# Patient Record
Sex: FEMALE | Race: BLACK OR AFRICAN AMERICAN | Employment: OTHER | URBAN - METROPOLITAN AREA
[De-identification: names, ages, dates, MRNs, and addresses within clinical notes are randomized per-mention and may not be internally consistent; named-entity substitution may affect disease eponyms.]

---

## 2017-02-06 PROBLEM — F32.5 MAJOR DEPRESSIVE DISORDER WITH SINGLE EPISODE, IN REMISSION (HCC): Status: ACTIVE | Noted: 2017-02-06

## 2017-02-06 PROBLEM — I12.0 CKD STAGE 5 SECONDARY TO HYPERTENSION (HCC): Status: ACTIVE | Noted: 2017-02-06

## 2017-02-06 PROBLEM — N18.5 CKD STAGE 5 SECONDARY TO HYPERTENSION (HCC): Status: ACTIVE | Noted: 2017-02-06

## 2017-04-09 ENCOUNTER — APPOINTMENT (OUTPATIENT)
Dept: GENERAL RADIOLOGY | Age: 67
End: 2017-04-09
Payer: MEDICARE

## 2017-04-09 ENCOUNTER — HOSPITAL ENCOUNTER (EMERGENCY)
Age: 67
Discharge: HOME | End: 2017-04-09
Attending: EMERGENCY MEDICINE
Payer: MEDICARE

## 2017-04-09 VITALS
TEMPERATURE: 98.2 F | SYSTOLIC BLOOD PRESSURE: 181 MMHG | OXYGEN SATURATION: 99 % | BODY MASS INDEX: 21.9 KG/M2 | RESPIRATION RATE: 16 BRPM | HEIGHT: 62 IN | DIASTOLIC BLOOD PRESSURE: 88 MMHG | HEART RATE: 65 BPM | WEIGHT: 119 LBS

## 2017-04-09 DIAGNOSIS — S30.0XXA SACRAL CONTUSION, INITIAL ENCOUNTER: Primary | ICD-10-CM
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
## 2017-04-09 PROCEDURE — 72170 X-RAY EXAM OF PELVIS: CPT

## 2017-04-09 PROCEDURE — 72100 X-RAY EXAM L-S SPINE 2/3 VWS: CPT

## 2017-04-09 PROCEDURE — 99283 EMERGENCY DEPT VISIT LOW MDM: CPT | Performed by: PHYSICIAN ASSISTANT

## 2017-04-09 RX ORDER — HYDROCODONE BITARTRATE AND ACETAMINOPHEN 5; 325 MG/1; MG/1
1 TABLET ORAL
Qty: 12 TAB | Refills: 0 | Status: SHIPPED | OUTPATIENT
Start: 2017-04-09 | End: 2017-05-27
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
## 2017-04-10 NOTE — DISCHARGE INSTRUCTIONS
Apply cool compresses to affected area as tolerated. You have a heart murmur that should be evaluated by a cardiologist. Ann Padilla have been seen by [facility] when you were in the hospital in the past. I am providing you with their name and contact information so that you can call tomorrow to arrange a follow up appointment. If you should have any change in your symptoms, worsening symptoms, or concerns return to the ER           Preventing Falls: Care Instructions  Your Care Instructions  Getting around your home safely can be a challenge if you have injuries or health problems that make it easy for you to fall. Loose rugs and furniture in walkways are among the dangers for many older people who have problems walking or who have poor eyesight. People who have conditions such as arthritis, osteoporosis, or dementia also have to be careful not to fall. You can make your home safer with a few simple measures. Follow-up care is a key part of your treatment and safety. Be sure to make and go to all appointments, and call your doctor if you are having problems. It's also a good idea to know your test results and keep a list of the medicines you take. How can you care for yourself at home? Taking care of yourself  · You may get dizzy if you do not drink enough water. To prevent dehydration, drink plenty of fluids, enough so that your urine is light yellow or clear like water. Choose water and other caffeine-free clear liquids. If you have kidney, heart, or liver disease and have to limit fluids, talk with your doctor before you increase the amount of fluids you drink. · Exercise regularly to improve your strength, muscle tone, and balance. Walk if you can. Swimming may be a good choice if you cannot walk easily. · Have your vision and hearing checked each year or any time you notice a change. If you have trouble seeing and hearing, you might not be able to avoid objects and could lose your balance.   · Know the side effects of the medicines you take. Ask your doctor or pharmacist whether the medicines you take can affect your balance. Sleeping pills or sedatives can affect your balance. · Limit the amount of alcohol you drink. Alcohol can impair your balance and other senses. · Ask your doctor whether calluses or corns on your feet need to be removed. If you wear loose-fitting shoes because of calluses or corns, you can lose your balance and fall. · Talk to your doctor if you have numbness in your feet. Preventing falls at home  · Remove raised doorway thresholds, throw rugs, and clutter. Repair loose carpet or raised areas in the floor. · Move furniture and electrical cords to keep them out of walking paths. · Use nonskid floor wax, and wipe up spills right away, especially on ceramic tile floors. · If you use a walker or cane, put rubber tips on it. If you use crutches, clean the bottoms of them regularly with an abrasive pad, such as steel wool. · Keep your house well lit, especially Carlos Landen, and outside walkways. Use night-lights in areas such as hallways and bathrooms. Add extra light switches or use remote switches (such as switches that go on or off when you clap your hands) to make it easier to turn lights on if you have to get up during the night. · Install sturdy handrails on stairways. · Move items in your cabinets so that the things you use a lot are on the lower shelves (about waist level). · Keep a cordless phone and a flashlight with new batteries by your bed. If possible, put a phone in each of the main rooms of your house, or carry a cell phone in case you fall and cannot reach a phone. Or, you can wear a device around your neck or wrist. You push a button that sends a signal for help. · Wear low-heeled shoes that fit well and give your feet good support. Use footwear with nonskid soles. Check the heels and soles of your shoes for wear.  Repair or replace worn heels or soles.  · Do not wear socks without shoes on wood floors. · Walk on the grass when the sidewalks are slippery. If you live in an area that gets snow and ice in the winter, sprinkle salt on slippery steps and sidewalks. Preventing falls in the bath  · Install grab bars and nonskid mats inside and outside your shower or tub and near the toilet and sinks. · Use shower chairs and bath benches. · Use a hand-held shower head that will allow you to sit while showering. · Get into a tub or shower by putting the weaker leg in first. Get out of a tub or shower with your strong side first.  · Repair loose toilet seats and consider installing a raised toilet seat to make getting on and off the toilet easier. · Keep your bathroom door unlocked while you are in the shower. Where can you learn more? Go to http://elfegoSeeqcourt.info/. Enter 0476 79 69 [id number] in the search box to learn more about \"Preventing Falls: Care Instructions. \"  Current as of: August 4, 2016  Content Version: 11.2  © 2722-1732 [employer]. Care instructions adapted under license by [employer] (which disclaims liability or warranty for this information). If you have questions about a medical condition or this instruction, always ask your healthcare professional. [place] [address] any warranty or liability for your use of this information. Low Back Contusion: Care Instructions  Your Care Instructions  Contusion is the medical term for a bruise. When you have a low back bruise, it's often caused by a direct blow or an impact, such as falling against a counter or table. Bruises are common sports injuries. Most people think of a bruise as a black-and-blue spot. This happens when small blood vessels get torn and leak blood under the skin. But bones, muscles, and organs can also get bruised. If these deep tissues are damaged, you may not always see a bruise. The doctor will examine your bruise.  You may also have tests to make sure you do not have a more serious injury, such as a broken bone or nerve damage. Tests may include X-rays or other imaging tests like a CT scan or MRI. Low back bruises may cause pain and swelling. But if there is no serious damage, they will often get better with home treatment in several days to a few weeks. The doctor has checked you carefully, but problems can develop later. If you notice any problems or new symptoms, get medical treatment right away. Follow-up care is a key part of your treatment and safety. Be sure to make and go to all appointments, and call your doctor if you are having problems. It's also a good idea to know your test results and keep a list of the medicines you take. How can you care for yourself at home? · Put ice or a cold pack on the sore area for 10 to 20 minutes at a time to stop swelling. Put a thin cloth between the ice pack and your skin. · Be safe with medicines. Read and follow all instructions on the label. ¨ If the doctor gave you a prescription medicine for pain, take it as prescribed. ¨ If you are not taking a prescription pain medicine, ask your doctor if you can take an over-the-counter medicine. · For the first day or two of pain, take it easy. But as soon as possible, get back to your normal daily life and activities. · Get gentle exercise, such as walking. Movement keeps your spine flexible and helps your muscles stay strong. When should you call for help? Call 911 anytime you think you may need emergency care. For example, call if:  · You are unable to move a leg at all. Call your doctor now or seek immediate medical care if:  · You have new or worse symptoms in your legs or buttocks. Symptoms may include:  ¨ Numbness or tingling. ¨ Weakness. ¨ Pain. · You lose bladder or bowel control. · You have blood in your urine.   Watch closely for changes in your health, and be sure to contact your doctor if:  · You do not get better as expected. Where can you learn more? Go to http://elfego-court.info/. Enter V337 in the search box to learn more about \"Low Back Contusion: Care Instructions. \"  Current as of: May 27, 2016  Content Version: 11.2  © 5417-6583 [employer]. Care instructions adapted under license by [employer] (which disclaims liability or warranty for this information). If you have questions about a medical condition or this instruction, always ask your healthcare professional. [address] any warranty or liability for your use of this information.

## 2017-05-27 ENCOUNTER — APPOINTMENT (OUTPATIENT)
Dept: GENERAL RADIOLOGY | Age: 67
End: 2017-05-27
Attending: EMERGENCY MEDICINE
Payer: MEDICARE

## 2017-05-27 ENCOUNTER — HOSPITAL ENCOUNTER (EMERGENCY)
Age: 67
Discharge: HOME | End: 2017-05-27
Attending: EMERGENCY MEDICINE
Payer: MEDICARE

## 2017-05-27 VITALS
SYSTOLIC BLOOD PRESSURE: 161 MMHG | DIASTOLIC BLOOD PRESSURE: 71 MMHG | TEMPERATURE: 98.5 F | OXYGEN SATURATION: 100 % | WEIGHT: 114 LBS | RESPIRATION RATE: 20 BRPM | HEART RATE: 66 BPM | HEIGHT: 61 IN | BODY MASS INDEX: 21.52 KG/M2

## 2017-05-27 DIAGNOSIS — N39.0 URINARY TRACT INFECTION WITHOUT HEMATURIA, SITE UNSPECIFIED: ICD-10-CM

## 2017-05-27 DIAGNOSIS — R53.83 MALAISE AND FATIGUE: ICD-10-CM

## 2017-05-27 DIAGNOSIS — R11.2 NON-INTRACTABLE VOMITING WITH NAUSEA, UNSPECIFIED VOMITING TYPE: Primary | ICD-10-CM

## 2017-05-27 DIAGNOSIS — R53.81 MALAISE AND FATIGUE: ICD-10-CM

## 2017-05-27 LAB
ALBUMIN SERPL BCP-MCNC: 3.1 G/DL (ref 3.2–4.6)
ALBUMIN/GLOB SERPL: 0.7 {RATIO} (ref 1.2–3.5)
ALP SERPL-CCNC: 53 U/L (ref 50–136)
ALT SERPL-CCNC: 7 U/L (ref 12–65)
ANION GAP BLD CALC-SCNC: 8 MMOL/L (ref 7–16)
AST SERPL W P-5'-P-CCNC: 10 U/L (ref 15–37)
ATRIAL RATE: 65 BPM
BACTERIA URNS QL MICRO: NORMAL /HPF
BASOPHILS # BLD AUTO: 0 K/UL (ref 0–0.2)
BASOPHILS # BLD: 0 % (ref 0–2)
BILIRUB SERPL-MCNC: 0.2 MG/DL (ref 0.2–1.1)
BUN SERPL-MCNC: 42 MG/DL (ref 8–23)
CALCIUM SERPL-MCNC: 8.4 MG/DL (ref 8.3–10.4)
CALCULATED P AXIS, ECG09: 66 DEGREES
CALCULATED R AXIS, ECG10: 58 DEGREES
CALCULATED T AXIS, ECG11: 126 DEGREES
CASTS URNS QL MICRO: NORMAL /LPF
CHLORIDE SERPL-SCNC: 116 MMOL/L (ref 98–107)
CO2 SERPL-SCNC: 19 MMOL/L (ref 21–32)
CREAT SERPL-MCNC: 4.44 MG/DL (ref 0.6–1)
DIAGNOSIS, 93000: NORMAL
DIFFERENTIAL METHOD BLD: ABNORMAL
EOSINOPHIL # BLD: 0.2 K/UL (ref 0–0.8)
EOSINOPHIL NFR BLD: 3 % (ref 0.5–7.8)
EPI CELLS #/AREA URNS HPF: NORMAL /HPF
ERYTHROCYTE [DISTWIDTH] IN BLOOD BY AUTOMATED COUNT: 13.8 % (ref 11.9–14.6)
GLOBULIN SER CALC-MCNC: 4.4 G/DL (ref 2.3–3.5)
GLUCOSE SERPL-MCNC: 89 MG/DL (ref 65–100)
HCT VFR BLD AUTO: 24 % (ref 35.8–46.3)
HGB BLD-MCNC: 8 G/DL (ref 11.7–15.4)
IMM GRANULOCYTES # BLD: 0 K/UL (ref 0–0.5)
IMM GRANULOCYTES NFR BLD AUTO: 0.2 % (ref 0–5)
LYMPHOCYTES # BLD AUTO: 35 % (ref 13–44)
LYMPHOCYTES # BLD: 2 K/UL (ref 0.5–4.6)
MCH RBC QN AUTO: 29.9 PG (ref 26.1–32.9)
MCHC RBC AUTO-ENTMCNC: 33.3 G/DL (ref 31.4–35)
MCV RBC AUTO: 89.6 FL (ref 79.6–97.8)
MONOCYTES # BLD: 0.4 K/UL (ref 0.1–1.3)
MONOCYTES NFR BLD AUTO: 8 % (ref 4–12)
NEUTS SEG # BLD: 3 K/UL (ref 1.7–8.2)
NEUTS SEG NFR BLD AUTO: 54 % (ref 43–78)
P-R INTERVAL, ECG05: 180 MS
PLATELET # BLD AUTO: 161 K/UL (ref 150–450)
PMV BLD AUTO: 12.5 FL (ref 10.8–14.1)
POTASSIUM SERPL-SCNC: 5.1 MMOL/L (ref 3.5–5.1)
PROT SERPL-MCNC: 7.5 G/DL (ref 6.3–8.2)
Q-T INTERVAL, ECG07: 392 MS
QRS DURATION, ECG06: 84 MS
QTC CALCULATION (BEZET), ECG08: 407 MS
RBC # BLD AUTO: 2.68 M/UL (ref 4.05–5.25)
RBC #/AREA URNS HPF: NORMAL /HPF
SODIUM SERPL-SCNC: 143 MMOL/L (ref 136–145)
TROPONIN I SERPL-MCNC: 0.07 NG/ML (ref 0.02–0.05)
VENTRICULAR RATE, ECG03: 65 BPM
WBC # BLD AUTO: 5.6 K/UL (ref 4.3–11.1)
WBC URNS QL MICRO: NORMAL /HPF

## 2017-05-27 PROCEDURE — 96374 THER/PROPH/DIAG INJ IV PUSH: CPT | Performed by: EMERGENCY MEDICINE

## 2017-05-27 PROCEDURE — 85025 COMPLETE CBC W/AUTO DIFF WBC: CPT | Performed by: EMERGENCY MEDICINE

## 2017-05-27 PROCEDURE — 99285 EMERGENCY DEPT VISIT HI MDM: CPT | Performed by: EMERGENCY MEDICINE

## 2017-05-27 PROCEDURE — 81015 MICROSCOPIC EXAM OF URINE: CPT | Performed by: EMERGENCY MEDICINE

## 2017-05-27 PROCEDURE — 71010 XR CHEST PORT: CPT

## 2017-05-27 PROCEDURE — 96375 TX/PRO/DX INJ NEW DRUG ADDON: CPT | Performed by: EMERGENCY MEDICINE

## 2017-05-27 PROCEDURE — 80053 COMPREHEN METABOLIC PANEL: CPT | Performed by: EMERGENCY MEDICINE

## 2017-05-27 PROCEDURE — 81003 URINALYSIS AUTO W/O SCOPE: CPT | Performed by: EMERGENCY MEDICINE

## 2017-05-27 PROCEDURE — 74011000250 HC RX REV CODE- 250: Performed by: EMERGENCY MEDICINE

## 2017-05-27 PROCEDURE — 93005 ELECTROCARDIOGRAM TRACING: CPT | Performed by: EMERGENCY MEDICINE

## 2017-05-27 PROCEDURE — 84484 ASSAY OF TROPONIN QUANT: CPT | Performed by: EMERGENCY MEDICINE

## 2017-05-27 PROCEDURE — 74011250636 HC RX REV CODE- 250/636: Performed by: EMERGENCY MEDICINE

## 2017-05-27 PROCEDURE — 74011250637 HC RX REV CODE- 250/637: Performed by: EMERGENCY MEDICINE

## 2017-05-27 RX ORDER — AMLODIPINE BESYLATE 10 MG/1
TABLET ORAL DAILY
COMMUNITY
End: 2017-11-28 | Stop reason: SDUPTHER

## 2017-05-27 RX ORDER — SODIUM CHLORIDE 0.9 % (FLUSH) 0.9 %
5-10 SYRINGE (ML) INJECTION EVERY 8 HOURS
Status: DISCONTINUED | OUTPATIENT
Start: 2017-05-27 | End: 2017-05-27 | Stop reason: HOSPADM

## 2017-05-27 RX ORDER — ONDANSETRON HYDROCHLORIDE 8 MG/1
8 TABLET, FILM COATED ORAL
Qty: 10 TAB | Refills: 0 | Status: SHIPPED | OUTPATIENT
Start: 2017-05-27 | End: 2017-11-28

## 2017-05-27 RX ORDER — LABETALOL HYDROCHLORIDE 5 MG/ML
20 INJECTION, SOLUTION INTRAVENOUS
Status: COMPLETED | OUTPATIENT
Start: 2017-05-27 | End: 2017-05-27

## 2017-05-27 RX ORDER — SULFAMETHOXAZOLE AND TRIMETHOPRIM 800; 160 MG/1; MG/1
1 TABLET ORAL 2 TIMES DAILY
Qty: 14 TAB | Refills: 0 | Status: SHIPPED | OUTPATIENT
Start: 2017-05-27 | End: 2017-11-11

## 2017-05-27 RX ORDER — LOSARTAN POTASSIUM 100 MG/1
100 TABLET ORAL DAILY
COMMUNITY
End: 2017-11-28 | Stop reason: SDUPTHER

## 2017-05-27 RX ORDER — SULFAMETHOXAZOLE AND TRIMETHOPRIM 800; 160 MG/1; MG/1
1 TABLET ORAL
Status: COMPLETED | OUTPATIENT
Start: 2017-05-27 | End: 2017-05-27

## 2017-05-27 RX ORDER — LISINOPRIL 5 MG/1
20 TABLET ORAL ONCE
Status: COMPLETED | OUTPATIENT
Start: 2017-05-27 | End: 2017-05-27

## 2017-05-27 RX ORDER — HYDRALAZINE HYDROCHLORIDE 100 MG/1
TABLET, FILM COATED ORAL 3 TIMES DAILY
COMMUNITY
End: 2017-11-28 | Stop reason: SDUPTHER

## 2017-05-27 RX ORDER — AMLODIPINE BESYLATE 10 MG/1
10 TABLET ORAL
Status: COMPLETED | OUTPATIENT
Start: 2017-05-27 | End: 2017-05-27

## 2017-05-27 RX ORDER — ONDANSETRON 2 MG/ML
4 INJECTION INTRAMUSCULAR; INTRAVENOUS
Status: COMPLETED | OUTPATIENT
Start: 2017-05-27 | End: 2017-05-27

## 2017-05-27 RX ORDER — SODIUM CHLORIDE 0.9 % (FLUSH) 0.9 %
5-10 SYRINGE (ML) INJECTION AS NEEDED
Status: DISCONTINUED | OUTPATIENT
Start: 2017-05-27 | End: 2017-05-27 | Stop reason: HOSPADM

## 2017-05-27 RX ADMIN — SULFAMETHOXAZOLE AND TRIMETHOPRIM 1 TABLET: 800; 160 TABLET ORAL at 04:47

## 2017-05-27 RX ADMIN — LISINOPRIL 20 MG: 5 TABLET ORAL at 03:10

## 2017-05-27 RX ADMIN — LABETALOL HYDROCHLORIDE 20 MG: 5 INJECTION, SOLUTION INTRAVENOUS at 03:10

## 2017-05-27 RX ADMIN — AMLODIPINE BESYLATE 10 MG: 10 TABLET ORAL at 03:10

## 2017-05-27 RX ADMIN — ONDANSETRON 4 MG: 2 INJECTION INTRAMUSCULAR; INTRAVENOUS at 03:10

## 2017-05-27 NOTE — DISCHARGE INSTRUCTIONS
Nausea and Vomiting: Care Instructions  Your Care Instructions    When you are nauseated, you may feel weak and sweaty and notice a lot of saliva in your mouth. Nausea often leads to vomiting. Most of the time you do not need to worry about nausea and vomiting, but they can be signs of other illnesses. Two common causes of nausea and vomiting are stomach flu and food poisoning. Nausea and vomiting from viral stomach flu will usually start to improve within 24 hours. Nausea and vomiting from food poisoning may last from 12 to 48 hours. The doctor has checked you carefully, but problems can develop later. If you notice any problems or new symptoms, get medical treatment right away. Follow-up care is a key part of your treatment and safety. Be sure to make and go to all appointments, and call your doctor if you are having problems. It's also a good idea to know your test results and keep a list of the medicines you take. How can you care for yourself at home? · To prevent dehydration, drink plenty of fluids, enough so that your urine is light yellow or clear like water. Choose water and other caffeine-free clear liquids until you feel better. If you have kidney, heart, or liver disease and have to limit fluids, talk with your doctor before you increase the amount of fluids you drink. · Rest in bed until you feel better. · When you are able to eat, try clear soups, mild foods, and liquids until all symptoms are gone for 12 to 48 hours. Other good choices include dry toast, crackers, cooked cereal, and gelatin dessert, such as Jell-O. When should you call for help? Call 911 anytime you think you may need emergency care. For example, call if:  · You passed out (lost consciousness). Call your doctor now or seek immediate medical care if:  · You have symptoms of dehydration, such as:  ¨ Dry eyes and a dry mouth. ¨ Passing only a little dark urine.   ¨ Feeling thirstier than usual.  · You have new or worsening belly pain. · You have a new or higher fever. · You vomit blood or what looks like coffee grounds. Watch closely for changes in your health, and be sure to contact your doctor if:  · You have ongoing nausea and vomiting. · Your vomiting is getting worse. · Your vomiting lasts longer than 2 days. · You are not getting better as expected. Where can you learn more? Go to http://elfego-court.info/. Enter 25 [id number] in the search box to learn more about \"Nausea and Vomiting: Care Instructions. \"  Current as of: May 27, 2016  Content Version: 11.2  © 7380-1467 [employer]. Care instructions adapted under license by [employer] (which disclaims liability or warranty for this information). If you have questions about a medical condition or this instruction, always ask your healthcare professional. [address] any warranty or liability for your use of this information. Urinary Tract Infection in Women: Care Instructions  Your Care Instructions    A urinary tract infection, or UTI, is a general term for an infection anywhere between the kidneys and the urethra (where urine comes out). Most UTIs are bladder infections. They often cause pain or burning when you urinate. UTIs are caused by bacteria and can be cured with antibiotics. Be sure to complete your treatment so that the infection goes away. Follow-up care is a key part of your treatment and safety. Be sure to make and go to all appointments, and call your doctor if you are having problems. It's also a good idea to know your test results and keep a list of the medicines you take. How can you care for yourself at home? · Take your antibiotics as directed. Do not stop taking them just because you feel better. You need to take the full course of antibiotics. · Drink extra water and other fluids for the next day or two. This may help wash out the bacteria that are causing the infection.  (If you have kidney, heart, or liver disease and have to limit fluids, talk with your doctor before you increase your fluid intake.)  · Avoid drinks that are carbonated or have caffeine. They can irritate the bladder. · Urinate often. Try to empty your bladder each time. · To relieve pain, take a hot bath or lay a heating pad set on low over your lower belly or genital area. Never go to sleep with a heating pad in place. To prevent UTIs  · Drink plenty of water each day. This helps you urinate often, which clears bacteria from your system. (If you have kidney, heart, or liver disease and have to limit fluids, talk with your doctor before you increase your fluid intake.)  · Urinate when you need to. · Urinate right after you have sex. · Change sanitary pads often. · Avoid douches, bubble baths, feminine hygiene sprays, and other feminine hygiene products that have deodorants. · After going to the bathroom, wipe from front to back. When should you call for help? Call your doctor now or seek immediate medical care if:  · Symptoms such as fever, chills, nausea, or vomiting get worse or appear for the first time. · You have new pain in your back just below your rib cage. This is called flank pain. · There is new blood or pus in your urine. · You have any problems with your antibiotic medicine. Watch closely for changes in your health, and be sure to contact your doctor if:  · You are not getting better after taking an antibiotic for 2 days. · Your symptoms go away but then come back. Where can you learn more? Go to http://elfego-court.info/. Enter A143 in the search box to learn more about \"Urinary Tract Infection in Women: Care Instructions. \"  Current as of: November 28, 2016  Content Version: 11.2  © 9096-2003 [employer]. Care instructions adapted under license by [employer] (which disclaims liability or warranty for this information).  If you have questions about a medical condition or this instruction, always ask your healthcare professional. [place] [address] any warranty or liability for your use of this information.

## 2017-05-27 NOTE — ED TRIAGE NOTES
Pt arrives per [employer]. Pt states she woke up with generalized weakness and had some nausea vomiting yesterday. Past history of CVA with left sided facial droop and MI. Pt has history of hypertension and states she is noncompliant with meds.

## 2017-11-09 ENCOUNTER — APPOINTMENT (OUTPATIENT)
Dept: CT IMAGING | Age: 67
DRG: 065 | End: 2017-11-09
Attending: STUDENT IN AN ORGANIZED HEALTH CARE EDUCATION/TRAINING PROGRAM
Payer: MEDICARE

## 2017-11-09 ENCOUNTER — HOSPITAL ENCOUNTER (INPATIENT)
Age: 67
LOS: 2 days | Discharge: HOME | DRG: 065 | End: 2017-11-11
Attending: STUDENT IN AN ORGANIZED HEALTH CARE EDUCATION/TRAINING PROGRAM | Admitting: INTERNAL MEDICINE
Payer: MEDICARE

## 2017-11-09 DIAGNOSIS — I63.9 CEREBROVASCULAR ACCIDENT (CVA), UNSPECIFIED MECHANISM (HCC): Primary | ICD-10-CM

## 2017-11-09 LAB
ALBUMIN SERPL-MCNC: 3.4 G/DL (ref 3.2–4.6)
ALBUMIN/GLOB SERPL: 0.8 {RATIO} (ref 1.2–3.5)
ALP SERPL-CCNC: 57 U/L (ref 50–136)
ALT SERPL-CCNC: 10 U/L (ref 12–65)
ANION GAP SERPL CALC-SCNC: 10 MMOL/L (ref 7–16)
AST SERPL-CCNC: 15 U/L (ref 15–37)
ATRIAL RATE: 87 BPM
BASOPHILS # BLD: 0 K/UL (ref 0–0.2)
BASOPHILS NFR BLD: 0 % (ref 0–2)
BILIRUB SERPL-MCNC: 0.2 MG/DL (ref 0.2–1.1)
BUN SERPL-MCNC: 51 MG/DL (ref 8–23)
CALCIUM SERPL-MCNC: 8.6 MG/DL (ref 8.3–10.4)
CALCULATED P AXIS, ECG09: 90 DEGREES
CALCULATED R AXIS, ECG10: 66 DEGREES
CALCULATED T AXIS, ECG11: 151 DEGREES
CHLORIDE SERPL-SCNC: 114 MMOL/L (ref 98–107)
CO2 SERPL-SCNC: 16 MMOL/L (ref 21–32)
CREAT SERPL-MCNC: 4.64 MG/DL (ref 0.6–1)
DIAGNOSIS, 93000: NORMAL
DIFFERENTIAL METHOD BLD: ABNORMAL
EOSINOPHIL # BLD: 0.2 K/UL (ref 0–0.8)
EOSINOPHIL NFR BLD: 1 % (ref 0.5–7.8)
ERYTHROCYTE [DISTWIDTH] IN BLOOD BY AUTOMATED COUNT: 14 % (ref 11.9–14.6)
GLOBULIN SER CALC-MCNC: 4.3 G/DL (ref 2.3–3.5)
GLUCOSE BLD-MCNC: 156 MG/DL (ref 65–100)
GLUCOSE SERPL-MCNC: 158 MG/DL (ref 65–100)
HCT VFR BLD AUTO: 26.8 % (ref 35.8–46.3)
HGB BLD-MCNC: 9.2 G/DL (ref 11.7–15.4)
IMM GRANULOCYTES # BLD: 0 K/UL (ref 0–0.5)
IMM GRANULOCYTES NFR BLD AUTO: 0 % (ref 0–5)
INR BLD: 1.1 (ref 0.9–1.2)
LYMPHOCYTES # BLD: 2.1 K/UL (ref 0.5–4.6)
LYMPHOCYTES NFR BLD: 17 % (ref 13–44)
MCH RBC QN AUTO: 30 PG (ref 26.1–32.9)
MCHC RBC AUTO-ENTMCNC: 34.3 G/DL (ref 31.4–35)
MCV RBC AUTO: 87.3 FL (ref 79.6–97.8)
MONOCYTES # BLD: 0.5 K/UL (ref 0.1–1.3)
MONOCYTES NFR BLD: 4 % (ref 4–12)
NEUTS SEG # BLD: 9.4 K/UL (ref 1.7–8.2)
NEUTS SEG NFR BLD: 78 % (ref 43–78)
P-R INTERVAL, ECG05: 172 MS
PLATELET # BLD AUTO: 192 K/UL (ref 150–450)
PMV BLD AUTO: 11.9 FL (ref 10.8–14.1)
POTASSIUM SERPL-SCNC: 5.2 MMOL/L (ref 3.5–5.1)
PROT SERPL-MCNC: 7.7 G/DL (ref 6.3–8.2)
PT BLD: 12.7 SECS (ref 9.6–11.6)
Q-T INTERVAL, ECG07: 334 MS
QRS DURATION, ECG06: 86 MS
QTC CALCULATION (BEZET), ECG08: 401 MS
RBC # BLD AUTO: 3.07 M/UL (ref 4.05–5.25)
SODIUM SERPL-SCNC: 140 MMOL/L (ref 136–145)
TROPONIN I BLD-MCNC: 0 NG/ML (ref 0.02–0.05)
VENTRICULAR RATE, ECG03: 87 BPM
WBC # BLD AUTO: 12.1 K/UL (ref 4.3–11.1)

## 2017-11-09 PROCEDURE — 93005 ELECTROCARDIOGRAM TRACING: CPT | Performed by: STUDENT IN AN ORGANIZED HEALTH CARE EDUCATION/TRAINING PROGRAM

## 2017-11-09 PROCEDURE — 85610 PROTHROMBIN TIME: CPT

## 2017-11-09 PROCEDURE — 65660000000 HC RM CCU STEPDOWN

## 2017-11-09 PROCEDURE — 85025 COMPLETE CBC W/AUTO DIFF WBC: CPT | Performed by: STUDENT IN AN ORGANIZED HEALTH CARE EDUCATION/TRAINING PROGRAM

## 2017-11-09 PROCEDURE — 74011250637 HC RX REV CODE- 250/637: Performed by: INTERNAL MEDICINE

## 2017-11-09 PROCEDURE — 70450 CT HEAD/BRAIN W/O DYE: CPT

## 2017-11-09 PROCEDURE — 84484 ASSAY OF TROPONIN QUANT: CPT

## 2017-11-09 PROCEDURE — 82962 GLUCOSE BLOOD TEST: CPT

## 2017-11-09 PROCEDURE — 99285 EMERGENCY DEPT VISIT HI MDM: CPT | Performed by: STUDENT IN AN ORGANIZED HEALTH CARE EDUCATION/TRAINING PROGRAM

## 2017-11-09 PROCEDURE — 80053 COMPREHEN METABOLIC PANEL: CPT | Performed by: STUDENT IN AN ORGANIZED HEALTH CARE EDUCATION/TRAINING PROGRAM

## 2017-11-09 RX ORDER — HEPARIN SODIUM 5000 [USP'U]/ML
5000 INJECTION, SOLUTION INTRAVENOUS; SUBCUTANEOUS EVERY 8 HOURS
Status: DISCONTINUED | OUTPATIENT
Start: 2017-11-10 | End: 2017-11-11 | Stop reason: HOSPADM

## 2017-11-09 RX ORDER — SODIUM CHLORIDE 0.9 % (FLUSH) 0.9 %
5-10 SYRINGE (ML) INJECTION AS NEEDED
Status: DISCONTINUED | OUTPATIENT
Start: 2017-11-09 | End: 2017-11-11 | Stop reason: HOSPADM

## 2017-11-09 RX ORDER — AMLODIPINE BESYLATE 10 MG/1
10 TABLET ORAL DAILY
Status: DISCONTINUED | OUTPATIENT
Start: 2017-11-10 | End: 2017-11-11 | Stop reason: HOSPADM

## 2017-11-09 RX ORDER — METOPROLOL SUCCINATE 50 MG/1
50 TABLET, EXTENDED RELEASE ORAL 2 TIMES DAILY
Status: DISCONTINUED | OUTPATIENT
Start: 2017-11-10 | End: 2017-11-11 | Stop reason: HOSPADM

## 2017-11-09 RX ORDER — LOSARTAN POTASSIUM 50 MG/1
100 TABLET ORAL DAILY
Status: DISCONTINUED | OUTPATIENT
Start: 2017-11-10 | End: 2017-11-11 | Stop reason: HOSPADM

## 2017-11-09 RX ORDER — HYDRALAZINE HYDROCHLORIDE 50 MG/1
100 TABLET, FILM COATED ORAL EVERY 8 HOURS
Status: DISCONTINUED | OUTPATIENT
Start: 2017-11-10 | End: 2017-11-11 | Stop reason: HOSPADM

## 2017-11-09 RX ORDER — ASPIRIN 325 MG
325 TABLET ORAL DAILY
Status: DISCONTINUED | OUTPATIENT
Start: 2017-11-10 | End: 2017-11-11 | Stop reason: HOSPADM

## 2017-11-09 RX ORDER — FLUOXETINE HYDROCHLORIDE 20 MG/1
20 CAPSULE ORAL DAILY
Status: DISCONTINUED | OUTPATIENT
Start: 2017-11-10 | End: 2017-11-11 | Stop reason: HOSPADM

## 2017-11-09 RX ORDER — SODIUM BICARBONATE 650 MG/1
650 TABLET ORAL 2 TIMES DAILY
Status: DISCONTINUED | OUTPATIENT
Start: 2017-11-10 | End: 2017-11-11 | Stop reason: HOSPADM

## 2017-11-09 RX ORDER — SODIUM CHLORIDE 0.9 % (FLUSH) 0.9 %
5-10 SYRINGE (ML) INJECTION EVERY 8 HOURS
Status: DISCONTINUED | OUTPATIENT
Start: 2017-11-10 | End: 2017-11-11 | Stop reason: HOSPADM

## 2017-11-09 RX ORDER — ACETAMINOPHEN 325 MG/1
650 TABLET ORAL
Status: DISCONTINUED | OUTPATIENT
Start: 2017-11-09 | End: 2017-11-11 | Stop reason: HOSPADM

## 2017-11-09 RX ORDER — ATORVASTATIN CALCIUM 40 MG/1
40 TABLET, FILM COATED ORAL DAILY
Status: DISCONTINUED | OUTPATIENT
Start: 2017-11-10 | End: 2017-11-11 | Stop reason: HOSPADM

## 2017-11-09 RX ADMIN — HYDRALAZINE HYDROCHLORIDE 100 MG: 50 TABLET, FILM COATED ORAL at 23:58

## 2017-11-09 NOTE — IP AVS SNAPSHOT
Jaci Tamayo 
 
 
 [address] 
[phone] Patient: Hank Pichardo MRN: [id number] LCR:[id number] My Medications STOP taking these medications   
 aspirin, buffered 81 mg Tab  
   
  
 trimethoprim-sulfamethoxazole 160-800 mg per tablet Commonly known as:  BACTRIM DS, SEPTRA DS  
   
  
  
TAKE these medications as instructed Instructions Each Dose to Equal  
 Morning Noon Evening Bedtime  
 amLODIPine 10 mg tablet Commonly known as:  Horris Bills Take  by mouth daily. Had today  
   
   
   
  
 aspirin 325 mg tablet Commonly known as:  ASPIRIN Start taking on:  11/12/2017 Take 1 Tab by mouth daily. 325 mg Had today  
   
   
   
  
 atorvastatin 80 mg tablet Commonly known as:  LIPITOR Start taking on:  11/12/2017 Take 1 Tab by mouth daily. 80 mg Had today FLUoxetine 20 mg capsule Commonly known as:  PROzac Take 1 Cap by mouth daily. 20 mg Had today  
   
   
   
  
 hydrALAZINE 100 mg tablet Commonly known as:  APRESOLINE Your next dose is:  Next dose at 1 pm  
   
 Take  by mouth three (3) times daily. Had today  
 1pm take 7pm take 
  
   
  
 losartan 100 mg tablet Commonly known as:  COZAAR Take 100 mg by mouth daily. 100 mg Had today  
   
   
   
  
 metoprolol succinate 50 mg XL tablet Commonly known as:  TOPROL-XL Take 1 Tab by mouth two (2) times a day. 50 mg Had today  
   
   
   
  
 ondansetron hcl 8 mg tablet Commonly known as:  ZOFRAN (AS HYDROCHLORIDE) Take 1 Tab by mouth every eight (8) hours as needed for Nausea. 8 mg  
    
   
   
   
  
 sodium bicarbonate 650 mg tablet Take 1 Tab by mouth two (2) times a day. 650 mg Had today Take 7pm  
   
  
  
  
Where to Get Your Medications Information on where to get these meds will be given to you by the nurse or doctor. ! Ask your nurse or doctor about these medications  
  aspirin 325 mg tablet  
 atorvastatin 80 mg tablet

## 2017-11-09 NOTE — IP AVS SNAPSHOT
[address] 
[phone] Patient: Xenia Lara MRN: [id number] CANDIE:[birth year] About your hospitalization You were admitted on:  November 9, 2017 You last received care in the:  [facility] 7 MED SURG You were discharged on:  November 11, 2017 Why you were hospitalized Your primary diagnosis was:  Not on File Your diagnoses also included: Tobacco Use Disorder, History Of Cva (Cerebrovascular Accident), Ckd Stage 5 Secondary To Hypertension (Hcc), Anemia Of Renal Disease, Cva (Cerebral Vascular Accident) (Hcc), Uti (Urinary Tract Infection), Stroke (Hcc) Things You Need To Do (next 8 weeks) Schedule an appointment with Janette Rizo MD as soon as possible for a visit in 2 week(s) Eval right carotid, limited duplex US, see Dr Charles Can Phone:  [phone] Where:  [facility], [address] [facility], [facility] [address] [facility] [address] Schedule an appointment with Jacky Carter MD as soon as possible for a visit in 1 week(s) Phone:  [phone] Where:  [facility] [address] Wednesday Nov 15, 2017 Office Visit with Geovani Felder DO at  9:30 AM  
Where:  [facility] ([facility] [address]) Discharge Orders None A check hardik indicates which time of day the medication should be taken. My Medications STOP taking these medications   
 aspirin, buffered 81 mg Tab  
   
  
 trimethoprim-sulfamethoxazole 160-800 mg per tablet Commonly known as:  BACTRIM DS, SEPTRA DS  
   
  
  
TAKE these medications as instructed Instructions Each Dose to Equal  
 Morning Noon Evening Bedtime  
 amLODIPine 10 mg tablet Commonly known as:  Love Breach Take  by mouth daily. Had today  
   
   
   
  
 aspirin 325 mg tablet Commonly known as:  ASPIRIN  
 Start taking on:  11/12/2017 Take 1 Tab by mouth daily. 325 mg Had today  
   
   
   
  
 atorvastatin 80 mg tablet Commonly known as:  LIPITOR Start taking on:  11/12/2017 Take 1 Tab by mouth daily. 80 mg Had today FLUoxetine 20 mg capsule Commonly known as:  PROzac Take 1 Cap by mouth daily. 20 mg Had today  
   
   
   
  
 hydrALAZINE 100 mg tablet Commonly known as:  APRESOLINE Your next dose is:  Next dose at 1 pm  
   
 Take  by mouth three (3) times daily. Had today  
 1pm take 7pm take 
  
   
  
 losartan 100 mg tablet Commonly known as:  COZAAR Take 100 mg by mouth daily. 100 mg Had today  
   
   
   
  
 metoprolol succinate 50 mg XL tablet Commonly known as:  TOPROL-XL Take 1 Tab by mouth two (2) times a day. 50 mg Had today  
   
   
   
  
 ondansetron hcl 8 mg tablet Commonly known as:  ZOFRAN (AS HYDROCHLORIDE) Take 1 Tab by mouth every eight (8) hours as needed for Nausea. 8 mg  
    
   
   
   
  
 sodium bicarbonate 650 mg tablet Take 1 Tab by mouth two (2) times a day. 650 mg Had today Take 7pm  
   
  
  
  
Where to Get Your Medications Information on where to get these meds will be given to you by the nurse or doctor. ! Ask your nurse or doctor about these medications  
  aspirin 325 mg tablet  
 atorvastatin 80 mg tablet Discharge Instructions Stroke: After Your Visit Your Care Instructions You have had a stroke. Risk factors for stroke include being overweight, smoking, and sedentary lifestyle. This means that the blood flow to a part of your brain was blocked for some time, which damages the nerve cells in that part of the brain. The part of your body controlled by that part of your brain may not function properly now. The brain is an amazing organ that can heal itself to some degree.  The stroke you had damaged part of your brain, but other parts of your brain may take over in some way for the damaged areas. You have already started this process. Going home may be hard for you and your family. The more you can try to do for yourself, the better. Remember to take each day one at a time. Follow-up care is a key part of your treatment and safety. Be sure to make and go to all appointments, and call your doctor if you are having problems. Its also a good idea to know your test results and keep a list of the medicines you take. How can you care for yourself at home? Enter a stroke rehabilitation (rehab) program, if your doctor recommends it. Physical, speech, and occupational therapies can help you manage bathing, dressing, eating, and other basics of daily living. Eat a heart-healthy diet that is low in cholesterol, saturated fat, and salt. Eat lots of fresh fruits and vegetables and foods high in fiber. Increase your activities slowly. Take short rest breaks when you get tired. Gradually increase the amount you walk. Start out by walking a little more than you did the day before. Do not drive until your doctor says it is okay. It is normal to feel sad or depressed after a stroke. If the blues last, talk to your doctor. If you are having problems with urine leakage, go to the bathroom at regular times, including when you first wake up and at bedtime. Also, limit fluids after dinner. If you are constipated, drink plenty of fluids, enough so that your urine is light yellow or clear like water. If you have kidney, heart, or liver disease and have to limit fluids, talk with your doctor before you increase the amount of fluids you drink. Set up a regular time for using the toilet. If you continue to have constipation, your doctor may suggest using a bulking agent, such as Metamucil, or a stool softener, laxative, or enema. Medicines Take your medicines exactly as prescribed. Call your doctor if you think you are having a problem with your medicine. You may be taking several medicines. ACE (angiotensin-converting enzyme) inhibitors, angiotensin II receptor blockers (ARBs), beta-blockers, diuretics (water pills), and calcium channel blockers control your blood pressure. Statins help lower cholesterol. Your doctor may also prescribe medicines for depression, pain, sleep problems, anxiety, or agitation. If your doctor has given you medicine that prevents blood clots, such as warfarin (Coumadin), aspirin combined with extended-release dipyridamole (Aggrenox), clopidogrel (Plavix), or aspirin to prevent another stroke, you should: 
Tell your dentist, pharmacist, and other health professionals that you take these medicines. Watch for unusual bruising or bleeding, such as blood in your urine, red or black stools, or bleeding from your nose or gums. Get regular blood tests to check your clotting time if you are taking Coumadin. Wear medical alert jewelry that says you take blood thinners. You can buy this at most drugsZignals. Do not take any over-the-counter medicines or herbal products without talking to your doctor first. 
If you take birth control pills or hormone replacement therapy, talk to your doctor about whether they are right for you. For family members and caregivers Make the home safe. Set up a room so that your loved one does not have to climb stairs. Be sure the bathroom is on the same floor. Move throw rugs and furniture that could cause falls, and make sure that the lighting is good. Put grab bars and seats in tubs and showers. Find out what your loved one can do and what he or she needs help with. Try not to do things for your loved one that your loved one can do on his or her own. Help him or her learn and practice new skills. Visit and talk with your loved one often.  Try doing activities together that you both enjoy, such as playing cards or board games. Keep in touch with your loved one's friends as much as you can, and encourage them to visit. Take care of yourself. Do not try to do everything yourself. Ask other family members to help. Eat well, get enough rest, and take time to do things that you enjoy. Keep up with your own doctor visits, and make sure to take your medicines regularly. Get out of the house as much as you can. Join a local support group. Find out if you qualify for home health care visits to help with rehab or for adult day care. When should you call for help? Call 911 anytime you think you may need emergency care. For example, call if: 
You have signs of another stroke. These may include: 
Sudden numbness, paralysis, or weakness in your face, arm, or leg, especially on only one side of your body. New problems with walking or balance. Sudden vision changes. Drooling or slurred speech. New problems speaking or understanding simple statements, or you feel confused. A sudden, severe headache that is different from past headaches. Call 911 even if these symptoms go away in a few minutes. You cough up blood. You vomit blood or what looks like coffee grounds. You pass maroon or very bloody stools. Call your doctor now or seek immediate medical care if: 
You have new bruises or blood spots under your skin. You have a nosebleed. Your gums bleed when you brush your teeth. You have blood in your urine. Your stools are black and tarlike or have streaks of blood. You have vaginal bleeding when you are not having your period, or heavy period bleeding. You have new symptoms that may be related to your stroke, such as falls or trouble swallowing. Watch closely for changes in your health, and be sure to contact your doctor if you have any problems. Where can you learn more? Go to DealExplorer.be Enter O658  in the search box to learn more about \"Stroke: After Your Visit\". © 1496-7476 Healthwise, Incorporated. Care instructions adapted under license by [facility] [employer] (which disclaims liability or warranty for this information). This care instruction is for use with your licensed healthcare professional. If you have questions about a medical condition or this instruction, always ask your healthcare professional. Ally Casey any warranty or liability for your use of this information. Ignite[employer] Announcement We are excited to announce that we are making your provider's discharge notes available to you in [email or web address]. You will see these notes when they are completed and signed by the physician that discharged you from your recent hospital stay. If you have any questions or concerns about any information you see in Ignite100, please call the Health Information Department where you were seen or reach out to your Primary Care Provider for more information about your plan of care. Introducing [facility]! [facility] introduces Ignite100 patient portal. Now you can access parts of your medical record, email your doctor's office, and request medication refills online. 1. In your internet browser, go to [email or web address] 2. Click on the First Time User? Click Here link in the Sign In box. You will see the New Member Sign Up page. 3. Enter your [email or web address] Access Code exactly as it appears below. You will not need to use this code after youve completed the sign-up process. If you do not sign up before the expiration date, you must request a new code. · [email or web address] Access Code: [email or web address] Expires: 1/31/2018 12:16 PM 
 
4. Enter the last four digits of your Social Security Number (xxxx) and Date of Birth (mm/dd/yyyy) as indicated and click Submit. You will be taken to the next sign-up page. 5. Create a Talkray ID. This will be your Talkray login ID and cannot be changed, so think of one that is secure and easy to remember. 6. Create a Talkray password. You can change your password at any time. 7. Enter your Password Reset Question and Answer. This can be used at a later time if you forget your password. 8. Enter your e-mail address. You will receive e-mail notification when new information is available in [address] 9. Click Sign Up. You can now view and download portions of your medical record. 10. Click the Download Summary menu link to download a portable copy of your medical information. If you have questions, please visit the Frequently Asked Questions section of the Talkray website. Remember, Talkray is NOT to be used for urgent needs. For medical emergencies, dial 911. Now available from your iPhone and Android! Unresulted Labs-Please follow up with your PCP about these lab tests Order Current Status CULTURE, URINE Preliminary result Providers Seen During Your Hospitalization Provider Specialty Primary office phone Ann Ortiz DO Emergency Medicine [phone] Carola Monzon, [address] [place] [address] Internal Medicine [phone] Immunizations Administered for This Admission Name Date Influenza Vaccine (Quad) PF  Deferred () TB Skin Test (PPD) Intradermal  Deferred (), 11/10/2017 Your Primary Care Physician (PCP) Primary Care Physician Office Phone Office Fax [address]  [address] [phone] You are allergic to the following Allergen Reactions Codeine Nausea and Vomiting Recent Documentation Height Weight BMI OB Status Smoking Status 1.549 m 51.7 kg 21.54 kg/m2 Postmenopausal Current Every Day Smoker Emergency Contacts Name Discharge Info Relation Home Work Mobile FitjaDDN 10 CAREGIVER [3] Daughter [21] [phone] Missael Uriarte  Mother [14] [phone] Patient Belongings The following personal items are in your possession at time of discharge: 
  Dental Appliances: None         Home Medications: Sent home   Jewelry: None  Clothing: At bedside    Other Valuables: At bedside Please provide this summary of care documentation to your next provider. Signatures-by signing, you are acknowledging that this After Visit Summary has been reviewed with you and you have received a copy. Patient Signature:  ____________________________________________________________ Date:  ____________________________________________________________  
  
[facility] Provider Signature:  ____________________________________________________________ Date:  ____________________________________________________________

## 2017-11-10 ENCOUNTER — APPOINTMENT (OUTPATIENT)
Dept: MRI IMAGING | Age: 67
DRG: 065 | End: 2017-11-10
Attending: INTERNAL MEDICINE
Payer: MEDICARE

## 2017-11-10 ENCOUNTER — APPOINTMENT (OUTPATIENT)
Dept: ULTRASOUND IMAGING | Age: 67
DRG: 065 | End: 2017-11-10
Attending: INTERNAL MEDICINE
Payer: MEDICARE

## 2017-11-10 PROBLEM — N39.0 UTI (URINARY TRACT INFECTION): Status: ACTIVE | Noted: 2017-11-10

## 2017-11-10 PROBLEM — I63.9 STROKE (HCC): Status: ACTIVE | Noted: 2017-11-10

## 2017-11-10 LAB
APPEARANCE UR: ABNORMAL
BACTERIA URNS QL MICRO: ABNORMAL /HPF
BILIRUB UR QL: NEGATIVE
CASTS URNS QL MICRO: ABNORMAL /LPF
CHOLEST SERPL-MCNC: 152 MG/DL
COLOR UR: YELLOW
EPI CELLS #/AREA URNS HPF: ABNORMAL /HPF
EST. AVERAGE GLUCOSE BLD GHB EST-MCNC: ABNORMAL MG/DL
GLUCOSE UR STRIP.AUTO-MCNC: NEGATIVE MG/DL
HBA1C MFR BLD: <3.5 % (ref 4.8–6)
HDLC SERPL-MCNC: 52 MG/DL (ref 40–60)
HDLC SERPL: 2.9 {RATIO}
HGB UR QL STRIP: NEGATIVE
KETONES UR QL STRIP.AUTO: NEGATIVE MG/DL
LDLC SERPL CALC-MCNC: 85.6 MG/DL
LEUKOCYTE ESTERASE UR QL STRIP.AUTO: ABNORMAL
LIPID PROFILE,FLP: NORMAL
NITRITE UR QL STRIP.AUTO: NEGATIVE
PH UR STRIP: 7 [PH] (ref 5–9)
PROT UR STRIP-MCNC: 100 MG/DL
RBC #/AREA URNS HPF: ABNORMAL /HPF
SP GR UR REFRACTOMETRY: 1.01 (ref 1–1.02)
TRIGL SERPL-MCNC: 72 MG/DL (ref 35–150)
UROBILINOGEN UR QL STRIP.AUTO: 0.2 EU/DL (ref 0.2–1)
VLDLC SERPL CALC-MCNC: 14.4 MG/DL (ref 6–23)
WBC URNS QL MICRO: >100 /HPF

## 2017-11-10 PROCEDURE — 36415 COLL VENOUS BLD VENIPUNCTURE: CPT | Performed by: INTERNAL MEDICINE

## 2017-11-10 PROCEDURE — 97161 PT EVAL LOW COMPLEX 20 MIN: CPT

## 2017-11-10 PROCEDURE — 74011000302 HC RX REV CODE- 302: Performed by: INTERNAL MEDICINE

## 2017-11-10 PROCEDURE — 70551 MRI BRAIN STEM W/O DYE: CPT

## 2017-11-10 PROCEDURE — 81001 URINALYSIS AUTO W/SCOPE: CPT | Performed by: INTERNAL MEDICINE

## 2017-11-10 PROCEDURE — 83036 HEMOGLOBIN GLYCOSYLATED A1C: CPT | Performed by: INTERNAL MEDICINE

## 2017-11-10 PROCEDURE — 92610 EVALUATE SWALLOWING FUNCTION: CPT

## 2017-11-10 PROCEDURE — 86580 TB INTRADERMAL TEST: CPT | Performed by: INTERNAL MEDICINE

## 2017-11-10 PROCEDURE — 97165 OT EVAL LOW COMPLEX 30 MIN: CPT

## 2017-11-10 PROCEDURE — 87086 URINE CULTURE/COLONY COUNT: CPT | Performed by: INTERNAL MEDICINE

## 2017-11-10 PROCEDURE — 93306 TTE W/DOPPLER COMPLETE: CPT

## 2017-11-10 PROCEDURE — 74011250637 HC RX REV CODE- 250/637: Performed by: INTERNAL MEDICINE

## 2017-11-10 PROCEDURE — 74011000258 HC RX REV CODE- 258: Performed by: INTERNAL MEDICINE

## 2017-11-10 PROCEDURE — 74011250636 HC RX REV CODE- 250/636: Performed by: INTERNAL MEDICINE

## 2017-11-10 PROCEDURE — 80061 LIPID PANEL: CPT | Performed by: INTERNAL MEDICINE

## 2017-11-10 PROCEDURE — 93880 EXTRACRANIAL BILAT STUDY: CPT

## 2017-11-10 PROCEDURE — 65660000000 HC RM CCU STEPDOWN

## 2017-11-10 RX ORDER — ONDANSETRON 2 MG/ML
6 INJECTION INTRAMUSCULAR; INTRAVENOUS
Status: DISCONTINUED | OUTPATIENT
Start: 2017-11-10 | End: 2017-11-11 | Stop reason: HOSPADM

## 2017-11-10 RX ADMIN — SODIUM BICARBONATE 650 MG TABLET 650 MG: at 08:58

## 2017-11-10 RX ADMIN — HYDRALAZINE HYDROCHLORIDE 100 MG: 50 TABLET, FILM COATED ORAL at 15:16

## 2017-11-10 RX ADMIN — Medication 5 ML: at 00:02

## 2017-11-10 RX ADMIN — METOPROLOL SUCCINATE 50 MG: 50 TABLET, EXTENDED RELEASE ORAL at 08:58

## 2017-11-10 RX ADMIN — LOSARTAN POTASSIUM 100 MG: 50 TABLET ORAL at 08:58

## 2017-11-10 RX ADMIN — AMLODIPINE BESYLATE 10 MG: 10 TABLET ORAL at 08:58

## 2017-11-10 RX ADMIN — HEPARIN SODIUM 5000 UNITS: 5000 INJECTION, SOLUTION INTRAVENOUS; SUBCUTANEOUS at 08:57

## 2017-11-10 RX ADMIN — HEPARIN SODIUM 5000 UNITS: 5000 INJECTION, SOLUTION INTRAVENOUS; SUBCUTANEOUS at 00:47

## 2017-11-10 RX ADMIN — HEPARIN SODIUM 5000 UNITS: 5000 INJECTION, SOLUTION INTRAVENOUS; SUBCUTANEOUS at 15:15

## 2017-11-10 RX ADMIN — Medication 5 ML: at 05:40

## 2017-11-10 RX ADMIN — ACETAMINOPHEN 650 MG: 325 TABLET ORAL at 17:10

## 2017-11-10 RX ADMIN — METOPROLOL SUCCINATE 50 MG: 50 TABLET, EXTENDED RELEASE ORAL at 17:10

## 2017-11-10 RX ADMIN — SODIUM BICARBONATE 650 MG TABLET 650 MG: at 17:10

## 2017-11-10 RX ADMIN — ASPIRIN 325 MG ORAL TABLET 325 MG: 325 PILL ORAL at 08:58

## 2017-11-10 RX ADMIN — TUBERCULIN PURIFIED PROTEIN DERIVATIVE 5 UNITS: 5 INJECTION INTRADERMAL at 03:52

## 2017-11-10 RX ADMIN — FLUOXETINE 20 MG: 20 CAPSULE ORAL at 08:58

## 2017-11-10 RX ADMIN — ATORVASTATIN CALCIUM 40 MG: 40 TABLET, FILM COATED ORAL at 10:44

## 2017-11-10 RX ADMIN — CEFTRIAXONE SODIUM 1 G: 1 INJECTION, POWDER, FOR SOLUTION INTRAMUSCULAR; INTRAVENOUS at 08:57

## 2017-11-10 RX ADMIN — Medication 10 ML: at 15:16

## 2017-11-10 RX ADMIN — HYDRALAZINE HYDROCHLORIDE 100 MG: 50 TABLET, FILM COATED ORAL at 19:58

## 2017-11-10 RX ADMIN — HYDRALAZINE HYDROCHLORIDE 100 MG: 50 TABLET, FILM COATED ORAL at 05:40

## 2017-11-10 NOTE — PROGRESS NOTES
Problem: Self Care Deficits Care Plan (Adult)  Goal: *Acute Goals and Plan of Care (Insert Text)  1. Patient will complete lower body bathing and dressing with modified independence and adaptive equipment as needed. 2. Patient will complete toileting with modified independence. 3. Patient will tolerate 20 minutes of OT treatment with no rest breaks to increase activity tolerance for ADLs. 4. Patient will complete functional transfers with modified independence and adaptive equipment as needed. Timeframe: 7 visits       OCCUPATIONAL THERAPY: Initial Assessment 11/10/2017  INPATIENT: Hospital Day: 2  Payor: Rolando Barlow / Plan: [address] [place] [address] HMO / Product Type: Managed Care Medicare /      NAME/AGE/GENDER: Darnell Garcia is a 69 y.o. female   PRIMARY DIAGNOSIS:  CVA (cerebral vascular accident) ([facility] [address] <principal problem not specified> <principal problem not specified>        ICD-10: Treatment Diagnosis:    · Other lack of cordination (R27.8)   Precautions/Allergies:     Codeine      ASSESSMENT:     Ms. Rahul Stapleton is a 69year old female who was admitted with L sided weakness. MRI shows acute to early subacute lacunar infarct in R frontoparietal cortex. At baseline patient lives alone and is typically independent with ADLs, IADLs, and driving. She has a cane that she uses \"some of the time\" for ambulation. Admits to one recent fall. She is R hand dominant. Patient agreeable to OT evaluation. Reports no pain at rest. Appears alert and oriented. BUE are [facility] for ROM, strength. Coordination decreased bilaterally and equally. Demonstrates ability to feed herself with R hand and bring cup to mouth using L hand. Patient feels that her LUE weakness has resolved and BUE appear equal at time of evaluation. Currently requiring CGA/ minimal assistance for transfers with fair balance.  Patient is likely functioning close to her baseline, but would benefit from 1-2 more sessions to insure independence and safety since she lives alone. Will follow. This section established at most recent assessment   PROBLEM LIST (Impairments causing functional limitations):  1. Decreased ADL/Functional Activities  2. Decreased Transfer Abilities  3. Decreased Ambulation Ability/Technique  4. Decreased Balance  5. Decreased coordination   INTERVENTIONS PLANNED: (Benefits and precautions of occupational therapy have been discussed with the patient.)  1. Activities of daily living training  2. Adaptive equipment training  3. Group therapy  4. Therapeutic activity  5. Therapeutic exercise     TREATMENT PLAN: Frequency/Duration: Follow patient 3x/ week to address above goals. Rehabilitation Potential For Stated Goals: Good     RECOMMENDED REHABILITATION/EQUIPMENT: (at time of discharge pending progress): Due to the probability of continued deficits (see above) this patient will not likely need continued skilled occupational therapy after discharge. Equipment:    None at this time              OCCUPATIONAL PROFILE AND HISTORY:   History of Present Injury/Illness (Reason for Referral):  Per H&P, \"Patient 67F with pmhx of HTN, GERD, Tob abuse, CAD, CVA several years ago with some residual left sided weakness. Lives at home alone and typically ambulates independently. Presents today with acute weakness and facial droop. Pt reports she was feeling fine until she had episode this afternoon of suddenly losing vision in both eyes which lasted only seconds followed by episode nausea. She walked to the bathroom and vomited then fell into bathtub due to weakness. Family has noted new left sided facial droop as well as increased LUE weakness. Evaluated by tele-neuro and not TPA candidate due to mildness of symptoms.   Hospitalist asked to admit for CVA workup\"  Past Medical History/Comorbidities:   Ms. Jessica Zamudio  has a past medical history of Aspirin long-term use (SINAN); CAD (coronary artery disease); CKD (chronic kidney disease) stage 3, GFR 30-59 ml/min ( ); GERD (gastroesophageal reflux disease); Heart murmur (not followed per cardiology); History of MI (myocardial infarction) (2012); History of stroke (2012); colonic polyps (2016); Hydronephrosis; Hypertension; Left-sided weakness (2012); Loss of appetite; Renal atrophy, right; Renal insufficiency; Smoker; Stented coronary artery (Xience SINAN); Stroke [facility]); and UTI (urinary tract infection) (11/10/2017). She also has no past medical history of Adverse effect of anesthesia; DEMENTIA; Difficult intubation; Endocrine disease; Malignant hyperthermia due to anesthesia; Nausea & vomiting; Pseudocholinesterase deficiency; or Sleep disorder. Ms. Marisol Schmidt  has a past surgical history that includes left heart cath,percutaneous (4/2012); urological; and colonoscopy (N/A, 9/26/2016). Social History/Living Environment:   Home Environment: Private residence  # Steps to Enter: 3  One/Two Story Residence: One story  Living Alone: Yes  Support Systems: Family member(s), Friends \ neighbors  Patient Expects to be Discharged to[de-identified] Private residence  Current DME Used/Available at Home: Cyrilla Crape, straight, Shower chair  Tub or Shower Type: Tub/Shower combination  Prior Level of Function/Work/Activity:  Patient lives alone. She is typically independent with ADLs, IADLs, and driving. Admits to one recent fall. Has a cane to use some of the time. R hand dominant.       Number of Personal Factors/Comorbidities that affect the Plan of Care: Brief history (0):  LOW COMPLEXITY   ASSESSMENT OF OCCUPATIONAL PERFORMANCE[de-identified]   Activities of Daily Living:           Basic ADLs (From Assessment) Complex ADLs (From Assessment)   Basic ADL  Feeding: Setup  Oral Facial Hygiene/Grooming: Setup  Bathing: Minimum assistance  Upper Body Dressing: Setup  Lower Body Dressing: Minimum assistance  Toileting: Minimum assistance Instrumental ADL  Meal Preparation: Minimum assistance  Homemaking: Minimum assistance   Grooming/Bathing/Dressing Activities of Daily Living     Cognitive Retraining  Safety/Judgement: Awareness of environment; Fall prevention                       Bed/Mat Mobility  Supine to Sit: Supervision  Sit to Stand: Stand-by asssistance  Bed to Chair: Stand-by asssistance  Scooting: Supervision       Most Recent Physical Functioning:   Gross Assessment:  AROM: Within functional limits (BUE)  Strength: Generally decreased, functional (BUE 4/5 and equal )  Coordination: Generally decreased, functional (BUE equal)               Posture:  Posture (WDL): Within defined limits  Balance:  Sitting: Intact  Standing: Impaired  Standing - Static: Good  Standing - Dynamic : Fair Bed Mobility:  Supine to Sit: Supervision  Scooting: Supervision  Wheelchair Mobility:     Transfers:  Sit to Stand: Stand-by asssistance  Stand to Sit: Contact guard assistance  Bed to Chair: Stand-by asssistance                Patient Vitals for the past 6 hrs:   BP BP Patient Position SpO2 Pulse   11/10/17 0951 162/70 At rest 100 % 78   11/10/17 1148 130/62 At rest 99 % 72       Mental Status  Neurologic State: Alert  Orientation Level: Oriented to person, Oriented to place, Oriented to situation  Cognition: Follows commands  Perception: Appears intact  Perseveration: No perseveration noted  Safety/Judgement: Awareness of environment, Fall prevention                          Physical Skills Involved:  1. Balance  2. Activity Tolerance  3. Fine Motor Control Cognitive Skills Affected (resulting in the inability to perform in a timely and safe manner):  1. None Psychosocial Skills Affected:  1. Habits/Routines   Number of elements that affect the Plan of Care: 3-5:  MODERATE COMPLEXITY   CLINICAL DECISION MAKIN [facility] [address] [address] AM-PAC 6 Clicks   Daily Activity Inpatient Short Form  How much help from another person does the patient currently need. .. Total A Lot A Little None   1. Putting on and taking off regular lower body clothing? [] 1   [] 2   [x] 3   [] 4   2.   Bathing (including washing, rinsing, drying)? [] 1   [] 2   [x] 3   [] 4   3. Toileting, which includes using toilet, bedpan or urinal?   [] 1   [] 2   [x] 3   [] 4   4. Putting on and taking off regular upper body clothing? [] 1   [] 2   [] 3   [x] 4   5. Taking care of personal grooming such as brushing teeth? [] 1   [] 2   [] 3   [x] 4   6. Eating meals? [] 1   [] 2   [] 3   [x] 4   © 2007, Trustees of [address] [place] [address] [address] [address], under license to [employer]. All rights reserved      Score:  Initial: 21 Most Recent: X (Date: -- )    Interpretation of Tool:  Represents activities that are increasingly more difficult (i.e. Bed mobility, Transfers, Gait). Score 24 23 22-20 19-15 14-10 9-7 6     Modifier CH CI CJ CK CL CM CN      ? Self Care:     - CURRENT STATUS: CJ - 20%-39% impaired, limited or restricted    - GOAL STATUS: CI - 1%-19% impaired, limited or restricted    - D/C STATUS:  ---------------To be determined---------------  Payor: Ki Cordoba / Plan: [address] [place] [address] / Product Type: Managed Care Medicare /      Medical Necessity:     · Patient demonstrates good rehab potential due to higher previous functional level. Reason for Services/Other Comments:  · Patient continues to require present interventions due to patient's inability to care for self at independent baseline.    Use of outcome tool(s) and clinical judgement create a POC that gives a: LOW COMPLEXITY         TREATMENT:   (In addition to Assessment/Re-Assessment sessions the following treatments were rendered)     Pre-treatment Symptoms/Complaints:  None   Pain: Initial:   Pain Intensity 1: 0  Post Session:  None      Assessment/Reassessment only, no treatment provided today    Braces/Orthotics/Lines/Etc:   · IV  · O2 Device: Room air  Treatment/Session Assessment:    · Response to Treatment:  Tolerated well  · Interdisciplinary Collaboration:   o Occupational Therapist  o Registered Nurse  o Physician  o Social Worker  · After treatment position/precautions:   o Up in chair  o Bed/Chair-wheels locked  o Call light within reach  o RN notified   · Compliance with Program/Exercises: compliant most of the time. · Recommendations/Intent for next treatment session: \"Next visit will focus on advancements to more challenging activities and reduction in assistance provided\".   Total Treatment Duration:  OT Patient Time In/Time Out  Time In: 1150  Time Out: [address] OTR/L

## 2017-11-10 NOTE — PROGRESS NOTES
STG: Pt will tolerate regular textures/thin liquids without signs/sx of aspiration with 100% accuracy  STG: Pt will participate with full cognitive assessment x1  LTG: Pt will tolerate the least restrictive diet at discharge without respiratory compromise    Speech language pathology: bedside swallow note: Initial Assessment    NAME/AGE/GENDER: Victoriano Baker is a 69 y.o. female  DATE: 11/10/2017  PRIMARY DIAGNOSIS: CVA (cerebral vascular accident) [facility])       ICD-10: Treatment Diagnosis: dysphagia; oropharyngeal R13.12    INTERDISCIPLINARY COLLABORATION: Registered Nurse  PRECAUTIONS/ALLERGIES: Codeine ASSESSMENT:Based on the objective data described below, Ms. Jessica Zamudio presents with no overt signs/sx of aspiration. Patient is fully oriented. Speech is clear; per son increased dysarthria noted yesterday. Mild left facial asymmetry. She is edentulous and has dentures but does not wear them when eating. Her son reports she can eat steak without them. No overt signs/sx of aspiration with thin liquids by straw. Clear vocal quality. Mild increased time for mastication required with the mixed and regular trials but son reports increased time at baseline due to being edentulous with oral cavity completely cleared with additional time. Her son reports that she does occasionally have difficulty swallowing pills and per RN she had difficulty initiating a swallow with them this am.  Recommend continue cardiac diet/thin liquids. May do best with pills coated in puree. Patient lives alone but next door to her son and daughter-in-law. They prepare many of her meals and her son reports they also assist with medication/finance management. He reports the patient does sometimes still drive after her CVA in 2012. Right frontopartietal infarct confirmed on MRI. Full cognitive assessment recommended. Patient will benefit from skilled intervention to address the below impairments. ?????? ? ? This section established at most recent assessment??????????  PROBLEM LIST (Impairments causing functional limitations):  1. Dysphagia  2. cognition  REHABILITATION POTENTIAL FOR STATED GOALS: Good  PLAN OF CARE:   Patient will benefit from skilled intervention to address the following impairments. RECOMMENDATIONS AND PLANNED INTERVENTIONS (Benefits and precautions of therapy have been discussed with the patient.):  · PO:  Regular  · Liquids:  regular thin  MEDICATIONS:  · coated in puree  COMPENSATORY STRATEGIES/MODIFICATIONS INCLUDING:  · Small sips and bites  OTHER RECOMMENDATIONS (including follow up treatment recommendations): · Family training/education  · Patient education  · diet tolerance   · Cognitive assessment  RECOMMENDED DIET MODIFICATIONS DISCUSSED WITH:  · Nursing  · Family  · Patient  FREQUENCY/DURATION: Continue to follow patient 3 times a week for duration of hospital stay to address above goals. RECOMMENDED REHABILITATION/EQUIPMENT: (at time of discharge pending progress): Due to the probability of continued deficits (see above) this patient will likely need continued skilled speech therapy after discharge. SUBJECTIVE:   Reports she is tired but is cooperative. History of Present Injury/Illness: Ms. Vessie Bernheim  has a past medical history of Aspirin long-term use (SINAN); CAD (coronary artery disease); CKD (chronic kidney disease) stage 3, GFR 30-59 ml/min ( ); GERD (gastroesophageal reflux disease); Heart murmur (not followed per cardiology); History of MI (myocardial infarction) (2012); History of stroke (2012); colonic polyps (2016); Hydronephrosis; Hypertension; Left-sided weakness (2012); Loss of appetite; Renal atrophy, right; Renal insufficiency; Smoker; Stented coronary artery (Xience SINAN); Stroke [facility]); and UTI (urinary tract infection) (11/10/2017). She also has no past medical history of Adverse effect of anesthesia; DEMENTIA; Difficult intubation; Endocrine disease; Malignant hyperthermia due to anesthesia;  Nausea & vomiting; Pseudocholinesterase deficiency; or Sleep disorder. She also  has a past surgical history that includes left heart cath,percutaneous (4/2012); urological; and colonoscopy (N/A, 9/26/2016). Present Symptoms: CVA with left sided weakness  Pain Intensity 1: 0  Current Medications:   No current facility-administered medications on file prior to encounter. Current Outpatient Prescriptions on File Prior to Encounter   Medication Sig Dispense Refill    losartan (COZAAR) 100 mg tablet Take 100 mg by mouth daily.  hydrALAZINE (APRESOLINE) 100 mg tablet Take  by mouth three (3) times daily.  amLODIPine (NORVASC) 10 mg tablet Take  by mouth daily.  sodium bicarbonate 650 mg tablet Take 1 Tab by mouth two (2) times a day. 180 Tab 3    metoprolol succinate (TOPROL-XL) 50 mg XL tablet Take 1 Tab by mouth two (2) times a day. 180 Tab 3    Aspirin, Buffered 81 mg tab Take 1 Tab by mouth nightly. Indications: THROMBOSIS PREVENTION AFTER PCI, continue      trimethoprim-sulfamethoxazole (BACTRIM DS, SEPTRA DS) 160-800 mg per tablet Take 1 Tab by mouth two (2) times a day. 14 Tab 0    ondansetron hcl (ZOFRAN, AS HYDROCHLORIDE,) 8 mg tablet Take 1 Tab by mouth every eight (8) hours as needed for Nausea. 10 Tab 0    FLUoxetine (PROZAC) 20 mg capsule Take 1 Cap by mouth daily. 90 Cap 2     Current Dietary Status:  cardiac      History of reflux: yes   Reflux medication: n/a  Social History/Home Situation: home alone; lives nextdoor to her son and daughter-in-law  Home Environment: Private residence  One/Two Story Residence: One story  Living Alone: Yes  Support Systems: Family member(s), Friends \ neighbors  Patient Expects to be Discharged to[de-identified] Private residence  Current DME Used/Available at Home: None  OBJECTIVE:   Respiratory Status:        CXR Results:n/a  MRI/CT Results:Acute to early subacute lacunar infarct in the right frontoparietal junction  cortex.   2. Multiple old lacunar infarcts present as described along with white matter  findings compatible with chronic small vessel ischemic disease  Oral Motor Structure/Speech:  Oral-Motor Structure/Motor Speech  Labial: Impaired coordination, Left droop (mild)  Dentition: Edentulous  Lingual: No impairment    Cognitive and Communication Status:  Neurologic State: Alert  Orientation Level: Oriented X4  Cognition: Appropriate decision making  Perception: Appears intact  Perseveration: No perseveration noted  Safety/Judgement: Awareness of environment    BEDSIDE SWALLOW EVALUATION  Oral Assessment:  Oral Assessment  Labial: Impaired coordination; Left droop (mild)  Dentition: Edentulous  Lingual: No impairment  P.O. Trials:  Patient Position: upright in bed    The patient was given tsp to straw amounts of the following:   Consistency Presented: Thin liquid; Solid;Mixed consistency;Puree  How Presented: Self-fed/presented;Spoon;Straw;Successive swallows    ORAL PHASE:  Bolus Acceptance: No impairment  Bolus Formation/Control: Impaired  Propulsion: No impairment  Type of Impairment: Delayed;Mastication  Oral Residue: None    PHARYNGEAL PHASE:  Initiation of Swallow: No impairment  Laryngeal Elevation: Functional  Aspiration Signs/Symptoms: None  Vocal Quality: No impairment   Pharyngeal Phase Characteristics: No impairment, issues, or problems     OTHER OBSERVATIONS:  Rate/bite size: WNL   Endurance:  Questionable       Tool Used: Dysphagia Outcome and Severity Scale (RENATO)    Score Comments   Normal Diet  [] 7 With no strategies or extra time needed   Functional Swallow  [x] 6 May have mild oral or pharyngeal delay       Mild Dysphagia    [] 5 Which may require one diet consistency restricted (those who demonstrate penetration which is entirely cleared on MBS would be included)   Mild-Moderate Dysphagia  [] 4 With 1-2 diet consistencies restricted       Moderate Dysphagia  [] 3 With 2 or more diet consistencies restricted       Moderately Severe Dysphagia [] 2 With partial PO strategies (trials with ST only)       Severe Dysphagia  [] 1 With inability to tolerate any PO safely          Score:  Initial: 6 Most Recent: X (Date: -- )   Interpretation of Tool: The Dysphagia Outcome and Severity Scale (RENATO) is a simple, easy-to-use, 7-point scale developed to systematically rate the functional severity of dysphagia based on objective assessment and make recommendations for diet level, independence level, and type of nutrition. Score 7 6 5 4 3 2 1   Modifier CH CI CJ CK CL CM CN   ? Swallowing:     - CURRENT STATUS: CI - 1%-19% impaired, limited or restricted    - GOAL STATUS:  CH - 0% impaired, limited or restricted    - D/C STATUS:  ---------------To be determined---------------  Payor: Samantha Mckeon / Plan: [address] [place] [address] HMO / Product Type: Managed Care Medicare /     TREATMENT:    (In addition to Assessment/Re-Assessment sessions the following treatments were rendered)  Assessment/Reassessment only, no treatment provided today    ORAL MOTOR  EXERCISES:                                                                                                                                                                      LARYNGEAL / PHARYNGEAL EXERCISES:                                                                                                                                     __________________________________________________________________________________________________  Safety:   After treatment position/precautions:  · RN notified  · Upright in Bed  Progression/Medical Necessity:   · Skilled intervention continues to be required due to unable to attend/participate in therapy as expected. Compliance with Program/Exercises: Will assess as treatment progresses.    Reason for Continuation of Services/Other Comments:  · Patient continues to require skilled intervention due to patient unable to attend/participate in therapy as expected. Recommendations/Intent for next treatment session: \"Treatment next visit will focus on diet tolerance; cognitive assessment\".     Total Treatment Duration:  Time In: 0943  Time Out: [address] MS, CCC-SLP

## 2017-11-10 NOTE — H&P
HOSPITALIST H&P/CONSULT  NAME:  Natalia Parra   Age:  69 y.o.  :   1950   MRN:   [id number]  PCP: Elif Vences MD  Consulting MD:  Treatment Team: Attending Provider: Carola Monzon DO  HPI:   Patient 71F with pmhx of HTN, GERD, Tob abuse, CAD, CVA several years ago with some residual left sided weakness. Lives at home alone and typically ambulates independently. Presents today with acute weakness and facial droop. Pt reports she was feeling fine until she had episode this afternoon of suddenly losing vision in both eyes which lasted only seconds followed by episode nausea. She walked to the bathroom and vomited then fell into bathtub due to weakness. Family has noted new left sided facial droop as well as increased LUE weakness. Evaluated by tele-neuro and not TPA candidate due to mildness of symptoms.   Hospitalist asked to admit for CVA workup      Complete ROS done and is as stated in HPI or otherwise negative  Past Medical History:   Diagnosis Date    Aspirin long-term use SINAN    continue    CAD (coronary artery disease)     MI. stented x 1     CKD (chronic kidney disease) stage 3, GFR 30-59 ml/min           GERD (gastroesophageal reflux disease)     no meds     Heart murmur not followed per cardiology    EF 55% PER ECHO 2012    History of MI (myocardial infarction) 2012    History of stroke 2012    after MI . has residual left sided weakness in left leg    Hx of colonic polyps 2016    adenoma    Hydronephrosis     Hypertension     daily meds    Left-sided weakness 2012    left leg r/t cva    Loss of appetite     states within the last several months    Renal atrophy, right     Renal insufficiency     elevated creatinine    Smoker     48 yr hx/ 1 pk per day    Stented coronary artery Xience SINAN    proximal LAD 2012    Stroke [facility])     left side noted with slight residual      Past Surgical History:   Procedure Laterality Date    COLONOSCOPY N/A 2016 Pedro--one desc TA, one rectal hyperplastic--5 year recall    HX UROLOGICAL      stent to left    [place] LEFT HEART CATH,PERCUTANEOUS  4/2012    Xience LAD      Prior to Admission Medications   Prescriptions Last Dose Informant Patient Reported? Taking? Aspirin, Buffered 81 mg tab 11/3/2017 at Unknown time  Yes Yes   Sig: Take 1 Tab by mouth nightly. Indications: THROMBOSIS PREVENTION AFTER PCI, continue   FLUoxetine (PROZAC) 20 mg capsule Not Taking at Unknown time  No No   Sig: Take 1 Cap by mouth daily. amLODIPine (NORVASC) 10 mg tablet 11/10/2017 at Unknown time  Yes Yes   Sig: Take  by mouth daily. hydrALAZINE (APRESOLINE) 100 mg tablet 11/10/2017 at Unknown time  Yes Yes   Sig: Take  by mouth three (3) times daily. losartan (COZAAR) 100 mg tablet 11/9/2017 at Unknown time  Yes Yes   Sig: Take 100 mg by mouth daily. metoprolol succinate (TOPROL-XL) 50 mg XL tablet 11/3/2017 at Unknown time  No Yes   Sig: Take 1 Tab by mouth two (2) times a day. ondansetron hcl (ZOFRAN, AS HYDROCHLORIDE,) 8 mg tablet Not Taking at Unknown time  No No   Sig: Take 1 Tab by mouth every eight (8) hours as needed for Nausea. sodium bicarbonate 650 mg tablet 11/9/2017 at Unknown time  No Yes   Sig: Take 1 Tab by mouth two (2) times a day. trimethoprim-sulfamethoxazole (BACTRIM DS, SEPTRA DS) 160-800 mg per tablet Not Taking at Unknown time  No No   Sig: Take 1 Tab by mouth two (2) times a day.       Facility-Administered Medications: None     Allergies   Allergen Reactions    Codeine Nausea and Vomiting      Social History   Substance Use Topics    Smoking status: Current Every Day Smoker     Packs/day: 1.00     Years: 50.00    Smokeless tobacco: Never Used    Alcohol use No      Family History   Problem Relation Age of Onset    Hypertension Mother     Heart Disease Mother     Stroke Father       Objective:     Visit Vitals    /70 (BP 1 Location: Right arm, BP Patient Position: At rest)    Pulse 74    Temp 98.7 °F (37.1 °C)    Resp 18    Ht 5' 1\" (1.549 m)    Wt 51.7 kg (114 lb)    SpO2 99%    BMI 21.54 kg/m2      Temp (24hrs), Av.2 °F (36.8 °C), Min:97.6 °F (36.4 °C), Max:98.7 °F (37.1 °C)    Oxygen Therapy  O2 Sat (%): 99 % (17)  Pulse via Oximetry: 74 beats per minute (17)  O2 Device: Room air (17)  Physical Exam:  General:    Alert, cooperative, no distress, appears stated age. Head:   Normocephalic, left sided facial.   Nose:  Nares normal. No drainage or sinus tenderness. Lungs:   Clear to auscultation bilaterally. No Wheezing or Rhonchi. No rales. Heart:   Regular rate and rhythm,  no murmur, rub or gallop. Abdomen:   Soft, non-tender. Not distended. Bowel sounds normal.   Extremities: No cyanosis. No edema. No clubbing  Skin:     Texture, turgor normal. No rashes or lesions. Not Jaundiced  Neurologic: Alert and oriented x 3, no focal deficits   Data Review:   Recent Results (from the past 24 hour(s))   EKG, 12 LEAD, INITIAL    Collection Time: 17  8:04 PM   Result Value Ref Range    Ventricular Rate 87 BPM    Atrial Rate 87 BPM    P-R Interval 172 ms    QRS Duration 86 ms    Q-T Interval 334 ms    QTC Calculation (Bezet) 401 ms    Calculated P Axis 90 degrees    Calculated R Axis 66 degrees    Calculated T Axis 151 degrees    Diagnosis       !! AGE AND GENDER SPECIFIC ECG ANALYSIS !!   Normal sinus rhythm  Possible Left atrial enlargement  Left ventricular hypertrophy with repolarization abnormality  Abnormal ECG  When compared with ECG of 27-MAY-2017 02:41,  T wave inversion now evident in Inferior leads  Confirmed by ST OCTAVIO GARDUNO MD (), KEISHA AMAYA ([id number]) on 2017 9:18:37 PM     POC PT/INR    Collection Time: 17  8:06 PM   Result Value Ref Range    Prothrombin time (POC) 12.7 (H) 9.6 - 11.6 SECS    INR (POC) 1.1 0.9 - 1.2     GLUCOSE, POC, BEDSIDE    Collection Time: 17  8:08 PM   Result Value Ref Range    Glucose (POC) 156 (H) 65 - 100 MG/DL   POC TROPONIN-I    Collection Time: 11/09/17  8:09 PM   Result Value Ref Range    Troponin-I (POC) 0 (L) 0.02 - 0.05 ng/ml   CBC WITH AUTOMATED DIFF    Collection Time: 11/09/17  8:10 PM   Result Value Ref Range    WBC 12.1 (H) 4.3 - 11.1 K/uL    RBC 3.07 (L) 4.05 - 5.25 M/uL    HGB 9.2 (L) 11.7 - 15.4 g/dL    HCT 26.8 (L) 35.8 - 46.3 %    MCV 87.3 79.6 - 97.8 FL    MCH 30.0 26.1 - 32.9 PG    MCHC 34.3 31.4 - 35.0 g/dL    RDW 14.0 11.9 - 14.6 %    PLATELET 646 168 - 256 K/uL    MPV 11.9 10.8 - 14.1 FL    DF AUTOMATED      NEUTROPHILS 78 43 - 78 %    LYMPHOCYTES 17 13 - 44 %    MONOCYTES 4 4.0 - 12.0 %    EOSINOPHILS 1 0.5 - 7.8 %    BASOPHILS 0 0.0 - 2.0 %    IMMATURE GRANULOCYTES 0 0.0 - 5.0 %    ABS. NEUTROPHILS 9.4 (H) 1.7 - 8.2 K/UL    ABS. LYMPHOCYTES 2.1 0.5 - 4.6 K/UL    ABS. MONOCYTES 0.5 0.1 - 1.3 K/UL    ABS. EOSINOPHILS 0.2 0.0 - 0.8 K/UL    ABS. BASOPHILS 0.0 0.0 - 0.2 K/UL    ABS. IMM. GRANS. 0.0 0.0 - 0.5 K/UL   METABOLIC PANEL, COMPREHENSIVE    Collection Time: 11/09/17  8:10 PM   Result Value Ref Range    Sodium 140 136 - 145 mmol/L    Potassium 5.2 (H) 3.5 - 5.1 mmol/L    Chloride 114 (H) 98 - 107 mmol/L    CO2 16 (L) 21 - 32 mmol/L    Anion gap 10 7 - 16 mmol/L    Glucose 158 (H) 65 - 100 mg/dL    BUN 51 (H) 8 - 23 MG/DL    Creatinine 4.64 (H) 0.6 - 1.0 MG/DL    GFR est AA 12 (L) >60 ml/min/1.73m2    GFR est non-AA 10 (L) >60 ml/min/1.73m2    Calcium 8.6 8.3 - 10.4 MG/DL    Bilirubin, total 0.2 0.2 - 1.1 MG/DL    ALT (SGPT) 10 (L) 12 - 65 U/L    AST (SGOT) 15 15 - 37 U/L    Alk.  phosphatase 57 50 - 136 U/L    Protein, total 7.7 6.3 - 8.2 g/dL    Albumin 3.4 3.2 - 4.6 g/dL    Globulin 4.3 (H) 2.3 - 3.5 g/dL    A-G Ratio 0.8 (L) 1.2 - 3.5       Imaging /Procedures /Studies   Final result (Exam End: 11/9/2017  7:59 PM) Open        Study Result      Noncontrast head CT      Clinical Indication: Code stroke with acute right upper and lower extremity  weakness, history of stroke.     Technique: Noncontrast axial images were obtained through the brain.     Comparison: MRI 4/30/2012, CT 4/28/2012     Findings: There is no acute intracranial hemorrhage, hydrocephalus, intra-axial  mass, or mass-effect. There are extensive scattered and confluent areas of  low-attenuation involving bilateral periventricular white matter, centrum  semiovale, corona radiata, and basal ganglia. Region of increased since prior. Superimposed small or developing infarct cannot be excluded by CT. There is no  CT evidence of acute large artery territorial infarction or abnormal extra-axial  fluid collection otherwise.     The mastoid air cells are aerated. 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      No displaced skull fractures are present.        IMPRESSION  Impression:   1. No evidence of hemorrhage. 2. Extensive hypodensities are nonspecific, most compatible with chronic small  vessel ischemic change and previous infarcts. Superimposed acute or developing  infarct cannot be excluded by CT however.            Assessment and Plan: Active Hospital Problems    Diagnosis Date Noted    CKD stage 5 secondary to hypertension ([facility] [address] 02/06/2017    History of CVA (cerebrovascular accident) 02/11/2016    Anemia of renal disease 01/07/2016    CVA (cerebral vascular accident) ([facility] [address] 04/28/2012    Tobacco use disorder 04/26/2012     A/P  - CVA - eval per stroke protocol. MRI, echo, carotid duplex. Pt/ot/ppd. Asa 325, statin    - CKD stage 5 - not on HD. Cr at baseline. Resume bicarb tabs from PTA meds. - Tob abuse - cessation counseling  - HTN - resume home meds (cozaar, norvasc, toprol)    Code Status: Full code    Anticipated discharge: 2-3 days    Signed By: Frank Levy DO     November 10, 2017      Addendum - UA came back pos for UTI - will add rocephin.  Culture already ordered

## 2017-11-10 NOTE — PROGRESS NOTES
Spoke with patient and son regarding d/c planning. Alert and oriented x 4  Lives alone in own home. No step entry  Son and daughter provide support and assistance if needed. Prior to admission very independent and able to perform all of her own ADL's and drive. PCP- Dr. Liz Floyd and last seen 2 months ago. DME- [facility] [place]  Denies having any problems filling Rx. No HH preference  PT/OT/ST karolals pending  CM will continue to follow for any further needs. Care Management Interventions  PCP Verified by CM: Yes  Mode of Transport at Discharge:  Other (see comment) (family)  Transition of Care Consult (CM Consult): Discharge Planning  Physical Therapy Consult: Yes  Occupational Therapy Consult: Yes  Speech Therapy Consult: Yes  Current Support Network: Own Home, Lives Alone  Confirm Follow Up Transport: Family  Plan discussed with Pt/Family/Caregiver: Yes  Freedom of Choice Offered: Yes  Discharge Location  Discharge Placement: Other: (TBD)

## 2017-11-10 NOTE — PROGRESS NOTES
Asked Lakisha MARIE in ED regarding stat MRI and DCB before pt was sent up to floor. She stated tests could be preformed in the morning per routine.

## 2017-11-10 NOTE — PROGRESS NOTES
Patient given a Patient Stroke Education book. Patient educated on signs and symptoms of stroke and importance of getting to nearest ED as soon as symptoms occur. Patient educated on risk factors of stroke. Medication side effects sheets provided and reviewed with patient and family.

## 2017-11-10 NOTE — PROGRESS NOTES
TRANSFER - IN REPORT:    Verbal report received from Lakisha RN(name) on Yuridia Izaguirre  being received from ED(unit) for routine progression of care      Report consisted of patients Situation, Background, Assessment and   Recommendations(SBAR). Information from the following report(s) SBAR, Kardex, ED Summary and Recent Results was reviewed with the receiving nurse. Opportunity for questions and clarification was provided. Assessment completed upon patients arrival to unit and care assumed. Skin assessment completed by this RN and Julian Jeff RN , skin is dry but no skin issues noted at this time.

## 2017-11-10 NOTE — ED PROVIDER NOTES
HPI Comments: 57-year-old female patient presents via EMS from home with reports of new onset left-sided facial droop, extremity weakness and difficulty ambulating. Per family's report patient had a history of stroke but they state patient appeared to have increased facial droop on the left side approximately 6:30 PM tonight. In addition patient has been unable to ambulate though the onset of this symptom is unclear. EMS reports burning her drift in the left upper extremity as well. Patient is a 69 y.o. female presenting with weakness. The history is provided by the patient and the EMS personnel. No  was used. Extremity Weakness    This is a new problem. The current episode started 1 to 2 hours ago. The problem occurs constantly. The problem has not changed since onset. The patient is experiencing no pain. Associated symptoms include numbness and limited range of motion. Pertinent negatives include no stiffness, no tingling, no itching, no back pain and no neck pain. She has tried nothing for the symptoms. There has been no history of extremity trauma.         Past Medical History:   Diagnosis Date    Aspirin long-term use SINAN    continue    CAD (coronary artery disease)     MI. stented x 1 2012    CKD (chronic kidney disease) stage 3, GFR 30-59 ml/min           GERD (gastroesophageal reflux disease)     no meds     Heart murmur not followed per cardiology    EF 55% PER ECHO 2012    History of MI (myocardial infarction) 2012    History of stroke 2012    after MI . has residual left sided weakness in left leg    Hx of colonic polyps 2016    adenoma    Hydronephrosis     Hypertension     daily meds    Left-sided weakness 2012    left leg r/t cva    Loss of appetite     states within the last several months    Renal atrophy, right     Renal insufficiency     elevated creatinine    Smoker     48 yr hx/ 1 pk per day    Stented coronary artery Xience SINAN    proximal LAD 2012    Stroke [facility])     left side noted with slight residual       Past Surgical History:   Procedure Laterality Date    COLONOSCOPY N/A 9/26/2016    [facility]--one desc TA, one rectal hyperplastic--5 year recall    HX UROLOGICAL      stent to left    [place] LEFT HEART CATH,PERCUTANEOUS  4/2012    Xience LAD         Family History:   Problem Relation Age of Onset    Hypertension Mother     Heart Disease Mother     Stroke Father        Social History     Social History    Marital status:      Spouse name: N/A    Number of children: N/A    Years of education: N/A     Occupational History    Not on file. Social History Main Topics    Smoking status: Current Every Day Smoker     Packs/day: 1.00     Years: 50.00    Smokeless tobacco: Never Used    Alcohol use No    Drug use: No    Sexual activity: Not on file     Other Topics Concern    Not on file     Social History Narrative         ALLERGIES: Codeine    Review of Systems   Musculoskeletal: Negative for back pain, neck pain and stiffness. Skin: Negative for itching. Neurological: Positive for weakness and numbness. Negative for tingling. There were no vitals filed for this visit. Physical Exam   Constitutional: She is oriented to person, place, and time. She appears well-developed and well-nourished. No distress. Alert and oriented to person, place and time. No acute distress. Speaks in clear, fluent sentences. HENT:   Head: Normocephalic and atraumatic. Right Ear: External ear normal.   Nose: Nose normal.   Eyes: Conjunctivae and EOM are normal. Pupils are equal, round, and reactive to light. Neck: Normal range of motion. Neck supple. No JVD present. No tracheal deviation present. Cardiovascular: Normal rate, regular rhythm, S1 normal, S2 normal, normal heart sounds and intact distal pulses. Exam reveals no gallop, no distant heart sounds and no friction rub. No murmur heard.   Pulmonary/Chest: Effort normal and breath sounds normal. No accessory muscle usage or stridor. No tachypnea and no bradypnea. No respiratory distress. She has no decreased breath sounds. She has no wheezes. She has no rhonchi. She has no rales. She exhibits no tenderness. Course no focal findings. Abdominal: Soft. Normal appearance. She exhibits no distension and no mass. There is no hepatosplenomegaly, splenomegaly or hepatomegaly. There is no tenderness. There is no rigidity, no rebound, no guarding, no CVA tenderness, no tenderness at McBurney's point and negative Nath's sign. Musculoskeletal: She exhibits no edema, tenderness or deformity. Neurological: She is alert and oriented to person, place, and time. She displays no atrophy and no tremor. No cranial nerve deficit or sensory deficit. She exhibits abnormal muscle tone. She displays no seizure activity. GCS eye subscore is 4. GCS verbal subscore is 5. GCS motor subscore is 6. Left-sided facial droop involving the left eye and corner of the left mouth. Patient denies any reproducible numbness on exam.  There is no significant pronator drift. No significant ataxia on cerebellar testing. Trace weakness in the left upper extremity with shoulder shrug though  strengths are intact bilaterally. Did not test gait as EMS reports patient inability to ambulate at all currently. Skin: Skin is warm and dry. No rash noted. She is not diaphoretic. Psychiatric: She has a normal mood and affect. Her behavior is normal.        MDM  Number of Diagnoses or Management Options  Cerebrovascular accident (CVA), unspecified mechanism [facility]): new and requires workup  Diagnosis management comments: New-onset left-sided facial droop and pronator drift and left upper extremity that started at approximate 6:30 PM this evening. Patient will be initiated as a code stroke. Taken directly to CT scan at this time.  8:02 PM    On reexamination after return for CT scan, patient has slight left-sided facial droop involving the eye and mouth on the left side. She also is unable to completely adductor right eye with testing of extraocular muscles. Unsure of the chronicity of this finding. Later drift appears improved on my exam.  Slight weakness appreciated in the upper extremity with shoulder shrug though  strength appear intact. There is no obvious ataxia on cerebellar testing. 8:10 PM    CT imaging shows chronic changes and suspected chronic microvascular changes. There is no obvious infarct present. 8:13 PM    Neurologist recommends against TPA at this time, recommends admission for further workup and MRI imaging. Spoke with on-call hospitalist agrees to admit patient at this time.          Amount and/or Complexity of Data Reviewed  Clinical lab tests: ordered and reviewed  Tests in the radiology section of CPT®: ordered and reviewed  Tests in the medicine section of CPT®: ordered and reviewed  Independent visualization of images, tracings, or specimens: yes    Risk of Complications, Morbidity, and/or Mortality  Presenting problems: moderate  Diagnostic procedures: low  Management options: moderate    Patient Progress  Patient progress: stable    ED Course       Procedures

## 2017-11-10 NOTE — PROGRESS NOTES
Problem: Mobility Impaired (Adult and Pediatric)  Goal: *Acute Goals and Plan of Care (Insert Text)  Discharge Goals:  (1.)Ms. Keshia Childress will transfer from bed to chair and chair to bed with independence using the least restrictive device within 7 day(s). (2.)Ms. Keshia Childress will ambulate with INDEPENDENCE for 300+ feet with the least restrictive device within 7 day(s). (3.)Ms. Keshia Childress will safely ascend and descend 3 steps with INDEPENDENCE within 7 day(s). (4.)Ms. Keshia Childress will demonstrate good dynamic standing balance within 7 day(s). ________________________________________________________________________________________________      PHYSICAL THERAPY: Initial Assessment, AM 11/10/2017  INPATIENT: Hospital Day: 2  Payor: Tuan Coleman / Plan: [address] [place] [address] HMO / Product Type: Managed Care Medicare /      NAME/AGE/GENDER: Stephanie Romano is a 69 y.o. female   PRIMARY DIAGNOSIS: CVA (cerebral vascular accident) ([facility] [address] <principal problem not specified> <principal problem not specified>        ICD-10: Treatment Diagnosis:   · Difficulty in walking, Not elsewhere classified (R26.2)   Precaution/Allergies:  Codeine      ASSESSMENT:     Ms. Keshia Childress is a 69year old right-handed female admitted with acute onset left sided weakness with MRI showing acute/subacute right frontoparietal CVA. Patient seen this AM for initial physical therapy evaluation: presents sitting on edge of bed s/p OT evaluation and endorses 0/10 pain. Patient lives alone in a single story residence with 3 steps to enter. At baseline, patient is independent with ADLs, ambulates primarily without utilizing an assistive device, and has a SPC to use PRN. Endorses 1 fall PTA. Today, B LE strength 4+/5, B dorsiflexion/plantarflexion power 4-/5, B LE ROM WFL, and sensation is intact to light touch B LE. Patient performed transfers with SBA and ambulation x5ft from bed to chair with no assistive device and SBA.  Demonstrated a slow, step-through gait pattern with grossly normalized mechanics, and fair+ dynamic balance throughout. Session concluded secondary to patient requesting to eat her lunch. Ms. Meri Martinez is approaching her independent/modified independent baseline; will follow 1-2 additional acute PT session to address dynamic standing balance and community level ambulation tolerance. Will follow and progress toward stated discharge goals during acute stay. This section established at most recent assessment   PROBLEM LIST (Impairments causing functional limitations):  1. Decreased Strength  2. Decreased Transfer Abilities  3. Decreased Ambulation Ability/Technique  4. Decreased Balance  5. Decreased Activity Tolerance  6. Decreased Knowledge of Precautions  7. Decreased [place] with Home Exercise Program   INTERVENTIONS PLANNED: (Benefits and precautions of physical therapy have been discussed with the patient.)  1. Balance Exercise  2. Bed Mobility  3. Family Education  4. Gait Training  5. Neuromuscular Re-education/Strengthening  6. Therapeutic Activites  7. Therapeutic Exercise/Strengthening  8. Transfer Training  9. Group Therapy     TREATMENT PLAN: Frequency/Duration: 3 times a week for duration of hospital stay  Rehabilitation Potential For Stated Goals: Good     RECOMMENDED REHABILITATION/EQUIPMENT: (at time of discharge pending progress): Due to the probability of continued deficits (see above) this patient will likely need continued skilled physical therapy after discharge. Equipment:    None at this time              HISTORY:   History of Present Injury/Illness (Reason for Referral):  CVA work up  Past Medical History/Comorbidities:   Ms. Meri Martinez  has a past medical history of Aspirin long-term use (SINAN); CAD (coronary artery disease); CKD (chronic kidney disease) stage 3, GFR 30-59 ml/min ( ); GERD (gastroesophageal reflux disease); Heart murmur (not followed per cardiology); History of MI (myocardial infarction) (2012);  History of stroke (2012); colonic polyps (2016); Hydronephrosis; Hypertension; Left-sided weakness (2012); Loss of appetite; Renal atrophy, right; Renal insufficiency; Smoker; Stented coronary artery (Xience SINAN); Stroke [facility]); and UTI (urinary tract infection) (11/10/2017). She also has no past medical history of Adverse effect of anesthesia; DEMENTIA; Difficult intubation; Endocrine disease; Malignant hyperthermia due to anesthesia; Nausea & vomiting; Pseudocholinesterase deficiency; or Sleep disorder. Ms. Ruba Santana  has a past surgical history that includes left heart cath,percutaneous (4/2012); urological; and colonoscopy (N/A, 9/26/2016). Social History/Living Environment:   Home Environment: Private residence  One/Two Story Residence: One story  Living Alone: Yes  Support Systems: Family member(s), Friends \ neighbors  Patient Expects to be Discharged to[de-identified] Private residence  Current DME Used/Available at Home: None  Prior Level of Function/Work/Activity:  Patient lives alone in a single story residence with 3 steps to enter. At baseline, patient is independent with ADLs, ambulates primarily without utilizing an assistive device, and has a SPC to use PRN. Endorses 1 fall PTA.    Dominant Side:         RIGHT   Number of Personal Factors/Comorbidities that affect the Plan of Care: 1-2: MODERATE COMPLEXITY   EXAMINATION:   Most Recent Physical Functioning:   Gross Assessment:  AROM: Within functional limits (B LE)  Strength: Generally decreased, functional (4/5 B dorsiflexion; proximal B LE 4+/5)  Coordination: Within functional limits (B LE)  Sensation: Intact (B LE)               Posture:  Posture (WDL): Within defined limits  Balance:  Sitting: Intact  Standing: Impaired  Standing - Static: Good  Standing - Dynamic : Fair Bed Mobility:  Supine to Sit: Supervision  Scooting: Supervision  Wheelchair Mobility:     Transfers:  Sit to Stand: Stand-by asssistance  Bed to Chair: Stand-by asssistance  Gait:     Base of Support: Center of gravity altered  Speed/Akila: Slow  Gait Abnormalities: Trunk sway increased  Distance (ft): 5 Feet (ft)  Assistive Device:  (None)  Interventions: Safety awareness training; Tactile cues; Verbal cues   Vision:          Near vision impairment. Distance vision WFL. Tracking [facility] all four quadrants. Convergence WFL. Patient utilizes corrective lenses    Body Structures Involved:  1. Nerves  2. Muscles Body Functions Affected:  1. Neuromusculoskeletal  2. Movement Related Activities and Participation Affected:  1. Mobility  2. Self Care   Number of elements that affect the Plan of Care: 4+: HIGH COMPLEXITY   CLINICAL PRESENTATION:   Presentation: Stable and uncomplicated: LOW COMPLEXITY   CLINICAL DECISION MAKIN[address] [place] [address] [address] [id number] AM-PAC 6 Clicks   Basic Mobility Inpatient Short Form  How much difficulty does the patient currently have. .. Unable A Lot A Little None   1. Turning over in bed (including adjusting bedclothes, sheets and blankets)? [] 1   [] 2   [] 3   [x] 4   2. Sitting down on and standing up from a chair with arms ( e.g., wheelchair, bedside commode, etc.)   [] 1   [] 2   [] 3   [x] 4   3. Moving from lying on back to sitting on the side of the bed? [] 1   [] 2   [] 3   [x] 4   How much help from another person does the patient currently need. .. Total A Lot A Little None   4. Moving to and from a bed to a chair (including a wheelchair)? [] 1   [] 2   [x] 3   [] 4   5. Need to walk in hospital room? [] 1   [] 2   [x] 3   [] 4   6. Climbing 3-5 steps with a railing? [] 1   [] 2   [x] 3   [] 4   © , Trustees of [facility] [address] [address], under license to [employer]. All rights reserved      Score:  Initial: 21 Most Recent: 21 (Date: 11/10/17 )    Interpretation of Tool:  Represents activities that are increasingly more difficult (i.e. Bed mobility, Transfers, Gait). Score 24 23 22-20 19-15 14-10 9-7 6     Modifier CH CI CJ CK CL CM CN      ?  Mobility - Walking and Moving Around:     - CURRENT STATUS: CJ - 20%-39% impaired, limited or restricted    - GOAL STATUS: CI - 1%-19% impaired, limited or restricted    - D/C STATUS:  ---------------To be determined---------------  Payor: Nisreen Ribeiro / Plan: [address] [place] [address] / Product Type: Managed Care Medicare /      Medical Necessity:     · Patient demonstrates good rehab potential due to higher previous functional level. Reason for Services/Other Comments:  · Patient continues to require skilled intervention due to decreased balance/gait status from baseline. .   Use of outcome tool(s) and clinical judgement create a POC that gives a: Clear prediction of patient's progress: LOW COMPLEXITY            TREATMENT:   (In addition to Assessment/Re-Assessment sessions the following treatments were rendered)   Pre-treatment Symptoms/Complaints:    Pain: Initial:   Pain Intensity 1: 0  Post Session:  0/10     Assessment/Reassessment only, no treatment provided today    Braces/Orthotics/Lines/Etc:   · IV  · O2 Device: Room air  Treatment/Session Assessment:    · Response to Treatment:  See above. · Interdisciplinary Collaboration:   o Physical Therapist  o Occupational Therapist  o Registered Nurse  · After treatment position/precautions:   o Up in chair  o Bed in low position  o Call light within reach  o RN notified  o Instructed patient to call for assistance for staff with mobility; verbalized understanding. · Compliance with Program/Exercises: Will assess as treatment progresses. · Recommendations/Intent for next treatment session: \"Next visit will focus on advancements to more challenging activities and reduction in assistance provided\".     Total Treatment Duration:  PT Patient Time In/Time Out  Time In: 1158  Time Out: 1316 [address], [place]

## 2017-11-10 NOTE — PROGRESS NOTES
Hospitalist Progress Note    Subjective:   Daily Progress Note: 11/10/2017 1:45 PM    Ms. Zenobia Interiano is a 68 yo AAF, with a pmh of HTN, tobacco abuse, and prior CVA, who presented 11/10 for a left facial droop and left upper extremity weakness and is found to have an acute ischemic right frontoparietal CVA. She is now on aspirin 325 mg and lipitor. Was on baby aspirin at home. Right carotid with 70-99% stenosis and left carotid with 50-69% stenosis. Also with possible uti and is on rocephin day 2. Echo pending. Denies current chest pain, sob, nausea. Has improved with therapies and is doing well. Current Facility-Administered Medications   Medication Dose Route Frequency    influenza vaccine 2017-18 (3 yrs+)(PF) (FLUZONE QUAD/FLUARIX QUAD) injection 0.5 mL  0.5 mL IntraMUSCular PRIOR TO DISCHARGE    tuberculin injection 5 Units  5 Units IntraDERMal ONCE    cefTRIAXone (ROCEPHIN) 1 g in 0.9% sodium chloride (MBP/ADV) 50 mL  1 g IntraVENous Q24H    ondansetron (ZOFRAN) injection 6 mg  6 mg IntraVENous Q6H PRN    amLODIPine (NORVASC) tablet 10 mg  10 mg Oral DAILY    FLUoxetine (PROzac) capsule 20 mg  20 mg Oral DAILY    hydrALAZINE (APRESOLINE) tablet 100 mg  100 mg Oral Q8H    losartan (COZAAR) tablet 100 mg  100 mg Oral DAILY    metoprolol succinate (TOPROL-XL) XL tablet 50 mg  50 mg Oral BID    sodium bicarbonate tablet 650 mg  650 mg Oral BID    sodium chloride (NS) flush 5-10 mL  5-10 mL IntraVENous Q8H    sodium chloride (NS) flush 5-10 mL  5-10 mL IntraVENous PRN    aspirin (ASPIRIN) tablet 325 mg  325 mg Oral DAILY    acetaminophen (TYLENOL) tablet 650 mg  650 mg Oral Q4H PRN    heparin (porcine) injection 5,000 Units  5,000 Units SubCUTAneous Q8H    atorvastatin (LIPITOR) tablet 40 mg  40 mg Oral DAILY        Review of Systems  A comprehensive review of systems was negative except for that written in the HPI.     Objective:     Visit Vitals    /62 (BP 1 Location: Right arm, BP Patient Position: At rest)    Pulse 72    Temp 99.3 °F (37.4 °C)    Resp 18    Ht 5' 1\" (1.549 m)    Wt 51.7 kg (114 lb)    SpO2 99%    BMI 21.54 kg/m2      O2 Device: Room air    Temp (24hrs), Av.9 °F (37.2 °C), Min:97.6 °F (36.4 °C), Max:100.2 °F (37.9 °C)              General: awake, alert, oriented, no acute distress  Eyes; non icteric, EOMI  Neck; supple  CV: RRR  Pulm; CTAb  Abd; soft, non tender  Neuro: slight facial droop, 4/5 left arm strength    Additional comments:I reviewed the patient's new clinical lab test results. jj    Data Review    Recent Results (from the past 24 hour(s))   EKG, 12 LEAD, INITIAL    Collection Time: 17  8:04 PM   Result Value Ref Range    Ventricular Rate 87 BPM    Atrial Rate 87 BPM    P-R Interval 172 ms    QRS Duration 86 ms    Q-T Interval 334 ms    QTC Calculation (Bezet) 401 ms    Calculated P Axis 90 degrees    Calculated R Axis 66 degrees    Calculated T Axis 151 degrees    Diagnosis       !! AGE AND GENDER SPECIFIC ECG ANALYSIS !!   Normal sinus rhythm  Possible Left atrial enlargement  Left ventricular hypertrophy with repolarization abnormality  Abnormal ECG  When compared with ECG of 27-MAY-2017 02:41,  T wave inversion now evident in Inferior leads  Confirmed by ST OCTAVIO GARDUNO MD (), KEISHA AMAYA ([id number]) on 2017 9:18:37 PM     POC PT/INR    Collection Time: 17  8:06 PM   Result Value Ref Range    Prothrombin time (POC) 12.7 (H) 9.6 - 11.6 SECS    INR (POC) 1.1 0.9 - 1.2     GLUCOSE, POC, BEDSIDE    Collection Time: 17  8:08 PM   Result Value Ref Range    Glucose (POC) 156 (H) 65 - 100 MG/DL   POC TROPONIN-I    Collection Time: 17  8:09 PM   Result Value Ref Range    Troponin-I (POC) 0 (L) 0.02 - 0.05 ng/ml   CBC WITH AUTOMATED DIFF    Collection Time: 17  8:10 PM   Result Value Ref Range    WBC 12.1 (H) 4.3 - 11.1 K/uL    RBC 3.07 (L) 4.05 - 5.25 M/uL    HGB 9.2 (L) 11.7 - 15.4 g/dL    HCT 26.8 (L) 35.8 - 46.3 %    MCV 87.3 79.6 - 97.8 FL    MCH 30.0 26.1 - 32.9 PG    MCHC 34.3 31.4 - 35.0 g/dL    RDW 14.0 11.9 - 14.6 %    PLATELET 922 659 - 673 K/uL    MPV 11.9 10.8 - 14.1 FL    DF AUTOMATED      NEUTROPHILS 78 43 - 78 %    LYMPHOCYTES 17 13 - 44 %    MONOCYTES 4 4.0 - 12.0 %    EOSINOPHILS 1 0.5 - 7.8 %    BASOPHILS 0 0.0 - 2.0 %    IMMATURE GRANULOCYTES 0 0.0 - 5.0 %    ABS. NEUTROPHILS 9.4 (H) 1.7 - 8.2 K/UL    ABS. LYMPHOCYTES 2.1 0.5 - 4.6 K/UL    ABS. MONOCYTES 0.5 0.1 - 1.3 K/UL    ABS. EOSINOPHILS 0.2 0.0 - 0.8 K/UL    ABS. BASOPHILS 0.0 0.0 - 0.2 K/UL    ABS. IMM. GRANS. 0.0 0.0 - 0.5 K/UL   METABOLIC PANEL, COMPREHENSIVE    Collection Time: 11/09/17  8:10 PM   Result Value Ref Range    Sodium 140 136 - 145 mmol/L    Potassium 5.2 (H) 3.5 - 5.1 mmol/L    Chloride 114 (H) 98 - 107 mmol/L    CO2 16 (L) 21 - 32 mmol/L    Anion gap 10 7 - 16 mmol/L    Glucose 158 (H) 65 - 100 mg/dL    BUN 51 (H) 8 - 23 MG/DL    Creatinine 4.64 (H) 0.6 - 1.0 MG/DL    GFR est AA 12 (L) >60 ml/min/1.73m2    GFR est non-AA 10 (L) >60 ml/min/1.73m2    Calcium 8.6 8.3 - 10.4 MG/DL    Bilirubin, total 0.2 0.2 - 1.1 MG/DL    ALT (SGPT) 10 (L) 12 - 65 U/L    AST (SGOT) 15 15 - 37 U/L    Alk.  phosphatase 57 50 - 136 U/L    Protein, total 7.7 6.3 - 8.2 g/dL    Albumin 3.4 3.2 - 4.6 g/dL    Globulin 4.3 (H) 2.3 - 3.5 g/dL    A-G Ratio 0.8 (L) 1.2 - 3.5     URINALYSIS W/ RFLX MICROSCOPIC    Collection Time: 11/10/17  3:48 AM   Result Value Ref Range    Color YELLOW      Appearance CLOUDY      Specific gravity 1.012 1.001 - 1.023      pH (UA) 7.0 5.0 - 9.0      Protein 100 (A) NEG mg/dL    Glucose NEGATIVE  mg/dL    Ketone NEGATIVE  NEG mg/dL    Bilirubin NEGATIVE  NEG      Blood NEGATIVE  NEG      Urobilinogen 0.2 0.2 - 1.0 EU/dL    Nitrites NEGATIVE  NEG      Leukocyte Esterase LARGE (A) NEG      WBC >100 (H) 0 /hpf    RBC 20-50 0 /hpf    Epithelial cells 3-5 0 /hpf    Bacteria 2+ (H) 0 /hpf    Casts 0-3 0 /lpf   CULTURE, URINE    Collection Time: 11/10/17  3:48 AM   Result Value Ref Range    Special Requests: NO SPECIAL REQUESTS      Culture result:        NO GROWTH AFTER SHORT PERIOD OF INCUBATION. FURTHER RESULTS TO FOLLOW AFTER OVERNIGHT INCUBATION. LIPID PANEL    Collection Time: 11/10/17  5:17 AM   Result Value Ref Range    LIPID PROFILE          Cholesterol, total 152 <200 MG/DL    Triglyceride 72 35 - 150 MG/DL    HDL Cholesterol 52 40 - 60 MG/DL    LDL, calculated 85.6 <100 MG/DL    VLDL, calculated 14.4 6.0 - 23.0 MG/DL    CHOL/HDL Ratio 2.9     HEMOGLOBIN A1C WITH EAG    Collection Time: 11/10/17  5:17 AM   Result Value Ref Range    Hemoglobin A1c <3.5 (L) 4.8 - 6.0 %    Est. average glucose Cannot be calculated mg/dL         Assessment/Plan:     Active Problems:    Tobacco use disorder (4/26/2012)      CVA (cerebral vascular accident) ([facility] [address] (4/28/2012)      Anemia of renal disease (1/7/2016)      History of CVA (cerebrovascular accident) (2/11/2016)      CKD stage 5 secondary to hypertension ([facility] [address] (2/6/2017)      UTI (urinary tract infection) (11/10/2017)  Rocephin day 2. Urine culture pending. Stroke [facility]) (11/10/2017)      Overview: Frontoparietal CVA, ischemic  Continue aspirin and lipitor, PT/OT. Will ask vascular surgery to evaluate patient's carotid stenosis as this is not her first ischemic CVA event. Echo pending. Home tomorrow if no intervention/testing needed by vascular?     Care Plan discussed with: Patient/Family, Nurse and       Signed By: Vane Peñaloza MD     November 10, 2017

## 2017-11-10 NOTE — ED NOTES
TRANSFER - OUT REPORT:    Verbal report given to [facility]) on [employer]  being transferred to 70(unit) for routine progression of care       Report consisted of patients Situation, Background, Assessment and   Recommendations(SBAR). Information from the following report(s) SBAR was reviewed with the receiving nurse. Lines:   Peripheral IV 11/09/17 Right Antecubital (Active)   Site Assessment Clean, dry, & intact 11/9/2017  9:30 PM   Phlebitis Assessment 0 11/9/2017  9:30 PM   Infiltration Assessment 0 11/9/2017  9:30 PM   Dressing Status Clean, dry, & intact 11/9/2017  9:30 PM        Opportunity for questions and clarification was provided.       Patient transported with:

## 2017-11-10 NOTE — ED TRIAGE NOTES
Patient arrives via EMS with left sided extremity weakness, EMS states last seen normal at 1830. Dr Orlin Patricio evaluated patient upon arrival.     EMS states that patient was about to eat when she had an episode of diarrhea, family stated that she could not walk that started at 31 75 62. Patient reports right sided headache, left sided facial droop, left sided weakness.

## 2017-11-10 NOTE — PROGRESS NOTES
Problem: Falls - Risk of  Goal: *Absence of Falls  Document Tiffany Fall Risk and appropriate interventions in the flowsheet.    Outcome: Progressing Towards Goal  Fall Risk Interventions:

## 2017-11-11 VITALS
TEMPERATURE: 98.9 F | HEART RATE: 72 BPM | BODY MASS INDEX: 21.52 KG/M2 | SYSTOLIC BLOOD PRESSURE: 130 MMHG | RESPIRATION RATE: 18 BRPM | HEIGHT: 61 IN | OXYGEN SATURATION: 99 % | DIASTOLIC BLOOD PRESSURE: 55 MMHG | WEIGHT: 114 LBS

## 2017-11-11 LAB
ANION GAP SERPL CALC-SCNC: 10 MMOL/L (ref 7–16)
BASOPHILS # BLD: 0 K/UL (ref 0–0.2)
BASOPHILS NFR BLD: 0 % (ref 0–2)
BUN SERPL-MCNC: 49 MG/DL (ref 8–23)
CALCIUM SERPL-MCNC: 8.5 MG/DL (ref 8.3–10.4)
CHLORIDE SERPL-SCNC: 116 MMOL/L (ref 98–107)
CO2 SERPL-SCNC: 15 MMOL/L (ref 21–32)
CREAT SERPL-MCNC: 4.71 MG/DL (ref 0.6–1)
DIFFERENTIAL METHOD BLD: ABNORMAL
EOSINOPHIL # BLD: 0.1 K/UL (ref 0–0.8)
EOSINOPHIL NFR BLD: 2 % (ref 0.5–7.8)
ERYTHROCYTE [DISTWIDTH] IN BLOOD BY AUTOMATED COUNT: 14 % (ref 11.9–14.6)
GLUCOSE SERPL-MCNC: 84 MG/DL (ref 65–100)
HCT VFR BLD AUTO: 23.9 % (ref 35.8–46.3)
HGB BLD-MCNC: 8.1 G/DL (ref 11.7–15.4)
IMM GRANULOCYTES # BLD: 0 K/UL (ref 0–0.5)
IMM GRANULOCYTES NFR BLD AUTO: 0 % (ref 0–5)
LYMPHOCYTES # BLD: 2.3 K/UL (ref 0.5–4.6)
LYMPHOCYTES NFR BLD: 48 % (ref 13–44)
MAGNESIUM SERPL-MCNC: 1.7 MG/DL (ref 1.8–2.4)
MCH RBC QN AUTO: 29.3 PG (ref 26.1–32.9)
MCHC RBC AUTO-ENTMCNC: 33.9 G/DL (ref 31.4–35)
MCV RBC AUTO: 86.6 FL (ref 79.6–97.8)
MM INDURATION POC: 0 MM (ref 0–5)
MONOCYTES # BLD: 0.3 K/UL (ref 0.1–1.3)
MONOCYTES NFR BLD: 7 % (ref 4–12)
NEUTS SEG # BLD: 2 K/UL (ref 1.7–8.2)
NEUTS SEG NFR BLD: 43 % (ref 43–78)
PHOSPHATE SERPL-MCNC: 3.8 MG/DL (ref 2.3–3.7)
PLATELET # BLD AUTO: 169 K/UL (ref 150–450)
PMV BLD AUTO: 12.1 FL (ref 10.8–14.1)
POTASSIUM SERPL-SCNC: 5.1 MMOL/L (ref 3.5–5.1)
PPD POC: NEGATIVE NEGATIVE
RBC # BLD AUTO: 2.76 M/UL (ref 4.05–5.25)
SODIUM SERPL-SCNC: 141 MMOL/L (ref 136–145)
WBC # BLD AUTO: 4.7 K/UL (ref 4.3–11.1)

## 2017-11-11 PROCEDURE — 36415 COLL VENOUS BLD VENIPUNCTURE: CPT | Performed by: INTERNAL MEDICINE

## 2017-11-11 PROCEDURE — 80048 BASIC METABOLIC PNL TOTAL CA: CPT | Performed by: INTERNAL MEDICINE

## 2017-11-11 PROCEDURE — 74011000258 HC RX REV CODE- 258: Performed by: INTERNAL MEDICINE

## 2017-11-11 PROCEDURE — 83735 ASSAY OF MAGNESIUM: CPT | Performed by: INTERNAL MEDICINE

## 2017-11-11 PROCEDURE — 99221 1ST HOSP IP/OBS SF/LOW 40: CPT | Performed by: SURGERY

## 2017-11-11 PROCEDURE — 84100 ASSAY OF PHOSPHORUS: CPT | Performed by: INTERNAL MEDICINE

## 2017-11-11 PROCEDURE — 85025 COMPLETE CBC W/AUTO DIFF WBC: CPT | Performed by: INTERNAL MEDICINE

## 2017-11-11 PROCEDURE — 74011250637 HC RX REV CODE- 250/637: Performed by: INTERNAL MEDICINE

## 2017-11-11 PROCEDURE — 74011250636 HC RX REV CODE- 250/636: Performed by: INTERNAL MEDICINE

## 2017-11-11 RX ORDER — ATORVASTATIN CALCIUM 80 MG/1
80 TABLET, FILM COATED ORAL DAILY
Qty: 30 TAB | Refills: 0 | Status: SHIPPED | OUTPATIENT
Start: 2017-11-12 | End: 2017-11-28 | Stop reason: SDUPTHER

## 2017-11-11 RX ORDER — ASPIRIN 325 MG
325 TABLET ORAL DAILY
Qty: 30 TAB | Refills: 0 | Status: SHIPPED | OUTPATIENT
Start: 2017-11-12 | End: 2018-11-01 | Stop reason: DRUGHIGH

## 2017-11-11 RX ADMIN — CEFTRIAXONE SODIUM 1 G: 1 INJECTION, POWDER, FOR SOLUTION INTRAMUSCULAR; INTRAVENOUS at 06:10

## 2017-11-11 RX ADMIN — HEPARIN SODIUM 5000 UNITS: 5000 INJECTION, SOLUTION INTRAVENOUS; SUBCUTANEOUS at 00:45

## 2017-11-11 RX ADMIN — HEPARIN SODIUM 5000 UNITS: 5000 INJECTION, SOLUTION INTRAVENOUS; SUBCUTANEOUS at 08:47

## 2017-11-11 RX ADMIN — ATORVASTATIN CALCIUM 40 MG: 40 TABLET, FILM COATED ORAL at 08:47

## 2017-11-11 RX ADMIN — Medication 10 ML: at 06:07

## 2017-11-11 RX ADMIN — METOPROLOL SUCCINATE 50 MG: 50 TABLET, EXTENDED RELEASE ORAL at 08:46

## 2017-11-11 RX ADMIN — ASPIRIN 325 MG ORAL TABLET 325 MG: 325 PILL ORAL at 08:46

## 2017-11-11 RX ADMIN — HYDRALAZINE HYDROCHLORIDE 100 MG: 50 TABLET, FILM COATED ORAL at 06:07

## 2017-11-11 RX ADMIN — LOSARTAN POTASSIUM 100 MG: 50 TABLET ORAL at 08:46

## 2017-11-11 RX ADMIN — SODIUM BICARBONATE 650 MG TABLET 650 MG: at 08:46

## 2017-11-11 RX ADMIN — AMLODIPINE BESYLATE 10 MG: 10 TABLET ORAL at 08:46

## 2017-11-11 RX ADMIN — FLUOXETINE 20 MG: 20 CAPSULE ORAL at 08:46

## 2017-11-11 NOTE — PROGRESS NOTES
Discharged home once her ride got here  Verbalized understanding of instructions  appt w dr Malachi TIJERINA and appt w Dr Lai August next week for follow up

## 2017-11-11 NOTE — PROGRESS NOTES
Problem: Falls - Risk of  Goal: *Absence of Falls  Document Tiffany Fall Risk and appropriate interventions in the flowsheet.    Outcome: Progressing Towards Goal  Fall Risk Interventions:  Mobility Interventions: Communicate number of staff needed for ambulation/transfer, OT consult for ADLs, Patient to call before getting OOB, PT Consult for mobility concerns, PT Consult for assist device competence, Strengthening exercises (ROM-active/passive), Utilize walker, cane, or other assitive device, Utilize gait belt for transfers/ambulation         Medication Interventions: Assess postural VS orthostatic hypotension, Evaluate medications/consider consulting pharmacy, Patient to call before getting OOB, Teach patient to arise slowly, Utilize gait belt for transfers/ambulation    Elimination Interventions: Call light in reach, Patient to call for help with toileting needs, Toilet paper/wipes in reach, Toileting schedule/hourly rounds    History of Falls Interventions: Consult care management for discharge planning, Door open when patient unattended, Evaluate medications/consider consulting pharmacy, Investigate reason for fall, Room close to nurse's station, Utilize gait belt for transfer/ambulation

## 2017-11-11 NOTE — DISCHARGE SUMMARY
Physician Discharge Summary       Patient: Keyla Ruiz MRN: [id number]  SSN: [id number]    YOB: 1950  Age: 69 y.o. Sex: female    PCP: Srinivasan Contreras MD    Admit date: 11/9/2017  Admitting Provider: Jen Orozco DO    Discharge date: 11/11/2017  Discharging Provider: Ruma Blackburn MD    * Admission Diagnoses: CVA (cerebral vascular accident) [facility])    * Discharge Diagnoses:    Hospital Problems as of 11/11/2017  Date Reviewed: 4/24/2017          Codes Class Noted - Resolved POA    UTI (urinary tract infection) ICD-10-CM: N39.0  ICD-9-CM: 599.0  11/10/2017 - Present Unknown        Stroke ([facility] ICD-10-CM: I63.9  ICD-9-CM: 434.91  11/10/2017 - Present Unknown    Overview Signed 11/10/2017 12:03 PM by Ruma Blackburn MD     Frontoparietal CVA, ischemic             CKD stage 5 secondary to hypertension ([facility] ICD-10-CM: I12.0, N18.5  ICD-9-CM: 403.91, 585.5  2/6/2017 - Present Yes        History of CVA (cerebrovascular accident) ICD-10-CM: Z86.73  ICD-9-CM: V12.54  2/11/2016 - Present Yes        Anemia of renal disease ICD-10-CM: D63.1  ICD-9-CM: 285.21  1/7/2016 - Present Yes        CVA (cerebral vascular accident) [facility]) ICD-10-CM: I63.9  ICD-9-CM: 434.91  4/28/2012 - Present Unknown        Tobacco use disorder ICD-10-CM: F17.200  ICD-9-CM: 305.1  4/26/2012 - Present Yes              * Hospital Course:     Ms. Jessie Sheikh is a 68 yo AAF, with a pmh of HTN, tobacco abuse, and prior CVA, who presented 11/10 for a left facial droop and left upper extremity weakness and is found to have an acute ischemic right frontoparietal CVA. She is now on aspirin 325 mg and lipitor 80 mg. Was on baby aspirin at home. Right carotid with 70-99% stenosis and left carotid with 50-69% stenosis. Evaluated by vascular surgery who will see in office in a few weeks and repeat US at that time. Unfortunately patient cannot have imaging with contrast as she is CKD stage 4-5.   Needs outpatient nephrology evaluation. Also with possible uti and has completed three days of rocephin. Echo with ef of 65-70% and no shunt. Has improved with therapies and is doing well. She is now medically stable for discharge home with home health services. Consults: Vascular Surgery    Significant Diagnostic Studies:     Transthoracic Echocardiogram  2D, M-mode, Doppler, and Color Doppler    Patient: Michael Melendez  MR #: [id number]  : 15-Osmar-1950  Age: 69 years  Gender: Female  Study date: 10-Nov-2017  Account #: [de-identified]  Height: 60.6 in  Weight: 113.7 lb  BSA: 1.48 mï¾²  Status:Routine  Location: [facility]  BP: 153/ 66    Allergies: CODEINE    Sonographer: Ganga Hall [facility]  Group:  [facility]  Referring Physician: Christine Stanford MD  Reading Physician: Letty Jones. MD [place] [facility]    INDICATIONS: CVA. PROCEDURE: This was a routine study. A transthoracic echocardiogram was  performed. The study included complete 2D imaging, M-mode, complete spectral  Doppler, and color Doppler. Intravenous contrast (agitated saline) was  administered. Image quality was adequate. LEFT VENTRICLE: Size was normal. Systolic function was normal. Ejection  fraction was estimated in the range of 65 % to 70 %. There were no regional  wall motion abnormalities. There was moderate concentric hypertrophy. Abnormal  Left ventricular diastolic function    RIGHT VENTRICLE: The size was normal. Systolic function was normal. The  tricuspid jet envelope definition was inadequate for estimation of RV   systolic  pressure. LEFT ATRIUM: The atrium was mildly dilated. ATRIAL SEPTUM: No significant shunt with agitated saline. RIGHT ATRIUM: Size was normal.    SYSTEMIC VEINS: IVC: The inferior vena cava was normal in size and course. AORTIC VALVE: The valve was probably trileaflet. There was no evidence for  stenosis. There was no insufficiency.     MITRAL VALVE: Valve structure was normal. There was no evidence for stenosis. There was trivial regurgitation. TRICUSPID VALVE: The valve structure was normal. There was no evidence for  stenosis. There was trivial regurgitation. PULMONIC VALVE: Not well visualized. There was no evidence for stenosis. There  was no insufficiency. PERICARDIUM: There was no pericardial effusion. AORTA: The root exhibited normal size. SUMMARY:    -  Left ventricle: Systolic function was normal. Ejection fraction was  estimated in the range of 65 % to 70 %. There were no regional wall motion  abnormalities. There was moderate concentric hypertrophy. -  Left atrium: The atrium was mildly dilated. -  Atrial septum: No significant shunt with agitated saline. SYSTEM MEASUREMENT TABLES    2D mode  AoR Diam (2D): 2.7 cm  LA Dimension (2D): 3.4 cm  IVS/LVPW (2D): 1.2  IVSd (2D): 1.9 cm  LVIDd (2D): 3.2 cm  LVIDs (2D): 1.8 cm  LVOT Area (2D): 3.1 cm2  LVPWd (2D): 1.6 cm    Unspecified Scan Mode  Peak Grad; Mean; Antegrade Flow: 6 mm[Hg]  Vmax; Antegrade Flow: 122 cm/s  LVOT Diam: 2 cm    Prepared and signed by    Xenia Ibrahim. MD [place] [facility]  Signed [id number] 13:14:23      Noncontrast head CT      Clinical Indication: Code stroke with acute right upper and lower extremity  weakness, history of stroke.     Technique: Noncontrast axial images were obtained through the brain.     Comparison: MRI 4/30/2012, CT 4/28/2012     Findings: There is no acute intracranial hemorrhage, hydrocephalus, intra-axial  mass, or mass-effect. There are extensive scattered and confluent areas of  low-attenuation involving bilateral periventricular white matter, centrum  semiovale, corona radiata, and basal ganglia. Region of increased since prior. Superimposed small or developing infarct cannot be excluded by CT. There is no  CT evidence of acute large artery territorial infarction or abnormal extra-axial  fluid collection otherwise.     The mastoid air cells are aerated.  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      No displaced skull fractures are present.        IMPRESSION  Impression:   1. No evidence of hemorrhage. 2. Extensive hypodensities are nonspecific, most compatible with chronic small  vessel ischemic change and previous infarcts. Superimposed acute or developing  infarct cannot be excluded by CT however. Duplex doppler carotid ultrasound exams performed of both the right and left  side of the neck. NASCET criteria.     Peak systolic velocity right common carotid artery 99 cm/s, right internal  carotid of 326 cm/s with a ratio of 3.3 on the right. Right internal carotid  artery end-diastolic velocity of 75 cm/s.     Peak systolic velocity left common carotid artery 108 cm/s, internal carotid of  209 cm/s with a ratio of 1.9 on the left. Left internal carotid artery  end-diastolic velocity of 50 cm/s.     Antegrade flow right vertebral artery and antegrade flow left vertebral artery.     Grayscale and color-flow imaging reveal bilateral carotid artery  atherosclerosis.     IMPRESSION  IMPRESSION:     Stenosis of the right internal carotid artery in the range of 70-99%.     Stenosis of the left internal carotid artery in the range of 50-69%. MRI BRAIN WITHOUT CONTRAST 11/10/2017     HISTORY: Stroke. Left facial droop and left-sided weakness.  Sudden vision loss  yesterday     TECHNIQUE: Sagittal and axial T1-weighted, axial T2-weighted, axial and coronal  FLAIR, axial T2-weighted gradient-echo, axial diffusion weighted images with ADC  maps of the brain.        COMPARISON: MRI Brain April 30, 2012 and Head CT November 19, 2017     FINDINGS: There is a small focus of restricted diffusion in the right paramedian  frontoparietal junction cortex without associated hemorrhage or mass effect.     On the T2-weighted and FLAIR sequences, there are extensive hyperintense white  matter lesions compatible with chronic small vessel ischemic disease. Old  lacunar infarcts are present in the bilateral basal ganglia, left thalamus,  corona radiata and centrum semiovale.     There is no intra-axial mass, hydrocephalus, or abnormal extra-axial fluid  collection.     The left ethmoid air cells are opacified. Mucosal thickening is present in the  left sphenoid sinus.     IMPRESSION  IMPRESSION:     1. Acute to early subacute lacunar infarct in the right frontoparietal junction  cortex.     2. Multiple old lacunar infarcts present as described along with white matter  findings compatible with chronic small vessel ischemic disease. Discharge Exam:    General: awake, alert, oriented, no acute distress  Eyes; non icteric, EOMI  Neck; supple  Cv; RRR  Pulm; CTAB  Abd; soft, non tender  Neuro: moves all extremities without focal deficit, has facial droop still evident, mildly slurred speech    * Discharge Condition: stable  * Disposition: Home    Discharge Medications:  Current Discharge Medication List      START taking these medications    Details   aspirin (ASPIRIN) 325 mg tablet Take 1 Tab by mouth daily. Qty: 30 Tab, Refills: 0      atorvastatin (LIPITOR) 80 mg tablet Take 1 Tab by mouth daily. Qty: 30 Tab, Refills: 0         CONTINUE these medications which have NOT CHANGED    Details   losartan (COZAAR) 100 mg tablet Take 100 mg by mouth daily. hydrALAZINE (APRESOLINE) 100 mg tablet Take  by mouth three (3) times daily. amLODIPine (NORVASC) 10 mg tablet Take  by mouth daily. sodium bicarbonate 650 mg tablet Take 1 Tab by mouth two (2) times a day. Qty: 180 Tab, Refills: 3    Associated Diagnoses: CKD stage 5 secondary to hypertension (HCC)      metoprolol succinate (TOPROL-XL) 50 mg XL tablet Take 1 Tab by mouth two (2) times a day.   Qty: 180 Tab, Refills: 3    Associated Diagnoses: Benign essential HTN      ondansetron hcl (ZOFRAN, AS HYDROCHLORIDE,) 8 mg tablet Take 1 Tab by mouth every eight (8) hours as needed for Nausea. Qty: 10 Tab, Refills: 0      FLUoxetine (PROZAC) 20 mg capsule Take 1 Cap by mouth daily. Qty: 90 Cap, Refills: 2    Associated Diagnoses: Other depression         STOP taking these medications       Aspirin, Buffered 81 mg tab Comments:   Reason for Stopping:         trimethoprim-sulfamethoxazole (BACTRIM DS, SEPTRA DS) 160-800 mg per tablet Comments:   Reason for Stopping:               * Follow-up Care/Patient Instructions: Activity: as tolerated  Diet: cardiac    Follow-up Information     Follow up With Details Comments Contact Valdemar Benitez MD Schedule an appointment as soon as possible for a visit in 2 weeks Eval right carotid, limited duplex US, see Dr Lo Velasquez [facility]  [facility]  Vascular [address] [facility],[address] [place] [address]  [phone]      Arie Hancock MD Schedule an appointment as soon as possible for a visit in 1 week  [address] [place] [address]  [phone]          35 minutes spent in discharge planning and coordination of care.      Signed:  Sukumar Johns MD  11/11/2017  10:36 AM

## 2017-11-11 NOTE — CONSULTS
[facility]   [address] [place] [address] , [place], [address] [facility] [address]  Phone: [phone]  Fax: [phone]    Subjective:      Edwardo Walker is a 69 y.o. female who presents for evaluation of right carotid stenosis after admission for right hemispheric stroke. Initial symptoms were left hemiparesis and bilateral blurred vision. All of the symptoms are improving daily but have not resolved. She is not a particularly detailed historian in terms of her description of events - mainly \"yes/no\" type of responses. Duplex US shows > 70% stenosis. Images and report reviewed. Patient also has CKD-% but has not seen a nephrologist lately. Says she has in the past but she does not remember who or when. +h/o CAD, prior \"heaert attack\" but no details. She does not appear to be seeing a cardiologist either. Denies CP but does have exertional SOB. +smoker.           Past Medical History:   Diagnosis Date    Aspirin long-term use SINAN    continue    CAD (coronary artery disease)     MI. stented x 1 2012    CKD (chronic kidney disease) stage 3, GFR 30-59 ml/min           GERD (gastroesophageal reflux disease)     no meds     Heart murmur not followed per cardiology    EF 55% PER ECHO 2012    History of MI (myocardial infarction) 2012    History of stroke 2012    after MI . has residual left sided weakness in left leg    Hx of colonic polyps 2016    adenoma    Hydronephrosis     Hypertension     daily meds    Left-sided weakness 2012    left leg r/t cva    Loss of appetite     states within the last several months    Renal atrophy, right     Renal insufficiency     elevated creatinine    Smoker     48 yr hx/ 1 pk per day    Stented coronary artery Xience SINAN    proximal LAD 2012    Stroke [facility])     left side noted with slight residual    UTI (urinary tract infection) 11/10/2017     Past Surgical History:   Procedure Laterality Date    COLONOSCOPY N/A 9/26/2016    [facility] desc TA, one rectal hyperplastic--5 year recall    HX UROLOGICAL      stent to left    GA LEFT HEART CATH,PERCUTANEOUS  4/2012    Xience LAD      Family History   Problem Relation Age of Onset    Hypertension Mother     Heart Disease Mother     Stroke Father      Social History     Social History    Marital status:      Spouse name: N/A    Number of children: N/A    Years of education: N/A     Social History Main Topics    Smoking status: Current Every Day Smoker     Packs/day: 1.00     Years: 50.00    Smokeless tobacco: Never Used    Alcohol use No    Drug use: No    Sexual activity: Not on file     Other Topics Concern    Not on file     Social History Narrative      Current Facility-Administered Medications   Medication Dose Route Frequency    influenza vaccine 2017-18 (3 yrs+)(PF) (FLUZONE QUAD/FLUARIX QUAD) injection 0.5 mL  0.5 mL IntraMUSCular PRIOR TO DISCHARGE    cefTRIAXone (ROCEPHIN) 1 g in 0.9% sodium chloride (MBP/ADV) 50 mL  1 g IntraVENous Q24H    ondansetron (ZOFRAN) injection 6 mg  6 mg IntraVENous Q6H PRN    amLODIPine (NORVASC) tablet 10 mg  10 mg Oral DAILY    FLUoxetine (PROzac) capsule 20 mg  20 mg Oral DAILY    hydrALAZINE (APRESOLINE) tablet 100 mg  100 mg Oral Q8H    losartan (COZAAR) tablet 100 mg  100 mg Oral DAILY    metoprolol succinate (TOPROL-XL) XL tablet 50 mg  50 mg Oral BID    sodium bicarbonate tablet 650 mg  650 mg Oral BID    sodium chloride (NS) flush 5-10 mL  5-10 mL IntraVENous Q8H    sodium chloride (NS) flush 5-10 mL  5-10 mL IntraVENous PRN    aspirin (ASPIRIN) tablet 325 mg  325 mg Oral DAILY    acetaminophen (TYLENOL) tablet 650 mg  650 mg Oral Q4H PRN    heparin (porcine) injection 5,000 Units  5,000 Units SubCUTAneous Q8H    atorvastatin (LIPITOR) tablet 40 mg  40 mg Oral DAILY      Allergies   Allergen Reactions    Codeine Nausea and Vomiting       Review of Systems:  A comprehensive review of systems was negative except for that written in the History of Present Illness. Objective:     Patient Vitals for the past 8 hrs:   BP Temp Pulse Resp SpO2   17 0716 130/55 98.9 °F (37.2 °C) 72 18 99 %   17 0316 163/63 99.2 °F (37.3 °C) 76 18 98 %     Temp (24hrs), Av.1 °F (37.3 °C), Min:98.6 °F (37 °C), Max:99.4 °F (37.4 °C)      Physical Exam:  Visit Vitals    /55 (BP 1 Location: Right arm, BP Patient Position: At rest)    Pulse 72    Temp 98.9 °F (37.2 °C)    Resp 18    Ht 5' 1\" (1.549 m)    Wt 114 lb (51.7 kg)    SpO2 99%    BMI 21.54 kg/m2     General appearance: alert, cooperative, no distress, appears stated age, flat affect. Head: Normocephalic, without obvious abnormality, atraumatic  Extremities: extremities normal, atraumatic, no cyanosis or edema  Neurologic: Grossly normal other than slight left-sided weakness. Assessment:     Acute R hemispheric CVA, >70% LEE stenosis. Neuro deficit has not resolved and is still improving per patient's history. Multiple medical problems, patient seems to have neglected care for them. Plan:     No indication for surgery now. Antiplatelet therapy. F/U as outpatient in 2 weeks. Unable to do contrast imaging due to CKD-5. I will repeat a limited carotid US in office for further anatomic clarification. Will need to be evaluated by nephrology and cardiology prior to surgery. Will assess for carotid endarterectomy in conjunction with above. Signed By: Carmencita Oh MD     2017      Elements of this note have been dictated using speech recognition software. As a result, errors of speech recognition may have occurred.

## 2017-11-11 NOTE — DISCHARGE INSTRUCTIONS

## 2017-11-12 LAB
BACTERIA SPEC CULT: NORMAL
SERVICE CMNT-IMP: NORMAL

## 2017-11-20 ENCOUNTER — PATIENT OUTREACH (OUTPATIENT)
Dept: CASE MANAGEMENT | Age: 67
End: 2017-11-20

## 2017-11-20 NOTE — PROGRESS NOTES
Transition of Care Discharge Follow-up Questionnaire   Date/Time of Call: 17  Patient name: Chloe Kay  :1950  MRN:       What was the patient hospitalized for?       17 invalid phone #. Reached out to PCP EVA. Does the patient understand his/her diagnosis and/or treatment and what happened during the hospitalization? Did the patient receive discharge instructions? Review any discharge instructions (see notes in [facility]). Ask patient if they understand these. Do they have any questions? Were home services ordered (nursing, PT, OT, ST, etc.)? If so, has the first visit occurred? If not, why? (Assist with coordination of services if necessary.)          Was any DME ordered? If so, has it been received? If not, why?  (Assist with coordination of arranging DME orders if necessary.)          Complete a review of all medications (new, continued and discontinued meds per the D/C instructions and medication tab in [facility]). Were all new prescriptions filled? If not, why?  (Assist with obtainment of medications if necessary.)          Does the patient understand the purpose and dosing instructions for all medications? (If patient has questions, provide explanation and education.)    Does the patient have any problems in performing ADLs? (If patient is unable to perform ADLs  what is the limiting factor(s)? Do they have a support system that can assist? If no support system is present, discuss possible assistance that they may be able to obtain.)              Does the patient have all follow-up appointments scheduled? 7 day f/up with PCP?    7-14 day f/up with specialist?    If f/up has not been made  what actions has the care coordinator made to accomplish this? Has transportation been arranged?     [facility] follow-up should be within 7 days of discharge; all others should have PCP follow-up within 7 days of discharge; follow-ups with other specialists should be within 7-14 days of discharge.)    Any other questions or concerns expressed by the patient? Schedule next appointment with MARY WHITESIDE Coordinator or refer to RN Case Manager/  per the workflow guidelines. When is care coordinators next follow-up call scheduled? If referred for CCM  what RN care manager was the referral assigned? VIKTORIA Paniagua, RN  Care  Nurse, [address]  C [phone]  [email or web address]  [email or web address]     HUGO Call Completed By:          Downtime progress note entry for [facility] : Raul Esparza This note will not be viewable in [address]

## 2017-11-28 PROBLEM — I65.23 BILATERAL CAROTID ARTERY STENOSIS: Status: ACTIVE | Noted: 2017-11-28

## 2017-11-28 PROBLEM — E78.00 PURE HYPERCHOLESTEROLEMIA: Status: ACTIVE | Noted: 2017-11-28

## 2017-12-08 ENCOUNTER — HOSPITAL ENCOUNTER (OUTPATIENT)
Dept: SURGERY | Age: 67
Discharge: HOME | End: 2017-12-08
Payer: MEDICARE

## 2017-12-08 VITALS
TEMPERATURE: 98.1 F | BODY MASS INDEX: 22.47 KG/M2 | OXYGEN SATURATION: 100 % | HEART RATE: 77 BPM | DIASTOLIC BLOOD PRESSURE: 69 MMHG | RESPIRATION RATE: 18 BRPM | HEIGHT: 61 IN | WEIGHT: 119 LBS | SYSTOLIC BLOOD PRESSURE: 145 MMHG

## 2017-12-08 LAB
ANION GAP SERPL CALC-SCNC: 10 MMOL/L (ref 7–16)
APPEARANCE UR: ABNORMAL
BACTERIA URNS QL MICRO: ABNORMAL /HPF
BILIRUB UR QL: NEGATIVE
BUN SERPL-MCNC: 42 MG/DL (ref 8–23)
CALCIUM SERPL-MCNC: 8.5 MG/DL (ref 8.3–10.4)
CHLORIDE SERPL-SCNC: 116 MMOL/L (ref 98–107)
CO2 SERPL-SCNC: 19 MMOL/L (ref 21–32)
COLOR UR: YELLOW
CREAT SERPL-MCNC: 4.42 MG/DL (ref 0.6–1)
EPI CELLS #/AREA URNS HPF: ABNORMAL /HPF
ERYTHROCYTE [DISTWIDTH] IN BLOOD BY AUTOMATED COUNT: 14.4 % (ref 11.9–14.6)
GLUCOSE SERPL-MCNC: 88 MG/DL (ref 65–100)
GLUCOSE UR STRIP.AUTO-MCNC: NEGATIVE MG/DL
HCT VFR BLD AUTO: 27.2 % (ref 35.8–46.3)
HGB BLD-MCNC: 8.7 G/DL (ref 11.7–15.4)
HGB UR QL STRIP: NEGATIVE
KETONES UR QL STRIP.AUTO: NEGATIVE MG/DL
LEUKOCYTE ESTERASE UR QL STRIP.AUTO: ABNORMAL
MCH RBC QN AUTO: 29.3 PG (ref 26.1–32.9)
MCHC RBC AUTO-ENTMCNC: 32 G/DL (ref 31.4–35)
MCV RBC AUTO: 91.6 FL (ref 79.6–97.8)
NITRITE UR QL STRIP.AUTO: NEGATIVE
PH UR STRIP: 6 [PH] (ref 5–9)
PLATELET # BLD AUTO: 144 K/UL (ref 150–450)
PMV BLD AUTO: 12.2 FL (ref 10.8–14.1)
POTASSIUM SERPL-SCNC: 5.3 MMOL/L (ref 3.5–5.1)
PROT UR STRIP-MCNC: 30 MG/DL
RBC # BLD AUTO: 2.97 M/UL (ref 4.05–5.25)
RBC #/AREA URNS HPF: ABNORMAL /HPF
SODIUM SERPL-SCNC: 145 MMOL/L (ref 136–145)
SP GR UR REFRACTOMETRY: 1.01 (ref 1–1.02)
UROBILINOGEN UR QL STRIP.AUTO: 0.2 EU/DL (ref 0.2–1)
WBC # BLD AUTO: 5.4 K/UL (ref 4.3–11.1)
WBC URNS QL MICRO: ABNORMAL /HPF

## 2017-12-08 PROCEDURE — 87086 URINE CULTURE/COLONY COUNT: CPT | Performed by: UROLOGY

## 2017-12-08 PROCEDURE — 85027 COMPLETE CBC AUTOMATED: CPT | Performed by: UROLOGY

## 2017-12-08 PROCEDURE — 81001 URINALYSIS AUTO W/SCOPE: CPT | Performed by: UROLOGY

## 2017-12-08 PROCEDURE — 80048 BASIC METABOLIC PNL TOTAL CA: CPT | Performed by: UROLOGY

## 2017-12-08 NOTE — PERIOP NOTES
Patient verified name, , and surgery as liste pendingd in [facility]. Patient provided medical/health information and PTA medications to the best of their ability. TYPE  CASE:1b  Orders per surgeon: Received and dated 17   Labs per surgeon:cbc,bmp,urine,u/a. Results: sent to surgeon  Labs per anesthesia protocol: none  EKG  :  ekg 17 on pts chart    Patient provided with and instructed on education handouts including Guide to Surgery, blood transfusions, pain management, and hand hygiene for the family and community, and [facility] brochure.     hibiclens and instructions given per hospital policy. Instructed patient to continue previous medications as prescribed prior to surgery unless otherwise directed and to take the following medications the day of surgery according to anesthesia guidelines : amlodipine,aspirin,atorvastatin,prozac,hydrazaline,and metoprolol . Instructed patient to hold  the following medications: all nsaids,vitamins and supplements. Original medication prescription bottles not visualized during patient appointment. Patient teach back successful and patient demonstrates knowledge of instruction. made dr. Evelin Fall aware of pts recent CVA and past history of  stents. Chioma Lockhart stated for pt to stay on aspirin and that pt did not need to be seen at this time.

## 2017-12-10 LAB
BACTERIA SPEC CULT: NORMAL
SERVICE CMNT-IMP: NORMAL

## 2017-12-12 ENCOUNTER — ANESTHESIA EVENT (OUTPATIENT)
Dept: SURGERY | Age: 67
End: 2017-12-12
Payer: MEDICARE

## 2017-12-13 ENCOUNTER — ANESTHESIA (OUTPATIENT)
Dept: SURGERY | Age: 67
End: 2017-12-13
Payer: MEDICARE

## 2017-12-13 ENCOUNTER — HOSPITAL ENCOUNTER (OUTPATIENT)
Age: 67
Setting detail: OUTPATIENT SURGERY
Discharge: HOME | End: 2017-12-13
Attending: UROLOGY | Admitting: UROLOGY
Payer: MEDICARE

## 2017-12-13 VITALS
RESPIRATION RATE: 16 BRPM | SYSTOLIC BLOOD PRESSURE: 134 MMHG | OXYGEN SATURATION: 100 % | HEIGHT: 61 IN | BODY MASS INDEX: 21.73 KG/M2 | WEIGHT: 115.1 LBS | TEMPERATURE: 98.2 F | HEART RATE: 64 BPM | DIASTOLIC BLOOD PRESSURE: 63 MMHG

## 2017-12-13 LAB — POTASSIUM BLD-SCNC: 5.1 MMOL/L (ref 3.5–5.1)

## 2017-12-13 PROCEDURE — 76010000154 HC OR TIME FIRST 0.5 HR: Performed by: UROLOGY

## 2017-12-13 PROCEDURE — C2617 STENT, NON-COR, TEM W/O DEL: HCPCS | Performed by: UROLOGY

## 2017-12-13 PROCEDURE — 76210000021 HC REC RM PH II 0.5 TO 1 HR: Performed by: UROLOGY

## 2017-12-13 PROCEDURE — 74011636320 HC RX REV CODE- 636/320: Performed by: UROLOGY

## 2017-12-13 PROCEDURE — C1769 GUIDE WIRE: HCPCS | Performed by: UROLOGY

## 2017-12-13 PROCEDURE — 74011250636 HC RX REV CODE- 250/636

## 2017-12-13 PROCEDURE — 84132 ASSAY OF SERUM POTASSIUM: CPT

## 2017-12-13 PROCEDURE — 77030019927 HC TBNG IRR CYSTO BAXT -A: Performed by: UROLOGY

## 2017-12-13 PROCEDURE — 77030018836 HC SOL IRR NACL ICUM -A: Performed by: UROLOGY

## 2017-12-13 PROCEDURE — 77030020143 HC AIRWY LARYN INTUB CGAS -A: Performed by: ANESTHESIOLOGY

## 2017-12-13 PROCEDURE — C1758 CATHETER, URETERAL: HCPCS | Performed by: UROLOGY

## 2017-12-13 PROCEDURE — 74011000250 HC RX REV CODE- 250

## 2017-12-13 PROCEDURE — 74011250636 HC RX REV CODE- 250/636: Performed by: ANESTHESIOLOGY

## 2017-12-13 PROCEDURE — 77030032490 HC SLV COMPR SCD KNE COVD -B: Performed by: UROLOGY

## 2017-12-13 PROCEDURE — 76060000032 HC ANESTHESIA 0.5 TO 1 HR: Performed by: UROLOGY

## 2017-12-13 PROCEDURE — 77030020782 HC GWN BAIR PAWS FLX 3M -B: Performed by: ANESTHESIOLOGY

## 2017-12-13 PROCEDURE — 74011250636 HC RX REV CODE- 250/636: Performed by: UROLOGY

## 2017-12-13 PROCEDURE — 76210000006 HC OR PH I REC 0.5 TO 1 HR: Performed by: UROLOGY

## 2017-12-13 PROCEDURE — 77030018832 HC SOL IRR H20 ICUM -A: Performed by: UROLOGY

## 2017-12-13 DEVICE — URETERAL STENT
Type: IMPLANTABLE DEVICE | Site: URETER | Status: FUNCTIONAL
Brand: PERCUFLEX™ PLUS

## 2017-12-13 RX ORDER — DIPHENHYDRAMINE HYDROCHLORIDE 50 MG/ML
12.5 INJECTION, SOLUTION INTRAMUSCULAR; INTRAVENOUS
Status: DISCONTINUED | OUTPATIENT
Start: 2017-12-13 | End: 2017-12-13 | Stop reason: HOSPADM

## 2017-12-13 RX ORDER — SODIUM CHLORIDE, SODIUM LACTATE, POTASSIUM CHLORIDE, CALCIUM CHLORIDE 600; 310; 30; 20 MG/100ML; MG/100ML; MG/100ML; MG/100ML
100 INJECTION, SOLUTION INTRAVENOUS CONTINUOUS
Status: DISCONTINUED | OUTPATIENT
Start: 2017-12-13 | End: 2017-12-13 | Stop reason: HOSPADM

## 2017-12-13 RX ORDER — OXYCODONE HYDROCHLORIDE 5 MG/1
5 TABLET ORAL
Status: DISCONTINUED | OUTPATIENT
Start: 2017-12-13 | End: 2017-12-13 | Stop reason: HOSPADM

## 2017-12-13 RX ORDER — NALOXONE HYDROCHLORIDE 0.4 MG/ML
0.2 INJECTION, SOLUTION INTRAMUSCULAR; INTRAVENOUS; SUBCUTANEOUS AS NEEDED
Status: DISCONTINUED | OUTPATIENT
Start: 2017-12-13 | End: 2017-12-13 | Stop reason: HOSPADM

## 2017-12-13 RX ORDER — OXYCODONE HYDROCHLORIDE 5 MG/1
10 TABLET ORAL
Status: DISCONTINUED | OUTPATIENT
Start: 2017-12-13 | End: 2017-12-13 | Stop reason: HOSPADM

## 2017-12-13 RX ORDER — LIDOCAINE HYDROCHLORIDE 10 MG/ML
0.1 INJECTION INFILTRATION; PERINEURAL AS NEEDED
Status: DISCONTINUED | OUTPATIENT
Start: 2017-12-13 | End: 2017-12-13 | Stop reason: HOSPADM

## 2017-12-13 RX ORDER — FLUMAZENIL 0.1 MG/ML
0.2 INJECTION INTRAVENOUS
Status: DISCONTINUED | OUTPATIENT
Start: 2017-12-13 | End: 2017-12-13 | Stop reason: HOSPADM

## 2017-12-13 RX ORDER — PROPOFOL 10 MG/ML
INJECTION, EMULSION INTRAVENOUS AS NEEDED
Status: DISCONTINUED | OUTPATIENT
Start: 2017-12-13 | End: 2017-12-13 | Stop reason: HOSPADM

## 2017-12-13 RX ORDER — CIPROFLOXACIN 2 MG/ML
400 INJECTION, SOLUTION INTRAVENOUS ONCE
Status: COMPLETED | OUTPATIENT
Start: 2017-12-13 | End: 2017-12-13

## 2017-12-13 RX ORDER — HYDROMORPHONE HYDROCHLORIDE 2 MG/ML
0.5 INJECTION, SOLUTION INTRAMUSCULAR; INTRAVENOUS; SUBCUTANEOUS
Status: DISCONTINUED | OUTPATIENT
Start: 2017-12-13 | End: 2017-12-13 | Stop reason: HOSPADM

## 2017-12-13 RX ORDER — EPHEDRINE SULFATE 50 MG/ML
INJECTION, SOLUTION INTRAVENOUS AS NEEDED
Status: DISCONTINUED | OUTPATIENT
Start: 2017-12-13 | End: 2017-12-13 | Stop reason: HOSPADM

## 2017-12-13 RX ORDER — SODIUM CHLORIDE 0.9 % (FLUSH) 0.9 %
5-10 SYRINGE (ML) INJECTION EVERY 8 HOURS
Status: DISCONTINUED | OUTPATIENT
Start: 2017-12-13 | End: 2017-12-13 | Stop reason: HOSPADM

## 2017-12-13 RX ORDER — ACETAMINOPHEN 500 MG
1000 TABLET ORAL ONCE
Status: DISCONTINUED | OUTPATIENT
Start: 2017-12-13 | End: 2017-12-13 | Stop reason: HOSPADM

## 2017-12-13 RX ORDER — FENTANYL CITRATE 50 UG/ML
100 INJECTION, SOLUTION INTRAMUSCULAR; INTRAVENOUS ONCE
Status: DISCONTINUED | OUTPATIENT
Start: 2017-12-13 | End: 2017-12-13 | Stop reason: HOSPADM

## 2017-12-13 RX ORDER — LIDOCAINE HYDROCHLORIDE 20 MG/ML
INJECTION, SOLUTION EPIDURAL; INFILTRATION; INTRACAUDAL; PERINEURAL AS NEEDED
Status: DISCONTINUED | OUTPATIENT
Start: 2017-12-13 | End: 2017-12-13 | Stop reason: HOSPADM

## 2017-12-13 RX ORDER — SODIUM CHLORIDE 0.9 % (FLUSH) 0.9 %
5-10 SYRINGE (ML) INJECTION AS NEEDED
Status: DISCONTINUED | OUTPATIENT
Start: 2017-12-13 | End: 2017-12-13 | Stop reason: HOSPADM

## 2017-12-13 RX ORDER — ONDANSETRON 2 MG/ML
INJECTION INTRAMUSCULAR; INTRAVENOUS AS NEEDED
Status: DISCONTINUED | OUTPATIENT
Start: 2017-12-13 | End: 2017-12-13 | Stop reason: HOSPADM

## 2017-12-13 RX ORDER — HYDROCODONE BITARTRATE AND ACETAMINOPHEN 5; 325 MG/1; MG/1
1 TABLET ORAL
Qty: 15 TAB | Refills: 0 | Status: SHIPPED | OUTPATIENT
Start: 2017-12-13 | End: 2018-05-21

## 2017-12-13 RX ADMIN — EPHEDRINE SULFATE 10 MG: 50 INJECTION, SOLUTION INTRAVENOUS at 15:55

## 2017-12-13 RX ADMIN — ONDANSETRON 4 MG: 2 INJECTION INTRAMUSCULAR; INTRAVENOUS at 16:00

## 2017-12-13 RX ADMIN — LIDOCAINE HYDROCHLORIDE 40 MG: 20 INJECTION, SOLUTION EPIDURAL; INFILTRATION; INTRACAUDAL; PERINEURAL at 15:42

## 2017-12-13 RX ADMIN — EPHEDRINE SULFATE 10 MG: 50 INJECTION, SOLUTION INTRAVENOUS at 16:02

## 2017-12-13 RX ADMIN — CIPROFLOXACIN 400 MG: 2 INJECTION, SOLUTION INTRAVENOUS at 15:37

## 2017-12-13 RX ADMIN — PROPOFOL 140 MG: 10 INJECTION, EMULSION INTRAVENOUS at 15:42

## 2017-12-13 RX ADMIN — SODIUM CHLORIDE, SODIUM LACTATE, POTASSIUM CHLORIDE, AND CALCIUM CHLORIDE 100 ML/HR: 600; 310; 30; 20 INJECTION, SOLUTION INTRAVENOUS at 13:26

## 2017-12-13 NOTE — BRIEF OP NOTE
BRIEF OPERATIVE NOTE    Date of Procedure: 12/13/2017   Preoperative Diagnosis: UPJ (ureteropelvic junction) obstruction [N13.5]  Postoperative Diagnosis: UPJ (ureteropelvic junction) obstruction [N13.5]    Procedure(s):  CYSTOSCOPY LEFT  URETERAL STENT EXCHANGE  LEFT RETROGRADE PYELOGRAM  Surgeon(s) and Role:     * Birtha Lunch, DO - Primary         Assistant Staff:       Surgical Staff:  Circ-1: Kim Manuel RN  Event Time In   Incision Start 1557   Incision Close 1601     Anesthesia: General   Estimated Blood Loss: Nil  Specimens: * No specimens in log *   Findings: see op note  Complications: none immediate  Implants:   Implant Name Type Inv.  Item Serial No.  Lot No. LRB No. Used Action   [facility] Chris DBL-PGTL [id number] -- [facility] - [id number]   [facility] Chris DBL-PGTL [id number] -- [facility] [id number] Left 1 Implanted

## 2017-12-13 NOTE — ANESTHESIA PREPROCEDURE EVALUATION
Anesthetic History   No history of anesthetic complications            Review of Systems / Medical History  Patient summary reviewed and pertinent labs reviewed    Pulmonary          Smoker         Neuro/Psych       CVA      Comments: CVA with residual left sided weakness  Recent CVA - found to have carotid stenosis - to cont medical therapy with future CEA Cardiovascular    Hypertension          Past MI, CAD and cardiac stents ('12)    Exercise tolerance: <4 METS     GI/Hepatic/Renal     GERD: well controlled    Renal disease (CRI with stage V renal disease): CRI and ESRD       Endo/Other  Within defined limits           Other Findings            Physical Exam    Airway  Mallampati: III  TM Distance: 4 - 6 cm  Neck ROM: normal range of motion   Mouth opening: Normal     Cardiovascular    Rhythm: regular      Murmur    Comments: Bilat carotid bruits Dental    Dentition: Edentulous     Pulmonary  Breath sounds clear to auscultation               Abdominal         Other Findings            Anesthetic Plan    ASA: 4  Anesthesia type: general          Induction: Intravenous  Anesthetic plan and risks discussed with: Patient      Poor historian with multiple current medical problems with poor medical follow up. CVA 5 weeks ago with no residual.  Scheduled for vascular f/u in 2d.   Explained risks and benefits to patient and son who appeared

## 2017-12-13 NOTE — IP AVS SNAPSHOT
Nadia Iglesias 
 
 
 [address] [facility] [address] [facility] 
[phone] Patient: Sumaya Steven MRN: [id number] About your hospitalization You were admitted on:  December 13, 2017 You last received care in the:  [facility] SURGERY You were discharged on:  December 13, 2017 Why you were hospitalized Your primary diagnosis was:  Not on File Things You Need To Do (next 8 weeks) Thursday Dec 14, 2017 Follow Up with Aguila Herrera MD at  9:15 AM  
Where:  [facility] ([address]) Friday Dec 15, 2017 Carotid Artery Ultrasound with [facility] at  9:30 AM  
Where:  [facility]) Carotid Artery Ultrasound with Briseida Bedoya MD at 10:00 AM  
Where:  [facility] ([facility]) Discharge Orders None A check hardik indicates which time of day the medication should be taken. My Medications TAKE these medications as instructed Instructions Each Dose to Equal  
 Morning Noon Evening Bedtime  
 amLODIPine 10 mg tablet Commonly known as:  Francisco Javier Anderson Your last dose was: Your next dose is: Take 1 Tab by mouth daily. 10 mg  
    
   
   
   
  
 aspirin 325 mg tablet Commonly known as:  ASPIRIN Your last dose was: Your next dose is: Take 1 Tab by mouth daily. 325 mg  
    
   
   
   
  
 atorvastatin 80 mg tablet Commonly known as:  LIPITOR Your last dose was: Your next dose is: Take 1 Tab by mouth daily. 80 mg FLUoxetine 20 mg capsule Commonly known as:  PROzac Your last dose was: Your next dose is: Take 1 Cap by mouth daily. 20 mg  
    
   
   
   
  
 hydrALAZINE 100 mg tablet Commonly known as:  APRESOLINE  
   
 Your last dose was: Your next dose is: Take 1 Tab by mouth three (3) times daily. 100 mg HYDROcodone-acetaminophen 5-325 mg per tablet Commonly known as:  Khari Barbour Your last dose was: Your next dose is: Take 1 Tab by mouth every six (6) hours as needed for Pain. Max Daily Amount: 4 Tabs. 1 Tab  
    
   
   
   
  
 losartan 100 mg tablet Commonly known as:  COZAAR Your last dose was: Your next dose is: Take 1 Tab by mouth daily. 100 mg  
    
   
   
   
  
 metoprolol succinate 50 mg XL tablet Commonly known as:  TOPROL-XL Your last dose was: Your next dose is: Take 1 Tab by mouth daily. 50 mg  
    
   
   
   
  
 sodium bicarbonate 650 mg tablet Your last dose was: Your next dose is: Take 1 Tab by mouth two (2) times a day. 650 mg Where to Get Your Medications Information on where to get these meds will be given to you by the nurse or doctor. ! Ask your nurse or doctor about these medications HYDROcodone-acetaminophen 5-325 mg per tablet Discharge Instructions Ureteral Stent Placement: What to Expect at Home Your Recovery A ureteral (say \"you-REE-ter-ul\") stent is a thin, hollow tube that is placed in the ureter to help urine pass from the kidney into the bladder. Ureters are the tubes that connect the kidneys to the bladder. You may have a small amount of blood in your urine for 1 to 3 days after the procedure. While the stent is in place, you may have to urinate more often, feel a sudden need to urinate, or feel like you cannot completely empty your bladder. You may feel some pain when you urinate or do strenuous activity. You also may notice a small amount of blood in your urine after strenuous activities.  These side effects usually do not prevent people from doing their normal daily activities. Your urethra is the tube that carries urine from your bladder to outside your body. This string is attached to the stent. Try not to pull on the string. The doctor will use the string to pull out the stent when you no longer need it. After the procedure, urine may flow better from your kidneys to your bladder. A ureteral stent may be left in place for several days or for as long as several months. Your doctor will take it out when you no longer need it. This care sheet gives you a general idea about how long it will take for you to recover. But each person recovers at a different pace. Follow the steps below to get better as quickly as possible. Cystoscopy: What to Expect at [facility] Your Recovery A cystoscopy is a procedure that lets a doctor look inside of the bladder and the urethra. The urethra is the tube that carries urine from the bladder to outside the body. The doctor uses a thin, lighted tube called a cystoscope to look for kidney or bladder stones, tumors, bleeding, or infection. After the cystoscopy, your urethra may be sore at first, and it may burn when you urinate for the first few days after the procedure. You may feel the need to urinate more often, and your urine may be pink. These symptoms should get better in 1 or 2 days. You will probably be able to go back to work or most of your usual activities in 1 or 2 days. This care sheet gives you a general idea about how long it will take for you to recover. But each person recovers at a different pace. Follow the steps below to get better as quickly as possible. How can you care for yourself at home? Activity 1. Rest when you feel tired. Getting enough sleep will help you recover. 2. Try to walk each day. Start by walking a little more than you did the day before. Bit by bit, increase the amount you walk. Walking boosts blood flow and helps prevent pneumonia and constipation. 3. Avoid strenuous activities, such as bicycle riding, jogging, weight lifting, or aerobic exercise, until your doctor says it is okay. 4. Ask your doctor when you can drive again. 5. Most people are able to return to work within 1 or 2 days after the procedure. 6. You may shower and take baths as usual. 
7. Ask your doctor when it is okay for you to have sex. Diet · You can eat your normal diet. If your stomach is upset, try bland, low-fat foods like plain rice, broiled chicken, toast, and yogurt. · Drink plenty of fluids (unless your doctor tells you not to). Medicines · Take pain medicines exactly as directed. ¨ If the doctor gave you a prescription medicine for pain, take it as prescribed. ¨ If you are not taking a prescription pain medicine, ask your doctor if you can take an over-the-counter medicine. · If you think your pain medicine is making you sick to your stomach: 
¨ Take your medicine after meals (unless your doctor has told you not to). ¨ Ask your doctor for a different pain medicine. · If your doctor prescribed antibiotics, take them as directed. Do not stop taking them just because you feel better. You need to take the full course of antibiotics. Follow-up care is a key part of your treatment and safety. Be sure to make and go to all appointments, and call your doctor if you are having problems. It's also a good idea to know your test results and keep a list of the medicines you take. When should you call for help? Call 911 anytime you think you may need emergency care. For example, call if: 
· You passed out (lost consciousness). · You have severe trouble breathing. · You have sudden chest pain and shortness of breath, or you cough up blood. · You have severe belly pain. Call your doctor now or seek immediate medical care if: 
· You are sick to your stomach or cannot keep fluids down. · Your urine is still red or you see blood clots after you have urinated several times. · You have trouble passing urine or stool, especially if you have pain or swelling in your lower belly. · You have signs of a blood clot, such as: 
¨ Pain in your calf, back of the knee, thigh, or groin. ¨ Redness and swelling in your leg or groin. · You develop a fever or severe chills. · You have pain in your back just below your rib cage. This is called flank pain. Watch closely for changes in your health, and be sure to contact your doctor if: 
· You have pain or burning when you urinate. A burning feeling is normal for a day or two after the test, but call if it does not get better. · You have a frequent urge to urinate but can pass only small amounts of urine. Your urine is pink, red, or cloudy, or smells bad. It is normal for the urine to have a pinkish color for a few days after the test, but call if it does not get better. After general anesthesia or intravenous sedation, for 24 hours or while taking prescription Narcotics: · Limit your activities · Do not drive and operate hazardous machinery · Do not make important personal or business decisions · Do  not drink alcoholic beverages · If you have not urinated within 8 hours after discharge, please contact your surgeon on call. *  Please give a list of your current medications to your Primary Care Provider. *  Please update this list whenever your medications are discontinued, doses are 
    changed, or new medications (including over-the-counter products) are added. *  Please carry medication information at all times in case of emergency situations. These are general instructions for a healthy lifestyle: No smoking/ No tobacco products/ Avoid exposure to second hand smoke Surgeon General's Warning:  Quitting smoking now greatly reduces serious risk to your health. Obesity, smoking, and sedentary lifestyle greatly increases your risk for illness A healthy diet, regular physical exercise & weight monitoring are important for maintaining a healthy lifestyle You may be retaining fluid if you have a history of heart failure or if you experience any of the following symptoms:  Weight gain of 3 pounds or more overnight or 5 pounds in a week, increased swelling in our hands or feet or shortness of breath while lying flat in bed. Please call your doctor as soon as you notice any of these symptoms; do not wait until your next office visit. Recognize signs and symptoms of STROKE: 
 
F-face looks uneven A-arms unable to move or move unevenly S-speech slurred or non-existent T-time-call 911 as soon as signs and symptoms begin-DO NOT go Back to bed or wait to see if you get better-TIME IS BRAIN. Introducing [facility]! Mabel Mckee introduces [employer] patient portal. Now you can access parts of your medical record, email your doctor's office, and request medication refills online. 1. In your internet browser, go to https://[employer]. [employer] 2. Click on the First Time User? Click Here link in the Sign In box. You will see the New Member Sign Up page. 3. Enter your [employer] Access Code exactly as it appears below. You will not need to use this code after youve completed the sign-up process. If you do not sign up before the expiration date, you must request a new code. · [employer] Access Code: [email or web address] Expires: 1/31/2018 12:16 PM 
 
4. Enter the last four digits of your Social Security Number (xxxx) and Date of Birth (mm/dd/yyyy) as indicated and click Submit. You will be taken to the next sign-up page. 5. Create a [employer] ID. This will be your [employer] login ID and cannot be changed, so think of one that is secure and easy to remember. 6. Create a [employer] password. You can change your password at any time. 7. Enter your Password Reset Question and Answer. This can be used at a later time if you forget your password. 8. Enter your e-mail address. You will receive e-mail notification when new information is available in [address] 9. Click Sign Up. You can now view and download portions of your medical record. 10. Click the Download Summary menu link to download a portable copy of your medical information. If you have questions, please visit the Frequently Asked Questions section of the [employer] website. Remember, [employer] is NOT to be used for urgent needs. For medical emergencies, dial 911. Now available from your iPhone and Android! Providers Seen During Your Hospitalization Provider Specialty Primary office phone Mariposa Mooney DO Urology [phone] Your Primary Care Physician (PCP) Primary Care Physician Office Phone Office Fax [address] 224 [phone] You are allergic to the following Allergen Reactions Codeine Nausea and Vomiting Recent Documentation Height Weight BMI OB Status Smoking Status 1.549 m 52.2 kg 21.75 kg/m2 Postmenopausal Current Every Day Smoker Emergency Contacts Name Discharge Info Relation Home Work Mobile Bindu Engel DISCHARGE CAREGIVER [3] Daughter [21] [phone] Missael Uriarte  Son [22] [phone] Patient Belongings The following personal items are in your possession at time of discharge: 
  Dental Appliances: None  Visual Aid: Glasses, At home      Home Medications: None   Jewelry: None  Clothing: Footwear, Pants, Shirt, Undergarments    Other Valuables: Purse Please provide this summary of care documentation to your next provider. Signatures-by signing, you are acknowledging that this After Visit Summary has been reviewed with you and you have received a copy. Patient Signature:  ____________________________________________________________  Date:  ____________________________________________________________  
  
[facility]    
 Provider Signature:  ____________________________________________________________ Date:  ____________________________________________________________

## 2017-12-13 NOTE — DISCHARGE INSTRUCTIONS
Ureteral Stent Placement: What to Expect at [address] [place] [address]  A ureteral (say \"you-REE-ter-ul\") stent is a thin, hollow tube that is placed in the ureter to help urine pass from the kidney into the bladder. Ureters are the tubes that connect the kidneys to the bladder. You may have a small amount of blood in your urine for 1 to 3 days after the procedure. While the stent is in place, you may have to urinate more often, feel a sudden need to urinate, or feel like you cannot completely empty your bladder. You may feel some pain when you urinate or do strenuous activity. You also may notice a small amount of blood in your urine after strenuous activities. These side effects usually do not prevent people from doing their normal daily activities. Your urethra is the tube that carries urine from your bladder to outside your body. This string is attached to the stent. Try not to pull on the string. The doctor will use the string to pull out the stent when you no longer need it. After the procedure, urine may flow better from your kidneys to your bladder. A ureteral stent may be left in place for several days or for as long as several months. Your doctor will take it out when you no longer need it. This care sheet gives you a general idea about how long it will take for you to recover. But each person recovers at a different pace. Follow the steps below to get better as quickly as possible. Cystoscopy: What to Expect at [address] [place] [address]  A cystoscopy is a procedure that lets a doctor look inside of the bladder and the urethra. The urethra is the tube that carries urine from the bladder to outside the body. The doctor uses a thin, lighted tube called a cystoscope to look for kidney or bladder stones, tumors, bleeding, or infection. After the cystoscopy, your urethra may be sore at first, and it may burn when you urinate for the first few days after the procedure.  You may feel the need to urinate more often, and your urine may be pink. These symptoms should get better in 1 or 2 days. You will probably be able to go back to work or most of your usual activities in 1 or 2 days. This care sheet gives you a general idea about how long it will take for you to recover. But each person recovers at a different pace. Follow the steps below to get better as quickly as possible. How can you care for yourself at home? Activity  1. Rest when you feel tired. Getting enough sleep will help you recover. 2. Try to walk each day. Start by walking a little more than you did the day before. Bit by bit, increase the amount you walk. Walking boosts blood flow and helps prevent pneumonia and constipation. 3. Avoid strenuous activities, such as bicycle riding, jogging, weight lifting, or aerobic exercise, until your doctor says it is okay. 4. Ask your doctor when you can drive again. 5. Most people are able to return to work within 1 or 2 days after the procedure. 6. You may shower and take baths as usual.  7. Ask your doctor when it is okay for you to have sex. Diet  · You can eat your normal diet. If your stomach is upset, try bland, low-fat foods like plain rice, broiled chicken, toast, and yogurt. · Drink plenty of fluids (unless your doctor tells you not to). Medicines  · Take pain medicines exactly as directed. ¨ If the doctor gave you a prescription medicine for pain, take it as prescribed. ¨ If you are not taking a prescription pain medicine, ask your doctor if you can take an over-the-counter medicine. · If you think your pain medicine is making you sick to your stomach:  ¨ Take your medicine after meals (unless your doctor has told you not to). ¨ Ask your doctor for a different pain medicine. · If your doctor prescribed antibiotics, take them as directed. Do not stop taking them just because you feel better. You need to take the full course of antibiotics. Follow-up care is a key part of your treatment and safety.  Be sure to make and go to all appointments, and call your doctor if you are having problems. It's also a good idea to know your test results and keep a list of the medicines you take. When should you call for help? Call 911 anytime you think you may need emergency care. For example, call if:  · You passed out (lost consciousness). · You have severe trouble breathing. · You have sudden chest pain and shortness of breath, or you cough up blood. · You have severe belly pain. Call your doctor now or seek immediate medical care if:  · You are sick to your stomach or cannot keep fluids down. · Your urine is still red or you see blood clots after you have urinated several times. · You have trouble passing urine or stool, especially if you have pain or swelling in your lower belly. · You have signs of a blood clot, such as:  ¨ Pain in your calf, back of the knee, thigh, or groin. ¨ Redness and swelling in your leg or groin. · You develop a fever or severe chills. · You have pain in your back just below your rib cage. This is called flank pain. Watch closely for changes in your health, and be sure to contact your doctor if:  · You have pain or burning when you urinate. A burning feeling is normal for a day or two after the test, but call if it does not get better. · You have a frequent urge to urinate but can pass only small amounts of urine. Your urine is pink, red, or cloudy, or smells bad. It is normal for the urine to have a pinkish color for a few days after the test, but call if it does not get better. After general anesthesia or intravenous sedation, for 24 hours or while taking prescription Narcotics:  · Limit your activities  · Do not drive and operate hazardous machinery  · Do not make important personal or business decisions  · Do  not drink alcoholic beverages  · If you have not urinated within 8 hours after discharge, please contact your surgeon on call.     *  Please give a list of your current medications to your Primary Care Provider. *  Please update this list whenever your medications are discontinued, doses are      changed, or new medications (including over-the-counter products) are added. *  Please carry medication information at all times in case of emergency situations. These are general instructions for a healthy lifestyle:  No smoking/ No tobacco products/ Avoid exposure to second hand smoke  Surgeon General's Warning:  Quitting smoking now greatly reduces serious risk to your health. Obesity, smoking, and sedentary lifestyle greatly increases your risk for illness  A healthy diet, regular physical exercise & weight monitoring are important for maintaining a healthy lifestyle    You may be retaining fluid if you have a history of heart failure or if you experience any of the following symptoms:  Weight gain of 3 pounds or more overnight or 5 pounds in a week, increased swelling in our hands or feet or shortness of breath while lying flat in bed. Please call your doctor as soon as you notice any of these symptoms; do not wait until your next office visit. Recognize signs and symptoms of STROKE:    F-face looks uneven  A-arms unable to move or move unevenly  S-speech slurred or non-existent  T-time-call 911 as soon as signs and symptoms begin-DO NOT go       Back to bed or wait to see if you get better-TIME IS BRAIN.

## 2017-12-14 NOTE — ANESTHESIA POSTPROCEDURE EVALUATION
Post-Anesthesia Evaluation and Assessment    Patient: Yamilet Nguyen MRN: [id number]  SSN: [id number]    YOB: 1950  Age: 69 y.o. Sex: female       Cardiovascular Function/Vital Signs  Visit Vitals    /63    Pulse 64    Temp 36.8 °C (98.2 °F)    Resp 16    Ht 5' 1\" (1.549 m)    Wt 52.2 kg (115 lb 1.6 oz)    SpO2 100%    BMI 21.75 kg/m2       Patient is status post general anesthesia for Procedure(s):  CYSTOSCOPY LEFT  URETERAL STENT EXCHANGE  LEFT RETROGRADE PYELOGRAM.    Nausea/Vomiting: None    Postoperative hydration reviewed and adequate. Pain:  Pain Scale 1: Numeric (0 - 10) (12/13/17 1646)  Pain Intensity 1: 0 (12/13/17 1646)   Managed    Neurological Status:   Neuro (WDL): Within Defined Limits (12/13/17 1646)  Neuro  Neurologic State: Alert (12/13/17 1646)  Orientation Level: Oriented X4 (12/13/17 1646)  Cognition: Follows commands (12/13/17 1646)  Speech: Clear (12/13/17 1646)  Assessment L Pupil: Brisk;Round (12/13/17 1646)  Assessment R Pupil: Brisk;Round (12/13/17 1646)  LUE Motor Response: Purposeful;Spontaneous  (12/13/17 1646)  LLE Motor Response: Purposeful;Spontaneous  (12/13/17 1646)  RUE Motor Response: Purposeful;Spontaneous  (12/13/17 1646)  RLE Motor Response: Purposeful;Spontaneous  (12/13/17 1646)   At baseline    Mental Status and Level of Consciousness: Arousable    Pulmonary Status:   O2 Device: Room air (12/13/17 1646)   Adequate oxygenation and airway patent    Complications related to anesthesia: None    Post-anesthesia assessment completed.  No concerns    Signed By: Yashira Yates MD     December 13, 2017

## 2017-12-16 NOTE — OP NOTES
[address] REPORT    Kiley Terrell  MR#: [id number]  : 1950  ACCOUNT #: [de-identified]   DATE OF SERVICE: 2017    DATE OF PROCEDURE:  2017    PREOPERATIVE DIAGNOSIS: Left ureteropelvic junction obstruction. POSTOPERATIVE DIAGNOSIS: Left ureteropelvic junction obstruction. PROCEDURES PERFORMED: Cystoscopy, left retrograde pyelogram, and left ureteral stent exchange. ANESTHESIA: General.  SURGEON: Katey Elizalde DO.  ESTIMATED BLOOD LOSS: Nil  SPECIMENS REMOVED: none  COMPLICATIONS: none immediate  CLINICAL HISTORY: This is a 78-year-old female with a history of chronic renal insufficiency who was previously diagnosed with a left UPJ obstruction and a solitary functioning left kidney. Her last stent exchange was 2016. She overall has exhibited no improvement in her renal function with the stent. However, given her significant renal insufficiency, I have elected to exchange her stent on a serial basis. Her latest creatinine is 4.42 on 2017. All risks, benefits, and alternatives to the above mentioned procedure have been reviewed and she is willing to proceed at this time. DESCRIPTION OF PROCEDURE: Patient consent was obtained. The patient was brought back to the operating room, at which time she was placed in the supine position. After the uneventful induction of general anesthesia, she was then placed in a dorsal lithotomy position. Her genital area was prepped and draped and a sterile field applied. A 22-Lao cystoscope was inserted into the urethra and advanced into the bladder under direct visualization. The bladder was systematically surveyed and no abnormalities were seen. The stent was externalized using the Alligator grasping forceps. A guidewire was passed up the left collecting system without difficulty. An open ended ureteral catheter was then passed up the left collecting system and the wire removed.  A retrograde pyelogram was performed in a pull-out fashion. No significant dilation or extravasation of contrast was noted. No filling defects were seen. The guidewire was then replaced and a new 6 x 26 double J ureteral stent was then passed under cystoscopic and fluoroscopic guidance. Excellent curl was noted proximally, as well as distally. The patient's bladder was drained and the procedure was terminated. The patient tolerated the procedure well. I will plan to exchange her stent out in approximately 6 months. INTERPRETATION OF LEFT RETROGRADE PYELOGRAM: A left retrograde pyelogram was performed through the open ended ureteral catheter outlining the left renal collection system. No significant hydronephrosis or filling defects were seen. No extravasation of contrast was noted.       DO MEJIA Arriaga / Starlet Breed  D: 12/13/2017 16:14     T: 12/15/2017 09:45  JOB #: [id number]

## 2017-12-19 ENCOUNTER — HOSPITAL ENCOUNTER (OUTPATIENT)
Dept: CT IMAGING | Age: 67
Discharge: HOME | End: 2017-12-19
Attending: SURGERY
Payer: MEDICARE

## 2017-12-19 DIAGNOSIS — Z86.73 HISTORY OF CVA (CEREBROVASCULAR ACCIDENT): ICD-10-CM

## 2017-12-19 PROCEDURE — 70490 CT SOFT TISSUE NECK W/O DYE: CPT

## 2018-04-18 ENCOUNTER — HOSPITAL ENCOUNTER (EMERGENCY)
Age: 68
Discharge: HOME | End: 2018-04-18
Attending: EMERGENCY MEDICINE
Payer: MEDICARE

## 2018-04-18 ENCOUNTER — APPOINTMENT (OUTPATIENT)
Dept: ULTRASOUND IMAGING | Age: 68
End: 2018-04-18
Attending: EMERGENCY MEDICINE
Payer: MEDICARE

## 2018-04-18 VITALS
OXYGEN SATURATION: 100 % | DIASTOLIC BLOOD PRESSURE: 78 MMHG | HEART RATE: 62 BPM | WEIGHT: 120 LBS | HEIGHT: 62 IN | SYSTOLIC BLOOD PRESSURE: 177 MMHG | BODY MASS INDEX: 22.08 KG/M2 | RESPIRATION RATE: 16 BRPM | TEMPERATURE: 97.8 F

## 2018-04-18 DIAGNOSIS — M79.605 LEFT LEG PAIN: Primary | ICD-10-CM

## 2018-04-18 PROCEDURE — 93971 EXTREMITY STUDY: CPT

## 2018-04-18 PROCEDURE — 99283 EMERGENCY DEPT VISIT LOW MDM: CPT | Performed by: EMERGENCY MEDICINE

## 2018-04-18 NOTE — ED PROVIDER NOTES
HPI Comments: Patient is a 49-year-old female with history of coronary artery disease, CVA and chronic kidney disease who is presenting with left lower extremity pain for about 2-3 weeks. States pain is in her left calf region and worse with ambulation. Patient denies any injury or trauma. One pack per day smoker. No reports of chest pain or shortness of breath. Patient is a 77 y.o. female presenting with leg pain. The history is provided by the patient. No  was used. Leg Pain    Pertinent negatives include no back pain.         Past Medical History:   Diagnosis Date    Aspirin long-term use SINAN    continue    CAD (coronary artery disease)     MI. stented x 1 2012    Chronic kidney disease     CKD (chronic kidney disease) stage 3, GFR 30-59 ml/min           GERD (gastroesophageal reflux disease)     no meds     Heart murmur not followed per cardiology    EF 55% PER ECHO 2012    History of MI (myocardial infarction) 2012    History of stroke 2012    after MI . has residual left sided weakness in left leg    Hx of colonic polyps 2016    adenoma    Hydronephrosis     Hypertension     daily meds    Left-sided weakness 2012    left leg r/t cva    Loss of appetite     states within the last several months    Renal atrophy, right     Renal insufficiency     elevated creatinine    Smoker     48 yr hx/ 1 pk per day    Stented coronary artery Xience SINAN    proximal LAD 2012    Stroke [facility])     left side noted with slight residual    UTI (urinary tract infection) 11/10/2017       Past Surgical History:   Procedure Laterality Date    COLONOSCOPY N/A 9/26/2016    Pedro--one desc TA, one rectal hyperplastic--5 year recall    HX UROLOGICAL      stent to left    [place] LEFT HEART CATH,PERCUTANEOUS  4/2012    Xience LAD         Family History:   Problem Relation Age of Onset    Hypertension Mother     Heart Disease Mother     Stroke Father        Social History     Social History  Marital status:      Spouse name: N/A    Number of children: N/A    Years of education: N/A     Occupational History    Not on file. Social History Main Topics    Smoking status: Current Every Day Smoker     Packs/day: 0.50     Years: 50.00    Smokeless tobacco: Never Used    Alcohol use No    Drug use: No    Sexual activity: Not on file     Other Topics Concern    Not on file     Social History Narrative         ALLERGIES: Codeine    Review of Systems   Constitutional: Negative for fatigue and fever. Respiratory: Negative for cough and shortness of breath. Cardiovascular: Negative for chest pain. Gastrointestinal: Negative for abdominal pain, nausea and vomiting. Genitourinary: Negative for dysuria. Musculoskeletal: Negative for back pain. Left lower extremity pain   Neurological: Negative for syncope. Psychiatric/Behavioral: Negative for confusion. Vitals:    04/18/18 1451   BP: 171/71   Pulse: 73   Resp: 16   Temp: 97.6 °F (36.4 °C)   SpO2: 99%   Weight: 54.4 kg (120 lb)   Height: 5' 1.5\" (1.562 m)            Physical Exam   Constitutional: She is oriented to person, place, and time. She appears well-developed and well-nourished. No distress. HENT:   Head: Normocephalic and atraumatic. Eyes: Conjunctivae and EOM are normal. Pupils are equal, round, and reactive to light. Neck: Normal range of motion. Neck supple. Cardiovascular: Normal rate, regular rhythm and normal heart sounds. Pulmonary/Chest: Effort normal and breath sounds normal. No respiratory distress. She has no wheezes. She has no rales. Abdominal: Soft. She exhibits no distension. There is no tenderness. There is no rebound. Musculoskeletal: Normal range of motion. She exhibits tenderness. She exhibits no edema. Pain to palpation in the left calf region. No erythema,or warmth. Normal pulses distally. Normal sensation. No significant swelling or venous distention present. Neurological: She is alert and oriented to person, place, and time. Skin: Skin is warm and dry. No rash noted. She is not diaphoretic. Psychiatric: She has a normal mood and affect. Her behavior is normal.   Vitals reviewed. MDM  Number of Diagnoses or Management Options  Left leg pain: new and does not require workup  Diagnosis management comments: Differential includes peripheral vascular disease,  DVT, cellulitis. We will obtain an ultrasound to evaluate for possible DVT. Ultrasound negative for DVT. I believe the patient may have some claudication secondary to her years of smoking. She has intact pulses bilaterally at this time. Extremity is not cool. Discussed the need for follow-up with PCP for further studies. Patient and family expressed understanding. Discharged in stable condition. Ana Sánchez MD; 4/18/2018 @4:32 PM Voice dictation software was used during the making of this note. This software is not perfect and grammatical and other typographical errors may be present. This note has not been proofread for errors.  ===================================================================           Amount and/or Complexity of Data Reviewed  Tests in the radiology section of CPT®: ordered and reviewed (Duplex Lower Ext Venous Left    Result Date: 4/18/2018  LEFT LOWER EXTREMITY VENOUS DUPLEX ULTRASOUND. HISTORY: Left lower extremity pain and swelling. TECHNIQUE: Direct skin-contact high resolution grayscale images, color-flow Doppler and duplex waveform analysis. FINDINGS: There is compressibility, color-flow assignment and augmentation of the venous waveform with calf compression in the common femoral, superficial femoral and popliteal veins.  Upper calf veins are unremarkable     IMPRESSION: Negative for sonographic evidence of deep venous thrombosis left lower extremity.     )  Review and summarize past medical records: yes  Independent visualization of images, tracings, or specimens: yes    Risk of Complications, Morbidity, and/or Mortality  Presenting problems: moderate  Diagnostic procedures: moderate  Management options: moderate    Patient Progress  Patient progress: stable        ED Course       Procedures

## 2018-04-18 NOTE — ED NOTES
I have reviewed discharge instructions with the patient and caregiver. The patient and caregiver verbalized understanding. Patient left ED via Discharge Method: ambulatory to Home with Ti Me, her daughter  Opportunity for questions and clarification provided. Patient given 0 scripts. To continue your aftercare when you leave the hospital, you may receive an automated call from our care team to check in on how you are doing. This is a free service and part of our promise to provide the best care and service to meet your aftercare needs.  If you have questions, or wish to unsubscribe from this service please call [phone]. Thank you for Choosing our [facility].

## 2018-04-18 NOTE — ED TRIAGE NOTES
Pt with left lower leg pain x 2-3 weeks. Pt  Denies injury or trauma. Pt reports pain is to front of leg.     Jeanenne Gottron, RN

## 2018-05-21 ENCOUNTER — HOSPITAL ENCOUNTER (OUTPATIENT)
Dept: SURGERY | Age: 68
Discharge: HOME | End: 2018-05-21
Payer: MEDICARE

## 2018-05-21 VITALS
TEMPERATURE: 97.8 F | SYSTOLIC BLOOD PRESSURE: 208 MMHG | OXYGEN SATURATION: 100 % | BODY MASS INDEX: 20.66 KG/M2 | HEART RATE: 67 BPM | DIASTOLIC BLOOD PRESSURE: 92 MMHG | WEIGHT: 112.25 LBS | RESPIRATION RATE: 20 BRPM | HEIGHT: 62 IN

## 2018-05-21 LAB
ANION GAP SERPL CALC-SCNC: 9 MMOL/L (ref 7–16)
BUN SERPL-MCNC: 42 MG/DL (ref 8–23)
CALCIUM SERPL-MCNC: 8.6 MG/DL (ref 8.3–10.4)
CHLORIDE SERPL-SCNC: 115 MMOL/L (ref 98–107)
CO2 SERPL-SCNC: 18 MMOL/L (ref 21–32)
CREAT SERPL-MCNC: 3.75 MG/DL (ref 0.6–1)
ERYTHROCYTE [DISTWIDTH] IN BLOOD BY AUTOMATED COUNT: 14.9 % (ref 11.9–14.6)
GLUCOSE SERPL-MCNC: 71 MG/DL (ref 65–100)
HCT VFR BLD AUTO: 26.4 % (ref 35.8–46.3)
HGB BLD-MCNC: 8.6 G/DL (ref 11.7–15.4)
MCH RBC QN AUTO: 29.7 PG (ref 26.1–32.9)
MCHC RBC AUTO-ENTMCNC: 32.6 G/DL (ref 31.4–35)
MCV RBC AUTO: 91 FL (ref 79.6–97.8)
PLATELET # BLD AUTO: 168 K/UL (ref 150–450)
PMV BLD AUTO: 11.8 FL (ref 10.8–14.1)
POTASSIUM SERPL-SCNC: 5.2 MMOL/L (ref 3.5–5.1)
RBC # BLD AUTO: 2.9 M/UL (ref 4.05–5.25)
SODIUM SERPL-SCNC: 142 MMOL/L (ref 136–145)
WBC # BLD AUTO: 4.2 K/UL (ref 4.3–11.1)

## 2018-05-21 PROCEDURE — 87086 URINE CULTURE/COLONY COUNT: CPT | Performed by: UROLOGY

## 2018-05-21 PROCEDURE — 80048 BASIC METABOLIC PNL TOTAL CA: CPT | Performed by: UROLOGY

## 2018-05-21 PROCEDURE — 81001 URINALYSIS AUTO W/SCOPE: CPT | Performed by: UROLOGY

## 2018-05-21 PROCEDURE — 85027 COMPLETE CBC AUTOMATED: CPT | Performed by: UROLOGY

## 2018-05-21 NOTE — PERIOP NOTES
Reviewed todays labs-- urine and urine cult still pending---noted hgb 8.6--and potassium 5.2-- bun and  Creat elevated also-- looks like they( bun, creat, potassium) were around the same 12/2018--- called [facility]-- after hours-- left message about labs and request to have dr moulton review--- will inform mda after heard from dr moulton---chart sent to chartroom for follow up

## 2018-05-21 NOTE — PERIOP NOTES
Patient verified name, , and surgery as listed in [facility]. Patient provided medical/health information and PTA medications to the best of their ability. TYPE  CASE:1b  Orders per surgeon: Received on chart and dated 18   Labs per surgeon:cbc, bmp, urine, urine cult---- blood work collected today--pt unable to give urine sample at this time-- given water to drink and placed in melissa-- aware to come and get me when ready to go  Labs per anesthesia protocol: no add't  EKG  :  2017 and echo 2017      Patient provided with and instructed on education handouts including Guide to Surgery, blood transfusions, pain management, and hand hygiene for the family and community, and [employer] brochure. dawnmist and instructions given per hospital policy. Instructed patient to continue previous medications as prescribed prior to surgery unless otherwise directed and to take the following medications the day of surgery according to anesthesia guidelines : norvasc, asa 325mg-- ( note from dr moulton on chart ok to stay on asa 325 mg) apresoline, lopressor, sodium bicarb. Instructed patient to hold  the following medications: none. Original medication prescription bottles was not visualized during patient appointment. Patient teach back successful and patient demonstrates knowledge of instruction.

## 2018-05-22 ENCOUNTER — ANESTHESIA EVENT (OUTPATIENT)
Dept: SURGERY | Age: 68
End: 2018-05-22
Payer: MEDICARE

## 2018-05-22 LAB
APPEARANCE UR: ABNORMAL
BACTERIA URNS QL MICRO: ABNORMAL /HPF
BILIRUB UR QL: NEGATIVE
CASTS URNS QL MICRO: ABNORMAL /LPF
COLOR UR: YELLOW
EPI CELLS #/AREA URNS HPF: ABNORMAL /HPF
GLUCOSE UR STRIP.AUTO-MCNC: NEGATIVE MG/DL
HGB UR QL STRIP: ABNORMAL
KETONES UR QL STRIP.AUTO: NEGATIVE MG/DL
LEUKOCYTE ESTERASE UR QL STRIP.AUTO: ABNORMAL
NITRITE UR QL STRIP.AUTO: NEGATIVE
PH UR STRIP: 6 [PH] (ref 5–9)
PROT UR STRIP-MCNC: 100 MG/DL
RBC #/AREA URNS HPF: ABNORMAL /HPF
SP GR UR REFRACTOMETRY: 1.01 (ref 1–1.02)
UROBILINOGEN UR QL STRIP.AUTO: 0.2 EU/DL (ref 0.2–1)
WBC URNS QL MICRO: ABNORMAL /HPF

## 2018-05-23 ENCOUNTER — HOSPITAL ENCOUNTER (OUTPATIENT)
Age: 68
Setting detail: OUTPATIENT SURGERY
Discharge: HOME | End: 2018-05-23
Attending: UROLOGY | Admitting: UROLOGY
Payer: MEDICARE

## 2018-05-23 ENCOUNTER — ANESTHESIA (OUTPATIENT)
Dept: SURGERY | Age: 68
End: 2018-05-23
Payer: MEDICARE

## 2018-05-23 VITALS
TEMPERATURE: 98.1 F | HEART RATE: 73 BPM | WEIGHT: 111.5 LBS | RESPIRATION RATE: 17 BRPM | BODY MASS INDEX: 20.52 KG/M2 | DIASTOLIC BLOOD PRESSURE: 66 MMHG | SYSTOLIC BLOOD PRESSURE: 138 MMHG | HEIGHT: 62 IN | OXYGEN SATURATION: 96 %

## 2018-05-23 LAB — POTASSIUM BLD-SCNC: 5.3 MMOL/L (ref 3.5–5.1)

## 2018-05-23 PROCEDURE — C1758 CATHETER, URETERAL: HCPCS | Performed by: UROLOGY

## 2018-05-23 PROCEDURE — 74011250637 HC RX REV CODE- 250/637: Performed by: ANESTHESIOLOGY

## 2018-05-23 PROCEDURE — C2617 STENT, NON-COR, TEM W/O DEL: HCPCS | Performed by: UROLOGY

## 2018-05-23 PROCEDURE — 74011000250 HC RX REV CODE- 250

## 2018-05-23 PROCEDURE — 76210000006 HC OR PH I REC 0.5 TO 1 HR: Performed by: UROLOGY

## 2018-05-23 PROCEDURE — 77030020782 HC GWN BAIR PAWS FLX 3M -B: Performed by: ANESTHESIOLOGY

## 2018-05-23 PROCEDURE — 74011636320 HC RX REV CODE- 636/320: Performed by: UROLOGY

## 2018-05-23 PROCEDURE — 74011250636 HC RX REV CODE- 250/636

## 2018-05-23 PROCEDURE — 74011250636 HC RX REV CODE- 250/636: Performed by: UROLOGY

## 2018-05-23 PROCEDURE — 76010000154 HC OR TIME FIRST 0.5 HR: Performed by: UROLOGY

## 2018-05-23 PROCEDURE — 77030019927 HC TBNG IRR CYSTO BAXT -A: Performed by: UROLOGY

## 2018-05-23 PROCEDURE — 76210000021 HC REC RM PH II 0.5 TO 1 HR: Performed by: UROLOGY

## 2018-05-23 PROCEDURE — C1769 GUIDE WIRE: HCPCS | Performed by: UROLOGY

## 2018-05-23 PROCEDURE — 77030018832 HC SOL IRR H20 ICUM -A: Performed by: UROLOGY

## 2018-05-23 PROCEDURE — 77030032490 HC SLV COMPR SCD KNE COVD -B: Performed by: UROLOGY

## 2018-05-23 PROCEDURE — 76060000032 HC ANESTHESIA 0.5 TO 1 HR: Performed by: UROLOGY

## 2018-05-23 PROCEDURE — 84132 ASSAY OF SERUM POTASSIUM: CPT

## 2018-05-23 PROCEDURE — 74011250636 HC RX REV CODE- 250/636: Performed by: ANESTHESIOLOGY

## 2018-05-23 PROCEDURE — 77030020143 HC AIRWY LARYN INTUB CGAS -A: Performed by: ANESTHESIOLOGY

## 2018-05-23 DEVICE — URETERAL STENT
Type: IMPLANTABLE DEVICE | Site: URETER | Status: FUNCTIONAL
Brand: PERCUFLEX™ PLUS

## 2018-05-23 RX ORDER — HYDROMORPHONE HYDROCHLORIDE 2 MG/ML
0.5 INJECTION, SOLUTION INTRAMUSCULAR; INTRAVENOUS; SUBCUTANEOUS
Status: DISCONTINUED | OUTPATIENT
Start: 2018-05-23 | End: 2018-05-23 | Stop reason: HOSPADM

## 2018-05-23 RX ORDER — ONDANSETRON 2 MG/ML
4 INJECTION INTRAMUSCULAR; INTRAVENOUS ONCE
Status: DISCONTINUED | OUTPATIENT
Start: 2018-05-23 | End: 2018-05-23 | Stop reason: HOSPADM

## 2018-05-23 RX ORDER — FENTANYL CITRATE 50 UG/ML
INJECTION, SOLUTION INTRAMUSCULAR; INTRAVENOUS AS NEEDED
Status: DISCONTINUED | OUTPATIENT
Start: 2018-05-23 | End: 2018-05-23 | Stop reason: HOSPADM

## 2018-05-23 RX ORDER — MIDAZOLAM HYDROCHLORIDE 1 MG/ML
2 INJECTION, SOLUTION INTRAMUSCULAR; INTRAVENOUS ONCE
Status: DISCONTINUED | OUTPATIENT
Start: 2018-05-23 | End: 2018-05-23 | Stop reason: HOSPADM

## 2018-05-23 RX ORDER — CIPROFLOXACIN 2 MG/ML
200 INJECTION, SOLUTION INTRAVENOUS ONCE
Status: COMPLETED | OUTPATIENT
Start: 2018-05-23 | End: 2018-05-23

## 2018-05-23 RX ORDER — OXYCODONE HYDROCHLORIDE 5 MG/1
10 TABLET ORAL
Status: DISCONTINUED | OUTPATIENT
Start: 2018-05-23 | End: 2018-05-23 | Stop reason: HOSPADM

## 2018-05-23 RX ORDER — LIDOCAINE HYDROCHLORIDE 10 MG/ML
0.1 INJECTION INFILTRATION; PERINEURAL AS NEEDED
Status: DISCONTINUED | OUTPATIENT
Start: 2018-05-23 | End: 2018-05-23 | Stop reason: HOSPADM

## 2018-05-23 RX ORDER — PROPOFOL 10 MG/ML
INJECTION, EMULSION INTRAVENOUS AS NEEDED
Status: DISCONTINUED | OUTPATIENT
Start: 2018-05-23 | End: 2018-05-23 | Stop reason: HOSPADM

## 2018-05-23 RX ORDER — METOPROLOL TARTRATE 25 MG/1
50 TABLET, FILM COATED ORAL ONCE
Status: COMPLETED | OUTPATIENT
Start: 2018-05-23 | End: 2018-05-23

## 2018-05-23 RX ORDER — FENTANYL CITRATE 50 UG/ML
100 INJECTION, SOLUTION INTRAMUSCULAR; INTRAVENOUS ONCE
Status: DISCONTINUED | OUTPATIENT
Start: 2018-05-23 | End: 2018-05-23 | Stop reason: HOSPADM

## 2018-05-23 RX ORDER — MIDAZOLAM HYDROCHLORIDE 1 MG/ML
2 INJECTION, SOLUTION INTRAMUSCULAR; INTRAVENOUS
Status: DISCONTINUED | OUTPATIENT
Start: 2018-05-23 | End: 2018-05-23 | Stop reason: HOSPADM

## 2018-05-23 RX ORDER — SODIUM CHLORIDE, SODIUM LACTATE, POTASSIUM CHLORIDE, CALCIUM CHLORIDE 600; 310; 30; 20 MG/100ML; MG/100ML; MG/100ML; MG/100ML
100 INJECTION, SOLUTION INTRAVENOUS CONTINUOUS
Status: DISCONTINUED | OUTPATIENT
Start: 2018-05-23 | End: 2018-05-23 | Stop reason: HOSPADM

## 2018-05-23 RX ORDER — GLYCOPYRROLATE 0.2 MG/ML
INJECTION INTRAMUSCULAR; INTRAVENOUS AS NEEDED
Status: DISCONTINUED | OUTPATIENT
Start: 2018-05-23 | End: 2018-05-23 | Stop reason: HOSPADM

## 2018-05-23 RX ORDER — DIPHENHYDRAMINE HYDROCHLORIDE 50 MG/ML
12.5 INJECTION, SOLUTION INTRAMUSCULAR; INTRAVENOUS
Status: DISCONTINUED | OUTPATIENT
Start: 2018-05-23 | End: 2018-05-23 | Stop reason: HOSPADM

## 2018-05-23 RX ORDER — ONDANSETRON 2 MG/ML
INJECTION INTRAMUSCULAR; INTRAVENOUS AS NEEDED
Status: DISCONTINUED | OUTPATIENT
Start: 2018-05-23 | End: 2018-05-23 | Stop reason: HOSPADM

## 2018-05-23 RX ORDER — NALOXONE HYDROCHLORIDE 0.4 MG/ML
0.1 INJECTION, SOLUTION INTRAMUSCULAR; INTRAVENOUS; SUBCUTANEOUS AS NEEDED
Status: DISCONTINUED | OUTPATIENT
Start: 2018-05-23 | End: 2018-05-23 | Stop reason: HOSPADM

## 2018-05-23 RX ORDER — LIDOCAINE HYDROCHLORIDE 20 MG/ML
INJECTION, SOLUTION EPIDURAL; INFILTRATION; INTRACAUDAL; PERINEURAL AS NEEDED
Status: DISCONTINUED | OUTPATIENT
Start: 2018-05-23 | End: 2018-05-23 | Stop reason: HOSPADM

## 2018-05-23 RX ORDER — ALBUTEROL SULFATE 0.83 MG/ML
2.5 SOLUTION RESPIRATORY (INHALATION) AS NEEDED
Status: DISCONTINUED | OUTPATIENT
Start: 2018-05-23 | End: 2018-05-23 | Stop reason: HOSPADM

## 2018-05-23 RX ORDER — EPHEDRINE SULFATE 50 MG/ML
INJECTION, SOLUTION INTRAVENOUS AS NEEDED
Status: DISCONTINUED | OUTPATIENT
Start: 2018-05-23 | End: 2018-05-23 | Stop reason: HOSPADM

## 2018-05-23 RX ORDER — OXYCODONE HYDROCHLORIDE 5 MG/1
5 TABLET ORAL
Status: DISCONTINUED | OUTPATIENT
Start: 2018-05-23 | End: 2018-05-23 | Stop reason: HOSPADM

## 2018-05-23 RX ADMIN — EPHEDRINE SULFATE 10 MG: 50 INJECTION, SOLUTION INTRAVENOUS at 17:44

## 2018-05-23 RX ADMIN — GLYCOPYRROLATE 0.1 MG: 0.2 INJECTION INTRAMUSCULAR; INTRAVENOUS at 17:33

## 2018-05-23 RX ADMIN — LIDOCAINE HYDROCHLORIDE 100 MG: 20 INJECTION, SOLUTION EPIDURAL; INFILTRATION; INTRACAUDAL; PERINEURAL at 17:29

## 2018-05-23 RX ADMIN — EPHEDRINE SULFATE 5 MG: 50 INJECTION, SOLUTION INTRAVENOUS at 17:39

## 2018-05-23 RX ADMIN — ONDANSETRON 4 MG: 2 INJECTION INTRAMUSCULAR; INTRAVENOUS at 17:42

## 2018-05-23 RX ADMIN — EPHEDRINE SULFATE 10 MG: 50 INJECTION, SOLUTION INTRAVENOUS at 17:33

## 2018-05-23 RX ADMIN — CIPROFLOXACIN 200 MG: 2 INJECTION, SOLUTION INTRAVENOUS at 17:34

## 2018-05-23 RX ADMIN — PROPOFOL 150 MG: 10 INJECTION, EMULSION INTRAVENOUS at 17:29

## 2018-05-23 RX ADMIN — SODIUM CHLORIDE, SODIUM LACTATE, POTASSIUM CHLORIDE, AND CALCIUM CHLORIDE 100 ML/HR: 600; 310; 30; 20 INJECTION, SOLUTION INTRAVENOUS at 16:17

## 2018-05-23 RX ADMIN — METOPROLOL TARTRATE 50 MG: 25 TABLET ORAL at 17:13

## 2018-05-23 RX ADMIN — FENTANYL CITRATE 50 MCG: 50 INJECTION, SOLUTION INTRAMUSCULAR; INTRAVENOUS at 17:20

## 2018-05-23 NOTE — BRIEF OP NOTE
BRIEF OPERATIVE NOTE    Date of Procedure: 5/23/2018   Preoperative Diagnosis: Left UPJ (ureteropelvic junction) obstruction [N13.5]  Postoperative Diagnosis: Left UPJ (ureteropelvic junction) obstruction [N13.5]    Procedure(s):  CYSTOSCOPY / LEFT URETERAL STENT EXCHANGE / left retrograde pyelogram  Surgeon(s) and Role:     * Kaley Simpson, DO - Primary         Surgical Assistant: none    Surgical Staff:  Circ-1: Alison Edwards RN  Event Time In   Incision Start 1740   Incision Close 1746     Anesthesia: General   Estimated Blood Loss: Nil  Specimens: * No specimens in log *   Findings: see op note  Complications: none immediate  Implants:   Implant Name Type Inv.  Item Serial No.  Lot No. LRB No. Used Action   Andrew Munguia DBL-PGTL [id number] [facility] [id number]   Andrew Munguia DBL-PGTL [id number] -- Yulia Leslie [facility] [id number] Left 1 Implanted

## 2018-05-23 NOTE — DISCHARGE INSTRUCTIONS
Ureteral Stent Placement: What to Expect at [address] [place] [address]  A ureteral (say \"you-REE-ter-ul\") stent is a thin, hollow tube that is placed in the ureter to help urine pass from the kidney into the bladder. Ureters are the tubes that connect the kidneys to the bladder. You may have a small amount of blood in your urine for 1 to 3 days after the procedure. While the stent is in place, you may have to urinate more often, feel a sudden need to urinate, or feel like you cannot completely empty your bladder. You may feel some pain when you urinate or do strenuous activity. You also may notice a small amount of blood in your urine after strenuous activities. These side effects usually do not prevent people from doing their normal daily activities. Your urethra is the tube that carries urine from your bladder to outside your body. This string is attached to the stent. Try not to pull on the string. The doctor will use the string to pull out the stent when you no longer need it. After the procedure, urine may flow better from your kidneys to your bladder. A ureteral stent may be left in place for several days or for as long as several months. Your doctor will take it out when you no longer need it. This care sheet gives you a general idea about how long it will take for you to recover. But each person recovers at a different pace. Follow the steps below to get better as quickly as possible. Cystoscopy: What to Expect at [address] [place] [address]  A cystoscopy is a procedure that lets a doctor look inside of the bladder and the urethra. The urethra is the tube that carries urine from the bladder to outside the body. The doctor uses a thin, lighted tube called a cystoscope to look for kidney or bladder stones, tumors, bleeding, or infection. After the cystoscopy, your urethra may be sore at first, and it may burn when you urinate for the first few days after the procedure.  You may feel the need to urinate more often, and your urine may be pink. These symptoms should get better in 1 or 2 days. You will probably be able to go back to work or most of your usual activities in 1 or 2 days. This care sheet gives you a general idea about how long it will take for you to recover. But each person recovers at a different pace. Follow the steps below to get better as quickly as possible. How can you care for yourself at home? Activity  1. Rest when you feel tired. Getting enough sleep will help you recover. 2. Try to walk each day. Start by walking a little more than you did the day before. Bit by bit, increase the amount you walk. Walking boosts blood flow and helps prevent pneumonia and constipation. 3. Avoid strenuous activities, such as bicycle riding, jogging, weight lifting, or aerobic exercise, until your doctor says it is okay. 4. Ask your doctor when you can drive again. 5. Most people are able to return to work within 1 or 2 days after the procedure. 6. You may shower and take baths as usual.  7. Ask your doctor when it is okay for you to have sex. Diet  · You can eat your normal diet. If your stomach is upset, try bland, low-fat foods like plain rice, broiled chicken, toast, and yogurt. · Drink plenty of fluids (unless your doctor tells you not to). Medicines  · Take pain medicines exactly as directed. ¨ If the doctor gave you a prescription medicine for pain, take it as prescribed. ¨ If you are not taking a prescription pain medicine, ask your doctor if you can take an over-the-counter medicine. · If you think your pain medicine is making you sick to your stomach:  ¨ Take your medicine after meals (unless your doctor has told you not to). ¨ Ask your doctor for a different pain medicine. · If your doctor prescribed antibiotics, take them as directed. Do not stop taking them just because you feel better. You need to take the full course of antibiotics. Follow-up care is a key part of your treatment and safety.  Be sure to make and go to all appointments, and call your doctor if you are having problems. It's also a good idea to know your test results and keep a list of the medicines you take. When should you call for help? Call 911 anytime you think you may need emergency care. For example, call if:  · You passed out (lost consciousness). · You have severe trouble breathing. · You have sudden chest pain and shortness of breath, or you cough up blood. · You have severe belly pain. Call your doctor now or seek immediate medical care if:  · You are sick to your stomach or cannot keep fluids down. · Your urine is still red or you see blood clots after you have urinated several times. · You have trouble passing urine or stool, especially if you have pain or swelling in your lower belly. · You have signs of a blood clot, such as:  ¨ Pain in your calf, back of the knee, thigh, or groin. ¨ Redness and swelling in your leg or groin. · You develop a fever or severe chills. · You have pain in your back just below your rib cage. This is called flank pain. Watch closely for changes in your health, and be sure to contact your doctor if:  · You have pain or burning when you urinate. A burning feeling is normal for a day or two after the test, but call if it does not get better. · You have a frequent urge to urinate but can pass only small amounts of urine. Your urine is pink, red, or cloudy, or smells bad. It is normal for the urine to have a pinkish color for a few days after the test, but call if it does not get better. After general anesthesia or intravenous sedation, for 24 hours or while taking prescription Narcotics:  · Limit your activities  · Do not drive and operate hazardous machinery  · Do not make important personal or business decisions  · Do  not drink alcoholic beverages  · If you have not urinated within 8 hours after discharge, please contact your surgeon on call.     *  Please give a list of your current medications to your Primary Care Provider. *  Please update this list whenever your medications are discontinued, doses are      changed, or new medications (including over-the-counter products) are added. *  Please carry medication information at all times in case of emergency situations. These are general instructions for a healthy lifestyle:  No smoking/ No tobacco products/ Avoid exposure to second hand smoke  Surgeon General's Warning:  Quitting smoking now greatly reduces serious risk to your health. Obesity, smoking, and sedentary lifestyle greatly increases your risk for illness  A healthy diet, regular physical exercise & weight monitoring are important for maintaining a healthy lifestyle    You may be retaining fluid if you have a history of heart failure or if you experience any of the following symptoms:  Weight gain of 3 pounds or more overnight or 5 pounds in a week, increased swelling in our hands or feet or shortness of breath while lying flat in bed. Please call your doctor as soon as you notice any of these symptoms; do not wait until your next office visit. Recognize signs and symptoms of STROKE:    F-face looks uneven  A-arms unable to move or move unevenly  S-speech slurred or non-existent  T-time-call 911 as soon as signs and symptoms begin-DO NOT go       Back to bed or wait to see if you get better-TIME IS BRAIN.

## 2018-05-23 NOTE — ANESTHESIA POSTPROCEDURE EVALUATION
Post-Anesthesia Evaluation and Assessment    Patient: Corrinne Pals MRN: [id number]  SSN: [id number]    YOB: 1950  Age: 77 y.o. Sex: female       Cardiovascular Function/Vital Signs  Visit Vitals    /66    Pulse 73    Temp 36.8 °C (98.3 °F)    Resp 17    Ht 5' 2\" (1.575 m)    Wt 50.6 kg (111 lb 8 oz)    SpO2 96%    BMI 20.39 kg/m2       Patient is status post general anesthesia for Procedure(s):  CYSTOSCOPY / LEFT URETERAL STENT EXCHANGE / left retrograde . Nausea/Vomiting: None    Postoperative hydration reviewed and adequate. Pain:  Pain Scale 1: Visual (05/23/18 1755)  Pain Intensity 1: 0 (05/23/18 1755)   Managed    Neurological Status:   Neuro (WDL): Exceptions to WDL (05/23/18 1806)  Neuro  Neurologic State: Drowsy (05/23/18 1806)  Orientation Level: Oriented X4 (05/23/18 1806)  Cognition: Follows commands (05/23/18 1806)  Speech: Clear (05/23/18 1806)  LUE Motor Response: Purposeful (05/23/18 1806)  LLE Motor Response: Purposeful (05/23/18 1806)  RUE Motor Response: Purposeful (05/23/18 1806)  RLE Motor Response: Purposeful (05/23/18 1806)   At baseline    Mental Status and Level of Consciousness: Arousable    Pulmonary Status:   O2 Device: Room air (05/23/18 1806)   Adequate oxygenation and airway patent    Complications related to anesthesia: None    Post-anesthesia assessment completed.  No concerns    Signed By: Zeferino Matthews MD     May 23, 2018

## 2018-05-23 NOTE — PERIOP NOTES
Dr. Juan Ramon Schaefer notified of POC K+ 5.3 today in pre op. Patient stating she is not quite sure if she took Metoprolol today or not. Patient feels she did not. /66, HR 60. Dr. Juan Ramon Schaefer stating OK to give Metoprolol 50 mg PO x1 in pre op.

## 2018-05-23 NOTE — ANESTHESIA PREPROCEDURE EVALUATION
Anesthetic History   No history of anesthetic complications            Review of Systems / Medical History  Patient summary reviewed and pertinent labs reviewed    Pulmonary          Smoker         Neuro/Psych       CVA (left sided weakness; 11/17 and 2013)  TIA     Cardiovascular    Hypertension: well controlled          Past MI, cardiac stents (2012) and hyperlipidemia    Exercise tolerance: <4 METS: Limited by weakness after CVA; uses cane  Comments: EF 65% 11/17 ECHO   on bASA; took this am  Will give beta blocker--did not take   GI/Hepatic/Renal         Renal disease (stage 3; no dialysis): CRI    Pertinent negatives: No GERD   Endo/Other             Other Findings   Comments: Hx hydronephrosis           Physical Exam    Airway  Mallampati: II  TM Distance: 4 - 6 cm  Neck ROM: normal range of motion   Mouth opening: Normal     Cardiovascular    Rhythm: regular  Rate: normal         Dental    Dentition: Full upper dentures and Edentulous     Pulmonary  Breath sounds clear to auscultation               Abdominal  GI exam deferred       Other Findings            Anesthetic Plan    ASA: 3  Anesthesia type: general          Induction: Intravenous  Anesthetic plan and risks discussed with: Patient      LMA ok

## 2018-05-23 NOTE — PERIOP NOTES
Dr. Betsy Larkin reviewed case, patient history, hemoglobin, creatinine, BUN results. No orders received. Dr. Veronique Schaffer has reviewed labs. Preliminary urine culture showing mixed skin cleopatra.

## 2018-05-23 NOTE — IP AVS SNAPSHOT
[address] [address] 
[phone] Patient: Fanny Petit MRN: [id number] AEU:[id number] About your hospitalization You were admitted on:  May 23, 2018 You last received care in the:  [facility] PACU You were discharged on:  May 23, 2018 Why you were hospitalized Your primary diagnosis was:  Not on File Follow-up Information Follow up With Details Comments Contact Info MD Christian Parisi [address] [facility] [phone] Laura Pandya, DO  Follow up with me in 5 mo to discuss stent exchange. Call sooner for questions/concerns [phone] [facility] [phone] Discharge Orders None A check hardik indicates which time of day the medication should be taken. My Medications CONTINUE taking these medications Instructions Each Dose to Equal  
 Morning Noon Evening Bedtime  
 amLODIPine 10 mg tablet Commonly known as:  Jarred Nohemy Your last dose was: Your next dose is: Take 1 Tab by mouth daily. 10 mg  
    
   
   
   
  
 aspirin 325 mg tablet Commonly known as:  ASPIRIN Your last dose was: Your next dose is: Take 1 Tab by mouth daily. 325 mg  
    
   
   
   
  
 hydrALAZINE 100 mg tablet Commonly known as:  APRESOLINE Your last dose was: Your next dose is: Take 1 Tab by mouth three (3) times daily. 100 mg  
    
   
   
   
  
 losartan 100 mg tablet Commonly known as:  COZAAR Your last dose was: Your next dose is: Take 1 Tab by mouth daily. 100 mg  
    
   
   
   
  
 metoprolol succinate 50 mg XL tablet Commonly known as:  TOPROL-XL Your last dose was: Your next dose is: Take 1 Tab by mouth daily. 50 mg  
    
   
   
   
  
 sodium bicarbonate 650 mg tablet Your last dose was: Your next dose is: Take 1 Tab by mouth two (2) times a day. 650 mg Discharge Instructions Ureteral Stent Placement: What to Expect at Home Your Recovery A ureteral (say \"you-REE-ter-ul\") stent is a thin, hollow tube that is placed in the ureter to help urine pass from the kidney into the bladder. Ureters are the tubes that connect the kidneys to the bladder. You may have a small amount of blood in your urine for 1 to 3 days after the procedure. While the stent is in place, you may have to urinate more often, feel a sudden need to urinate, or feel like you cannot completely empty your bladder. You may feel some pain when you urinate or do strenuous activity. You also may notice a small amount of blood in your urine after strenuous activities. These side effects usually do not prevent people from doing their normal daily activities. Your urethra is the tube that carries urine from your bladder to outside your body. This string is attached to the stent. Try not to pull on the string. The doctor will use the string to pull out the stent when you no longer need it. After the procedure, urine may flow better from your kidneys to your bladder. A ureteral stent may be left in place for several days or for as long as several months. Your doctor will take it out when you no longer need it. This care sheet gives you a general idea about how long it will take for you to recover. But each person recovers at a different pace. Follow the steps below to get better as quickly as possible. Cystoscopy: What to Expect at [facility] Your Recovery A cystoscopy is a procedure that lets a doctor look inside of the bladder and the urethra. The urethra is the tube that carries urine from the bladder to outside the body.  The doctor uses a thin, lighted tube called a cystoscope to look for kidney or bladder stones, tumors, bleeding, or infection. After the cystoscopy, your urethra may be sore at first, and it may burn when you urinate for the first few days after the procedure. You may feel the need to urinate more often, and your urine may be pink. These symptoms should get better in 1 or 2 days. You will probably be able to go back to work or most of your usual activities in 1 or 2 days. This care sheet gives you a general idea about how long it will take for you to recover. But each person recovers at a different pace. Follow the steps below to get better as quickly as possible. How can you care for yourself at home? Activity 1. Rest when you feel tired. Getting enough sleep will help you recover. 2. Try to walk each day. Start by walking a little more than you did the day before. Bit by bit, increase the amount you walk. Walking boosts blood flow and helps prevent pneumonia and constipation. 3. Avoid strenuous activities, such as bicycle riding, jogging, weight lifting, or aerobic exercise, until your doctor says it is okay. 4. Ask your doctor when you can drive again. 5. Most people are able to return to work within 1 or 2 days after the procedure. 6. You may shower and take baths as usual. 
7. Ask your doctor when it is okay for you to have sex. Diet · You can eat your normal diet. If your stomach is upset, try bland, low-fat foods like plain rice, broiled chicken, toast, and yogurt. · Drink plenty of fluids (unless your doctor tells you not to). Medicines · Take pain medicines exactly as directed. ¨ If the doctor gave you a prescription medicine for pain, take it as prescribed. ¨ If you are not taking a prescription pain medicine, ask your doctor if you can take an over-the-counter medicine. · If you think your pain medicine is making you sick to your stomach: 
¨ Take your medicine after meals (unless your doctor has told you not to). ¨ Ask your doctor for a different pain medicine. · If your doctor prescribed antibiotics, take them as directed. Do not stop taking them just because you feel better. You need to take the full course of antibiotics. Follow-up care is a key part of your treatment and safety. Be sure to make and go to all appointments, and call your doctor if you are having problems. It's also a good idea to know your test results and keep a list of the medicines you take. When should you call for help? Call 911 anytime you think you may need emergency care. For example, call if: 
· You passed out (lost consciousness). · You have severe trouble breathing. · You have sudden chest pain and shortness of breath, or you cough up blood. · You have severe belly pain. Call your doctor now or seek immediate medical care if: 
· You are sick to your stomach or cannot keep fluids down. · Your urine is still red or you see blood clots after you have urinated several times. · You have trouble passing urine or stool, especially if you have pain or swelling in your lower belly. · You have signs of a blood clot, such as: 
¨ Pain in your calf, back of the knee, thigh, or groin. ¨ Redness and swelling in your leg or groin. · You develop a fever or severe chills. · You have pain in your back just below your rib cage. This is called flank pain. Watch closely for changes in your health, and be sure to contact your doctor if: 
· You have pain or burning when you urinate. A burning feeling is normal for a day or two after the test, but call if it does not get better. · You have a frequent urge to urinate but can pass only small amounts of urine. Your urine is pink, red, or cloudy, or smells bad. It is normal for the urine to have a pinkish color for a few days after the test, but call if it does not get better. After general anesthesia or intravenous sedation, for 24 hours or while taking prescription Narcotics: · Limit your activities · Do not drive and operate hazardous machinery · Do not make important personal or business decisions · Do  not drink alcoholic beverages · If you have not urinated within 8 hours after discharge, please contact your surgeon on call. *  Please give a list of your current medications to your Primary Care Provider. *  Please update this list whenever your medications are discontinued, doses are 
    changed, or new medications (including over-the-counter products) are added. *  Please carry medication information at all times in case of emergency situations. These are general instructions for a healthy lifestyle: No smoking/ No tobacco products/ Avoid exposure to second hand smoke Surgeon General's Warning:  Quitting smoking now greatly reduces serious risk to your health. Obesity, smoking, and sedentary lifestyle greatly increases your risk for illness A healthy diet, regular physical exercise & weight monitoring are important for maintaining a healthy lifestyle You may be retaining fluid if you have a history of heart failure or if you experience any of the following symptoms:  Weight gain of 3 pounds or more overnight or 5 pounds in a week, increased swelling in our hands or feet or shortness of breath while lying flat in bed. Please call your doctor as soon as you notice any of these symptoms; do not wait until your next office visit. Recognize signs and symptoms of STROKE: 
 
F-face looks uneven A-arms unable to move or move unevenly S-speech slurred or non-existent T-time-call 911 as soon as signs and symptoms begin-DO NOT go Back to bed or wait to see if you get better-TIME IS BRAIN. Introducing [facility]! [employer] introduces [employer] patient portal. Now you can access parts of your medical record, email your doctor's office, and request medication refills online. 1. In your internet browser, go to [email or web address] 2. Click on the First Time User? Click Here link in the Sign In box. You will see the New Member Sign Up page. 3. Enter your [employer] Access Code exactly as it appears below. You will not need to use this code after youve completed the sign-up process. If you do not sign up before the expiration date, you must request a new code. · [employer] Access Code: [email or web address] Expires: 7/17/2018  2:47 PM 
 
4. Enter the last four digits of your Social Security Number (xxxx) and Date of Birth (mm/dd/yyyy) as indicated and click Submit. You will be taken to the next sign-up page. 5. Create a [employer] ID. This will be your [employer] login ID and cannot be changed, so think of one that is secure and easy to remember. 6. Create a [employer] password. You can change your password at any time. 7. Enter your Password Reset Question and Answer. This can be used at a later time if you forget your password. 8. Enter your e-mail address. You will receive e-mail notification when new information is available in [address] 9. Click Sign Up. You can now view and download portions of your medical record. 10. Click the Download Summary menu link to download a portable copy of your medical information. If you have questions, please visit the Frequently Asked Questions section of the [employer] website. Remember, [employer] is NOT to be used for urgent needs. For medical emergencies, dial 911. Now available from your iPhone and Android! Introducing Roberto Garcia As a [employer] patient, I wanted to make you aware of our electronic visit tool called Roberto Garcia. [employer] 24/7 allows you to connect within minutes with a medical provider 24 hours a day, seven days a week via a mobile device or tablet or logging into a secure website from your computer. You can access Roberto Garcia from anywhere in the [place].  
 
A virtual visit might be right for you when you have a simple condition and feel like you just dont want to get out of bed, or cant get away from work for an appointment, when your regular [employer] provider is not available (evenings, weekends or holidays), or when youre out of town and need minor care. Electronic visits cost only $49 and if the [email or web address] 24/7 provider determines a prescription is needed to treat your condition, one can be electronically transmitted to a nearby pharmacy*. Please take a moment to enroll today if you have not already done so. The enrollment process is free and takes just a few minutes. To enroll, please download the [email or web address] 24/7 lori to your tablet or phone, or visit [email or web address] to enroll on your computer. And, as an [address] [place] [address] patient with a [employer] account, the results of your visits will be scanned into your electronic medical record and your primary care provider will be able to view the scanned results. We urge you to continue to see your regular [facility] provider for your ongoing medical care. And while your primary care provider may not be the one available when you seek a [employer] virtual visit, the peace of mind you get from getting a real diagnosis real time can be priceless. For more information on [employer], view our Frequently Asked Questions (FAQs) at [email or web address]. Sincerely, 
 
Lina Owen MD 
Chief Medical Officer [employer] *:  certain medications cannot be prescribed via [employer] Providers Seen During Your Hospitalization Provider Specialty Primary office phone Kayla Armas DO Urology [phone] Your Primary Care Physician (PCP) Primary Care Physician Office Phone Office Fax [address] [phone] You are allergic to the following Allergen Reactions Codeine Nausea and Vomiting Recent Documentation Height Weight BMI OB Status Smoking Status 1.575 m 50.6 kg 20.39 kg/m2 Postmenopausal Current Every Day Smoker Emergency Contacts Name Discharge Info Relation Home Work Mobile Bindu Engel DISCHARGE CAREGIVER [3] Daughter [21] [phone] Missael Uriarte  Son [22] [phone] Patient Belongings The following personal items are in your possession at time of discharge: 
  Dental Appliances: None  Visual Aid: Glasses (glasses for distance- did not bring today)      Home Medications: None   Jewelry: Ring (wedding set given to daughter, Charles Ahn, in pre op lobby.)  Clothing: Footwear, Socks, Undergarments, Shirt, Pants    Other Valuables: Other (comment), Cane (hair cap) Please provide this summary of care documentation to your next provider. Signatures-by signing, you are acknowledging that this After Visit Summary has been reviewed with you and you have received a copy. Patient Signature:  ____________________________________________________________ Date:  ____________________________________________________________  
  
Thom Richardson Provider Signature:  ____________________________________________________________ Date:  ____________________________________________________________

## 2018-05-23 NOTE — PROGRESS NOTES
made pre-op prayer visit. Pt was alert and verbal.  Stated she was a little nervous. Pt's daughter was present.  provided spiritual care through presence, pastoral conversation, prayer and assurance of prayer.

## 2018-05-24 LAB
BACTERIA SPEC CULT: NORMAL
SERVICE CMNT-IMP: NORMAL

## 2018-05-24 NOTE — OP NOTES
[facility] REPORT    Scott Chandler  MR#: [id number]  : 1950  ACCOUNT #: [de-identified]   DATE OF SERVICE: 2018    PREOPERATIVE DIAGNOSIS:  Left ureteropelvic junction obstruction. POSTOPERATIVE DIAGNOSIS:  Left ureteropelvic junction obstruction. PROCEDURE:  Cystoscopy, left retrograde pyelogram, and left ureteral stent exchange. ANESTHESIA:  General.    SURGEON:  Chirag Weir DO.    ESTIMATED BLOOD LOSS:  Less than 5 mL. COMPLICATIONS:  None immediate    ASSISTANT:  None. IMPLANTS:  Left ureteral stent. SPECIMENS REMOVED:  None    CLINICAL HISTORY:  This is a 78-year-old female with a history of a solitary functioning left kidney. She was found to have a left UPJ obstruction and elected for a left ureteral stent placement. She has undergone serial stent exchanges with her last stent being exchanged in 2017. She presents today for the above-mentioned procedure. All risks, benefits, and alternatives to the procedure have been reviewed and she is willing to proceed at this time. DESCRIPTION OF OPERATIVE PROCEDURE:  The patient's consent was obtained. The patient was brought back to the operating room at which time she was placed in the supine position. After the uneventful induction of general anesthesia, she was then placed in a dorsal lithotomy position. Her genital area was prepped and draped and a sterile field applied. A 22-Samoan cystoscope was inserted into the urethra and advanced into the bladder under direct visualization. A partially calcified stent was seen emanating from the left orifice. There was mild intermittent cystitis cystica noted throughout the bladder. There were no other mucosal abnormalities seen. The left ureteral stent was externalized using an alligator grasping forceps. A guidewire was passed up the left stent without difficulty.   A 5-Samoan open-ended ureteral catheter was then passed over the wire to the level of the kidney. A retrograde pyelogram was performed which showed no significant hydronephrosis, filling defects, or extravasation of contrast.  The guidewire was replaced and a new 6 x 26 double-J ureteral stent was then passed under cystoscopic and fluoroscopic guidance. Excellent curl was noted proximally as well as distally. The patient tolerated the procedure well. I will plan to exchange her stent again in approximately 6 months' time. INTERPRETATION OF LEFT RETROGRADE PYELOGRAM:  A left retrograde pyelogram was performed through the open-ended ureteral catheter outlining the left proximal collecting system. No hydronephrosis, filling defects, or extravasation of contrast was noted.       Sinclair Apley STERRETT, DO SPS / DN  D: 05/23/2018 18:01     T: 05/23/2018 18:23  JOB #: [id number]

## 2018-08-27 ENCOUNTER — HOSPITAL ENCOUNTER (EMERGENCY)
Age: 68
Discharge: HOME | End: 2018-08-28
Attending: STUDENT IN AN ORGANIZED HEALTH CARE EDUCATION/TRAINING PROGRAM
Payer: MEDICARE

## 2018-08-27 ENCOUNTER — APPOINTMENT (OUTPATIENT)
Dept: GENERAL RADIOLOGY | Age: 68
End: 2018-08-27
Attending: STUDENT IN AN ORGANIZED HEALTH CARE EDUCATION/TRAINING PROGRAM
Payer: MEDICARE

## 2018-08-27 DIAGNOSIS — R07.9 CHEST PAIN, UNSPECIFIED TYPE: Primary | ICD-10-CM

## 2018-08-27 LAB
ALBUMIN SERPL-MCNC: 3.1 G/DL (ref 3.2–4.6)
ALBUMIN/GLOB SERPL: 0.6 {RATIO} (ref 1.2–3.5)
ALP SERPL-CCNC: 69 U/L (ref 50–136)
ALT SERPL-CCNC: 15 U/L (ref 12–65)
ANION GAP SERPL CALC-SCNC: 11 MMOL/L (ref 7–16)
AST SERPL-CCNC: 16 U/L (ref 15–37)
BASOPHILS # BLD: 0 K/UL (ref 0–0.2)
BASOPHILS NFR BLD: 1 % (ref 0–2)
BILIRUB SERPL-MCNC: 0.2 MG/DL (ref 0.2–1.1)
BUN SERPL-MCNC: 53 MG/DL (ref 8–23)
CALCIUM SERPL-MCNC: 8.4 MG/DL (ref 8.3–10.4)
CHLORIDE SERPL-SCNC: 121 MMOL/L (ref 98–107)
CO2 SERPL-SCNC: 15 MMOL/L (ref 21–32)
CREAT SERPL-MCNC: 4.63 MG/DL (ref 0.6–1)
DIFFERENTIAL METHOD BLD: ABNORMAL
EOSINOPHIL # BLD: 0.3 K/UL (ref 0–0.8)
EOSINOPHIL NFR BLD: 4 % (ref 0.5–7.8)
ERYTHROCYTE [DISTWIDTH] IN BLOOD BY AUTOMATED COUNT: 14.1 %
GLOBULIN SER CALC-MCNC: 4.8 G/DL (ref 2.3–3.5)
GLUCOSE SERPL-MCNC: 99 MG/DL (ref 65–100)
HCT VFR BLD AUTO: 25.8 % (ref 35.8–46.3)
HGB BLD-MCNC: 8.3 G/DL (ref 11.7–15.4)
IMM GRANULOCYTES # BLD: 0 K/UL (ref 0–0.5)
IMM GRANULOCYTES NFR BLD AUTO: 0 % (ref 0–5)
LYMPHOCYTES # BLD: 3 K/UL (ref 0.5–4.6)
LYMPHOCYTES NFR BLD: 47 % (ref 13–44)
MAGNESIUM SERPL-MCNC: 1.9 MG/DL (ref 1.8–2.4)
MCH RBC QN AUTO: 29.1 PG (ref 26.1–32.9)
MCHC RBC AUTO-ENTMCNC: 32.2 G/DL (ref 31.4–35)
MCV RBC AUTO: 90.5 FL (ref 79.6–97.8)
MONOCYTES # BLD: 0.5 K/UL (ref 0.1–1.3)
MONOCYTES NFR BLD: 7 % (ref 4–12)
NEUTS SEG # BLD: 2.7 K/UL (ref 1.7–8.2)
NEUTS SEG NFR BLD: 42 % (ref 43–78)
NRBC # BLD: 0 K/UL (ref 0–0.2)
PLATELET # BLD AUTO: 142 K/UL (ref 150–450)
PMV BLD AUTO: 12.1 FL (ref 9.4–12.3)
POTASSIUM SERPL-SCNC: 5.3 MMOL/L (ref 3.5–5.1)
PROT SERPL-MCNC: 7.9 G/DL (ref 6.3–8.2)
RBC # BLD AUTO: 2.85 M/UL (ref 4.05–5.2)
SODIUM SERPL-SCNC: 147 MMOL/L (ref 136–145)
TROPONIN I BLD-MCNC: 0.01 NG/ML (ref 0.02–0.05)
WBC # BLD AUTO: 6.4 K/UL (ref 4.3–11.1)

## 2018-08-27 PROCEDURE — 71046 X-RAY EXAM CHEST 2 VIEWS: CPT

## 2018-08-27 PROCEDURE — 93005 ELECTROCARDIOGRAM TRACING: CPT | Performed by: STUDENT IN AN ORGANIZED HEALTH CARE EDUCATION/TRAINING PROGRAM

## 2018-08-27 PROCEDURE — 99285 EMERGENCY DEPT VISIT HI MDM: CPT | Performed by: STUDENT IN AN ORGANIZED HEALTH CARE EDUCATION/TRAINING PROGRAM

## 2018-08-27 PROCEDURE — 83735 ASSAY OF MAGNESIUM: CPT

## 2018-08-27 PROCEDURE — 85025 COMPLETE CBC W/AUTO DIFF WBC: CPT

## 2018-08-27 PROCEDURE — 80053 COMPREHEN METABOLIC PANEL: CPT

## 2018-08-27 PROCEDURE — 84484 ASSAY OF TROPONIN QUANT: CPT

## 2018-08-27 PROCEDURE — 81003 URINALYSIS AUTO W/O SCOPE: CPT | Performed by: STUDENT IN AN ORGANIZED HEALTH CARE EDUCATION/TRAINING PROGRAM

## 2018-08-28 VITALS
RESPIRATION RATE: 26 BRPM | TEMPERATURE: 98 F | HEIGHT: 62 IN | BODY MASS INDEX: 20.8 KG/M2 | DIASTOLIC BLOOD PRESSURE: 67 MMHG | HEART RATE: 78 BPM | OXYGEN SATURATION: 100 % | WEIGHT: 113 LBS | SYSTOLIC BLOOD PRESSURE: 141 MMHG

## 2018-08-28 LAB
ATRIAL RATE: 97 BPM
CALCULATED P AXIS, ECG09: 62 DEGREES
CALCULATED R AXIS, ECG10: 52 DEGREES
CALCULATED T AXIS, ECG11: 173 DEGREES
DIAGNOSIS, 93000: NORMAL
P-R INTERVAL, ECG05: 182 MS
Q-T INTERVAL, ECG07: 320 MS
QRS DURATION, ECG06: 84 MS
QTC CALCULATION (BEZET), ECG08: 406 MS
TROPONIN I BLD-MCNC: 0.01 NG/ML (ref 0.02–0.05)
VENTRICULAR RATE, ECG03: 97 BPM

## 2018-08-28 PROCEDURE — 84484 ASSAY OF TROPONIN QUANT: CPT

## 2018-08-28 NOTE — ED PROVIDER NOTES
HPI Comments: 78-year-old female patient presents via EMS with reports of right-sided chest pressure, shortness of breath and nausea/vomiting. Patient states her symptoms started at approximately 9 PM this morning while seated still. He reports near constant pain and dental EMS providers arrived. This pain was described as dull aching sensation in the right chest, nonradiating. Pain is completely resolved on arrival.  Patient denies any symptoms at present. She was given for his aspirin by EMS providers and vomited shortly thereafter. She denies other episodes of vomiting. No reports of diaphoresis. Denies recent fever, chills, abdominal pain or changes in bowel habits. She does report some flank pain earlier today but this is resolved as well. He notes a history of cardiac disease, previous stent and chronic renal failure. Patient is a 77 y.o. female presenting with chest pain. The history is provided by the patient. No  was used. Chest Pain    This is a new problem. The current episode started 3 to 5 hours ago. The problem has not changed since onset. The problem occurs constantly. The pain is associated with normal activity. The pain is present in the right side and substernal region. The pain is moderate. The quality of the pain is described as pressure-like. The pain does not radiate. Associated symptoms include nausea, shortness of breath and vomiting. Pertinent negatives include no abdominal pain, no back pain, no claudication, no cough, no diaphoresis, no dizziness, no exertional chest pressure, no fever, no headaches, no hemoptysis, no irregular heartbeat, no leg pain, no lower extremity edema, no malaise/fatigue, no near-syncope, no numbness, no orthopnea, no palpitations, no PND, no sputum production and no weakness. She has tried aspirin for the symptoms. The treatment provided significant relief. Risk factors include cardiac disease.         Past Medical History: Diagnosis Date    Aspirin long-term use SINAN    continue    CKD (chronic kidney disease) stage 3, GFR 30-59 ml/min           GERD (gastroesophageal reflux disease)     no meds     Heart murmur not followed per cardiology    EF 65-70 %-- on echo 11/2017    History of MI (myocardial infarction) 2012    stents x 1---- per pt-- does not see a cardiologist    Hx of colonic polyps 2016    adenoma    Hydronephrosis     Hypertension     daily meds    Left-sided weakness 2012    left leg r/t cva    Loss of appetite     states within the last several months    Poor historian     Renal atrophy, right     Renal insufficiency     ? --- pt unsure if sees nephrologist    Smoker     48 yr hx/ 1 pk per day    Stented coronary artery Xience SINAN    proximal LAD 2012-- pt states does not see a cardio    Stroke ([facility] [address]     with slight left  sided weakness residual       Past Surgical History:   Procedure Laterality Date    COLONOSCOPY N/A 9/26/2016    Pedro--one desc TA, one rectal hyperplastic--5 year recall    HX UROLOGICAL      stent to left--multi times    MS LEFT HEART CATH,PERCUTANEOUS  4/2012    Xience LAD         Family History:   Problem Relation Age of Onset    Hypertension Mother     Heart Disease Mother     Stroke Father        Social History     Social History    Marital status:      Spouse name: N/A    Number of children: N/A    Years of education: N/A     Occupational History    Not on file. Social History Main Topics    Smoking status: Current Every Day Smoker     Packs/day: 0.50     Years: 50.00    Smokeless tobacco: Never Used    Alcohol use No    Drug use: No    Sexual activity: Not on file     Other Topics Concern    Not on file     Social History Narrative         ALLERGIES: Codeine    Review of Systems   Constitutional: Negative for chills, diaphoresis, fever and malaise/fatigue. HENT: Negative for congestion, sneezing and sore throat.     Eyes: Negative for visual disturbance. Respiratory: Positive for shortness of breath. Negative for cough, hemoptysis, sputum production, chest tightness and wheezing. Cardiovascular: Positive for chest pain. Negative for palpitations, orthopnea, claudication, leg swelling, PND and near-syncope. Gastrointestinal: Positive for nausea and vomiting. Negative for abdominal pain, blood in stool and diarrhea. Endocrine: Negative for polyuria. Genitourinary: Negative for difficulty urinating, dysuria, flank pain, hematuria and urgency. Musculoskeletal: Negative for back pain, myalgias, neck pain and neck stiffness. Skin: Negative for color change and rash. Neurological: Negative for dizziness, syncope, speech difficulty, weakness, light-headedness, numbness and headaches. Psychiatric/Behavioral: Negative for behavioral problems. All other systems reviewed and are negative. Vitals:    08/27/18 2247   BP: 147/65   Pulse: (!) 102   Resp: 16   Temp: 98.3 °F (36.8 °C)   SpO2: 100%   Weight: 51.3 kg (113 lb)   Height: 5' 2\" (1.575 m)            Physical Exam   Constitutional: She is oriented to person, place, and time. She appears well-developed and well-nourished. No distress. Alert and oriented to person, place and time. No acute distress. Speaks in clear, fluent sentences. HENT:   Head: Normocephalic and atraumatic. Nose: Nose normal.   Eyes: Conjunctivae and EOM are normal. Pupils are equal, round, and reactive to light. Neck: Normal range of motion. Neck supple. No JVD present. No tracheal deviation present. Cardiovascular: Normal rate, regular rhythm, S1 normal, S2 normal, normal heart sounds and intact distal pulses. Exam reveals no gallop, no distant heart sounds and no friction rub. No murmur heard. Pulmonary/Chest: Effort normal and breath sounds normal. No accessory muscle usage or stridor. No tachypnea and no bradypnea. No respiratory distress. She has no decreased breath sounds. She has no wheezes. She has no rhonchi. She has no rales. She exhibits no tenderness. Abdominal: Soft. Normal appearance. She exhibits no distension and no mass. There is no hepatosplenomegaly, splenomegaly or hepatomegaly. There is no tenderness. There is no rigidity, no rebound, no guarding, no CVA tenderness, no tenderness at McBurney's point and negative Nath's sign. Musculoskeletal: Normal range of motion. She exhibits no edema, tenderness or deformity. Neurological: She is alert and oriented to person, place, and time. No cranial nerve deficit. Skin: Skin is warm and dry. No rash noted. She is not diaphoretic. Psychiatric: She has a normal mood and affect. Her behavior is normal.   Nursing note and vitals reviewed. MDM  Number of Diagnoses or Management Options  Diagnosis management comments: EKG obtained on arrival shows a normal sinus rhythm with a rate of 97 beats a minute. Chronic inversions of the T-wave in 1-4,5 and 6. Today's EKG appears consistent with previous tracings. No evidence of acute ischemic change. Patient is pain-free on arrival.    Previous ECHO:=====================  Sonographer: Deshaun Browne RDCS  Group:  [facility]  Referring Physician: Cruz Dias MD  Reading Physician: Anthony Conde. MD [facility]    INDICATIONS: CVA. PROCEDURE: This was a routine study. A transthoracic echocardiogram was  performed. The study included complete 2D imaging, M-mode, complete spectral  Doppler, and color Doppler. Intravenous contrast (agitated saline) was  administered. Image quality was adequate. LEFT VENTRICLE: Size was normal. Systolic function was normal. Ejection  fraction was estimated in the range of 65 % to 70 %. There were no regional  wall motion abnormalities. There was moderate concentric hypertrophy.    Abnormal  Left ventricular diastolic function    RIGHT VENTRICLE: The size was normal. Systolic function was normal. The  tricuspid jet envelope definition was inadequate for estimation of RV   systolic  pressure. LEFT ATRIUM: The atrium was mildly dilated. ATRIAL SEPTUM: No significant shunt with agitated saline. RIGHT ATRIUM: Size was normal.    SYSTEMIC VEINS: IVC: The inferior vena cava was normal in size and course. AORTIC VALVE: The valve was probably trileaflet. There was no evidence for  stenosis. There was no insufficiency. MITRAL VALVE: Valve structure was normal. There was no evidence for stenosis. There was trivial regurgitation. TRICUSPID VALVE: The valve structure was normal. There was no evidence for  stenosis. There was trivial regurgitation. PULMONIC VALVE: Not well visualized. There was no evidence for stenosis. There  was no insufficiency. PERICARDIUM: There was no pericardial effusion. AORTA: The root exhibited normal size. SUMMARY:    -  Left ventricle: Systolic function was normal. Ejection fraction was  estimated in the range of 65 % to 70 %. There were no regional wall motion  abnormalities. There was moderate concentric hypertrophy. -  Left atrium: The atrium was mildly dilated.     -  Atrial septum: No significant shunt with agitated saline.  ================================================       Amount and/or Complexity of Data Reviewed  Clinical lab tests: ordered and reviewed  Tests in the radiology section of CPT®: ordered and reviewed  Tests in the medicine section of CPT®: reviewed and ordered  Discuss the patient with other providers: yes  Independent visualization of images, tracings, or specimens: yes    Risk of Complications, Morbidity, and/or Mortality  Presenting problems: moderate  Diagnostic procedures: low  Management options: moderate          ED Course       Procedures

## 2018-08-28 NOTE — DISCHARGE INSTRUCTIONS
Chest Pain: Care Instructions  Your Care Instructions    There are many things that can cause chest pain. Some are not serious and will get better on their own in a few days. But some kinds of chest pain need more testing and treatment. Your doctor may have recommended a follow-up visit in the next 8 to 12 hours. If you are not getting better, you may need more tests or treatment. Even though your doctor has released you, you still need to watch for any problems. The doctor carefully checked you, but sometimes problems can develop later. If you have new symptoms or if your symptoms do not get better, get medical care right away. If you have worse or different chest pain or pressure that lasts more than 5 minutes or you passed out (lost consciousness), call 911 or seek other emergency help right away. A medical visit is only one step in your treatment. Even if you feel better, you still need to do what your doctor recommends, such as going to all suggested follow-up appointments and taking medicines exactly as directed. This will help you recover and help prevent future problems. How can you care for yourself at home? · Rest until you feel better. · Take your medicine exactly as prescribed. Call your doctor if you think you are having a problem with your medicine. · Do not drive after taking a prescription pain medicine. When should you call for help? Call 911 if:    · You passed out (lost consciousness).     · You have severe difficulty breathing.     · You have symptoms of a heart attack. These may include:  ¨ Chest pain or pressure, or a strange feeling in your chest.  ¨ Sweating. ¨ Shortness of breath. ¨ Nausea or vomiting. ¨ Pain, pressure, or a strange feeling in your back, neck, jaw, or upper belly or in one or both shoulders or arms. ¨ Lightheadedness or sudden weakness. ¨ A fast or irregular heartbeat.   After you call 911, the  may tell you to chew 1 adult-strength or 2 to 4 low-dose aspirin. Wait for an ambulance. Do not try to drive yourself.    Call your doctor today if:    · You have any trouble breathing.     · Your chest pain gets worse.     · You are dizzy or lightheaded, or you feel like you may faint.     · You are not getting better as expected.     · You are having new or different chest pain. Where can you learn more? Go to http://elfego-court.info/. Enter A120 in the search box to learn more about \"Chest Pain: Care Instructions. \"  Current as of: November 20, 2017  Content Version: 11.7  © 9088-5782 [employer]. Care instructions adapted under license by [employer] (which disclaims liability or warranty for this information). If you have questions about a medical condition or this instruction, always ask your healthcare professional. [address] any warranty or liability for your use of this information.

## 2018-08-28 NOTE — ED TRIAGE NOTES
Per EMS: Pt reports going to bed at 9pm, felt her chest tight and took 4-81mg ASA at 2130. Since, pain has mostly gone away and reporting malaise. Reported 10% remaining renal fxn. 12 lead sinus tach at 104, minor LVH. Mostly hydrates with sodas, freq. Smoker. 150/90, , RR 16, 100% room air, bgl 108, 20 L forearm. Lung sounds clear, strong radial pulses. Is experiencing some flank pain bilateral, increases with movement. Pt reports nausea and vomiting after ASA. Episode of vomit x1.

## 2018-08-28 NOTE — ED NOTES
I have reviewed discharge instructions with the patient. The patient verbalized understanding. Patient left ED via Discharge Method: ambulatory to Home with daughter. Opportunity for questions and clarification provided. Patient given 0 scripts. To continue your aftercare when you leave the hospital, you may receive an automated call from our care team to check in on how you are doing. This is a free service and part of our promise to provide the best care and service to meet your aftercare needs.  If you have questions, or wish to unsubscribe from this service please call [phone]. Thank you for Choosing our [facility] Emergency Department.

## 2018-08-29 PROBLEM — I77.9 BILATERAL CAROTID ARTERY DISEASE (HCC): Status: ACTIVE | Noted: 2018-08-29

## 2018-09-08 ENCOUNTER — HOSPITAL ENCOUNTER (EMERGENCY)
Age: 68
Discharge: HOME | End: 2018-09-08
Attending: EMERGENCY MEDICINE
Payer: MEDICARE

## 2018-09-08 VITALS
TEMPERATURE: 98.4 F | RESPIRATION RATE: 18 BRPM | BODY MASS INDEX: 21.35 KG/M2 | DIASTOLIC BLOOD PRESSURE: 86 MMHG | OXYGEN SATURATION: 100 % | WEIGHT: 116 LBS | SYSTOLIC BLOOD PRESSURE: 156 MMHG | HEART RATE: 78 BPM | HEIGHT: 62 IN

## 2018-09-08 DIAGNOSIS — M79.605 LEG PAIN, ANTERIOR, LEFT: Primary | ICD-10-CM

## 2018-09-08 PROCEDURE — 99284 EMERGENCY DEPT VISIT MOD MDM: CPT | Performed by: EMERGENCY MEDICINE

## 2018-09-08 NOTE — ED NOTES
I have reviewed discharge instructions with the patient. The patient verbalized understanding. Patient left ED via Discharge Method: ambulatory to Home with family Opportunity for questions and clarification provided. Patient given 0 scripts. To continue your aftercare when you leave the hospital, you may receive an automated call from our care team to check in on how you are doing. This is a free service and part of our promise to provide the best care and service to meet your aftercare needs.  If you have questions, or wish to unsubscribe from this service please call [phone]. Thank you for Choosing our [facility] Emergency Department.

## 2018-09-08 NOTE — DISCHARGE INSTRUCTIONS
Leg Pain: Care Instructions  Your Care Instructions  Many things can cause leg pain. Too much exercise or overuse can cause a muscle cramp (or charley horse). You can get leg cramps from not eating a balanced diet that has enough potassium, calcium, and other minerals. If you do not drink enough fluids or are taking certain medicines, you may develop leg cramps. Other causes of leg pain include injuries, blood flow problems, nerve damage, and twisted and enlarged veins (varicose veins). You can usually ease pain with self-care. Your doctor may recommend that you rest your leg and keep it elevated. Follow-up care is a key part of your treatment and safety. Be sure to make and go to all appointments, and call your doctor if you are having problems. It's also a good idea to know your test results and keep a list of the medicines you take. How can you care for yourself at home? · Take pain medicines exactly as directed. ¨ If the doctor gave you a prescription medicine for pain, take it as prescribed. ¨ If you are not taking a prescription pain medicine, ask your doctor if you can take an over-the-counter medicine. · Take any other medicines exactly as prescribed. Call your doctor if you think you are having a problem with your medicine. · Rest your leg while you have pain, and avoid standing for long periods of time. · Prop up your leg at or above the level of your heart when possible. · Make sure you are eating a balanced diet that is rich in calcium, potassium, and magnesium, especially if you are pregnant. · If directed by your doctor, put ice or a cold pack on the area for 10 to 20 minutes at a time. Put a thin cloth between the ice and your skin. · Your leg may be in a splint, a brace, or an elastic bandage, and you may have crutches to help you walk. Follow your doctor's directions about how long to wear supports and how to use the crutches. When should you call for help?   Call 911 anytime you think you may need emergency care. For example, call if:    · You have sudden chest pain and shortness of breath, or you cough up blood.     · Your leg is cool or pale or changes color.    Call your doctor now or seek immediate medical care if:    · You have increasing or severe pain.     · Your leg suddenly feels weak and you cannot move it.     · You have signs of a blood clot, such as:  ¨ Pain in your calf, back of the knee, thigh, or groin. ¨ Redness and swelling in your leg or groin.     · You have signs of infection, such as:  ¨ Increased pain, swelling, warmth, or redness. ¨ Red streaks leading from the sore area. ¨ Pus draining from a place on your leg. ¨ A fever.     · You cannot bear weight on your leg.    Watch closely for changes in your health, and be sure to contact your doctor if:    · You do not get better as expected. Where can you learn more? Go to http://elfego-court.info/. Enter M211 in the search box to learn more about \"Leg Pain: Care Instructions. \"  Current as of: November 20, 2017  Content Version: 11.7  © 0256-9440 [employer]. Care instructions adapted under license by [employer] (which disclaims liability or warranty for this information). If you have questions about a medical condition or this instruction, always ask your healthcare professional. [address] any warranty or liability for your use of this information.

## 2018-09-08 NOTE — ED TRIAGE NOTES
Pt arrived via ems for left leg pain for the past three days. Ems states the pt has had a cva. Denies any recent injury.

## 2018-09-08 NOTE — ED PROVIDER NOTES
HPI Comments: Patient presents to ER via EMS for reportedly having pain in her left leg. Family states patient is complaining of some pain in her left leg for the past couple days. Reports she appeared D be having some difficulty ambulating earlier so EMS was called. However upon arrival by EMS patient is able to ambulate without problems. She reports pain is resolved. Patient has a history of CVA with residual left-sided leg weakness. Denies any leg swelling, erythema or fever. Patient is a 77 y.o. female presenting with leg pain. The history is provided by the patient. Leg Pain This is a new problem. The current episode started 1 to 2 hours ago. The problem has been resolved. The pain is present in the left lower leg. The quality of the pain is described as aching. The pain is at a severity of 3/10. The pain is moderate. Pertinent negatives include no numbness and no back pain. Past Medical History:  
Diagnosis Date  Aspirin long-term use SINAN  
 continue  CKD (chronic kidney disease) stage 3, GFR 30-59 ml/min  GERD (gastroesophageal reflux disease)   
 no meds  Heart murmur not followed per cardiology EF 65-70 %-- on echo 11/2017  History of MI (myocardial infarction) 2012  
 stents x 1---- per pt-- does not see a cardiologist  
 Hx of colonic polyps 2016  
 adenoma  Hydronephrosis  Hypertension   
 daily meds  Left-sided weakness 2012  
 left leg r/t cva  Loss of appetite   
 states within the last several months  Poor historian  Renal atrophy, right  Renal insufficiency ? --- pt unsure if sees nephrologist  
 Smoker 50 yr hx/ 1 pk per day  Stented coronary artery Xience SINAN  
 proximal LAD 2012-- pt states does not see a cardio  Stroke ([facility] [address] with slight left  sided weakness residual  
 
 
Past Surgical History:  
Procedure Laterality Date  COLONOSCOPY N/A 9/26/2016 Pedro--one desc TA, one rectal hyperplastic--5 year recall  HX UROLOGICAL    
 stent to left--multi times  NE LEFT HEART CATH,PERCUTANEOUS  4/2012 Xience LAD Family History:  
Problem Relation Age of Onset  Hypertension Mother  Heart Disease Mother  Stroke Father Social History Social History  Marital status:  Spouse name: N/A  
 Number of children: N/A  
 Years of education: N/A Occupational History  Not on file. Social History Main Topics  Smoking status: Current Every Day Smoker Packs/day: 0.50 Years: 50.00  Smokeless tobacco: Never Used  Alcohol use No  
 Drug use: No  
 Sexual activity: Not on file Other Topics Concern  Not on file Social History Narrative ALLERGIES: Codeine Review of Systems Constitutional: Negative for fatigue, fever and unexpected weight change. HENT: Negative for congestion and dental problem. Eyes: Negative for visual disturbance. Respiratory: Negative for chest tightness, shortness of breath and stridor. Cardiovascular: Negative for palpitations and leg swelling. Gastrointestinal: Negative for abdominal pain, nausea and vomiting. Endocrine: Negative for polydipsia, polyphagia and polyuria. Genitourinary: Negative for flank pain and urgency. Musculoskeletal: Negative for back pain and gait problem. Skin: Negative for pallor. Allergic/Immunologic: Negative for food allergies and immunocompromised state. Neurological: Negative for light-headedness and numbness. Hematological: Negative for adenopathy. Does not bruise/bleed easily. Psychiatric/Behavioral: Negative for behavioral problems and confusion. All other systems reviewed and are negative. Vitals:  
 09/08/18 1752 BP: 175/71 Pulse: 78 Resp: 18 Temp: 98.4 °F (36.9 °C) SpO2: 100% Weight: 52.6 kg (116 lb) Height: 5' 2\" (1.575 m) Physical Exam  
 Constitutional: She is oriented to person, place, and time. She appears well-developed and well-nourished. HENT:  
Head: Normocephalic and atraumatic. Mouth/Throat: Oropharynx is clear and moist.  
Eyes: Conjunctivae and EOM are normal. No scleral icterus. Cardiovascular: Normal rate, regular rhythm and intact distal pulses. Pulmonary/Chest: Effort normal and breath sounds normal. No respiratory distress. Abdominal: Soft. Bowel sounds are normal. She exhibits no distension. Musculoskeletal: Normal range of motion. She exhibits no edema or deformity. Neurological: She is alert and oriented to person, place, and time. She has normal reflexes. No cranial nerve deficit. Skin: Skin is warm and dry. No erythema. Nursing note and vitals reviewed. MDM Number of Diagnoses or Management Options Diagnosis management comments: Patient is well-appearing here. Her exam was unimpressive. Legs swelling erythema or tenderness. Vital signs stable. We'll discharge home. Encouraged follow-up with primary care physician Risk of Complications, Morbidity, and/or Mortality Presenting problems: low Diagnostic procedures: low Management options: low Patient Progress Patient progress: stable ED Course Procedures

## 2018-09-19 ENCOUNTER — HOSPITAL ENCOUNTER (OUTPATIENT)
Dept: SURGERY | Age: 68
Discharge: HOME | End: 2018-09-19

## 2018-09-19 ENCOUNTER — HOSPITAL ENCOUNTER (OUTPATIENT)
Dept: SURGERY | Age: 68
Discharge: HOME | End: 2018-09-19
Payer: MEDICARE

## 2018-09-19 LAB
ANION GAP SERPL CALC-SCNC: 5 MMOL/L (ref 7–16)
BUN SERPL-MCNC: 41 MG/DL (ref 8–23)
CALCIUM SERPL-MCNC: 8.3 MG/DL (ref 8.3–10.4)
CHLORIDE SERPL-SCNC: 117 MMOL/L (ref 98–107)
CO2 SERPL-SCNC: 18 MMOL/L (ref 21–32)
CREAT SERPL-MCNC: 4.84 MG/DL (ref 0.6–1)
ERYTHROCYTE [DISTWIDTH] IN BLOOD BY AUTOMATED COUNT: 14.7 %
GLUCOSE SERPL-MCNC: 76 MG/DL (ref 65–100)
HCT VFR BLD AUTO: 26.3 % (ref 35.8–46.3)
HGB BLD-MCNC: 8.2 G/DL (ref 11.7–15.4)
MCH RBC QN AUTO: 29.1 PG (ref 26.1–32.9)
MCHC RBC AUTO-ENTMCNC: 31.2 G/DL (ref 31.4–35)
MCV RBC AUTO: 93.3 FL (ref 79.6–97.8)
NRBC # BLD: 0 K/UL (ref 0–0.2)
PLATELET # BLD AUTO: 129 K/UL (ref 150–450)
PMV BLD AUTO: 12.8 FL (ref 9.4–12.3)
POTASSIUM SERPL-SCNC: 5.2 MMOL/L (ref 3.5–5.1)
RBC # BLD AUTO: 2.82 M/UL (ref 4.05–5.2)
SODIUM SERPL-SCNC: 140 MMOL/L (ref 136–145)
WBC # BLD AUTO: 4.7 K/UL (ref 4.3–11.1)

## 2018-09-19 PROCEDURE — 80048 BASIC METABOLIC PNL TOTAL CA: CPT

## 2018-09-19 PROCEDURE — 85027 COMPLETE CBC AUTOMATED: CPT

## 2018-09-19 NOTE — PERIOP NOTES
Recent Results (from the past 12 hour(s)) CBC W/O DIFF Collection Time: 09/19/18  2:24 PM  
Result Value Ref Range WBC 4.7 4.3 - 11.1 K/uL  
 RBC 2.82 (L) 4.05 - 5.2 M/uL HGB 8.2 (L) 11.7 - 15.4 g/dL HCT 26.3 (L) 35.8 - 46.3 % MCV 93.3 79.6 - 97.8 FL  
 MCH 29.1 26.1 - 32.9 PG  
 MCHC 31.2 (L) 31.4 - 35.0 g/dL  
 RDW 14.7 % PLATELET 965 (L) 818 - 450 K/uL MPV 12.8 (H) 9.4 - 12.3 FL ABSOLUTE NRBC 0.00 0.0 - 0.2 K/uL METABOLIC PANEL, BASIC Collection Time: 09/19/18  2:24 PM  
Result Value Ref Range Sodium 140 136 - 145 mmol/L Potassium 5.2 (H) 3.5 - 5.1 mmol/L Chloride 117 (H) 98 - 107 mmol/L  
 CO2 18 (L) 21 - 32 mmol/L Anion gap 5 (L) 7 - 16 mmol/L Glucose 76 65 - 100 mg/dL BUN 41 (H) 8 - 23 MG/DL Creatinine 4.84 (H) 0.6 - 1.0 MG/DL  
 GFR est AA 11 (L) >60 ml/min/1.73m2 GFR est non-AA 9 (L) >60 ml/min/1.73m2 Calcium 8.3 8.3 - 10.4 MG/DL Reviewed Dr Kamala Acevedo notified of abnormal labs.

## 2018-09-19 NOTE — PERIOP NOTES
Patient verified name, , and surgery as listed in [facility]. Patient provided medical/health information and PTA medications to the best of their ability. TYPE  CASE:3 
Orders per surgeon: Received in [address] [place] [address] Labs per surgeon:cbc,bmp. Results: pending Labs per anesthesia protocol: T/S,cbc,bmp. Results pending EKG  :  18 Dr Sal Courser in to see pt. Pt scheduled for stress test 18. Dr Sal Courser request that anesthesia review stress test when resulted before surgery in AM. Patient provided with and instructed on education handouts including Guide to Surgery, blood transfusions, pain management, and hand hygiene for the family and community, and [facility] brochure. Hibiclens and instructions given per hospital policy. Instructed patient to continue previous medications as prescribed prior to surgery unless otherwise directed and to take the following medications the day of surgery according to anesthesia guidelines : Amlodipine, metoprolol . Instructed patient to hold  the following medications: none. Original medication prescription bottles not visualized during patient appointment. Patient teach back successful and patient demonstrates knowledge of instruction.

## 2018-11-02 ENCOUNTER — HOSPITAL ENCOUNTER (OUTPATIENT)
Dept: SURGERY | Age: 68
Discharge: HOME | End: 2018-11-02
Payer: MEDICARE

## 2018-11-02 ENCOUNTER — ANESTHESIA EVENT (OUTPATIENT)
Dept: SURGERY | Age: 68
DRG: 038 | End: 2018-11-02
Payer: MEDICARE

## 2018-11-02 VITALS
SYSTOLIC BLOOD PRESSURE: 192 MMHG | OXYGEN SATURATION: 100 % | BODY MASS INDEX: 22.29 KG/M2 | DIASTOLIC BLOOD PRESSURE: 100 MMHG | HEIGHT: 62 IN | HEART RATE: 74 BPM | WEIGHT: 121.13 LBS | RESPIRATION RATE: 16 BRPM | TEMPERATURE: 98.2 F

## 2018-11-02 LAB
ANION GAP SERPL CALC-SCNC: 6 MMOL/L (ref 7–16)
BUN SERPL-MCNC: 51 MG/DL (ref 8–23)
CALCIUM SERPL-MCNC: 8.4 MG/DL (ref 8.3–10.4)
CHLORIDE SERPL-SCNC: 116 MMOL/L (ref 98–107)
CO2 SERPL-SCNC: 18 MMOL/L (ref 21–32)
CREAT SERPL-MCNC: 4.45 MG/DL (ref 0.6–1)
ERYTHROCYTE [DISTWIDTH] IN BLOOD BY AUTOMATED COUNT: 14.8 %
GLUCOSE SERPL-MCNC: 87 MG/DL (ref 65–100)
HCT VFR BLD AUTO: 28.9 % (ref 35.8–46.3)
HGB BLD-MCNC: 9.2 G/DL (ref 11.7–15.4)
MCH RBC QN AUTO: 29.7 PG (ref 26.1–32.9)
MCHC RBC AUTO-ENTMCNC: 31.8 G/DL (ref 31.4–35)
MCV RBC AUTO: 93.2 FL (ref 79.6–97.8)
NRBC # BLD: 0 K/UL (ref 0–0.2)
PLATELET # BLD AUTO: 139 K/UL (ref 150–450)
PMV BLD AUTO: 12.6 FL (ref 9.4–12.3)
POTASSIUM SERPL-SCNC: 5.8 MMOL/L (ref 3.5–5.1)
RBC # BLD AUTO: 3.1 M/UL (ref 4.05–5.2)
SODIUM SERPL-SCNC: 140 MMOL/L (ref 136–145)
WBC # BLD AUTO: 5.1 K/UL (ref 4.3–11.1)

## 2018-11-02 PROCEDURE — 93005 ELECTROCARDIOGRAM TRACING: CPT | Performed by: ANESTHESIOLOGY

## 2018-11-02 PROCEDURE — 85027 COMPLETE CBC AUTOMATED: CPT

## 2018-11-02 PROCEDURE — 80048 BASIC METABOLIC PNL TOTAL CA: CPT

## 2018-11-02 NOTE — PERIOP NOTES
Labs reviewed, Dr. Yrn Cali notified of elevated potassium 5.8. Labs routed to surgeon. Recent Results (from the past 12 hour(s)) CBC W/O DIFF Collection Time: 11/02/18  2:51 PM  
Result Value Ref Range WBC 5.1 4.3 - 11.1 K/uL  
 RBC 3.10 (L) 4.05 - 5.2 M/uL HGB 9.2 (L) 11.7 - 15.4 g/dL HCT 28.9 (L) 35.8 - 46.3 % MCV 93.2 79.6 - 97.8 FL  
 MCH 29.7 26.1 - 32.9 PG  
 MCHC 31.8 31.4 - 35.0 g/dL  
 RDW 14.8 % PLATELET 521 (L) 797 - 450 K/uL MPV 12.6 (H) 9.4 - 12.3 FL ABSOLUTE NRBC 0.00 0.0 - 0.2 K/uL METABOLIC PANEL, BASIC Collection Time: 11/02/18  2:51 PM  
Result Value Ref Range Sodium 140 136 - 145 mmol/L Potassium 5.8 (H) 3.5 - 5.1 mmol/L Chloride 116 (H) 98 - 107 mmol/L  
 CO2 18 (L) 21 - 32 mmol/L Anion gap 6 (L) 7 - 16 mmol/L Glucose 87 65 - 100 mg/dL BUN 51 (H) 8 - 23 MG/DL Creatinine 4.45 (H) 0.6 - 1.0 MG/DL  
 GFR est AA 13 (L) >60 ml/min/1.73m2 GFR est non-AA 10 (L) >60 ml/min/1.73m2  Calcium 8.4 8.3 - 10.4 MG/DL

## 2018-11-02 NOTE — PERIOP NOTES
Patient verified name and . Patient provided medical/health information and PTA medications to the best of their ability. TYPE  CASE:lll Order for consent  found in EHR and matches case posting. Labs per surgeon:none. Labs per anesthesia protocol: CBC, BMP, EKG, T&S on DOS. EKG  :  Today. Has 1 cardiac stent placed . Echo on 2017 EF 65-70%, Stress test on 10/16/2018 normal. Dr. Meena Interiano here to see patient and reviewed chart. NO new orders received. Patient had elevated Creatinine 4.84 on 2018 awaiting to start PD. Dr. Meena Interiano aware. Patient provided with and instructed on education handouts including Guide to Surgery, blood transfusions, pain management, and hand hygiene for the family and community, and [facility] brochure. Antibacterial soap and Hibiclens instructions given per hospital policy. Instructed patient to continue previous medications as prescribed prior to surgery unless otherwise directed and to take the following medications the day of surgery according to anesthesia guidelines : none . Instructed patient to hold  the following medications: Vitamins. Original medication prescription bottles were not visualized during patient appointment. Patient teach back successful and patient demonstrates knowledge of instruction.

## 2018-11-02 NOTE — ANESTHESIA PREPROCEDURE EVALUATION
Anesthetic History No history of anesthetic complications Review of Systems / Medical History Patient summary reviewed and pertinent labs reviewed Pulmonary Smoker Neuro/Psych  
 
 
CVA (left sided weakness; 11/17 and 2013) TIA Cardiovascular Hypertension: well controlled Past MI, cardiac stents (2012) and hyperlipidemia Exercise tolerance: <4 METS: Limited by weakness after CVA; uses cane Comments: EF 55% 11/17 ECHO 
  
GI/Hepatic/Renal 
  
 
 
Renal disease (awaiting appt for potential PD): CRI and ESRD Pertinent negatives: No GERD Endo/Other Anemia Other Findings Comments: Hx hydronephrosis Physical Exam 
 
Airway Mallampati: II 
TM Distance: 4 - 6 cm Neck ROM: normal range of motion Mouth opening: Normal 
 
 Cardiovascular Rhythm: regular Rate: normal 
 
Murmur (diastolic murmur): Grade 4, Aortic area Dental 
 
Dentition: Full upper dentures and Edentulous Pulmonary Breath sounds clear to auscultation Abdominal 
GI exam deferred Other Findings Anesthetic Plan ASA: 4 Anesthesia type: general 
 
Monitoring Plan: Arterial line and BIS Induction: Intravenous Anesthetic plan and risks discussed with: Patient Prior CEA cancelled 2/2 Pt needing stress test. NST grossly wnl with EF 69%. Pt with Htn

## 2018-11-03 LAB
ATRIAL RATE: 64 BPM
CALCULATED P AXIS, ECG09: 73 DEGREES
CALCULATED R AXIS, ECG10: 59 DEGREES
CALCULATED T AXIS, ECG11: 140 DEGREES
DIAGNOSIS, 93000: NORMAL
P-R INTERVAL, ECG05: 184 MS
Q-T INTERVAL, ECG07: 404 MS
QRS DURATION, ECG06: 82 MS
QTC CALCULATION (BEZET), ECG08: 416 MS
VENTRICULAR RATE, ECG03: 64 BPM

## 2018-11-06 ENCOUNTER — ANESTHESIA (OUTPATIENT)
Dept: SURGERY | Age: 68
DRG: 038 | End: 2018-11-06
Payer: MEDICARE

## 2018-11-06 ENCOUNTER — HOSPITAL ENCOUNTER (INPATIENT)
Age: 68
LOS: 2 days | Discharge: HOME | DRG: 038 | End: 2018-11-08
Attending: SURGERY | Admitting: SURGERY
Payer: MEDICARE

## 2018-11-06 DIAGNOSIS — I65.21 CAROTID STENOSIS, ASYMPTOMATIC, RIGHT: ICD-10-CM

## 2018-11-06 DIAGNOSIS — I65.21 CAROTID STENOSIS, RIGHT: ICD-10-CM

## 2018-11-06 DIAGNOSIS — Z98.890 HISTORY OF RIGHT-SIDED CAROTID ENDARTERECTOMY: Primary | ICD-10-CM

## 2018-11-06 LAB
ABO + RH BLD: NORMAL
BLOOD GROUP ANTIBODIES SERPL: NORMAL
SPECIMEN EXP DATE BLD: NORMAL

## 2018-11-06 PROCEDURE — 74011250636 HC RX REV CODE- 250/636: Performed by: SURGERY

## 2018-11-06 PROCEDURE — 77030014008 HC SPNG HEMSTAT J&J -C: Performed by: SURGERY

## 2018-11-06 PROCEDURE — 74011250637 HC RX REV CODE- 250/637: Performed by: SURGERY

## 2018-11-06 PROCEDURE — 77030037400 HC ADH TISS HI VISC EXOFIN CHMP -B: Performed by: SURGERY

## 2018-11-06 PROCEDURE — 77030034888 HC SUT PROL 2 J&J -B: Performed by: SURGERY

## 2018-11-06 PROCEDURE — 76060000037 HC ANESTHESIA 3 TO 3.5 HR: Performed by: SURGERY

## 2018-11-06 PROCEDURE — 74011250636 HC RX REV CODE- 250/636

## 2018-11-06 PROCEDURE — 77030005401 HC CATH RAD ARRO -A: Performed by: ANESTHESIOLOGY

## 2018-11-06 PROCEDURE — 03CM0ZZ EXTIRPATION OF MATTER FROM RIGHT EXTERNAL CAROTID ARTERY, OPEN APPROACH: ICD-10-PCS | Performed by: SURGERY

## 2018-11-06 PROCEDURE — 03UK0KZ SUPPLEMENT RIGHT INTERNAL CAROTID ARTERY WITH NONAUTOLOGOUS TISSUE SUBSTITUTE, OPEN APPROACH: ICD-10-PCS | Performed by: SURGERY

## 2018-11-06 PROCEDURE — 77030002519 HC INSRT CLMP FIBRA STLTH AMR -B: Performed by: SURGERY

## 2018-11-06 PROCEDURE — 77030018673: Performed by: SURGERY

## 2018-11-06 PROCEDURE — 03UM0KZ SUPPLEMENT RIGHT EXTERNAL CAROTID ARTERY WITH NONAUTOLOGOUS TISSUE SUBSTITUTE, OPEN APPROACH: ICD-10-PCS | Performed by: SURGERY

## 2018-11-06 PROCEDURE — 77030019908 HC STETH ESOPH SIMS -A: Performed by: ANESTHESIOLOGY

## 2018-11-06 PROCEDURE — 74011000258 HC RX REV CODE- 258: Performed by: SURGERY

## 2018-11-06 PROCEDURE — 03UH0KZ SUPPLEMENT RIGHT COMMON CAROTID ARTERY WITH NONAUTOLOGOUS TISSUE SUBSTITUTE, OPEN APPROACH: ICD-10-PCS | Performed by: SURGERY

## 2018-11-06 PROCEDURE — 77030010512 HC APPL CLP LIG J&J -C: Performed by: SURGERY

## 2018-11-06 PROCEDURE — 77030002996 HC SUT SLK J&J -A: Performed by: SURGERY

## 2018-11-06 PROCEDURE — 77030018836 HC SOL IRR NACL ICUM -A: Performed by: SURGERY

## 2018-11-06 PROCEDURE — 74011250636 HC RX REV CODE- 250/636: Performed by: ANESTHESIOLOGY

## 2018-11-06 PROCEDURE — 76010000207 HC CV SURG 2.5 TO 3 HR INTENSV-TIER 1: Performed by: SURGERY

## 2018-11-06 PROCEDURE — 77030020782 HC GWN BAIR PAWS FLX 3M -B: Performed by: ANESTHESIOLOGY

## 2018-11-06 PROCEDURE — 86901 BLOOD TYPING SEROLOGIC RH(D): CPT

## 2018-11-06 PROCEDURE — 77030013794 HC KT TRNSDUC BLD EDWD -B: Performed by: ANESTHESIOLOGY

## 2018-11-06 PROCEDURE — 74011000250 HC RX REV CODE- 250

## 2018-11-06 PROCEDURE — 03CK0ZZ EXTIRPATION OF MATTER FROM RIGHT INTERNAL CAROTID ARTERY, OPEN APPROACH: ICD-10-PCS | Performed by: SURGERY

## 2018-11-06 PROCEDURE — 65610000006 HC RM INTENSIVE CARE

## 2018-11-06 PROCEDURE — 77030037088 HC TUBE ENDOTRACH ORAL NSL COVD-A: Performed by: ANESTHESIOLOGY

## 2018-11-06 PROCEDURE — 74011000250 HC RX REV CODE- 250: Performed by: SURGERY

## 2018-11-06 PROCEDURE — 03CH0ZZ EXTIRPATION OF MATTER FROM RIGHT COMMON CAROTID ARTERY, OPEN APPROACH: ICD-10-PCS | Performed by: SURGERY

## 2018-11-06 PROCEDURE — C1768 GRAFT, VASCULAR: HCPCS | Performed by: SURGERY

## 2018-11-06 PROCEDURE — 77030018824 HC SHNT CAR ARGY COVD -B: Performed by: SURGERY

## 2018-11-06 PROCEDURE — 77030020407 HC IV BLD WRMR ST 3M -A: Performed by: ANESTHESIOLOGY

## 2018-11-06 PROCEDURE — 77030013567 HC DRN WND RESERV BARD -A: Performed by: SURGERY

## 2018-11-06 PROCEDURE — 77030012406 HC DRN WND PENRS BARD -A: Performed by: SURGERY

## 2018-11-06 PROCEDURE — 77030031139 HC SUT VCRL2 J&J -A: Performed by: SURGERY

## 2018-11-06 PROCEDURE — 77030039266 HC ADH SKN EXOFIN S2SG -A: Performed by: SURGERY

## 2018-11-06 PROCEDURE — 77030039425 HC BLD LARYNG TRULITE DISP TELE -A: Performed by: ANESTHESIOLOGY

## 2018-11-06 PROCEDURE — 77030020245 HC SOL INJ 5% D/0.9%NACL

## 2018-11-06 PROCEDURE — 77010033678 HC OXYGEN DAILY

## 2018-11-06 PROCEDURE — 77030002986 HC SUT PROL J&J -A: Performed by: SURGERY

## 2018-11-06 PROCEDURE — 77030013292 HC BOWL MX PRSM J&J -A: Performed by: ANESTHESIOLOGY

## 2018-11-06 DEVICE — XENOSURE BIOLOGIC PATCH, 0.8CM X 8CM, EIFU
Type: IMPLANTABLE DEVICE | Site: NECK | Status: FUNCTIONAL
Brand: XENOSURE BIOLOGIC PATCH

## 2018-11-06 RX ORDER — LOSARTAN POTASSIUM 50 MG/1
100 TABLET ORAL DAILY
Status: DISCONTINUED | OUTPATIENT
Start: 2018-11-06 | End: 2018-11-07

## 2018-11-06 RX ORDER — NALOXONE HYDROCHLORIDE 0.4 MG/ML
0.4 INJECTION, SOLUTION INTRAMUSCULAR; INTRAVENOUS; SUBCUTANEOUS AS NEEDED
Status: DISCONTINUED | OUTPATIENT
Start: 2018-11-06 | End: 2018-11-08 | Stop reason: HOSPADM

## 2018-11-06 RX ORDER — SODIUM CHLORIDE 0.9 % (FLUSH) 0.9 %
5-10 SYRINGE (ML) INJECTION AS NEEDED
Status: DISCONTINUED | OUTPATIENT
Start: 2018-11-06 | End: 2018-11-08 | Stop reason: HOSPADM

## 2018-11-06 RX ORDER — MORPHINE SULFATE 10 MG/ML
5 INJECTION, SOLUTION INTRAMUSCULAR; INTRAVENOUS
Status: DISCONTINUED | OUTPATIENT
Start: 2018-11-06 | End: 2018-11-08 | Stop reason: HOSPADM

## 2018-11-06 RX ORDER — ACETAMINOPHEN 325 MG/1
650 TABLET ORAL
Status: DISCONTINUED | OUTPATIENT
Start: 2018-11-06 | End: 2018-11-08 | Stop reason: HOSPADM

## 2018-11-06 RX ORDER — OXYCODONE AND ACETAMINOPHEN 5; 325 MG/1; MG/1
1 TABLET ORAL
Status: DISCONTINUED | OUTPATIENT
Start: 2018-11-06 | End: 2018-11-08 | Stop reason: HOSPADM

## 2018-11-06 RX ORDER — AMLODIPINE BESYLATE 10 MG/1
10 TABLET ORAL
Status: DISCONTINUED | OUTPATIENT
Start: 2018-11-06 | End: 2018-11-08 | Stop reason: HOSPADM

## 2018-11-06 RX ORDER — ASPIRIN 81 MG/1
81 TABLET ORAL DAILY
Status: DISCONTINUED | OUTPATIENT
Start: 2018-11-07 | End: 2018-11-08 | Stop reason: HOSPADM

## 2018-11-06 RX ORDER — CEFAZOLIN SODIUM/WATER 2 G/20 ML
2 SYRINGE (ML) INTRAVENOUS EVERY 8 HOURS
Status: COMPLETED | OUTPATIENT
Start: 2018-11-06 | End: 2018-11-07

## 2018-11-06 RX ORDER — SODIUM CHLORIDE 9 MG/ML
INJECTION, SOLUTION INTRAVENOUS
Status: DISCONTINUED | OUTPATIENT
Start: 2018-11-06 | End: 2018-11-06 | Stop reason: HOSPADM

## 2018-11-06 RX ORDER — NEOSTIGMINE METHYLSULFATE 1 MG/ML
INJECTION, SOLUTION INTRAVENOUS AS NEEDED
Status: DISCONTINUED | OUTPATIENT
Start: 2018-11-06 | End: 2018-11-06 | Stop reason: HOSPADM

## 2018-11-06 RX ORDER — DOPAMINE HYDROCHLORIDE 320 MG/100ML
5 INJECTION, SOLUTION INTRAVENOUS
Status: DISCONTINUED | OUTPATIENT
Start: 2018-11-06 | End: 2018-11-08 | Stop reason: HOSPADM

## 2018-11-06 RX ORDER — LIDOCAINE HYDROCHLORIDE 20 MG/ML
INJECTION, SOLUTION EPIDURAL; INFILTRATION; INTRACAUDAL; PERINEURAL AS NEEDED
Status: DISCONTINUED | OUTPATIENT
Start: 2018-11-06 | End: 2018-11-06 | Stop reason: HOSPADM

## 2018-11-06 RX ORDER — ROCURONIUM BROMIDE 10 MG/ML
INJECTION, SOLUTION INTRAVENOUS AS NEEDED
Status: DISCONTINUED | OUTPATIENT
Start: 2018-11-06 | End: 2018-11-06 | Stop reason: HOSPADM

## 2018-11-06 RX ORDER — SODIUM CHLORIDE 0.9 % (FLUSH) 0.9 %
5-10 SYRINGE (ML) INJECTION EVERY 8 HOURS
Status: DISCONTINUED | OUTPATIENT
Start: 2018-11-06 | End: 2018-11-08 | Stop reason: HOSPADM

## 2018-11-06 RX ORDER — NICARDIPINE HYDROCHLORIDE 0.1 MG/ML
5-15 INJECTION INTRAVENOUS
Status: DISCONTINUED | OUTPATIENT
Start: 2018-11-06 | End: 2018-11-06 | Stop reason: SDUPTHER

## 2018-11-06 RX ORDER — FENTANYL CITRATE 50 UG/ML
INJECTION, SOLUTION INTRAMUSCULAR; INTRAVENOUS AS NEEDED
Status: DISCONTINUED | OUTPATIENT
Start: 2018-11-06 | End: 2018-11-06 | Stop reason: HOSPADM

## 2018-11-06 RX ORDER — ONDANSETRON 2 MG/ML
INJECTION INTRAMUSCULAR; INTRAVENOUS AS NEEDED
Status: DISCONTINUED | OUTPATIENT
Start: 2018-11-06 | End: 2018-11-06 | Stop reason: HOSPADM

## 2018-11-06 RX ORDER — LIDOCAINE HYDROCHLORIDE 10 MG/ML
INJECTION INFILTRATION; PERINEURAL AS NEEDED
Status: DISCONTINUED | OUTPATIENT
Start: 2018-11-06 | End: 2018-11-06 | Stop reason: HOSPADM

## 2018-11-06 RX ORDER — PROTAMINE SULFATE 10 MG/ML
INJECTION, SOLUTION INTRAVENOUS AS NEEDED
Status: DISCONTINUED | OUTPATIENT
Start: 2018-11-06 | End: 2018-11-06 | Stop reason: HOSPADM

## 2018-11-06 RX ORDER — DEXAMETHASONE SODIUM PHOSPHATE 100 MG/10ML
INJECTION INTRAMUSCULAR; INTRAVENOUS AS NEEDED
Status: DISCONTINUED | OUTPATIENT
Start: 2018-11-06 | End: 2018-11-06 | Stop reason: HOSPADM

## 2018-11-06 RX ORDER — SODIUM CHLORIDE, SODIUM LACTATE, POTASSIUM CHLORIDE, CALCIUM CHLORIDE 600; 310; 30; 20 MG/100ML; MG/100ML; MG/100ML; MG/100ML
75 INJECTION, SOLUTION INTRAVENOUS CONTINUOUS
Status: DISCONTINUED | OUTPATIENT
Start: 2018-11-06 | End: 2018-11-07

## 2018-11-06 RX ORDER — HEPARIN SODIUM 5000 [USP'U]/ML
INJECTION, SOLUTION INTRAVENOUS; SUBCUTANEOUS AS NEEDED
Status: DISCONTINUED | OUTPATIENT
Start: 2018-11-06 | End: 2018-11-06 | Stop reason: HOSPADM

## 2018-11-06 RX ORDER — ONDANSETRON 2 MG/ML
4 INJECTION INTRAMUSCULAR; INTRAVENOUS
Status: DISCONTINUED | OUTPATIENT
Start: 2018-11-06 | End: 2018-11-08 | Stop reason: HOSPADM

## 2018-11-06 RX ORDER — PROPOFOL 10 MG/ML
INJECTION, EMULSION INTRAVENOUS AS NEEDED
Status: DISCONTINUED | OUTPATIENT
Start: 2018-11-06 | End: 2018-11-06 | Stop reason: HOSPADM

## 2018-11-06 RX ORDER — GLYCOPYRROLATE 0.2 MG/ML
INJECTION INTRAMUSCULAR; INTRAVENOUS AS NEEDED
Status: DISCONTINUED | OUTPATIENT
Start: 2018-11-06 | End: 2018-11-06 | Stop reason: HOSPADM

## 2018-11-06 RX ORDER — METOPROLOL SUCCINATE 25 MG/1
50 TABLET, EXTENDED RELEASE ORAL
Status: DISCONTINUED | OUTPATIENT
Start: 2018-11-06 | End: 2018-11-08 | Stop reason: HOSPADM

## 2018-11-06 RX ORDER — CEFAZOLIN SODIUM/WATER 2 G/20 ML
2 SYRINGE (ML) INTRAVENOUS ONCE
Status: COMPLETED | OUTPATIENT
Start: 2018-11-06 | End: 2018-11-06

## 2018-11-06 RX ORDER — HEPARIN SODIUM 1000 [USP'U]/ML
INJECTION, SOLUTION INTRAVENOUS; SUBCUTANEOUS AS NEEDED
Status: DISCONTINUED | OUTPATIENT
Start: 2018-11-06 | End: 2018-11-06 | Stop reason: HOSPADM

## 2018-11-06 RX ORDER — BUPIVACAINE HYDROCHLORIDE 5 MG/ML
INJECTION, SOLUTION EPIDURAL; INTRACAUDAL AS NEEDED
Status: DISCONTINUED | OUTPATIENT
Start: 2018-11-06 | End: 2018-11-06 | Stop reason: HOSPADM

## 2018-11-06 RX ORDER — DEXTROSE MONOHYDRATE AND SODIUM CHLORIDE 5; .9 G/100ML; G/100ML
50 INJECTION, SOLUTION INTRAVENOUS CONTINUOUS
Status: DISCONTINUED | OUTPATIENT
Start: 2018-11-06 | End: 2018-11-07

## 2018-11-06 RX ADMIN — OXYCODONE HYDROCHLORIDE AND ACETAMINOPHEN 1 TABLET: 5; 325 TABLET ORAL at 21:12

## 2018-11-06 RX ADMIN — Medication: at 16:08

## 2018-11-06 RX ADMIN — LIDOCAINE HYDROCHLORIDE 60 MG: 20 INJECTION, SOLUTION EPIDURAL; INFILTRATION; INTRACAUDAL; PERINEURAL at 12:17

## 2018-11-06 RX ADMIN — DEXAMETHASONE SODIUM PHOSPHATE 10 MG: 100 INJECTION INTRAMUSCULAR; INTRAVENOUS at 12:47

## 2018-11-06 RX ADMIN — DEXTROSE MONOHYDRATE AND SODIUM CHLORIDE 50 ML/HR: 5; .9 INJECTION, SOLUTION INTRAVENOUS at 15:25

## 2018-11-06 RX ADMIN — NICARDIPINE HYDROCHLORIDE 2.5 MG/HR: 25 INJECTION INTRAVENOUS at 15:30

## 2018-11-06 RX ADMIN — FENTANYL CITRATE 25 MCG: 50 INJECTION, SOLUTION INTRAMUSCULAR; INTRAVENOUS at 13:27

## 2018-11-06 RX ADMIN — SODIUM CHLORIDE: 9 INJECTION, SOLUTION INTRAVENOUS at 12:20

## 2018-11-06 RX ADMIN — NEOSTIGMINE METHYLSULFATE 3 MG: 1 INJECTION, SOLUTION INTRAVENOUS at 14:02

## 2018-11-06 RX ADMIN — FENTANYL CITRATE 25 MCG: 50 INJECTION, SOLUTION INTRAMUSCULAR; INTRAVENOUS at 14:10

## 2018-11-06 RX ADMIN — Medication 2 G: at 12:30

## 2018-11-06 RX ADMIN — FENTANYL CITRATE 25 MCG: 50 INJECTION, SOLUTION INTRAMUSCULAR; INTRAVENOUS at 12:28

## 2018-11-06 RX ADMIN — NEOSTIGMINE METHYLSULFATE 1 MG: 1 INJECTION, SOLUTION INTRAVENOUS at 14:12

## 2018-11-06 RX ADMIN — FENTANYL CITRATE 50 MCG: 50 INJECTION, SOLUTION INTRAMUSCULAR; INTRAVENOUS at 12:40

## 2018-11-06 RX ADMIN — AMLODIPINE BESYLATE 10 MG: 10 TABLET ORAL at 21:12

## 2018-11-06 RX ADMIN — Medication 10 ML: at 22:00

## 2018-11-06 RX ADMIN — ONDANSETRON 4 MG: 2 INJECTION INTRAMUSCULAR; INTRAVENOUS at 14:05

## 2018-11-06 RX ADMIN — DEXTRAN 40 500 ML: 10 INJECTION, SOLUTION INTRAVENOUS at 15:24

## 2018-11-06 RX ADMIN — FENTANYL CITRATE 25 MCG: 50 INJECTION, SOLUTION INTRAMUSCULAR; INTRAVENOUS at 12:11

## 2018-11-06 RX ADMIN — LOSARTAN POTASSIUM 100 MG: 50 TABLET ORAL at 21:12

## 2018-11-06 RX ADMIN — ROCURONIUM BROMIDE 40 MG: 10 INJECTION, SOLUTION INTRAVENOUS at 12:17

## 2018-11-06 RX ADMIN — HEPARIN SODIUM 5700 UNITS: 1000 INJECTION, SOLUTION INTRAVENOUS; SUBCUTANEOUS at 13:08

## 2018-11-06 RX ADMIN — Medication 10 ML: at 15:24

## 2018-11-06 RX ADMIN — Medication 2 G: at 20:12

## 2018-11-06 RX ADMIN — PROTAMINE SULFATE 10 MG: 10 INJECTION, SOLUTION INTRAVENOUS at 13:53

## 2018-11-06 RX ADMIN — PROTAMINE SULFATE 20 MG: 10 INJECTION, SOLUTION INTRAVENOUS at 13:55

## 2018-11-06 RX ADMIN — SODIUM CHLORIDE, SODIUM LACTATE, POTASSIUM CHLORIDE, AND CALCIUM CHLORIDE 75 ML/HR: 600; 310; 30; 20 INJECTION, SOLUTION INTRAVENOUS at 10:11

## 2018-11-06 RX ADMIN — PROTAMINE SULFATE 10 MG: 10 INJECTION, SOLUTION INTRAVENOUS at 13:50

## 2018-11-06 RX ADMIN — FENTANYL CITRATE 50 MCG: 50 INJECTION, SOLUTION INTRAMUSCULAR; INTRAVENOUS at 12:05

## 2018-11-06 RX ADMIN — ROCURONIUM BROMIDE 10 MG: 10 INJECTION, SOLUTION INTRAVENOUS at 13:03

## 2018-11-06 RX ADMIN — NICARDIPINE HYDROCHLORIDE 5 MG/HR: 25 INJECTION INTRAVENOUS at 18:23

## 2018-11-06 RX ADMIN — PROPOFOL 40 MG: 10 INJECTION, EMULSION INTRAVENOUS at 12:40

## 2018-11-06 RX ADMIN — PROPOFOL 10 MG: 10 INJECTION, EMULSION INTRAVENOUS at 13:27

## 2018-11-06 RX ADMIN — GLYCOPYRROLATE 0.4 MG: 0.2 INJECTION INTRAMUSCULAR; INTRAVENOUS at 14:02

## 2018-11-06 RX ADMIN — PROPOFOL 130 MG: 10 INJECTION, EMULSION INTRAVENOUS at 12:17

## 2018-11-06 NOTE — PERIOP NOTES
TRANSFER - OUT REPORT: 
 
Verbal report given to Raul Pierce RN on Verner Griffiths  being transferred to CVICU for routine progression of care Report consisted of patients Situation, Background, Assessment and  
Recommendations(SBAR). Information from the following report(s) OR Summary was reviewed with the receiving nurse. Lines:  
Peripheral IV 11/06/18 Left Forearm (Active) Site Assessment Clean, dry, & intact 11/6/2018 10:11 AM  
Phlebitis Assessment 0 11/6/2018 10:11 AM  
Infiltration Assessment 0 11/6/2018 10:11 AM  
Dressing Status Clean, dry, & intact 11/6/2018 10:11 AM  
Dressing Type Tape;Transparent 11/6/2018 10:11 AM  
Hub Color/Line Status Pink; Infusing 11/6/2018 10:11 AM  
Action Taken Blood drawn 11/6/2018 10:11 AM  
   
Peripheral IV 11/06/18 Left Wrist (Active) Arterial Line 11/06/18 Right Radial artery (Active) Opportunity for questions and clarification was provided. Patient transported with: 
 Monitor O2 @ 3 liters Registered Nurse CRNA

## 2018-11-06 NOTE — ANESTHESIA POSTPROCEDURE EVALUATION
Procedure(s): RIGHT CAROTID ARTERY ENDARTERECTOMY. Anesthesia Post Evaluation Patient location during evaluation: PACU Patient participation: complete - patient participated Level of consciousness: responsive to verbal stimuli Pain management: adequate Airway patency: patent Anesthetic complications: no 
Cardiovascular status: acceptable Respiratory status: acceptable Hydration status: euvolemic Visit Vitals BP (!) 117/39 Pulse 66 Temp 36.3 °C (97.4 °F) Resp 12 Ht 5' 2\" (1.575 m) Wt 56.7 kg (125 lb) SpO2 99% BMI 22.86 kg/m²

## 2018-11-06 NOTE — PROGRESS NOTES
Dual skin assessment performed. No redness or skin breakdown noted. Right carotid dressing clean dry and intact. Will continue to monitor.

## 2018-11-06 NOTE — PROGRESS NOTES
made initial visit. Pt was asleep and staff was working with her and family. Pt appeared comfortable.  welcomed them to [facility] and shared information about  services with her.  provided spiritual care through presence, pastoral conversation, and assurance of prayer.

## 2018-11-06 NOTE — ANESTHESIA PROCEDURE NOTES
Arterial Line Placement Start time: 11/6/2018 12:09 PM 
End time: 11/6/2018 12:15 PM 
Performed by: Scot Terry MD 
Authorized by: Scot Terry MD  
 
Pre-Procedure Indications:  Arterial pressure monitoring Preanesthetic Checklist: patient identified, risks and benefits discussed, anesthesia consent, site marked, patient being monitored, timeout performed and patient being monitored Timeout Time: 12:09 Procedure:  
Prep:  Chlorhexidine Seldinger Technique?: Yes Orientation:  Right Location:  Radial artery Catheter size:  20 G Number of attempts:  2 Cont Cardiac Output Sensor: No   
 
Assessment:  
Post-procedure:  Line secured and sterile dressing applied Patient Tolerance:  Patient tolerated the procedure well with no immediate complications

## 2018-11-06 NOTE — BRIEF OP NOTE
BRIEF OPERATIVE NOTE Date of Procedure: 11/6/2018 Preoperative Diagnosis: Carotid stenosis, right [I65.21] Postoperative Diagnosis: Carotid stenosis, right [I65.21] Procedure(s): RIGHT CAROTID ARTERY ENDARTERECTOMY w/PATCH ANGIOPLASTY Surgeon(s) and Role: Joie Santa MD - Primary Surgical Assistant:  
 
Surgical Staff: 
Circ-1: Bhupinder Cormier RN 
Circ-2: Amy Short RN 
Circ-Relief: Javier Jensen RN Scrub Tech-1: Alirio Connell Scrub Tech-2: Deonte Burleson Time In Time Out Incision Start 21  Incision Close 1409 Anesthesia: General  
Estimated Blood Loss: 50cc Specimens: * No specimens in log * Findings:   
Complications: None Implants:  
Implant Name Type Inv. Item Serial No.  Lot No. LRB No. Used Action PATCH VASC PERIPATCH 0.8X8CM [facility] - [id number]  PATCH VASC PERIPATCH 0.8X8CM -- [employer] [facility] [id number] Right 1 Implanted

## 2018-11-06 NOTE — PROGRESS NOTES
TRANSFER - IN REPORT: 
 
Verbal report received from OR (name) on Odin Kidd  being received from OR (unit) for routine post - op Report consisted of patients Situation, Background, Assessment and  
Recommendations(SBAR). Information from the following report(s) SBAR, Kardex, OR Summary, Intake/Output, MAR, Accordion, Recent Results, Med Rec Status and Cardiac Rhythm nsr was reviewed with the receiving nurse. Opportunity for questions and clarification was provided. Assessment completed upon patients arrival to unit and care assumed.

## 2018-11-07 ENCOUNTER — APPOINTMENT (OUTPATIENT)
Dept: ULTRASOUND IMAGING | Age: 68
DRG: 038 | End: 2018-11-07
Attending: INTERNAL MEDICINE
Payer: MEDICARE

## 2018-11-07 LAB
ANION GAP SERPL CALC-SCNC: 5 MMOL/L (ref 7–16)
BUN SERPL-MCNC: 53 MG/DL (ref 8–23)
CALCIUM SERPL-MCNC: 7.3 MG/DL (ref 8.3–10.4)
CHLORIDE SERPL-SCNC: 116 MMOL/L (ref 98–107)
CO2 SERPL-SCNC: 16 MMOL/L (ref 21–32)
CREAT SERPL-MCNC: 4.42 MG/DL (ref 0.6–1)
ERYTHROCYTE [DISTWIDTH] IN BLOOD BY AUTOMATED COUNT: 14.6 %
GLUCOSE SERPL-MCNC: 148 MG/DL (ref 65–100)
HCT VFR BLD AUTO: 24.2 % (ref 35.8–46.3)
HGB BLD-MCNC: 7.6 G/DL (ref 11.7–15.4)
MCH RBC QN AUTO: 29.5 PG (ref 26.1–32.9)
MCHC RBC AUTO-ENTMCNC: 31.4 G/DL (ref 31.4–35)
MCV RBC AUTO: 93.8 FL (ref 79.6–97.8)
NRBC # BLD: 0 K/UL (ref 0–0.2)
PLATELET # BLD AUTO: 108 K/UL (ref 150–450)
PMV BLD AUTO: 12.8 FL (ref 9.4–12.3)
POTASSIUM SERPL-SCNC: 5.6 MMOL/L (ref 3.5–5.1)
POTASSIUM SERPL-SCNC: 7.1 MMOL/L (ref 3.5–5.1)
POTASSIUM SERPL-SCNC: 7.5 MMOL/L (ref 3.5–5.1)
POTASSIUM SERPL-SCNC: 7.5 MMOL/L (ref 3.5–5.1)
RBC # BLD AUTO: 2.58 M/UL (ref 4.05–5.2)
SODIUM SERPL-SCNC: 137 MMOL/L (ref 136–145)
WBC # BLD AUTO: 4.8 K/UL (ref 4.3–11.1)

## 2018-11-07 PROCEDURE — 84132 ASSAY OF SERUM POTASSIUM: CPT

## 2018-11-07 PROCEDURE — 74011250637 HC RX REV CODE- 250/637: Performed by: SURGERY

## 2018-11-07 PROCEDURE — 74011636637 HC RX REV CODE- 636/637: Performed by: INTERNAL MEDICINE

## 2018-11-07 PROCEDURE — 80048 BASIC METABOLIC PNL TOTAL CA: CPT

## 2018-11-07 PROCEDURE — 74011250636 HC RX REV CODE- 250/636: Performed by: SURGERY

## 2018-11-07 PROCEDURE — 74011000250 HC RX REV CODE- 250: Performed by: INTERNAL MEDICINE

## 2018-11-07 PROCEDURE — 85027 COMPLETE CBC AUTOMATED: CPT

## 2018-11-07 PROCEDURE — 76770 US EXAM ABDO BACK WALL COMP: CPT

## 2018-11-07 PROCEDURE — 74011250637 HC RX REV CODE- 250/637: Performed by: INTERNAL MEDICINE

## 2018-11-07 PROCEDURE — 36600 WITHDRAWAL OF ARTERIAL BLOOD: CPT

## 2018-11-07 PROCEDURE — 74011000258 HC RX REV CODE- 258: Performed by: INTERNAL MEDICINE

## 2018-11-07 PROCEDURE — 65660000004 HC RM CVT STEPDOWN

## 2018-11-07 PROCEDURE — 94640 AIRWAY INHALATION TREATMENT: CPT

## 2018-11-07 PROCEDURE — 36415 COLL VENOUS BLD VENIPUNCTURE: CPT

## 2018-11-07 RX ORDER — DEXTROSE 50 % IN WATER (D50W) INTRAVENOUS SYRINGE
25
Status: COMPLETED | OUTPATIENT
Start: 2018-11-07 | End: 2018-11-07

## 2018-11-07 RX ORDER — ALBUTEROL SULFATE 0.83 MG/ML
10 SOLUTION RESPIRATORY (INHALATION)
Status: COMPLETED | OUTPATIENT
Start: 2018-11-07 | End: 2018-11-07

## 2018-11-07 RX ADMIN — AMLODIPINE BESYLATE 10 MG: 10 TABLET ORAL at 20:59

## 2018-11-07 RX ADMIN — INSULIN HUMAN 10 UNITS: 100 INJECTION, SOLUTION PARENTERAL at 09:13

## 2018-11-07 RX ADMIN — DEXTROSE MONOHYDRATE 25 G: 25 INJECTION, SOLUTION INTRAVENOUS at 09:14

## 2018-11-07 RX ADMIN — SODIUM BICARBONATE: 84 INJECTION, SOLUTION INTRAVENOUS at 22:25

## 2018-11-07 RX ADMIN — SODIUM BICARBONATE: 84 INJECTION, SOLUTION INTRAVENOUS at 10:04

## 2018-11-07 RX ADMIN — Medication 10 ML: at 06:34

## 2018-11-07 RX ADMIN — OXYCODONE HYDROCHLORIDE AND ACETAMINOPHEN 1 TABLET: 5; 325 TABLET ORAL at 07:27

## 2018-11-07 RX ADMIN — ONDANSETRON 4 MG: 2 INJECTION INTRAMUSCULAR; INTRAVENOUS at 23:11

## 2018-11-07 RX ADMIN — ASPIRIN 81 MG: 81 TABLET, COATED ORAL at 08:42

## 2018-11-07 RX ADMIN — Medication 10 ML: at 13:53

## 2018-11-07 RX ADMIN — Medication 10 ML: at 21:00

## 2018-11-07 RX ADMIN — Medication 2 G: at 04:27

## 2018-11-07 RX ADMIN — ALBUTEROL SULFATE 10 MG: 2.5 SOLUTION RESPIRATORY (INHALATION) at 09:24

## 2018-11-07 RX ADMIN — ONDANSETRON 4 MG: 2 INJECTION INTRAMUSCULAR; INTRAVENOUS at 08:42

## 2018-11-07 RX ADMIN — PATIROMER 8.4 G: 8.4 POWDER, FOR SUSPENSION ORAL at 10:05

## 2018-11-07 RX ADMIN — METOPROLOL SUCCINATE 50 MG: 25 TABLET, EXTENDED RELEASE ORAL at 22:12

## 2018-11-07 NOTE — PROGRESS NOTES
KAREN Rodriguez updated on patient status. Most recent potassium is 5.6 from 7.5. Renal ultrasound completed. Patient okay to be transferred to CV stepdown.

## 2018-11-07 NOTE — CONSULTS
Full consult note dictated. CKD V:  stable   Hyperkalemia will be treated medically.   F/u on K  Thanks

## 2018-11-07 NOTE — PROGRESS NOTES
Dr. Flores Gomes called. POC discussed with Dr. Angela Valdivia. Will wait until tomorrow to decide about placement of pacemaker. Will update patient and family. Will continue to monitor.

## 2018-11-07 NOTE — PROGRESS NOTES
Pt sitting up in bedside chair in CVICU followed by Dr. Holly Romano. No needs voiced at present per pt. CM available for any needs per MD and will continue to follow. Care Management Interventions PCP Verified by CM: Yes Mode of Transport at Discharge: Other (see comment) Transition of Care Consult (CM Consult): Discharge Planning Discharge Durable Medical Equipment: (cane) Current Support Network: Other, Own Home(son lives with pt) Confirm Follow Up Transport: Family(daughter assist with transportation) Plan discussed with Pt/Family/Caregiver: Yes Freedom of Choice Offered: Yes [employer] Information Provided?: (confirms [facility]) Discharge Location Discharge Placement: Unable to determine at this time

## 2018-11-07 NOTE — OP NOTES
[facility] REPORT    Alexi Ramos  MR#: [id number]  : 1950  ACCOUNT #: [de-identified]   DATE OF SERVICE: 2018    PREOPERATIVE DIAGNOSIS:  Right carotid stenosis. POSTOPERATIVE DIAGNOSIS:  Right carotid stenosis. PROCEDURE PERFORMED:  Right carotid endarterectomy with bovine pericardial patch angioplasty. SURGEON:  Rupert Aviles MD    ASSISTANT:  None. ANESTHESIA:  General endotracheal.    ESTIMATED BLOOD LOSS:  50 mL. DRAINS:  #15 TOMMY. SPECIMENS REMOVED:  None. COMPLICATIONS:  None. IMPLANTS:  See chart. DESCRIPTION OF PROCEDURE:  The patient was brought to operating room and placed on the operating table in supine position. Following adequate general endotracheal anesthesia and a timeout procedure,  the right neck was draped and prepped in sterile fashion. A curvilinear incision was made over the anterior border of sternocleidomastoid muscle. Wound was deepened, using Bovie to control bleeding. The fascia was divided throughout the length of the wound. Dissection was carried down to the level of the facial branch of the internal jugular vein. This was divided between silk ties. Next, the common carotid, internal carotid and external carotid arteries were identified, mobilized and encircled with vessel loops in [place] fashion. The patient was then systemically heparinized. Next, the internal, external and common carotid arteries were sequentially occluded. A longitudinal arteriotomy was performed on the common carotid artery extended across the bifurcation onto the internal carotid artery beyond the plaque. A 10-[place] shunt was then placed in the internal carotid artery and then proximally in the common carotid artery, restoring antegrade flow. Flow was confirmed. Next, the plaque was mobilized and the proximal end of the arteriotomy circumferentially and divided.   The plaque was then elevated from proximal to distal.  The plaque extending into the external carotid artery was removed via eversion technique. Next, the remainder of plaque was removed and a smooth feathered endpoint was achieved. All movable debris in the endarterectomy site was removed with manual debridement. Next bovine pericardial patch was sewn in position with running 6-0 Prolene suture brought out from either corner. Prior to completion of the closure, the shunt was removed. Then, the endarterectomy site was irrigated with dextran solution. The patch closure was then completed and flow was restored to the external carotid artery and then to the internal carotid artery. Flow was confirmed by Doppler. At this point, hemostasis was achieved. Protamine was administered to reverse heparin effect. A 15 TOMMY drain was placed. Again, the wound was inspected for bleeding and there was none seen. The wound was then closed in layers of Vicryl suture. Sterile dry dressings were applied. The patient was awakened from anesthesia and was able to move all extremities on command upon awakening from anesthesia. The patient was then transferred to the recovery room in stable condition. The patient tolerated the procedure well. All needle and sponge counts were correct.       MD Stacey Gaming / Babatunde Solo  D: 11/07/2018 14:15     T: 11/07/2018 18:18  JOB #: [id number]

## 2018-11-07 NOTE — PROGRESS NOTES
Right radial art line removed at this time with tip intact. Pressure held to achieve hemostasis. No bleeding or hematoma noted. Will monitor closely

## 2018-11-07 NOTE — PROGRESS NOTES
Primary Nurse Flor Guy and Zara Swain, RN performed a dual skin assessment on this patient No impairment noted Izaiah score is 20

## 2018-11-07 NOTE — PROGRESS NOTES
Progress Note Patient: Odin Kidd MRN: [id number]  SSN: [id number] YOB: 1950  Age: 77 y.o. Sex: female Admission Date: 11/6/2018 LOS: 1 day 1 Day Post-Op PROCEDURE(S): 
RIGHT CAROTID ARTERY ENDARTERECTOMY Subjective:  
 
Patient admits to mild neck discomfort, otherwise has no complaints. Has ambulated with staff to void, tolerating sips, requests breakfast. K+ 7.5, nephrology consulted. Objective:  
 
Vitals:  
 11/07/18 9410 11/07/18 0636 11/07/18 0705 11/07/18 7344 BP: 155/66 165/72 156/70 157/67 Pulse: (!) 57 60 (!) 59 (!) 59 Resp: 16 14 9 18 Temp:   97.5 °F (36.4 °C) SpO2: 100%  99% 100% Weight:      
Height:      
  
 
Intake and Output: 
Current Shift: No intake/output data recorded. Last three shifts: 11/05 1901 - 11/07 0700 In: 2592.5 [I.V.:2592.5] Out: 821 [Urine:700; Drains:35] Physical Exam:  
GENERAL: alert, cooperative, no distress HEENT: EOM intact, tongue midline, facial symmetry noted NECK: surgical dressing intact, minimal edema, scant serous in TOMMY bulb LUNG: clear to auscultation bilaterally, normal respiratory effort HEART: mild bradycardia, regular underlying rhythm ABDOMEN: soft, nontender, nondistended, bowel sounds normoactive EXTREMITIES: warm, normal ROM,  and strength normal 
 
Lab/Data Review: 
BMP:  
Lab Results Component Value Date/Time  11/07/2018 12:12 AM  
 K 7.5 (HH) 11/07/2018 01:42 AM  
  (H) 11/07/2018 12:12 AM  
 CO2 16 (L) 11/07/2018 12:12 AM  
 AGAP 5 (L) 11/07/2018 12:12 AM  
  (H) 11/07/2018 12:12 AM  
 BUN 53 (H) 11/07/2018 12:12 AM  
 CREA 4.42 (H) 11/07/2018 12:12 AM  
 GFRAA 13 (L) 11/07/2018 12:12 AM  
 GFRNA 10 (L) 11/07/2018 12:12 AM  
 
CBC:  
Lab Results Component Value Date/Time WBC 4.8 11/07/2018 12:12 AM  
 HGB 7.6 (L) 11/07/2018 12:12 AM  
 HCT 24.2 (L) 11/07/2018 12:12 AM  
  (L) 11/07/2018 12:12 AM  
 
 
Assessment / Plan: Principal Problem: 
  Carotid stenosis, right (11/6/2018) Active Problems: 
  Carotid stenosis, asymptomatic, right (11/6/2018) Await Nephrology consult for hyperkalemia Possible D/C later today Signed By: Nataly Voss PA-C November 7, 2018 Physician Assistant with [facility] Kristin Dasilva.  Lennox Doom, MD / Shannan Cooney MD

## 2018-11-07 NOTE — PROGRESS NOTES
Bedside verbal shift report received from Farrukh, [address] [facility]

## 2018-11-07 NOTE — PROGRESS NOTES
TRANSFER - OUT REPORT: 
 
Verbal report given to Juliet Watson RN on Demond Avelar  being transferred to [facility] for routine progression of care Report consisted of patients Situation, Background, Assessment and  
Recommendations(SBAR). Information from the following report(s) SBAR, Kardex, Procedure Summary, Intake/Output, MAR, Recent Results and Cardiac Rhythm Sinus rhythm  was reviewed with the receiving nurse. Lines:  
Peripheral IV 11/06/18 Left Forearm (Active) Site Assessment Clean, dry, & intact 11/7/2018 11:05 AM  
Phlebitis Assessment 0 11/7/2018 11:05 AM  
Infiltration Assessment 0 11/7/2018 11:05 AM  
Dressing Status Clean, dry, & intact 11/7/2018 11:05 AM  
Dressing Type Transparent 11/7/2018 11:05 AM  
Hub Color/Line Status Pink; Infusing;Patent; Flushed 11/7/2018 11:05 AM  
Action Taken Blood drawn 11/6/2018 10:11 AM  
   
Peripheral IV 11/06/18 Left Wrist (Active) Site Assessment Clean, dry, & intact 11/7/2018 11:05 AM  
Phlebitis Assessment 0 11/7/2018 11:05 AM  
Infiltration Assessment 0 11/7/2018 11:05 AM  
Dressing Status Clean, dry, & intact 11/7/2018 11:05 AM  
Dressing Type Transparent 11/7/2018 11:05 AM  
Hub Color/Line Status Green;Patent;Capped 11/7/2018 11:05 AM  
  
 
Opportunity for questions and clarification was provided. Patient transported with: 
 Monitor Registered Nurse

## 2018-11-08 VITALS
OXYGEN SATURATION: 96 % | HEIGHT: 62 IN | SYSTOLIC BLOOD PRESSURE: 162 MMHG | HEART RATE: 83 BPM | BODY MASS INDEX: 25.38 KG/M2 | RESPIRATION RATE: 22 BRPM | DIASTOLIC BLOOD PRESSURE: 70 MMHG | TEMPERATURE: 98.6 F | WEIGHT: 137.9 LBS

## 2018-11-08 PROBLEM — Z98.890 HISTORY OF RIGHT-SIDED CAROTID ENDARTERECTOMY: Status: ACTIVE | Noted: 2018-11-08

## 2018-11-08 LAB
HCT VFR BLD AUTO: 25.5 % (ref 35.8–46.3)
HGB BLD-MCNC: 8.1 G/DL (ref 11.7–15.4)
POTASSIUM SERPL-SCNC: 5.6 MMOL/L (ref 3.5–5.1)

## 2018-11-08 PROCEDURE — 85018 HEMOGLOBIN: CPT

## 2018-11-08 PROCEDURE — 84132 ASSAY OF SERUM POTASSIUM: CPT

## 2018-11-08 PROCEDURE — 74011250637 HC RX REV CODE- 250/637: Performed by: INTERNAL MEDICINE

## 2018-11-08 PROCEDURE — 74011250637 HC RX REV CODE- 250/637: Performed by: SURGERY

## 2018-11-08 PROCEDURE — 74011000250 HC RX REV CODE- 250: Performed by: NURSE PRACTITIONER

## 2018-11-08 PROCEDURE — 36415 COLL VENOUS BLD VENIPUNCTURE: CPT

## 2018-11-08 RX ORDER — SODIUM BICARBONATE 650 MG/1
650 TABLET ORAL 2 TIMES DAILY
Qty: 60 TAB | Refills: 0 | Status: SHIPPED | OUTPATIENT
Start: 2018-11-08 | End: 2018-12-08

## 2018-11-08 RX ORDER — SODIUM BICARBONATE 650 MG/1
650 TABLET ORAL 2 TIMES DAILY
Status: DISCONTINUED | OUTPATIENT
Start: 2018-11-08 | End: 2018-11-08 | Stop reason: HOSPADM

## 2018-11-08 RX ORDER — POLYETHYLENE GLYCOL 3350 17 G/17G
17 POWDER, FOR SOLUTION ORAL DAILY
Status: DISCONTINUED | OUTPATIENT
Start: 2018-11-08 | End: 2018-11-08 | Stop reason: HOSPADM

## 2018-11-08 RX ORDER — OXYCODONE AND ACETAMINOPHEN 5; 325 MG/1; MG/1
1 TABLET ORAL
Qty: 20 TAB | Refills: 0 | Status: SHIPPED | OUTPATIENT
Start: 2018-11-08 | End: 2019-02-04

## 2018-11-08 RX ADMIN — Medication 10 ML: at 05:14

## 2018-11-08 RX ADMIN — Medication 10 ML: at 09:30

## 2018-11-08 RX ADMIN — POLYETHYLENE GLYCOL 3350 17 G: 17 POWDER, FOR SOLUTION ORAL at 09:19

## 2018-11-08 RX ADMIN — PATIROMER 8.4 G: 8.4 POWDER, FOR SUSPENSION ORAL at 10:15

## 2018-11-08 RX ADMIN — ASPIRIN 81 MG: 81 TABLET, COATED ORAL at 09:19

## 2018-11-08 NOTE — PROGRESS NOTES
Patient in the bathroom. Last potassium was 5.6 with repeat pending. If her K is less than 6, she can go home off ARB and on a low potassium diet with f/u in  Our office within 7-10 days. Will add Patiromer daily. She can continue this out of the hospital. 
 
Will f/u later in the day if she is not discharged.

## 2018-11-08 NOTE — PROGRESS NOTES
Problem: Falls - Risk of 
Goal: *Absence of Falls Document Dasha Jurado Fall Risk and appropriate interventions in the flowsheet. Outcome: Progressing Towards Goal 
Fall Risk Interventions: 
Mobility Interventions: Bed/chair exit alarm, Patient to call before getting OOB, Strengthening exercises (ROM-active/passive) Medication Interventions: Bed/chair exit alarm, Patient to call before getting OOB, Teach patient to arise slowly Elimination Interventions: Bed/chair exit alarm, Call light in reach, Patient to call for help with toileting needs

## 2018-11-08 NOTE — DISCHARGE SUMMARY
[address]   [address] FAX: [phone]        Physician Discharge Summary     Patient: Jose Antonio Shah MRN: [id number]  SSN: [id number]    YOB: 1950  Age: 77 y.o.   Sex: female       Admit Date: 11/6/2018    Discharge Date: 11/8/2018      Admitting Physician: Charisma Walls MD     Discharge Physician: Gianfranco Galicia NP    Admission Diagnoses: Carotid stenosis, right [I65.21]    Discharge Diagnoses:   Problem List as of 11/8/2018 Date Reviewed: 11/1/2018          Codes Class Noted - Resolved    History of right-sided carotid endarterectomy ICD-10-CM: Z98.890  ICD-9-CM: V45.89  11/8/2018 - Present        Carotid stenosis, asymptomatic, right ICD-10-CM: I65.21  ICD-9-CM: 433.10  11/6/2018 - Present        * (Principal) Carotid stenosis, right ICD-10-CM: I65.21  ICD-9-CM: 433.10  11/6/2018 - Present        [facility] ICD-10-CM: I77.9  ICD-9-CM: 447.9  8/29/2018 - Present        Bilateral carotid artery stenosis ICD-10-CM: I65.23  ICD-9-CM: 433.10, 433.30  11/28/2017 - Present        Pure hypercholesterolemia ICD-10-CM: E78.00  ICD-9-CM: 272.0  11/28/2017 - Present        UTI (urinary tract infection) ICD-10-CM: N39.0  ICD-9-CM: 599.0  11/10/2017 - Present        Stroke ([facility] ICD-10-CM: I63.9  ICD-9-CM: 434.91  11/10/2017 - Present    Overview Signed 11/10/2017 12:03 PM by Guillaume Tapia MD     Frontoparietal CVA, ischemic             History of urethral stent ICD-10-CM: [id number]  ICD-9-CM: V45.89  2/6/2017 - Present        CKD stage 5 secondary to hypertension ([facility] ICD-10-CM: I12.0, N18.5  ICD-9-CM: 403.91, 585.5  2/6/2017 - Present        Major depressive disorder with single episode, in remission [facility]) ICD-10-CM: F32.5  ICD-9-CM: 296.25  2/6/2017 - Present        Hydronephrosis ICD-10-CM: N13.30  ICD-9-CM: 576  2/12/2016 - Present        History of CVA (cerebrovascular accident) ICD-10-CM: Z86.73  ICD-9-CM: V12.54  2/11/2016 - Present        Anemia of renal disease ICD-10-CM: D63.1  ICD-9-CM: 285.21  1/7/2016 - Present        Benign essential HTN ICD-10-CM: I10  ICD-9-CM: 401.1  1/7/2016 - Present        Coronary artery disease involving native coronary artery with angina pectoris (HCC) ICD-10-CM: I25.119  ICD-9-CM: 414.01, 413.9  1/7/2016 - Present        CVA (cerebral vascular accident) [facility]) ICD-10-CM: I63.9  ICD-9-CM: 434.91  4/28/2012 - Present        Hypertensive emergency ICD-10-CM: I16.1  ICD-9-CM: 401.9  4/26/2012 - Present        Carotid artery bruit ICD-10-CM: R09.89  ICD-9-CM: 785.9  4/26/2012 - Present    Overview Signed 4/26/2012  8:28 AM by Chepe Neves     bilateral             CKD (chronic kidney disease) stage 3, GFR 30-59 ml/min (formerly [facility]) ICD-10-CM: N18.3  ICD-9-CM: 585.3  4/26/2012 - Present        Tobacco use disorder ICD-10-CM: F17.200  ICD-9-CM: 305.1  4/26/2012 - Present        GERD (gastroesophageal reflux disease) ICD-10-CM: K21.9  ICD-9-CM: 530.81  4/26/2012 - Present        Chest pain, unspecified ICD-10-CM: R07.9  ICD-9-CM: 786.50  4/26/2012 - Present        Anemia ICD-10-CM: D64.9  ICD-9-CM: 285.9  4/26/2012 - Present               Procedures for this admission: Procedure(s):  RIGHT CAROTID ARTERY ENDARTERECTOMY    Discharged Condition: stable    Hospital Course: Patient followed up for follow-up of her known bilateral carotid stenosis. She denies any lateralizing neurologic symptoms that we saw her last in September. She was identified to have a severe right ICA stenosis and we sent her for cardiac clearance. Her study which found her to be at low risk for perioperative complication and she was cleared for surgery. She was scheduled for a right carotid endarterectomy. Dr. Downs Colander performed a Right carotid endarterectomy with bovine pericardial patch angioplasty on 11/7/18. Her K+ was elevated at 7.5 on admission. Nephrology was consulted and made recommendations.  She will follow-up with their office in 7-10 days. She was deemed stable for discharge on 11/8/18. Consults: Nephrology    Significant Diagnostic Studies: labs, microbiology and surgery. Treatments: IV hydration, antibiotics: Ancef, analgesia: Percocet, cardiac meds: Norvasc, Metoprolol , anticoagulation: ASA and surgery: see above    Discharge Exam: GENERAL: alert, cooperative, no distress  HEENT: EOM intact, tongue midline, facial symmetry noted  NECK: surgical dressing intact, minimal edema, dressing in place  LUNG: clear to auscultation bilaterally, normal respiratory effort  HEART: mild bradycardia, regular underlying rhythm  ABDOMEN: soft, nontender, nondistended, bowel sounds normoactive  EXTREMITIES: warm, normal ROM,  and strength normal        Disposition: home    LOW POTASSIUM DIET! Patient Instructions:   Current Discharge Medication List      START taking these medications    Details   sodium bicarbonate 650 mg tablet Take 1 Tab by mouth two (2) times a day for 30 days. Qty: 60 Tab, Refills: 0      patiromer calcium sorbitex (VELTASSA) 8.4 gram powder Take 8.4 g by mouth daily for 30 days. Qty: 30 Packet, Refills: 0      oxyCODONE-acetaminophen (PERCOCET) 5-325 mg per tablet Take 1 Tab by mouth every four (4) hours as needed. Max Daily Amount: 6 Tabs. Qty: 20 Tab, Refills: 0    Associated Diagnoses: History of right-sided carotid endarterectomy; Carotid stenosis, right; Carotid stenosis, asymptomatic, right         CONTINUE these medications which have NOT CHANGED    Details   aspirin delayed-release 81 mg tablet Take  by mouth nightly. metoprolol succinate (TOPROL-XL) 50 mg XL tablet Take 1 Tab by mouth daily. Qty: 180 Tab, Refills: 3    Associated Diagnoses: Benign essential HTN      amLODIPine (NORVASC) 10 mg tablet Take 1 Tab by mouth daily.   Qty: 90 Tab, Refills: 3    Associated Diagnoses: Benign essential HTN         STOP taking these medications       losartan (COZAAR) 100 mg tablet Comments:   Reason for Stopping:               Reference discharge instructions as provided by nursing for diet, labs, medications, activity, wound care and any outpatient referrals. Follow-up Appointments   Procedures    FOLLOW UP VISIT Appointment in: Other (Specify) Carotid duplex 12/3/18 0930 F/U 12/6/18 1115     Carotid duplex 12/3/18 0930  F/U 12/6/18 1115     Standing Status:   Standing     Number of Occurrences:   1     Order Specific Question:   Appointment in     Answer: Other (Specify)        Signed:  Simon Jonas NP  11/8/2018  12:47 PM    Elements of this note have been dictated using speech recognition software. As a result, errors of speech recognition may have occurred.

## 2018-11-08 NOTE — PROGRESS NOTES
Bedside shift report given to Rancho Wakefield RN (oncoming nurse) by David Lee RN (offgoing nurse). Bedside shift report included the following information: SBAR, Kardex, ED Summary, MAR, and Recent Results.  \

## 2018-11-08 NOTE — PROGRESS NOTES
Reviewed discharge instructions and medications and gave copies and prescriptions. Gave time for questions. Removed PIV and heart monitor. Transported downstairs by w/c to go home. Patient stable upon discharge.

## 2018-11-08 NOTE — PROGRESS NOTES
Progress Note Patient: Elena Stone MRN: [id number]  SSN: [id number] YOB: 1950  Age: 77 y.o. Sex: female Admission Date: 11/6/2018 LOS: 2 days 2 Day Post-Op PROCEDURE(S): 
RIGHT CAROTID ARTERY ENDARTERECTOMY Subjective:  
 
Patient sitting in the chair eating breakfast. No complaints. K+ yesterday 5.6. Will repeat today. Objective:  
 
Vitals:  
 11/07/18 2324 11/08/18 0222 11/08/18 0344 11/08/18 9160 BP:  137/63  146/67 Pulse: 75 81  75 Resp:  18  20 Temp:  98.4 °F (36.9 °C)  98.9 °F (37.2 °C) SpO2:  98%  97% Weight:   137 lb 14.4 oz (62.6 kg) Height:   5' 2\" (1.575 m) Intake and Output: 
Current Shift: 11/08 0701 - 11/08 1900 In: 785 [I.V.:785] Out: 275 [Urine:275] Last three shifts: 11/06 1901 - 11/08 0700 In: 2830 [P.O.:650; I.V.:2180] Out: 7246 [Urine:2200; Drains:10] Physical Exam:  
GENERAL: alert, cooperative, no distress HEENT: EOM intact, tongue midline, facial symmetry noted NECK: surgical dressing intact, minimal edema, dressing in place LUNG: clear to auscultation bilaterally, normal respiratory effort HEART: mild bradycardia, regular underlying rhythm ABDOMEN: soft, nontender, nondistended, bowel sounds normoactive EXTREMITIES: warm, normal ROM,  and strength normal 
 
Lab/Data Review: 
BMP:  
Lab Results Component Value Date/Time  
 K 5.6 (H) 11/07/2018 01:10 PM  
 
CBC:  
No results found for: WBC, HGB, HGBEXT, HCT, HCTEXT, PLT, PLTEXT, HGBEXT, HCTEXT, PLTEXT Assessment / Plan:  
 
Principal Problem: 
  Carotid stenosis, right (11/6/2018) Active Problems: 
  Carotid stenosis, asymptomatic, right (11/6/2018) Recheck K+ and H/H Possible D/C later today once labs have resulted and she has been evaluated by nephrology Signed By: MARCO Soto November 8, 2018 Physician Assistant with [facility] V[facility] Surgery Chris Pedersen.  Yao Mcneil MD / Marisol Giraldo MD

## 2018-11-09 ENCOUNTER — PATIENT OUTREACH (OUTPATIENT)
Dept: CASE MANAGEMENT | Age: 68
End: 2018-11-09

## 2018-11-09 NOTE — PATIENT INSTRUCTIONS
11/9/18 @ 9:22am- Called to establish relationship. Left a voicemail with my contact information. 11/9/18 @ 12:24pm- Called and left voicemail with my contact information.

## 2018-11-12 ENCOUNTER — PATIENT OUTREACH (OUTPATIENT)
Dept: CASE MANAGEMENT | Age: 68
End: 2018-11-12

## 2018-11-12 NOTE — PATIENT INSTRUCTIONS
11/12/18 Called to establish relationship with member. RN Care  role explained and my contact information given. Leo Nurse advice line number given and Urgent care centers in her area reviewed. She had carotid surgery on 11/6/18. She states the incision is tight but is healing. No drainage or bleeding noted. No redness or warmth to incision site noted. Advsied her if incision starts bleeding to get a clean towel, hold pressure and call 911. Advised her to call 911 if she has difficulty swallowing or breathing due to swelling of neck. She voiced understanding. She states she has a f/u with surgeon on 11/15 . She states she is no longer going to [facility]. She states she is going to start going to [facility] but is unsure of PCP name as her daughter writes it all down on her calendar. Advsied her of the importance of 3-7 day f/u with PCP and she voiced understanding. She has gotten all of her discharge medications except for one, the pharmacy had to order it. She is on a a low potassium diet. Reviewed foods that were high in potassium such as potatoes, tomatoes, oranges, bananas and to avoid those foods. She voiced understanding. Her daughter is to transport her to all doctor's appointments. Advised her that I would be calling weekly but if she had any questions to please call me or the [facility] [employer] [facility] Nurse advice line number. She voiced understanding. Well dine meal program initiated.

## 2018-11-12 NOTE — PROGRESS NOTES
11/12/18 Called to establish relationship with member. RN Care  role explained and my contact information given. [facility] Nurse advice line number given and Urgent care centers in her area reviewed. She had carotid surgery on 11/6/18. She states the incision is tight but is healing. No drainage or bleeding noted. No redness or warmth to incision site noted. Advsied her if incision starts bleeding to get a clean towel, hold pressure and call 911. Advised her to call 911 if she has difficulty swallowing or breathing due to swelling of neck. She voiced understanding. She states she has a f/u with surgeon on 11/15 . She states she is no longer going to [facility]. She states she is going to start going to [facility] but is unsure of PCP name as her daughter writes it all down on her calendar. Advsied her of the importance of 3-7 day f/u with PCP and she voiced understanding. She has gotten all of her discharge medications except for one, the pharmacy had to order it. She is on a a low potassium diet. Reviewed foods that were high in potassium such as potatoes, tomatoes, oranges, bananas and to avoid those foods. She voiced understanding. Her daughter is to transport her to all doctor's appointments. Advised her that I would be calling weekly but if she had any questions to please call me or the [facility] [employer] [facility] Nurse advice line number. She voiced understanding. Well dine meal program initiated.

## 2018-11-19 ENCOUNTER — PATIENT OUTREACH (OUTPATIENT)
Dept: CASE MANAGEMENT | Age: 68
End: 2018-11-19

## 2018-11-19 NOTE — PATIENT INSTRUCTIONS
11/19/18 @ 1pm- Called to check on member. The phone number [phone] is not available to take calls at this time.

## 2018-11-19 NOTE — PROGRESS NOTES
11/19/18 @ 1pm- Called to check on member. The phone number [phone] is not available to take calls at this time.

## 2018-11-30 ENCOUNTER — PATIENT OUTREACH (OUTPATIENT)
Dept: CASE MANAGEMENT | Age: 68
End: 2018-11-30

## 2018-11-30 NOTE — PROGRESS NOTES
11/30/18 @ 11am- Have tried multiple times to get in contact with member. Phone number is unavailable to take calls. Mailed my business card with my contact information in an attempt to reach member.

## 2018-11-30 NOTE — PATIENT INSTRUCTIONS
1/30/18 @ 11am- Have tried multiple times to get in contact with member. Phone number is unavailable to take calls. Mailed my business card with my contact information in an attempt to reach member.

## 2019-02-04 ENCOUNTER — HOSPITAL ENCOUNTER (EMERGENCY)
Age: 69
Discharge: HOME | End: 2019-02-05
Attending: EMERGENCY MEDICINE
Payer: MEDICARE

## 2019-02-04 ENCOUNTER — APPOINTMENT (OUTPATIENT)
Dept: GENERAL RADIOLOGY | Age: 69
End: 2019-02-04
Attending: EMERGENCY MEDICINE
Payer: MEDICARE

## 2019-02-04 DIAGNOSIS — R53.83 MALAISE AND FATIGUE: Primary | ICD-10-CM

## 2019-02-04 DIAGNOSIS — R53.81 MALAISE AND FATIGUE: Primary | ICD-10-CM

## 2019-02-04 DIAGNOSIS — R11.0 NAUSEA WITHOUT VOMITING: ICD-10-CM

## 2019-02-04 DIAGNOSIS — N18.9 CHRONIC KIDNEY DISEASE, UNSPECIFIED CKD STAGE: ICD-10-CM

## 2019-02-04 LAB
ALBUMIN SERPL-MCNC: 3.5 G/DL (ref 3.2–4.6)
ALBUMIN/GLOB SERPL: 0.8 {RATIO}
ALP SERPL-CCNC: 71 U/L (ref 50–136)
ALT SERPL-CCNC: 8 U/L (ref 12–65)
ANION GAP SERPL CALC-SCNC: 8 MMOL/L
AST SERPL-CCNC: 24 U/L (ref 15–37)
BASOPHILS # BLD: 0 K/UL (ref 0–0.2)
BASOPHILS NFR BLD: 1 % (ref 0–2)
BILIRUB SERPL-MCNC: 0.2 MG/DL (ref 0.2–1.1)
BNP SERPL-MCNC: 113 PG/ML
BUN SERPL-MCNC: 38 MG/DL (ref 8–23)
CALCIUM SERPL-MCNC: 8.5 MG/DL (ref 8.3–10.4)
CHLORIDE SERPL-SCNC: 112 MMOL/L (ref 98–107)
CO2 SERPL-SCNC: 19 MMOL/L (ref 21–32)
CREAT SERPL-MCNC: 4.79 MG/DL (ref 0.6–1)
DIFFERENTIAL METHOD BLD: ABNORMAL
EOSINOPHIL # BLD: 0.1 K/UL (ref 0–0.8)
EOSINOPHIL NFR BLD: 3 % (ref 0.5–7.8)
ERYTHROCYTE [DISTWIDTH] IN BLOOD BY AUTOMATED COUNT: 14.6 % (ref 11.9–14.6)
GLOBULIN SER CALC-MCNC: 4.4 G/DL (ref 2.3–3.5)
GLUCOSE SERPL-MCNC: 89 MG/DL (ref 65–100)
HCT VFR BLD AUTO: 30.7 % (ref 35.8–46.3)
HGB BLD-MCNC: 9.8 G/DL (ref 11.7–15.4)
IMM GRANULOCYTES # BLD AUTO: 0 K/UL (ref 0–0.5)
IMM GRANULOCYTES NFR BLD AUTO: 0 % (ref 0–5)
LYMPHOCYTES # BLD: 2.1 K/UL (ref 0.5–4.6)
LYMPHOCYTES NFR BLD: 48 % (ref 13–44)
MCH RBC QN AUTO: 29 PG (ref 26.1–32.9)
MCHC RBC AUTO-ENTMCNC: 31.9 G/DL (ref 31.4–35)
MCV RBC AUTO: 90.8 FL (ref 79.6–97.8)
MONOCYTES # BLD: 0.3 K/UL (ref 0.1–1.3)
MONOCYTES NFR BLD: 6 % (ref 4–12)
NEUTS SEG # BLD: 1.9 K/UL (ref 1.7–8.2)
NEUTS SEG NFR BLD: 42 % (ref 43–78)
NRBC # BLD: 0 K/UL (ref 0–0.2)
PLATELET # BLD AUTO: 156 K/UL (ref 150–450)
PMV BLD AUTO: 12.5 FL (ref 9.4–12.3)
POTASSIUM SERPL-SCNC: 5 MMOL/L (ref 3.5–5.1)
PROT SERPL-MCNC: 7.9 G/DL
RBC # BLD AUTO: 3.38 M/UL (ref 4.05–5.2)
SODIUM SERPL-SCNC: 139 MMOL/L (ref 136–145)
TROPONIN I SERPL-MCNC: 0.13 NG/ML (ref 0.02–0.05)
WBC # BLD AUTO: 4.4 K/UL (ref 4.3–11.1)

## 2019-02-04 PROCEDURE — 85025 COMPLETE CBC W/AUTO DIFF WBC: CPT

## 2019-02-04 PROCEDURE — 83880 ASSAY OF NATRIURETIC PEPTIDE: CPT

## 2019-02-04 PROCEDURE — 96374 THER/PROPH/DIAG INJ IV PUSH: CPT | Performed by: EMERGENCY MEDICINE

## 2019-02-04 PROCEDURE — 80053 COMPREHEN METABOLIC PANEL: CPT

## 2019-02-04 PROCEDURE — 96375 TX/PRO/DX INJ NEW DRUG ADDON: CPT | Performed by: EMERGENCY MEDICINE

## 2019-02-04 PROCEDURE — 99285 EMERGENCY DEPT VISIT HI MDM: CPT | Performed by: EMERGENCY MEDICINE

## 2019-02-04 PROCEDURE — 93005 ELECTROCARDIOGRAM TRACING: CPT | Performed by: EMERGENCY MEDICINE

## 2019-02-04 PROCEDURE — 71046 X-RAY EXAM CHEST 2 VIEWS: CPT

## 2019-02-04 PROCEDURE — 84484 ASSAY OF TROPONIN QUANT: CPT

## 2019-02-04 RX ORDER — SODIUM BICARBONATE 650 MG/1
650 TABLET ORAL DAILY
COMMUNITY
End: 2019-07-29

## 2019-02-05 VITALS
DIASTOLIC BLOOD PRESSURE: 79 MMHG | WEIGHT: 137 LBS | OXYGEN SATURATION: 80 % | RESPIRATION RATE: 30 BRPM | SYSTOLIC BLOOD PRESSURE: 169 MMHG | HEIGHT: 63 IN | BODY MASS INDEX: 24.27 KG/M2 | TEMPERATURE: 98.8 F | HEART RATE: 91 BPM

## 2019-02-05 LAB — TROPONIN I SERPL-MCNC: 0.14 NG/ML (ref 0.02–0.05)

## 2019-02-05 PROCEDURE — 96374 THER/PROPH/DIAG INJ IV PUSH: CPT | Performed by: EMERGENCY MEDICINE

## 2019-02-05 PROCEDURE — 84484 ASSAY OF TROPONIN QUANT: CPT

## 2019-02-05 PROCEDURE — 74011250636 HC RX REV CODE- 250/636: Performed by: EMERGENCY MEDICINE

## 2019-02-05 PROCEDURE — 96375 TX/PRO/DX INJ NEW DRUG ADDON: CPT | Performed by: EMERGENCY MEDICINE

## 2019-02-05 RX ORDER — HYDRALAZINE HYDROCHLORIDE 20 MG/ML
10 INJECTION INTRAMUSCULAR; INTRAVENOUS
Status: COMPLETED | OUTPATIENT
Start: 2019-02-05 | End: 2019-02-05

## 2019-02-05 RX ORDER — ONDANSETRON 8 MG/1
8 TABLET, ORALLY DISINTEGRATING ORAL
Qty: 12 TAB | Refills: 0 | Status: SHIPPED | OUTPATIENT
Start: 2019-02-05 | End: 2020-10-20 | Stop reason: SDUPTHER

## 2019-02-05 RX ORDER — ONDANSETRON 2 MG/ML
4 INJECTION INTRAMUSCULAR; INTRAVENOUS
Status: COMPLETED | OUTPATIENT
Start: 2019-02-05 | End: 2019-02-05

## 2019-02-05 RX ADMIN — ONDANSETRON 4 MG: 2 INJECTION INTRAMUSCULAR; INTRAVENOUS at 01:53

## 2019-02-05 RX ADMIN — SODIUM CHLORIDE 500 ML: 900 INJECTION, SOLUTION INTRAVENOUS at 00:12

## 2019-02-05 RX ADMIN — HYDRALAZINE HYDROCHLORIDE: 20 INJECTION INTRAMUSCULAR; INTRAVENOUS at 01:04

## 2019-02-05 NOTE — ED PROVIDER NOTES
Patient presents to the ER complaining of generalized weakness and fatigue for the past week. She feels \"as if she does not have enough strength\". Denies any syncope or chest pain. Denies any fevers or vomiting. Reports a normal appetite. Patient has a history of chronic kidney disease, does not make much urine. The history is provided by the patient. Fatigue This is a new problem. The current episode started more than 2 days ago. The problem has been gradually worsening. Pertinent negatives include no focal weakness, no slurred speech, no movement disorder, no agitation, no mental status change, no unresponsiveness and no disorientation. There has been no fever. Pertinent negatives include no vomiting, no altered mental status, no confusion, no nausea and no bladder incontinence. Past Medical History:  
Diagnosis Date  Arrhythmia   
 murmur  Aspirin long-term use SINAN  
 continue  CAD (coronary artery disease) 2012 MI, 1 stent  Chronic kidney disease [address]  
 CKD (chronic kidney disease) stage 3, GFR 30-59 ml/min ([facility])  GERD (gastroesophageal reflux disease)   
 no meds  Heart murmur not followed per cardiology EF 65-70 %-- on echo 11/2017  History of MI (myocardial infarction) 2012  
 stents x 1---- per pt-- does not see a cardiologist  
 Hx of colonic polyps 2016  
 adenoma  Hydronephrosis  Hypertension   
 daily meds  Left-sided weakness 2012  
 left leg r/t cva  Loss of appetite   
 states within the last several months  Poor historian  Renal atrophy, right  Renal insufficiency ? --- pt unsure if sees nephrologist  
 Smoker 50 yr hx/ 1 pk per day  Stented coronary artery Xience SINAN  
 proximal LAD 2012-- pt states does not see a cardio  Stroke [facility]) 2012  
 with slight left  sided weakness residual  
 
 
Past Surgical History:  
Procedure Laterality Date  COLONOSCOPY N/A 9/26/2016 Pedro--one desc TA, one rectal hyperplastic--5 year recall  HX CAROTID ENDARTERECTOMY Right 11/07/2018  HX UROLOGICAL    
 stent to left kidney--multi times  [place] LEFT HEART CATH,PERCUTANEOUS  4/2012 Xience LAD Family History:  
Problem Relation Age of Onset  Hypertension Mother  Heart Disease Mother  Stroke Father Social History Socioeconomic History  Marital status:  Spouse name: Not on file  Number of children: Not on file  Years of education: Not on file  Highest education level: Not on file Social Needs  Financial resource strain: Not on file  Food insecurity - worry: Not on file  Food insecurity - inability: Not on file  Transportation needs - medical: Not on file  Transportation needs - non-medical: Not on file Occupational History  Not on file Tobacco Use  Smoking status: Current Every Day Smoker Packs/day: 1.00 Years: 50.00 Pack years: 50.00  Smokeless tobacco: Never Used Substance and Sexual Activity  Alcohol use: No  
 Drug use: No  
 Sexual activity: Not on file Other Topics Concern  Not on file Social History Narrative  Not on file ALLERGIES: Codeine Review of Systems Constitutional: Positive for fatigue. Negative for chills and diaphoresis. HENT: Negative for congestion. Eyes: Negative for redness. Respiratory: Negative for chest tightness and stridor. Cardiovascular: Negative for palpitations and leg swelling. Gastrointestinal: Negative for abdominal pain, anal bleeding, nausea and vomiting. Endocrine: Negative for polydipsia, polyphagia and polyuria. Genitourinary: Negative for bladder incontinence, decreased urine volume, flank pain, frequency and urgency. Musculoskeletal: Negative for back pain. Skin: Negative for pallor. Neurological: Positive for weakness. Negative for focal weakness, light-headedness and numbness. Hematological: Negative for adenopathy. Does not bruise/bleed easily. Psychiatric/Behavioral: Negative for agitation and confusion. All other systems reviewed and are negative. Vitals:  
 02/04/19 2140 BP: (!) 195/97 Pulse: 98 Resp: 18 Temp: 98.8 °F (37.1 °C) Weight: 62.1 kg (137 lb) Height: 5' 3\" (1.6 m) Physical Exam  
Constitutional: She is oriented to person, place, and time. She appears well-developed and well-nourished. HENT:  
Head: Normocephalic and atraumatic. Mouth/Throat: Oropharynx is clear and moist.  
Eyes: Conjunctivae and EOM are normal. Pupils are equal, round, and reactive to light. Neck: Normal range of motion. Neck supple. No thyromegaly present. Cardiovascular: Normal rate and regular rhythm. Pulmonary/Chest: Effort normal and breath sounds normal.  
Abdominal: Soft. Bowel sounds are normal. She exhibits no distension. There is no tenderness. Musculoskeletal: Normal range of motion. She exhibits no edema or deformity. Neurological: She is alert and oriented to person, place, and time. No cranial nerve deficit. Coordination normal.  
Nursing note and vitals reviewed. MDM Number of Diagnoses or Management Options Diagnosis management comments: Differential diagnosis in this patient is broad. We'll check labs, chest x-ray and EKG 
 
1:26 AM 
EKG is stable. Labs are stable. Troponin mildly elevated, will repeat and monitor blood pressure I believe troponin elevation is related to chronic kidney disease. 
 
2:01 AM 
Repeat troponin is equivocal, BP has improved Will d/c home with nausea meds, she has a follow-up appointment with nephrology soon. Amount and/or Complexity of Data Reviewed Clinical lab tests: ordered and reviewed Tests in the radiology section of CPT®: ordered and reviewed Risk of Complications, Morbidity, and/or Mortality Presenting problems: moderate Diagnostic procedures: low Management options: low Patient Progress Patient progress: improved Procedures Results Include: 
 
Recent Results (from the past 24 hour(s)) CBC WITH AUTOMATED DIFF Collection Time: 02/04/19 10:05 PM  
Result Value Ref Range WBC 4.4 4.3 - 11.1 K/uL  
 RBC 3.38 (L) 4.05 - 5.2 M/uL HGB 9.8 (L) 11.7 - 15.4 g/dL HCT 30.7 (L) 35.8 - 46.3 % MCV 90.8 79.6 - 97.8 FL  
 MCH 29.0 26.1 - 32.9 PG  
 MCHC 31.9 31.4 - 35.0 g/dL  
 RDW 14.6 11.9 - 14.6 % PLATELET 814 759 - 010 K/uL MPV 12.5 (H) 9.4 - 12.3 FL ABSOLUTE NRBC 0.00 0.0 - 0.2 K/uL  
 DF AUTOMATED NEUTROPHILS 42 (L) 43 - 78 % LYMPHOCYTES 48 (H) 13 - 44 % MONOCYTES 6 4.0 - 12.0 % EOSINOPHILS 3 0.5 - 7.8 % BASOPHILS 1 0.0 - 2.0 % IMMATURE GRANULOCYTES 0 0.0 - 5.0 %  
 ABS. NEUTROPHILS 1.9 1.7 - 8.2 K/UL  
 ABS. LYMPHOCYTES 2.1 0.5 - 4.6 K/UL  
 ABS. MONOCYTES 0.3 0.1 - 1.3 K/UL  
 ABS. EOSINOPHILS 0.1 0.0 - 0.8 K/UL  
 ABS. BASOPHILS 0.0 0.0 - 0.2 K/UL  
 ABS. IMM. GRANS. 0.0 0.0 - 0.5 K/UL METABOLIC PANEL, COMPREHENSIVE Collection Time: 02/04/19 10:05 PM  
Result Value Ref Range Sodium 139 136 - 145 mmol/L Potassium 5.0 3.5 - 5.1 mmol/L Chloride 112 (H) 98 - 107 mmol/L  
 CO2 19 (L) 21 - 32 mmol/L Anion gap 8 mmol/L Glucose 89 65 - 100 mg/dL BUN 38 (H) 8 - 23 MG/DL Creatinine 4.79 (H) 0.6 - 1.0 MG/DL  
 GFR est AA 12 (L) >60 ml/min/1.73m2 GFR est non-AA 10 ml/min/1.73m2 Calcium 8.5 8.3 - 10.4 MG/DL Bilirubin, total 0.2 0.2 - 1.1 MG/DL  
 ALT (SGPT) 8 (L) 12 - 65 U/L  
 AST (SGOT) 24 15 - 37 U/L Alk. phosphatase 71 50 - 136 U/L Protein, total 7.9 g/dL Albumin 3.5 3.2 - 4.6 g/dL Globulin 4.4 (H) 2.3 - 3.5 g/dL A-G Ratio 0.8 TROPONIN I Collection Time: 02/04/19 10:05 PM  
Result Value Ref Range Troponin-I, Qt. 0.13 (HH) 0.02 - 0.05 NG/ML  
BNP Collection Time: 02/04/19 10:05 PM  
Result Value Ref Range  pg/mL TROPONIN I  
 Collection Time: 02/05/19  1:15 AM  
Result Value Ref Range Troponin-I, Qt. 0.14 (HH) 0.02 - 0.05 NG/ML Voice dictation software was used during the making of this note. This software is not perfect and grammatical and other typographical errors may be present. This note has been proofread, but may still contain errors.  
Jessica Garcia MD; 2/5/2019 @2:01 AM  
===================================================================

## 2019-02-05 NOTE — ED NOTES
I have reviewed discharge instructions with the caregiver. The caregiver verbalized understanding. Patient left ED via Discharge Method: wheelchair to Home with (family). Opportunity for questions and clarification provided. Patient given 1 scripts. To continue your aftercare when you leave the hospital, you may receive an automated call from our care team to check in on how you are doing. This is a free service and part of our promise to provide the best care and service to meet your aftercare needs.  If you have questions, or wish to unsubscribe from this service please call [phone]. Thank you for Choosing our [facility] Emergency Department.

## 2019-02-05 NOTE — DISCHARGE INSTRUCTIONS
Take medications as prescribed  Follow-up with nephrology  Follow-up with your primary care physician  Return to the ER for any new or worsening symptoms    Fatigue: Care Instructions  Your Care Instructions    Fatigue is a feeling of tiredness, exhaustion, or lack of energy. You may feel fatigue because of too much or not enough activity. It can also come from stress, lack of sleep, boredom, and poor diet. Many medical problems, such as viral infections, can cause fatigue. Emotional problems, especially depression, are often the cause of fatigue. Fatigue is most often a symptom of another problem. Treatment for fatigue depends on the cause. For example, if you have fatigue because you have a certain health problem, treating this problem also treats your fatigue. If depression or anxiety is the cause, treatment may help. Follow-up care is a key part of your treatment and safety. Be sure to make and go to all appointments, and call your doctor if you are having problems. It's also a good idea to know your test results and keep a list of the medicines you take. How can you care for yourself at home? · Get regular exercise. But don't overdo it. Go back and forth between rest and exercise. · Get plenty of rest.  · Eat a healthy diet. Do not skip meals, especially breakfast.  · Reduce your use of caffeine, tobacco, and alcohol. Caffeine is most often found in coffee, tea, cola drinks, and chocolate. · Limit medicines that can cause fatigue. This includes tranquilizers and cold and allergy medicines. When should you call for help? Watch closely for changes in your health, and be sure to contact your doctor if:    · You have new symptoms such as fever or a rash.     · Your fatigue gets worse.     · You have been feeling down, depressed, or hopeless. Or you may have lost interest in things that you usually enjoy.     · You are not getting better as expected. Where can you learn more?   Go to http://easton.info/. Enter A555 in the search box to learn more about \"Fatigue: Care Instructions. \"  Current as of: September 23, 2018  Content Version: 11.9  © 1077-4569 [employer]. Care instructions adapted under license by [employer] (which disclaims liability or warranty for this information). If you have questions about a medical condition or this instruction, always ask your healthcare professional. [place] [address] any warranty or liability for your use of this information. Patient Education        Kidney Disease and High Blood Pressure: Care Instructions  Your Care Instructions    Long-term (chronic) kidney disease happens when the kidneys cannot remove waste and keep your body's fluids and chemicals in balance. Usually, the kidneys remove waste from the blood through the urine. When the kidneys are not working well, waste can build up so much that it poisons the body. Kidney disease can make you very tired. It also can cause swelling, or edema, in your legs or other areas of your body. High blood pressure is one of the major causes of chronic kidney disease. And kidney disease can also cause high blood pressure. No matter which came first, having high blood pressure damages the tiny blood vessels in the kidneys. If you have high blood pressure, it is important to lower it. There are many things you can do to lower your blood pressure, which may help slow or stop the damage to your kidneys. Follow-up care is a key part of your treatment and safety. Be sure to make and go to all appointments, and call your doctor if you are having problems. It's also a good idea to know your test results and keep a list of the medicines you take. How can you care for yourself at home? · Be safe with medicines. Take your medicines exactly as prescribed. Call your doctor if you have any problems with your medicine.  You will probably need more than one medicine to lower your blood pressure. You will get more details on the specific medicines your doctor prescribes. · Work with your doctor and a dietitian to plan meals that have the right amount of nutrients for you. You will probably have to limit salt, fluids, and protein. · Stay at a healthy weight. This is very important if you put on weight around the waist. Losing even 10 pounds can help you lower your blood pressure. · Manage other health problems such as diabetes and high cholesterol. You can help lower your risk for heart disease and blood vessel problems with a healthy lifestyle along with medicines. · Do not take ibuprofen (Advil, Motrin) or naproxen (Aleve), or similar medicines, unless your doctor tells you to. They may make chronic kidney disease worse. It is okay to take acetaminophen (Tylenol). · If your doctor recommends it, get more exercise. Walking is a good choice. Bit by bit, increase the amount you walk every day. Try for at least 30 minutes on most days of the week. You also may want to swim, bike, or do other activities. · Limit or avoid alcohol. Talk to your doctor about whether you can drink any alcohol. · Do not smoke or allow others to smoke around you. If you need help quitting, talk to your doctor about stop-smoking programs and medicines. These can increase your chances of quitting for good. When should you call for help? Call 911 anytime you think you may need emergency care.  For example, call if:    · You passed out (lost consciousness).    Call your doctor now or seek immediate medical care if:    · You have new or worse nausea and vomiting.     · You have much less urine than normal, or you have no urine.     · You are feeling confused or cannot think clearly.     · You have new or more blood in your urine.     · You have new swelling.     · You are dizzy or lightheaded, or you feel like you may faint.    Watch closely for changes in your health, and be sure to contact your doctor if:    · You do not get better as expected. Where can you learn more? Go to http://elfego-court.info/. Enter H704 in the search box to learn more about \"Kidney Disease and High Blood Pressure: Care Instructions. \"  Current as of: March 14, 2018  Content Version: 11.9  © 3297-6626 [employer]. Care instructions adapted under license by "[employer] (which disclaims liability or warranty for this information). If you have questions about a medical condition or this instruction, always ask your healthcare professional. [address] any warranty or liability for your use of this information.

## 2019-02-06 LAB
ATRIAL RATE: 95 BPM
CALCULATED P AXIS, ECG09: 68 DEGREES
CALCULATED R AXIS, ECG10: 55 DEGREES
CALCULATED T AXIS, ECG11: 130 DEGREES
DIAGNOSIS, 93000: NORMAL
P-R INTERVAL, ECG05: 168 MS
Q-T INTERVAL, ECG07: 348 MS
QRS DURATION, ECG06: 76 MS
QTC CALCULATION (BEZET), ECG08: 437 MS
VENTRICULAR RATE, ECG03: 95 BPM

## 2019-02-22 RX ORDER — CEFAZOLIN SODIUM/WATER 2 G/20 ML
2 SYRINGE (ML) INTRAVENOUS ONCE
Status: CANCELLED | OUTPATIENT
Start: 2019-02-22 | End: 2019-02-22

## 2019-02-22 RX ORDER — SODIUM CHLORIDE 0.9 % (FLUSH) 0.9 %
5-40 SYRINGE (ML) INJECTION EVERY 8 HOURS
Status: CANCELLED | OUTPATIENT
Start: 2019-02-22

## 2019-02-22 RX ORDER — SODIUM CHLORIDE 0.9 % (FLUSH) 0.9 %
5-40 SYRINGE (ML) INJECTION AS NEEDED
Status: CANCELLED | OUTPATIENT
Start: 2019-02-22

## 2019-02-25 ENCOUNTER — HOSPITAL ENCOUNTER (OUTPATIENT)
Dept: SURGERY | Age: 69
Discharge: HOME | End: 2019-02-25
Payer: MEDICARE

## 2019-02-25 VITALS
OXYGEN SATURATION: 98 % | DIASTOLIC BLOOD PRESSURE: 90 MMHG | RESPIRATION RATE: 18 BRPM | BODY MASS INDEX: 22.33 KG/M2 | WEIGHT: 121.31 LBS | HEART RATE: 77 BPM | HEIGHT: 62 IN | SYSTOLIC BLOOD PRESSURE: 203 MMHG | TEMPERATURE: 98.3 F

## 2019-02-25 LAB
ANION GAP SERPL CALC-SCNC: 7 MMOL/L (ref 7–16)
BASOPHILS # BLD: 0 K/UL (ref 0–0.2)
BASOPHILS NFR BLD: 1 % (ref 0–2)
BUN SERPL-MCNC: 43 MG/DL (ref 8–23)
CALCIUM SERPL-MCNC: 9.2 MG/DL (ref 8.3–10.4)
CHLORIDE SERPL-SCNC: 115 MMOL/L (ref 98–107)
CO2 SERPL-SCNC: 19 MMOL/L (ref 21–32)
CREAT SERPL-MCNC: 4.73 MG/DL (ref 0.6–1)
DIFFERENTIAL METHOD BLD: ABNORMAL
EOSINOPHIL # BLD: 0.2 K/UL (ref 0–0.8)
EOSINOPHIL NFR BLD: 4 % (ref 0.5–7.8)
ERYTHROCYTE [DISTWIDTH] IN BLOOD BY AUTOMATED COUNT: 14.5 % (ref 11.9–14.6)
GLUCOSE SERPL-MCNC: 82 MG/DL (ref 65–100)
HCT VFR BLD AUTO: 28.8 % (ref 35.8–46.3)
HGB BLD-MCNC: 9 G/DL (ref 11.7–15.4)
IMM GRANULOCYTES # BLD AUTO: 0 K/UL (ref 0–0.5)
IMM GRANULOCYTES NFR BLD AUTO: 0 % (ref 0–5)
LYMPHOCYTES # BLD: 1.6 K/UL (ref 0.5–4.6)
LYMPHOCYTES NFR BLD: 35 % (ref 13–44)
MCH RBC QN AUTO: 28.8 PG (ref 26.1–32.9)
MCHC RBC AUTO-ENTMCNC: 31.3 G/DL (ref 31.4–35)
MCV RBC AUTO: 92 FL (ref 79.6–97.8)
MONOCYTES # BLD: 0.4 K/UL (ref 0.1–1.3)
MONOCYTES NFR BLD: 8 % (ref 4–12)
NEUTS SEG # BLD: 2.4 K/UL (ref 1.7–8.2)
NEUTS SEG NFR BLD: 53 % (ref 43–78)
NRBC # BLD: 0 K/UL (ref 0–0.2)
PLATELET # BLD AUTO: 169 K/UL (ref 150–450)
PMV BLD AUTO: 12.7 FL (ref 9.4–12.3)
POTASSIUM SERPL-SCNC: 5.3 MMOL/L (ref 3.5–5.1)
RBC # BLD AUTO: 3.13 M/UL (ref 4.05–5.2)
SODIUM SERPL-SCNC: 141 MMOL/L (ref 136–145)
WBC # BLD AUTO: 4.5 K/UL (ref 4.3–11.1)

## 2019-02-25 PROCEDURE — 85025 COMPLETE CBC W/AUTO DIFF WBC: CPT

## 2019-02-25 PROCEDURE — 80048 BASIC METABOLIC PNL TOTAL CA: CPT

## 2019-02-25 NOTE — PERIOP NOTES
Recent Results (from the past 12 hour(s)) CBC WITH AUTOMATED DIFF Collection Time: 02/25/19 10:49 AM  
Result Value Ref Range WBC 4.5 4.3 - 11.1 K/uL  
 RBC 3.13 (L) 4.05 - 5.2 M/uL HGB 9.0 (L) 11.7 - 15.4 g/dL HCT 28.8 (L) 35.8 - 46.3 % MCV 92.0 79.6 - 97.8 FL  
 MCH 28.8 26.1 - 32.9 PG  
 MCHC 31.3 (L) 31.4 - 35.0 g/dL  
 RDW 14.5 11.9 - 14.6 % PLATELET 056 172 - 806 K/uL MPV 12.7 (H) 9.4 - 12.3 FL ABSOLUTE NRBC 0.00 0.0 - 0.2 K/uL  
 DF AUTOMATED NEUTROPHILS 53 43 - 78 % LYMPHOCYTES 35 13 - 44 % MONOCYTES 8 4.0 - 12.0 % EOSINOPHILS 4 0.5 - 7.8 % BASOPHILS 1 0.0 - 2.0 % IMMATURE GRANULOCYTES 0 0.0 - 5.0 %  
 ABS. NEUTROPHILS 2.4 1.7 - 8.2 K/UL  
 ABS. LYMPHOCYTES 1.6 0.5 - 4.6 K/UL  
 ABS. MONOCYTES 0.4 0.1 - 1.3 K/UL  
 ABS. EOSINOPHILS 0.2 0.0 - 0.8 K/UL  
 ABS. BASOPHILS 0.0 0.0 - 0.2 K/UL  
 ABS. IMM. GRANS. 0.0 0.0 - 0.5 K/UL METABOLIC PANEL, BASIC Collection Time: 02/25/19 10:49 AM  
Result Value Ref Range Sodium 141 136 - 145 mmol/L Potassium 5.3 (H) 3.5 - 5.1 mmol/L Chloride 115 (H) 98 - 107 mmol/L  
 CO2 19 (L) 21 - 32 mmol/L Anion gap 7 7 - 16 mmol/L Glucose 82 65 - 100 mg/dL BUN 43 (H) 8 - 23 MG/DL Creatinine 4.73 (H) 0.6 - 1.0 MG/DL  
 GFR est AA 12 (L) >60 ml/min/1.73m2 GFR est non-AA 10 (L) >60 ml/min/1.73m2 Calcium 9.2 8.3 - 10.4 MG/DL Reviewed. Radha at Dr Damon Carrillo office notified of K+ 5.3.

## 2019-02-25 NOTE — PERIOP NOTES
Patient verified name and . Patient provided medical/health information and PTA medications to the best of their ability. TYPE  CASE:1B Order for consent found in EHR and matches case posting. Labs per surgeon:cbc,bmp. Results: pending Labs per anesthesia protocol: K+ DOS. Results pending EKG  :  19 Patient provided with and instructed on education handouts including Guide to Surgery, blood transfusions, pain management, and hand hygiene for the family and community, and [facility] brochure. Hibiclens and instructions given per hospital policy. Instructed patient to continue previous medications as prescribed prior to surgery unless otherwise directed and to take the following medications the day of surgery according to anesthesia guidelines : none . Instructed patient to hold  the following medications: none. Original medication prescription bottles not visualized during patient appointment. Patient teach back successful and patient demonstrates knowledge of instruction.

## 2019-02-27 ENCOUNTER — ANESTHESIA EVENT (OUTPATIENT)
Dept: SURGERY | Age: 69
End: 2019-02-27
Payer: MEDICARE

## 2019-02-28 ENCOUNTER — ANESTHESIA (OUTPATIENT)
Dept: SURGERY | Age: 69
End: 2019-02-28
Payer: MEDICARE

## 2019-02-28 ENCOUNTER — HOSPITAL ENCOUNTER (OUTPATIENT)
Age: 69
Setting detail: OUTPATIENT SURGERY
Discharge: HOME | End: 2019-02-28
Attending: SURGERY | Admitting: SURGERY
Payer: MEDICARE

## 2019-02-28 VITALS
WEIGHT: 122.9 LBS | HEIGHT: 62 IN | DIASTOLIC BLOOD PRESSURE: 93 MMHG | OXYGEN SATURATION: 98 % | BODY MASS INDEX: 22.62 KG/M2 | TEMPERATURE: 98.3 F | SYSTOLIC BLOOD PRESSURE: 218 MMHG | HEART RATE: 64 BPM | RESPIRATION RATE: 12 BRPM

## 2019-02-28 LAB — POTASSIUM BLD-SCNC: 5.1 MMOL/L (ref 3.5–5.1)

## 2019-02-28 PROCEDURE — 84132 ASSAY OF SERUM POTASSIUM: CPT

## 2019-02-28 PROCEDURE — 76210000020 HC REC RM PH II FIRST 0.5 HR: Performed by: SURGERY

## 2019-02-28 PROCEDURE — 74011000250 HC RX REV CODE- 250

## 2019-02-28 PROCEDURE — 76010000161 HC OR TIME 1 TO 1.5 HR INTENSV-TIER 1: Performed by: SURGERY

## 2019-02-28 PROCEDURE — 77030032490 HC SLV COMPR SCD KNE COVD -B: Performed by: SURGERY

## 2019-02-28 PROCEDURE — 76210000016 HC OR PH I REC 1 TO 1.5 HR: Performed by: SURGERY

## 2019-02-28 PROCEDURE — 77030018673: Performed by: SURGERY

## 2019-02-28 PROCEDURE — 74011000250 HC RX REV CODE- 250: Performed by: ANESTHESIOLOGY

## 2019-02-28 PROCEDURE — 77030031139 HC SUT VCRL2 J&J -A: Performed by: SURGERY

## 2019-02-28 PROCEDURE — 77030039266 HC ADH SKN EXOFIN S2SG -A: Performed by: SURGERY

## 2019-02-28 PROCEDURE — 77030039521 HC STYLT FALLER TUNNEL COVD -C: Performed by: SURGERY

## 2019-02-28 PROCEDURE — 74011250636 HC RX REV CODE- 250/636

## 2019-02-28 PROCEDURE — 74011250636 HC RX REV CODE- 250/636: Performed by: SURGERY

## 2019-02-28 PROCEDURE — 77030037088 HC TUBE ENDOTRACH ORAL NSL COVD-A: Performed by: ANESTHESIOLOGY

## 2019-02-28 PROCEDURE — 74011250637 HC RX REV CODE- 250/637: Performed by: ANESTHESIOLOGY

## 2019-02-28 PROCEDURE — C1750 CATH, HEMODIALYSIS,LONG-TERM: HCPCS | Performed by: SURGERY

## 2019-02-28 PROCEDURE — 77030008522 HC TBNG INSUF LAPRO STRY -B: Performed by: SURGERY

## 2019-02-28 PROCEDURE — 76060000034 HC ANESTHESIA 1.5 TO 2 HR: Performed by: SURGERY

## 2019-02-28 PROCEDURE — 77030039425 HC BLD LARYNG TRULITE DISP TELE -A: Performed by: ANESTHESIOLOGY

## 2019-02-28 PROCEDURE — 74011250636 HC RX REV CODE- 250/636: Performed by: ANESTHESIOLOGY

## 2019-02-28 PROCEDURE — 77030016151 HC PROTCTR LNS DFOG COVD -B: Performed by: SURGERY

## 2019-02-28 PROCEDURE — 77030018719 HC DRSG PTCH ANTIMIC J&J -A: Performed by: SURGERY

## 2019-02-28 PROCEDURE — 77030019927 HC TBNG IRR CYSTO BAXT -A: Performed by: SURGERY

## 2019-02-28 PROCEDURE — 77030002996 HC SUT SLK J&J -A: Performed by: SURGERY

## 2019-02-28 PROCEDURE — 77030035048 HC TRCR ENDOSC OPTCL COVD -B: Performed by: SURGERY

## 2019-02-28 PROCEDURE — 77030020782 HC GWN BAIR PAWS FLX 3M -B: Performed by: ANESTHESIOLOGY

## 2019-02-28 PROCEDURE — 77030019908 HC STETH ESOPH SIMS -A: Performed by: ANESTHESIOLOGY

## 2019-02-28 RX ORDER — FAMOTIDINE 20 MG/1
20 TABLET, FILM COATED ORAL ONCE
Status: COMPLETED | OUTPATIENT
Start: 2019-02-28 | End: 2019-02-28

## 2019-02-28 RX ORDER — SODIUM CHLORIDE 0.9 % (FLUSH) 0.9 %
5-40 SYRINGE (ML) INJECTION AS NEEDED
Status: DISCONTINUED | OUTPATIENT
Start: 2019-02-28 | End: 2019-02-28 | Stop reason: HOSPADM

## 2019-02-28 RX ORDER — HEPARIN SODIUM 5000 [USP'U]/ML
INJECTION, SOLUTION INTRAVENOUS; SUBCUTANEOUS AS NEEDED
Status: DISCONTINUED | OUTPATIENT
Start: 2019-02-28 | End: 2019-02-28 | Stop reason: HOSPADM

## 2019-02-28 RX ORDER — SODIUM CHLORIDE, SODIUM LACTATE, POTASSIUM CHLORIDE, CALCIUM CHLORIDE 600; 310; 30; 20 MG/100ML; MG/100ML; MG/100ML; MG/100ML
75 INJECTION, SOLUTION INTRAVENOUS CONTINUOUS
Status: DISCONTINUED | OUTPATIENT
Start: 2019-02-28 | End: 2019-02-28 | Stop reason: HOSPADM

## 2019-02-28 RX ORDER — LABETALOL HYDROCHLORIDE 5 MG/ML
INJECTION, SOLUTION INTRAVENOUS AS NEEDED
Status: DISCONTINUED | OUTPATIENT
Start: 2019-02-28 | End: 2019-02-28 | Stop reason: HOSPADM

## 2019-02-28 RX ORDER — PROPOFOL 10 MG/ML
INJECTION, EMULSION INTRAVENOUS AS NEEDED
Status: DISCONTINUED | OUTPATIENT
Start: 2019-02-28 | End: 2019-02-28 | Stop reason: HOSPADM

## 2019-02-28 RX ORDER — LIDOCAINE HYDROCHLORIDE 10 MG/ML
0.1 INJECTION INFILTRATION; PERINEURAL AS NEEDED
Status: DISCONTINUED | OUTPATIENT
Start: 2019-02-28 | End: 2019-02-28 | Stop reason: HOSPADM

## 2019-02-28 RX ORDER — MIDAZOLAM HYDROCHLORIDE 1 MG/ML
2 INJECTION, SOLUTION INTRAMUSCULAR; INTRAVENOUS ONCE
Status: DISCONTINUED | OUTPATIENT
Start: 2019-02-28 | End: 2019-02-28 | Stop reason: HOSPADM

## 2019-02-28 RX ORDER — SODIUM CHLORIDE 0.9 % (FLUSH) 0.9 %
5-40 SYRINGE (ML) INJECTION EVERY 8 HOURS
Status: DISCONTINUED | OUTPATIENT
Start: 2019-02-28 | End: 2019-02-28 | Stop reason: HOSPADM

## 2019-02-28 RX ORDER — NEOSTIGMINE METHYLSULFATE 1 MG/ML
INJECTION INTRAVENOUS AS NEEDED
Status: DISCONTINUED | OUTPATIENT
Start: 2019-02-28 | End: 2019-02-28 | Stop reason: HOSPADM

## 2019-02-28 RX ORDER — FENTANYL CITRATE 50 UG/ML
INJECTION, SOLUTION INTRAMUSCULAR; INTRAVENOUS AS NEEDED
Status: DISCONTINUED | OUTPATIENT
Start: 2019-02-28 | End: 2019-02-28 | Stop reason: HOSPADM

## 2019-02-28 RX ORDER — CEFAZOLIN SODIUM/WATER 2 G/20 ML
2 SYRINGE (ML) INTRAVENOUS ONCE
Status: COMPLETED | OUTPATIENT
Start: 2019-02-28 | End: 2019-02-28

## 2019-02-28 RX ORDER — LIDOCAINE HYDROCHLORIDE 20 MG/ML
INJECTION, SOLUTION EPIDURAL; INFILTRATION; INTRACAUDAL; PERINEURAL AS NEEDED
Status: DISCONTINUED | OUTPATIENT
Start: 2019-02-28 | End: 2019-02-28 | Stop reason: HOSPADM

## 2019-02-28 RX ORDER — OXYCODONE HYDROCHLORIDE 5 MG/1
10 TABLET ORAL
Status: DISCONTINUED | OUTPATIENT
Start: 2019-02-28 | End: 2019-02-28 | Stop reason: HOSPADM

## 2019-02-28 RX ORDER — ROCURONIUM BROMIDE 10 MG/ML
INJECTION, SOLUTION INTRAVENOUS AS NEEDED
Status: DISCONTINUED | OUTPATIENT
Start: 2019-02-28 | End: 2019-02-28 | Stop reason: HOSPADM

## 2019-02-28 RX ORDER — SODIUM CHLORIDE 9 MG/ML
25 INJECTION, SOLUTION INTRAVENOUS CONTINUOUS
Status: DISCONTINUED | OUTPATIENT
Start: 2019-02-28 | End: 2019-02-28 | Stop reason: HOSPADM

## 2019-02-28 RX ORDER — GLYCOPYRROLATE 0.2 MG/ML
INJECTION INTRAMUSCULAR; INTRAVENOUS AS NEEDED
Status: DISCONTINUED | OUTPATIENT
Start: 2019-02-28 | End: 2019-02-28 | Stop reason: HOSPADM

## 2019-02-28 RX ORDER — EPHEDRINE SULFATE 50 MG/ML
INJECTION, SOLUTION INTRAVENOUS AS NEEDED
Status: DISCONTINUED | OUTPATIENT
Start: 2019-02-28 | End: 2019-02-28 | Stop reason: HOSPADM

## 2019-02-28 RX ORDER — OXYCODONE HYDROCHLORIDE 5 MG/1
5 TABLET ORAL
Status: DISCONTINUED | OUTPATIENT
Start: 2019-02-28 | End: 2019-02-28 | Stop reason: HOSPADM

## 2019-02-28 RX ORDER — ONDANSETRON 2 MG/ML
INJECTION INTRAMUSCULAR; INTRAVENOUS AS NEEDED
Status: DISCONTINUED | OUTPATIENT
Start: 2019-02-28 | End: 2019-02-28 | Stop reason: HOSPADM

## 2019-02-28 RX ORDER — METOPROLOL TARTRATE 5 MG/5ML
2 INJECTION INTRAVENOUS
Status: COMPLETED | OUTPATIENT
Start: 2019-02-28 | End: 2019-02-28

## 2019-02-28 RX ORDER — HYDROMORPHONE HYDROCHLORIDE 2 MG/ML
0.5 INJECTION, SOLUTION INTRAMUSCULAR; INTRAVENOUS; SUBCUTANEOUS
Status: DISCONTINUED | OUTPATIENT
Start: 2019-02-28 | End: 2019-02-28 | Stop reason: HOSPADM

## 2019-02-28 RX ORDER — FENTANYL CITRATE 50 UG/ML
100 INJECTION, SOLUTION INTRAMUSCULAR; INTRAVENOUS ONCE
Status: DISCONTINUED | OUTPATIENT
Start: 2019-02-28 | End: 2019-02-28 | Stop reason: HOSPADM

## 2019-02-28 RX ADMIN — NEOSTIGMINE METHYLSULFATE 3 MG: 1 INJECTION INTRAVENOUS at 15:55

## 2019-02-28 RX ADMIN — SODIUM CHLORIDE, SODIUM LACTATE, POTASSIUM CHLORIDE, AND CALCIUM CHLORIDE 75 ML/HR: 600; 310; 30; 20 INJECTION, SOLUTION INTRAVENOUS at 14:03

## 2019-02-28 RX ADMIN — ROCURONIUM BROMIDE 30 MG: 10 INJECTION, SOLUTION INTRAVENOUS at 14:58

## 2019-02-28 RX ADMIN — EPHEDRINE SULFATE 5 MG: 50 INJECTION, SOLUTION INTRAVENOUS at 15:06

## 2019-02-28 RX ADMIN — EPHEDRINE SULFATE 5 MG: 50 INJECTION, SOLUTION INTRAVENOUS at 15:16

## 2019-02-28 RX ADMIN — LABETALOL HYDROCHLORIDE 5 MG: 5 INJECTION, SOLUTION INTRAVENOUS at 15:37

## 2019-02-28 RX ADMIN — FENTANYL CITRATE 100 MCG: 50 INJECTION, SOLUTION INTRAMUSCULAR; INTRAVENOUS at 14:57

## 2019-02-28 RX ADMIN — LABETALOL HYDROCHLORIDE 5 MG: 5 INJECTION, SOLUTION INTRAVENOUS at 16:03

## 2019-02-28 RX ADMIN — METOPROLOL TARTRATE 2 MG: 5 INJECTION, SOLUTION INTRAVENOUS at 16:38

## 2019-02-28 RX ADMIN — LABETALOL HYDROCHLORIDE 5 MG: 5 INJECTION, SOLUTION INTRAVENOUS at 15:57

## 2019-02-28 RX ADMIN — LIDOCAINE HYDROCHLORIDE 100 MG: 20 INJECTION, SOLUTION EPIDURAL; INFILTRATION; INTRACAUDAL; PERINEURAL at 14:58

## 2019-02-28 RX ADMIN — Medication 2 G: at 14:48

## 2019-02-28 RX ADMIN — ONDANSETRON 4 MG: 2 INJECTION INTRAMUSCULAR; INTRAVENOUS at 15:57

## 2019-02-28 RX ADMIN — FAMOTIDINE 20 MG: 20 TABLET ORAL at 13:46

## 2019-02-28 RX ADMIN — SODIUM CHLORIDE 25 ML/HR: 900 INJECTION, SOLUTION INTRAVENOUS at 14:10

## 2019-02-28 RX ADMIN — GLYCOPYRROLATE 0.4 MG: 0.2 INJECTION INTRAMUSCULAR; INTRAVENOUS at 15:55

## 2019-02-28 RX ADMIN — LABETALOL HYDROCHLORIDE 5 MG: 5 INJECTION, SOLUTION INTRAVENOUS at 16:05

## 2019-02-28 RX ADMIN — PROPOFOL 140 MG: 10 INJECTION, EMULSION INTRAVENOUS at 14:58

## 2019-02-28 RX ADMIN — PROPOFOL 30 MG: 10 INJECTION, EMULSION INTRAVENOUS at 15:32

## 2019-02-28 RX ADMIN — HYDROMORPHONE HYDROCHLORIDE 0.5 MG: 2 INJECTION, SOLUTION INTRAMUSCULAR; INTRAVENOUS; SUBCUTANEOUS at 17:07

## 2019-02-28 NOTE — ANESTHESIA POSTPROCEDURE EVALUATION
Procedure(s): PERITONEAL DIALYSIS CATHETER INSERTION LAPAROSCOPIC. Anesthesia Post Evaluation Multimodal analgesia: multimodal analgesia not used between 6 hours prior to anesthesia start to PACU discharge Patient location during evaluation: PACU Patient participation: complete - patient participated Level of consciousness: awake and alert Pain management: adequate Airway patency: patent Anesthetic complications: no 
Cardiovascular status: hemodynamically stable Respiratory status: acceptable Hydration status: acceptable Visit Vitals BP (!) 213/91 Pulse (!) 57 Temp 36.5 °C (97.7 °F) Resp 12 Ht 5' 2\" (1.575 m) Wt 55.7 kg (122 lb 14.4 oz) SpO2 100% BMI 22.48 kg/m²

## 2019-02-28 NOTE — ANESTHESIA PREPROCEDURE EVALUATION
Anesthetic History Review of Systems / Medical History Pulmonary Smoker Neuro/Psych CVA TIA Cardiovascular Hypertension Dysrhythmias CAD and cardiac stents Exercise tolerance: <4 METS 
  
GI/Hepatic/Renal 
  
GERD Renal disease Endo/Other Anemia Other Findings Comments: Uses cane Anesthetic History No history of anesthetic complications Review of Systems / Medical History Patient summary reviewed and pertinent labs reviewed Pulmonary Smoker Neuro/Psych  
 
 
CVA (left sided weakness; 11/17 and 2013) TIA Cardiovascular Hypertension: well controlled Past MI, cardiac stents (2012) and hyperlipidemia Exercise tolerance: <4 METS: Limited by weakness after CVA; uses cane Comments: EF 55% 11/17 ECHO 
  
GI/Hepatic/Renal 
  
 
 
Renal disease (awaiting appt for potential PD): CRI and ESRD Pertinent negatives: No GERD Endo/Other Anesthetic History No history of anesthetic complications Review of Systems / Medical History Patient summary reviewed and pertinent labs reviewed Pulmonary Smoker Neuro/Psych  
 
 
CVA (left sided weakness; 11/17 and 2013) TIA Cardiovascular Hypertension: well controlled Past MI, cardiac stents (2012) and hyperlipidemia Exercise tolerance: <4 METS: Limited by weakness after CVA; uses cane Comments: EF 55% 11/17 ECHO 
  
GI/Hepatic/Renal 
  
 
 
Renal disease (awaiting appt for potential PD): CRI and ESRD Pertinent negatives: No GERD Endo/Other Anemia Other Findings Comments: Hx hydronephrosis Physical Exam 
 
Airway Mallampati: II 
TM Distance: 4 - 6 cm Neck ROM: normal range of motion Mouth opening: Normal 
 
 Cardiovascular Rhythm: regular Rate: normal 
 
Murmur (diastolic murmur): Grade 4, Aortic area  Dental 
 
 Dentition: Full upper dentures and Edentulous Pulmonary Breath sounds clear to auscultation Abdominal 
GI exam deferred Other Findings Anesthetic Plan ASA: 4 Anesthesia type: general 
 
Monitoring Plan: Arterial line and BIS Induction: Intravenous Anesthetic plan and risks discussed with: Patient Prior CEA cancelled 2/2 Pt needing stress test. NST grossly wnl with EF 69%. Pt with Htn Anemia Other Findings Comments: Hx hydronephrosis Physical Exam 
 
Airway Mallampati: II 
TM Distance: 4 - 6 cm Neck ROM: normal range of motion Mouth opening: Normal 
 
 Cardiovascular Rhythm: regular Rate: normal 
 
Murmur (diastolic murmur): Grade 4, Aortic area Dental 
 
Dentition: Full upper dentures and Edentulous Pulmonary Breath sounds clear to auscultation Abdominal 
GI exam deferred Other Findings Anesthetic Plan ASA: 4 Anesthesia type: general 
 
Monitoring Plan: Arterial line and BIS Induction: Intravenous Anesthetic plan and risks discussed with: Patient Prior CEA cancelled 2/2 Pt needing stress test. NST grossly wnl with EF 69%. Pt with Htn Physical Exam 
 
Airway Mallampati: II 
TM Distance: > 6 cm Neck ROM: normal range of motion Mouth opening: Normal 
 
 Cardiovascular Rhythm: regular Rate: normal 
 
 
 
 Dental 
 
 
  
Pulmonary Breath sounds clear to auscultation Abdominal 
 
 
 
 Other Findings Anesthetic Plan ASA: 3 Anesthesia type: general 
 
 
 
 
 
Anesthetic plan and risks discussed with: Patient

## 2019-02-28 NOTE — BRIEF OP NOTE
BRIEF OPERATIVE NOTE Date of Procedure: 2/28/2019 Preoperative Diagnosis: Chronic kidney disease, stage V ([facility] [address] [N18.5] Postoperative Diagnosis: Chronic kidney disease, stage V ([facility] [address] [N18.5] Procedure(s): PERITONEAL DIALYSIS CATHETER INSERTION LAPAROSCOPIC Surgeon(s) and Role: Arthur Riley MD - Primary Surgical Assistant: Damian Segura Surgical Staff: 
Circ-1: Jaylon Mcintyre RN 
Circ-Relief: Evin Doe RN Physician Assistant: KAREN Macias Scrub Tech-1: Herbert Doe Event Time In Time Out Incision Start 1525 Incision Close Anesthesia: General  
Estimated Blood Loss: 10 cc Specimens: * No specimens in log * Findings: 
Complications: None Implants: * No implants in log *

## 2019-02-28 NOTE — PERIOP NOTES
Dr. Morales Board telephoned and notified of patient's elevated BP and elevated POC K+ 5.1. No new orders received at this time.

## 2019-02-28 NOTE — DISCHARGE INSTRUCTIONS
Patient Education        Peritoneal Dialysis Catheter Care: Care Instructions  Your Care Instructions    Dialysis does the work of your kidneys when you have kidney failure. It filters wastes and removes extra fluid. It also keeps the right balance of chemicals in your blood. Peritoneal dialysis uses the lining of your belly to filter your blood. Before you start dialysis, your doctor creates a dialysis access. This is the place where the dialysis solution can flow into and out of your body. To make the access, the doctor places a soft tube in your belly. This tube is called a catheter. When you do dialysis, the solution flows into your belly and stays there for several hours. Then you remove it through the catheter. It is important to take care of the catheter and the access area to prevent infection. Follow-up care is a key part of your treatment and safety. Be sure to make and go to all appointments, and call your doctor if you are having problems. It's also a good idea to know your test results and keep a list of the medicines you take. How can you care for yourself at home? Care of the catheter and access  · After the doctor creates your access, keep the bandage dry and clean. Change a dirty or bloody bandage. · Keep your access area clean and dry. Check it every day for signs of infection. · Always clean and dry your catheter and access area right away after you get wet. · Always wash your hands before you touch the catheter. · Fasten or tape the catheter to your body to keep it from catching on your clothes. · Never use scissors or other sharp objects around your catheter. · Do not use unapproved clamps on your catheter. · Store your dialysis supplies in a cool, dry place. Activity when you have an access  · Do not lift heavy objects. · Do not swim or take a bath unless your doctor tells you it is okay. When should you call for help?   Call your doctor now or seek immediate medical care if:    · You have signs of infection, such as:  ? Increased pain, swelling, warmth, or redness. ? Red streaks leading from the access area. ? Pus draining from the access area. ? A fever.     · You have belly pain.     · You have nausea or vomiting.     · The dialysis fluid looks cloudy or is a different color.     · Fluid does not flow through the catheter.    Watch closely for changes in your health, and be sure to contact your doctor if you have any problems. After general anesthesia or intravenous sedation, for 24 hours or while taking prescription Narcotics:  · Limit your activities  · Do not drive and operate hazardous machinery  · Do not make important personal or business decisions  · Do  not drink alcoholic beverages  · If you have not urinated within 8 hours after discharge, please contact your surgeon on call. *  Please give a list of your current medications to your Primary Care Provider. *  Please update this list whenever your medications are discontinued, doses are      changed, or new medications (including over-the-counter products) are added. *  Please carry medication information at all times in case of emergency situations. These are general instructions for a healthy lifestyle:  No smoking/ No tobacco products/ Avoid exposure to second hand smoke  Surgeon General's Warning:  Quitting smoking now greatly reduces serious risk to your health. Obesity, smoking, and sedentary lifestyle greatly increases your risk for illness  A healthy diet, regular physical exercise & weight monitoring are important for maintaining a healthy lifestyle    You may be retaining fluid if you have a history of heart failure or if you experience any of the following symptoms:  Weight gain of 3 pounds or more overnight or 5 pounds in a week, increased swelling in our hands or feet or shortness of breath while lying flat in bed.   Please call your doctor as soon as you notice any of these symptoms; do not wait until your next office visit. Recognize signs and symptoms of STROKE:  F-face looks uneven  A-arms unable to move or move unevenly  S-speech slurred or non-existent  T-time-call 911 as soon as signs and symptoms begin-DO NOT go       Back to bed or wait to see if you get better-TIME IS BRAIN.     1.  Follow-up with hemodialysis in 3-5 days  2 follow-up with me in 2 weeks, please call office for appointment

## 2019-03-01 NOTE — OP NOTES
[address] [place] [address]  OPERATIVE REPORT    Name:  Mary Baptiste  MR#:  [id number]  :  1950  ACCOUNT #:  [de-identified]  DATE OF SERVICE:  2019      PREOPERATIVE DIAGNOSIS:  End-stage renal disease. POSTOPERATIVE DIAGNOSIS:  End-stage renal disease. PROCEDURE PERFORMED:  Laparoscopically assisted peritoneal dialysis catheter placement. SURGEON:  Mireille Brooke MD    ASSISTANT:  Siva Moreira PA-C    ANESTHESIA:  General endotracheal.    COMPLICATIONS:  None. SPECIMENS REMOVED:  None. IMPLANTS:  _____. ESTIMATED BLOOD LOSS:  Minimal.    DRAINS:  None. DESCRIPTION OF THE PROCEDURE:  The patient was brought to the operating room and placed on the operating table in the supine position. Following adequate general endotracheal anesthesia and a time-out procedure, the anterior abdomen was draped and prepped in sterile fashion. A small 5-mm incision was made on the right side of the abdomen at the level of the umbilicus. The 5-mm Optiview port was then inserted and the peritoneal cavity was entered under direct vision. The abdomen was then insufflated with CO2 at 15 mmHg. Next, a small incision was made just to the left of the umbilicus and superior to it. The wound was deepened with the Bovie down to the level of the rectus fascia. A 2-0 Vicryl suture was placed as pursestring. A small incision was made in the rectus sheath in the center of the pursestring stitch. The introducer needle was then advanced inferiorly within the rectus sheath and then downward into the peritoneal cavity under direct vision using the laparoscope. Then a guidewire was advanced followed by the peel-away introducer sheath of the peritoneal dialysis catheter system. Next, the catheter was then inserted through the peel-way sheath under direct vision and placed down in the bottom of the pelvis. The distal cuff was then inserted into the rectus sheath and the pursestring stitch was secured.   The catheter was then tunneled in a retrograde fashion and brought out through the skin laterally. The port was then attached and 1 liter of sterile saline was then instilled into the abdominal cavity in about 2 minutes. The IV bag was then dropped to the floor and 750 mL of saline promptly was drained. The remaining 250 mL was left in the abdominal cavity. Next, the catheter was flushed with concentrated heparin solution and the cap was attached. The wounds were then closed in layers of Vicryl suture. Sterile dry dressing was applied. The patient was then awakened from anesthesia and transported to the recovery room in stable condition. The patient tolerated the procedure well. No complication. All needle and sponge counts were correct.       Zion Ingram MD      [id number]  D:  02/28/2019 16:10  T:  03/01/2019 2:24  JOB #:  [id number]  CC:

## 2019-05-07 ENCOUNTER — HOSPITAL ENCOUNTER (EMERGENCY)
Age: 69
Discharge: HOME | End: 2019-05-07
Attending: EMERGENCY MEDICINE
Payer: MEDICARE

## 2019-05-07 ENCOUNTER — APPOINTMENT (OUTPATIENT)
Dept: GENERAL RADIOLOGY | Age: 69
End: 2019-05-07
Attending: EMERGENCY MEDICINE
Payer: MEDICARE

## 2019-05-07 VITALS
DIASTOLIC BLOOD PRESSURE: 63 MMHG | OXYGEN SATURATION: 100 % | SYSTOLIC BLOOD PRESSURE: 122 MMHG | RESPIRATION RATE: 18 BRPM | WEIGHT: 120 LBS | TEMPERATURE: 98.3 F | BODY MASS INDEX: 22.08 KG/M2 | HEART RATE: 63 BPM | HEIGHT: 62 IN

## 2019-05-07 DIAGNOSIS — N18.9 CHRONIC RENAL FAILURE, UNSPECIFIED CKD STAGE: Primary | ICD-10-CM

## 2019-05-07 DIAGNOSIS — R53.83 FATIGUE, UNSPECIFIED TYPE: ICD-10-CM

## 2019-05-07 LAB
ALBUMIN SERPL-MCNC: 3.4 G/DL (ref 3.2–4.6)
ALBUMIN/GLOB SERPL: 0.6 {RATIO} (ref 1.2–3.5)
ALP SERPL-CCNC: 78 U/L (ref 50–136)
ALT SERPL-CCNC: 19 U/L (ref 12–65)
ANION GAP SERPL CALC-SCNC: 13 MMOL/L (ref 7–16)
AST SERPL-CCNC: 24 U/L (ref 15–37)
ATRIAL RATE: 86 BPM
BASOPHILS # BLD: 0.1 K/UL (ref 0–0.2)
BASOPHILS NFR BLD: 1 % (ref 0–2)
BILIRUB SERPL-MCNC: 0.4 MG/DL (ref 0.2–1.1)
BUN SERPL-MCNC: 67 MG/DL (ref 8–23)
CALCIUM SERPL-MCNC: 9.4 MG/DL (ref 8.3–10.4)
CALCULATED P AXIS, ECG09: 71 DEGREES
CALCULATED R AXIS, ECG10: 65 DEGREES
CALCULATED T AXIS, ECG11: -171 DEGREES
CHLORIDE SERPL-SCNC: 96 MMOL/L (ref 98–107)
CO2 SERPL-SCNC: 27 MMOL/L (ref 21–32)
CREAT SERPL-MCNC: 9.57 MG/DL (ref 0.6–1)
DIAGNOSIS, 93000: NORMAL
DIFFERENTIAL METHOD BLD: ABNORMAL
EOSINOPHIL # BLD: 0.2 K/UL (ref 0–0.8)
EOSINOPHIL NFR BLD: 4 % (ref 0.5–7.8)
ERYTHROCYTE [DISTWIDTH] IN BLOOD BY AUTOMATED COUNT: 14.8 % (ref 11.9–14.6)
GLOBULIN SER CALC-MCNC: 5.7 G/DL (ref 2.3–3.5)
GLUCOSE SERPL-MCNC: 69 MG/DL (ref 65–100)
HCT VFR BLD AUTO: 39.4 % (ref 35.8–46.3)
HGB BLD-MCNC: 12.8 G/DL (ref 11.7–15.4)
IMM GRANULOCYTES # BLD AUTO: 0 K/UL (ref 0–0.5)
IMM GRANULOCYTES NFR BLD AUTO: 0 % (ref 0–5)
LYMPHOCYTES # BLD: 2.1 K/UL (ref 0.5–4.6)
LYMPHOCYTES NFR BLD: 42 % (ref 13–44)
MCH RBC QN AUTO: 29.8 PG (ref 26.1–32.9)
MCHC RBC AUTO-ENTMCNC: 32.5 G/DL (ref 31.4–35)
MCV RBC AUTO: 91.8 FL (ref 79.6–97.8)
MONOCYTES # BLD: 0.3 K/UL (ref 0.1–1.3)
MONOCYTES NFR BLD: 7 % (ref 4–12)
NEUTS SEG # BLD: 2.4 K/UL (ref 1.7–8.2)
NEUTS SEG NFR BLD: 47 % (ref 43–78)
NRBC # BLD: 0 K/UL (ref 0–0.2)
P-R INTERVAL, ECG05: 158 MS
PLATELET # BLD AUTO: 134 K/UL (ref 150–450)
PMV BLD AUTO: 12.6 FL (ref 9.4–12.3)
POTASSIUM SERPL-SCNC: 3.2 MMOL/L (ref 3.5–5.1)
PROT SERPL-MCNC: 9.1 G/DL (ref 6.3–8.2)
Q-T INTERVAL, ECG07: 386 MS
QRS DURATION, ECG06: 88 MS
QTC CALCULATION (BEZET), ECG08: 461 MS
RBC # BLD AUTO: 4.29 M/UL (ref 4.05–5.2)
SODIUM SERPL-SCNC: 136 MMOL/L (ref 136–145)
TROPONIN I BLD-MCNC: 0.03 NG/ML (ref 0.02–0.05)
VENTRICULAR RATE, ECG03: 86 BPM
WBC # BLD AUTO: 5.1 K/UL (ref 4.3–11.1)

## 2019-05-07 PROCEDURE — 93005 ELECTROCARDIOGRAM TRACING: CPT | Performed by: EMERGENCY MEDICINE

## 2019-05-07 PROCEDURE — 85025 COMPLETE CBC W/AUTO DIFF WBC: CPT

## 2019-05-07 PROCEDURE — 99285 EMERGENCY DEPT VISIT HI MDM: CPT | Performed by: EMERGENCY MEDICINE

## 2019-05-07 PROCEDURE — 80053 COMPREHEN METABOLIC PANEL: CPT

## 2019-05-07 PROCEDURE — 71046 X-RAY EXAM CHEST 2 VIEWS: CPT

## 2019-05-07 PROCEDURE — 84484 ASSAY OF TROPONIN QUANT: CPT

## 2019-05-07 NOTE — ED PROVIDER NOTES
33-year-old -American female recently started on peritoneal dialysis approximately one month ago presents with 3 days of fatigue and weakness. Family reports that she did have one Day of nausea and vomiting last week but has not had any further vomiting, fever or diarrhea. She denies chest pain, shortness breath and abdominal pain. There've been no recent changes in her dialysis. The history is provided by the patient. Fatigue Pertinent negatives include no shortness of breath, no chest pain, no vomiting, no confusion and no nausea. Past Medical History:  
Diagnosis Date  Arrhythmia   
 murmur  Aspirin long-term use SINAN  
 continue  CAD (coronary artery disease) 2012 MI, 1 stent  Chronic kidney disease St 3  
 CKD (chronic kidney disease) stage 3, GFR 30-59 ml/min ([facility])  GERD (gastroesophageal reflux disease)   
 no meds  Heart murmur not followed per cardiology EF 65-70 %-- on echo 11/2017  History of MI (myocardial infarction) 2012  
 stents x 1---- per pt-- does not see a cardiologist  
 Hx of colonic polyps 2016  
 adenoma  Hydronephrosis  Hypertension   
 daily meds  Left-sided weakness 2012  
 left leg r/t cva  Loss of appetite   
 states within the last several months  Poor historian  Renal atrophy, right  Renal insufficiency ? --- pt unsure if sees nephrologist  
 Smoker 50 yr hx/ 1 pk per day  Stented coronary artery Xience SINAN  
 proximal LAD 2012-- pt states does not see a cardio  Stroke [facility]) 2012  
 with slight left  sided weakness residual  
 
 
Past Surgical History:  
Procedure Laterality Date  COLONOSCOPY N/A 9/26/2016 Pedro--one desc TA, one rectal hyperplastic--5 year recall  HX CAROTID ENDARTERECTOMY Right 11/07/2018  HX UROLOGICAL    
 stent to left kidney--multi times  RI LEFT HEART CATH,PERCUTANEOUS  4/2012 Xience LAD  VASCULAR SURGERY PROCEDURE UNLIST  02/28/2019 PD catheter placement Family History:  
Problem Relation Age of Onset  Hypertension Mother  Heart Disease Mother  Stroke Father Social History Socioeconomic History  Marital status:  Spouse name: Not on file  Number of children: Not on file  Years of education: Not on file  Highest education level: Not on file Occupational History  Not on file Social Needs  Financial resource strain: Not on file  Food insecurity:  
  Worry: Not on file Inability: Not on file  Transportation needs:  
  Medical: Not on file Non-medical: Not on file Tobacco Use  Smoking status: Current Every Day Smoker Packs/day: 1.00 Years: 50.00 Pack years: 50.00  Smokeless tobacco: Never Used Substance and Sexual Activity  Alcohol use: No  
 Drug use: No  
 Sexual activity: Not on file Lifestyle  Physical activity:  
  Days per week: Not on file Minutes per session: Not on file  Stress: Not on file Relationships  Social connections:  
  Talks on phone: Not on file Gets together: Not on file Attends Ep[employer] service: Not on file Active member of club or organization: Not on file Attends meetings of clubs or organizations: Not on file Relationship status: Not on file  Intimate partner violence:  
  Fear of current or ex partner: Not on file Emotionally abused: Not on file Physically abused: Not on file Forced sexual activity: Not on file Other Topics Concern  Not on file Social History Narrative  Not on file ALLERGIES: Codeine and Hydralazine Review of Systems Constitutional: Positive for fatigue. Negative for fever. HENT: Negative for congestion. Respiratory: Negative for cough and shortness of breath. Cardiovascular: Negative for chest pain. Gastrointestinal: Negative for abdominal pain, nausea and vomiting. Genitourinary: Negative for dysuria. Musculoskeletal: Negative for back pain. Skin: Negative for rash. Neurological: Positive for weakness. Psychiatric/Behavioral: Negative for confusion. Vitals:  
 05/07/19 1354 BP: 119/65 Pulse: (!) 59 Resp: 18 Temp: 97.3 °F (36.3 °C) SpO2: 97% Weight: 54.4 kg (120 lb) Height: 5' 2\" (1.575 m) Physical Exam  
Constitutional: She appears well-developed and well-nourished. No distress. HENT:  
Head: Normocephalic and atraumatic. Mouth/Throat: Oropharynx is clear and moist.  
Eyes: Pupils are equal, round, and reactive to light. Conjunctivae are normal.  
Neck: Normal range of motion. Neck supple. Cardiovascular: Normal rate and regular rhythm. Pulmonary/Chest: Effort normal and breath sounds normal.  
Abdominal: Soft. Tenderness near PD catheter but no peritoneal signs Musculoskeletal: Normal range of motion. Neurological: She is alert. Skin: Skin is warm and dry. Psychiatric: She has a normal mood and affect. Her behavior is normal.  
Nursing note and vitals reviewed. MDM Number of Diagnoses or Management Options Diagnosis management comments: Lab work reveals elevated creatinine 9.5 and BUN 67 which is a significant Rise from baseline of creatinine 4.4 BUN 40. Findings discussed with on-call nephrology who will adjust her peritoneal dialysis prescription. Patient is comfortable And has reassuring vital signs and unremarkable electrolytes. She appears safe for discharge home with further outpatient management. Amount and/or Complexity of Data Reviewed Clinical lab tests: ordered and reviewed Tests in the radiology section of CPT®: ordered and reviewed Risk of Complications, Morbidity, and/or Mortality Presenting problems: moderate Diagnostic procedures: moderate Management options: moderate Procedures

## 2019-05-07 NOTE — ED TRIAGE NOTES
Per ems report patient to ed from home due to generalized weakness and \"not feeling well\". EMS vitals bp 110/79, hr 93, temp 98.2 and bgl 156.

## 2019-05-07 NOTE — ED TRIAGE NOTES
Patient states that she has been feeling weak for the last 3 days. Had an episode of SOB at home today and children called 911. Patient reports decreased appetite. Patient on peritoneal dialysis.

## 2019-05-07 NOTE — ED NOTES
I have reviewed discharge instructions with the patient. The patient verbalized understanding. Patient left ED via Discharge Method: ambulatory to Home with family Opportunity for questions and clarification provided. Patient given 0 scripts. To continue your aftercare when you leave the hospital, you may receive an automated call from our care team to check in on how you are doing. This is a free service and part of our promise to provide the best care and service to meet your aftercare needs.  If you have questions, or wish to unsubscribe from this service please call [phone]. Thank you for Choosing our [facility] Department.

## 2019-05-07 NOTE — DISCHARGE INSTRUCTIONS
Patient Education        Learning About Peritoneal Dialysis  What is peritoneal dialysis? Dialysis does the work of your kidneys when you have kidney failure. It filters wastes and removes extra fluid. And it restores the right balance of chemicals in the blood. Peritoneal dialysis (say \"iwbq-ik-nck-NEE-hugh vn-QU-sn-soumya\") uses the lining of your belly to filter your blood. This lining is called the peritoneal membrane. You do not need to go to a dialysis center for peritoneal dialysis. Instead, you will do your own treatments at home or in any clean place. You may be able to do it when you sleep. You can do peritoneal dialysis yourself or have a machine help you. How do you prepare? Before you can start dialysis, a doctor has to make a dialysis access in your belly. This is the place where the fluid (dialysis solution) flows into and out of your body. Your doctor will place a soft tube, or catheter, in your belly. This is most often done 10 to 14 days before dialysis starts. You will be trained on how to do the treatment. What happens in peritoneal dialysis? The process of doing peritoneal dialysis is called an exchange. Each exchange has three steps: fill, dwell, and drain. · Fill: Dialysis fluid enters your belly through the catheter. The fluid is a mix of sugar, water, and electrolytes. Electrolytes are minerals such as sodium, potassium, and calcium. · Dwell: While the fluid is in your belly, extra fluid and waste travel into the dialysis fluid. · Drain: The fluid is drained and replaced with new fluid. How do you do peritoneal dialysis? How the exchange is done, how often you do it, and how long it takes depend on the type of peritoneal dialysis you use. Continuous ambulatory peritoneal dialysis (CAPD)  · You attach the fluid to the catheter and let it flow into your belly. The fluid stays in your belly for about 4 to 6 hours.  During this time you can move around and do some of your normal activities. · After this time, you drain the fluid out of your belly. You then put fresh fluid in your belly. · You need to do this about 4 times a day. · It takes about 30 to 40 minutes to drain and refill your belly. Continuous cycling peritoneal dialysis (CCPD)  · A machine fills and drains the fluid from your belly. · This process takes about 10 to 12 hours. This means you can do CCPD at night while you sleep. Follow-up care is a key part of your treatment and safety. Be sure to make and go to all appointments, and call your doctor if you are having problems. It's also a good idea to know your test results and keep a list of the medicines you take. Where can you learn more? Go to http://elfego-court.info/. Enter J372 in the search box to learn more about \"Learning About Peritoneal Dialysis. \"  Current as of: March 14, 2018  Content Version: 11.9  © 0743-7377 [employer]. Care instructions adapted under license by [employer] (which disclaims liability or warranty for this information). If you have questions about a medical condition or this instruction, always ask your healthcare professional. [address] any warranty or liability for your use of this information.

## 2019-06-04 ENCOUNTER — APPOINTMENT (OUTPATIENT)
Dept: GENERAL RADIOLOGY | Age: 69
End: 2019-06-04
Attending: EMERGENCY MEDICINE
Payer: MEDICARE

## 2019-06-04 ENCOUNTER — HOSPITAL ENCOUNTER (OUTPATIENT)
Age: 69
Setting detail: OBSERVATION
Discharge: HOME | End: 2019-06-07
Attending: EMERGENCY MEDICINE | Admitting: INTERNAL MEDICINE
Payer: MEDICARE

## 2019-06-04 DIAGNOSIS — R06.02 SOB (SHORTNESS OF BREATH): Primary | ICD-10-CM

## 2019-06-04 DIAGNOSIS — Z99.2 END STAGE RENAL DISEASE ON DIALYSIS (HCC): ICD-10-CM

## 2019-06-04 DIAGNOSIS — N18.6 END STAGE RENAL DISEASE ON DIALYSIS (HCC): ICD-10-CM

## 2019-06-04 LAB
ALBUMIN SERPL-MCNC: 2.7 G/DL (ref 3.2–4.6)
ALBUMIN/GLOB SERPL: 0.5 {RATIO} (ref 1.2–3.5)
ALP SERPL-CCNC: 61 U/L (ref 50–136)
ALT SERPL-CCNC: 45 U/L (ref 12–65)
AMMONIA PLAS-SCNC: 14 UMOL/L (ref 11–32)
ANION GAP SERPL CALC-SCNC: 20 MMOL/L (ref 7–16)
APTT PPP: 27.4 SEC (ref 24.7–39.8)
AST SERPL-CCNC: 35 U/L (ref 15–37)
ATRIAL RATE: 166 BPM
BASOPHILS # BLD: 0 K/UL (ref 0–0.2)
BASOPHILS NFR BLD: 0 % (ref 0–2)
BILIRUB SERPL-MCNC: 0.3 MG/DL (ref 0.2–1.1)
BUN SERPL-MCNC: 66 MG/DL (ref 8–23)
CALCIUM SERPL-MCNC: 8.6 MG/DL (ref 8.3–10.4)
CALCULATED P AXIS, ECG09: 49 DEGREES
CALCULATED R AXIS, ECG10: 62 DEGREES
CALCULATED T AXIS, ECG11: -130 DEGREES
CHLORIDE SERPL-SCNC: 93 MMOL/L (ref 98–107)
CK SERPL-CCNC: 174 U/L (ref 21–215)
CO2 SERPL-SCNC: 19 MMOL/L (ref 21–32)
CREAT SERPL-MCNC: 10.8 MG/DL (ref 0.6–1)
D DIMER PPP FEU-MCNC: 6.53 UG/ML(FEU)
DIAGNOSIS, 93000: NORMAL
DIFFERENTIAL METHOD BLD: ABNORMAL
EOSINOPHIL # BLD: 0.1 K/UL (ref 0–0.8)
EOSINOPHIL NFR BLD: 1 % (ref 0.5–7.8)
ERYTHROCYTE [DISTWIDTH] IN BLOOD BY AUTOMATED COUNT: 16 % (ref 11.9–14.6)
GLOBULIN SER CALC-MCNC: 5.8 G/DL (ref 2.3–3.5)
GLUCOSE SERPL-MCNC: 93 MG/DL (ref 65–100)
HCT VFR BLD AUTO: 33.9 % (ref 35.8–46.3)
HGB BLD-MCNC: 11.1 G/DL (ref 11.7–15.4)
IMM GRANULOCYTES # BLD AUTO: 0.1 K/UL (ref 0–0.5)
IMM GRANULOCYTES NFR BLD AUTO: 1 % (ref 0–5)
LACTATE BLD-SCNC: 4.02 MMOL/L (ref 0.5–1.9)
LACTATE BLD-SCNC: 4.51 MMOL/L (ref 0.5–1.9)
LYMPHOCYTES # BLD: 2.5 K/UL (ref 0.5–4.6)
LYMPHOCYTES NFR BLD: 28 % (ref 13–44)
MCH RBC QN AUTO: 30.2 PG (ref 26.1–32.9)
MCHC RBC AUTO-ENTMCNC: 32.7 G/DL (ref 31.4–35)
MCV RBC AUTO: 92.1 FL (ref 79.6–97.8)
MONOCYTES # BLD: 0.8 K/UL (ref 0.1–1.3)
MONOCYTES NFR BLD: 9 % (ref 4–12)
NEUTS SEG # BLD: 5.5 K/UL (ref 1.7–8.2)
NEUTS SEG NFR BLD: 61 % (ref 43–78)
NRBC # BLD: 0 K/UL (ref 0–0.2)
P-R INTERVAL, ECG05: 176 MS
PLATELET # BLD AUTO: 229 K/UL (ref 150–450)
PMV BLD AUTO: 12.4 FL (ref 9.4–12.3)
POTASSIUM SERPL-SCNC: 2.9 MMOL/L (ref 3.5–5.1)
PROCALCITONIN SERPL-MCNC: 4 NG/ML
PROT SERPL-MCNC: 8.5 G/DL (ref 6.3–8.2)
Q-T INTERVAL, ECG07: 318 MS
QRS DURATION, ECG06: 86 MS
QTC CALCULATION (BEZET), ECG08: 403 MS
RBC # BLD AUTO: 3.68 M/UL (ref 4.05–5.2)
SODIUM SERPL-SCNC: 132 MMOL/L (ref 136–145)
TROPONIN I BLD-MCNC: 0.09 NG/ML (ref 0.02–0.05)
VENTRICULAR RATE, ECG03: 97 BPM
WBC # BLD AUTO: 9 K/UL (ref 4.3–11.1)

## 2019-06-04 PROCEDURE — 93005 ELECTROCARDIOGRAM TRACING: CPT | Performed by: EMERGENCY MEDICINE

## 2019-06-04 PROCEDURE — 84484 ASSAY OF TROPONIN QUANT: CPT

## 2019-06-04 PROCEDURE — 74011000250 HC RX REV CODE- 250: Performed by: INTERNAL MEDICINE

## 2019-06-04 PROCEDURE — 74011250637 HC RX REV CODE- 250/637: Performed by: INTERNAL MEDICINE

## 2019-06-04 PROCEDURE — 74011000250 HC RX REV CODE- 250: Performed by: EMERGENCY MEDICINE

## 2019-06-04 PROCEDURE — 83605 ASSAY OF LACTIC ACID: CPT

## 2019-06-04 PROCEDURE — 85379 FIBRIN DEGRADATION QUANT: CPT

## 2019-06-04 PROCEDURE — 94760 N-INVAS EAR/PLS OXIMETRY 1: CPT

## 2019-06-04 PROCEDURE — 82140 ASSAY OF AMMONIA: CPT

## 2019-06-04 PROCEDURE — 96366 THER/PROPH/DIAG IV INF ADDON: CPT | Performed by: EMERGENCY MEDICINE

## 2019-06-04 PROCEDURE — 82550 ASSAY OF CK (CPK): CPT

## 2019-06-04 PROCEDURE — 94640 AIRWAY INHALATION TREATMENT: CPT

## 2019-06-04 PROCEDURE — 74011250637 HC RX REV CODE- 250/637: Performed by: EMERGENCY MEDICINE

## 2019-06-04 PROCEDURE — 80053 COMPREHEN METABOLIC PANEL: CPT

## 2019-06-04 PROCEDURE — 96365 THER/PROPH/DIAG IV INF INIT: CPT | Performed by: EMERGENCY MEDICINE

## 2019-06-04 PROCEDURE — 85025 COMPLETE CBC W/AUTO DIFF WBC: CPT

## 2019-06-04 PROCEDURE — 99218 HC RM OBSERVATION: CPT

## 2019-06-04 PROCEDURE — 74011250636 HC RX REV CODE- 250/636: Performed by: EMERGENCY MEDICINE

## 2019-06-04 PROCEDURE — 85730 THROMBOPLASTIN TIME PARTIAL: CPT

## 2019-06-04 PROCEDURE — 74011250636 HC RX REV CODE- 250/636: Performed by: INTERNAL MEDICINE

## 2019-06-04 PROCEDURE — 77010033678 HC OXYGEN DAILY

## 2019-06-04 PROCEDURE — 71045 X-RAY EXAM CHEST 1 VIEW: CPT

## 2019-06-04 PROCEDURE — 84145 PROCALCITONIN (PCT): CPT

## 2019-06-04 PROCEDURE — 99285 EMERGENCY DEPT VISIT HI MDM: CPT | Performed by: EMERGENCY MEDICINE

## 2019-06-04 RX ORDER — SODIUM CHLORIDE 0.9 % (FLUSH) 0.9 %
5-40 SYRINGE (ML) INJECTION AS NEEDED
Status: DISCONTINUED | OUTPATIENT
Start: 2019-06-04 | End: 2019-06-07 | Stop reason: HOSPADM

## 2019-06-04 RX ORDER — ENOXAPARIN SODIUM 100 MG/ML
1 INJECTION SUBCUTANEOUS
Status: DISCONTINUED | OUTPATIENT
Start: 2019-06-04 | End: 2019-06-04 | Stop reason: ALTCHOICE

## 2019-06-04 RX ORDER — IPRATROPIUM BROMIDE AND ALBUTEROL SULFATE 2.5; .5 MG/3ML; MG/3ML
3 SOLUTION RESPIRATORY (INHALATION)
Status: DISCONTINUED | OUTPATIENT
Start: 2019-06-04 | End: 2019-06-07 | Stop reason: HOSPADM

## 2019-06-04 RX ORDER — ALBUTEROL SULFATE 0.83 MG/ML
5 SOLUTION RESPIRATORY (INHALATION)
Status: COMPLETED | OUTPATIENT
Start: 2019-06-04 | End: 2019-06-04

## 2019-06-04 RX ORDER — METOPROLOL SUCCINATE 50 MG/1
50 TABLET, EXTENDED RELEASE ORAL DAILY
Status: DISCONTINUED | OUTPATIENT
Start: 2019-06-05 | End: 2019-06-07 | Stop reason: HOSPADM

## 2019-06-04 RX ORDER — ACETAMINOPHEN 325 MG/1
650 TABLET ORAL
Status: DISCONTINUED | OUTPATIENT
Start: 2019-06-04 | End: 2019-06-07 | Stop reason: HOSPADM

## 2019-06-04 RX ORDER — HEPARIN SODIUM 5000 [USP'U]/ML
80 INJECTION, SOLUTION INTRAVENOUS; SUBCUTANEOUS ONCE
Status: COMPLETED | OUTPATIENT
Start: 2019-06-04 | End: 2019-06-04

## 2019-06-04 RX ORDER — POTASSIUM CHLORIDE 20 MEQ/1
20 TABLET, EXTENDED RELEASE ORAL
Status: COMPLETED | OUTPATIENT
Start: 2019-06-04 | End: 2019-06-04

## 2019-06-04 RX ORDER — ASPIRIN 81 MG/1
81 TABLET ORAL
Status: DISCONTINUED | OUTPATIENT
Start: 2019-06-04 | End: 2019-06-07 | Stop reason: HOSPADM

## 2019-06-04 RX ORDER — HEPARIN SODIUM 5000 [USP'U]/100ML
18-36 INJECTION, SOLUTION INTRAVENOUS
Status: DISCONTINUED | OUTPATIENT
Start: 2019-06-04 | End: 2019-06-05

## 2019-06-04 RX ORDER — SODIUM BICARBONATE 650 MG/1
325 TABLET ORAL DAILY
Status: DISCONTINUED | OUTPATIENT
Start: 2019-06-05 | End: 2019-06-07 | Stop reason: HOSPADM

## 2019-06-04 RX ORDER — AMLODIPINE BESYLATE 10 MG/1
10 TABLET ORAL DAILY
Status: DISCONTINUED | OUTPATIENT
Start: 2019-06-05 | End: 2019-06-07 | Stop reason: HOSPADM

## 2019-06-04 RX ORDER — SODIUM CHLORIDE 0.9 % (FLUSH) 0.9 %
5-40 SYRINGE (ML) INJECTION EVERY 8 HOURS
Status: DISCONTINUED | OUTPATIENT
Start: 2019-06-04 | End: 2019-06-07 | Stop reason: HOSPADM

## 2019-06-04 RX ADMIN — SODIUM CHLORIDE 1000 ML: 900 INJECTION, SOLUTION INTRAVENOUS at 16:55

## 2019-06-04 RX ADMIN — ASPIRIN 81 MG: 81 TABLET, COATED ORAL at 22:48

## 2019-06-04 RX ADMIN — Medication 5 ML: at 23:01

## 2019-06-04 RX ADMIN — HEPARIN SODIUM 4350 UNITS: 5000 INJECTION INTRAVENOUS; SUBCUTANEOUS at 22:59

## 2019-06-04 RX ADMIN — POTASSIUM CHLORIDE 20 MEQ: 20 TABLET, EXTENDED RELEASE ORAL at 17:39

## 2019-06-04 RX ADMIN — IPRATROPIUM BROMIDE AND ALBUTEROL SULFATE 3 ML: .5; 3 SOLUTION RESPIRATORY (INHALATION) at 23:07

## 2019-06-04 RX ADMIN — HEPARIN SODIUM 18 UNITS/KG/HR: 5000 INJECTION, SOLUTION INTRAVENOUS at 21:58

## 2019-06-04 RX ADMIN — ALBUTEROL SULFATE 5 MG: 2.5 SOLUTION RESPIRATORY (INHALATION) at 16:04

## 2019-06-04 NOTE — ED NOTES
Pt resting comfortably in bed with family present. No needs stated at this time. Call bell given to pt.

## 2019-06-04 NOTE — ED PROVIDER NOTES
The patient is a 59-year-old female who is brought to the emergency department by EMS secondary to shortness of breath. They state that they gave her breathing treatment in route to the emergency department and she seemed to have increased lung sounds with that intervention but she continues to breathe about 40 times per minute. The patient denies any chest pains or shortness of breath. She is not on oxygen at home but does have a history of COPD and smokes a pack cigarettes per day. The patient denies any history of DVT or pulmonary embolism. She said that when they were moving her she started having left anterior knee pain. The patient does have a history of chronic kidney disease on peritoneal dialysis for the past 4 months. She denies any abdominal pain fevers or chills. Past Medical History:   Diagnosis Date    Arrhythmia     murmur    Aspirin long-term use SINAN    continue    CAD (coronary artery disease) 2012    MI, 1 stent    Chronic kidney disease     St 3    CKD (chronic kidney disease) stage 3, GFR 30-59 ml/min ([facility])           GERD (gastroesophageal reflux disease)     no meds     Heart murmur not followed per cardiology    EF 65-70 %-- on echo 11/2017    History of MI (myocardial infarction) 2012    stents x 1---- per pt-- does not see a cardiologist    Hx of colonic polyps 2016    adenoma    Hydronephrosis     Hypertension     daily meds    Left-sided weakness 2012    left leg r/t cva    Loss of appetite     states within the last several months    Poor historian     Renal atrophy, right     Renal insufficiency     ? --- pt unsure if sees nephrologist    Smoker     48 yr hx/ 1 pk per day    Stented coronary artery Xience SINAN    proximal LAD 2012-- pt states does not see a cardio    Stroke ([facility] [address] 2012    with slight left  sided weakness residual       Past Surgical History:   Procedure Laterality Date    COLONOSCOPY N/A 9/26/2016    Pedro--one desc TA, one rectal hyperplastic--5 year recall    HX CAROTID ENDARTERECTOMY Right 11/07/2018    HX UROLOGICAL      stent to left kidney--multi times    SD LEFT HEART CATH,PERCUTANEOUS  4/2012    Xience LAD    VASCULAR SURGERY PROCEDURE UNLIST  02/28/2019    PD catheter placement         Family History:   Problem Relation Age of Onset    Hypertension Mother     Heart Disease Mother     Stroke Father        Social History     Socioeconomic History    Marital status:      Spouse name: Not on file    Number of children: Not on file    Years of education: Not on file    Highest education level: Not on file   Occupational History    Not on file   Social Needs    Financial resource strain: Not on file    Food insecurity:     Worry: Not on file     Inability: Not on file    Transportation needs:     Medical: Not on file     Non-medical: Not on file   Tobacco Use    Smoking status: Current Every Day Smoker     Packs/day: 1.00     Years: 50.00     Pack years: 50.00    Smokeless tobacco: Never Used   Substance and Sexual Activity    Alcohol use: No    Drug use: No    Sexual activity: Not on file   Lifestyle    Physical activity:     Days per week: Not on file     Minutes per session: Not on file    Stress: Not on file   Relationships    Social connections:     Talks on phone: Not on file     Gets together: Not on file     Attends Roman Catholic service: Not on file     Active member of club or organization: Not on file     Attends meetings of clubs or organizations: Not on file     Relationship status: Not on file    Intimate partner violence:     Fear of current or ex partner: Not on file     Emotionally abused: Not on file     Physically abused: Not on file     Forced sexual activity: Not on file   Other Topics Concern    Not on file   Social History Narrative    Not on file         ALLERGIES: Codeine and Hydralazine    Review of Systems   All other systems reviewed and are negative.       Vitals:    06/04/19 1900 06/04/19 1901 06/04/19 1908 06/04/19 1909   BP:  121/60     Pulse: 94 93 92 93   Resp: 26 (!) 47 (!) 32 25   Temp:       SpO2: 100% 100% 100% 95%   Weight:       Height:                Physical Exam     GENERAL:The patient is thin, and well-hydrated. VITAL SIGNS: Heart rate, blood pressure, respiratory rate reviewed as recorded in  nurse's notes  EYES: Pupils reactive. Extraocular motion intact. No conjunctival redness or drainage. EARS: No external masses or lesions. NOSE: No nasal drainage or epistaxis. MOUTH/THROAT: Pharynx clear; airway patent. NECK: Supple, no meningeal signs. Trachea midline. No masses or thyromegaly. LUNGS: patient has wheezing throughout her lung fields but good air exchange on both sides. The patient is markedly to With accessory muscle use with respiration. CHEST: No deformity  CARDIOVASCULAR: tachycardia with no murmur gallops or rubs appreciated. ABDOMEN: Soft without tenderness. No palpable masses or organomegaly. No  peritoneal signs. No rigidity. Peritoneal dialysis shunt in left lower quadrant  EXTREMITIES: No clubbing or cyanosis. No joint swelling. Normal muscle tone. No  restricted range of motion appreciated. NEUROLOGIC: Sensation is grossly intact. Cranial nerve exam reveals face is  symmetrical, tongue is midline speech is clear. SKIN: No rash or petechiae. Good skin turgor palpated. PSYCHIATRIC: Alert and oriented. Appropriate behavior and judgment.       MDM  Number of Diagnoses or Management Options  Diagnosis management comments: acute exacerbation asthma, COPD, CHF,     Bronchitis, aspiration pneumonia, pneumonia,    PE, rib fracture, rib dysfunction, pleurisy, pneumothorax, aspiration of foreign body         Amount and/or Complexity of Data Reviewed  Clinical lab tests: ordered and reviewed  Tests in the radiology section of CPT®: ordered and reviewed  Tests in the medicine section of CPT®: reviewed and ordered      ED Course as of Jun 04 2023 Tue Jun 04, 2019 1756 I talked to the patient's family about the findings in the emergency department as well as the patient and she is agreeable with having the VQ scan done this evening. The patient is getting IV fluids and will have a repeat lactic acid and the two-hour hardik. [KH]   2021 I spoke with the hospitalist on call Dr. Susanna Serrano who will come and see the patient in the emergency department. He requested that I go ahead and order a dose of Lovenox for the patient to be given tonight as he does not believe that the VQ scan will be obtained until tomorrow.     [KH]      ED Course User Index  [KH] Bimal Vang, DO       Procedures

## 2019-06-04 NOTE — ED NOTES
Patient to ED via [facility]. Per EMS, patient found using accessory muscles and in obvious distress with RA O2 sat in 80s. EMS reports tight, wheezy LS. Per EMS, patient treated with albuterol x 1 with minimal improvement. Patient non-O2 dependent at home. Patient reports increasing SHOB x 1 week. Patient denies any significant cough, fevers or any other associated complaints. Patient does PD at home x 4 months- reports no difficulty over the past several days. Per EMS, during transport patient with increasing complaint of L knee pain. MD Angle Vazquez to bedside with verbal orders given.

## 2019-06-05 ENCOUNTER — HOSPITAL ENCOUNTER (OUTPATIENT)
Dept: NUCLEAR MEDICINE | Age: 69
Discharge: HOME | End: 2019-06-05
Attending: EMERGENCY MEDICINE
Payer: MEDICARE

## 2019-06-05 LAB
AMPHET UR QL SCN: NEGATIVE
ANION GAP SERPL CALC-SCNC: 17 MMOL/L (ref 7–16)
APPEARANCE UR: ABNORMAL
APTT PPP: 178.3 SEC (ref 24.7–39.8)
APTT PPP: 87.9 SEC (ref 24.7–39.8)
BACTERIA URNS QL MICRO: ABNORMAL /HPF
BARBITURATES UR QL SCN: NEGATIVE
BASOPHILS # BLD: 0 K/UL (ref 0–0.2)
BASOPHILS NFR BLD: 0 % (ref 0–2)
BENZODIAZ UR QL: NEGATIVE
BILIRUB UR QL: ABNORMAL
BUN SERPL-MCNC: 69 MG/DL (ref 8–23)
CALCIUM SERPL-MCNC: 8.2 MG/DL (ref 8.3–10.4)
CANNABINOIDS UR QL SCN: NEGATIVE
CASTS URNS QL MICRO: ABNORMAL /LPF
CHLORIDE SERPL-SCNC: 99 MMOL/L (ref 98–107)
CO2 SERPL-SCNC: 20 MMOL/L (ref 21–32)
COCAINE UR QL SCN: NEGATIVE
COLOR UR: ABNORMAL
CREAT SERPL-MCNC: 10.7 MG/DL (ref 0.6–1)
DIFFERENTIAL METHOD BLD: ABNORMAL
EOSINOPHIL # BLD: 0 K/UL (ref 0–0.8)
EOSINOPHIL NFR BLD: 0 % (ref 0.5–7.8)
EPI CELLS #/AREA URNS HPF: ABNORMAL /HPF
ERYTHROCYTE [DISTWIDTH] IN BLOOD BY AUTOMATED COUNT: 15.9 % (ref 11.9–14.6)
GLUCOSE SERPL-MCNC: 117 MG/DL (ref 65–100)
GLUCOSE UR STRIP.AUTO-MCNC: NEGATIVE MG/DL
HCT VFR BLD AUTO: 29.3 % (ref 35.8–46.3)
HGB BLD-MCNC: 9.7 G/DL (ref 11.7–15.4)
HGB UR QL STRIP: ABNORMAL
IMM GRANULOCYTES # BLD AUTO: 0 K/UL (ref 0–0.5)
IMM GRANULOCYTES NFR BLD AUTO: 1 % (ref 0–5)
KETONES UR QL STRIP.AUTO: NEGATIVE MG/DL
LACTATE BLD-SCNC: 2.77 MMOL/L (ref 0.5–1.9)
LACTATE SERPL-SCNC: 1.9 MMOL/L (ref 0.4–2)
LEUKOCYTE ESTERASE UR QL STRIP.AUTO: ABNORMAL
LYMPHOCYTES # BLD: 1 K/UL (ref 0.5–4.6)
LYMPHOCYTES NFR BLD: 11 % (ref 13–44)
MCH RBC QN AUTO: 30.4 PG (ref 26.1–32.9)
MCHC RBC AUTO-ENTMCNC: 33.1 G/DL (ref 31.4–35)
MCV RBC AUTO: 91.8 FL (ref 79.6–97.8)
METHADONE UR QL: NEGATIVE
MONOCYTES # BLD: 0.2 K/UL (ref 0.1–1.3)
MONOCYTES NFR BLD: 2 % (ref 4–12)
NEUTS SEG # BLD: 7.4 K/UL (ref 1.7–8.2)
NEUTS SEG NFR BLD: 86 % (ref 43–78)
NITRITE UR QL STRIP.AUTO: NEGATIVE
NRBC # BLD: 0 K/UL (ref 0–0.2)
OPIATES UR QL: NEGATIVE
PCP UR QL: NEGATIVE
PH UR STRIP: 5 [PH] (ref 5–9)
PLATELET # BLD AUTO: 192 K/UL (ref 150–450)
PMV BLD AUTO: 12.1 FL (ref 9.4–12.3)
POTASSIUM SERPL-SCNC: 3 MMOL/L (ref 3.5–5.1)
PROT UR STRIP-MCNC: 100 MG/DL
RBC # BLD AUTO: 3.19 M/UL (ref 4.05–5.2)
RBC #/AREA URNS HPF: ABNORMAL /HPF
SODIUM SERPL-SCNC: 136 MMOL/L (ref 136–145)
SP GR UR REFRACTOMETRY: 1.02 (ref 1–1.02)
TROPONIN I SERPL-MCNC: 0.36 NG/ML (ref 0.02–0.05)
TROPONIN I SERPL-MCNC: 0.37 NG/ML (ref 0.02–0.05)
TROPONIN I SERPL-MCNC: 0.38 NG/ML (ref 0.02–0.05)
TROPONIN I SERPL-MCNC: 0.43 NG/ML (ref 0.02–0.05)
UROBILINOGEN UR QL STRIP.AUTO: 0.2 EU/DL (ref 0.2–1)
WBC # BLD AUTO: 8.6 K/UL (ref 4.3–11.1)
WBC URNS QL MICRO: >100 /HPF

## 2019-06-05 PROCEDURE — 94760 N-INVAS EAR/PLS OXIMETRY 1: CPT

## 2019-06-05 PROCEDURE — 96367 TX/PROPH/DG ADDL SEQ IV INF: CPT

## 2019-06-05 PROCEDURE — 99218 HC RM OBSERVATION: CPT

## 2019-06-05 PROCEDURE — 85730 THROMBOPLASTIN TIME PARTIAL: CPT

## 2019-06-05 PROCEDURE — 80048 BASIC METABOLIC PNL TOTAL CA: CPT

## 2019-06-05 PROCEDURE — 80307 DRUG TEST PRSMV CHEM ANLYZR: CPT

## 2019-06-05 PROCEDURE — 74011000250 HC RX REV CODE- 250: Performed by: INTERNAL MEDICINE

## 2019-06-05 PROCEDURE — 74011250637 HC RX REV CODE- 250/637: Performed by: INTERNAL MEDICINE

## 2019-06-05 PROCEDURE — 97165 OT EVAL LOW COMPLEX 30 MIN: CPT

## 2019-06-05 PROCEDURE — 87086 URINE CULTURE/COLONY COUNT: CPT

## 2019-06-05 PROCEDURE — 90945 DIALYSIS ONE EVALUATION: CPT

## 2019-06-05 PROCEDURE — 96366 THER/PROPH/DIAG IV INF ADDON: CPT | Performed by: EMERGENCY MEDICINE

## 2019-06-05 PROCEDURE — 78582 LUNG VENTILAT&PERFUS IMAGING: CPT

## 2019-06-05 PROCEDURE — 84484 ASSAY OF TROPONIN QUANT: CPT

## 2019-06-05 PROCEDURE — 36415 COLL VENOUS BLD VENIPUNCTURE: CPT

## 2019-06-05 PROCEDURE — 74011250636 HC RX REV CODE- 250/636: Performed by: NURSE PRACTITIONER

## 2019-06-05 PROCEDURE — 97530 THERAPEUTIC ACTIVITIES: CPT

## 2019-06-05 PROCEDURE — 83605 ASSAY OF LACTIC ACID: CPT

## 2019-06-05 PROCEDURE — 94761 N-INVAS EAR/PLS OXIMETRY MLT: CPT

## 2019-06-05 PROCEDURE — 81001 URINALYSIS AUTO W/SCOPE: CPT

## 2019-06-05 PROCEDURE — 74011000258 HC RX REV CODE- 258: Performed by: NURSE PRACTITIONER

## 2019-06-05 PROCEDURE — 93306 TTE W/DOPPLER COMPLETE: CPT

## 2019-06-05 PROCEDURE — 94640 AIRWAY INHALATION TREATMENT: CPT

## 2019-06-05 PROCEDURE — 85025 COMPLETE CBC W/AUTO DIFF WBC: CPT

## 2019-06-05 PROCEDURE — 74011250636 HC RX REV CODE- 250/636: Performed by: INTERNAL MEDICINE

## 2019-06-05 PROCEDURE — 96372 THER/PROPH/DIAG INJ SC/IM: CPT

## 2019-06-05 RX ORDER — ATORVASTATIN CALCIUM 80 MG/1
80 TABLET, FILM COATED ORAL
Status: ON HOLD | COMMUNITY
End: 2020-11-06

## 2019-06-05 RX ORDER — HYDRALAZINE HYDROCHLORIDE 10 MG/1
10 TABLET, FILM COATED ORAL 4 TIMES DAILY
COMMUNITY
End: 2019-06-27 | Stop reason: CLARIF

## 2019-06-05 RX ORDER — ASPIRIN 325 MG
325 TABLET ORAL ONCE
Status: COMPLETED | OUTPATIENT
Start: 2019-06-05 | End: 2019-06-05

## 2019-06-05 RX ORDER — HYDRALAZINE HYDROCHLORIDE 10 MG/1
10 TABLET, FILM COATED ORAL 4 TIMES DAILY
Status: DISCONTINUED | OUTPATIENT
Start: 2019-06-05 | End: 2019-06-05

## 2019-06-05 RX ORDER — POTASSIUM CHLORIDE 20 MEQ/1
20 TABLET, EXTENDED RELEASE ORAL 2 TIMES DAILY
Status: DISCONTINUED | OUTPATIENT
Start: 2019-06-05 | End: 2019-06-06

## 2019-06-05 RX ORDER — HEPARIN SODIUM 5000 [USP'U]/ML
5000 INJECTION, SOLUTION INTRAVENOUS; SUBCUTANEOUS EVERY 8 HOURS
Status: DISCONTINUED | OUTPATIENT
Start: 2019-06-05 | End: 2019-06-07 | Stop reason: HOSPADM

## 2019-06-05 RX ADMIN — HEPARIN SODIUM 5000 UNITS: 5000 INJECTION INTRAVENOUS; SUBCUTANEOUS at 17:07

## 2019-06-05 RX ADMIN — HEPARIN SODIUM 5000 UNITS: 5000 INJECTION INTRAVENOUS; SUBCUTANEOUS at 23:16

## 2019-06-05 RX ADMIN — IPRATROPIUM BROMIDE AND ALBUTEROL SULFATE 3 ML: .5; 3 SOLUTION RESPIRATORY (INHALATION) at 19:15

## 2019-06-05 RX ADMIN — ASPIRIN 325 MG ORAL TABLET 325 MG: 325 PILL ORAL at 00:49

## 2019-06-05 RX ADMIN — Medication 10 ML: at 21:34

## 2019-06-05 RX ADMIN — SODIUM BICARBONATE 650 MG TABLET 325 MG: at 10:17

## 2019-06-05 RX ADMIN — CEFTRIAXONE SODIUM 1 G: 1 INJECTION, POWDER, FOR SOLUTION INTRAMUSCULAR; INTRAVENOUS at 13:12

## 2019-06-05 RX ADMIN — Medication 10 ML: at 13:17

## 2019-06-05 RX ADMIN — HEPARIN SODIUM 15 UNITS/KG/HR: 5000 INJECTION, SOLUTION INTRAVENOUS at 06:40

## 2019-06-05 RX ADMIN — AMLODIPINE BESYLATE 10 MG: 10 TABLET ORAL at 08:22

## 2019-06-05 RX ADMIN — METOPROLOL SUCCINATE 50 MG: 50 TABLET, EXTENDED RELEASE ORAL at 08:22

## 2019-06-05 RX ADMIN — IPRATROPIUM BROMIDE AND ALBUTEROL SULFATE 3 ML: .5; 3 SOLUTION RESPIRATORY (INHALATION) at 07:13

## 2019-06-05 RX ADMIN — ASPIRIN 81 MG: 81 TABLET, COATED ORAL at 21:34

## 2019-06-05 RX ADMIN — POTASSIUM CHLORIDE 20 MEQ: 20 TABLET, EXTENDED RELEASE ORAL at 17:07

## 2019-06-05 RX ADMIN — POTASSIUM CHLORIDE 20 MEQ: 20 TABLET, EXTENDED RELEASE ORAL at 08:22

## 2019-06-05 RX ADMIN — Medication 5 ML: at 06:00

## 2019-06-05 RX ADMIN — IPRATROPIUM BROMIDE AND ALBUTEROL SULFATE 3 ML: .5; 3 SOLUTION RESPIRATORY (INHALATION) at 13:55

## 2019-06-05 NOTE — ED NOTES
Jessica Bolivar NP, aware pt remains NPO. States she will figure out whether pt can eat or not and put the appropriate orders in.

## 2019-06-05 NOTE — PROGRESS NOTES
Hourly rounds completed this shift. All needs met. Bed low/locked. No c/o pain. Pt 100% on RA. Son at bedside. Call light in reach. Will continue to monitor pt and give report to oncoming RN.      Peripheral IV 06/04/19 Right Wrist (Active)   Site Assessment Clean, dry, & intact 6/5/2019 12:58 PM   Phlebitis Assessment 0 6/5/2019 12:58 PM   Infiltration Assessment 0 6/5/2019 12:58 PM   Dressing Status Clean, dry, & intact 6/5/2019 12:58 PM   Dressing Type Transparent;Tape 6/5/2019 12:58 PM   Hub Color/Line Status Green;Flushed;Patent 6/5/2019 12:58 PM       Peripheral IV 06/04/19 Left Hand (Active)   Site Assessment Clean, dry, & intact 6/5/2019 12:58 PM   Phlebitis Assessment 0 6/5/2019 12:58 PM   Infiltration Assessment 0 6/5/2019 12:58 PM   Dressing Status Clean, dry, & intact 6/5/2019 12:58 PM   Dressing Type Transparent;Tape 6/5/2019 12:58 PM   Hub Color/Line Status Pink;Flushed;Patent 6/5/2019 12:58 PM

## 2019-06-05 NOTE — CONSULTS
[facility] Cardiology Consult                Date of  Admission: 6/4/2019  3:54 PM     Primary Care Physician: Maryam Tamayo MD  Primary Cardiologist: Dr Raya Sandra  Referring Physician: Hospital Medicine  Consulting Physician: Dr Valery Saunders    CC/Reason for consult:  Elevated Trop      Olga Simeon is a 68 y.o. female with hx of ESRD s/p PD, HTN, CVN, GERD, HLD and CAD. The patient came to the ed with complaints of SIMMS and SOB at rest for 5 days. She has no hx of CHF or use of home o2. She also denies any history of PE, thrombetic events, CP, ha, loc, ams, abd pain, recent illness, or recent changes to medication. She stated her SOB did start to improve on the way to the hospital with nebulizer treatments by EMS. She is still a 1 PPD smoker at home. Initial lab work showed Elevated D Dimer of over 6 with mildly elevated Trop I and no significant EKG changes. Her VQ scan showed low probability for PE and she was also noted to have an elevated  Lactic Acid and low K. Recent Cardiac Synopsis w/ Labs  LHC/NST: 2012 1. Extremely tortuous anatomy in the proximal brachiocephalic artery. The procedure was performed without extensive manipulation, but wires were need to torque catheters without kinking them. This needs to be taken into consideration if the patient is to undergo further cardiac catheterizations. 2. Left ventriculogram not obtained due to elevated creatinine, but did  have markedly elevated left ventricular diastolic pressure. 3. Severe proximal left anterior descending stenosis status post Xience  drug-eluting stent implantation as described. 4. Moderate nonobstructive disease involving the left dominant circumflex  system and the rest of the LAD system. 5. Borderline significant lesion of the proximal nondominant right coronary artery which is best treated medically. 6. Renal arteries were not imaged due to extreme tortuosity and need to conserve dye.  Renal artery duplex should be obtained and a staged procedure could be considered if the patient were to need renal artery angioplasty in the future. Echo: 2017   -  Left ventricle: Systolic function was normal. Ejection fraction was estimated in the range of 65 % to 70 %. There were no regional wall motion abnormalities. There was moderate concentric hypertrophy. -  Left atrium: The atrium was mildly dilated. -  Atrial septum: No significant shunt with agitated saline.   EKG: NSR poor baseline no  Gross ST segment elevated noted      Lab Results   Component Value Date     06/05/2019    K 3.0 (L) 06/05/2019    MG 1.9 08/27/2018    BUN 69 (H) 06/05/2019    CREA 10.70 (H) 06/05/2019    WBC 8.6 06/05/2019    HGB 9.7 (L) 06/05/2019     06/05/2019    INR 1.1 11/09/2017    TROIQ 0.36 (HH) 06/05/2019    TROIQ 0.43 (HH) 06/05/2019    TROIQ 0.38 (HH) 06/04/2019          Patient Active Problem List   Diagnosis Code    Hypertensive emergency I16.1    Carotid artery bruit R09.89    CKD (chronic kidney disease) stage 3, GFR 30-59 ml/min ([facility]) N18.3    Tobacco use disorder F17.200    GERD (gastroesophageal reflux disease) K21.9    Chest pain, unspecified R07.9    Anemia D64.9    CVA (cerebral vascular accident) ([facility] [address] I63.9    Anemia of renal disease N18.9, D63.1    Benign essential HTN I10    Coronary artery disease involving native coronary artery with angina pectoris ([facility]) I25.119    History of CVA (cerebrovascular accident) Z86.73    Hydronephrosis N13.30    History of urethral stent [id number]    CKD stage 5 secondary to hypertension ([facility]) I12.0, N18.5    Major depressive disorder with single episode, in remission ([address] F32.5    UTI (urinary tract infection) N39.0    Stroke ([facility] [address] I63.9    Bilateral carotid artery stenosis I65.23    Pure hypercholesterolemia E78.00    Bilateral carotid artery disease (HCC) I77.9    Carotid stenosis, asymptomatic, right I65.21    Carotid stenosis, right I65.21    History of right-sided carotid endarterectomy Z98.890    Shortness of breath R06.02       Past Medical History:   Diagnosis Date    Arrhythmia     murmur    Aspirin long-term use SINAN    continue    CAD (coronary artery disease) 2012    MI, 1 stent    Chronic kidney disease     St 3    CKD (chronic kidney disease) stage 3, GFR 30-59 ml/min ([facility])           GERD (gastroesophageal reflux disease)     no meds     Heart murmur not followed per cardiology    EF 65-70 %-- on echo 11/2017    History of MI (myocardial infarction) 2012    stents x 1---- per pt-- does not see a cardiologist    Hx of colonic polyps 2016    adenoma    Hydronephrosis     Hypertension     daily meds    Left-sided weakness 2012    left leg r/t cva    Loss of appetite     states within the last several months    Poor historian     Renal atrophy, right     Renal insufficiency     ? --- pt unsure if sees nephrologist    Smoker     48 yr hx/ 1 pk per day    Stented coronary artery Xience SINAN    proximal LAD 2012-- pt states does not see a cardio    Stroke ([facility] [address] 2012    with slight left  sided weakness residual      Past Surgical History:   Procedure Laterality Date    COLONOSCOPY N/A 9/26/2016    Pedro--one desc TA, one rectal hyperplastic--5 year recall    HX CAROTID ENDARTERECTOMY Right 11/07/2018    HX UROLOGICAL      stent to left kidney--multi times    PA LEFT HEART CATH,PERCUTANEOUS  4/2012    Xience LAD    VASCULAR SURGERY PROCEDURE UNLIST  02/28/2019    PD catheter placement     Allergies   Allergen Reactions    Codeine Nausea and Vomiting    Hydralazine Nausea and Vomiting      Family History   Problem Relation Age of Onset    Hypertension Mother     Heart Disease Mother     Stroke Father       Social History     Socioeconomic History    Marital status:      Spouse name: Not on file    Number of children: Not on file    Years of education: Not on file    Highest education level: Not on file   Occupational History    Not on file   Social Needs    Financial resource strain: Not on file    Food insecurity:     Worry: Not on file     Inability: Not on file    Transportation needs:     Medical: Not on file     Non-medical: Not on file   Tobacco Use    Smoking status: Current Every Day Smoker     Packs/day: 1.00     Years: 50.00     Pack years: 50.00    Smokeless tobacco: Never Used   Substance and Sexual Activity    Alcohol use: No    Drug use: No    Sexual activity: Not on file   Lifestyle    Physical activity:     Days per week: Not on file     Minutes per session: Not on file    Stress: Not on file   Relationships    Social connections:     Talks on phone: Not on file     Gets together: Not on file     Attends Christianity service: Not on file     Active member of club or organization: Not on file     Attends meetings of clubs or organizations: Not on file     Relationship status: Not on file    Intimate partner violence:     Fear of current or ex partner: Not on file     Emotionally abused: Not on file     Physically abused: Not on file     Forced sexual activity: Not on file   Other Topics Concern    Not on file   Social History Narrative    Not on file       Current Facility-Administered Medications   Medication Dose Route Frequency    potassium chloride (K-DUR, KLOR-CON) SR tablet 20 mEq  20 mEq Oral BID    sodium chloride (NS) flush 5-40 mL  5-40 mL IntraVENous Q8H    sodium chloride (NS) flush 5-40 mL  5-40 mL IntraVENous PRN    acetaminophen (TYLENOL) tablet 650 mg  650 mg Oral Q4H PRN    heparin 25,000 units in dextrose 500 mL infusion  18-36 Units/kg/hr IntraVENous TITRATE    albuterol-ipratropium (DUO-NEB) 2.5 MG-0.5 MG/3 ML  3 mL Nebulization Q6H RT    amLODIPine (NORVASC) tablet 10 mg  10 mg Oral DAILY    aspirin delayed-release tablet 81 mg  81 mg Oral QHS    metoprolol succinate (TOPROL-XL) XL tablet 50 mg  50 mg Oral DAILY    sodium bicarbonate tablet 325 mg  325 mg Oral DAILY     Current Outpatient Medications   Medication Sig    ondansetron (ZOFRAN ODT) 8 mg disintegrating tablet Take 1 Tab by mouth every eight (8) hours as needed for Nausea.  sodium bicarbonate 650 mg tablet Take  by mouth daily.  aspirin delayed-release 81 mg tablet Take  by mouth nightly.  metoprolol succinate (TOPROL-XL) 50 mg XL tablet Take 1 Tab by mouth daily. (Patient taking differently: Take 50 mg by mouth nightly.)    amLODIPine (NORVASC) 10 mg tablet Take 1 Tab by mouth daily. (Patient taking differently: Take 10 mg by mouth nightly.)       Review of Systems   Constitution: Negative for weight gain and weight loss. HENT: Negative. Eyes: Negative. Cardiovascular: Positive for dyspnea on exertion. Negative for chest pain (currently pain free), claudication, cyanosis, irregular heartbeat, leg swelling, near-syncope, orthopnea, palpitations, paroxysmal nocturnal dyspnea and syncope. Respiratory: Positive for shortness of breath. Negative for cough and wheezing. Endocrine: Negative. Skin: Negative. Musculoskeletal: Negative. Gastrointestinal: Negative for nausea and vomiting. Genitourinary: Negative. Neurological: Negative for dizziness. Psychiatric/Behavioral: Negative. Allergic/Immunologic: Negative.          Physical Exam  Vitals:    06/04/19 2308 06/05/19 0444 06/05/19 0713 06/05/19 0959   BP:  168/70  143/67   Pulse:  76  68   Resp:  20     Temp:  98.2 °F (36.8 °C)     SpO2: 98% 93% 97% 98%   Weight:       Height:           Physical Exam:  General: Well Developed, Well Nourished, No Acute Distress  HEENT: pupils equal and round, no abnormalities noted  Neck: supple, no JVD, no carotid bruits  Heart: S1S2 with RRR without murmurs or gallops  Lungs: Clear throughout auscultation bilaterally without adventitious sounds  Abd: soft, nontender, nondistended, with good bowel sounds  Ext: warm, no edema, calves supple/nontender, pulses 2+ bilaterally  Skin: warm and dry  Psychiatric: Normal mood and affect  Neurologic: Alert and oriented X 3      Labs:   Recent Labs     06/05/19  0435 06/04/19  1607    132*   K 3.0* 2.9*   BUN 69* 66*   CREA 10.70* 10.80*   * 93   WBC 8.6 9.0   HGB 9.7* 11.1*   HCT 29.3* 33.9*    229       Echo Results  (Last 48 hours)    None        Nm Lung Perfusion W Vent    Result Date: 6/5/2019  Impression: Low probability of pulmonary embolism. Assessment/Plan:     Assessment:      Principal Problem:    Shortness of breath (6/4/2019) Per Primary VQ scan negative, does not appear to be volume overloaded at this time by exam, Echo pending per primary 11/2017 Normal EF no major valve problems    Active Problems:    CKD (chronic kidney disease) stage 3, GFR 30-59 ml/min ([facility] [address] (4/26/2012) Per Nephrology      Anemia of renal disease (1/7/2016) Per Primary Team      Benign essential HTN (1/7/2016) Pt initial BP controled on arrival with one HTN reading, continue to monitor    Hypokalemia: Per Primary team would have a goal above 3.5    Elevated Trop I: Likely Type II in nature with elevated Lactic, Elevated Cr, and Electrolyte abnormalities. Currently wouldd not pursue invasive workup    Aion Gap Acidosis: Per Primary If hydration is needed would do it genitally and find cause. She has mod LVH and some LV outflow gradient. Keep BP controlled and avoid dehydration      Thank you very much for this referral. We appreciate the opportunity to participate in this patient's care.  Call if needed  Dora Dowling NP  Consulting MD: Neymar Villatoro

## 2019-06-05 NOTE — PROGRESS NOTES
Problem: Falls - Risk of  Goal: *Absence of Falls  Description  Document Maricruz Trav Fall Risk and appropriate interventions in the flowsheet.   Outcome: Progressing Towards Goal     Problem: Patient Education: Go to Patient Education Activity  Goal: Patient/Family Education  Outcome: Progressing Towards Goal

## 2019-06-05 NOTE — ED NOTES
TRANSFER - OUT REPORT:    Verbal report given to Radha Castorena, RN on Derrick Barcenas  being transferred to 6th floor for routine progression of care       Report consisted of patients Situation, Background, Assessment and   Recommendations(SBAR). Information from the following report(s) SBAR, Kardex and MAR was reviewed with the receiving nurse. Lines:   Peripheral IV 06/04/19 Right Wrist (Active)   Site Assessment Clean, dry, & intact 6/4/2019  3:57 PM   Phlebitis Assessment 0 6/4/2019  3:57 PM   Infiltration Assessment 0 6/4/2019  3:57 PM   Dressing Status Clean, dry, & intact 6/4/2019  3:57 PM   Hub Color/Line Status Green 6/4/2019  3:57 PM       Peripheral IV 06/04/19 Left Hand (Active)        Opportunity for questions and clarification was provided.       Patient transported with:

## 2019-06-05 NOTE — ED NOTES
Pt back [place] [facility]  Family at bedside and updated with patient's plan of care

## 2019-06-05 NOTE — ED NOTES
Spoke with Myriam Hargrove NP. Discussed bacteria in urine and if there was a need for continued heparin drip. Myriam Hargrove stated she will add abx and discontinue heparin.

## 2019-06-05 NOTE — PROGRESS NOTES
Problem: Self Care Deficits Care Plan (Adult)  Goal: *Acute Goals and Plan of Care (Insert Text)  Description  1. Patient will perform bathing with supervision within 7 days with equipment as needed. 2.  Patient will perform lower body dressing with minimal assistance within 7 days with equipment as needed. 3.  Patient will perform toileting with supervision within 7 days with equipment as needed. 4.   Patient will perform toilet transfer with supervision within 7 days with equipment as needed. 5.   Pt will participate in B UE therapeutic exercises for 8 minutes with 3 rest breaks within 7 days. 6..Pt and or caregiver to demonstrate and verbalize good understanding of recommendations for increasing safety with functional tasks within 7 days. Outcome: Progressing Towards Goal    OCCUPATIONAL THERAPY: Initial Assessment and AM 6/5/2019  OBSERVATION: OT Visit Days: 1  Payor: Kandi Claude / Plan: [address] [place] [address] HMO / Product Type: [employer] Medicare /      NAME/AGE/GENDER: Casper Gottlieb is a 68 y.o. female   PRIMARY DIAGNOSIS:  Shortness of breath [R06.02] Shortness of breath   Shortness of breath          ICD-10: Treatment Diagnosis:    Generalized Muscle Weakness (M62.81)   Precautions/Allergies:     Codeine and Hydralazine      ASSESSMENT:     Ms. Pollo Evans presents from home due to Shortness of Breath. Patient supine in bed, A & O x 2. Patient reported that she ambulated with a cane and required assist with ADLs prior to admit. Patient required Minimal Assist for supine to sit. Patient required extensive assist with LB dressing this date. Patient's daughter in law and patient's son just arrived, and they are upset that patient has not been able to eat anything since last night. Consulted with RN, and RN said that patient is NPO, awaiting blood test results and patient may have a procedure that requires her to be NPO.   Explained to family the circumstances, and family seemed to understand. Family hypervigilant, though supportive of patient. Family reported that patient has a broken wheel on her rollator. Patient stood with CGA with RW, and she leaned backwards a little initially, and with assist, patient regained her balance. Patient ambulated in hallway outside of room with CGA/Min A using RW. Patient to benefit from Occupational Therapy to maximize ADL performance. Cont OT per tx plan. This section established at most recent assessment   PROBLEM LIST (Impairments causing functional limitations):  Decreased Strength  Decreased ADL/Functional Activities  Decreased Transfer Abilities  Decreased Ambulation Ability/Technique  Decreased Balance  Decreased Activity Tolerance  Decreased Work Simplification/Energy Conservation Techniques  Increased Fatigue  Increased Shortness of Breath  Decreased Cognition   INTERVENTIONS PLANNED: (Benefits and precautions of occupational therapy have been discussed with the patient.)  Activities of daily living training  Adaptive equipment training  Balance training  Clothing management  Cognitive training  Donning&doffing training  Hygiene training  Medication management training  Neuromuscular re-eduation  Re-evaluation  Sensory reintegration training  Therapeutic activity  Therapeutic exercise     TREATMENT PLAN: Frequency/Duration: Follow patient 3x's/wk to address above goals. Rehabilitation Potential For Stated Goals: Good     REHAB RECOMMENDATIONS (at time of discharge pending progress):    Placement: It is my opinion, based on this patient's performance to date, that Ms. Virgil Sahu may benefit from participating in 1-2 additional therapy sessions in order to continue to assess for rehab potential and then make recommendation for disposition at discharge.   Home Health???  Equipment:   Patient has a rollator with broken wheel per family; may need to repair wheel if possible or may need a new rollator               OCCUPATIONAL PROFILE AND HISTORY:   History o71 years old F with PMH of ESRD on PD, HTN presented to the hospital complaining of SOB for 5 days. Patient reported SOB was on exertion and at rest. Patient denies history of PE, CHF or using O2 at home. Patient also denies chest pain, leg swelling or abdominal pain. Patient reported SOB improved of her way to the hospital. ED physician reported EMS gave her some nebs treatment. Patient stated is doing PD at home with no issues. . Patient noted she was told by her PCP she has COPD. Patient currently smokes 1 PPD. In the ED patient was having tachypnea but O2 sat was 100%. Currently at bedside patient is breathing comfortable. f Present Injury/Illness (Reason for Referral):    Past Medical History/Comorbidities:   Ms. Marisol Schmidt  has a past medical history of Arrhythmia, Aspirin long-term use (SINAN), CAD (coronary artery disease) (2012), Chronic kidney disease, CKD (chronic kidney disease) stage 3, GFR 30-59 ml/min ([facility]) ( ), GERD (gastroesophageal reflux disease), Heart murmur (not followed per cardiology), History of MI (myocardial infarction) (2012), colonic polyps (2016), Hydronephrosis, Hypertension, Left-sided weakness (2012), Loss of appetite, Poor historian, Renal atrophy, right, Renal insufficiency, Smoker, Stented coronary artery ([facility] SINAN), and Stroke ([facility] (2012). She also has no past medical history of Adverse effect of anesthesia, Difficult intubation, Malignant hyperthermia due to anesthesia, Nausea & vomiting, or Pseudocholinesterase deficiency. Ms. Marisol Schmidt  has a past surgical history that includes colonoscopy (N/A, 9/26/2016); pr left heart cath,percutaneous (4/2012); hx urological; hx carotid endarterectomy (Right, 11/07/2018); and vascular surgery procedure unlist (02/28/2019).   Social History/Living Environment:   Home Environment: Trailer/mobile home  # Steps to Enter: 2  Rails to Enter: No  One/Two Story Residence: One story  Living Alone: No(her son lives w her, doesn't drive, her other children drive)  Support Systems: Family member(s)  Patient Expects to be Discharged to[de-identified] Private residence  Current DME Used/Available at Home: Cane, straight  Tub or Shower Type: Tub/Shower combination  Prior Level of Function/Work/Activity:  Patient reported that she ambulated with a cane and required assist with ADLs prior to admit. Number of Personal Factors/Comorbidities that affect the Plan of Care: Brief history (0):  LOW COMPLEXITY   ASSESSMENT OF OCCUPATIONAL PERFORMANCE[de-identified]   Activities of Daily Living:   Basic ADLs (From Assessment) Complex ADLs (From Assessment)   Feeding: Setup(Pt currently NPO, possible test this date)  Oral Facial Hygiene/Grooming: Setup  Bathing: Moderate assistance  Upper Body Dressing: Minimum assistance  Lower Body Dressing: Moderate assistance  Toileting: Moderate assistance Instrumental ADL  Meal Preparation: Moderate assistance  Homemaking: Maximum assistance  Medication Management: Moderate assistance  Financial Management: Total assistance   Grooming/Bathing/Dressing Activities of Daily Living     Cognitive Retraining  Safety/Judgement: Awareness of environment; Fall prevention                       Bed/Mat Mobility  Sit to Stand: Contact guard assistance  Stand to Sit: Contact guard assistance  Bed to Chair: Minimum assistance       Most Recent Physical Functioning:   Gross Assessment:                  Posture:     Balance:  Sitting: Intact  Standing: Impaired  Standing - Static: Fair  Standing - Dynamic : Fair Bed Mobility:     Wheelchair Mobility:     Transfers:  Sit to Stand: Contact guard assistance  Stand to Sit: Contact guard assistance  Bed to Chair: Minimum assistance            Patient Vitals for the past 6 hrs:   BP BP Patient Position SpO2 Pulse   06/05/19 0959 143/67 At rest 98 % 68   06/05/19 1350 135/75 -- 96 % 69   06/05/19 1355 -- -- 99 % --   06/05/19 1417 132/78 -- 97 % 66       Mental Status  Neurologic State: Alert  Orientation Level: Disoriented to time, Oriented to person, Oriented to place, Oriented to situation  Cognition: Follows commands, Memory loss  Perception: Appears intact  Perseveration: No perseveration noted  Safety/Judgement: Awareness of environment, Fall prevention                          Physical Skills Involved:  Balance  Strength  Activity Tolerance Cognitive Skills Affected (resulting in the inability to perform in a timely and safe manner):  Executive Function  Immediate Memory  Divided Attention Psychosocial Skills Affected:  Habits/Routines  Social Interaction   Number of elements that affect the Plan of Care: 3-5:  MODERATE COMPLEXITY   CLINICAL DECISION MAKIN [facility]? ?6 Clicks? Daily Activity Inpatient Short Form  How much help from another person does the patient currently need. .. Total A Lot A Little None   1. Putting on and taking off regular lower body clothing? ? 1   ? 2   ? 3   ? 4   2. Bathing (including washing, rinsing, drying)? ? 1   ? 2   ? 3   ? 4   3. Toileting, which includes using toilet, bedpan or urinal?   ? 1   ? 2   ? 3   ? 4   4. Putting on and taking off regular upper body clothing? ? 1   ? 2   ? 3   ? 4   5. Taking care of personal grooming such as brushing teeth? ? 1   ? 2   ? 3   ? 4   6. Eating meals? ? 1   ? 2   ? 3   ? 4   © 2007, Trustees of [address] [place] [address] [address] [address], under license to [employer]. All rights reserved      Score:  Initial: 15 Most Recent: X (Date: -- )    Interpretation of Tool:  Represents activities that are increasingly more difficult (i.e. Bed mobility, Transfers, Gait). Medical Necessity:     Patient is expected to demonstrate progress in strength, balance and functional technique   to decrease assistance required with ADLs   . Reason for Services/Other Comments:  Patient continues to require skilled intervention due to patient's inability to take care of self   .    Use of outcome tool(s) and clinical judgement create a POC that gives a: LOW COMPLEXITY         TREATMENT:   (In addition to Assessment/Re-Assessment sessions the following treatments were rendered)     Pre-treatment Symptoms/Complaints:    Pain: Initial:   Pain Intensity 1: 0 0 Post Session:  0     Therapeutic Activity: (    10 minutes): Therapeutic activities including Bed transfers, Chair transfers and Ambulation on level ground to improve mobility, strength and balance. Required minimal   to promote static and dynamic balance in standing. Braces/Orthotics/Lines/Etc:   IV  O2 Device: Room air  Treatment/Session Assessment:    Response to Treatment:  positive  Interdisciplinary Collaboration:   Registered Nurse  After treatment position/precautions:   Supine in bed  Bed/Chair-wheels locked  Bed in low position  Call light within reach  RN notified  Family at bedside   Compliance with Program/Exercises: Compliant all of the time, Will assess as treatment progresses. Recommendations/Intent for next treatment session: \"Next visit will focus on advancements to more challenging activities and reduction in assistance provided\".   Total Treatment Duration:  OT Patient Time In/Time Out  Time In: 1125  Time Out: 1010 [facility],

## 2019-06-05 NOTE — PROGRESS NOTES
TRANSFER - IN REPORT:    Verbal report received from [facility]) on Sarkis Kimball  being received from ED(unit) for routine progression of care      Report consisted of patients Situation, Background, Assessment and   Recommendations(SBAR). Information from the following report(s) SBAR was reviewed with the receiving nurse. Opportunity for questions and clarification was provided. Assessment completed upon patients arrival to unit and care assumed.

## 2019-06-05 NOTE — H&P
Hospitalist H&P/Consult Note     Admit Date:  2019  3:54 PM   Name:  Mary Ramirez   Age:  68 y.o.  :  1950   MRN:  [id number]   PCP:  Saw Stallworth MD  Treatment Team: Attending Provider: Filemon Russell; Primary Nurse: Bina Billingsley RN    HPI:   68years old F with PMH of ESRD on PD, HTN presented to the hospital complaining of SOB for 5 days. Patient reported SOB was on exertion and at rest. Patient denies history of PE, CHF or using O2 at home. Patient also denies chest pain, leg swelling or abdominal pain. Patient reported SOB improved of her way to the hospital. ED physician reported EMS gave her some nebs treatment. Patient stated is doing PD at home with no issues. . Patient noted she was told by her PCP she has COPD. Patient currently smokes 1 PPD. In the ED patient was having tachypnea but O2 sat was 100%. Currently at bedside patient is breathing comfortable. 10 systems reviewed and negative except as noted in HPI. Past Medical History:   Diagnosis Date    Arrhythmia     murmur    Aspirin long-term use SINAN    continue    CAD (coronary artery disease)     MI, 1 stent    Chronic kidney disease     St 3    CKD (chronic kidney disease) stage 3, GFR 30-59 ml/min ([facility])           GERD (gastroesophageal reflux disease)     no meds     Heart murmur not followed per cardiology    EF 65-70 %-- on echo 2017    History of MI (myocardial infarction) 2012    stents x 1---- per pt-- does not see a cardiologist    Hx of colonic polyps 2016    adenoma    Hydronephrosis     Hypertension     daily meds    Left-sided weakness     left leg r/t cva    Loss of appetite     states within the last several months    Poor historian     Renal atrophy, right     Renal insufficiency     ? --- pt unsure if sees nephrologist    Smoker     48 yr hx/ 1 pk per day    Stented coronary artery [facility] SINAN    proximal LAD -- pt states does not see a cardio    Stroke [facility]) 2012    with slight left  sided weakness residual      Past Surgical History:   Procedure Laterality Date    COLONOSCOPY N/A 9/26/2016    Pedro--one desc TA, one rectal hyperplastic--5 year recall    HX CAROTID ENDARTERECTOMY Right 11/07/2018    HX UROLOGICAL      stent to left kidney--multi times    WA LEFT HEART CATH,PERCUTANEOUS  4/2012    Xience LAD    VASCULAR SURGERY PROCEDURE UNLIST  02/28/2019    PD catheter placement      Prior to Admission Medications   Prescriptions Last Dose Informant Patient Reported? Taking? amLODIPine (NORVASC) 10 mg tablet   No No   Sig: Take 1 Tab by mouth daily. Patient taking differently: Take 10 mg by mouth nightly. aspirin delayed-release 81 mg tablet   Yes No   Sig: Take  by mouth nightly. metoprolol succinate (TOPROL-XL) 50 mg XL tablet   No No   Sig: Take 1 Tab by mouth daily. Patient taking differently: Take 50 mg by mouth nightly. ondansetron (ZOFRAN ODT) 8 mg disintegrating tablet   No No   Sig: Take 1 Tab by mouth every eight (8) hours as needed for Nausea. sodium bicarbonate 650 mg tablet   Yes No   Sig: Take  by mouth daily.       Facility-Administered Medications: None     Allergies   Allergen Reactions    Codeine Nausea and Vomiting    Hydralazine Nausea and Vomiting      Social History     Tobacco Use    Smoking status: Current Every Day Smoker     Packs/day: 1.00     Years: 50.00     Pack years: 50.00    Smokeless tobacco: Never Used   Substance Use Topics    Alcohol use: No      Family History   Problem Relation Age of Onset    Hypertension Mother     Heart Disease Mother     Stroke Father       Immunization History   Administered Date(s) Administered    TB Skin Test (PPD) Intradermal 11/10/2017       Objective:     Patient Vitals for the past 24 hrs:   Temp Pulse Resp BP SpO2   06/04/19 1909  93 25  95 %   06/04/19 1908  92 (!) 32  100 %   06/04/19 1901  93 (!) 47 121/60 100 %   06/04/19 1900  94 26  100 % 06/04/19 1859  93 24  100 %   06/04/19 1856  93 23  100 %   06/04/19 1846  98 28  100 %   06/04/19 1825  91 25  100 %   06/04/19 1753  97 (!) 37  100 %   06/04/19 1738     100 %   06/04/19 1733  94 18  98 %   06/04/19 1732  94 27  95 %   06/04/19 1703     95 %   06/04/19 1637  98 26  98 %   06/04/19 1610  97 (!) 39  97 %   06/04/19 1558 99.1 °F (37.3 °C) 98 28 139/63 96 %     Oxygen Therapy  O2 Sat (%): 95 % (06/04/19 1909)  Pulse via Oximetry: 96 beats per minute (06/04/19 1909)  O2 Device: Nasal cannula (06/04/19 1900)  O2 Flow Rate (L/min): 3 l/min (06/04/19 1900)  No intake or output data in the 24 hours ending 06/04/19 2054    Physical Exam:  General:    Well nourished. Alert. Eyes:   Normal sclera. Extraocular movements intact. ENT:  Normocephalic, atraumatic. Moist mucous membranes  CV:   RRR. No murmur, rub, or gallop. Lungs:  CTAB. No wheezing, rhonchi, or rales. Abdomen: Soft, nontender, nondistended. Bowel sounds normal.   Extremities: Warm and dry. No cyanosis or edema. Neurologic: CN II-XII grossly intact. Sensation intact. Skin:     No rashes or jaundice. No wounds. Psych:  Normal mood and affect. I reviewed the labs  and other studies done this admission.   Data Review:   Recent Results (from the past 24 hour(s))   POC TROPONIN-I    Collection Time: 06/04/19  4:05 PM   Result Value Ref Range    Troponin-I (POC) 0.09 (H) 0.02 - 0.05 ng/ml   D DIMER    Collection Time: 06/04/19  4:07 PM   Result Value Ref Range    D DIMER 6.53 (HH) <0.56 ug/ml(FEU)   CBC WITH AUTOMATED DIFF    Collection Time: 06/04/19  4:07 PM   Result Value Ref Range    WBC 9.0 4.3 - 11.1 K/uL    RBC 3.68 (L) 4.05 - 5.2 M/uL    HGB 11.1 (L) 11.7 - 15.4 g/dL    HCT 33.9 (L) 35.8 - 46.3 %    MCV 92.1 79.6 - 97.8 FL    MCH 30.2 26.1 - 32.9 PG    MCHC 32.7 31.4 - 35.0 g/dL    RDW 16.0 (H) 11.9 - 14.6 %    PLATELET 611 035 - 524 K/uL    MPV 12.4 (H) 9.4 - 12.3 FL    ABSOLUTE NRBC 0.00 0.0 - 0.2 K/uL    DF AUTOMATED      NEUTROPHILS 61 43 - 78 %    LYMPHOCYTES 28 13 - 44 %    MONOCYTES 9 4.0 - 12.0 %    EOSINOPHILS 1 0.5 - 7.8 %    BASOPHILS 0 0.0 - 2.0 %    IMMATURE GRANULOCYTES 1 0.0 - 5.0 %    ABS. NEUTROPHILS 5.5 1.7 - 8.2 K/UL    ABS. LYMPHOCYTES 2.5 0.5 - 4.6 K/UL    ABS. MONOCYTES 0.8 0.1 - 1.3 K/UL    ABS. EOSINOPHILS 0.1 0.0 - 0.8 K/UL    ABS. BASOPHILS 0.0 0.0 - 0.2 K/UL    ABS. IMM. GRANS. 0.1 0.0 - 0.5 K/UL   METABOLIC PANEL, COMPREHENSIVE    Collection Time: 06/04/19  4:07 PM   Result Value Ref Range    Sodium 132 (L) 136 - 145 mmol/L    Potassium 2.9 (LL) 3.5 - 5.1 mmol/L    Chloride 93 (L) 98 - 107 mmol/L    CO2 19 (L) 21 - 32 mmol/L    Anion gap 20 (H) 7 - 16 mmol/L    Glucose 93 65 - 100 mg/dL    BUN 66 (H) 8 - 23 MG/DL    Creatinine 10.80 (H) 0.6 - 1.0 MG/DL    GFR est AA 5 (L) >60 ml/min/1.73m2    GFR est non-AA 4 (L) >60 ml/min/1.73m2    Calcium 8.6 8.3 - 10.4 MG/DL    Bilirubin, total 0.3 0.2 - 1.1 MG/DL    ALT (SGPT) 45 12 - 65 U/L    AST (SGOT) 35 15 - 37 U/L    Alk. phosphatase 61 50 - 136 U/L    Protein, total 8.5 (H) 6.3 - 8.2 g/dL    Albumin 2.7 (L) 3.2 - 4.6 g/dL    Globulin 5.8 (H) 2.3 - 3.5 g/dL    A-G Ratio 0.5 (L) 1.2 - 3.5     CK    Collection Time: 06/04/19  4:07 PM   Result Value Ref Range     21 - 215 U/L   POC LACTIC ACID    Collection Time: 06/04/19  4:07 PM   Result Value Ref Range    Lactic Acid (POC) 4.51 (H) 0.5 - 1.9 mmol/L   PROCALCITONIN    Collection Time: 06/04/19  4:07 PM   Result Value Ref Range    Procalcitonin 4.0 ng/mL   EKG, 12 LEAD, INITIAL    Collection Time: 06/04/19  4:08 PM   Result Value Ref Range    Ventricular Rate 97 BPM    Atrial Rate 166 BPM    P-R Interval 176 ms    QRS Duration 86 ms    Q-T Interval 318 ms    QTC Calculation (Bezet) 403 ms    Calculated P Axis 49 degrees    Calculated R Axis 62 degrees    Calculated T Axis -130 degrees    Diagnosis       !! AGE AND GENDER SPECIFIC ECG ANALYSIS !!   Sinus tachycardia  Possible Left atrial enlargement  Left ventricular hypertrophy with repolarization abnormality  Abnormal ECG  When compared with ECG of 07-MAY-2019 13:54,  T wave inversion more evident in Inferior leads  QT has shortened  Confirmed by HALLE HERNANDEZ (), Laurent Blanchard ([id number]) on 6/4/2019 5:05:40 PM     AMMONIA    Collection Time: 06/04/19  4:27 PM   Result Value Ref Range    Ammonia 14 11 - 32 UMOL/L   POC LACTIC ACID    Collection Time: 06/04/19  6:18 PM   Result Value Ref Range    Lactic Acid (POC) 4.02 (H) 0.5 - 1.9 mmol/L       Imaging Studies:  CXR Results  (Last 48 hours)               06/04/19 1630  XR CHEST PORT Final result    Impression:  IMPRESSION: Unremarkable portable chest x-ray. Narrative:  Portable chest x-ray       CLINICAL INDICATION: Altered mental status       FINDINGS: Single AP view of the chest compared to a similar chest x-ray dated   5/7/2019 show the lungs to be expanded and clear. No pleural effusion or   pneumothorax. The cardiac silhouette and mediastinum are unremarkable. CT Results  (Last 48 hours)    None          Assessment and Plan:     Hospital Problems as of 6/4/2019 Date Reviewed: 2/21/2019          Codes Class Noted - Resolved POA    * (Principal) Shortness of breath ICD-10-CM: R06.02  ICD-9-CM: 786.05  6/4/2019 - Present Unknown        Anemia of renal disease ICD-10-CM: N18.9, D63.1  ICD-9-CM: 285.21  1/7/2016 - Present Yes        Benign essential HTN ICD-10-CM: I10  ICD-9-CM: 401.1  1/7/2016 - Present Yes        CKD (chronic kidney disease) stage 3, GFR 30-59 ml/min ([facility]) ICD-10-CM: N18.3  ICD-9-CM: 585.3  4/26/2012 - Present Yes              A/P    -SOB  Could be multifactorial: hx of COPD, ?PE, debility  CXR with no acute disease  Elevated D-dimer and troponin 0.09      -Plan  Start heparin drip for possible PE  VQ scan ordered by ED physician, likely will be done on AM  Wean O2 as tolerated  Start duo nebs Q6.  No need for steroids as patient is not wheezing  Get echocardiogram to evaluate     -Lactic acidosis  Could be secondary to increase work of breathing  No WBC elevation and patient is afebrile  Benign abdominal exam  No infectious etiology identified  Patient already received 1 L bolus at ED. Hx ESRD  Trend LA and monitor temp curve    -Elevated troponin  Could be secondary to ESRD  EKG sinus tachy  Slightly elevated 0.09. Denies chest pain. Remote tele and trend troponins. echo    -ESRD  Appears euvolemic  Nephrology evaluation for PD    -HTN  Restart home medications    DVT ppx: heparin  Code status:Full        Signed:   Shreyas Molina MD

## 2019-06-05 NOTE — ED NOTES
Helen Pedersen MD paged r/t troponin of 0.38 . Verbal read back provided. MD to consult cardiology. Primary RN Kaykay Franco notified as this RN is assisting as a float assignment.

## 2019-06-05 NOTE — PROGRESS NOTES
Attempted to tx. RN reporting she was on her way to nuc med and then followed by HD. Will attempt again next visit.

## 2019-06-05 NOTE — CONSULTS
[facility]        Subjective: ESRD on PD  Pt here under OBS status. Renal consult dictated # [id number]    Review of Systems -   General ROS: negative for - fever, chills  Respiratory ROS: no SOB, cough, SIMMS  Cardiovascular ROS: no CP, palpitations  Gastrointestinal ROS: no N&V, abdominal pain, diarrhea  Genito-Urinary ROS: no difficulty voiding, dysuria  Neurological ROS: no seizures, focal weekness        Objective:    Vitals:    06/04/19 1909 06/04/19 2308 06/05/19 0444 06/05/19 0713   BP:   168/70    Pulse: 93  76    Resp: 25  20    Temp:   98.2 °F (36.8 °C)    SpO2: 95% 98% 93% 97%   Weight:       Height:           PE  Gen: in no acute distress  CV:reg rate  Chest:clear  Abd: soft PD cath in LLQ is ok  Ext/Access: no edema       . LAB  Recent Labs     06/05/19  0435 06/04/19  1607   WBC 8.6 9.0   HGB 9.7* 11.1*   HCT 29.3* 33.9*    229     Recent Labs     06/05/19  0435 06/05/19  0159 06/04/19  2320 06/04/19  1607     --   --  132*   K 3.0*  --   --  2.9*   CL 99  --   --  93*   CO2 20*  --   --  19*   *  --   --  93   BUN 69*  --   --  66*   CREA 10.70*  --   --  10.80*   CA 8.2*  --   --  8.6   TROIQ  --  0.43* 0.38*  --    ALB  --   --   --  2.7*   TBILI  --   --   --  0.3   ALT  --   --   --  45   SGOT  --   --   --  35           Radiology    A/P:   Patient Active Problem List   Diagnosis Code    Hypertensive emergency I16.1    Carotid artery bruit R09.89    CKD (chronic kidney disease) stage 3, GFR 30-59 ml/min ([facility]) N18.3    Tobacco use disorder F17.200    GERD (gastroesophageal reflux disease) K21.9    Chest pain, unspecified R07.9    Anemia D64.9    CVA (cerebral vascular accident) ([facility] [address] I63.9    Anemia of renal disease N18.9, D63.1    Benign essential HTN I10    Coronary artery disease involving native coronary artery with angina pectoris (HCC) I25.119    History of CVA (cerebrovascular accident) Z80.78    Hydronephrosis N13.30    History of urethral stent [id number]    CKD stage 5 secondary to hypertension (HCC) I12.0, N18.5    Major depressive disorder with single episode, in remission ([facility] [address] F32.5    UTI (urinary tract infection) N39.0    Stroke (HCC) I63.9    Bilateral carotid artery stenosis I65.23    Pure hypercholesterolemia E78.00    Bilateral carotid artery disease (HCC) I77.9    Carotid stenosis, asymptomatic, right I65.21    Carotid stenosis, right I65.21    History of right-sided carotid endarterectomy Z98.890    Shortness of breath R06.02       ESRD on PD - will continue PD while she is here. She does not appear to be volume overloaded.   Will continue with 2.5 % dialysate K replacement ordered    SOB - workup in progress    ASHWENDY Mir MD

## 2019-06-05 NOTE — CONSULTS
[address] [place] [address]    Name:  Hector Billingsley  MR#:  [id number]  :  1950  ACCOUNT #:  [de-identified]  DATE OF SERVICE:  2019    REASON FOR CONSULTATION:  We are seeing the patient at the request of Dr. Laura Dunne with regards to renal failure. HISTORY OF PRESENT ILLNESS:  The patient is a 26-year-old -American female who presented to the ER at [address] on 2019 with increasing shortness of breath over the past five days prior to admission. Chest x-ray was clear on admission. She has been told in the past that she has COPD. She is being admitted to the hospital now for further evaluation and treatment under outpatient observation status. PAST MEDICAL HISTORY:  Significant for end-stage renal disease, on chronic cycling peritoneal dialysis. She has been on PD for about four months and has a cycler. [facility] is her home PD unit. Other medical problems include chronic hypertension, anemia of chronic kidney disease, peripheral vascular disease with carotid bruit, atherosclerotic coronary artery disease with remote history of stenting, remote history of CVA, chronic history of depression, hyperlipidemia. MEDICATIONS PRIOR TO ADMISSION:  Her current medications include DuoNeb nebulizer q.6 hours, Norvasc 10 mg p.o. daily, aspirin 81 mg p.o. daily, Toprol-XL 50 mg p.o. daily, sodium bicarb 325 mg p.o. daily. She is on a heparin drip. ALLERGIES:  SHE HAS A HISTORY OF DRUG ALLERGY TO CODEINE WHICH CAUSES NAUSEA AND VOMITING AND TO HYDRALAZINE CAUSES NAUSEA AND VOMITING. SOCIAL HISTORY:  She is single. Her son lives with her. She does not smoke, does not drink any alcohol. FAMILY HISTORY:  Negative for any kidney disease. REVIEW OF SYSTEMS:  CONSTITUTIONAL:  She denies any fevers, chills. No headaches, dizzy spells, fainting spells.   RESPIRATORY:  Her shortness of breath has improved since coming to the ER.  CARDIOVASCULAR:  She denies any chest pain or palpitations. GASTROINTESTINAL:  No nausea, vomiting, heartburn or abdominal pain. Her PD fluid has remained clear. She has no diarrhea. GENITOURINARY:  No dysuria or polyuria. PHYSICAL EXAMINATION:  GENERAL:  Reveals a pleasant -American female, in no acute distress. VITAL SIGNS:  She is afebrile. Blood pressure is 168/70; pulse is 76; and respirations are 29, they are not labored. HEENT:  Her head is normocephalic. Eyes:  Pupils are equal and reactive to light and accommodation. Extraocular muscles were intact. Fundi were not visualized. LUNGS:  Clear to auscultation. HEART:  Regular rate and rhythm. ABDOMEN:  Soft. Bowel sounds are present. PD catheter is in the left lower quadrant. Exit site is clean and intact. GENITAL/RECTAL:  Deferred. EXTREMITIES:  She has no peripheral edema. LABORATORY DATA:  White count is 8.6, hemoglobin 9.7, hematocrit 29.3, platelets are 195. Chemistries:  Sodium is 136, potassium 3.0, chloride 99, CO2 is 20, BUN is 69, creatinine is 10.7, calcium is 8.2. IMPRESSION:  1. End-stage renal disease, on chronic cycling peritoneal dialysis. 2.  Chronic hypertension. 3.  History of hyperlipidemia. 4.  History of atherosclerotic coronary artery disease. 5.  Remote history of cerebrovascular accident. PLAN:  We will provide her with peritoneal dialysis while she is here in the hospital.  We will also put her on daily potassium replacement as she is probably losing it in her PD fluid. Thank you very much for allowing us to see her and helping in her care. We will follow with you.       Roslyn Banegas MD      [id number]  D:  06/05/2019 7:53  T:  06/05/2019 8:00  JOB #:  [id number]  CC:  Shauna Najera MD

## 2019-06-05 NOTE — ED NOTES
Pt reports that she received peritoneal dialysis every night, no orders seen for this, Apurva Escobar MD paged, per MD patient should not get PD tonight and he would like this pt consulted by nephrology in the morning.

## 2019-06-05 NOTE — PROGRESS NOTES
Nurse reported troponin trending up. No chest pain. Patient already on heparin drip. Start ASA  Will ask Cardiology to evaluate.

## 2019-06-05 NOTE — PROGRESS NOTES
Oxygen Qualifier       Room air: SpO2 with O2 and liter flow   Resting SpO2  94%     Ambulating SpO2  97%          Completed by:    Conner Prasad, RT

## 2019-06-05 NOTE — ED NOTES
spoke with [employer]. They reported that the hospitalist note said the test could be done routine tomorrow am. Family updated.

## 2019-06-05 NOTE — ED NOTES
Report from FRANK marcelo. Pt remains on tele monitor on heparin drip in chest pain center. Repeat troponin due at 645 AM drawn and sent. Tele leads in place. Will continue to monitor. Per last nurse, pt does PD dialysis usually every night, but per Dr. Catia Wilson last night, hold PD and have nephro evaluate today.

## 2019-06-05 NOTE — PROGRESS NOTES
06/05/19 1258   Dual Skin Pressure Injury Assessment   Dual Skin Pressure Injury Assessment WDL   Second Care Provider (Based on [address] [place] [address]) Nisha Bartholomew RN   Skin Integumentary   Skin Integumentary (WDL) WDL   Skin Color Appropriate for ethnicity   Skin Condition/Temp Warm;Dry;Flaky   Skin Integrity   (Peritoneal dialysis access to abd)   Turgor Non-tenting   Hair Growth Absent   Varicosities Absent   Wound Prevention and Protection Methods   Orientation of Wound Prevention Posterior   Location of Wound Prevention Sacrum/Coccyx   Dressing Present  No   Wound Offloading (Prevention Methods) Turning;Repositioning

## 2019-06-05 NOTE — PROGRESS NOTES
Hospitalist Progress Note     Admit Date:  2019  3:54 PM   Name:  Leigh Mcmullen   Age:  68 y.o.  :  1950   MRN:  [id number]   PCP:  July Hilliard MD  Treatment Team: Attending Provider: Go Gonzalez MD; Consulting Provider: Collette Commons, MD; Consulting Provider: Emily Hardin MD; Utilization Review: Raymond Noe RN; Occupational Therapist: Bushra Denise OT    Subjective:     Pt is a 67 yo female with pmh ESRD on PD, HTN who presented to ER with SOB x 5 days. CXR without acute findings and VQ scan low probability for PE. Cards consulted for elevated trops but felt likely r/t elevated LA and Cr. This afternoon pt states breathing is better. She denies SOB but do not think she has exerted herself since admission.       Objective:     Patient Vitals for the past 24 hrs:   Temp Pulse Resp BP SpO2   19 1355     99 %   19 1350 98.2 °F (36.8 °C) 69 18 135/75 96 %   19 0959  68  143/67 98 %   19 0713     97 %   19 0444 98.2 °F (36.8 °C) 76 20 168/70 93 %   19 2308     98 %   19 1909  93 25  95 %   19 1908  92 (!) 32  100 %   19 1901  93 (!) 47 121/60 100 %   19 1900  94 26  100 %   19 1859  93 24  100 %   19 1856  93 23  100 %   19 1846  98 28  100 %   19 1825  91 25  100 %   19 1753  97 (!) 37  100 %   19 1738     100 %   19 1733  94 18  98 %   19 1732  94 27  95 %   19 1703     95 %   19 1637  98 26  98 %   19 1610  97 (!) 39  97 %   19 1558 99.1 °F (37.3 °C) 98 28 139/63 96 %     Oxygen Therapy  O2 Sat (%): 99 % (19 1355)  Pulse via Oximetry: 74 beats per minute (19 1355)  O2 Device: Room air (19 135)  O2 Flow Rate (L/min): 3 l/min (19 9308)    Intake/Output Summary (Last 24 hours) at 2019 1417  Last data filed at 2019 1233  Gross per 24 hour Intake 246.82 ml   Output    Net 246.82 ml         General:    Well nourished. Alert. CV:   RRR. No murmur, rub, or gallop. Lungs:   CTAB. No wheezing, rhonchi, or rales. Abdomen:   Soft, nontender, nondistended. Extremities: Warm and dry. No cyanosis or edema. Skin:     No rashes or jaundice. Neuro:  No gross focal deficits    Data Review:  I have reviewed all labs, meds, telemetry events, and studies from the last 24 hours:    Recent Results (from the past 24 hour(s))   POC TROPONIN-I    Collection Time: 06/04/19  4:05 PM   Result Value Ref Range    Troponin-I (POC) 0.09 (H) 0.02 - 0.05 ng/ml   D DIMER    Collection Time: 06/04/19  4:07 PM   Result Value Ref Range    D DIMER 6.53 (HH) <0.56 ug/ml(FEU)   CBC WITH AUTOMATED DIFF    Collection Time: 06/04/19  4:07 PM   Result Value Ref Range    WBC 9.0 4.3 - 11.1 K/uL    RBC 3.68 (L) 4.05 - 5.2 M/uL    HGB 11.1 (L) 11.7 - 15.4 g/dL    HCT 33.9 (L) 35.8 - 46.3 %    MCV 92.1 79.6 - 97.8 FL    MCH 30.2 26.1 - 32.9 PG    MCHC 32.7 31.4 - 35.0 g/dL    RDW 16.0 (H) 11.9 - 14.6 %    PLATELET 072 771 - 877 K/uL    MPV 12.4 (H) 9.4 - 12.3 FL    ABSOLUTE NRBC 0.00 0.0 - 0.2 K/uL    DF AUTOMATED      NEUTROPHILS 61 43 - 78 %    LYMPHOCYTES 28 13 - 44 %    MONOCYTES 9 4.0 - 12.0 %    EOSINOPHILS 1 0.5 - 7.8 %    BASOPHILS 0 0.0 - 2.0 %    IMMATURE GRANULOCYTES 1 0.0 - 5.0 %    ABS. NEUTROPHILS 5.5 1.7 - 8.2 K/UL    ABS. LYMPHOCYTES 2.5 0.5 - 4.6 K/UL    ABS. MONOCYTES 0.8 0.1 - 1.3 K/UL    ABS. EOSINOPHILS 0.1 0.0 - 0.8 K/UL    ABS. BASOPHILS 0.0 0.0 - 0.2 K/UL    ABS. IMM.  GRANS. 0.1 0.0 - 0.5 K/UL   METABOLIC PANEL, COMPREHENSIVE    Collection Time: 06/04/19  4:07 PM   Result Value Ref Range    Sodium 132 (L) 136 - 145 mmol/L    Potassium 2.9 (LL) 3.5 - 5.1 mmol/L    Chloride 93 (L) 98 - 107 mmol/L    CO2 19 (L) 21 - 32 mmol/L    Anion gap 20 (H) 7 - 16 mmol/L    Glucose 93 65 - 100 mg/dL    BUN 66 (H) 8 - 23 MG/DL    Creatinine 10.80 (H) 0.6 - 1.0 MG/DL GFR est AA 5 (L) >60 ml/min/1.73m2    GFR est non-AA 4 (L) >60 ml/min/1.73m2    Calcium 8.6 8.3 - 10.4 MG/DL    Bilirubin, total 0.3 0.2 - 1.1 MG/DL    ALT (SGPT) 45 12 - 65 U/L    AST (SGOT) 35 15 - 37 U/L    Alk. phosphatase 61 50 - 136 U/L    Protein, total 8.5 (H) 6.3 - 8.2 g/dL    Albumin 2.7 (L) 3.2 - 4.6 g/dL    Globulin 5.8 (H) 2.3 - 3.5 g/dL    A-G Ratio 0.5 (L) 1.2 - 3.5     CK    Collection Time: 06/04/19  4:07 PM   Result Value Ref Range     21 - 215 U/L   POC LACTIC ACID    Collection Time: 06/04/19  4:07 PM   Result Value Ref Range    Lactic Acid (POC) 4.51 (H) 0.5 - 1.9 mmol/L   PROCALCITONIN    Collection Time: 06/04/19  4:07 PM   Result Value Ref Range    Procalcitonin 4.0 ng/mL   EKG, 12 LEAD, INITIAL    Collection Time: 06/04/19  4:08 PM   Result Value Ref Range    Ventricular Rate 97 BPM    Atrial Rate 166 BPM    P-R Interval 176 ms    QRS Duration 86 ms    Q-T Interval 318 ms    QTC Calculation (Bezet) 403 ms    Calculated P Axis 49 degrees    Calculated R Axis 62 degrees    Calculated T Axis -130 degrees    Diagnosis       !! AGE AND GENDER SPECIFIC ECG ANALYSIS !!   Sinus tachycardia  Possible Left atrial enlargement  Left ventricular hypertrophy with repolarization abnormality  Abnormal ECG  When compared with ECG of 07-MAY-2019 13:54,  T wave inversion more evident in Inferior leads  QT has shortened  Confirmed by HALLE HERNANDEZ (), JOSHUA GOYAL ([id number]) on 6/4/2019 5:05:40 PM     AMMONIA    Collection Time: 06/04/19  4:27 PM   Result Value Ref Range    Ammonia 14 11 - 32 UMOL/L   POC LACTIC ACID    Collection Time: 06/04/19  6:18 PM   Result Value Ref Range    Lactic Acid (POC) 4.02 (H) 0.5 - 1.9 mmol/L   PTT    Collection Time: 06/04/19 10:56 PM   Result Value Ref Range    aPTT 27.4 24.7 - 39.8 SEC   TROPONIN I    Collection Time: 06/04/19 11:20 PM   Result Value Ref Range    Troponin-I, Qt. 0.38 (HH) 0.02 - 0.05 NG/ML   TROPONIN I    Collection Time: 06/05/19  1:59 AM   Result Value Ref Range Troponin-I, Qt. 0.43 () 0.02 - 0.05 NG/ML   PTT    Collection Time: 06/05/19  4:35 AM   Result Value Ref Range    aPTT 178.3 () 24.7 - 30.9 SEC   METABOLIC PANEL, BASIC    Collection Time: 06/05/19  4:35 AM   Result Value Ref Range    Sodium 136 136 - 145 mmol/L    Potassium 3.0 (L) 3.5 - 5.1 mmol/L    Chloride 99 98 - 107 mmol/L    CO2 20 (L) 21 - 32 mmol/L    Anion gap 17 (H) 7 - 16 mmol/L    Glucose 117 (H) 65 - 100 mg/dL    BUN 69 (H) 8 - 23 MG/DL    Creatinine 10.70 (H) 0.6 - 1.0 MG/DL    GFR est AA 5 (L) >60 ml/min/1.73m2    GFR est non-AA 4 (L) >60 ml/min/1.73m2    Calcium 8.2 (L) 8.3 - 10.4 MG/DL   CBC WITH AUTOMATED DIFF    Collection Time: 06/05/19  4:35 AM   Result Value Ref Range    WBC 8.6 4.3 - 11.1 K/uL    RBC 3.19 (L) 4.05 - 5.2 M/uL    HGB 9.7 (L) 11.7 - 15.4 g/dL    HCT 29.3 (L) 35.8 - 46.3 %    MCV 91.8 79.6 - 97.8 FL    MCH 30.4 26.1 - 32.9 PG    MCHC 33.1 31.4 - 35.0 g/dL    RDW 15.9 (H) 11.9 - 14.6 %    PLATELET 855 877 - 399 K/uL    MPV 12.1 9.4 - 12.3 FL    ABSOLUTE NRBC 0.00 0.0 - 0.2 K/uL    DF AUTOMATED      NEUTROPHILS 86 (H) 43 - 78 %    LYMPHOCYTES 11 (L) 13 - 44 %    MONOCYTES 2 (L) 4.0 - 12.0 %    EOSINOPHILS 0 (L) 0.5 - 7.8 %    BASOPHILS 0 0.0 - 2.0 %    IMMATURE GRANULOCYTES 1 0.0 - 5.0 %    ABS. NEUTROPHILS 7.4 1.7 - 8.2 K/UL    ABS. LYMPHOCYTES 1.0 0.5 - 4.6 K/UL    ABS. MONOCYTES 0.2 0.1 - 1.3 K/UL    ABS. EOSINOPHILS 0.0 0.0 - 0.8 K/UL    ABS. BASOPHILS 0.0 0.0 - 0.2 K/UL    ABS. IMM.  GRANS. 0.0 0.0 - 0.5 K/UL   POC LACTIC ACID    Collection Time: 06/05/19  6:46 AM   Result Value Ref Range    Lactic Acid (POC) 2.77 (H) 0.5 - 1.9 mmol/L   TROPONIN I    Collection Time: 06/05/19  7:16 AM   Result Value Ref Range    Troponin-I, Qt. 0.36 (HH) 0.02 - 0.05 NG/ML   URINALYSIS W/ RFLX MICROSCOPIC    Collection Time: 06/05/19 10:18 AM   Result Value Ref Range    Color RAJIV      Appearance TURBID      Specific gravity 1.018 1.001 - 1.023      pH (UA) 5.0 5.0 - 9.0      Protein 100 (A) NEG mg/dL    Glucose NEGATIVE  mg/dL    Ketone NEGATIVE  NEG mg/dL    Bilirubin SMALL (A) NEG      Blood LARGE (A) NEG      Urobilinogen 0.2 0.2 - 1.0 EU/dL    Nitrites NEGATIVE  NEG      Leukocyte Esterase LARGE (A) NEG      WBC >100 0 /hpf    RBC 0-3 0 /hpf    Epithelial cells 3-5 0 /hpf    Bacteria 4+ (H) 0 /hpf    Casts GRANULAR 0 /lpf   DRUG SCREEN, URINE    Collection Time: 06/05/19 10:18 AM   Result Value Ref Range    PCP(PHENCYCLIDINE) NEGATIVE       BENZODIAZEPINES NEGATIVE       COCAINE NEGATIVE       AMPHETAMINES NEGATIVE       METHADONE NEGATIVE       THC (TH-CANNABINOL) NEGATIVE       OPIATES NEGATIVE       BARBITURATES NEGATIVE      LACTIC ACID    Collection Time: 06/05/19 11:31 AM   Result Value Ref Range    Lactic acid 1.9 0.4 - 2.0 MMOL/L   PTT    Collection Time: 06/05/19 11:31 AM   Result Value Ref Range    aPTT 87.9 (H) 24.7 - 39.8 SEC        All Micro Results     None          No results found for this visit on 06/04/19.     Current Meds:  Current Facility-Administered Medications   Medication Dose Route Frequency    potassium chloride (K-DUR, KLOR-CON) SR tablet 20 mEq  20 mEq Oral BID    cefTRIAXone (ROCEPHIN) 1 g in 0.9% sodium chloride (MBP/ADV) 50 mL  1 g IntraVENous Q24H    sodium chloride (NS) flush 5-40 mL  5-40 mL IntraVENous Q8H    sodium chloride (NS) flush 5-40 mL  5-40 mL IntraVENous PRN    acetaminophen (TYLENOL) tablet 650 mg  650 mg Oral Q4H PRN    albuterol-ipratropium (DUO-NEB) 2.5 MG-0.5 MG/3 ML  3 mL Nebulization Q6H RT    amLODIPine (NORVASC) tablet 10 mg  10 mg Oral DAILY    aspirin delayed-release tablet 81 mg  81 mg Oral QHS    metoprolol succinate (TOPROL-XL) XL tablet 50 mg  50 mg Oral DAILY    sodium bicarbonate tablet 325 mg  325 mg Oral DAILY       Other Studies (last 24 hours):  Xr Chest Port    Result Date: 6/4/2019  Portable chest x-ray CLINICAL INDICATION: Altered mental status FINDINGS: Single AP view of the chest compared to a similar chest x-ray dated 5/7/2019 show the lungs to be expanded and clear. No pleural effusion or pneumothorax. The cardiac silhouette and mediastinum are unremarkable. IMPRESSION: Unremarkable portable chest x-ray. Nm Lung Perfusion W Vent    Result Date: 6/5/2019  Ventilation/perfusion scintigraphy. Indication: PE, 68 years Female shortness of breath, end-stage renal disease on peritoneal dialysis, elevated d-dimer Radiopharmaceutical: 41.5 mCi Tc 99m DTPA aerosol. 6.3 mCi Tc 99m MAA. Findings: Ventilation images demonstrate homogeneous deposition of aerosol bilaterally. Perfusion images demonstrate bilateral symmetric perfusion with no mismatched segmental or subsegmental perfusion defects. Chest radiograph performed yesterday demonstrates no evidence of consolidation or pleural effusions. There is mild hyperinflation. Impression: Low probability of pulmonary embolism. Assessment and Plan:     Hospital Problems as of 6/5/2019 Date Reviewed: 2/21/2019          Codes Class Noted - Resolved POA    * (Principal) Shortness of breath ICD-10-CM: R06.02  ICD-9-CM: 786.05  6/4/2019 - Present Unknown        Anemia of renal disease ICD-10-CM: N18.9, D63.1  ICD-9-CM: 285.21  1/7/2016 - Present Yes        Benign essential HTN ICD-10-CM: I10  ICD-9-CM: 401.1  1/7/2016 - Present Yes        CKD (chronic kidney disease) stage 3, GFR 30-59 ml/min ([facility]) ICD-10-CM: N18.3  ICD-9-CM: 585.3  4/26/2012 - Present Yes              Plan:    SOB  VQ scan not likely for PE, stop Hep gtt  ?  COPD exac   Duonebs  Echo pending  Smoking cessation    Need to assess exertional sats/ SIMMS, consult PT     Elevated trops   Felt demand ischemia   Trend trops and LA     UTI  Follow culture  Start Rocephin     ESRD  Consult nephro for PD     HTN   Cont Norvasc and Toprol    DC planning  Consult CM but no obvious dc needs  Likely home 06/06     Diet:  DIET CARDIAC  DVT ppx:  Heparin     Signed:  Dearcliff Harley, AMIRAH

## 2019-06-06 LAB
ALBUMIN SERPL-MCNC: 2.5 G/DL (ref 3.2–4.6)
ANION GAP SERPL CALC-SCNC: 15 MMOL/L (ref 7–16)
BASOPHILS # BLD: 0 K/UL (ref 0–0.2)
BASOPHILS NFR BLD: 0 % (ref 0–2)
BUN SERPL-MCNC: 68 MG/DL (ref 8–23)
CALCIUM SERPL-MCNC: 8.6 MG/DL (ref 8.3–10.4)
CHLORIDE SERPL-SCNC: 104 MMOL/L (ref 98–107)
CO2 SERPL-SCNC: 22 MMOL/L (ref 21–32)
CREAT SERPL-MCNC: 8.92 MG/DL (ref 0.6–1)
DIFFERENTIAL METHOD BLD: ABNORMAL
EOSINOPHIL # BLD: 0.1 K/UL (ref 0–0.8)
EOSINOPHIL NFR BLD: 1 % (ref 0.5–7.8)
ERYTHROCYTE [DISTWIDTH] IN BLOOD BY AUTOMATED COUNT: 16.3 % (ref 11.9–14.6)
GLUCOSE SERPL-MCNC: 98 MG/DL (ref 65–100)
HCT VFR BLD AUTO: 31.4 % (ref 35.8–46.3)
HGB BLD-MCNC: 10.3 G/DL (ref 11.7–15.4)
IMM GRANULOCYTES # BLD AUTO: 0 K/UL (ref 0–0.5)
IMM GRANULOCYTES NFR BLD AUTO: 1 % (ref 0–5)
LYMPHOCYTES # BLD: 2.7 K/UL (ref 0.5–4.6)
LYMPHOCYTES NFR BLD: 31 % (ref 13–44)
MCH RBC QN AUTO: 30.8 PG (ref 26.1–32.9)
MCHC RBC AUTO-ENTMCNC: 32.8 G/DL (ref 31.4–35)
MCV RBC AUTO: 94 FL (ref 79.6–97.8)
MONOCYTES # BLD: 0.4 K/UL (ref 0.1–1.3)
MONOCYTES NFR BLD: 5 % (ref 4–12)
NEUTS SEG # BLD: 5.4 K/UL (ref 1.7–8.2)
NEUTS SEG NFR BLD: 63 % (ref 43–78)
NRBC # BLD: 0 K/UL (ref 0–0.2)
PHOSPHATE SERPL-MCNC: 5.8 MG/DL (ref 2.3–3.7)
PLATELET # BLD AUTO: 200 K/UL (ref 150–450)
PMV BLD AUTO: 12.1 FL (ref 9.4–12.3)
POTASSIUM SERPL-SCNC: 3.1 MMOL/L (ref 3.5–5.1)
RBC # BLD AUTO: 3.34 M/UL (ref 4.05–5.2)
SODIUM SERPL-SCNC: 141 MMOL/L (ref 136–145)
WBC # BLD AUTO: 8.7 K/UL (ref 4.3–11.1)

## 2019-06-06 PROCEDURE — 74011000250 HC RX REV CODE- 250: Performed by: INTERNAL MEDICINE

## 2019-06-06 PROCEDURE — 36415 COLL VENOUS BLD VENIPUNCTURE: CPT

## 2019-06-06 PROCEDURE — 94760 N-INVAS EAR/PLS OXIMETRY 1: CPT

## 2019-06-06 PROCEDURE — 96372 THER/PROPH/DIAG INJ SC/IM: CPT

## 2019-06-06 PROCEDURE — 94640 AIRWAY INHALATION TREATMENT: CPT

## 2019-06-06 PROCEDURE — 90945 DIALYSIS ONE EVALUATION: CPT

## 2019-06-06 PROCEDURE — 74011250637 HC RX REV CODE- 250/637: Performed by: INTERNAL MEDICINE

## 2019-06-06 PROCEDURE — 74011000258 HC RX REV CODE- 258: Performed by: NURSE PRACTITIONER

## 2019-06-06 PROCEDURE — 99218 HC RM OBSERVATION: CPT

## 2019-06-06 PROCEDURE — 96366 THER/PROPH/DIAG IV INF ADDON: CPT

## 2019-06-06 PROCEDURE — 74011250636 HC RX REV CODE- 250/636: Performed by: NURSE PRACTITIONER

## 2019-06-06 PROCEDURE — 80069 RENAL FUNCTION PANEL: CPT

## 2019-06-06 PROCEDURE — 85025 COMPLETE CBC W/AUTO DIFF WBC: CPT

## 2019-06-06 RX ORDER — POTASSIUM CHLORIDE 20 MEQ/1
20 TABLET, EXTENDED RELEASE ORAL 3 TIMES DAILY
Status: DISCONTINUED | OUTPATIENT
Start: 2019-06-06 | End: 2019-06-07 | Stop reason: HOSPADM

## 2019-06-06 RX ORDER — HYDROCODONE BITARTRATE AND ACETAMINOPHEN 5; 325 MG/1; MG/1
1 TABLET ORAL
Status: DISCONTINUED | OUTPATIENT
Start: 2019-06-06 | End: 2019-06-07 | Stop reason: HOSPADM

## 2019-06-06 RX ADMIN — POTASSIUM CHLORIDE 20 MEQ: 20 TABLET, EXTENDED RELEASE ORAL at 22:38

## 2019-06-06 RX ADMIN — POTASSIUM CHLORIDE 20 MEQ: 20 TABLET, EXTENDED RELEASE ORAL at 16:10

## 2019-06-06 RX ADMIN — METOPROLOL SUCCINATE 50 MG: 50 TABLET, EXTENDED RELEASE ORAL at 08:25

## 2019-06-06 RX ADMIN — HEPARIN SODIUM 5000 UNITS: 5000 INJECTION INTRAVENOUS; SUBCUTANEOUS at 05:15

## 2019-06-06 RX ADMIN — AMLODIPINE BESYLATE 10 MG: 10 TABLET ORAL at 08:25

## 2019-06-06 RX ADMIN — HYDROCODONE BITARTRATE AND ACETAMINOPHEN 1 TABLET: 5; 325 TABLET ORAL at 09:47

## 2019-06-06 RX ADMIN — CEFTRIAXONE SODIUM 1 G: 1 INJECTION, POWDER, FOR SOLUTION INTRAMUSCULAR; INTRAVENOUS at 13:41

## 2019-06-06 RX ADMIN — IPRATROPIUM BROMIDE AND ALBUTEROL SULFATE 3 ML: .5; 3 SOLUTION RESPIRATORY (INHALATION) at 19:09

## 2019-06-06 RX ADMIN — HEPARIN SODIUM 5000 UNITS: 5000 INJECTION INTRAVENOUS; SUBCUTANEOUS at 22:39

## 2019-06-06 RX ADMIN — HYDROCODONE BITARTRATE AND ACETAMINOPHEN 1 TABLET: 5; 325 TABLET ORAL at 17:08

## 2019-06-06 RX ADMIN — POTASSIUM CHLORIDE 20 MEQ: 20 TABLET, EXTENDED RELEASE ORAL at 08:25

## 2019-06-06 RX ADMIN — IPRATROPIUM BROMIDE AND ALBUTEROL SULFATE 3 ML: .5; 3 SOLUTION RESPIRATORY (INHALATION) at 07:20

## 2019-06-06 RX ADMIN — Medication 10 ML: at 22:39

## 2019-06-06 RX ADMIN — IPRATROPIUM BROMIDE AND ALBUTEROL SULFATE 3 ML: .5; 3 SOLUTION RESPIRATORY (INHALATION) at 14:46

## 2019-06-06 RX ADMIN — SODIUM BICARBONATE 650 MG TABLET 325 MG: at 08:25

## 2019-06-06 RX ADMIN — HEPARIN SODIUM 5000 UNITS: 5000 INJECTION INTRAVENOUS; SUBCUTANEOUS at 16:10

## 2019-06-06 RX ADMIN — Medication 10 ML: at 13:10

## 2019-06-06 RX ADMIN — Medication 10 ML: at 05:15

## 2019-06-06 RX ADMIN — ASPIRIN 81 MG: 81 TABLET, COATED ORAL at 22:38

## 2019-06-06 NOTE — PROGRESS NOTES
Rounding done every hour. Patient resting in room. No signs or symptoms of distress. Pt tolerated parataenial dialysis well. Pt with remote tele and ran NSR except once Tele tech notified pt had one run of 11 beats of vtach. Doctor made aware. No changes in status. Will check mag in am.  Patient denies any further needs or pain at this time. Bed in low position and call light/ personal items within reach. Will continue to monitor and give bedside shift report to oncoming day shift nurse.

## 2019-06-06 NOTE — PROGRESS NOTES
Interdisciplinary Rounds completed 06/06/19. Nursing, Case Management, Physician and PT present. Plan of care reviewed and updated. No needs identified. Wanting to go home.

## 2019-06-06 NOTE — PROGRESS NOTES
Problem: Falls - Risk of  Goal: *Absence of Falls  Description  Document Kamar Joshi Fall Risk and appropriate interventions in the flowsheet. Outcome: Progressing Towards Goal     Problem: Chronic Renal Failure  Goal: *Fluid and electrolytes stabilized  Outcome: Progressing Towards Goal     Problem:  Activity Intolerance  Goal: *Oxygen saturation during activity within specified parameters  Outcome: Resolved/Met

## 2019-06-06 NOTE — PROGRESS NOTES
Hourly rounds completed this shift. All needs met. Bed low/locked. No c/o SOB. Pt c/o leg pain x2-Norco 5-325mg given. Pt is currently connected to peritoneal dialysis-draining well. Tele shows NSR all day. Pts son eager to have pt DC tomorrow. Call light in reach. Will continue to monitor pt and give report to oncoming RN.      Peripheral IV 06/04/19 Right Wrist (Active)   Site Assessment Clean, dry, & intact 6/6/2019  2:30 PM   Phlebitis Assessment 0 6/6/2019  2:30 PM   Infiltration Assessment 0 6/6/2019  2:30 PM   Dressing Status Clean, dry, & intact 6/6/2019  2:30 PM   Dressing Type Transparent;Tape 6/6/2019  2:30 PM   Hub Color/Line Status Green;Flushed;Patent 6/6/2019  2:30 PM       Peripheral IV 06/04/19 Left Hand (Active)   Site Assessment Clean, dry, & intact 6/6/2019  2:30 PM   Phlebitis Assessment 0 6/6/2019  2:30 PM   Infiltration Assessment 0 6/6/2019  2:30 PM   Dressing Status Clean, dry, & intact 6/6/2019  2:30 PM   Dressing Type Transparent;Tape 6/6/2019  2:30 PM   Hub Color/Line Status Pink;Flushed;Patent 6/6/2019  2:30 PM

## 2019-06-06 NOTE — PROGRESS NOTES
CM met with patient to discuss d/c plan. Patient alert/orietned x4. Patient verified information no changes needed. Patient states her son lives with her in a one level home with two steps to enter into the home. Patient states she's independent with her ADL's and do has several DME's in the home. Patient on PD dialysis and states she will be returning back home with no needs identified. CM will continue to monitor and remain available for any concerns or needs that may occur. Care Management Interventions  PCP Verified by CM: Yes([place] Cam[facility],)  Mode of Transport at Discharge:  Other (see comment)(Family)  Transition of Care Consult (CM Consult): Discharge Planning  Discharge Durable Medical Equipment: No(patient currently has several DME's in the home such as BSC, rolling walker, cane,shower chair, PD dailysis)  Physical Therapy Consult: Yes  Occupational Therapy Consult: Yes  Speech Therapy Consult: No  Current Support Network: Own Home  Confirm Follow Up Transport: Family  Plan discussed with Pt/Family/Caregiver: Yes  Freedom of Choice Offered: Yes  [place] Resource Information Provided?: No  Discharge Location  Discharge Placement: Home

## 2019-06-06 NOTE — PROGRESS NOTES
[facility]        Subjective: ESRD on PD  Pt here under OBS status. Breathing is back to baseline    Review of Systems -   General ROS: negative for - fever, chills  Respiratory ROS: no SOB, cough, SIMMS  Cardiovascular ROS: no CP, palpitations  Gastrointestinal ROS: no N&V, abdominal pain, diarrhea  Genito-Urinary ROS: no difficulty voiding, dysuria  Neurological ROS: no seizures, focal weekness        Objective:    Vitals:    06/06/19 0506 06/06/19 0721 06/06/19 0732 06/06/19 1021   BP: 107/60  110/62 112/66   Pulse: 70  66 69   Resp: 18 19 18   Temp: 98.4 °F (36.9 °C)  98.6 °F (37 °C) 98.2 °F (36.8 °C)   SpO2: 98% 99% 99% 96%   Weight:       Height:           PE  Gen: in no acute distress  CV:reg rate  Chest:clear  Abd: soft PD cath in LLQ is ok  Ext/Access: no edema       . LAB  Recent Labs     06/06/19  0505 06/05/19  0435 06/04/19  1607   WBC 8.7 8.6 9.0   HGB 10.3* 9.7* 11.1*   HCT 31.4* 29.3* 33.9*    192 229     Recent Labs     06/06/19  0505 06/05/19  1436 06/05/19  0716 06/05/19  0435 06/05/19  0159  06/04/19  1607     --   --  136  --   --  132*   K 3.1*  --   --  3.0*  --   --  2.9*     --   --  99  --   --  93*   CO2 22  --   --  20*  --   --  19*   GLU 98  --   --  117*  --   --  93   BUN 68*  --   --  69*  --   --  66*   CREA 8.92*  --   --  10.70*  --   --  10.80*   PHOS 5.8*  --   --   --   --   --   --    CA 8.6  --   --  8.2*  --   --  8.6   TROIQ  --  0.37* 0.36*  --  0.43*   < >  --    ALB 2.5*  --   --   --   --   --  2.7*   TBILI  --   --   --   --   --   --  0.3   ALT  --   --   --   --   --   --  45   SGOT  --   --   --   --   --   --  35    < > = values in this interval not displayed.            Radiology    A/P:   Patient Active Problem List   Diagnosis Code    Hypertensive emergency I16.1    Carotid artery bruit R09.89    CKD (chronic kidney disease) stage 3, GFR 30-59 ml/min ([facility]) N18.3    Tobacco use disorder F17.200    GERD (gastroesophageal reflux disease) K21.9    Chest pain, unspecified R07.9    Anemia D64.9    CVA (cerebral vascular accident) ([facility] [address] I63.9    Anemia of renal disease N18.9, D63.1    Benign essential HTN I10    Coronary artery disease involving native coronary artery with angina pectoris (HCC) I25.119    History of CVA (cerebrovascular accident) Z86.73    Hydronephrosis N13.30    History of urethral stent [id number]    CKD stage 5 secondary to hypertension (HCC) I12.0, N18.5    Major depressive disorder with single episode, in remission ([facility] [address] F32.5    UTI (urinary tract infection) N39.0    Stroke (HCC) I63.9    Bilateral carotid artery stenosis I65.23    Pure hypercholesterolemia E78.00    Bilateral carotid artery disease (HCC) I77.9    Carotid stenosis, asymptomatic, right I65.21    Carotid stenosis, right I65.21    History of right-sided carotid endarterectomy Z98.890    Shortness of breath R06.02       ESRD on PD - will continue PD while she is here.   Will continue with 2.5 % dialysate K replacement ordered    SOB - workup in progress    ASHD     Anemia      Shelia Clark MD

## 2019-06-06 NOTE — PROGRESS NOTES
06/06/19 1447   Oxygen Therapy   O2 Sat (%) 98 %   Pulse via Oximetry 62 beats per minute   O2 Device Room air

## 2019-06-06 NOTE — PROGRESS NOTES
Hospitalist Progress Note    2019  Admit Date: 2019  3:54 PM   NAME: Juana Castrejon   :  1950   MRN:  [id number]   Attending: Corrine Wing MD  PCP:  Herbert Hollingsworth MD    SUBJECTIVE:     Juana Castrejon is a   68years old F with PMH of ESRD on PD, HTN presented to the hospital complaining of SOB for 5 days. Patient reported SOB was on exertion and at rest. Patient denies history of PE, CHF or using O2 at home. Patient also denies chest pain, leg swelling or abdominal pain. Patient reported SOB improved of her way to the hospital. ED physician reported EMS gave her some nebs treatment. Patient stated is doing PD at home with no issues. . Patient denies any chest pain,sob or nausea or vomiting           Review of Systems negative with exception of pertinent positives noted above      PHYSICAL EXAM       Visit Vitals  /54   Pulse 65   Temp 97.7 °F (36.5 °C)   Resp 18   Ht 5' 2\" (1.575 m)   Wt 54.4 kg (120 lb)   SpO2 94%   BMI 21.95 kg/m²      Temp (24hrs), Av.3 °F (36.8 °C), Min:97.7 °F (36.5 °C), Max:98.6 °F (37 °C)    Oxygen Therapy  O2 Sat (%): 94 % (19 1528)  Pulse via Oximetry: 62 beats per minute (19 1447)  O2 Device: Room air (19 1447)  O2 Flow Rate (L/min): 3 l/min (19 2308)    Intake/Output Summary (Last 24 hours) at 2019 1555  Last data filed at 2019 1352  Gross per 24 hour   Intake 440 ml   Output 2348 ml   Net -1908 ml          General: No acute distress. Head:  Atraumatic Normocephalic. Eyes:  PERRLA, EOMI, Anicteric. ENT:  No discharges/lesions. Lungs:  CTA Bilaterally. CVS:  Regular rate and rhythm,  No murmur, rub, or gallop, No JVD, No lower extremity edema. Abdomen: Soft, Non distended, Non tender, Positive bowel sounds. MSK:  No deformities, lesions, Spontaneously moves extremities. Neurologic:  AOx3. No focal deficits  Psychiatry:      No anxiety/Depression  Skin:   No rash/lesions.  Good skin turgor  Heme/Lymph/Immune:  No petechiae, ecchymoses, overt signs of bleeding or lymphadenopathy noted. Recent Results (from the past 24 hour(s))   CBC WITH AUTOMATED DIFF    Collection Time: 06/06/19  5:05 AM   Result Value Ref Range    WBC 8.7 4.3 - 11.1 K/uL    RBC 3.34 (L) 4.05 - 5.2 M/uL    HGB 10.3 (L) 11.7 - 15.4 g/dL    HCT 31.4 (L) 35.8 - 46.3 %    MCV 94.0 79.6 - 97.8 FL    MCH 30.8 26.1 - 32.9 PG    MCHC 32.8 31.4 - 35.0 g/dL    RDW 16.3 (H) 11.9 - 14.6 %    PLATELET 833 800 - 865 K/uL    MPV 12.1 9.4 - 12.3 FL    ABSOLUTE NRBC 0.00 0.0 - 0.2 K/uL    DF AUTOMATED      NEUTROPHILS 63 43 - 78 %    LYMPHOCYTES 31 13 - 44 %    MONOCYTES 5 4.0 - 12.0 %    EOSINOPHILS 1 0.5 - 7.8 %    BASOPHILS 0 0.0 - 2.0 %    IMMATURE GRANULOCYTES 1 0.0 - 5.0 %    ABS. NEUTROPHILS 5.4 1.7 - 8.2 K/UL    ABS. LYMPHOCYTES 2.7 0.5 - 4.6 K/UL    ABS. MONOCYTES 0.4 0.1 - 1.3 K/UL    ABS. EOSINOPHILS 0.1 0.0 - 0.8 K/UL    ABS. BASOPHILS 0.0 0.0 - 0.2 K/UL    ABS. IMM.  GRANS. 0.0 0.0 - 0.5 K/UL   RENAL FUNCTION PANEL    Collection Time: 06/06/19  5:05 AM   Result Value Ref Range    Sodium 141 136 - 145 mmol/L    Potassium 3.1 (L) 3.5 - 5.1 mmol/L    Chloride 104 98 - 107 mmol/L    CO2 22 21 - 32 mmol/L    Anion gap 15 7 - 16 mmol/L    Glucose 98 65 - 100 mg/dL    BUN 68 (H) 8 - 23 MG/DL    Creatinine 8.92 (H) 0.6 - 1.0 MG/DL    GFR est AA 6 (L) >60 ml/min/1.73m2    GFR est non-AA 5 (L) >60 ml/min/1.73m2    Calcium 8.6 8.3 - 10.4 MG/DL    Phosphorus 5.8 (H) 2.3 - 3.7 MG/DL    Albumin 2.5 (L) 3.2 - 4.6 g/dL         Imaging /Procedures [address] Problems as of 6/6/2019 Date Reviewed: 2/21/2019          Codes Class Noted - Resolved POA    * (Principal) Shortness of breath ICD-10-CM: R06.02  ICD-9-CM: 786.05  6/4/2019 - Present Unknown        Anemia of renal disease ICD-10-CM: N18.9, D63.1  ICD-9-CM: 285.21  1/7/2016 - Present Yes        Benign essential HTN ICD-10-CM: I10  ICD-9-CM: 401.1  1/7/2016 - Present Yes        CKD (chronic kidney disease) stage 3, GFR 30-59 ml/min ([facility]) ICD-10-CM: N18.3  ICD-9-CM: 585.3  4/26/2012 - Present Yes                 -SOB  Could be multifactorial: hx of COPD, PE rouled out v/q scan neg, debility  CXR with no acute disease        -Plan  Wean O2 as tolerated  Start duo nebs Q6. No need for steroids as patient is not wheezing       -Lactic acidosis  Could be secondary to increase work of breathing  No WBC elevation and patient is afebrile  Benign abdominal exam       -Elevated troponin  Could be secondary to ESRD  EKG sinus tachy  Slightly elevated 0.09. Denies chest pain.   Evaluated by cardiology no need for ischemic evaluation     -ESRD  Appears euvolemic  Nephrology on board pt s/p PD     -HTN  cont home medications     DVT ppx: heparin  Code status:Full      Timo Tyler MD

## 2019-06-06 NOTE — PROGRESS NOTES
Notified by tele tech Kamilah Comment that pt had 11 beat run of V-tach. Pt asymptomatic;denies chest pain, SOB. Doctor informed and orders received for am magnesium lab. Will continue to monitor.

## 2019-06-06 NOTE — DIALYSIS
Disconnected PD cycler from patientby primary RN. Betadine cap intact. Patient tolerated well. UF amount -2048mls.  Effluent: clear, light, yellow

## 2019-06-07 VITALS
DIASTOLIC BLOOD PRESSURE: 75 MMHG | HEIGHT: 62 IN | OXYGEN SATURATION: 97 % | SYSTOLIC BLOOD PRESSURE: 131 MMHG | RESPIRATION RATE: 20 BRPM | HEART RATE: 79 BPM | WEIGHT: 114.1 LBS | TEMPERATURE: 98.1 F | BODY MASS INDEX: 20.99 KG/M2

## 2019-06-07 LAB
ANION GAP SERPL CALC-SCNC: 14 MMOL/L (ref 7–16)
BASOPHILS # BLD: 0 K/UL (ref 0–0.2)
BASOPHILS NFR BLD: 1 % (ref 0–2)
BUN SERPL-MCNC: 57 MG/DL (ref 8–23)
CALCIUM SERPL-MCNC: 9 MG/DL (ref 8.3–10.4)
CHLORIDE SERPL-SCNC: 108 MMOL/L (ref 98–107)
CO2 SERPL-SCNC: 19 MMOL/L (ref 21–32)
CREAT SERPL-MCNC: 7.95 MG/DL (ref 0.6–1)
DIFFERENTIAL METHOD BLD: ABNORMAL
EOSINOPHIL # BLD: 0.2 K/UL (ref 0–0.8)
EOSINOPHIL NFR BLD: 2 % (ref 0.5–7.8)
ERYTHROCYTE [DISTWIDTH] IN BLOOD BY AUTOMATED COUNT: 16.5 % (ref 11.9–14.6)
GLUCOSE SERPL-MCNC: 90 MG/DL (ref 65–100)
HCT VFR BLD AUTO: 34.9 % (ref 35.8–46.3)
HGB BLD-MCNC: 10.7 G/DL (ref 11.7–15.4)
IMM GRANULOCYTES # BLD AUTO: 0.1 K/UL (ref 0–0.5)
IMM GRANULOCYTES NFR BLD AUTO: 1 % (ref 0–5)
LYMPHOCYTES # BLD: 2.4 K/UL (ref 0.5–4.6)
LYMPHOCYTES NFR BLD: 32 % (ref 13–44)
MAGNESIUM SERPL-MCNC: 2.3 MG/DL (ref 1.8–2.4)
MCH RBC QN AUTO: 29.9 PG (ref 26.1–32.9)
MCHC RBC AUTO-ENTMCNC: 30.7 G/DL (ref 31.4–35)
MCV RBC AUTO: 97.5 FL (ref 79.6–97.8)
MONOCYTES # BLD: 0.6 K/UL (ref 0.1–1.3)
MONOCYTES NFR BLD: 8 % (ref 4–12)
NEUTS SEG # BLD: 4.2 K/UL (ref 1.7–8.2)
NEUTS SEG NFR BLD: 57 % (ref 43–78)
NRBC # BLD: 0 K/UL (ref 0–0.2)
PLATELET # BLD AUTO: 214 K/UL (ref 150–450)
PMV BLD AUTO: 11.9 FL (ref 9.4–12.3)
POTASSIUM SERPL-SCNC: 4.8 MMOL/L (ref 3.5–5.1)
RBC # BLD AUTO: 3.58 M/UL (ref 4.05–5.2)
SODIUM SERPL-SCNC: 141 MMOL/L (ref 136–145)
WBC # BLD AUTO: 7.4 K/UL (ref 4.3–11.1)

## 2019-06-07 PROCEDURE — 74011250637 HC RX REV CODE- 250/637: Performed by: INTERNAL MEDICINE

## 2019-06-07 PROCEDURE — 94760 N-INVAS EAR/PLS OXIMETRY 1: CPT

## 2019-06-07 PROCEDURE — 36415 COLL VENOUS BLD VENIPUNCTURE: CPT

## 2019-06-07 PROCEDURE — 94640 AIRWAY INHALATION TREATMENT: CPT

## 2019-06-07 PROCEDURE — 74011000250 HC RX REV CODE- 250: Performed by: INTERNAL MEDICINE

## 2019-06-07 PROCEDURE — 96372 THER/PROPH/DIAG INJ SC/IM: CPT

## 2019-06-07 PROCEDURE — 83735 ASSAY OF MAGNESIUM: CPT

## 2019-06-07 PROCEDURE — 85025 COMPLETE CBC W/AUTO DIFF WBC: CPT

## 2019-06-07 PROCEDURE — 74011250636 HC RX REV CODE- 250/636: Performed by: NURSE PRACTITIONER

## 2019-06-07 PROCEDURE — 80048 BASIC METABOLIC PNL TOTAL CA: CPT

## 2019-06-07 PROCEDURE — 99218 HC RM OBSERVATION: CPT

## 2019-06-07 RX ORDER — CEPHALEXIN 500 MG/1
500 CAPSULE ORAL
Status: DISCONTINUED | OUTPATIENT
Start: 2019-06-07 | End: 2019-06-07 | Stop reason: HOSPADM

## 2019-06-07 RX ORDER — CEPHALEXIN 500 MG/1
500 CAPSULE ORAL 2 TIMES DAILY
Qty: 10 CAP | Refills: 0 | Status: SHIPPED | OUTPATIENT
Start: 2019-06-07 | End: 2019-06-12

## 2019-06-07 RX ADMIN — POTASSIUM CHLORIDE 20 MEQ: 20 TABLET, EXTENDED RELEASE ORAL at 08:50

## 2019-06-07 RX ADMIN — METOPROLOL SUCCINATE 50 MG: 50 TABLET, EXTENDED RELEASE ORAL at 08:50

## 2019-06-07 RX ADMIN — HEPARIN SODIUM 5000 UNITS: 5000 INJECTION INTRAVENOUS; SUBCUTANEOUS at 05:43

## 2019-06-07 RX ADMIN — SODIUM BICARBONATE 650 MG TABLET 325 MG: at 08:50

## 2019-06-07 RX ADMIN — HYDROCODONE BITARTRATE AND ACETAMINOPHEN 1 TABLET: 5; 325 TABLET ORAL at 08:49

## 2019-06-07 RX ADMIN — Medication 10 ML: at 05:48

## 2019-06-07 RX ADMIN — IPRATROPIUM BROMIDE AND ALBUTEROL SULFATE 3 ML: .5; 3 SOLUTION RESPIRATORY (INHALATION) at 08:16

## 2019-06-07 NOTE — PROGRESS NOTES
Patient to discharge home. No needs identified at this time. Care Management Interventions  PCP Verified by CM: Yes(Yolanda Camara,)  Mode of Transport at Discharge:  Other (see comment)(Family)  Transition of Care Consult (CM Consult): Discharge Planning  Discharge Durable Medical Equipment: No(patient currently has several DME's in the home such as BSC, rolling walker, cane,shower chair, PD dailysis)  Physical Therapy Consult: Yes  Occupational Therapy Consult: Yes  Speech Therapy Consult: No  Current Support Network: Own Home  Confirm Follow Up Transport: Family  Plan discussed with Pt/Family/Caregiver: Yes  Freedom of Choice Offered: Yes  [facility] Resource Information Provided?: No  Discharge Location  Discharge Placement: Home

## 2019-06-07 NOTE — DISCHARGE INSTRUCTIONS
DISCHARGE SUMMARY from Nurse    PATIENT INSTRUCTIONS:    *  Please give a list of your current medications to your Primary Care Provider. *  Please update this list whenever your medications are discontinued, doses are      changed, or new medications (including over-the-counter products) are added. *  Please carry medication information at all times in case of emergency situations. These are general instructions for a healthy lifestyle:    No smoking/ No tobacco products/ Avoid exposure to second hand smoke  Surgeon General's Warning:  Quitting smoking now greatly reduces serious risk to your health. Obesity, smoking, and sedentary lifestyle greatly increases your risk for illness    A healthy diet, regular physical exercise & weight monitoring are important for maintaining a healthy lifestyle    You may be retaining fluid if you have a history of heart failure or if you experience any of the following symptoms:  Weight gain of 3 pounds or more overnight or 5 pounds in a week, increased swelling in our hands or feet or shortness of breath while lying flat in bed. Please call your doctor as soon as you notice any of these symptoms; do not wait until your next office visit. Recognize signs and symptoms of STROKE:    F-face looks uneven    A-arms unable to move or move unevenly    S-speech slurred or non-existent    T-time-call 911 as soon as signs and symptoms begin-DO NOT go       Back to bed or wait to see if you get better-TIME IS BRAIN. Warning Signs of HEART ATTACK     Call 911 if you have these symptoms:   Chest discomfort. Most heart attacks involve discomfort in the center of the chest that lasts more than a few minutes, or that goes away and comes back. It can feel like uncomfortable pressure, squeezing, fullness, or pain.  Discomfort in other areas of the upper body. Symptoms can include pain or discomfort in one or both arms, the back, neck, jaw, or stomach.    Shortness of breath with or without chest discomfort.  Other signs may include breaking out in a cold sweat, nausea, or lightheadedness. Don't wait more than five minutes to call 911 - MINUTES MATTER! Fast action can save your life. Calling 911 is almost always the fastest way to get lifesaving treatment. Emergency Medical Services staff can begin treatment when they arrive -- up to an hour sooner than if someone gets to the hospital by car. The discharge information has been reviewed with the patient. The patient verbalized understanding. Discharge medications reviewed with the patient and appropriate educational materials and side effects teaching were provided. ___________________________________________________________________________________________________________________________________    Patient Education        Shortness of Breath: Care Instructions  Your Care Instructions  Shortness of breath has many causes. Sometimes conditions such as anxiety can lead to shortness of breath. Some people get mild shortness of breath when they exercise. Trouble breathing also can be a symptom of a serious problem, such as asthma, lung disease, emphysema, heart problems, and pneumonia. If your shortness of breath continues, you may need tests and treatment. Watch for any changes in your breathing and other symptoms. Follow-up care is a key part of your treatment and safety. Be sure to make and go to all appointments, and call your doctor if you are having problems. It's also a good idea to know your test results and keep a list of the medicines you take. How can you care for yourself at home? · Do not smoke or allow others to smoke around you. If you need help quitting, talk to your doctor about stop-smoking programs and medicines. These can increase your chances of quitting for good. · Get plenty of rest and sleep. · Take your medicines exactly as prescribed.  Call your doctor if you think you are having a problem with your medicine. · Find healthy ways to deal with stress. ? Exercise daily. ? Get plenty of sleep. ? Eat regularly and well. When should you call for help? Call 911 anytime you think you may need emergency care. For example, call if:    · You have severe shortness of breath.     · You have symptoms of a heart attack. These may include:  ? Chest pain or pressure, or a strange feeling in the chest.  ? Sweating. ? Shortness of breath. ? Nausea or vomiting. ? Pain, pressure, or a strange feeling in the back, neck, jaw, or upper belly or in one or both shoulders or arms. ? Lightheadedness or sudden weakness. ? A fast or irregular heartbeat. After you call 911, the  may tell you to chew 1 adult-strength or 2 to 4 low-dose aspirin. Wait for an ambulance. Do not try to drive yourself.    Call your doctor now or seek immediate medical care if:    · Your shortness of breath gets worse or you start to wheeze. Wheezing is a high-pitched sound when you breathe.     · You wake up at night out of breath or have to prop your head up on several pillows to breathe.     · You are short of breath after only light activity or while at rest.    Watch closely for changes in your health, and be sure to contact your doctor if:    · You do not get better over the next 1 to 2 days. Where can you learn more? Go to http://elfego-court.info/. Enter S780 in the search box to learn more about \"Shortness of Breath: Care Instructions. \"  Current as of: September 5, 2018  Content Version: 11.9  © 4593-7614 [employer]. Care instructions adapted under license by [employer] (which disclaims liability or warranty for this information). If you have questions about a medical condition or this instruction, always ask your healthcare professional. [address] any warranty or liability for your use of this information.

## 2019-06-07 NOTE — PROGRESS NOTES
Called the on-call dialysis nurse for clarification on pt peritoneal dialysis. Dialysis nurse, aparna, stated it is okay to keep patient hooked to the machine until the morning once treatment is completed.

## 2019-06-07 NOTE — PROGRESS NOTES
Problem: Falls - Risk of  Goal: *Absence of Falls  Description  Document Brittany Knox Fall Risk and appropriate interventions in the flowsheet.   Outcome: Progressing Towards Goal     Problem: Chronic Renal Failure  Goal: *Fluid and electrolytes stabilized  Outcome: Progressing Towards Goal

## 2019-06-07 NOTE — DISCHARGE SUMMARY
Hospitalist Discharge Summary     Patient ID:  Juana Castrejon  [id number]  68 y.o.  1950  Admit date: 6/4/2019  3:54 PM  Discharge date and time: 6/7/2019  Attending: No att. providers found  PCP:  Tiff Short MD  Treatment Team: Consulting Provider: Dayton Gunn MD; Utilization Review: Bertrand Newby RN; Charge Nurse: Arnulfo Cooper RN; Charge Nurse: Fletcher Castleman    Principal Diagnosis Shortness of breath   Principal Problem:    Shortness of breath (6/4/2019)    Active Problems:    CKD (chronic kidney disease) stage 3, GFR 30-59 ml/min ([address] (4/26/2012)      Anemia of renal disease (1/7/2016)      Benign essential HTN (1/7/2016)             Hospital Course:  Please refer to the admission H&P for details of presentation. In summary, the patient is     Significant Diagnostic Studies:       Labs: Results:       Chemistry Recent Labs     06/07/19  0510 06/06/19  0505 06/05/19  0435   GLU 90 98 117*    141 136   K 4.8 3.1* 3.0*   * 104 99   CO2 19* 22 20*   BUN 57* 68* 69*   CREA 7.95* 8.92* 10.70*   CA 9.0 8.6 8.2*   AGAP 14 15 17*   ALB  --  2.5*  --       CBC w/Diff Recent Labs     06/07/19  0510 06/06/19  0505 06/05/19  0435   WBC 7.4 8.7 8.6   RBC 3.58* 3.34* 3.19*   HGB 10.7* 10.3* 9.7*   HCT 34.9* 31.4* 29.3*    200 192   GRANS 57 63 86*   LYMPH 32 31 11*   EOS 2 1 0*      Cardiac Enzymes No results for input(s): CPK, CKND1, XOCHITL in the last 72 hours. No lab exists for component: CKRMB, TROIP   Coagulation Recent Labs     06/05/19  1131 06/05/19  0435   APTT 87.9* 178.3*       Lipid Panel Lab Results   Component Value Date/Time    Cholesterol, total 152 11/10/2017 05:17 AM    HDL Cholesterol 52 11/10/2017 05:17 AM    LDL, calculated 85.6 11/10/2017 05:17 AM    VLDL, calculated 14.4 11/10/2017 05:17 AM    Triglyceride 72 11/10/2017 05:17 AM    CHOL/HDL Ratio 2.9 11/10/2017 05:17 AM      BNP No results for input(s): BNPP in the last 72 hours.    Liver Enzymes Recent Labs     06/06/19  0505   ALB 2.5*      Thyroid Studies No results found for: T4, T3U, TSH, TSHEXT         Discharge Exam:  Visit Vitals  /75 (BP 1 Location: Right arm, BP Patient Position: At rest)   Pulse 79   Temp 98.1 °F (36.7 °C)   Resp 20   Ht 5' 2\" (1.575 m)   Wt 51.8 kg (114 lb 1.6 oz)   SpO2 97%   BMI 20.87 kg/m²     General appearance: alert, cooperative, no distress, appears stated age  Lungs: clear to auscultation bilaterally  Heart: regular rate and rhythm, S1, S2 normal, no murmur, click, rub or gallop  Abdomen: soft, non-tender. Bowel sounds normal. No masses,  no organomegaly  Extremities: no cyanosis or edema  Neurologic: Grossly normal    Disposition: home  Discharge Condition: stable  Patient Instructions:   Discharge Medication List as of 6/7/2019  9:22 AM      START taking these medications    Details   cephALEXin (KEFLEX) 500 mg capsule Take 1 Cap by mouth two (2) times a day for 5 days. , Print, Disp-10 Cap, R-0         CONTINUE these medications which have NOT CHANGED    Details   hydrALAZINE (APRESOLINE) 10 mg tablet Take 10 mg by mouth four (4) times daily. , Historical Med      atorvastatin (LIPITOR) 80 mg tablet Take 80 mg by mouth daily. , Historical Med      ondansetron (ZOFRAN ODT) 8 mg disintegrating tablet Take 1 Tab by mouth every eight (8) hours as needed for Nausea. , Print, Disp-12 Tab, R-0      sodium bicarbonate 650 mg tablet Take 650 mg by mouth two (2) times a day., Historical Med      aspirin delayed-release 81 mg tablet Take  by mouth nightly., Historical Med      metoprolol succinate (TOPROL-XL) 50 mg XL tablet Take 1 Tab by mouth daily. , Normal, Disp-180 Tab, R-3      amLODIPine (NORVASC) 10 mg tablet Take 1 Tab by mouth daily. , Normal, Disp-90 Tab, R-3             Activity: Activity as tolerated  Diet: Renal Diet  Wound Care: As directed    Follow-up  F/o pcp and cardiology    Time spent to discharge patient  Less than 30 minutes  Signed:  Sharonda Quinones MD  6/7/2019  5:43 PM

## 2019-06-07 NOTE — PROGRESS NOTES
Hourly rounds completed. All needs met. Pt alert and oriented x 3. With the help of the charge nurse, disconnected pt from PD cycler. Pt tolerated disconnection well. No c/o pain during the shift. Pt is resting quietly in the bed in a locked, low position with the side rails up x 2, call light within reach. Will give report to the oncoming day shift nurse.

## 2019-06-09 LAB
BACTERIA SPEC CULT: NORMAL
SERVICE CMNT-IMP: NORMAL

## 2019-06-19 PROBLEM — I73.9 PAD (PERIPHERAL ARTERY DISEASE) (HCC): Status: ACTIVE | Noted: 2019-06-19

## 2019-06-19 PROBLEM — I70.219 ATHEROSCLEROSIS OF NATIVE ARTERIES OF EXTREMITY WITH INTERMITTENT CLAUDICATION (HCC): Status: ACTIVE | Noted: 2019-06-19

## 2019-06-19 PROBLEM — I99.8 ISCHEMIA: Status: ACTIVE | Noted: 2019-06-19

## 2019-06-27 ENCOUNTER — HOSPITAL ENCOUNTER (OUTPATIENT)
Dept: SURGERY | Age: 69
Discharge: HOME | End: 2019-06-27
Payer: MEDICARE

## 2019-06-27 VITALS
WEIGHT: 112.4 LBS | TEMPERATURE: 98.6 F | OXYGEN SATURATION: 94 % | HEART RATE: 115 BPM | SYSTOLIC BLOOD PRESSURE: 128 MMHG | DIASTOLIC BLOOD PRESSURE: 70 MMHG | RESPIRATION RATE: 16 BRPM | HEIGHT: 62 IN | BODY MASS INDEX: 20.68 KG/M2

## 2019-06-27 LAB
ANION GAP SERPL CALC-SCNC: 11 MMOL/L (ref 7–16)
BUN SERPL-MCNC: 25 MG/DL (ref 8–23)
CALCIUM SERPL-MCNC: 9.2 MG/DL (ref 8.3–10.4)
CHLORIDE SERPL-SCNC: 100 MMOL/L (ref 98–107)
CO2 SERPL-SCNC: 28 MMOL/L (ref 21–32)
CREAT SERPL-MCNC: 7.13 MG/DL (ref 0.6–1)
ERYTHROCYTE [DISTWIDTH] IN BLOOD BY AUTOMATED COUNT: 17.2 % (ref 11.9–14.6)
GLUCOSE SERPL-MCNC: 92 MG/DL (ref 65–100)
HCT VFR BLD AUTO: 37.8 % (ref 35.8–46.3)
HGB BLD-MCNC: 11.9 G/DL (ref 11.7–15.4)
MCH RBC QN AUTO: 30.7 PG (ref 26.1–32.9)
MCHC RBC AUTO-ENTMCNC: 31.5 G/DL (ref 31.4–35)
MCV RBC AUTO: 97.7 FL (ref 79.6–97.8)
NRBC # BLD: 0 K/UL (ref 0–0.2)
PLATELET # BLD AUTO: 208 K/UL (ref 150–450)
PMV BLD AUTO: 12.1 FL (ref 9.4–12.3)
POTASSIUM SERPL-SCNC: 2.9 MMOL/L (ref 3.5–5.1)
RBC # BLD AUTO: 3.87 M/UL (ref 4.05–5.2)
SODIUM SERPL-SCNC: 139 MMOL/L (ref 136–145)
WBC # BLD AUTO: 4.7 K/UL (ref 4.3–11.1)

## 2019-06-27 PROCEDURE — 80048 BASIC METABOLIC PNL TOTAL CA: CPT

## 2019-06-27 PROCEDURE — 85027 COMPLETE CBC AUTOMATED: CPT

## 2019-06-27 NOTE — PERIOP NOTES
Patient verified name and . Patient provided medical/health information and PTA medications to the best of their ability. TYPE  CASE:II  Order for consent were found in EHR and matches case posting. Labs per surgeon:cbc,bmp. Labs per anesthesia protocol: no additional.   EKG  :  Done in May and 2019. Acceptable per anesthesia. Patient has no complaints of CP or SIMMS    Patient provided with and instructed on education handouts including Guide to Surgery, blood transfusions, pain management, and hand hygiene for the family and community, and [facility] brochure. Soap and HIbilens and instructions given per hospital policy. Instructed patient to continue previous medications as prescribed prior to surgery unless otherwise directed and to take the following medications the day of surgery according to anesthesia guidelines : none . Instructed patient to hold  the following medications: vitamins, supplements,and NSAIDS. Original medication prescription bottles were not visualized during patient appointment. Patient teach back successful and patient demonstrates knowledge of instruction. Patient son Meghana Mckinney in room at time of Assessment. Patient poor historian. Missael assisted with assessment per verbal consent from patient.  Per Patient, please call either Missael (Son) or Lyubov Cody (Daughter) with time of arrival to hospital.

## 2019-06-27 NOTE — PERIOP NOTES
Recent Results (from the past 12 hour(s))   CBC W/O DIFF    Collection Time: 06/27/19 11:07 AM   Result Value Ref Range    WBC 4.7 4.3 - 11.1 K/uL    RBC 3.87 (L) 4.05 - 5.2 M/uL    HGB 11.9 11.7 - 15.4 g/dL    HCT 37.8 35.8 - 46.3 %    MCV 97.7 79.6 - 97.8 FL    MCH 30.7 26.1 - 32.9 PG    MCHC 31.5 31.4 - 35.0 g/dL    RDW 17.2 (H) 11.9 - 14.6 %    PLATELET 158 216 - 469 K/uL    MPV 12.1 9.4 - 12.3 FL    ABSOLUTE NRBC 0.00 0.0 - 0.2 K/uL   METABOLIC PANEL, BASIC    Collection Time: 06/27/19 11:08 AM   Result Value Ref Range    Sodium 139 136 - 145 mmol/L    Potassium 2.9 (LL) 3.5 - 5.1 mmol/L    Chloride 100 98 - 107 mmol/L    CO2 28 21 - 32 mmol/L    Anion gap 11 7 - 16 mmol/L    Glucose 92 65 - 100 mg/dL    BUN 25 (H) 8 - 23 MG/DL    Creatinine 7.13 (H) 0.6 - 1.0 MG/DL    GFR est AA 7 (L) >60 ml/min/1.73m2    GFR est non-AA 6 (L) >60 ml/min/1.73m2    Calcium 9.2 8.3 - 10.4 MG/DL     Reviewed; Left VM for [address] at Dr. Lorin Goodpasture office regarding abnormal labs. Results also routed to Dr. Reta Collins. Dr. Shea Chapin notified of creatinine and potassium.  No new orders at this time

## 2019-07-01 ENCOUNTER — APPOINTMENT (OUTPATIENT)
Dept: INTERVENTIONAL RADIOLOGY/VASCULAR | Age: 69
End: 2019-07-01
Attending: SURGERY
Payer: MEDICARE

## 2019-07-01 ENCOUNTER — ANESTHESIA EVENT (OUTPATIENT)
Dept: SURGERY | Age: 69
End: 2019-07-01
Payer: MEDICARE

## 2019-07-01 ENCOUNTER — ANESTHESIA (OUTPATIENT)
Dept: SURGERY | Age: 69
End: 2019-07-01
Payer: MEDICARE

## 2019-07-01 ENCOUNTER — HOSPITAL ENCOUNTER (OUTPATIENT)
Age: 69
Setting detail: OUTPATIENT SURGERY
Discharge: HOME | End: 2019-07-01
Attending: SURGERY | Admitting: SURGERY
Payer: MEDICARE

## 2019-07-01 VITALS
OXYGEN SATURATION: 93 % | HEIGHT: 62 IN | TEMPERATURE: 98.9 F | SYSTOLIC BLOOD PRESSURE: 156 MMHG | DIASTOLIC BLOOD PRESSURE: 71 MMHG | WEIGHT: 112 LBS | HEART RATE: 69 BPM | RESPIRATION RATE: 16 BRPM | BODY MASS INDEX: 20.61 KG/M2

## 2019-07-01 LAB
CREAT BLD-MCNC: 8.7 MG/DL (ref 0.8–1.5)
POTASSIUM BLD-SCNC: 3 MMOL/L (ref 3.5–5.1)

## 2019-07-01 PROCEDURE — 76210000031 HC REC RM PH II 4.5 TO 5 HR: Performed by: SURGERY

## 2019-07-01 PROCEDURE — 77030037088 HC TUBE ENDOTRACH ORAL NSL COVD-A: Performed by: ANESTHESIOLOGY

## 2019-07-01 PROCEDURE — 74011000250 HC RX REV CODE- 250

## 2019-07-01 PROCEDURE — 74011250636 HC RX REV CODE- 250/636

## 2019-07-01 PROCEDURE — 84132 ASSAY OF SERUM POTASSIUM: CPT

## 2019-07-01 PROCEDURE — 74011250636 HC RX REV CODE- 250/636: Performed by: ANESTHESIOLOGY

## 2019-07-01 PROCEDURE — 82565 ASSAY OF CREATININE: CPT

## 2019-07-01 PROCEDURE — C1887 CATHETER, GUIDING: HCPCS

## 2019-07-01 PROCEDURE — 74011250637 HC RX REV CODE- 250/637: Performed by: SURGERY

## 2019-07-01 PROCEDURE — 77030039425 HC BLD LARYNG TRULITE DISP TELE -A: Performed by: ANESTHESIOLOGY

## 2019-07-01 PROCEDURE — 76937 US GUIDE VASCULAR ACCESS: CPT

## 2019-07-01 PROCEDURE — 74011250636 HC RX REV CODE- 250/636: Performed by: SURGERY

## 2019-07-01 PROCEDURE — 74011636320 HC RX REV CODE- 636/320: Performed by: SURGERY

## 2019-07-01 PROCEDURE — C1769 GUIDE WIRE: HCPCS

## 2019-07-01 PROCEDURE — 76010000149 HC OR TIME 1 TO 1.5 HR: Performed by: SURGERY

## 2019-07-01 PROCEDURE — 76060000033 HC ANESTHESIA 1 TO 1.5 HR: Performed by: SURGERY

## 2019-07-01 PROCEDURE — 74011250637 HC RX REV CODE- 250/637: Performed by: ANESTHESIOLOGY

## 2019-07-01 PROCEDURE — 75625 CONTRAST EXAM ABDOMINL AORTA: CPT

## 2019-07-01 PROCEDURE — C1894 INTRO/SHEATH, NON-LASER: HCPCS

## 2019-07-01 PROCEDURE — 74011250636 HC RX REV CODE- 250/636: Performed by: NURSE PRACTITIONER

## 2019-07-01 PROCEDURE — 76210000016 HC OR PH I REC 1 TO 1.5 HR: Performed by: SURGERY

## 2019-07-01 PROCEDURE — 36200 PLACE CATHETER IN AORTA: CPT

## 2019-07-01 RX ORDER — LIDOCAINE HYDROCHLORIDE 20 MG/ML
INJECTION, SOLUTION EPIDURAL; INFILTRATION; INTRACAUDAL; PERINEURAL AS NEEDED
Status: DISCONTINUED | OUTPATIENT
Start: 2019-07-01 | End: 2019-07-01 | Stop reason: HOSPADM

## 2019-07-01 RX ORDER — ROCURONIUM BROMIDE 10 MG/ML
INJECTION, SOLUTION INTRAVENOUS AS NEEDED
Status: DISCONTINUED | OUTPATIENT
Start: 2019-07-01 | End: 2019-07-01 | Stop reason: HOSPADM

## 2019-07-01 RX ORDER — DEXAMETHASONE SODIUM PHOSPHATE 4 MG/ML
INJECTION, SOLUTION INTRA-ARTICULAR; INTRALESIONAL; INTRAMUSCULAR; INTRAVENOUS; SOFT TISSUE AS NEEDED
Status: DISCONTINUED | OUTPATIENT
Start: 2019-07-01 | End: 2019-07-01 | Stop reason: HOSPADM

## 2019-07-01 RX ORDER — OXYCODONE HYDROCHLORIDE 5 MG/1
5 TABLET ORAL
Status: COMPLETED | OUTPATIENT
Start: 2019-07-01 | End: 2019-07-01

## 2019-07-01 RX ORDER — OXYCODONE AND ACETAMINOPHEN 5; 325 MG/1; MG/1
1 TABLET ORAL
Status: DISCONTINUED | OUTPATIENT
Start: 2019-07-01 | End: 2019-07-01 | Stop reason: HOSPADM

## 2019-07-01 RX ORDER — CEFAZOLIN SODIUM/WATER 2 G/20 ML
2 SYRINGE (ML) INTRAVENOUS ONCE
Status: COMPLETED | OUTPATIENT
Start: 2019-07-01 | End: 2019-07-01

## 2019-07-01 RX ORDER — FENTANYL CITRATE 50 UG/ML
INJECTION, SOLUTION INTRAMUSCULAR; INTRAVENOUS AS NEEDED
Status: DISCONTINUED | OUTPATIENT
Start: 2019-07-01 | End: 2019-07-01 | Stop reason: HOSPADM

## 2019-07-01 RX ORDER — HEPARIN SODIUM 1000 [USP'U]/ML
INJECTION, SOLUTION INTRAVENOUS; SUBCUTANEOUS AS NEEDED
Status: DISCONTINUED | OUTPATIENT
Start: 2019-07-01 | End: 2019-07-01 | Stop reason: HOSPADM

## 2019-07-01 RX ORDER — ONDANSETRON 2 MG/ML
4 INJECTION INTRAMUSCULAR; INTRAVENOUS
Status: DISCONTINUED | OUTPATIENT
Start: 2019-07-01 | End: 2019-07-01 | Stop reason: HOSPADM

## 2019-07-01 RX ORDER — NALOXONE HYDROCHLORIDE 0.4 MG/ML
0.1 INJECTION, SOLUTION INTRAMUSCULAR; INTRAVENOUS; SUBCUTANEOUS AS NEEDED
Status: DISCONTINUED | OUTPATIENT
Start: 2019-07-01 | End: 2019-07-01 | Stop reason: HOSPADM

## 2019-07-01 RX ORDER — MORPHINE SULFATE 10 MG/ML
1 INJECTION, SOLUTION INTRAMUSCULAR; INTRAVENOUS
Status: DISCONTINUED | OUTPATIENT
Start: 2019-07-01 | End: 2019-07-01 | Stop reason: HOSPADM

## 2019-07-01 RX ORDER — SODIUM CHLORIDE 0.9 % (FLUSH) 0.9 %
5-40 SYRINGE (ML) INJECTION EVERY 8 HOURS
Status: DISCONTINUED | OUTPATIENT
Start: 2019-07-01 | End: 2019-07-01 | Stop reason: HOSPADM

## 2019-07-01 RX ORDER — METOPROLOL SUCCINATE 25 MG/1
50 TABLET, EXTENDED RELEASE ORAL
Status: COMPLETED | OUTPATIENT
Start: 2019-07-01 | End: 2019-07-01

## 2019-07-01 RX ORDER — ONDANSETRON 2 MG/ML
INJECTION INTRAMUSCULAR; INTRAVENOUS AS NEEDED
Status: DISCONTINUED | OUTPATIENT
Start: 2019-07-01 | End: 2019-07-01 | Stop reason: HOSPADM

## 2019-07-01 RX ORDER — SODIUM CHLORIDE 9 MG/ML
75 INJECTION, SOLUTION INTRAVENOUS CONTINUOUS
Status: DISCONTINUED | OUTPATIENT
Start: 2019-07-01 | End: 2019-07-01 | Stop reason: HOSPADM

## 2019-07-01 RX ORDER — SODIUM CHLORIDE 9 MG/ML
25 INJECTION, SOLUTION INTRAVENOUS CONTINUOUS
Status: DISCONTINUED | OUTPATIENT
Start: 2019-07-01 | End: 2019-07-01 | Stop reason: HOSPADM

## 2019-07-01 RX ORDER — PROPOFOL 10 MG/ML
INJECTION, EMULSION INTRAVENOUS AS NEEDED
Status: DISCONTINUED | OUTPATIENT
Start: 2019-07-01 | End: 2019-07-01 | Stop reason: HOSPADM

## 2019-07-01 RX ORDER — HEPARIN SODIUM 5000 [USP'U]/ML
INJECTION, SOLUTION INTRAVENOUS; SUBCUTANEOUS AS NEEDED
Status: DISCONTINUED | OUTPATIENT
Start: 2019-07-01 | End: 2019-07-01 | Stop reason: HOSPADM

## 2019-07-01 RX ORDER — SODIUM CHLORIDE 0.9 % (FLUSH) 0.9 %
5-40 SYRINGE (ML) INJECTION AS NEEDED
Status: DISCONTINUED | OUTPATIENT
Start: 2019-07-01 | End: 2019-07-01 | Stop reason: HOSPADM

## 2019-07-01 RX ORDER — DIPHENHYDRAMINE HYDROCHLORIDE 50 MG/ML
12.5 INJECTION, SOLUTION INTRAMUSCULAR; INTRAVENOUS
Status: DISCONTINUED | OUTPATIENT
Start: 2019-07-01 | End: 2019-07-01 | Stop reason: HOSPADM

## 2019-07-01 RX ORDER — SODIUM CHLORIDE 9 MG/ML
50 INJECTION, SOLUTION INTRAVENOUS CONTINUOUS
Status: DISCONTINUED | OUTPATIENT
Start: 2019-07-01 | End: 2019-07-01 | Stop reason: HOSPADM

## 2019-07-01 RX ADMIN — LIDOCAINE HYDROCHLORIDE 60 MG: 20 INJECTION, SOLUTION EPIDURAL; INFILTRATION; INTRACAUDAL; PERINEURAL at 10:02

## 2019-07-01 RX ADMIN — HEPARIN SODIUM 3000 UNITS: 1000 INJECTION, SOLUTION INTRAVENOUS; SUBCUTANEOUS at 10:26

## 2019-07-01 RX ADMIN — SODIUM CHLORIDE 25 ML/HR: 900 INJECTION, SOLUTION INTRAVENOUS at 09:14

## 2019-07-01 RX ADMIN — DEXAMETHASONE SODIUM PHOSPHATE 4 MG: 4 INJECTION, SOLUTION INTRA-ARTICULAR; INTRALESIONAL; INTRAMUSCULAR; INTRAVENOUS; SOFT TISSUE at 10:22

## 2019-07-01 RX ADMIN — PROPOFOL 100 MG: 10 INJECTION, EMULSION INTRAVENOUS at 10:02

## 2019-07-01 RX ADMIN — Medication 2 G: at 10:14

## 2019-07-01 RX ADMIN — OXYCODONE HYDROCHLORIDE 5 MG: 5 TABLET ORAL at 11:38

## 2019-07-01 RX ADMIN — MORPHINE SULFATE 1 MG: 10 INJECTION, SOLUTION INTRAMUSCULAR; INTRAVENOUS at 13:36

## 2019-07-01 RX ADMIN — ONDANSETRON 4 MG: 2 INJECTION INTRAMUSCULAR; INTRAVENOUS at 10:22

## 2019-07-01 RX ADMIN — OXYCODONE HYDROCHLORIDE AND ACETAMINOPHEN 1 TABLET: 5; 325 TABLET ORAL at 17:00

## 2019-07-01 RX ADMIN — FENTANYL CITRATE 50 MCG: 50 INJECTION, SOLUTION INTRAMUSCULAR; INTRAVENOUS at 10:01

## 2019-07-01 RX ADMIN — FENTANYL CITRATE 25 MCG: 50 INJECTION, SOLUTION INTRAMUSCULAR; INTRAVENOUS at 10:48

## 2019-07-01 RX ADMIN — ROCURONIUM BROMIDE 35 MG: 10 INJECTION, SOLUTION INTRAVENOUS at 10:03

## 2019-07-01 RX ADMIN — METOPROLOL SUCCINATE 50 MG: 25 TABLET, EXTENDED RELEASE ORAL at 09:30

## 2019-07-01 NOTE — DISCHARGE INSTRUCTIONS
Arteriogram: What to Expect at [address] [place] [address] Drive inserted a thin, flexible tube (catheter) into a blood vessel in your groin. In some cases, the catheter is placed in a blood vessel in the arm. After an arteriogram, your groin or arm may have a bruise and feel sore for a day or two. You can do light activities around the house but nothing strenuous for several days. Your doctor may give you specific instructions on when you can do your normal activities again, such as driving and going back to work. This care sheet gives you a general idea about how long it will take for you to recover. But each person recovers at a different pace. Follow the steps below to feel better as quickly as possible. How can you care for yourself at home? Activity  · Do not do strenuous exercise and do not lift, pull, or push anything heavy until your doctor says it is okay. This may be for a day or two. You can walk around the house and do light activity, such as cooking. · You may shower 24 to 48 hours after the procedure, if your doctor okays it. Pat the incision dry. Do not take a bath for 1 week, or until your doctor tells you it is okay. · If the catheter was placed in your groin, try not to walk up stairs for the first couple of days. · If your doctor recommends it, get more exercise. Walking is a good choice. Bit by bit, increase the amount you walk every day. Try for at least 30 minutes on most days of the week. Diet  · Drink plenty of fluids to help your body flush out the dye. If you have kidney, heart, or liver disease and have to limit fluids, talk with your doctor before you increase the amount of fluids you drink. · You can eat your normal diet. If your stomach is upset, try bland, low-fat foods like plain rice, broiled chicken, toast, and yogurt. Medicines  · Be safe with medicines. Read and follow all instructions on the label.   ¨ If the doctor gave you a prescription medicine for pain, take it as prescribed. ¨ If you are not taking a prescription pain medicine, ask your doctor if you can take an over-the-counter medicine. · If you take blood thinners, such as warfarin (Coumadin), clopidogrel (Plavix), or aspirin, be sure to talk to your doctor. He or she will tell you if and when to start taking those medicines again. Make sure that you understand exactly what your doctor wants you to do. · Your doctor will tell you if and when you can restart your medicines. He or she will also give you instructions about taking any new medicines. Care of the catheter site  · You will have a dressing over the cut (incision). A dressing helps the incision heal and protects it. Your doctor will tell you how to take care of this. · Put ice or a cold pack on the area for 10 to 20 minutes at a time to help with soreness or swelling. Put a thin cloth between the ice and your skin. Follow-up care is a key part of your treatment and safety. Be sure to make and go to all appointments, and call your doctor if you are having problems. It's also a good idea to know your test results and keep a list of the medicines you take. When should you call for help? Call 911 anytime you think you may need emergency care. For example, call if:  · You passed out (lost consciousness). · You have severe trouble breathing. · You have sudden chest pain and shortness of breath, or you cough up blood. Call your doctor now or seek immediate medical care if:  · You are bleeding from the area where the catheter was put in your artery. · You have a fast-growing, painful lump at the catheter site. · You have signs of infection, such as:  ¨ Increased pain, swelling, warmth, or redness. ¨ Red streaks leading from the incision. ¨ Pus draining from the incision. ¨ A fever. Watch closely for any changes in your health, and be sure to contact your doctor if:  · You don't get better as expected.   After general anesthesia or intravenous sedation, for 24 hours or while taking prescription Narcotics:  · Limit your activities  · Do not drive and operate hazardous machinery  · Do not make important personal or business decisions  · Do  not drink alcoholic beverages  · If you have not urinated within 8 hours after discharge, please contact your surgeon on call. *  Please give a list of your current medications to your Primary Care Provider. *  Please update this list whenever your medications are discontinued, doses are      changed, or new medications (including over-the-counter products) are added. *  Please carry medication information at all times in case of emergency situations. These are general instructions for a healthy lifestyle:  No smoking/ No tobacco products/ Avoid exposure to second hand smoke  Surgeon General's Warning:  Quitting smoking now greatly reduces serious risk to your health. Obesity, smoking, and sedentary lifestyle greatly increases your risk for illness  A healthy diet, regular physical exercise & weight monitoring are important for maintaining a healthy lifestyle    You may be retaining fluid if you have a history of heart failure or if you experience any of the following symptoms:  Weight gain of 3 pounds or more overnight or 5 pounds in a week, increased swelling in our hands or feet or shortness of breath while lying flat in bed. Please call your doctor as soon as you notice any of these symptoms; do not wait until your next office visit.

## 2019-07-01 NOTE — PROGRESS NOTES
Spiritual Care visit. Initial Visit, Pre Surgery Consult. Visit and prayer before patient goes to surgery.     Visit by Tono Bose M.Ed., Th.B. ,Staff

## 2019-07-01 NOTE — ANESTHESIA PREPROCEDURE EVALUATION
Anesthetic History               Review of Systems / Medical History  Patient summary reviewed, nursing notes reviewed and pertinent labs reviewed    Pulmonary    COPD: moderate      Smoker (48 pk-yr hx)         Neuro/Psych       CVA (residual L hemiparesis)  TIA     Cardiovascular    Hypertension        Dysrhythmias   Past MI (2012), CAD, PAD and cardiac stents (2012)    Exercise tolerance: <4 METS     GI/Hepatic/Renal     GERD: well controlled    Renal disease: dialysis and ESRD       Endo/Other        Anemia     Other Findings   Comments: Uses cane     Anesthetic History   No history of anesthetic complications            Review of Systems / Medical History  Patient summary reviewed and pertinent labs reviewed    Pulmonary          Smoker         Neuro/Psych       CVA (left sided weakness; 11/17 and 2013)  TIA     Cardiovascular    Hypertension: well controlled          Past MI, cardiac stents (2012) and hyperlipidemia    Exercise tolerance: <4 METS: Limited by weakness after CVA; uses cane  Comments: EF 55% 11/17 ECHO     GI/Hepatic/Renal         Renal disease (awaiting appt for potential PD): CRI and ESRD    Pertinent negatives: No GERD   Endo/Other        Anesthetic History   No history of anesthetic complications            Review of Systems / Medical History  Patient summary reviewed and pertinent labs reviewed    Pulmonary          Smoker         Neuro/Psych       CVA (left sided weakness; 11/17 and 2013)  TIA     Cardiovascular    Hypertension: well controlled          Past MI, cardiac stents (2012) and hyperlipidemia    Exercise tolerance: <4 METS: Limited by weakness after CVA; uses cane  Comments: EF 55% 11/17 ECHO     GI/Hepatic/Renal         Renal disease (awaiting appt for potential PD): CRI and ESRD    Pertinent negatives: No GERD   Endo/Other        Anemia     Other Findings   Comments: Hx hydronephrosis           Physical Exam    Airway  Mallampati: II  TM Distance: 4 - 6 cm  Neck ROM: normal range of motion   Mouth opening: Normal     Cardiovascular    Rhythm: regular  Rate: normal    Murmur (diastolic murmur): Grade 4, Aortic area     Dental    Dentition: Full upper dentures and Edentulous     Pulmonary  Breath sounds clear to auscultation               Abdominal  GI exam deferred       Other Findings            Anesthetic Plan    ASA: 4  Anesthesia type: general    Monitoring Plan: Arterial line and BIS      Induction: Intravenous  Anesthetic plan and risks discussed with: Patient        Prior CEA cancelled 2/2 Pt needing stress test. NST grossly wnl with EF 69%. Pt with Htn    Anemia     Other Findings   Comments: Hx hydronephrosis           Physical Exam    Airway  Mallampati: II  TM Distance: 4 - 6 cm  Neck ROM: normal range of motion   Mouth opening: Normal     Cardiovascular    Rhythm: regular  Rate: normal    Murmur (diastolic murmur): Grade 4, Aortic area     Dental    Dentition: Full upper dentures and Edentulous     Pulmonary  Breath sounds clear to auscultation               Abdominal  GI exam deferred       Other Findings            Anesthetic Plan    ASA: 4  Anesthesia type: general    Monitoring Plan: Arterial line and BIS      Induction: Intravenous  Anesthetic plan and risks discussed with: Patient        Prior CEA cancelled 2/2 Pt needing stress test. NST grossly wnl with EF 69%.  Pt with Htn        Physical Exam    Airway  Mallampati: II  TM Distance: > 6 cm  Neck ROM: normal range of motion   Mouth opening: Normal     Cardiovascular    Rhythm: regular  Rate: normal         Dental         Pulmonary  Breath sounds clear to auscultation               Abdominal  GI exam deferred       Other Findings            Anesthetic Plan    ASA: 3  Anesthesia type: general            Anesthetic plan and risks discussed with: Patient    Daughter present

## 2019-07-01 NOTE — ANESTHESIA POSTPROCEDURE EVALUATION
Procedure(s):  ULTRASOUND GUIDED ACCESS BILATERAL LOWER EXTREMITY ARTERIOGRAM.    general    Anesthesia Post Evaluation        Patient location during evaluation: PACU  Patient participation: complete - patient participated  Level of consciousness: responsive to verbal stimuli  Pain management: adequate  Airway patency: patent  Anesthetic complications: no  Cardiovascular status: acceptable  Respiratory status: acceptable  Hydration status: euvolemic        Vitals Value Taken Time   /65 7/1/2019 11:50 AM   Temp 36.7 °C (98.1 °F) 7/1/2019 11:06 AM   Pulse 70 7/1/2019 12:35 PM   Resp 16 7/1/2019 11:06 AM   SpO2 95 % 7/1/2019 12:35 PM   Vitals shown include unvalidated device data.

## 2019-07-01 NOTE — BRIEF OP NOTE
BRIEF OPERATIVE NOTE    Date of Procedure: 7/1/2019   Preoperative Diagnosis: Peripheral arterial disease ([facility] [address] [I73.9]  Postoperative Diagnosis: Peripheral arterial disease ([facility] [address] [I73.9]    Procedure(s):  ULTRASOUND GUIDED ACCESS BILATERAL LOWER EXTREMITY ARTERIOGRAM  Surgeon(s) and Role:     * Aravind Lemos MD - Primary         Surgical Assistant:     Surgical Staff:  Circ-1: Zaynab Quiroz RN  Circ-2: Brandyn Smith RN  Radiology Technician: Emeka Reyes; Rj Gonsalves, RT, R, CT  Event Time In Time Out   Incision Start 1023    Incision Close 1057      Anesthesia: General   Estimated Blood Loss:   Specimens: * No specimens in log *   Findings:    Complications: None  Implants: * No implants in log *

## 2019-07-02 NOTE — PROCEDURES
[facility]  PROCEDURE NOTE    Name:  Deboraha Snellen  MR#:  [id number]  :  1950  ACCOUNT #:  [[id number]]  DATE OF SERVICE:  2019      PREOPERATIVE DIAGNOSIS:  Left lower extremity ischemic rest pain. POSTOPERATIVE DIAGNOSIS:  Left lower extremity ischemic rest pain. PROCEDURES PERFORMED:  Abdominal aortogram, bilateral lower extremity bolus-melissa imaging, ultrasound-guided vascular access. SURGEON:  Dany Carbajal MD    ASSISTANT:  .    ANESTHESIA:  General endotracheal.    ESTIMATED BLOOD LOSS:  Minimal.    DRAINS:  None. SPECIMENS REMOVED:  None. COMPLICATIONS:  None. IMPLANTS:  None. DESCRIPTION OF PROCEDURE:  The patient was brought to the operating room, placed on the operating table in the supine position. Following adequate general endotracheal anesthesia and a time-out procedure, the groins were draped and prepped in sterile fashion. The right common femoral artery was then percutaneously punctured under direct vision using ultrasound. A 5-Maori sheath was then placed. Next, an 0.035 guidewire was advanced in to the aorta followed by a 5-Maori Omni Flush catheter. The catheter was positioned above the renal arteries. Abdominal aortogram was performed. The catheter was then pulled down just above the aortic bifurcation where bilateral lower extremity bolus-melissa imaging was performed. At this point, the severity of the disease on the right side was not felt to lend itself for any form of catheter based intervention. Given this, the catheter was straightened over a guidewire and removed. The sheath on the right was removed and direct pressure applied and hemostasis was achieved. The patient tolerated the procedure well. No complications. ANGIOGRAPHIC FINDINGS:  The abdominal aorta is irregular. No evidence of aneurysm. Both renal arteries are patent although atretic. There were no nephrograms. The patient is on peritoneal dialysis. There is a filling defect in the proximal common iliac on the right. Beyond that, it is patent. The common iliac on the left is patent. The hypogastric arteries are patent, but small bilaterally. External iliac arteries are patent bilaterally. On the right, the common femoral artery is patent, although small. The profunda femoris is widely patent. The SFA is occluded. There is reconstitution of the popliteal artery above the level of the knee via profunda collaterals. Beyond that, the trifurcation is patent with three vessels to the foot. On the left, the common femoral artery is abruptly occluded. There is no flow seen within the profunda or the SFA. The popliteal artery reconstitutes above the knee. The trifurcation vessels are open, although there is very sluggish flow, and contrast enhancement could not be followed out down to the foot due to the time limitation of the fluoroscopy run. IMPRESSION:  Severe right lower extremity peripheral arterial disease with common, profunda and superficial femoral artery occlusions.       Lucy Joel MD      [id number]  D:  07/01/2019 11:53  T:  07/01/2019 16:06  JOB #:  [id number]

## 2019-07-29 PROBLEM — I25.10 ATHEROSCLEROSIS OF NATIVE CORONARY ARTERY OF NATIVE HEART WITHOUT ANGINA PECTORIS: Status: ACTIVE | Noted: 2019-07-29

## 2019-07-29 PROBLEM — Z95.5 S/P CORONARY ARTERY STENT PLACEMENT: Status: ACTIVE | Noted: 2017-02-06

## 2019-07-29 PROBLEM — Z99.2 ESRD ON PERITONEAL DIALYSIS (HCC): Status: ACTIVE | Noted: 2019-07-29

## 2019-07-29 PROBLEM — N18.6 ESRD ON PERITONEAL DIALYSIS (HCC): Status: ACTIVE | Noted: 2019-07-29

## 2019-07-29 PROBLEM — Z01.810 PREOP CARDIOVASCULAR EXAM: Status: ACTIVE | Noted: 2019-07-29

## 2019-07-30 ENCOUNTER — HOSPITAL ENCOUNTER (OUTPATIENT)
Dept: SURGERY | Age: 69
Discharge: HOME | End: 2019-07-30
Payer: MEDICARE

## 2019-07-30 ENCOUNTER — ANESTHESIA EVENT (OUTPATIENT)
Dept: SURGERY | Age: 69
DRG: 252 | End: 2019-07-30
Payer: MEDICARE

## 2019-07-30 VITALS
WEIGHT: 115 LBS | SYSTOLIC BLOOD PRESSURE: 148 MMHG | HEART RATE: 76 BPM | HEIGHT: 61 IN | BODY MASS INDEX: 21.71 KG/M2 | RESPIRATION RATE: 14 BRPM | DIASTOLIC BLOOD PRESSURE: 60 MMHG | TEMPERATURE: 98.9 F | OXYGEN SATURATION: 97 %

## 2019-07-30 LAB
ANION GAP SERPL CALC-SCNC: 8 MMOL/L (ref 7–16)
BASOPHILS # BLD: 0.1 K/UL (ref 0–0.2)
BASOPHILS NFR BLD: 1 % (ref 0–2)
BUN SERPL-MCNC: 29 MG/DL (ref 8–23)
CALCIUM SERPL-MCNC: 7.4 MG/DL (ref 8.3–10.4)
CHLORIDE SERPL-SCNC: 107 MMOL/L (ref 98–107)
CO2 SERPL-SCNC: 26 MMOL/L (ref 21–32)
CREAT SERPL-MCNC: 5.33 MG/DL (ref 0.6–1)
DIFFERENTIAL METHOD BLD: ABNORMAL
EOSINOPHIL # BLD: 0.3 K/UL (ref 0–0.8)
EOSINOPHIL NFR BLD: 4 % (ref 0.5–7.8)
ERYTHROCYTE [DISTWIDTH] IN BLOOD BY AUTOMATED COUNT: 17.1 % (ref 11.9–14.6)
GLUCOSE SERPL-MCNC: 97 MG/DL (ref 65–100)
HCT VFR BLD AUTO: 33.9 % (ref 35.8–46.3)
HGB BLD-MCNC: 10.9 G/DL (ref 11.7–15.4)
IMM GRANULOCYTES # BLD AUTO: 0 K/UL (ref 0–0.5)
IMM GRANULOCYTES NFR BLD AUTO: 0 % (ref 0–5)
LYMPHOCYTES # BLD: 2 K/UL (ref 0.5–4.6)
LYMPHOCYTES NFR BLD: 28 % (ref 13–44)
MCH RBC QN AUTO: 32.1 PG (ref 26.1–32.9)
MCHC RBC AUTO-ENTMCNC: 32.2 G/DL (ref 31.4–35)
MCV RBC AUTO: 99.7 FL (ref 79.6–97.8)
MONOCYTES # BLD: 0.5 K/UL (ref 0.1–1.3)
MONOCYTES NFR BLD: 7 % (ref 4–12)
NEUTS SEG # BLD: 4.1 K/UL (ref 1.7–8.2)
NEUTS SEG NFR BLD: 60 % (ref 43–78)
NRBC # BLD: 0 K/UL (ref 0–0.2)
PLATELET # BLD AUTO: 207 K/UL (ref 150–450)
PMV BLD AUTO: 11.3 FL (ref 9.4–12.3)
POTASSIUM SERPL-SCNC: 4.6 MMOL/L (ref 3.5–5.1)
RBC # BLD AUTO: 3.4 M/UL (ref 4.05–5.2)
SODIUM SERPL-SCNC: 141 MMOL/L (ref 136–145)
WBC # BLD AUTO: 6.9 K/UL (ref 4.3–11.1)

## 2019-07-30 PROCEDURE — 85025 COMPLETE CBC W/AUTO DIFF WBC: CPT

## 2019-07-30 PROCEDURE — 80048 BASIC METABOLIC PNL TOTAL CA: CPT

## 2019-07-30 NOTE — PERIOP NOTES
Recent Results (from the past 12 hour(s))   CBC WITH AUTOMATED DIFF    Collection Time: 07/30/19  4:00 PM   Result Value Ref Range    WBC 6.9 4.3 - 11.1 K/uL    RBC 3.40 (L) 4.05 - 5.2 M/uL    HGB 10.9 (L) 11.7 - 15.4 g/dL    HCT 33.9 (L) 35.8 - 46.3 %    MCV 99.7 (H) 79.6 - 97.8 FL    MCH 32.1 26.1 - 32.9 PG    MCHC 32.2 31.4 - 35.0 g/dL    RDW 17.1 (H) 11.9 - 14.6 %    PLATELET 353 610 - 941 K/uL    MPV 11.3 9.4 - 12.3 FL    ABSOLUTE NRBC 0.00 0.0 - 0.2 K/uL    DF AUTOMATED      NEUTROPHILS 60 43 - 78 %    LYMPHOCYTES 28 13 - 44 %    MONOCYTES 7 4.0 - 12.0 %    EOSINOPHILS 4 0.5 - 7.8 %    BASOPHILS 1 0.0 - 2.0 %    IMMATURE GRANULOCYTES 0 0.0 - 5.0 %    ABS. NEUTROPHILS 4.1 1.7 - 8.2 K/UL    ABS. LYMPHOCYTES 2.0 0.5 - 4.6 K/UL    ABS. MONOCYTES 0.5 0.1 - 1.3 K/UL    ABS. EOSINOPHILS 0.3 0.0 - 0.8 K/UL    ABS. BASOPHILS 0.1 0.0 - 0.2 K/UL    ABS. IMM. GRANS. 0.0 0.0 - 0.5 K/UL   METABOLIC PANEL, BASIC    Collection Time: 07/30/19  4:00 PM   Result Value Ref Range    Sodium 141 136 - 145 mmol/L    Potassium 4.6 3.5 - 5.1 mmol/L    Chloride 107 98 - 107 mmol/L    CO2 26 21 - 32 mmol/L    Anion gap 8 7 - 16 mmol/L    Glucose 97 65 - 100 mg/dL    BUN 29 (H) 8 - 23 MG/DL    Creatinine 5.33 (H) 0.6 - 1.0 MG/DL    GFR est AA 10 (L) >60 ml/min/1.73m2    GFR est non-AA 8 (L) >60 ml/min/1.73m2    Calcium 7.4 (L) 8.3 - 10.4 MG/DL     Labs reviewed. Hx of CKD. Results routed to surgeon.

## 2019-07-30 NOTE — ANESTHESIA PREPROCEDURE EVALUATION
Anesthetic History   No history of anesthetic complications            Review of Systems / Medical History  Patient summary reviewed and pertinent labs reviewed    Pulmonary    COPD (breathing at baseline): moderate      Smoker         Neuro/Psych       CVA      Comments: CVA with residual left sided weakness  Recent CVA - found to have carotid stenosis - to cont medical therapy with future CEA Cardiovascular    Hypertension          Past MI, CAD, PAD and cardiac stents ('12)    Exercise tolerance: <4 METS  Comments: ECHO 6/2019 LVEF 65-70, elevated gradients in LVOT with Valsalva, +diastolic dysfunction   GI/Hepatic/Renal     GERD: well controlled    Renal disease (peritoneal dialysis): CRI and ESRD       Endo/Other  Within defined limits           Other Findings              Physical Exam    Airway  Mallampati: II  TM Distance: 4 - 6 cm  Neck ROM: normal range of motion   Mouth opening: Normal     Cardiovascular    Rhythm: regular      Murmur    Comments: Bilat carotid bruits Dental    Dentition: Edentulous     Pulmonary  Breath sounds clear to auscultation               Abdominal         Other Findings            Anesthetic Plan    ASA: 4  Anesthesia type: general    Monitoring Plan: Arterial line      Induction: Intravenous  Anesthetic plan and risks discussed with: Patient and Family

## 2019-07-30 NOTE — PERIOP NOTES
Patient confirms name and . Order to obtain consent found in EHR and matches case posting. Patient is here with her daughter-in-law/caretaker  César Oropeza. All instructions reviewed with patient and daughter in law. Type 3 surgery,  assessment complete. Labs per surgeon: CBC, BMP    Labs per anesthesia protocol: T&S on DOS signed and held    EK19, Cardiac clearance 19; Echo 19 reviewed and approved by Dr Lisa Alejandre while in to assess the patient    Glucose:not indicated    Hibiclens and instructions given per orders Dr Merlyn Lee. Patient provided with and instructed on educational handouts including Guide to Surgery, Pain Management, Hand Hygiene, Blood Transfusion Education, and [facility] Brochure. Patient answered medical/surgical history questions at their best of ability. All prior to admission medications documented in [facility]. Patient instructed to continue previous medications as prescribed prior to surgery and to take the following medications the day of surgery according to anesthesia guidelines with a small sip of water: zofran. Patient instructed to hold all vitamins 7 days prior to surgery and NSAIDS 5 days prior to surgery, patient verbalized understanding. Medications to be held: none    Patient instructed on the following:  Arrive at main Entrance, time of arrival to be called the day before by 1700  NPO after midnight including gum, mints, and ice chips. Responsible adult must drive patient to the hospital, stay during surgery, and patient will require supervision 24 hours after anesthesia. Use hibiclens (provided) in shower the night before surgery and on the morning of surgery. Leave all valuables (money and jewelry) at home but bring insurance card and ID on       DOS. Do not wear make-up, nail polish, lotions, cologne, perfumes, powders, or oil on skin.     Patient teach back successful and patient demonstrates knowledge of instruction.

## 2019-07-31 ENCOUNTER — HOSPITAL ENCOUNTER (OUTPATIENT)
Age: 69
Setting detail: OBSERVATION
Discharge: HOME | End: 2019-08-01
Attending: EMERGENCY MEDICINE | Admitting: SURGERY
Payer: MEDICARE

## 2019-07-31 ENCOUNTER — APPOINTMENT (OUTPATIENT)
Dept: GENERAL RADIOLOGY | Age: 69
End: 2019-07-31
Attending: EMERGENCY MEDICINE
Payer: MEDICARE

## 2019-07-31 DIAGNOSIS — M79.672 LEFT FOOT PAIN: ICD-10-CM

## 2019-07-31 DIAGNOSIS — M79.89 SWELLING OF LEFT FOOT: ICD-10-CM

## 2019-07-31 DIAGNOSIS — I73.9 PAD (PERIPHERAL ARTERY DISEASE) (HCC): Primary | ICD-10-CM

## 2019-07-31 DIAGNOSIS — I70.222 ATHEROSCLEROSIS OF NATIVE ARTERIES OF EXTREMITIES WITH REST PAIN, LEFT LEG (HCC): ICD-10-CM

## 2019-07-31 LAB
ALBUMIN SERPL-MCNC: 2.8 G/DL (ref 3.2–4.6)
ALBUMIN/GLOB SERPL: 0.5 {RATIO} (ref 1.2–3.5)
ALP SERPL-CCNC: 71 U/L (ref 50–136)
ALT SERPL-CCNC: 6 U/L (ref 12–65)
ANION GAP SERPL CALC-SCNC: 11 MMOL/L (ref 7–16)
APTT PPP: 107.5 SEC (ref 24.7–39.8)
APTT PPP: 109.9 SEC (ref 24.7–39.8)
APTT PPP: 134.4 SEC (ref 24.7–39.8)
APTT PPP: 27.7 SEC (ref 24.7–39.8)
AST SERPL-CCNC: 21 U/L (ref 15–37)
ATRIAL RATE: 92 BPM
BASOPHILS # BLD: 0 K/UL (ref 0–0.2)
BASOPHILS # BLD: 0 K/UL (ref 0–0.2)
BASOPHILS NFR BLD: 1 % (ref 0–2)
BASOPHILS NFR BLD: 1 % (ref 0–2)
BILIRUB SERPL-MCNC: 0.4 MG/DL (ref 0.2–1.1)
BUN SERPL-MCNC: 28 MG/DL (ref 8–23)
CALCIUM SERPL-MCNC: 7.7 MG/DL (ref 8.3–10.4)
CALCULATED R AXIS, ECG10: 61 DEGREES
CALCULATED T AXIS, ECG11: -173 DEGREES
CHLORIDE SERPL-SCNC: 104 MMOL/L (ref 98–107)
CO2 SERPL-SCNC: 24 MMOL/L (ref 21–32)
CREAT SERPL-MCNC: 5.04 MG/DL (ref 0.6–1)
DIAGNOSIS, 93000: NORMAL
DIFFERENTIAL METHOD BLD: ABNORMAL
DIFFERENTIAL METHOD BLD: ABNORMAL
EOSINOPHIL # BLD: 0.1 K/UL (ref 0–0.8)
EOSINOPHIL # BLD: 0.1 K/UL (ref 0–0.8)
EOSINOPHIL NFR BLD: 2 % (ref 0.5–7.8)
EOSINOPHIL NFR BLD: 2 % (ref 0.5–7.8)
ERYTHROCYTE [DISTWIDTH] IN BLOOD BY AUTOMATED COUNT: 16.6 % (ref 11.9–14.6)
ERYTHROCYTE [DISTWIDTH] IN BLOOD BY AUTOMATED COUNT: 16.6 % (ref 11.9–14.6)
GLOBULIN SER CALC-MCNC: 5.2 G/DL (ref 2.3–3.5)
GLUCOSE SERPL-MCNC: 102 MG/DL (ref 65–100)
HCT VFR BLD AUTO: 34.6 % (ref 35.8–46.3)
HCT VFR BLD AUTO: 35.7 % (ref 35.8–46.3)
HGB BLD-MCNC: 11.1 G/DL (ref 11.7–15.4)
HGB BLD-MCNC: 11.4 G/DL (ref 11.7–15.4)
IMM GRANULOCYTES # BLD AUTO: 0 K/UL (ref 0–0.5)
IMM GRANULOCYTES # BLD AUTO: 0 K/UL (ref 0–0.5)
IMM GRANULOCYTES NFR BLD AUTO: 0 % (ref 0–5)
IMM GRANULOCYTES NFR BLD AUTO: 1 % (ref 0–5)
LYMPHOCYTES # BLD: 1.3 K/UL (ref 0.5–4.6)
LYMPHOCYTES # BLD: 2 K/UL (ref 0.5–4.6)
LYMPHOCYTES NFR BLD: 23 % (ref 13–44)
LYMPHOCYTES NFR BLD: 32 % (ref 13–44)
MCH RBC QN AUTO: 31.7 PG (ref 26.1–32.9)
MCH RBC QN AUTO: 31.9 PG (ref 26.1–32.9)
MCHC RBC AUTO-ENTMCNC: 31.9 G/DL (ref 31.4–35)
MCHC RBC AUTO-ENTMCNC: 32.1 G/DL (ref 31.4–35)
MCV RBC AUTO: 99.2 FL (ref 79.6–97.8)
MCV RBC AUTO: 99.4 FL (ref 79.6–97.8)
MONOCYTES # BLD: 0.3 K/UL (ref 0.1–1.3)
MONOCYTES # BLD: 0.4 K/UL (ref 0.1–1.3)
MONOCYTES NFR BLD: 5 % (ref 4–12)
MONOCYTES NFR BLD: 6 % (ref 4–12)
NEUTS SEG # BLD: 3.8 K/UL (ref 1.7–8.2)
NEUTS SEG # BLD: 3.9 K/UL (ref 1.7–8.2)
NEUTS SEG NFR BLD: 61 % (ref 43–78)
NEUTS SEG NFR BLD: 68 % (ref 43–78)
NRBC # BLD: 0 K/UL (ref 0–0.2)
NRBC # BLD: 0 K/UL (ref 0–0.2)
PLATELET # BLD AUTO: 210 K/UL (ref 150–450)
PLATELET # BLD AUTO: 225 K/UL (ref 150–450)
PMV BLD AUTO: 11.6 FL (ref 9.4–12.3)
PMV BLD AUTO: 11.7 FL (ref 9.4–12.3)
POTASSIUM SERPL-SCNC: 4 MMOL/L (ref 3.5–5.1)
PROT SERPL-MCNC: 8 G/DL (ref 6.3–8.2)
Q-T INTERVAL, ECG07: 334 MS
QRS DURATION, ECG06: 72 MS
QTC CALCULATION (BEZET), ECG08: 413 MS
RBC # BLD AUTO: 3.48 M/UL (ref 4.05–5.2)
RBC # BLD AUTO: 3.6 M/UL (ref 4.05–5.2)
SODIUM SERPL-SCNC: 139 MMOL/L (ref 136–145)
VENTRICULAR RATE, ECG03: 92 BPM
WBC # BLD AUTO: 5.8 K/UL (ref 4.3–11.1)
WBC # BLD AUTO: 6.3 K/UL (ref 4.3–11.1)

## 2019-07-31 PROCEDURE — 65270000029 HC RM PRIVATE

## 2019-07-31 PROCEDURE — 74011250636 HC RX REV CODE- 250/636: Performed by: EMERGENCY MEDICINE

## 2019-07-31 PROCEDURE — 36415 COLL VENOUS BLD VENIPUNCTURE: CPT

## 2019-07-31 PROCEDURE — 77030031642 HC BOOT FT ROOKE HFS OSBM -B

## 2019-07-31 PROCEDURE — 85730 THROMBOPLASTIN TIME PARTIAL: CPT

## 2019-07-31 PROCEDURE — 93005 ELECTROCARDIOGRAM TRACING: CPT | Performed by: EMERGENCY MEDICINE

## 2019-07-31 PROCEDURE — 96376 TX/PRO/DX INJ SAME DRUG ADON: CPT

## 2019-07-31 PROCEDURE — 74011250636 HC RX REV CODE- 250/636: Performed by: SURGERY

## 2019-07-31 PROCEDURE — 96375 TX/PRO/DX INJ NEW DRUG ADDON: CPT

## 2019-07-31 PROCEDURE — 73630 X-RAY EXAM OF FOOT: CPT

## 2019-07-31 PROCEDURE — 74011250637 HC RX REV CODE- 250/637: Performed by: SURGERY

## 2019-07-31 PROCEDURE — 96366 THER/PROPH/DIAG IV INF ADDON: CPT

## 2019-07-31 PROCEDURE — 74011250637 HC RX REV CODE- 250/637: Performed by: EMERGENCY MEDICINE

## 2019-07-31 PROCEDURE — 65390000012 HC CONDITION CODE 44 OBSERVATION

## 2019-07-31 PROCEDURE — 99284 EMERGENCY DEPT VISIT MOD MDM: CPT | Performed by: EMERGENCY MEDICINE

## 2019-07-31 PROCEDURE — 80053 COMPREHEN METABOLIC PANEL: CPT

## 2019-07-31 PROCEDURE — 96365 THER/PROPH/DIAG IV INF INIT: CPT

## 2019-07-31 PROCEDURE — 85025 COMPLETE CBC W/AUTO DIFF WBC: CPT

## 2019-07-31 PROCEDURE — 90945 DIALYSIS ONE EVALUATION: CPT

## 2019-07-31 RX ORDER — HEPARIN SODIUM 5000 [USP'U]/ML
60 INJECTION, SOLUTION INTRAVENOUS; SUBCUTANEOUS ONCE
Status: COMPLETED | OUTPATIENT
Start: 2019-07-31 | End: 2019-07-31

## 2019-07-31 RX ORDER — ACETAMINOPHEN 500 MG
1000 TABLET ORAL
Status: COMPLETED | OUTPATIENT
Start: 2019-07-31 | End: 2019-07-31

## 2019-07-31 RX ORDER — OXYCODONE AND ACETAMINOPHEN 7.5; 325 MG/1; MG/1
1 TABLET ORAL
Status: DISCONTINUED | OUTPATIENT
Start: 2019-07-31 | End: 2019-08-01 | Stop reason: HOSPADM

## 2019-07-31 RX ORDER — ATORVASTATIN CALCIUM 40 MG/1
80 TABLET, FILM COATED ORAL
Status: DISCONTINUED | OUTPATIENT
Start: 2019-07-31 | End: 2019-08-01 | Stop reason: HOSPADM

## 2019-07-31 RX ORDER — ASPIRIN 81 MG/1
81 TABLET ORAL
Status: DISCONTINUED | OUTPATIENT
Start: 2019-07-31 | End: 2019-08-01 | Stop reason: HOSPADM

## 2019-07-31 RX ORDER — ONDANSETRON 4 MG/1
8 TABLET, ORALLY DISINTEGRATING ORAL
Status: DISCONTINUED | OUTPATIENT
Start: 2019-07-31 | End: 2019-08-01 | Stop reason: HOSPADM

## 2019-07-31 RX ORDER — METOPROLOL SUCCINATE 50 MG/1
50 TABLET, EXTENDED RELEASE ORAL EVERY EVENING
Status: DISCONTINUED | OUTPATIENT
Start: 2019-07-31 | End: 2019-08-01 | Stop reason: HOSPADM

## 2019-07-31 RX ORDER — POTASSIUM CHLORIDE 20 MEQ/1
20 TABLET, EXTENDED RELEASE ORAL DAILY
Status: DISCONTINUED | OUTPATIENT
Start: 2019-07-31 | End: 2019-08-01 | Stop reason: HOSPADM

## 2019-07-31 RX ORDER — ONDANSETRON 2 MG/ML
4 INJECTION INTRAMUSCULAR; INTRAVENOUS
Status: COMPLETED | OUTPATIENT
Start: 2019-07-31 | End: 2019-07-31

## 2019-07-31 RX ORDER — HEPARIN SODIUM 5000 [USP'U]/100ML
12-25 INJECTION, SOLUTION INTRAVENOUS
Status: DISCONTINUED | OUTPATIENT
Start: 2019-07-31 | End: 2019-08-01 | Stop reason: HOSPADM

## 2019-07-31 RX ORDER — AMLODIPINE BESYLATE 10 MG/1
10 TABLET ORAL EVERY EVENING
Status: DISCONTINUED | OUTPATIENT
Start: 2019-07-31 | End: 2019-08-01 | Stop reason: HOSPADM

## 2019-07-31 RX ORDER — MORPHINE SULFATE 2 MG/ML
4 INJECTION, SOLUTION INTRAMUSCULAR; INTRAVENOUS ONCE
Status: COMPLETED | OUTPATIENT
Start: 2019-07-31 | End: 2019-07-31

## 2019-07-31 RX ORDER — HYDRALAZINE HYDROCHLORIDE 10 MG/1
10 TABLET, FILM COATED ORAL DAILY
COMMUNITY
End: 2020-10-28

## 2019-07-31 RX ADMIN — POTASSIUM CHLORIDE 20 MEQ: 20 TABLET, EXTENDED RELEASE ORAL at 09:48

## 2019-07-31 RX ADMIN — OXYCODONE HYDROCHLORIDE AND ACETAMINOPHEN 1 TABLET: 7.5; 325 TABLET ORAL at 09:48

## 2019-07-31 RX ADMIN — Medication 1 AMPULE: at 09:48

## 2019-07-31 RX ADMIN — ASPIRIN 81 MG: 81 TABLET ORAL at 21:52

## 2019-07-31 RX ADMIN — AMLODIPINE BESYLATE 10 MG: 10 TABLET ORAL at 21:52

## 2019-07-31 RX ADMIN — OXYCODONE HYDROCHLORIDE AND ACETAMINOPHEN 1 TABLET: 7.5; 325 TABLET ORAL at 17:28

## 2019-07-31 RX ADMIN — HEPARIN SODIUM 3000 UNITS: 5000 INJECTION, SOLUTION INTRAVENOUS; SUBCUTANEOUS at 09:53

## 2019-07-31 RX ADMIN — Medication 1 AMPULE: at 21:52

## 2019-07-31 RX ADMIN — ACETAMINOPHEN 1000 MG: 500 TABLET ORAL at 04:48

## 2019-07-31 RX ADMIN — HEPARIN SODIUM 12 UNITS/KG/HR: 5000 INJECTION, SOLUTION INTRAVENOUS at 09:53

## 2019-07-31 RX ADMIN — ATORVASTATIN CALCIUM 80 MG: 40 TABLET, FILM COATED ORAL at 21:52

## 2019-07-31 RX ADMIN — ONDANSETRON 4 MG: 2 INJECTION INTRAMUSCULAR; INTRAVENOUS at 07:56

## 2019-07-31 RX ADMIN — MORPHINE SULFATE 4 MG: 2 INJECTION, SOLUTION INTRAMUSCULAR; INTRAVENOUS at 07:56

## 2019-07-31 RX ADMIN — METOPROLOL SUCCINATE 50 MG: 50 TABLET, EXTENDED RELEASE ORAL at 21:52

## 2019-07-31 NOTE — PROGRESS NOTES
TRANSFER - IN REPORT:    Verbal report received from Selam Chase, [facility] on [address]  being received from ED for routine progression of care      Report consisted of patients Situation, Background, Assessment and   Recommendations(SBAR). Information from the following report(s) SBAR, Kardex and ED Summary was reviewed with the receiving nurse. Opportunity for questions and clarification was provided. Assessment completed upon patients arrival to unit and care assumed.

## 2019-07-31 NOTE — ED PROVIDER NOTES
64-year-old female w/  PMHx of PAD, ESRD on PD presents with complaint of left foot pain over the past 2-3 days. States that several days ago she asked him they rolled over her left foot with her wheelchair. States that she has had mild pain localized to left foot and mild amount of swelling localized to the left foot as well. Denies numbness, tingling, weakness, fever, chills, left ankle pain, chest pain, shortness of breath. VICKI in June 2019 w/ left leg w/ VICKI of 0.12 with a toe pressure of 0. Angioraphy on 07/01/19 w/ the left common femoral artery abruptly occluded, no flow seen within the profunda or the SFA. The common femoral artery is abruptly occluded. There is no flow seen within the profunda or the SFA. The popliteal artery reconstitutes above the knee. Patient scheduled to undergo left femoral to above-the-knee- popliteal bypass on 8/06/19 w/ Dr. Marcia Morales. The history is provided by the patient. No  was used. Foot Injury    This is a new problem. The current episode started 2 days ago. The problem occurs constantly. The problem has not changed since onset. The pain is present in the left foot. The quality of the pain is described as dull. The pain is at a severity of 2/10. The pain is mild. Pertinent negatives include no numbness, full range of motion, no stiffness, no tingling, no itching and no back pain. She has tried nothing for the symptoms. The treatment provided no relief.         Past Medical History:   Diagnosis Date    Anemia     denies hx of blood transfusion    Arrhythmia     murmur; sinus tach    Aspirin long-term use SINAN    continue    CAD (coronary artery disease) 2012    MI, 1 stent 2012-    Chronic kidney disease     peritoneal dialysis qPM    Chronic obstructive pulmonary disease (HCC)     no inhalers; no treatment; smokes 1 ppd since age 19    CKD (chronic kidney disease) stage 3, GFR 30-59 ml/min ([facility])           ESRD on [facility] [address]     Flat affect     GERD (gastroesophageal reflux disease)     resolved per patient; no current meds 7/30/19    Heart murmur not followed per cardiology    EF 65-70 %-- on echo 11/2017    History of MI (myocardial infarction) 2012    stents x 1---- per pt-- does not see a cardiologist    Hx of colonic polyps 2016    adenoma    Hydronephrosis     Hypertension     daily meds    Left-sided weakness 2012    left leg r/t cva    Loss of appetite     states within the last several months    Peritoneal dialysis catheter in place [facility])     patient does dialysis QPM    Poor historian     Renal atrophy, right     Renal insufficiency     ? --- pt unsure if sees nephrologist    Smoker     48 yr hx/ 1 pk per day    Smoker     1 ppd sicne age 19    Stented coronary artery Xience SINAN    proximal LAD 2012-- pt states does not see a cardio    Stroke ([facility] [address] 2012    with slight left  sided weakness residual       Past Surgical History:   Procedure Laterality Date    COLONOSCOPY N/A 9/26/2016    Pedro--one desc TA, one rectal hyperplastic--5 year recall    HX CAROTID ENDARTERECTOMY Right 11/07/2018    HX UROLOGICAL      stent to left kidney--multi times    [place] LEFT HEART CATH,PERCUTANEOUS  4/2012    Xience LAD    VASCULAR SURGERY PROCEDURE UNLIST  02/28/2019    PD catheter placement    VASCULAR SURGERY PROCEDURE UNLIST  07/01/2019    ULTRASOUND GUIDED ACCESS BILATERAL LOWER EXTREMITY ARTERIOGRAM (Left Groin)         Family History:   Problem Relation Age of Onset    Hypertension Mother     Heart Disease Mother     Stroke Father     No Known Problems Sister     No Known Problems Brother     No Known Problems Sister     No Known Problems Sister     No Known Problems Sister     No Known Problems Brother     No Known Problems Brother        Social History     Socioeconomic History    Marital status:      Spouse name: Not on file    Number of children: Not on file    Years of education: Not on file   Cecily Malone Highest education level: Not on file   Occupational History    Not on file   Social Needs    Financial resource strain: Not on file    Food insecurity:     Worry: Not on file     Inability: Not on file    Transportation needs:     Medical: Not on file     Non-medical: Not on file   Tobacco Use    Smoking status: Current Every Day Smoker     Packs/day: 1.00     Years: 51.00     Pack years: 51.00    Smokeless tobacco: Never Used   Substance and Sexual Activity    Alcohol use: No    Drug use: No    Sexual activity: Not on file   Lifestyle    Physical activity:     Days per week: Not on file     Minutes per session: Not on file    Stress: Not on file   Relationships    Social connections:     Talks on phone: Not on file     Gets together: Not on file     Attends Congregational service: Not on file     Active member of club or organization: Not on file     Attends meetings of clubs or organizations: Not on file     Relationship status: Not on file    Intimate partner violence:     Fear of current or ex partner: Not on file     Emotionally abused: Not on file     Physically abused: Not on file     Forced sexual activity: Not on file   Other Topics Concern    Not on file   Social History Narrative    Not on file         ALLERGIES: Codeine and Hydralazine    Review of Systems   Constitutional: Negative for fatigue and fever. HENT: Negative. Respiratory: Negative for cough and shortness of breath. Cardiovascular: Negative for chest pain and palpitations. Gastrointestinal: Negative for abdominal pain, nausea and vomiting. Genitourinary: Negative. Musculoskeletal: Positive for arthralgias. Negative for back pain, gait problem, myalgias and stiffness. Skin: Negative for color change, itching, rash and wound. Neurological: Negative for dizziness, tingling, numbness and headaches.        Vitals:    07/31/19 0404   BP: 159/69   Pulse: 81   Resp: 16   Temp: 98.1 °F (36.7 °C)   SpO2: 100%   Weight: 49.9 kg (110 lb)   Height: 5' (1.524 m)            Physical Exam   Constitutional: She is oriented to person, place, and time. She appears well-developed and well-nourished. Well appearing and in NAD. HENT:   Head: Normocephalic. Mouth/Throat: Oropharynx is clear and moist.   MMM. Eyes: Pupils are equal, round, and reactive to light. Cardiovascular: Normal rate, regular rhythm, normal heart sounds and intact distal pulses. RRR. Pulses 2+ and equal bilaterally. Pulmonary/Chest: Effort normal and breath sounds normal.   Abdominal: Soft. There is no tenderness. Soft, NTND. Musculoskeletal: Normal range of motion. Left ankle: She exhibits swelling. She exhibits normal range of motion, no ecchymosis, no deformity, no laceration and normal pulse. Feet:    No dopplerable DP pulse on left. No calf TTP. Strengtht 5/5 throughout. Full range of motion of the left lower extremity. Able to doppler R DP pulse. Dark discoloration noted to left second through fifth toes (darker in color with comparison to R). Neurological: She is alert and oriented to person, place, and time. No cranial nerve deficit. Strength 5/5 throughout. Normal sensory. Skin: Skin is warm and dry. No rash. Nursing note and vitals reviewed. MDM  Number of Diagnoses or Management Options  Left foot pain: new and requires workup  PAD (peripheral artery disease) ([facility] [address]): new and requires workup  Swelling of left foot: new and requires workup  Diagnosis management comments: XR neg for acute findings; no fracture. Given patient significant history for PAD and recent findings on angiography and VICIK, vascular surgery can contacted Dr. Cain Hubbard on call. States that he will present to bedside and the patient will need to be admitted. States the patient will need to have surgery expedited. Labs are initially ordered on patient. CBC, CMP pending. Dr. Cain Hubbard states that patient does not need repeat VICKI this morning.   No recommendations made in regards to starting Heparin. Vascular to admit. Amount and/or Complexity of Data Reviewed  Clinical lab tests: ordered and reviewed  Tests in the radiology section of CPT®: ordered and reviewed  Tests in the medicine section of CPT®: ordered and reviewed  Review and summarize past medical records: yes  Discuss the patient with other providers: yes  Independent visualization of images, tracings, or specimens: yes    Risk of Complications, Morbidity, and/or Mortality  Presenting problems: low  Diagnostic procedures: low  Management options: low    Patient Progress  Patient progress: stable    ED Course as of Jul 31 0617 Wed Jul 31, 2019   0509 XR L foot IMPRESSION:    1. No acute fracture is seen. 2. Soft tissue swelling.    [DF]      ED Course User Index  [DF] Max Aceves MD       EKG  Date/Time: 7/31/2019 6:52 AM  Performed by: Max Aceves MD  Authorized by:  Max Aceves MD     ECG reviewed by ED Physician in the absence of a cardiologist: yes    Rate:     ECG rate:  92    ECG rate assessment: normal    Rhythm:     Rhythm: junctional    Ectopy:     Ectopy: none    QRS:     QRS axis:  Normal    QRS intervals:  Normal  Conduction:     Conduction: normal    ST segments:     ST segments:  Normal  T waves:     T waves: normal          Results Include:    Recent Results (from the past 24 hour(s))   CBC WITH AUTOMATED DIFF    Collection Time: 07/30/19  4:00 PM   Result Value Ref Range    WBC 6.9 4.3 - 11.1 K/uL    RBC 3.40 (L) 4.05 - 5.2 M/uL    HGB 10.9 (L) 11.7 - 15.4 g/dL    HCT 33.9 (L) 35.8 - 46.3 %    MCV 99.7 (H) 79.6 - 97.8 FL    MCH 32.1 26.1 - 32.9 PG    MCHC 32.2 31.4 - 35.0 g/dL    RDW 17.1 (H) 11.9 - 14.6 %    PLATELET 124 951 - 406 K/uL    MPV 11.3 9.4 - 12.3 FL    ABSOLUTE NRBC 0.00 0.0 - 0.2 K/uL    DF AUTOMATED      NEUTROPHILS 60 43 - 78 %    LYMPHOCYTES 28 13 - 44 %    MONOCYTES 7 4.0 - 12.0 %    EOSINOPHILS 4 0.5 - 7.8 % BASOPHILS 1 0.0 - 2.0 %    IMMATURE GRANULOCYTES 0 0.0 - 5.0 %    ABS. NEUTROPHILS 4.1 1.7 - 8.2 K/UL    ABS. LYMPHOCYTES 2.0 0.5 - 4.6 K/UL    ABS. MONOCYTES 0.5 0.1 - 1.3 K/UL    ABS. EOSINOPHILS 0.3 0.0 - 0.8 K/UL    ABS. BASOPHILS 0.1 0.0 - 0.2 K/UL    ABS. IMM. GRANS. 0.0 0.0 - 0.5 K/UL   METABOLIC PANEL, BASIC    Collection Time: 07/30/19  4:00 PM   Result Value Ref Range    Sodium 141 136 - 145 mmol/L    Potassium 4.6 3.5 - 5.1 mmol/L    Chloride 107 98 - 107 mmol/L    CO2 26 21 - 32 mmol/L    Anion gap 8 7 - 16 mmol/L    Glucose 97 65 - 100 mg/dL    BUN 29 (H) 8 - 23 MG/DL    Creatinine 5.33 (H) 0.6 - 1.0 MG/DL    GFR est AA 10 (L) >60 ml/min/1.73m2    GFR est non-AA 8 (L) >60 ml/min/1.73m2    Calcium 7.4 (L) 8.3 - 10.4 MG/DL   CBC WITH AUTOMATED DIFF    Collection Time: 07/31/19  6:17 AM   Result Value Ref Range    WBC 5.8 4.3 - 11.1 K/uL    RBC 3.60 (L) 4.05 - 5.2 M/uL    HGB 11.4 (L) 11.7 - 15.4 g/dL    HCT 35.7 (L) 35.8 - 46.3 %    MCV 99.2 (H) 79.6 - 97.8 FL    MCH 31.7 26.1 - 32.9 PG    MCHC 31.9 31.4 - 35.0 g/dL    RDW 16.6 (H) 11.9 - 14.6 %    PLATELET 738 497 - 625 K/uL    MPV 11.6 9.4 - 12.3 FL    ABSOLUTE NRBC 0.00 0.0 - 0.2 K/uL    DF AUTOMATED      NEUTROPHILS 68 43 - 78 %    LYMPHOCYTES 23 13 - 44 %    MONOCYTES 6 4.0 - 12.0 %    EOSINOPHILS 2 0.5 - 7.8 %    BASOPHILS 1 0.0 - 2.0 %    IMMATURE GRANULOCYTES 1 0.0 - 5.0 %    ABS. NEUTROPHILS 3.9 1.7 - 8.2 K/UL    ABS. LYMPHOCYTES 1.3 0.5 - 4.6 K/UL    ABS. MONOCYTES 0.4 0.1 - 1.3 K/UL    ABS. EOSINOPHILS 0.1 0.0 - 0.8 K/UL    ABS. BASOPHILS 0.0 0.0 - 0.2 K/UL    ABS. IMM. GRANS. 0.0 0.0 - 0.5 K/UL           Jose Catalan MD; 7/31/2019 @4:09 AM Voice dictation software was used during the making of this note. This software is not perfect and grammatical and other typographical errors may be present.   This note has not been proofread for errors.  ===================================================================

## 2019-07-31 NOTE — ED TRIAGE NOTES
Patient reports running over left foot with wheelchair 3 days ago. Some swelling noted.  MD to triage for assessment

## 2019-07-31 NOTE — PROGRESS NOTES
07/31/19 0912   Dual Skin Pressure Injury Assessment   Dual Skin Pressure Injury Assessment WDL   Second Care Provider (Based on Facility Policy) Misty Wright RN   Skin Integumentary   Skin Integumentary (WDL) X   Skin Color Appropriate for ethnicity   Skin Condition/Temp Flaky;Dry;Cool; Warm  (LLE cool)   Skin Integrity Other (comment)  (PD access)   Turgor Leathery   Hair Growth Sparce   Varicosities Absent   Wound Prevention and Protection Methods   Orientation of Wound Prevention Posterior   Location of Wound Prevention Sacrum/Coccyx   Dressing Present  No   Wound Offloading (Prevention Methods) Bed, pressure reduction mattress;Pillows;Repositioning     Pt's sacrum intact with no s/sx of breakdown. Skin is leathery and flaky.   LLE cool to the touch

## 2019-07-31 NOTE — ED NOTES
TRANSFER - OUT REPORT:    Verbal report given to Dia Valiente RN (name) on Rudene Stairs  being transferred to [facility] [phone] (unit) for routine progression of care       Report consisted of patients Situation, Background, Assessment and   Recommendations(SBAR). Information from the following report(s) SBAR was reviewed with the receiving nurse. Lines:   Peripheral IV 07/31/19 Left Antecubital (Active)   Site Assessment Clean, dry, & intact 7/31/2019  6:18 AM   Phlebitis Assessment 0 7/31/2019  6:18 AM   Infiltration Assessment 0 7/31/2019  6:18 AM   Dressing Status Clean, dry, & intact 7/31/2019  6:18 AM        Opportunity for questions and clarification was provided.       Patient transported with:   [employer]

## 2019-07-31 NOTE — PROGRESS NOTES
Patient see in PM rounds, alone without family in room (which smells of stale cigarette). Patient dozing but alerts easily, reports pain last night kept her from sleeping, became unbearable prompting ED presentation. Reports less pain this afternoon. AVSS, A&Ox3  Clear breath sounds bilaterally  RRR, harsh ejection murmur over superior sternal border  Abd soft, slightly protuberant but NTND, +BS, PD catheter in place  Feet slightly cool but consistent bilaterally, sensorimotor intact, left mildly edematous, no erythema, no lesions / ulcerations / wounds to feet  Right PT palpable, ? palp DP; left +DP, PT signals with Doppler    A/P:  Continue care  Rooke boots when not ambulating  Will reassess tomorrow AM      Maggie Williamson PA-C  Physician Assistant with [facility]ascular Surgery  Demond Ching.  Marleen Keller MD / Dania High MD

## 2019-07-31 NOTE — H&P
[facility]   [address] [place] [address] Kyle Valiente. [address], [address] [facility] [address]  Phone: [phone]  Fax: [phone]    Subjective:      Billie Zhang is a 68 y.o. female who presents to the ED for evaluation of worsening left foot pain. She is a patient of Dr Grupo Sandy and had an a-gram 7-1-19 that showed occlusion of the left CFA/PFA/SFA with reconst of the AK pop. A left fem-pop has been planned for next week and she just had her pre-op clearance from cardiology and the vein mapping was done yesterday in the vascular office. She denies any additional LLE weakness recently but she did have a stroke 3 years ago that has resulted in left leg weakness. She is a minimal ambulator - primarily just into and out of a wheelchair. She has required assistance to even position herself in the ER gurney today. ER physician called me earlier - said increasing pain, no Doppler signal in left foot.     Patient does PD at [facility] - Dr. Frances Cuello is her nephrologist.    Past Medical History:   Diagnosis Date    Anemia     denies hx of blood transfusion    Arrhythmia     murmur; sinus tach    Aspirin long-term use SINAN    continue    CAD (coronary artery disease) 2012    MI, 1 stent 2012-    Chronic kidney disease     peritoneal dialysis qPM    Chronic obstructive pulmonary disease (HCC)     no inhalers; no treatment; smokes 1 ppd since age 19    CKD (chronic kidney disease) stage 3, GFR 30-59 ml/min [facility]           ESRD on peritoneal dialysis ([address]     Flat affect     GERD (gastroesophageal reflux disease)     resolved per patient; no current meds 7/30/19    Heart murmur not followed per cardiology    EF 65-70 %-- on echo 11/2017    History of MI (myocardial infarction) 2012    stents x 1---- per pt-- does not see a cardiologist    Hx of colonic polyps 2016    adenoma    Hydronephrosis     Hypertension     daily meds    Left-sided weakness 2012    left leg r/t cva    Loss of appetite     states within the last several months    Peritoneal dialysis catheter in place [facility])     patient does dialysis QPM    Poor historian     Renal atrophy, right     Renal insufficiency     ? --- pt unsure if sees nephrologist    Smoker     46 yr hx/ 1 pk per day    Smoker     1 ppd sicne age 19    Stented coronary artery Xience SINAN    proximal LAD 2012-- pt states does not see a cardio    Stroke ([address] 2012    with slight left  sided weakness residual     Past Surgical History:   Procedure Laterality Date    COLONOSCOPY N/A 9/26/2016    Pedro--one desc TA, one rectal hyperplastic--5 year recall    HX CAROTID ENDARTERECTOMY Right 11/07/2018    HX UROLOGICAL      stent to left kidney--multi times    [place] LEFT HEART CATH,PERCUTANEOUS  4/2012    Xience LAD    VASCULAR SURGERY PROCEDURE UNLIST  02/28/2019    PD catheter placement    VASCULAR SURGERY PROCEDURE UNLIST  07/01/2019    ULTRASOUND GUIDED ACCESS BILATERAL LOWER EXTREMITY ARTERIOGRAM (Left Groin)      Family History   Problem Relation Age of Onset    Hypertension Mother     Heart Disease Mother     Stroke Father     No Known Problems Sister     No Known Problems Brother     No Known Problems Sister     No Known Problems Sister     No Known Problems Sister     No Known Problems Brother     No Known Problems Brother      Social History     Socioeconomic History    Marital status:      Spouse name: Not on file    Number of children: Not on file    Years of education: Not on file    Highest education level: Not on file   Tobacco Use    Smoking status: Current Every Day Smoker     Packs/day: 1.00     Years: 51.00     Pack years: 51.00    Smokeless tobacco: Never Used   Substance and Sexual Activity    Alcohol use: No    Drug use: No      Current Facility-Administered Medications   Medication Dose Route Frequency    morphine injection 4 mg  4 mg IntraVENous ONCE    ondansetron (ZOFRAN) injection 4 mg  4 mg IntraVENous NOW     Current Outpatient Medications   Medication Sig    potassium chloride (K-DUR, KLOR-CON) 20 mEq tablet Take 20 mEq by mouth daily.  atorvastatin (LIPITOR) 80 mg tablet Take 80 mg by mouth nightly.  ondansetron (ZOFRAN ODT) 8 mg disintegrating tablet Take 1 Tab by mouth every eight (8) hours as needed for Nausea.  aspirin delayed-release 81 mg tablet Take  by mouth nightly.  metoprolol succinate (TOPROL-XL) 50 mg XL tablet Take 1 Tab by mouth daily. (Patient taking differently: Take 50 mg by mouth nightly.)    amLODIPine (NORVASC) 10 mg tablet Take 1 Tab by mouth daily. (Patient taking differently: Take 10 mg by mouth nightly.)      Allergies   Allergen Reactions    Codeine Nausea and Vomiting    Hydralazine Nausea and Vomiting       Review of Systems:  A comprehensive review of systems was negative except for that written in the History of Present Illness. Objective:     Patient Vitals for the past 8 hrs:   BP Temp Pulse Resp SpO2 Height Weight   19 0637 147/66  89  100 %     19 0404 159/69 98.1 °F (36.7 °C) 81 16 100 % 5' (1.524 m) 49.9 kg (110 lb)     Temp (24hrs), Av.1 °F (36.7 °C), Min:98.1 °F (36.7 °C), Max:98.1 °F (36.7 °C)      Physical Exam:  Visit Vitals  /66   Pulse 89   Temp 98.1 °F (36.7 °C)   Resp 16   Ht 5' (1.524 m)   Wt 49.9 kg (110 lb)   SpO2 100%   BMI 21.48 kg/m²     General appearance: alert, cooperative, mild distress, appears stated age  Head: Normocephalic, without obvious abnormality, atraumatic  Lungs: clear to auscultation bilaterally  Heart: regular rate and rhythm, S1, S2 normal, no murmur, click, rub or gallop  Abdomen: PD cath in place. Extremities: extremities normal, atraumatic, no cyanosis or edema  Pulses: + PT and DP Doppler signals on the right foot, no Doppler signals on left foot. Neurologic: She does have motor function and sensation in both feet. Assessment:     Worsening LLE rest pain. Will admit. Discussed with Dr. Crystal Cordova.       Plan: Admit  IV heparin  Pain control  Dialysis per [facility]    Signed By: Soraya Power MD     July 31, 2019      Elements of this note have been dictated using speech recognition software. As a result, errors of speech recognition may have occurred.

## 2019-07-31 NOTE — ED NOTES
This RN to bedside with doppler to attempt finding pulse in left foot.  Unable to locate, MD notified

## 2019-07-31 NOTE — CONSULTS
[facility] CONSULT NOTE    Admission Date:  7/31/2019    Admission Diagnosis:  Atherosclerosis of native arteries of extremities with rest pain, left leg [facility]) [I70.222]    Consulting physician: Alexandru Yao MD    Reason for consult: ESRD on CCPD    Subjective:   History of Present Illness: Paula Love is a 68year old AAF with ESRD on CCPD who presents to the ED today with worsening left foot pain. She has a PMH of HTN, COPD, PVD, carotid stenosis, CAD s/p PCI, remote CVA, depression, and HLD. She is being followed by vascular surgery for PVD. She had an arteriogram on 7/1/19 showing occlusion of the left CFA/PFA/SFA wotj popliteal artery reconstitution above the knee. A left fem-pop has been planned for next week on 8/6/19 with Dr. Alexus Arroyo was found to have no doppler signal on the left and vascular surgery was called for admission. She is on IV heparin and pain control. Nephrology is consulted for ESRD management.      Past Medical History:   Diagnosis Date    Anemia     denies hx of blood transfusion    Arrhythmia     murmur; sinus tach    Aspirin long-term use SINAN    continue    CAD (coronary artery disease) 2012    MI, 1 stent 2012-    Chronic kidney disease     peritoneal dialysis qPM    Chronic obstructive pulmonary disease (HCC)     no inhalers; no treatment; smokes 1 ppd since age 19    CKD (chronic kidney disease) stage 3, GFR 30-59 ml/min ([facility])           [facility]D on [facility]     Flat affect     GERD (gastroesophageal reflux disease)     resolved per patient; no current meds 7/30/19    Heart murmur not followed per cardiology    EF 65-70 %-- on echo 11/2017    History of MI (myocardial infarction) 2012    stents x 1---- per pt-- does not see a cardiologist    Hx of colonic polyps 2016    adenoma    Hydronephrosis     Hypertension     daily meds    Left-sided weakness 2012    left leg r/t cva    Loss of appetite     states within the last several months    Peritoneal dialysis catheter in place [facility])     patient does dialysis QPM    Poor historian     Renal atrophy, right     Renal insufficiency     ? --- pt unsure if sees nephrologist    Smoker     48 yr hx/ 1 pk per day    Smoker     1 ppd sicne age 19    Stented coronary artery Xience SINAN    proximal LAD 2012-- pt states does not see a cardio    Stroke ([address] 2012    with slight left  sided weakness residual      Past Surgical History:   Procedure Laterality Date    COLONOSCOPY N/A 9/26/2016    Pedro--one desc TA, one rectal hyperplastic--5 year recall    HX CAROTID ENDARTERECTOMY Right 11/07/2018    HX UROLOGICAL      stent to left kidney--multi times    PA LEFT HEART CATH,PERCUTANEOUS  4/2012    Xience LAD    VASCULAR SURGERY PROCEDURE UNLIST  02/28/2019    PD catheter placement    VASCULAR SURGERY PROCEDURE UNLIST  07/01/2019    ULTRASOUND GUIDED ACCESS BILATERAL LOWER EXTREMITY ARTERIOGRAM (Left Groin)      Current Facility-Administered Medications   Medication Dose Route Frequency    metoprolol succinate (TOPROL-XL) XL tablet 50 mg  50 mg Oral QPM    amLODIPine (NORVASC) tablet 10 mg  10 mg Oral QPM    aspirin delayed-release tablet 81 mg  81 mg Oral QHS    ondansetron (ZOFRAN ODT) tablet 8 mg  8 mg Oral Q8H PRN    atorvastatin (LIPITOR) tablet 80 mg  80 mg Oral QHS    potassium chloride (K-DUR, KLOR-CON) SR tablet 20 mEq  20 mEq Oral DAILY    oxyCODONE-acetaminophen (PERCOCET 7.5) 7.5-325 mg per tablet 1 Tab  1 Tab Oral Q6H PRN    heparin 25,000 units in dextrose 500 mL infusion  12-25 Units/kg/hr IntraVENous TITRATE    alcohol 62% (NOZIN) nasal  1 Ampule  1 Ampule Topical Q12H     Allergies   Allergen Reactions    Codeine Nausea and Vomiting    Hydralazine Nausea and Vomiting      Social History     Tobacco Use    Smoking status: Current Every Day Smoker     Packs/day: 1.00     Years: 51.00     Pack years: 51.00    Smokeless tobacco: Never Used   Substance Use Topics    Alcohol use: No      Family History   Problem Relation Age of Onset    Hypertension Mother     Heart Disease Mother     Stroke Father     No Known Problems Sister     No Known Problems Brother     No Known Problems Sister     No Known Problems Sister     No Known Problems Sister     No Known Problems Brother     No Known Problems Brother         Review of Systems  Gen - no fever, no chills, appetite okay  HEENT - no sore throat, no decreased vision, no hearing loss  Neck - no neck mass  CV - no chest pain, no palpitation, no orthopnea  Lung - no shortness of breath, no cough, no hemoptysis  Abd - no tenderness, no nausea/vomiting, no bloody stool  Ext - no edema, no clubbing, no cyanosis, left foot pain  Musculoskeletal - no joint pain, no back pain  Neurologic - no headaches, no dizziness, no seizures  Psychiatric - no anxiety, no depression  Skin - no rashes, no pupura  Genitourinary - no decreased urine output, no hematuria, no foamy urine    Objective:   Vitals:    07/31/19 0404 07/31/19 0637 07/31/19 0820 07/31/19 0928   BP: 159/69 147/66 160/72 175/82   Pulse: 81 89 76 83   Resp: 16   17   Temp: 98.1 °F (36.7 °C)   97.7 °F (36.5 °C)   SpO2: 100% 100% 95% 99%   Weight: 49.9 kg (110 lb)   53.4 kg (117 lb 12.8 oz)   Height: 5' (1.524 m)        No intake or output data in the 24 hours ending 07/31/19 1140    Physical Exam  GEN :in no distress, alert and oriented  HEENT: anicteric sclerae, eomi. Oropharynx without lesions. Mucous membranes are moist.  Neck - supple without JVD, no thyromegaly. No lymphadenopathy. CV - regular rate and rhythm, no murmur, no rub  Lung - clear bilaterally, lungs expand symmetrically  Chest wall - normal appearance  Abd - soft, nontender, bowel sounds present, no hepatosplenomegaly.  PD cath intact  Ext - no clubbing, no cyanosis, no edema  Neurologic - nonfocal  Genitourinary - bladder nonpalpable  Skin - no rashes, no purpura, no ecchymoses  Psychiatric: Normal mood and affect. Data Review:   Recent Labs     07/31/19  0917 07/31/19  0617 07/30/19  1600   WBC 6.3 5.8 6.9   HGB 11.1* 11.4* 10.9*   HCT 34.6* 35.7* 33.9*    225 207     Recent Labs     07/31/19  0617 07/30/19  1600    141   K 4.0 4.6    107   CO2 24 26   BUN 28* 29*   CREA 5.04* 5.33*   * 97   CA 7.7* 7.4*     No results for input(s): PH, PCO2, PO2, PCO2 in the last 72 hours.     Problem List:     Patient Active Problem List    Diagnosis Date Noted    Atherosclerosis of native arteries of extremities with rest pain, left leg ([address] 07/31/2019    Preop cardiovascular exam 07/29/2019    ESRD on peritoneal dialysis ([address] 07/29/2019    Atherosclerosis of native coronary artery of native heart without angina pectoris 07/29/2019    PAD (peripheral artery disease) ([address] 06/19/2019    Atherosclerosis of native arteries of extremity with intermittent claudication ([address] 06/19/2019    Ischemia 06/19/2019    Shortness of breath 06/04/2019    History of right-sided carotid endarterectomy 11/08/2018    Carotid stenosis, asymptomatic, right 11/06/2018    Carotid stenosis, right 11/06/2018    Bilateral carotid artery disease ([address] 08/29/2018    Bilateral carotid artery stenosis 11/28/2017    Pure hypercholesterolemia 11/28/2017    UTI (urinary tract infection) 11/10/2017    Stroke ([address] 11/10/2017    S/P coronary artery stent placement 02/06/2017    CKD stage 5 secondary to hypertension ([address] 02/06/2017    Major depressive disorder with single episode, in remission ([address] 02/06/2017    Hydronephrosis 02/12/2016    History of CVA (cerebrovascular accident) 02/11/2016    Anemia of renal disease 01/07/2016    Benign essential HTN 01/07/2016    Coronary artery disease involving native coronary artery with angina pectoris ([address] 01/07/2016    CVA (cerebral vascular accident) ([address] 04/28/2012    Hypertensive emergency 04/26/2012    Carotid artery bruit 04/26/2012    CKD (chronic kidney disease) stage 3, GFR 30-59 ml/min (HCC) 04/26/2012    Tobacco use disorder 04/26/2012    GERD (gastroesophageal reflux disease) 04/26/2012    Chest pain, unspecified 04/26/2012    Anemia 04/26/2012       Assessment and Plan:  ESRD on CCPD  - resume home PD regimen tonight.  Use all 2.5% solutions    PAD with worsening LLE rest pain  - vascular surgery following  - heparin drip  - pain control    HTN  - resume home meds    Anemia  - in goal range  - follow labs      Heidi Labs, ACNP

## 2019-08-01 VITALS
TEMPERATURE: 98.5 F | OXYGEN SATURATION: 98 % | HEIGHT: 60 IN | HEART RATE: 85 BPM | BODY MASS INDEX: 23.13 KG/M2 | DIASTOLIC BLOOD PRESSURE: 61 MMHG | RESPIRATION RATE: 18 BRPM | SYSTOLIC BLOOD PRESSURE: 128 MMHG | WEIGHT: 117.8 LBS

## 2019-08-01 PROBLEM — I70.229 ATHEROSCLEROSIS OF NATIVE ARTERIES OF EXTREMITY WITH REST PAIN (HCC): Status: ACTIVE | Noted: 2019-08-01

## 2019-08-01 LAB
ANION GAP SERPL CALC-SCNC: 8 MMOL/L (ref 7–16)
APTT PPP: 64 SEC (ref 24.7–39.8)
BASOPHILS # BLD: 0.1 K/UL (ref 0–0.2)
BASOPHILS NFR BLD: 1 % (ref 0–2)
BUN SERPL-MCNC: 26 MG/DL (ref 8–23)
CALCIUM SERPL-MCNC: 7.7 MG/DL (ref 8.3–10.4)
CHLORIDE SERPL-SCNC: 105 MMOL/L (ref 98–107)
CO2 SERPL-SCNC: 25 MMOL/L (ref 21–32)
CREAT SERPL-MCNC: 5.59 MG/DL (ref 0.6–1)
DIFFERENTIAL METHOD BLD: ABNORMAL
EOSINOPHIL # BLD: 0.3 K/UL (ref 0–0.8)
EOSINOPHIL NFR BLD: 4 % (ref 0.5–7.8)
ERYTHROCYTE [DISTWIDTH] IN BLOOD BY AUTOMATED COUNT: 16.4 % (ref 11.9–14.6)
GLUCOSE SERPL-MCNC: 95 MG/DL (ref 65–100)
HCT VFR BLD AUTO: 32.4 % (ref 35.8–46.3)
HGB BLD-MCNC: 10.3 G/DL (ref 11.7–15.4)
IMM GRANULOCYTES # BLD AUTO: 0 K/UL (ref 0–0.5)
IMM GRANULOCYTES NFR BLD AUTO: 0 % (ref 0–5)
LYMPHOCYTES # BLD: 2.1 K/UL (ref 0.5–4.6)
LYMPHOCYTES NFR BLD: 33 % (ref 13–44)
MCH RBC QN AUTO: 32 PG (ref 26.1–32.9)
MCHC RBC AUTO-ENTMCNC: 31.8 G/DL (ref 31.4–35)
MCV RBC AUTO: 100.6 FL (ref 79.6–97.8)
MONOCYTES # BLD: 0.4 K/UL (ref 0.1–1.3)
MONOCYTES NFR BLD: 7 % (ref 4–12)
NEUTS SEG # BLD: 3.4 K/UL (ref 1.7–8.2)
NEUTS SEG NFR BLD: 55 % (ref 43–78)
NRBC # BLD: 0 K/UL (ref 0–0.2)
PLATELET # BLD AUTO: 197 K/UL (ref 150–450)
PLATELET COMMENTS,PCOM: ADEQUATE
PMV BLD AUTO: 12 FL (ref 9.4–12.3)
POTASSIUM SERPL-SCNC: 4.2 MMOL/L (ref 3.5–5.1)
RBC # BLD AUTO: 3.22 M/UL (ref 4.05–5.2)
RBC MORPH BLD: ABNORMAL
SODIUM SERPL-SCNC: 138 MMOL/L (ref 136–145)
WBC # BLD AUTO: 6.3 K/UL (ref 4.3–11.1)

## 2019-08-01 PROCEDURE — 96376 TX/PRO/DX INJ SAME DRUG ADON: CPT

## 2019-08-01 PROCEDURE — 85025 COMPLETE CBC W/AUTO DIFF WBC: CPT

## 2019-08-01 PROCEDURE — 36415 COLL VENOUS BLD VENIPUNCTURE: CPT

## 2019-08-01 PROCEDURE — 74011250636 HC RX REV CODE- 250/636: Performed by: SURGERY

## 2019-08-01 PROCEDURE — 85730 THROMBOPLASTIN TIME PARTIAL: CPT

## 2019-08-01 PROCEDURE — 65390000012 HC CONDITION CODE 44 OBSERVATION

## 2019-08-01 PROCEDURE — 99218 HC RM OBSERVATION: CPT

## 2019-08-01 PROCEDURE — 80048 BASIC METABOLIC PNL TOTAL CA: CPT

## 2019-08-01 PROCEDURE — 74011250637 HC RX REV CODE- 250/637: Performed by: SURGERY

## 2019-08-01 PROCEDURE — 96366 THER/PROPH/DIAG IV INF ADDON: CPT

## 2019-08-01 PROCEDURE — 74011250637 HC RX REV CODE- 250/637: Performed by: PHYSICIAN ASSISTANT

## 2019-08-01 RX ORDER — SODIUM CHLORIDE 0.9 % (FLUSH) 0.9 %
5-40 SYRINGE (ML) INJECTION EVERY 8 HOURS
Status: CANCELLED | OUTPATIENT
Start: 2019-08-01

## 2019-08-01 RX ORDER — OXYCODONE AND ACETAMINOPHEN 5; 325 MG/1; MG/1
1 TABLET ORAL
Qty: 30 TAB | Refills: 0 | Status: SHIPPED | OUTPATIENT
Start: 2019-08-01 | End: 2019-08-06

## 2019-08-01 RX ORDER — CEFAZOLIN SODIUM/WATER 2 G/20 ML
2 SYRINGE (ML) INTRAVENOUS ONCE
Status: CANCELLED | OUTPATIENT
Start: 2019-08-01 | End: 2019-08-01

## 2019-08-01 RX ORDER — HEPARIN SODIUM 5000 [USP'U]/ML
35 INJECTION, SOLUTION INTRAVENOUS; SUBCUTANEOUS ONCE
Status: COMPLETED | OUTPATIENT
Start: 2019-08-01 | End: 2019-08-01

## 2019-08-01 RX ORDER — TRAMADOL HYDROCHLORIDE 50 MG/1
50 TABLET ORAL ONCE
Status: COMPLETED | OUTPATIENT
Start: 2019-08-01 | End: 2019-08-01

## 2019-08-01 RX ORDER — SODIUM CHLORIDE 0.9 % (FLUSH) 0.9 %
5-40 SYRINGE (ML) INJECTION AS NEEDED
Status: CANCELLED | OUTPATIENT
Start: 2019-08-01

## 2019-08-01 RX ADMIN — Medication 1 AMPULE: at 10:14

## 2019-08-01 RX ADMIN — OXYCODONE HYDROCHLORIDE AND ACETAMINOPHEN 1 TABLET: 7.5; 325 TABLET ORAL at 07:00

## 2019-08-01 RX ADMIN — TRAMADOL HYDROCHLORIDE 50 MG: 50 TABLET, FILM COATED ORAL at 11:02

## 2019-08-01 RX ADMIN — POTASSIUM CHLORIDE 20 MEQ: 20 TABLET, EXTENDED RELEASE ORAL at 10:14

## 2019-08-01 RX ADMIN — HEPARIN SODIUM 1750 UNITS: 5000 INJECTION INTRAVENOUS; SUBCUTANEOUS at 06:59

## 2019-08-01 RX ADMIN — OXYCODONE HYDROCHLORIDE AND ACETAMINOPHEN 1 TABLET: 7.5; 325 TABLET ORAL at 01:01

## 2019-08-01 NOTE — DISCHARGE SUMMARY
[facility] [facility]          Physician Discharge Summary     Patient: Shabnam English MRN: [id number]  SSN: [id number]    YOB: 1950  Age: 68 y.o.   Sex: female       Admission Date: 7/31/2019    Discharge Date: 8/1/2019      Admitting Physician: Janice Bhat MD     Discharge Provider: Azalia Rose PA-C    Admission Diagnoses: Atherosclerosis of native arteries of extremities with rest pain, left leg [facility]) [I70.222]    Discharge Diagnoses:   Problem List as of 8/1/2019 Date Reviewed: 8/1/2019          Codes Class Noted - Resolved    * (Principal) Atherosclerosis of native arteries of extremities with rest pain, left leg ([facility] ICD-10-CM: X23.581  ICD-9-CM: 440.22  7/31/2019 - Present        Preop cardiovascular exam ICD-10-CM: Z01.810  ICD-9-CM: V72.81  7/29/2019 - Present        ESRD on peritoneal dialysis [facility]) ICD-10-CM: N18.6, Z99.2  ICD-9-CM: 585.6, V45.11  7/29/2019 - Present        Atherosclerosis of native coronary artery of native heart without angina pectoris ICD-10-CM: I25.10  ICD-9-CM: 414.01  7/29/2019 - Present        PAD (peripheral artery disease) ([facility] ICD-10-CM: I73.9  ICD-9-CM: 443.9  6/19/2019 - Present        Atherosclerosis of native arteries of extremity with intermittent claudication ([facility] ICD-10-CM: E07.811  ICD-9-CM: 440.21  6/19/2019 - Present        Ischemia ICD-10-CM: I99.8  ICD-9-CM: 459.9  6/19/2019 - Present        Shortness of breath ICD-10-CM: R06.02  ICD-9-CM: 786.05  6/4/2019 - Present        History of right-sided carotid endarterectomy ICD-10-CM: Z98.890  ICD-9-CM: V45.89  11/8/2018 - Present        Carotid stenosis, asymptomatic, right ICD-10-CM: I65.21  ICD-9-CM: 433.10  11/6/2018 - Present    Overview Signed 12/12/2018 10:01 AM by Jorge Ibrahim NP     11/7/18 (Dr. Salma Cagle) right carotid endarterectomy with bovine pericardial patch angioplasty             Carotid stenosis, right ICD-10-CM: I65.21  ICD-9-CM: 433.10  11/6/2018 - Present        Bilateral carotid artery disease ([facility] ICD-10-CM: I77.9  ICD-9-CM: 447.9  8/29/2018 - Present        Bilateral carotid artery stenosis ICD-10-CM: I65.23  ICD-9-CM: 433.10, 433.30  11/28/2017 - Present        Pure hypercholesterolemia ICD-10-CM: E78.00  ICD-9-CM: 272.0  11/28/2017 - Present        UTI (urinary tract infection) ICD-10-CM: N39.0  ICD-9-CM: 599.0  11/10/2017 - Present        Stroke ([facility] ICD-10-CM: I63.9  ICD-9-CM: 434.91  11/10/2017 - Present    Overview Signed 11/10/2017 12:03 PM by Neil Davis MD     Frontoparietal CVA, ischemic             S/P coronary artery stent placement ICD-10-CM: Z95.5  ICD-9-CM: V45.82  2/6/2017 - Present        CKD stage 5 secondary to hypertension ([facility] ICD-10-CM: I12.0, N18.5  ICD-9-CM: 403.91, 585.5  2/6/2017 - Present        Major depressive disorder with single episode, in remission [facility]) ICD-10-CM: F32.5  ICD-9-CM: 296.25  2/6/2017 - Present        Hydronephrosis ICD-10-CM: N13.30  ICD-9-CM: 834  2/12/2016 - Present        History of CVA (cerebrovascular accident) ICD-10-CM: Z86.73  ICD-9-CM: V12.54  2/11/2016 - Present        Anemia of renal disease ICD-10-CM: N18.9, D63.1  ICD-9-CM: 285.21  1/7/2016 - Present        Benign essential HTN ICD-10-CM: I10  ICD-9-CM: 401.1  1/7/2016 - Present        Coronary artery disease involving native coronary artery with angina pectoris ([facility] ICD-10-CM: I25.119  ICD-9-CM: 414.01, 413.9  1/7/2016 - Present        CVA (cerebral vascular accident) [facility]) ICD-10-CM: I63.9  ICD-9-CM: 434.91  4/28/2012 - Present        Hypertensive emergency ICD-10-CM: I16.1  ICD-9-CM: 401.9  4/26/2012 - Present        Carotid artery bruit ICD-10-CM: R09.89  ICD-9-CM: 785.9  4/26/2012 - Present    Overview Signed 4/26/2012  8:28 AM by Riya Webber     bilateral             CKD (chronic kidney disease) stage 3, GFR 30-59 ml/min ([facility]) ICD-10-CM: N18.3  ICD-9-CM: 585.3 4/26/2012 - Present        Tobacco use disorder ICD-10-CM: F17.200  ICD-9-CM: 305.1  4/26/2012 - Present        GERD (gastroesophageal reflux disease) ICD-10-CM: K21.9  ICD-9-CM: 530.81  4/26/2012 - Present        Chest pain, unspecified ICD-10-CM: R07.9  ICD-9-CM: 786.50  4/26/2012 - Present        Anemia ICD-10-CM: D64.9  ICD-9-CM: 285.9  4/26/2012 - Present               Procedures for this Admission: none    Discharged Condition: stable    Brief History: Lan Persaud was admitted with the following history of present illness. She is a 75yo lady with known occlusions of left CFA / PFA / SFA with reconstitution at the [place] popliteal already scheduled for left femoral-popliteal bypass next week; she presented to the ED early 7/31/2019 with increased LE pain. With non-palpable left foot pulses and no Doppler signals, she was admitted for further evaluation. Hospital Course: She was admitted and started on IV heparin. Her pain had improved by midday and was soon controlled with oral medications. She underwent peritoneal dialysis overnight. She tolerated a diet, mobilized to chair. Her admission was uneventful, and on Hospital Day #2, she was stable and deemed suitable for discharge to home. She will return early next week for left femoral-popliteal bypass as same-day admission.     Consults: Nephrology    Significant Diagnostic Studies: labs, left foot x-ray (soft tissue swelling, no acute fracture)    Treatments:   IV hydration  analgesia: oxycodone+acetaminophen, tramadol  cardiac meds: metoprolol, amlodipine  anticoagulation: IV heparin, ASA    Discharge Exam:  GENERAL: alert, cooperative, no distress  LUNG: clear to auscultation bilaterally, normal respiratory effort  HEART: regular rate and rhythm, AS murmur  ABDOMEN: soft, nontender, nondistended, bowel sounds normoactive  EXTREMITIES: feet warmer today, sensorimotor intact, no wounds  PULSES: palpable right DP, PT, left DP    Disposition: home; will return 8/6/2019 for left femoral-popliteal bypass      Patient Instructions:   Current Discharge Medication List      START taking these medications    Details   oxyCODONE-acetaminophen (PERCOCET) 5-325 mg per tablet Take 1 Tab by mouth every four (4) hours as needed for Pain for up to 5 days. Max Daily Amount: 6 Tabs. Qty: 30 Tab, Refills: 0    Associated Diagnoses: Left foot pain; Atherosclerosis of native arteries of extremities with rest pain, left leg (HCC)         CONTINUE these medications which have NOT CHANGED    Details   hydrALAZINE (APRESOLINE) 10 mg tablet Take 10 mg by mouth daily. potassium chloride (K-DUR, KLOR-CON) 20 mEq tablet Take 20 mEq by mouth daily. Refills: 6      atorvastatin (LIPITOR) 80 mg tablet Take 80 mg by mouth nightly. ondansetron (ZOFRAN ODT) 8 mg disintegrating tablet Take 1 Tab by mouth every eight (8) hours as needed for Nausea. Qty: 12 Tab, Refills: 0      aspirin delayed-release 81 mg tablet Take 81 mg by mouth nightly. metoprolol succinate (TOPROL-XL) 50 mg XL tablet Take 1 Tab by mouth daily. Qty: 180 Tab, Refills: 3    Associated Diagnoses: Benign essential HTN      amLODIPine (NORVASC) 10 mg tablet Take 1 Tab by mouth daily. Qty: 90 Tab, Refills: 3    Associated Diagnoses: Benign essential HTN             Reference MD discharge instructions, as well as those provided by nursing for diet, labs, medications, activity, wound care and any outpatient referrals. I have reviewed the patients controlled substance prescription history as maintained in the [address] [place] [address] prescription monitoring program so that the prescription(s) for a controlled substance can be given. Follow-up Appointments   Procedures    FOLLOW UP VISIT Appointment: Same-day admission 8/6/2019 for left LE bypass. Same-day admission 8/6/2019 for left LE bypass. Signed:   Joe Velázquez PA-C  8/1/2019 10:56 AM  Physician Assistant with [facility] Vascular Surgery  Elton Ramirez.  Tamiko Hillman MD / Lopez Li MD

## 2019-08-01 NOTE — PROGRESS NOTES
Progress Note    Patient: Josias White MRN: [id number]  SSN: [id number]    YOB: 1950  Age: 68 y.o. Sex: female      Admission Date: 7/31/2019    LOS: 1 day     Subjective:     Patient seen in conjunction with Dr. Lisette Smiley in chair eating breakfast. Reports left LE pain greatly improved. Refused Rooke boots. Objective:     Vitals:    07/31/19 2131 08/01/19 0006 08/01/19 0200 08/01/19 0720   BP: 114/65 148/74 102/57 132/66   Pulse: 88 89 84 76   Resp: 17 17 17 18   Temp: 98.3 °F (36.8 °C) 98.5 °F (36.9 °C) 98.9 °F (37.2 °C) 98.9 °F (37.2 °C)   SpO2: 96% 95% 95% 94%   Weight:       Height:            Physical Exam:   GENERAL: alert, cooperative, no distress  LUNG: clear to auscultation bilaterally, normal respiratory effort  HEART: regular rate and rhythm, AS murmur  ABDOMEN: soft, nontender, nondistended, bowel sounds normoactive  EXTREMITIES: feet warmer today, sensorimotor intact, no wounds  PULSES: palpable right DP, PT, left DP    Lab/Data Review:  BMP:   Lab Results   Component Value Date/Time     08/01/2019 05:43 AM    K 4.2 08/01/2019 05:43 AM     08/01/2019 05:43 AM    CO2 25 08/01/2019 05:43 AM    AGAP 8 08/01/2019 05:43 AM    GLU 95 08/01/2019 05:43 AM    BUN 26 (H) 08/01/2019 05:43 AM    CREA 5.59 (H) 08/01/2019 05:43 AM    GFRAA 10 (L) 08/01/2019 05:43 AM    GFRNA 8 (L) 08/01/2019 05:43 AM     CBC:   Lab Results   Component Value Date/Time    WBC 6.3 08/01/2019 05:43 AM    HGB 10.3 (L) 08/01/2019 05:43 AM    HCT 32.4 (L) 08/01/2019 05:43 AM     08/01/2019 05:43 AM       Assessment / Plan / Recommendations / Medical Decision Making:     Principal Problem:     Atherosclerosis of native arteries of extremities with rest pain, left leg ([address] (7/31/2019)        D/C home with pain control  Patient to return 8/6/2019 for same-day admission for left femoral-to-above knee popliteal bypass      Signed By: Eliazar Peters PA-C    August 1, 2019      Physician Assistant with [facility] Vascular Surgery  Pat Iglesias.  Charu Turcios MD / Juli Diaz MD

## 2019-08-01 NOTE — PHYSICIAN ADVISORY
Letter of Determination:  Outpatient status receiving Observation Services This patient was originally hospitalized as Inpatient Status on 7/31/2019 for left lower extremity pain. At this time this patient does not appear to meet the medical necessity requirements to support an inpatient level of care. It is our recommendation that this patient's hospitalization status should be changed from INPATIENT to [place] receiving OBSERVATION services via Condition Code 44.  
  
This may change due to the medical condition of the patient and new clinical evidence as the patients care progreses. The final decision regarding the patient's hospitalization status depends on the attending physician's judgement. Emeka Power MD, FEDE, Physician Advisor [address] [facility].

## 2019-08-01 NOTE — DISCHARGE INSTRUCTIONS
Patient Education        DISCHARGE SUMMARY from Nurse    PATIENT INSTRUCTIONS:    After general anesthesia or intravenous sedation, for 24 hours or while taking prescription Narcotics:  · Limit your activities  · Do not drive and operate hazardous machinery  · Do not make important personal or business decisions  · Do  not drink alcoholic beverages  · If you have not urinated within 8 hours after discharge, please contact your surgeon on call. Report the following to your surgeon:  · Excessive pain, swelling, redness or odor of or around the surgical area  · Temperature over 100.5  · Nausea and vomiting lasting longer than 4 hours or if unable to take medications  · Any signs of decreased circulation or nerve impairment to extremity: change in color, persistent  numbness, tingling, coldness or increase pain  · Any questions    What to do at Home:  Recommended activity: Activity as tolerated    If you experience any of the following symptoms worsening of pain, please follow up with Dr. Idalia Jackson. .    *  Please give a list of your current medications to your Primary Care Provider. *  Please update this list whenever your medications are discontinued, doses are      changed, or new medications (including over-the-counter products) are added. *  Please carry medication information at all times in case of emergency situations. These are general instructions for a healthy lifestyle:    No smoking/ No tobacco products/ Avoid exposure to second hand smoke  Surgeon General's Warning:  Quitting smoking now greatly reduces serious risk to your health.     Obesity, smoking, and sedentary lifestyle greatly increases your risk for illness    A healthy diet, regular physical exercise & weight monitoring are important for maintaining a healthy lifestyle    You may be retaining fluid if you have a history of heart failure or if you experience any of the following symptoms:  Weight gain of 3 pounds or more overnight or 5 pounds in a week, increased swelling in our hands or feet or shortness of breath while lying flat in bed. Please call your doctor as soon as you notice any of these symptoms; do not wait until your next office visit. The discharge information has been reviewed with the patient. The patient verbalized understanding. Discharge medications reviewed with the patient and appropriate educational materials and side effects teaching were provided. ___________________________________________________________________________________________________________________________________  Foot Pain: Care Instructions  Your Care Instructions  Foot injuries that cause pain and swelling are fairly common. Almost all sports or home repair projects can cause a misstep that ends up as foot pain. Normal wear and tear, especially as you get older, also can cause foot pain. Most minor foot injuries will heal on their own, and home treatment is usually all you need to do. If you have a severe injury, you may need tests and treatment. Follow-up care is a key part of your treatment and safety. Be sure to make and go to all appointments, and call your doctor if you are having problems. It's also a good idea to know your test results and keep a list of the medicines you take. How can you care for yourself at home? · Take pain medicines exactly as directed. ? If the doctor gave you a prescription medicine for pain, take it as prescribed. ? If you are not taking a prescription pain medicine, ask your doctor if you can take an over-the-counter medicine. · Rest and protect your foot. Take a break from any activity that may cause pain. · Put ice or a cold pack on your foot for 10 to 20 minutes at a time. Put a thin cloth between the ice and your skin. · Prop up the sore foot on a pillow when you ice it or anytime you sit or lie down during the next 3 days. Try to keep it above the level of your heart. This will help reduce swelling.   · Your doctor may recommend that you wrap your foot with an elastic bandage. Keep your foot wrapped for as long as your doctor advises. · If your doctor recommends crutches, use them as directed. · Wear roomy footwear. · As soon as pain and swelling end, begin gentle exercises of your foot. Your doctor can tell you which exercises will help. When should you call for help? Call 911 anytime you think you may need emergency care. For example, call if:    · Your foot turns pale, white, blue, or cold.    Call your doctor now or seek immediate medical care if:    · You cannot move or stand on your foot.     · Your foot looks twisted or out of its normal position.     · Your foot is not stable when you step down.     · You have signs of infection, such as:  ? Increased pain, swelling, warmth, or redness. ? Red streaks leading from the sore area. ? Pus draining from a place on your foot. ? A fever.     · Your foot is numb or tingly.    Watch closely for changes in your health, and be sure to contact your doctor if:    · You do not get better as expected.     · You have bruises from an injury that last longer than 2 weeks. Where can you learn more? Go to http://elfego-court.info/. Enter G852 in the search box to learn more about \"Foot Pain: Care Instructions. \"  Current as of: September 20, 2018  Content Version: 12.1  © 2394-2213 [employer]. Care instructions adapted under license by [employer] (which disclaims liability or warranty for this information). If you have questions about a medical condition or this instruction, always ask your healthcare professional. [address] any warranty or liability for your use of this information. Learning About RICE (Rest, Ice, Compression, and Elevation)  What is RICE? RICE is a way to care for an injury. RICE helps relieve pain and swelling. It may also help with healing and flexibility.  RICE stands for:  · Rest and protect the injured or sore area. · Ice or a cold pack used as soon as possible. · Compression, or wrapping the injured or sore area with an elastic bandage. · Elevation (propping up) the injured or sore area. How do you do RICE? You can use RICE for home treatment when you have general aches and pains or after an injury or surgery. Rest  · Do not put weight on the injury for at least 24 to 48 hours. · Use crutches for a badly sprained knee or ankle. · Support a sprained wrist, elbow, or shoulder with a sling. Ice  · Put ice or a cold pack on the injury right away to reduce pain and swelling. Frozen vegetables will also work as an ice pack. Put a thin cloth between the ice or cold pack and your skin. The cloth protects the injured area from getting too cold. · Use ice for 10 to 15 minutes at a time for the first 48 to 72 hours. Compression  · Use compression for sprains, strains, and surgeries of the arms and legs. · Wrap the injured area with an elastic bandage or compression sleeve to reduce swelling. · Don't wrap it too tightly. If the area below it feels numb, tingles, or feels cool, loosen the wrap. Elevation  · Use elevation for areas of the body that can be propped up, such as arms and legs. · Prop up the injured area on pillows whenever you use ice. Keep it propped up anytime you sit or lie down. · Try to keep the injured area at or above the level of your heart. This will help reduce swelling and bruising. Where can you learn more? Go to http://elfego-court.info/. Enter H620 in the search box to learn more about \"Learning About RICE (Rest, Ice, Compression, and Elevation). \"  Current as of: September 20, 2018  Content Version: 12.1  © 7755-9437 [employer]. Care instructions adapted under license by [employer] (which disclaims liability or warranty for this information).  If you have questions about a medical condition or this instruction, always ask your healthcare professional. [place] [address] any warranty or liability for your use of this information.

## 2019-08-01 NOTE — PROGRESS NOTES
Billie Zhang  Admission Date: 7/31/2019             Renal Daily Progress Note: 8/1/2019    The patient's chart is reviewed and the patient is discussed with the staff. Follow up ESRD  Subjective:   Patient seen and examined on rounds pt sitting up in chair no issues with PD over night, LLE pain controlled currently. ROS:  General: no fever/ chills, appetite ok  Lung: no SOB  CV: no CP  GI no N/V/C  Ext: no edema    Current Facility-Administered Medications   Medication Dose Route Frequency    metoprolol succinate (TOPROL-XL) XL tablet 50 mg  50 mg Oral QPM    amLODIPine (NORVASC) tablet 10 mg  10 mg Oral QPM    aspirin delayed-release tablet 81 mg  81 mg Oral QHS    ondansetron (ZOFRAN ODT) tablet 8 mg  8 mg Oral Q8H PRN    atorvastatin (LIPITOR) tablet 80 mg  80 mg Oral QHS    potassium chloride (K-DUR, KLOR-CON) SR tablet 20 mEq  20 mEq Oral DAILY    oxyCODONE-acetaminophen (PERCOCET 7.5) 7.5-325 mg per tablet 1 Tab  1 Tab Oral Q6H PRN    heparin 25,000 units in dextrose 500 mL infusion  12-25 Units/kg/hr IntraVENous TITRATE    alcohol 62% (NOZIN) nasal  1 Ampule  1 Ampule Topical Q12H         Objective:     Vitals:    07/31/19 2131 08/01/19 0006 08/01/19 0200 08/01/19 0720   BP: 114/65 148/74 102/57 132/66   Pulse: 88 89 84 76   Resp: 17 17 17 18   Temp: 98.3 °F (36.8 °C) 98.5 °F (36.9 °C) 98.9 °F (37.2 °C) 98.9 °F (37.2 °C)   SpO2: 96% 95% 95% 94%   Weight:       Height:         Intake and Output:   07/30 1901 - 08/01 0700  In: 110 [P.O.:110]  Out: 50 [Urine:50]  No intake/output data recorded. Physical Exam:   Constitutional:  the patient is well developed and in no acute distress  HEENT:  Sclera clear, pupils equal, oral mucosa moist  Lungs: clear bilaterally  Cardiovascular:  RRR without Rub  Abd/GI: soft and non-tender; with positive bowel sounds, PD catheter intact  Ext: warm without cyanosis. There is no lower leg edema.   Skin:  no jaundice or rashes Neuro: no gross neuro deficits   Psychiatric: Calm. LAB  Recent Labs     08/01/19  0543 07/31/19  0917 07/31/19  0617 07/30/19  1600   WBC 6.3 6.3 5.8 6.9   HGB 10.3* 11.1* 11.4* 10.9*   HCT 32.4* 34.6* 35.7* 33.9*    210 225 207     Recent Labs     08/01/19  0543 07/31/19  0617 07/30/19  1600    139 141   K 4.2 4.0 4.6    104 107   CO2 25 24 26   GLU 95 102* 97   BUN 26* 28* 29*   CREA 5.59* 5.04* 5.33*   ALB  --  2.8*  --    SGOT  --  21  --      No results for input(s): PH, PCO2, PO2, HCO3 in the last 72 hours. 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  Use all 1.5% solutions     PAD with worsening LLE rest pain  - vascular surgery following  - heparin drip  - pain control     HTN- stable   - resume home meds     Anemia- stable  - follow labs       Eugena Sandhoff, NP

## 2019-08-01 NOTE — DIALYSIS
Disconnected PD cycler from patient by primary RN. Betadine cap intact. Patient tolerated well. UF amount 1668mls.  Effluent: clear yellow

## 2019-08-05 RX ORDER — SODIUM CHLORIDE 0.9 % (FLUSH) 0.9 %
5-40 SYRINGE (ML) INJECTION AS NEEDED
Status: CANCELLED | OUTPATIENT
Start: 2019-08-05

## 2019-08-05 RX ORDER — HYDROMORPHONE HYDROCHLORIDE 2 MG/ML
0.5 INJECTION, SOLUTION INTRAMUSCULAR; INTRAVENOUS; SUBCUTANEOUS
Status: CANCELLED | OUTPATIENT
Start: 2019-08-05

## 2019-08-05 RX ORDER — OXYCODONE HYDROCHLORIDE 5 MG/1
5 TABLET ORAL
Status: CANCELLED | OUTPATIENT
Start: 2019-08-05 | End: 2019-08-06

## 2019-08-05 RX ORDER — SODIUM CHLORIDE 0.9 % (FLUSH) 0.9 %
5-40 SYRINGE (ML) INJECTION EVERY 8 HOURS
Status: CANCELLED | OUTPATIENT
Start: 2019-08-05

## 2019-08-05 RX ORDER — OXYCODONE AND ACETAMINOPHEN 10; 325 MG/1; MG/1
1 TABLET ORAL AS NEEDED
Status: CANCELLED | OUTPATIENT
Start: 2019-08-05

## 2019-08-06 ENCOUNTER — HOSPITAL ENCOUNTER (INPATIENT)
Age: 69
LOS: 7 days | Discharge: NURSING, REHAB OR LONG-TERM CARE | DRG: 252 | End: 2019-08-13
Attending: SURGERY | Admitting: SURGERY
Payer: MEDICARE

## 2019-08-06 ENCOUNTER — ANESTHESIA (OUTPATIENT)
Dept: SURGERY | Age: 69
DRG: 252 | End: 2019-08-06
Payer: MEDICARE

## 2019-08-06 DIAGNOSIS — Z95.828 S/P FEMORAL-POPLITEAL BYPASS SURGERY: Primary | ICD-10-CM

## 2019-08-06 PROBLEM — I99.8 ISCHEMIA OF LEFT LOWER EXTREMITY: Status: ACTIVE | Noted: 2019-08-06

## 2019-08-06 LAB
ACT BLD: 246 SECS (ref 70–128)
ANION GAP SERPL CALC-SCNC: 11 MMOL/L (ref 7–16)
BUN SERPL-MCNC: 28 MG/DL (ref 8–23)
CALCIUM SERPL-MCNC: 7.5 MG/DL (ref 8.3–10.4)
CHLORIDE SERPL-SCNC: 107 MMOL/L (ref 98–107)
CO2 SERPL-SCNC: 22 MMOL/L (ref 21–32)
CREAT SERPL-MCNC: 6.58 MG/DL (ref 0.6–1)
ERYTHROCYTE [DISTWIDTH] IN BLOOD BY AUTOMATED COUNT: 15.5 % (ref 11.9–14.6)
GLUCOSE BLD STRIP.AUTO-MCNC: 128 MG/DL (ref 65–100)
GLUCOSE BLD STRIP.AUTO-MCNC: 169 MG/DL (ref 65–100)
GLUCOSE BLD STRIP.AUTO-MCNC: 203 MG/DL (ref 65–100)
GLUCOSE SERPL-MCNC: 191 MG/DL (ref 65–100)
HCT VFR BLD AUTO: 25.6 % (ref 35.8–46.3)
HGB BLD-MCNC: 8.4 G/DL (ref 11.7–15.4)
MAGNESIUM SERPL-MCNC: 1.6 MG/DL (ref 1.8–2.4)
MCH RBC QN AUTO: 32.4 PG (ref 26.1–32.9)
MCHC RBC AUTO-ENTMCNC: 32.8 G/DL (ref 31.4–35)
MCV RBC AUTO: 98.8 FL (ref 79.6–97.8)
NRBC # BLD: 0 K/UL (ref 0–0.2)
PLATELET # BLD AUTO: 203 K/UL (ref 150–450)
PMV BLD AUTO: 12.2 FL (ref 9.4–12.3)
POTASSIUM BLD-SCNC: 3.9 MMOL/L (ref 3.5–5.1)
POTASSIUM SERPL-SCNC: 4.2 MMOL/L (ref 3.5–5.1)
RBC # BLD AUTO: 2.59 M/UL (ref 4.05–5.2)
SODIUM SERPL-SCNC: 140 MMOL/L (ref 136–145)
WBC # BLD AUTO: 9.7 K/UL (ref 4.3–11.1)

## 2019-08-06 PROCEDURE — 77030034074 HC PTCH CORMATRIX CARD REP CMTX -E: Performed by: SURGERY

## 2019-08-06 PROCEDURE — 77030008467 HC STPLR SKN COVD -B: Performed by: SURGERY

## 2019-08-06 PROCEDURE — 77030014008 HC SPNG HEMSTAT J&J -C: Performed by: SURGERY

## 2019-08-06 PROCEDURE — 77030002986 HC SUT PROL J&J -A: Performed by: SURGERY

## 2019-08-06 PROCEDURE — 85347 COAGULATION TIME ACTIVATED: CPT

## 2019-08-06 PROCEDURE — 77030008462 HC STPLR SKN PROX J&J -A: Performed by: SURGERY

## 2019-08-06 PROCEDURE — 74011636637 HC RX REV CODE- 636/637: Performed by: SURGERY

## 2019-08-06 PROCEDURE — 06BQ0ZZ EXCISION OF LEFT SAPHENOUS VEIN, OPEN APPROACH: ICD-10-PCS | Performed by: SURGERY

## 2019-08-06 PROCEDURE — 65610000006 HC RM INTENSIVE CARE

## 2019-08-06 PROCEDURE — 77030013292 HC BOWL MX PRSM J&J -A: Performed by: ANESTHESIOLOGY

## 2019-08-06 PROCEDURE — 77030037088 HC TUBE ENDOTRACH ORAL NSL COVD-A: Performed by: ANESTHESIOLOGY

## 2019-08-06 PROCEDURE — 77030034888 HC SUT PROL 2 J&J -B: Performed by: SURGERY

## 2019-08-06 PROCEDURE — 86580 TB INTRADERMAL TEST: CPT | Performed by: SURGERY

## 2019-08-06 PROCEDURE — 77030032490 HC SLV COMPR SCD KNE COVD -B

## 2019-08-06 PROCEDURE — 77030010512 HC APPL CLP LIG J&J -C: Performed by: SURGERY

## 2019-08-06 PROCEDURE — 77030019908 HC STETH ESOPH SIMS -A: Performed by: ANESTHESIOLOGY

## 2019-08-06 PROCEDURE — 77030013794 HC KT TRNSDUC BLD EDWD -B: Performed by: ANESTHESIOLOGY

## 2019-08-06 PROCEDURE — 76060000044 HC ANESTHESIA 6.5 TO 7 HR: Performed by: SURGERY

## 2019-08-06 PROCEDURE — 74011000258 HC RX REV CODE- 258: Performed by: SURGERY

## 2019-08-06 PROCEDURE — 74011250636 HC RX REV CODE- 250/636: Performed by: ANESTHESIOLOGY

## 2019-08-06 PROCEDURE — 77030031139 HC SUT VCRL2 J&J -A: Performed by: SURGERY

## 2019-08-06 PROCEDURE — 83735 ASSAY OF MAGNESIUM: CPT

## 2019-08-06 PROCEDURE — 74011000250 HC RX REV CODE- 250

## 2019-08-06 PROCEDURE — 74011000302 HC RX REV CODE- 302: Performed by: SURGERY

## 2019-08-06 PROCEDURE — 77030025869: Performed by: SURGERY

## 2019-08-06 PROCEDURE — 77030010929 HC CLMP VASC FGRTY EDWD -B: Performed by: SURGERY

## 2019-08-06 PROCEDURE — 77030002987 HC SUT PROL J&J -B: Performed by: SURGERY

## 2019-08-06 PROCEDURE — 77030031642 HC BOOT FT ROOKE HFS OSBM -B

## 2019-08-06 PROCEDURE — 90945 DIALYSIS ONE EVALUATION: CPT

## 2019-08-06 PROCEDURE — 80048 BASIC METABOLIC PNL TOTAL CA: CPT

## 2019-08-06 PROCEDURE — 77010033678 HC OXYGEN DAILY

## 2019-08-06 PROCEDURE — 77030034850: Performed by: SURGERY

## 2019-08-06 PROCEDURE — 85027 COMPLETE CBC AUTOMATED: CPT

## 2019-08-06 PROCEDURE — 76010000208 HC CV SURG 6.5 TO 7 HR INTENSV-TIER 1: Performed by: SURGERY

## 2019-08-06 PROCEDURE — 047L3ZZ DILATION OF LEFT FEMORAL ARTERY, PERCUTANEOUS APPROACH: ICD-10-PCS | Performed by: SURGERY

## 2019-08-06 PROCEDURE — 74011250636 HC RX REV CODE- 250/636

## 2019-08-06 PROCEDURE — 36600 WITHDRAWAL OF ARTERIAL BLOOD: CPT

## 2019-08-06 PROCEDURE — 74011250637 HC RX REV CODE- 250/637: Performed by: SURGERY

## 2019-08-06 PROCEDURE — 041L09L BYPASS LEFT FEMORAL ARTERY TO POPLITEAL ARTERY WITH AUTOLOGOUS VENOUS TISSUE, OPEN APPROACH: ICD-10-PCS | Performed by: SURGERY

## 2019-08-06 PROCEDURE — 77030020245 HC SOL INJ 5% D/0.9%NACL

## 2019-08-06 PROCEDURE — 74011250636 HC RX REV CODE- 250/636: Performed by: SURGERY

## 2019-08-06 PROCEDURE — 04CL0ZZ EXTIRPATION OF MATTER FROM LEFT FEMORAL ARTERY, OPEN APPROACH: ICD-10-PCS | Performed by: SURGERY

## 2019-08-06 PROCEDURE — 86923 COMPATIBILITY TEST ELECTRIC: CPT

## 2019-08-06 PROCEDURE — 74011250636 HC RX REV CODE- 250/636: Performed by: PHYSICIAN ASSISTANT

## 2019-08-06 PROCEDURE — 77030002996 HC SUT SLK J&J -A: Performed by: SURGERY

## 2019-08-06 PROCEDURE — 77030039425 HC BLD LARYNG TRULITE DISP TELE -A: Performed by: ANESTHESIOLOGY

## 2019-08-06 PROCEDURE — 5A1D70Z PERFORMANCE OF URINARY FILTRATION, INTERMITTENT, LESS THAN 6 HOURS PER DAY: ICD-10-PCS | Performed by: SURGERY

## 2019-08-06 PROCEDURE — 86900 BLOOD TYPING SEROLOGIC ABO: CPT

## 2019-08-06 PROCEDURE — 82962 GLUCOSE BLOOD TEST: CPT

## 2019-08-06 PROCEDURE — 77030005401 HC CATH RAD ARRO -A: Performed by: ANESTHESIOLOGY

## 2019-08-06 PROCEDURE — 84132 ASSAY OF SERUM POTASSIUM: CPT

## 2019-08-06 PROCEDURE — 77030031639: Performed by: SURGERY

## 2019-08-06 PROCEDURE — 77030018836 HC SOL IRR NACL ICUM -A: Performed by: SURGERY

## 2019-08-06 PROCEDURE — 77030019940 HC BLNKT HYPOTHRM STRY -B: Performed by: ANESTHESIOLOGY

## 2019-08-06 DEVICE — PATCH CAR TISS REP CORMATRIX 1 X 10 CM: Type: IMPLANTABLE DEVICE | Site: GROIN | Status: FUNCTIONAL

## 2019-08-06 RX ORDER — OXYCODONE AND ACETAMINOPHEN 5; 325 MG/1; MG/1
1 TABLET ORAL
Status: DISCONTINUED | OUTPATIENT
Start: 2019-08-06 | End: 2019-08-13 | Stop reason: HOSPADM

## 2019-08-06 RX ORDER — NALOXONE HYDROCHLORIDE 0.4 MG/ML
0.4 INJECTION, SOLUTION INTRAMUSCULAR; INTRAVENOUS; SUBCUTANEOUS AS NEEDED
Status: DISCONTINUED | OUTPATIENT
Start: 2019-08-06 | End: 2019-08-13 | Stop reason: HOSPADM

## 2019-08-06 RX ORDER — HYDRALAZINE HYDROCHLORIDE 10 MG/1
10 TABLET, FILM COATED ORAL DAILY
Status: DISCONTINUED | OUTPATIENT
Start: 2019-08-07 | End: 2019-08-13 | Stop reason: HOSPADM

## 2019-08-06 RX ORDER — INSULIN LISPRO 100 [IU]/ML
INJECTION, SOLUTION INTRAVENOUS; SUBCUTANEOUS
Status: DISCONTINUED | OUTPATIENT
Start: 2019-08-06 | End: 2019-08-12

## 2019-08-06 RX ORDER — CEFAZOLIN SODIUM/WATER 2 G/20 ML
2 SYRINGE (ML) INTRAVENOUS ONCE
Status: COMPLETED | OUTPATIENT
Start: 2019-08-06 | End: 2019-08-06

## 2019-08-06 RX ORDER — SODIUM CHLORIDE 0.9 % (FLUSH) 0.9 %
5-40 SYRINGE (ML) INJECTION EVERY 8 HOURS
Status: DISCONTINUED | OUTPATIENT
Start: 2019-08-06 | End: 2019-08-06 | Stop reason: HOSPADM

## 2019-08-06 RX ORDER — ONDANSETRON 2 MG/ML
INJECTION INTRAMUSCULAR; INTRAVENOUS AS NEEDED
Status: DISCONTINUED | OUTPATIENT
Start: 2019-08-06 | End: 2019-08-06 | Stop reason: HOSPADM

## 2019-08-06 RX ORDER — MORPHINE SULFATE 10 MG/ML
5 INJECTION, SOLUTION INTRAMUSCULAR; INTRAVENOUS
Status: DISCONTINUED | OUTPATIENT
Start: 2019-08-06 | End: 2019-08-13 | Stop reason: HOSPADM

## 2019-08-06 RX ORDER — AMLODIPINE BESYLATE 5 MG/1
10 TABLET ORAL DAILY
Status: DISCONTINUED | OUTPATIENT
Start: 2019-08-07 | End: 2019-08-13 | Stop reason: HOSPADM

## 2019-08-06 RX ORDER — SODIUM CHLORIDE, SODIUM LACTATE, POTASSIUM CHLORIDE, CALCIUM CHLORIDE 600; 310; 30; 20 MG/100ML; MG/100ML; MG/100ML; MG/100ML
75 INJECTION, SOLUTION INTRAVENOUS CONTINUOUS
Status: DISCONTINUED | OUTPATIENT
Start: 2019-08-06 | End: 2019-08-06 | Stop reason: HOSPADM

## 2019-08-06 RX ORDER — MORPHINE SULFATE 10 MG/ML
5 INJECTION, SOLUTION INTRAMUSCULAR; INTRAVENOUS ONCE
Status: COMPLETED | OUTPATIENT
Start: 2019-08-06 | End: 2019-08-06

## 2019-08-06 RX ORDER — DEXTROSE MONOHYDRATE AND SODIUM CHLORIDE 5; .9 G/100ML; G/100ML
1000 INJECTION, SOLUTION INTRAVENOUS CONTINUOUS
Status: DISCONTINUED | OUTPATIENT
Start: 2019-08-06 | End: 2019-08-08

## 2019-08-06 RX ORDER — HEPARIN SODIUM 1000 [USP'U]/ML
INJECTION, SOLUTION INTRAVENOUS; SUBCUTANEOUS AS NEEDED
Status: DISCONTINUED | OUTPATIENT
Start: 2019-08-06 | End: 2019-08-06 | Stop reason: HOSPADM

## 2019-08-06 RX ORDER — PROTAMINE SULFATE 10 MG/ML
INJECTION, SOLUTION INTRAVENOUS AS NEEDED
Status: DISCONTINUED | OUTPATIENT
Start: 2019-08-06 | End: 2019-08-06 | Stop reason: HOSPADM

## 2019-08-06 RX ORDER — GLYCOPYRROLATE 0.2 MG/ML
INJECTION INTRAMUSCULAR; INTRAVENOUS AS NEEDED
Status: DISCONTINUED | OUTPATIENT
Start: 2019-08-06 | End: 2019-08-06 | Stop reason: HOSPADM

## 2019-08-06 RX ORDER — ACETAMINOPHEN 325 MG/1
650 TABLET ORAL
Status: DISCONTINUED | OUTPATIENT
Start: 2019-08-06 | End: 2019-08-13 | Stop reason: HOSPADM

## 2019-08-06 RX ORDER — LIDOCAINE HYDROCHLORIDE 20 MG/ML
INJECTION, SOLUTION EPIDURAL; INFILTRATION; INTRACAUDAL; PERINEURAL AS NEEDED
Status: DISCONTINUED | OUTPATIENT
Start: 2019-08-06 | End: 2019-08-06 | Stop reason: HOSPADM

## 2019-08-06 RX ORDER — SODIUM CHLORIDE 0.9 % (FLUSH) 0.9 %
5-40 SYRINGE (ML) INJECTION EVERY 8 HOURS
Status: DISCONTINUED | OUTPATIENT
Start: 2019-08-06 | End: 2019-08-13 | Stop reason: HOSPADM

## 2019-08-06 RX ORDER — PROPOFOL 10 MG/ML
INJECTION, EMULSION INTRAVENOUS AS NEEDED
Status: DISCONTINUED | OUTPATIENT
Start: 2019-08-06 | End: 2019-08-06 | Stop reason: HOSPADM

## 2019-08-06 RX ORDER — EPHEDRINE SULFATE 50 MG/ML
INJECTION, SOLUTION INTRAVENOUS AS NEEDED
Status: DISCONTINUED | OUTPATIENT
Start: 2019-08-06 | End: 2019-08-06 | Stop reason: HOSPADM

## 2019-08-06 RX ORDER — ROCURONIUM BROMIDE 10 MG/ML
INJECTION, SOLUTION INTRAVENOUS AS NEEDED
Status: DISCONTINUED | OUTPATIENT
Start: 2019-08-06 | End: 2019-08-06 | Stop reason: HOSPADM

## 2019-08-06 RX ORDER — SODIUM CHLORIDE 9 MG/ML
25 INJECTION, SOLUTION INTRAVENOUS CONTINUOUS
Status: DISCONTINUED | OUTPATIENT
Start: 2019-08-06 | End: 2019-08-08

## 2019-08-06 RX ORDER — SODIUM CHLORIDE 0.9 % (FLUSH) 0.9 %
5-40 SYRINGE (ML) INJECTION AS NEEDED
Status: DISCONTINUED | OUTPATIENT
Start: 2019-08-06 | End: 2019-08-06 | Stop reason: HOSPADM

## 2019-08-06 RX ORDER — METOPROLOL SUCCINATE 50 MG/1
50 TABLET, EXTENDED RELEASE ORAL DAILY
Status: DISCONTINUED | OUTPATIENT
Start: 2019-08-07 | End: 2019-08-13 | Stop reason: HOSPADM

## 2019-08-06 RX ORDER — NEOSTIGMINE METHYLSULFATE 1 MG/ML
INJECTION INTRAVENOUS AS NEEDED
Status: DISCONTINUED | OUTPATIENT
Start: 2019-08-06 | End: 2019-08-06 | Stop reason: HOSPADM

## 2019-08-06 RX ORDER — HEPARIN SODIUM 5000 [USP'U]/ML
INJECTION, SOLUTION INTRAVENOUS; SUBCUTANEOUS AS NEEDED
Status: DISCONTINUED | OUTPATIENT
Start: 2019-08-06 | End: 2019-08-06 | Stop reason: HOSPADM

## 2019-08-06 RX ORDER — FENTANYL CITRATE 50 UG/ML
INJECTION, SOLUTION INTRAMUSCULAR; INTRAVENOUS AS NEEDED
Status: DISCONTINUED | OUTPATIENT
Start: 2019-08-06 | End: 2019-08-06 | Stop reason: HOSPADM

## 2019-08-06 RX ORDER — CEFAZOLIN SODIUM/WATER 2 G/20 ML
2 SYRINGE (ML) INTRAVENOUS EVERY 8 HOURS
Status: COMPLETED | OUTPATIENT
Start: 2019-08-06 | End: 2019-08-07

## 2019-08-06 RX ORDER — SODIUM CHLORIDE 0.9 % (FLUSH) 0.9 %
5-40 SYRINGE (ML) INJECTION AS NEEDED
Status: DISCONTINUED | OUTPATIENT
Start: 2019-08-06 | End: 2019-08-13 | Stop reason: HOSPADM

## 2019-08-06 RX ORDER — ASPIRIN 81 MG/1
81 TABLET ORAL DAILY
Status: DISCONTINUED | OUTPATIENT
Start: 2019-08-07 | End: 2019-08-13 | Stop reason: HOSPADM

## 2019-08-06 RX ORDER — SODIUM CHLORIDE 9 MG/ML
250 INJECTION, SOLUTION INTRAVENOUS AS NEEDED
Status: DISCONTINUED | OUTPATIENT
Start: 2019-08-06 | End: 2019-08-08

## 2019-08-06 RX ORDER — DEXAMETHASONE SODIUM PHOSPHATE 4 MG/ML
INJECTION, SOLUTION INTRA-ARTICULAR; INTRALESIONAL; INTRAMUSCULAR; INTRAVENOUS; SOFT TISSUE AS NEEDED
Status: DISCONTINUED | OUTPATIENT
Start: 2019-08-06 | End: 2019-08-06 | Stop reason: HOSPADM

## 2019-08-06 RX ORDER — DIPHENHYDRAMINE HYDROCHLORIDE 50 MG/ML
12.5 INJECTION, SOLUTION INTRAMUSCULAR; INTRAVENOUS
Status: DISCONTINUED | OUTPATIENT
Start: 2019-08-06 | End: 2019-08-13 | Stop reason: HOSPADM

## 2019-08-06 RX ORDER — ATORVASTATIN CALCIUM 40 MG/1
80 TABLET, FILM COATED ORAL
Status: DISCONTINUED | OUTPATIENT
Start: 2019-08-06 | End: 2019-08-13 | Stop reason: HOSPADM

## 2019-08-06 RX ADMIN — OXYCODONE HYDROCHLORIDE AND ACETAMINOPHEN 1 TABLET: 5; 325 TABLET ORAL at 23:40

## 2019-08-06 RX ADMIN — FENTANYL CITRATE 25 MCG: 50 INJECTION, SOLUTION INTRAMUSCULAR; INTRAVENOUS at 12:51

## 2019-08-06 RX ADMIN — FENTANYL CITRATE 25 MCG: 50 INJECTION, SOLUTION INTRAMUSCULAR; INTRAVENOUS at 07:12

## 2019-08-06 RX ADMIN — HEPARIN SODIUM 4000 UNITS: 1000 INJECTION, SOLUTION INTRAVENOUS; SUBCUTANEOUS at 10:22

## 2019-08-06 RX ADMIN — SODIUM CHLORIDE, SODIUM LACTATE, POTASSIUM CHLORIDE, AND CALCIUM CHLORIDE: 600; 310; 30; 20 INJECTION, SOLUTION INTRAVENOUS at 07:55

## 2019-08-06 RX ADMIN — FENTANYL CITRATE 50 MCG: 50 INJECTION, SOLUTION INTRAMUSCULAR; INTRAVENOUS at 08:20

## 2019-08-06 RX ADMIN — DEXAMETHASONE SODIUM PHOSPHATE 4 MG: 4 INJECTION, SOLUTION INTRA-ARTICULAR; INTRALESIONAL; INTRAMUSCULAR; INTRAVENOUS; SOFT TISSUE at 08:32

## 2019-08-06 RX ADMIN — ATORVASTATIN CALCIUM 80 MG: 40 TABLET, FILM COATED ORAL at 21:30

## 2019-08-06 RX ADMIN — TUBERCULIN PURIFIED PROTEIN DERIVATIVE 5 UNITS: 5 INJECTION, SOLUTION INTRADERMAL at 21:31

## 2019-08-06 RX ADMIN — PROPOFOL 80 MG: 10 INJECTION, EMULSION INTRAVENOUS at 07:24

## 2019-08-06 RX ADMIN — EPHEDRINE SULFATE 5 MG: 50 INJECTION, SOLUTION INTRAVENOUS at 12:31

## 2019-08-06 RX ADMIN — NEOSTIGMINE METHYLSULFATE 1.5 MG: 1 INJECTION INTRAVENOUS at 12:49

## 2019-08-06 RX ADMIN — Medication 2 G: at 20:16

## 2019-08-06 RX ADMIN — FENTANYL CITRATE 25 MCG: 50 INJECTION, SOLUTION INTRAMUSCULAR; INTRAVENOUS at 13:04

## 2019-08-06 RX ADMIN — SODIUM CHLORIDE, SODIUM LACTATE, POTASSIUM CHLORIDE, AND CALCIUM CHLORIDE: 600; 310; 30; 20 INJECTION, SOLUTION INTRAVENOUS at 12:19

## 2019-08-06 RX ADMIN — ROCURONIUM BROMIDE 10 MG: 10 INJECTION, SOLUTION INTRAVENOUS at 08:50

## 2019-08-06 RX ADMIN — FENTANYL CITRATE 25 MCG: 50 INJECTION, SOLUTION INTRAMUSCULAR; INTRAVENOUS at 10:48

## 2019-08-06 RX ADMIN — Medication 10 ML: at 21:31

## 2019-08-06 RX ADMIN — HEPARIN SODIUM 3000 UNITS: 1000 INJECTION, SOLUTION INTRAVENOUS; SUBCUTANEOUS at 11:10

## 2019-08-06 RX ADMIN — Medication 2 G: at 11:32

## 2019-08-06 RX ADMIN — MORPHINE SULFATE 5 MG: 10 INJECTION, SOLUTION INTRAMUSCULAR; INTRAVENOUS at 15:44

## 2019-08-06 RX ADMIN — Medication 2 G: at 08:00

## 2019-08-06 RX ADMIN — ONDANSETRON 4 MG: 2 INJECTION INTRAMUSCULAR; INTRAVENOUS at 12:45

## 2019-08-06 RX ADMIN — DEXTROSE MONOHYDRATE AND SODIUM CHLORIDE 1000 ML: 5; .9 INJECTION, SOLUTION INTRAVENOUS at 15:44

## 2019-08-06 RX ADMIN — GLYCOPYRROLATE 0.2 MG: 0.2 INJECTION INTRAMUSCULAR; INTRAVENOUS at 12:49

## 2019-08-06 RX ADMIN — MORPHINE SULFATE 5 MG: 10 INJECTION, SOLUTION INTRAMUSCULAR; INTRAVENOUS at 06:51

## 2019-08-06 RX ADMIN — HEPARIN SODIUM 5000 UNITS: 1000 INJECTION, SOLUTION INTRAVENOUS; SUBCUTANEOUS at 09:40

## 2019-08-06 RX ADMIN — LIDOCAINE HYDROCHLORIDE 80 MG: 20 INJECTION, SOLUTION EPIDURAL; INFILTRATION; INTRACAUDAL; PERINEURAL at 07:24

## 2019-08-06 RX ADMIN — INSULIN LISPRO 2 UNITS: 100 INJECTION, SOLUTION INTRAVENOUS; SUBCUTANEOUS at 18:51

## 2019-08-06 RX ADMIN — PROTAMINE SULFATE 50 MG: 10 INJECTION, SOLUTION INTRAVENOUS at 12:22

## 2019-08-06 RX ADMIN — SODIUM CHLORIDE 25 ML/HR: 900 INJECTION, SOLUTION INTRAVENOUS at 06:22

## 2019-08-06 RX ADMIN — Medication 10 ML: at 15:53

## 2019-08-06 RX ADMIN — ROCURONIUM BROMIDE 40 MG: 10 INJECTION, SOLUTION INTRAVENOUS at 07:24

## 2019-08-06 NOTE — ANESTHESIA PROCEDURE NOTES
Arterial Line Placement    Start time: 8/6/2019 7:30 AM  End time: 8/6/2019 7:35 AM  Performed by: Zion Vila CRNA  Authorized by: Yvonne Hancock MD     Pre-Procedure  Indications:  Arterial pressure monitoring and blood sampling  Preanesthetic Checklist: patient identified, risks and benefits discussed, anesthesia consent, site marked, patient being monitored, timeout performed and patient being monitored    Timeout Time: 07:30        Procedure:   Prep:  Chlorhexidine  Seldinger Technique?: Yes    Orientation:  Left  Location:  Radial artery  Catheter size:  20 G  Number of attempts:  2  Cont Cardiac Output Sensor: No      Assessment:   Post-procedure:  Line secured and sterile dressing applied  Patient Tolerance:  Patient tolerated the procedure well with no immediate complications

## 2019-08-06 NOTE — PROGRESS NOTES
TRANSFER - IN REPORT:    Verbal report received from israel Hamilton RN(name) on [employer]  being received from OR(unit) for routine post - op      Report consisted of patients Situation, Background, Assessment and   Recommendations(SBAR). Information from the following report(s) SBAR, Kardex, OR Summary, Intake/Output, MAR and Recent Results was reviewed with the receiving nurse. Opportunity for questions and clarification was provided. Assessment completed upon patients arrival to unit and care assumed.

## 2019-08-06 NOTE — PROGRESS NOTES
Care Management Interventions  PCP Verified by CM: Yes(Lynn Parish )  Mode of Transport at Discharge: Other (see comment)(TBD based on need)  Transition of Care Consult (CM Consult): Discharge Planning  Discharge Durable Medical Equipment: No  Physical Therapy Consult: No  Occupational Therapy Consult: No  Speech Therapy Consult: No  Current Support Network: Own Home, Lives with Caregiver  Confirm Follow Up Transport: Other (see comment)(TBD based on need)  Plan discussed with Pt/Family/Caregiver: Yes  Freedom of Choice Offered: Yes  Discharge Location  Discharge Placement: Unable to determine at this time      This CM met with pt and daughter and son at bedside this day. They verified her PCP, insurance, emergency contact, and home address. They report no difficulty obtaining her medications in the community and her son manages them for her. She lives at home and her son lives with her. There are 3 steps into her home. Her home DME includes a walker, w/c, and cane. They confirm that at baseline pt was able to do some ADLs on her own but was needing assistance with her bathing, cooking, and some dressing. Pt does not drive. Family requests pt transition to SNF for STR. This CM will speak with attending physician to see when appropriate to have PT/OT evals ordered. PPD order placed. A barrier to discharge is that pt has peritoneal dialysis - I explained to family that no SNF in the area accepts PD. Discussed the option of the 9th floor pending if pt meets criteria, however, [employer] authorization would be another barrier. At this time will await to see what PT/OT evals yield. No additional discharge needs at this time. CM to continue to follow.

## 2019-08-06 NOTE — BRIEF OP NOTE
BRIEF OPERATIVE NOTE    Date of Procedure: 8/6/2019   Preoperative Diagnosis: PAD (peripheral artery disease) (formerly [facility]) [I73.9]  Postoperative Diagnosis: PAD (peripheral artery disease) (formerly [facility]) [I73.9]    Procedure(s):  LEFT  CFA/PFA ENDARTERECTOMY/PATCH ANGIOPLASTY, L FEMORAL TO ABOVE KNEE -POPLITEAL BYPASS W/RSVG  Surgeon(s) and Role:     * Alonso Urrutia MD - Primary         Surgical Assistant:     Surgical Staff:  Circ-1: Colleen Colunga RN  Circ-Relief: Lulu Stack RN  Scrub Tech-1: Gilford Harold, Kennieth Cobb  Scrub Tech-2: Junior Vera  Scrub Tech-Relief: Donell Murillo  Event Time In Time Out   Incision Start  8:06 AM    Incision Close  1:22 PM      Anesthesia: General   Estimated Blood Loss: 150CC  Specimens: * No specimens in log *   Findings:    Complications: nONE  Implants:   Implant Name Type Inv.  Item Serial No.  Lot No. LRB No. Used Action   GRAFT PERPH VASC PERIC 1X10CM -- [employer] - [id number]  GRAFT PERPH VASC PERIC 1X10CM -- [employer]  [employer] [id number] 1 Implanted

## 2019-08-06 NOTE — PROGRESS NOTES
Now with family at bedside (and a clod cup of water) Ritchie Gallegos is conversing without difficulty. Moves all extremities without difficulty, and has equal sensations to all extremities.

## 2019-08-06 NOTE — ANESTHESIA POSTPROCEDURE EVALUATION
Procedure(s):  LEFT FEMORAL TO ABOVE KNEE -POPLITEAL BYPASS. general    Anesthesia Post Evaluation      Multimodal analgesia: multimodal analgesia used between 6 hours prior to anesthesia start to PACU discharge  Patient location during evaluation: ICU  Patient participation: complete - patient participated  Level of consciousness: awake  Pain management: adequate  Airway patency: patent  Anesthetic complications: no  Cardiovascular status: acceptable and stable  Respiratory status: acceptable and nasal cannula  Hydration status: acceptable  Post anesthesia nausea and vomiting:  none      Vitals Value Taken Time   BP     Temp     Pulse 99 8/6/2019  2:20 PM   Resp 27 8/6/2019  2:19 PM   SpO2 98 % 8/6/2019  2:20 PM   Vitals shown include unvalidated device data.

## 2019-08-06 NOTE — PROGRESS NOTES
Physical Exam   Skin:        : Dual skin assessment is completed with Sacha Muhammad (our most excellent charge nurse) RN.

## 2019-08-06 NOTE — PROGRESS NOTES
Initial visit to assess pt's spiritual needs. Feeling today? Just out of surgery; in pain    Receiving good care? Yes; nurse was providing excellent care    Needs from Spiritual Care: prayer     Ministry of presence & prayer to demonstrate caring & concern, convey emotional & spiritual support.     kaykay Jordan MDiv,[facility],PhD

## 2019-08-06 NOTE — PROGRESS NOTES
To room 107 from OR and placed on monitoring equipment. Eyes open spontaneously but glossy. Pupils 2 and extremely sluggish. Tracks with eyes. Tongue is midline. Hand  are equal but weak, pedal strength is the same. Grunts yes to equal sensation to both BUE/BLE. Follows all commands except to speak. BGL-203 mg/dL. Will continue to closely monitor.

## 2019-08-06 NOTE — CONSULTS
Nephrology consult    Admission Date:  8/6/2019    Admission Diagnosis  PAD (peripheral artery disease) ([facility] [I73.9]  Ischemia of left lower extremity [I99.8]  PAD (peripheral artery disease) ([facility] [address] [I73.9]    We are asked by Dr Rahul Cook to see the patient. History of Present Illness: This is a 69 yo AAF with ESRD on PD who is admitted after Left femoral to above the knee popliteal bypass. She is on [facility] via [facility] She feels OK after the surgery. Denies pain. Resting comfortably in bed. Past Medical History:   Diagnosis Date    Anemia     denies hx of blood transfusion    Arrhythmia     murmur; sinus tach    Aspirin long-term use SINAN    continue    CAD (coronary artery disease) 2012    MI, 1 stent 2012-    Chronic kidney disease     peritoneal dialysis qPM    Chronic obstructive pulmonary disease (HCC)     no inhalers; no treatment; smokes 1 ppd since age 19    CKD (chronic kidney disease) stage 3, GFR 30-59 ml/min ([facility])           [facility]     Flat affect     GERD (gastroesophageal reflux disease)     resolved per patient; no current meds 7/30/19    Heart murmur not followed per cardiology    EF 65-70 %-- on echo 11/2017    History of MI (myocardial infarction) 2012    stents x 1---- per pt-- does not see a cardiologist    Hx of colonic polyps 2016    adenoma    Hydronephrosis     Hypertension     daily meds    Left-sided weakness 2012    left leg r/t cva    Loss of appetite     states within the last several months    Peritoneal dialysis catheter in place [facility])     patient does dialysis QPM    Poor historian     Renal atrophy, right     Renal insufficiency     ? --- pt unsure if sees nephrologist    Smoker     48 yr hx/ 1 pk per day    Smoker     1 ppd sicne age 19    Stented coronary artery Xience SINAN    proximal LAD 2012-- pt states does not see a cardio    Stroke ([facility] 2012    with slight left  sided weakness residual      Past Surgical History: Procedure Laterality Date    COLONOSCOPY N/A 9/26/2016    Pedro--one desc TA, one rectal hyperplastic--5 year recall    HX CAROTID ENDARTERECTOMY Right 11/07/2018    HX UROLOGICAL      stent to left kidney--multi times    TX LEFT HEART CATH,PERCUTANEOUS  4/2012    Xience LAD    VASCULAR SURGERY PROCEDURE UNLIST  02/28/2019    PD catheter placement    VASCULAR SURGERY PROCEDURE UNLIST  07/01/2019    ULTRASOUND GUIDED ACCESS BILATERAL LOWER EXTREMITY ARTERIOGRAM (Left Groin)      Current Facility-Administered Medications   Medication Dose Route Frequency    0.9% sodium chloride infusion  25 mL/hr IntraVENous CONTINUOUS    0.9% sodium chloride infusion 250 mL  250 mL IntraVENous PRN    [START ON 8/7/2019] amLODIPine (NORVASC) tablet 10 mg  10 mg Oral DAILY    atorvastatin (LIPITOR) tablet 80 mg  80 mg Oral QHS    [START ON 8/7/2019] hydrALAZINE (APRESOLINE) tablet 10 mg  10 mg Oral DAILY    [START ON 8/7/2019] metoprolol succinate (TOPROL-XL) XL tablet 50 mg  50 mg Oral DAILY    dextrose 5% and 0.9% NaCl infusion 1,000 mL  1,000 mL IntraVENous CONTINUOUS    sodium chloride (NS) flush 5-40 mL  5-40 mL IntraVENous Q8H    sodium chloride (NS) flush 5-40 mL  5-40 mL IntraVENous PRN    acetaminophen (TYLENOL) tablet 650 mg  650 mg Oral Q4H PRN    oxyCODONE-acetaminophen (PERCOCET) 5-325 mg per tablet 1 Tab  1 Tab Oral Q4H PRN    morphine 10 mg/ml injection 5 mg  5 mg IntraVENous Q3H PRN    naloxone (NARCAN) injection 0.4 mg  0.4 mg IntraVENous PRN    ceFAZolin (ANCEF) 2 g/20 mL in sterile water IV syringe  2 g IntraVENous Q8H    [START ON 8/7/2019] aspirin delayed-release tablet 81 mg  81 mg Oral DAILY    diphenhydrAMINE (BENADRYL) injection 12.5 mg  12.5 mg IntraVENous Q4H PRN     Allergies   Allergen Reactions    Codeine Nausea and Vomiting    Hydralazine Nausea and Vomiting      Social History     Tobacco Use    Smoking status: Current Every Day Smoker     Packs/day: 1.00     Years: 51.00 Pack years: 51.00    Smokeless tobacco: Never Used   Substance Use Topics    Alcohol use: No      Family History   Problem Relation Age of Onset    Hypertension Mother     Heart Disease Mother     Stroke Father     No Known Problems Sister     No Known Problems Brother     No Known Problems Sister     No Known Problems Sister     No Known Problems Sister     No Known Problems Brother     No Known Problems Brother         Review of Systems  Gen - no fever, no chills, appetite okay  HEENT - no sore throat, no decreased vision, no hearing loss  Neck - no neck mass  CV - no chest pain, no palpitation, no orthopnea  Lung - no shortness of breath, no cough, no hemoptysis  Abd - no tenderness, no nausea/vomiting, no bloody stool  Ext - no edema, no clubbing, no cyanosis  Musculoskeletal - no joint pain, no back pain  Neurologic - no headaches, no dizziness, no seizures  Psychiatric - no anxiety, no depression  Skin - no rashes, no pupura  Genitourinary - no decreased urine output, no hematuria, no foamy urine    Objective:     Vitals:    08/06/19 1545 08/06/19 1559 08/06/19 1600 08/06/19 1615   BP:  132/62     Pulse: (!) 101 (!) 101 98 93   Resp: 22 21 21 15   Temp:       SpO2:       Weight:       Height:           Intake/Output Summary (Last 24 hours) at 8/6/2019 1627  Last data filed at 8/6/2019 1315  Gross per 24 hour   Intake 1900 ml   Output 480 ml   Net 1420 ml       Physical Exam  GEN :in no distress, alert  HEENT: anicteric sclerae, eomi. Oropharynx without lesions. Mucous membranes are moist.  Neck - supple without JVD, no thyromegaly. No lymphadenopathy.   CV - regular rate and rhythm, no murmur, no rub  Lung - clear bilaterally, lungs expand symmetrically  Chest wall - normal appearance  Abd - soft, nontender, bowel sounds present, no hepatosplenomegaly, PD catheter  Ext - no clubbing, no cyanosis, no edema  Neurologic - nonfocal  Genitourinary - bladder nonpalpable  Skin - no rashes, no purpura, no ecchymoses  Psychiatric: Normal mood and affect. Data Review:   Recent Labs     08/06/19  1446   WBC 9.7   HGB 8.4*   HCT 25.6*        Recent Labs     08/06/19  1446      K 4.2      CO2 22   BUN 28*   CREA 6.58*   *   CA 7.5*   MG 1.6*     No results for input(s): PH, PCO2, PO2, PCO2 in the last 72 hours.     Problem List:     Patient Active Problem List    Diagnosis Date Noted    Ischemia of left lower extremity 08/06/2019    Atherosclerosis of native arteries of extremity with rest pain ([address] 08/01/2019    Atherosclerosis of native arteries of extremities with rest pain, left leg ([address] 07/31/2019    Preop cardiovascular exam 07/29/2019    ESRD on peritoneal dialysis ([address] 07/29/2019    Atherosclerosis of native coronary artery of native heart without angina pectoris 07/29/2019    PAD (peripheral artery disease) ([address] 06/19/2019    Atherosclerosis of native arteries of extremity with intermittent claudication ([address] 06/19/2019    Ischemia 06/19/2019    Shortness of breath 06/04/2019    History of right-sided carotid endarterectomy 11/08/2018    Carotid stenosis, asymptomatic, right 11/06/2018    Carotid stenosis, right 11/06/2018    Bilateral carotid artery disease ([address] 08/29/2018    Bilateral carotid artery stenosis 11/28/2017    Pure hypercholesterolemia 11/28/2017    UTI (urinary tract infection) 11/10/2017    Stroke ([address] 11/10/2017    S/P coronary artery stent placement 02/06/2017    CKD stage 5 secondary to hypertension ([address] 02/06/2017    Major depressive disorder with single episode, in remission ([address] 02/06/2017    Hydronephrosis 02/12/2016    History of CVA (cerebrovascular accident) 02/11/2016    Anemia of renal disease 01/07/2016    Benign essential HTN 01/07/2016    Coronary artery disease involving native coronary artery with angina pectoris ([address] 01/07/2016    CVA (cerebral vascular accident) ([address] 04/28/2012    Hypertensive emergency 04/26/2012    Carotid artery bruit 04/26/2012    CKD (chronic kidney disease) stage 3, GFR 30-59 ml/min ([facility]) 04/26/2012    Tobacco use disorder 04/26/2012    GERD (gastroesophageal reflux disease) 04/26/2012    Chest pain, unspecified 04/26/2012    Anemia 04/26/2012       Impression:  1. ESRD on PD  2. LE bypass  3. Anemia with ESRD - monitor. Gets outpatient [facility].      Plan:   -Will restart PD tonight  -Use all 1.5%

## 2019-08-07 ENCOUNTER — APPOINTMENT (OUTPATIENT)
Dept: INTERVENTIONAL RADIOLOGY/VASCULAR | Age: 69
DRG: 252 | End: 2019-08-07
Attending: PHYSICIAN ASSISTANT
Payer: MEDICARE

## 2019-08-07 LAB
ANION GAP SERPL CALC-SCNC: 10 MMOL/L (ref 7–16)
BUN SERPL-MCNC: 28 MG/DL (ref 8–23)
CALCIUM SERPL-MCNC: 7.5 MG/DL (ref 8.3–10.4)
CHLORIDE SERPL-SCNC: 109 MMOL/L (ref 98–107)
CO2 SERPL-SCNC: 22 MMOL/L (ref 21–32)
CREAT SERPL-MCNC: 6.69 MG/DL (ref 0.6–1)
ERYTHROCYTE [DISTWIDTH] IN BLOOD BY AUTOMATED COUNT: 15.5 % (ref 11.9–14.6)
GLUCOSE BLD STRIP.AUTO-MCNC: 108 MG/DL (ref 65–100)
GLUCOSE BLD STRIP.AUTO-MCNC: 111 MG/DL (ref 65–100)
GLUCOSE BLD STRIP.AUTO-MCNC: 117 MG/DL (ref 65–100)
GLUCOSE BLD STRIP.AUTO-MCNC: 125 MG/DL (ref 65–100)
GLUCOSE BLD STRIP.AUTO-MCNC: 216 MG/DL (ref 65–100)
GLUCOSE BLD STRIP.AUTO-MCNC: 52 MG/DL (ref 65–100)
GLUCOSE BLD STRIP.AUTO-MCNC: 73 MG/DL (ref 65–100)
GLUCOSE BLD STRIP.AUTO-MCNC: 85 MG/DL (ref 65–100)
GLUCOSE SERPL-MCNC: 150 MG/DL (ref 65–100)
HCT VFR BLD AUTO: 22.6 % (ref 35.8–46.3)
HGB BLD-MCNC: 7.3 G/DL (ref 11.7–15.4)
MCH RBC QN AUTO: 31.7 PG (ref 26.1–32.9)
MCHC RBC AUTO-ENTMCNC: 32.3 G/DL (ref 31.4–35)
MCV RBC AUTO: 98.3 FL (ref 79.6–97.8)
MM INDURATION POC: 0 MM (ref 0–5)
NRBC # BLD: 0 K/UL (ref 0–0.2)
PLATELET # BLD AUTO: 174 K/UL (ref 150–450)
PMV BLD AUTO: 12.6 FL (ref 9.4–12.3)
POTASSIUM SERPL-SCNC: 4.3 MMOL/L (ref 3.5–5.1)
PPD POC: NEGATIVE NEGATIVE
RBC # BLD AUTO: 2.3 M/UL (ref 4.05–5.2)
SODIUM SERPL-SCNC: 141 MMOL/L (ref 136–145)
WBC # BLD AUTO: 11.3 K/UL (ref 4.3–11.1)

## 2019-08-07 PROCEDURE — 02H633Z INSERTION OF INFUSION DEVICE INTO RIGHT ATRIUM, PERCUTANEOUS APPROACH: ICD-10-PCS | Performed by: RADIOLOGY

## 2019-08-07 PROCEDURE — 90945 DIALYSIS ONE EVALUATION: CPT

## 2019-08-07 PROCEDURE — 74011000250 HC RX REV CODE- 250: Performed by: RADIOLOGY

## 2019-08-07 PROCEDURE — C1894 INTRO/SHEATH, NON-LASER: HCPCS

## 2019-08-07 PROCEDURE — 97530 THERAPEUTIC ACTIVITIES: CPT

## 2019-08-07 PROCEDURE — 74011000250 HC RX REV CODE- 250

## 2019-08-07 PROCEDURE — 74011250637 HC RX REV CODE- 250/637: Performed by: SURGERY

## 2019-08-07 PROCEDURE — 65610000006 HC RM INTENSIVE CARE

## 2019-08-07 PROCEDURE — 85027 COMPLETE CBC AUTOMATED: CPT

## 2019-08-07 PROCEDURE — 36430 TRANSFUSION BLD/BLD COMPNT: CPT

## 2019-08-07 PROCEDURE — 77030018719 HC DRSG PTCH ANTIMIC J&J -A

## 2019-08-07 PROCEDURE — C1750 CATH, HEMODIALYSIS,LONG-TERM: HCPCS

## 2019-08-07 PROCEDURE — C1769 GUIDE WIRE: HCPCS

## 2019-08-07 PROCEDURE — 74011250636 HC RX REV CODE- 250/636

## 2019-08-07 PROCEDURE — 36600 WITHDRAWAL OF ARTERIAL BLOOD: CPT

## 2019-08-07 PROCEDURE — 74011250636 HC RX REV CODE- 250/636: Performed by: RADIOLOGY

## 2019-08-07 PROCEDURE — 77001 FLUOROGUIDE FOR VEIN DEVICE: CPT

## 2019-08-07 PROCEDURE — 74011250636 HC RX REV CODE- 250/636: Performed by: SURGERY

## 2019-08-07 PROCEDURE — 80048 BASIC METABOLIC PNL TOTAL CA: CPT

## 2019-08-07 PROCEDURE — 0JH63XZ INSERTION OF TUNNELED VASCULAR ACCESS DEVICE INTO CHEST SUBCUTANEOUS TISSUE AND FASCIA, PERCUTANEOUS APPROACH: ICD-10-PCS | Performed by: RADIOLOGY

## 2019-08-07 PROCEDURE — 97161 PT EVAL LOW COMPLEX 20 MIN: CPT

## 2019-08-07 PROCEDURE — 77030037400 HC ADH TISS HI VISC EXOFIN CHMP -B

## 2019-08-07 PROCEDURE — 82962 GLUCOSE BLOOD TEST: CPT

## 2019-08-07 PROCEDURE — P9016 RBC LEUKOCYTES REDUCED: HCPCS

## 2019-08-07 PROCEDURE — 77030002916 HC SUT ETHLN J&J -A

## 2019-08-07 RX ORDER — FENTANYL CITRATE 50 UG/ML
25-50 INJECTION, SOLUTION INTRAMUSCULAR; INTRAVENOUS
Status: DISCONTINUED | OUTPATIENT
Start: 2019-08-07 | End: 2019-08-07

## 2019-08-07 RX ORDER — DEXTROSE 50 % IN WATER (D50W) INTRAVENOUS SYRINGE
25 AS NEEDED
Status: DISCONTINUED | OUTPATIENT
Start: 2019-08-07 | End: 2019-08-13 | Stop reason: HOSPADM

## 2019-08-07 RX ORDER — DEXTROSE 50 % IN WATER (D50W) INTRAVENOUS SYRINGE
Status: COMPLETED
Start: 2019-08-07 | End: 2019-08-07

## 2019-08-07 RX ORDER — LIDOCAINE HYDROCHLORIDE 20 MG/ML
2-20 INJECTION, SOLUTION INFILTRATION; PERINEURAL ONCE
Status: DISCONTINUED | OUTPATIENT
Start: 2019-08-07 | End: 2019-08-07

## 2019-08-07 RX ORDER — LIDOCAINE HYDROCHLORIDE 20 MG/ML
2-20 INJECTION, SOLUTION INFILTRATION; PERINEURAL ONCE
Status: COMPLETED | OUTPATIENT
Start: 2019-08-07 | End: 2019-08-07

## 2019-08-07 RX ORDER — DIPHENHYDRAMINE HYDROCHLORIDE 50 MG/ML
12.5-5 INJECTION, SOLUTION INTRAMUSCULAR; INTRAVENOUS ONCE
Status: COMPLETED | OUTPATIENT
Start: 2019-08-07 | End: 2019-08-07

## 2019-08-07 RX ORDER — SODIUM CHLORIDE 9 MG/ML
25 INJECTION, SOLUTION INTRAVENOUS CONTINUOUS
Status: DISCONTINUED | OUTPATIENT
Start: 2019-08-07 | End: 2019-08-08

## 2019-08-07 RX ORDER — SODIUM CHLORIDE 9 MG/ML
250 INJECTION, SOLUTION INTRAVENOUS AS NEEDED
Status: DISCONTINUED | OUTPATIENT
Start: 2019-08-07 | End: 2019-08-08

## 2019-08-07 RX ORDER — ONDANSETRON 2 MG/ML
INJECTION INTRAMUSCULAR; INTRAVENOUS
Status: COMPLETED
Start: 2019-08-07 | End: 2019-08-07

## 2019-08-07 RX ORDER — HEPARIN SODIUM 1000 [USP'U]/ML
2000-5000 INJECTION, SOLUTION INTRAVENOUS; SUBCUTANEOUS ONCE
Status: COMPLETED | OUTPATIENT
Start: 2019-08-07 | End: 2019-08-07

## 2019-08-07 RX ORDER — ONDANSETRON 2 MG/ML
4 INJECTION INTRAMUSCULAR; INTRAVENOUS ONCE
Status: COMPLETED | OUTPATIENT
Start: 2019-08-07 | End: 2019-08-07

## 2019-08-07 RX ORDER — MIDAZOLAM HYDROCHLORIDE 1 MG/ML
.5-2 INJECTION, SOLUTION INTRAMUSCULAR; INTRAVENOUS
Status: DISCONTINUED | OUTPATIENT
Start: 2019-08-07 | End: 2019-08-07

## 2019-08-07 RX ORDER — HEPARIN SODIUM 1000 [USP'U]/ML
2000-5000 INJECTION, SOLUTION INTRAVENOUS; SUBCUTANEOUS ONCE
Status: ACTIVE | OUTPATIENT
Start: 2019-08-07 | End: 2019-08-08

## 2019-08-07 RX ORDER — HEPARIN SODIUM 200 [USP'U]/100ML
1000 INJECTION, SOLUTION INTRAVENOUS AS NEEDED
Status: DISCONTINUED | OUTPATIENT
Start: 2019-08-07 | End: 2019-08-13 | Stop reason: HOSPADM

## 2019-08-07 RX ADMIN — ONDANSETRON 4 MG: 2 INJECTION INTRAMUSCULAR; INTRAVENOUS at 09:24

## 2019-08-07 RX ADMIN — Medication 2 G: at 04:06

## 2019-08-07 RX ADMIN — AMLODIPINE BESYLATE 10 MG: 5 TABLET ORAL at 09:09

## 2019-08-07 RX ADMIN — ATORVASTATIN CALCIUM 80 MG: 40 TABLET, FILM COATED ORAL at 21:42

## 2019-08-07 RX ADMIN — MORPHINE SULFATE 5 MG: 10 INJECTION, SOLUTION INTRAMUSCULAR; INTRAVENOUS at 23:31

## 2019-08-07 RX ADMIN — DIPHENHYDRAMINE HYDROCHLORIDE 50 MG: 50 INJECTION INTRAMUSCULAR; INTRAVENOUS at 15:15

## 2019-08-07 RX ADMIN — Medication 10 ML: at 06:28

## 2019-08-07 RX ADMIN — DEXTROSE 50 % IN WATER (D50W) INTRAVENOUS SYRINGE 25 G: at 18:05

## 2019-08-07 RX ADMIN — LIDOCAINE HYDROCHLORIDE 150 MG: 20 INJECTION, SOLUTION INFILTRATION; PERINEURAL at 15:24

## 2019-08-07 RX ADMIN — Medication 10 ML: at 21:48

## 2019-08-07 RX ADMIN — HEPARIN SODIUM 2000 UNITS: 200 INJECTION, SOLUTION INTRAVENOUS at 15:00

## 2019-08-07 RX ADMIN — HEPARIN SODIUM 2600 UNITS: 1000 INJECTION, SOLUTION INTRAVENOUS; SUBCUTANEOUS at 15:30

## 2019-08-07 RX ADMIN — Medication 10 ML: at 14:16

## 2019-08-07 RX ADMIN — SODIUM CHLORIDE 25 ML/HR: 900 INJECTION, SOLUTION INTRAVENOUS at 15:00

## 2019-08-07 RX ADMIN — HYDRALAZINE HYDROCHLORIDE 10 MG: 10 TABLET, FILM COATED ORAL at 11:47

## 2019-08-07 RX ADMIN — OXYCODONE HYDROCHLORIDE AND ACETAMINOPHEN 1 TABLET: 5; 325 TABLET ORAL at 09:08

## 2019-08-07 RX ADMIN — MORPHINE SULFATE 5 MG: 10 INJECTION, SOLUTION INTRAMUSCULAR; INTRAVENOUS at 00:51

## 2019-08-07 RX ADMIN — LIDOCAINE HYDROCHLORIDE 150 MG: 10; .005 INJECTION, SOLUTION EPIDURAL; INFILTRATION; INTRACAUDAL; PERINEURAL at 15:24

## 2019-08-07 RX ADMIN — ASPIRIN 81 MG: 81 TABLET ORAL at 09:08

## 2019-08-07 RX ADMIN — FENTANYL CITRATE 50 MCG: 50 INJECTION, SOLUTION INTRAMUSCULAR; INTRAVENOUS at 15:20

## 2019-08-07 RX ADMIN — METOPROLOL SUCCINATE 50 MG: 50 TABLET, EXTENDED RELEASE ORAL at 09:08

## 2019-08-07 NOTE — PROGRESS NOTES
Bedside shift change report given to Seferino Lopes RN (oncoming nurse) by Nam Head RN (offgoing nurse). Report included the following information SBAR, Kardex, OR Summary, Intake/Output, MAR, Recent Results, Med Rec Status and Cardiac Rhythm of Sinus Tach.

## 2019-08-07 NOTE — PROCEDURES
Department of Interventional Radiology  [phone]        Interventional Radiology Brief Procedure Note    Patient: Victor Hugo Jimenez MRN: [id number]  SSN: [id number]    YOB: 1950  Age: 68 y.o. Sex: female      Date of Procedure: 8/7/2019    Pre-Procedure Diagnosis: ESRD    Post-Procedure Diagnosis: SAME    Procedure(s): Tunneled Central Venous Catheter    Brief Description of Procedure: as above    Performed By: Eva Nguyen MD     Assistants: None    Anesthesia:Lidocaine    Estimated Blood Loss: Less than 10ml    Specimens:  None    Implants:  Tunnelled Hemodialysis Catheter    Findings: Tip in RA. Complications: None    Recommendations: 1 hour bedrest.  Ready to use.        Follow Up: PRN    Signed By: Eva Nguyen MD     August 7, 2019

## 2019-08-07 NOTE — PROGRESS NOTES
TRANSFER - IN REPORT:    Verbal report received from Alina Richardson RN(name) on Donato De La Garza  being received from IR(unit) for routine post - op      Report consisted of patients Situation, Background, Assessment and   Recommendations(SBAR). Information from the following report(s) SBAR, Kardex, Procedure Summary, Intake/Output, MAR and Recent Results was reviewed with the receiving nurse. Opportunity for questions and clarification was provided. Assessment completed upon patients arrival to unit and care assumed.

## 2019-08-07 NOTE — PROGRESS NOTES
[facility] PA called regarding patient's family's questions regarding plan of care and patient's intermittent confusion. States she will come to bedside to speak with family and reevaluate the patient. Patient remains confused as to where she is and what is going on.

## 2019-08-07 NOTE — PROGRESS NOTES
Patient seen in PM rounds in presence of son Mendel Ripple, daughter Evelin Barcenas and niece. Primary RN and family express concerns of intermittent confusion today. Patient is alert, oriented to person and place, struggles with and fixates on date / timing questions. Ambulated with PT and staff. Family with numerous questions about patient's care plan, dialysis, SNF, etc. Receiving pRBC, going for tunneled CVC in IR shortly. Physical Exam:   Constitutional: AVSS, alert, in no acute distress  HEENT:  PERRL, EOMI, oral mucosa moist, minimal baseline facial asymmetry, tongue midline, facial symmetry with smile / cheek puff / brow furrow  Lungs: CTA bilaterally, no wheezes  Cardiovascular: regular rate and rhythm  Ext: warm, normal shoulder shrug, full and symmetric  strength, independently lifts each LE off recliner; left groin / medial thigh with Prevena wound vac in place and functioning, no drainage in cannister    A/P:  Post-op confusion in elderly patient with ESRD, GFR 8; physical exam with improved mobility vs last week and without new signs of CVA. Will monitor patient closely. She needs skilled rehab at discharge but will require CVC placement for temporary HD, as no SNFs provide peritoneal dialysis services. Patient and family anticipate eventual return to home and peritoneal dialysis after rehab. Stacy Caputo PA-C  Physician Assistant with [facility]  [facility].  Claudette Jules, MD / Mayco Mcguire MD

## 2019-08-07 NOTE — DIALYSIS
Disconnected PD cycler from patient. Betadine cap intact by primary RN. Patient tolerated well. UF amount 650mls.  Effluent: clear, yellow

## 2019-08-07 NOTE — OP NOTES
[address] [facility], [place]  OPERATIVE REPORT    Name:  Liliana Castaneda  MR#:  [id number]  :  1950  ACCOUNT #:  [de-identified]  DATE OF SERVICE:  2019    PREOPERATIVE DIAGNOSIS:  Left lower extremity critical limb ischemia. POSTOPERATIVE DIAGNOSIS:  Left lower extremity critical limb ischemia. PROCEDURES PERFORMED:  1. Left common femoral and profunda endarterectomy with CorMatrix patch angioplasty. 2.  Left femoral to above-knee popliteal artery with reverse saphenous vein graft. SURGEON:  Dianna Becerril MD    ASSISTANT:  None. ANESTHESIA:  General endotracheal.    COMPLICATIONS:  None. SPECIMENS REMOVED:  None. IMPLANTS:  See chart. ESTIMATED BLOOD LOSS:  150 mL. DRAINS:  None. DESCRIPTION OF THE PROCEDURE:  The patient was brought to the operating room, placed on the operating table in supine position. Following adequate general endotracheal anesthesia and time-out procedure, the abdomen and left lower extremity were draped and prepped in sterile fashion. A vertical incision was made in the left groin. The wound was deepened using Bovie to control bleeding. Dissection was carried down to the level of the common femoral artery. The common femoral was encircled with vessel loop proximally. Dissection was continued inferiorly along the common femoral artery and the SFA and profunda femoris arteries were mobilized and circled the vessels. Both of which were known to be chronically occluded. The profunda femoris artery was then dissected inferiorly for approximately 2.5 cm to its terminal branches which were patent and fairly soft. Next, the saphenous vein was harvested using skip incisions from the left groin to the level of the knee. The vein was then removed, reversed and irrigated with heparinized saline. All side branches were tied. The above-knee popliteal artery was identified, mobilized and encircled with vessel loops proximally and distally.   Next, a Luisa tunneler was used to create anatomic tunnel deep to the sartorius muscle for tunneling of the fem-pop bypass graft. Next, the patient was systemically heparinized. Next, the common femoral artery was occluded proximally. The loops around the terminal branches of the profunda femoris were tightened as well. The SFA was noted to be chronically occluded. A longitudinal arteriotomy was performed on the common femoral artery. There was dense atherosclerotic plaque present with only a small amount of lumen present. The arteriotomy was extended proximally. The arteriotomy was then extended distally on to the profunda deep to the adventitia around the area of chronic occlusion. Ultimately, the lumen was reentered about 1 cm beyond the origin of the chronically occluded profunda. The plaque was mobilized circumferentially and excised. The plaque was elevated proximally and the plaque extending into the external iliac artery was removed with downward traction. The remainder of the plaque extending down to the profunda femoris was removed with a good endpoint. Next, a CorMatrix patch was sewn in position with running 7-0 Prolene suture. Prior to completion of the patch closure, the native vessels were flushed and back bled. The patch closure was then completed, flow restored. There was excellent flow in the profunda system following restoration of flow, which had been previously occluded. Next, the common femoral artery is occluded proximally and distally and a longitudinal arteriotomy was performed through the CorMatrix patch. The vein graft was then brought on the field, reversed, spatulated and sewn into position in end-to-side fashion with a running 6-0 Prolene suture. Following completion of anastomosis, a Bulldog was placed on the end of the vein graft and the clamps from the common femoral artery were released restoring flow. There was excellent flow in the graft.   The graft was then extended on to its full length and marked towards the anterior surface. The graft was then tunneled using the previously placed Luisa tunneler and brought out through the incision at the level of the knee. The loops from the popliteal artery were then tightened to occlude flow. A longitudinal arteriotomy was performed. There was thickened media at this level, but there was still retain of reasonably good lumen. The graft then trimmed into appropriate length, spatulated, sewn in position in end-to-side fashion with running 6-0 Prolene suture. Prior to completion of the anastomosis, the graft was flushed and native vessels were back bled. The anastomosis was then completed and flow restored. At this point, there was excellent flow in the popliteal artery beyond the anastomosis as well as in the posterior tibia and  dorsalis pedis arteries which were graft dependent. Protamine was then administered to reverse the heparin effect. Hemostasis was achieved. The wounds were closed in layers with Vicryl suture followed by skin closure with skin staples and a Prevena wound management system was applied. The patient was awakened from anesthesia and transported to the recovery room in stable condition. The patient tolerated the procedure well. No complications. All the needle and sponge counts were correct.       Laurel Hays MD      [id number]  D:  08/06/2019 15:45  T:  08/06/2019 15:50  JOB #:  [id number]

## 2019-08-07 NOTE — PROGRESS NOTES
Bedside shift change report given to Isacc Colin RN (oncoming nurse) by Mg James RN (offgoing nurse). Report included the following information SBAR, Kardex, OR Summary, Intake/Output, MAR, Recent Results, Med Rec Status and Cardiac Rhythm of NSR.

## 2019-08-07 NOTE — PROGRESS NOTES
TRANSFER - OUT REPORT:    Verbal report given to FRANK Houston on Karlie Iglesias  being transferred to CV unit for routine post - op       Report consisted of patients Situation, Background, Assessment and   Recommendations(SBAR). Information from the following report(s) SBAR, Kardex, Procedure Summary and MAR was reviewed with the receiving nurse. Lines:   Peripheral IV 08/06/19 Left Antecubital (Active)   Site Assessment Clean, dry, & intact 8/7/2019  7:00 AM   Phlebitis Assessment 0 8/7/2019  7:00 AM   Infiltration Assessment 0 8/7/2019  7:00 AM   Dressing Status Clean, dry, & intact; Occlusive 8/7/2019  7:00 AM   Dressing Type Tape;Transparent 8/7/2019  7:00 AM   Hub Color/Line Status Capped;Pink;Flushed;Patent 8/7/2019  7:00 AM   Alcohol Cap Used No 8/7/2019  7:00 AM       Peripheral IV 08/06/19 Right Arm (Active)   Site Assessment Clean, dry, & intact 8/7/2019  7:00 AM   Phlebitis Assessment 0 8/7/2019  7:00 AM   Infiltration Assessment 0 8/7/2019  7:00 AM   Dressing Status Clean, dry, & intact; Occlusive 8/7/2019  7:00 AM   Dressing Type Tape;Transparent 8/7/2019  7:00 AM   Hub Color/Line Status Green;Capped;Flushed;Patent 8/7/2019  7:00 AM   Action Taken Open ports on tubing capped 8/6/2019  1:49 PM   Alcohol Cap Used No 8/7/2019  7:00 AM        Opportunity for questions and clarification was provided.

## 2019-08-07 NOTE — PROGRESS NOTES
Bedside shift change report given to Ambreen Sauceda RN (oncoming nurse) by Aaron Leon RN (offgoing nurse). Report included the following information SBAR, Kardex, OR Summary, Procedure Summary, Intake/Output, MAR, Recent Results, Med Rec Status and Cardiac Rhythm of Sinus Tachycardia.

## 2019-08-07 NOTE — PROGRESS NOTES
Problem: Mobility Impaired (Adult and Pediatric)  Goal: *Acute Goals and Plan of Care (Insert Text)  Description  STG:  (1.)Ms. Suzette Romano will move from supine to sit and sit to supine  with MINIMAL ASSIST within 3 treatment day(s). (2.)Ms. Suzette Romano will transfer from bed to chair and chair to bed with CONTACT GUARD ASSIST using the least restrictive device within 3 treatment day(s). (3.)Ms. Suzette Romano will ambulate with CONTACT GUARD ASSIST for 75 feet with the least restrictive device within 3 treatment day(s). LTG:  (1.)Ms. Suzette Romano will move from supine to sit and sit to supine  in bed with CONTACT GUARD ASSIST within 7 treatment day(s). (2.)Ms. Suzette Romano will transfer from bed to chair and chair to bed with STAND BY ASSIST using the least restrictive device within 7 treatment day(s). (3.)Ms. Suzette Romano will ambulate with STAND BY ASSIST for 150+ feet with the least restrictive device within 7 treatment day(s).   ________________________________________________________________________________________________     Outcome: Progressing Towards Goal      PHYSICAL THERAPY: Initial Assessment and AM 8/7/2019  INPATIENT: PT Visit Days : 1  Payor: Elías Sinclair / Plan: [address] [place] [address] HMO / Product Type: [employer] /       NAME/AGE/GENDER: Sandi Hong is a 68 y.o. female   PRIMARY DIAGNOSIS: PAD (peripheral artery disease) ([facility] [address] [I73.9]  Ischemia of left lower extremity [I99.8]  PAD (peripheral artery disease) ([facility] [address] [I73.9] Ischemia of left lower extremity   Ischemia of left lower extremity    Procedure(s) (LRB):  LEFT FEMORAL TO ABOVE KNEE -POPLITEAL BYPASS (Left)  1 Day Post-Op  ICD-10: Treatment Diagnosis:    Generalized Muscle Weakness (M62.81)  Difficulty in walking, Not elsewhere classified (R26.2)  History of falling (Z91.81)   Precaution/Allergies:  Codeine and Hydralazine      ASSESSMENT:     Ms. Suzette Romano is sitting up in bedside chair upon contact and agreeable to PT evaluation and treatment this morning. Pt is A&O X 2 this session. Pt reports her son living with her in 1 story home with 2 steps to enter with railing available. Pt reports use of SPC for gait and requiring assist with ADLs but able to participate. Pt reports history of 2 falls. Throughout treatment, pt fixated on year being 2019 after PT corrected pt response of 2017. Pt is able to be re-oriented at times and appropriate in conversation but then returns to confused state. Pt performed STS to RW with modA and unsteady on feet requiring CGA-Robin to maintain standing balance. Pt returned to sitting with Robin and cues for safety of transfer. Pt rested for few minutes and performed STS again with Robin and improved balance noted in standing. Pt ambulated 20 ft with RW and Robin. Pt ambulates with narrowed CHRISTOPHER, shuffling gait pattern, and antalgic gait pattern with decreased stance time to L LE. Pt returned to chair again requiring cues for safety. Pt positioned for comfort and left sitting up with all needs met and within reach. Adina Severino will benefit from skilled PT (medically necessary) to address decreased strength, decreased balance, decreased functional tolerance, decreased cardiopulmonary endurance affecting participation in basic ADLs and functional tasks.          This section established at most recent assessment   PROBLEM LIST (Impairments causing functional limitations):  Decreased Strength  Decreased ADL/Functional Activities  Decreased Transfer Abilities  Decreased Ambulation Ability/Technique  Decreased Balance  Increased Pain  Decreased Activity Tolerance  Decreased Pacing Skills  Increased Fatigue  Decreased Flexibility/Joint Mobility  Decreased [place] with Home Exercise Program  Decreased Cognition   INTERVENTIONS PLANNED: (Benefits and precautions of physical therapy have been discussed with the patient.)  Balance Exercise  Bed Mobility  Family Education  Gait Training  Home Exercise Program (HEP)  Neuromuscular Re-education/Strengthening  Range of Motion (ROM)  Therapeutic Activites  Therapeutic Exercise/Strengthening  Transfer Training     TREATMENT PLAN: Frequency/Duration: daily for duration of hospital stay  Rehabilitation Potential For Stated Goals: Good     REHAB RECOMMENDATIONS (at time of discharge pending progress):    Placement: It is my opinion, based on this patient's performance to date, that Ms. Grecia Griffiths may benefit from intensive therapy at an [address] [place] [address] after discharge due to a probable need for multiple therapy disciplines and potential to make ongoing and sustainable functional improvement that is of practical value. .  Equipment:   None at this time              HISTORY:   History of Present Injury/Illness (Reason for Referral):  S/p Procedure(s) (LRB):  LEFT FEMORAL TO ABOVE KNEE -POPLITEAL BYPASS (Left)  Past Medical History/Comorbidities:   Ms. Grecia Griffiths  has a past medical history of Anemia, Arrhythmia, Aspirin long-term use ([facility]), CAD (coronary artery disease) (2012), Chronic kidney disease, Chronic obstructive pulmonary disease ([facility] [address] CKD (chronic kidney disease) stage 3, GFR 30-59 ml/min ([facility]) ( ), ESRD on peritoneal dialysis ([facility] [address] Flat affect, GERD (gastroesophageal reflux disease), Heart murmur (not followed per cardiology), History of MI (myocardial infarction) (2012), colonic polyps (2016), Hydronephrosis, Hypertension, Left-sided weakness (2012), Loss of appetite, Peritoneal dialysis catheter in place [facility]), Poor historian, Renal atrophy, right, Renal insufficiency, Smoker, Smoker, Stented coronary artery ([facility]), and Stroke ([facility] [address] (2012). Ms. Grecia Griffiths  has a past surgical history that includes colonoscopy (N/A, 9/26/2016); pr left heart cath,percutaneous (4/2012); hx urological; hx carotid endarterectomy (Right, 11/07/2018); vascular surgery procedure unlist (02/28/2019); and vascular surgery procedure unlist (07/01/2019).   Social History/Living Environment:   Home Environment: Private residence  # Steps to Enter: 2  One/Two Story Residence: One story  Living Alone: No  Support Systems: Family member(s)  Patient Expects to be Discharged to[de-identified] Private residence  Current DME Used/Available at Home: thalia Vela, Walker, Wheelchair  Prior Level of Function/Work/Activity:  Use of SPC for gait, assist with ADLs, 2 falls   Number of Personal Factors/Comorbidities that affect the Plan of Care: 3+: HIGH COMPLEXITY   EXAMINATION:   Most Recent Physical Functioning:   Gross Assessment:  AROM: Generally decreased, functional  Strength: Generally decreased, functional  Coordination: Generally decreased, functional               Posture:     Balance:  Sitting: Intact; Without support  Standing: Impaired; With support;Pull to stand  Standing - Static: Fair;Constant support  Standing - Dynamic : Fair;Constant support Bed Mobility:     Wheelchair Mobility:     Transfers:  Sit to Stand: Minimum assistance; Moderate assistance  Stand to Sit: Minimum assistance  Gait:     Base of Support: Narrowed; Center of gravity altered  Speed/Akila: Shuffled; Slow  Step Length: Left shortened;Right shortened  Gait Abnormalities: Antalgic;Decreased step clearance  Distance (ft): 20 Feet (ft)  Assistive Device: Walker, rolling  Ambulation - Level of Assistance: Minimal assistance  Interventions: Safety awareness training; Tactile cues; Verbal cues      Body Structures Involved:  Nerves  Bones  Joints  Muscles  Ligaments Body Functions Affected:  Sensory/Pain  Cardio  Respiratory  Neuromusculoskeletal  Movement Related Activities and Participation Affected:  General Tasks and Demands  Mobility  Self Care  Domestic Life  Interpersonal Interactions and Relationships  Community, Social and Civic Life   Number of elements that affect the Plan of Care: 4+: HIGH COMPLEXITY   CLINICAL PRESENTATION:   Presentation: Evolving clinical presentation with changing clinical characteristics: MODERATE COMPLEXITY   CLINICAL DECISION MAKIN[address] [place] [address] AM-PAC 6 Clicks   Basic Mobility Inpatient Short Form  How much difficulty does the patient currently have. .. Unable A Lot A Little None   1. Turning over in bed (including adjusting bedclothes, sheets and blankets)? ? 1   ? 2   ? 3   ? 4   2. Sitting down on and standing up from a chair with arms ( e.g., wheelchair, bedside commode, etc.)   ? 1   ? 2   ? 3   ? 4   3. Moving from lying on back to sitting on the side of the bed?   ? 1   ? 2   ? 3   ? 4   How much help from another person does the patient currently need. .. Total A Lot A Little None   4. Moving to and from a bed to a chair (including a wheelchair)? ? 1   ? 2   ? 3   ? 4   5. Need to walk in hospital room? ? 1   ? 2   ? 3   ? 4   6. Climbing 3-5 steps with a railing? ? 1   ? 2   ? 3   ? 4   © 2007, Trustees of [address] [place], [place] [id number], under license to [employer]. All rights reserved      Score:  Initial: 15 Most Recent: X (Date: -- )    Interpretation of Tool:  Represents activities that are increasingly more difficult (i.e. Bed mobility, Transfers, Gait). Medical Necessity:     Patient is expected to demonstrate progress in strength, range of motion, balance, coordination and functional technique   to decrease assistance required with gait, transfers, and functional mobility   . Reason for Services/Other Comments:  Patient continues to require skilled intervention due to decreased strength, decreased balance, decreased functional tolerance, decreased cardiopulmonary endurance affecting participation in basic ADLs and functional tasks   .    Use of outcome tool(s) and clinical judgement create a POC that gives a: Clear prediction of patient's progress: LOW COMPLEXITY            TREATMENT:   (In addition to Assessment/Re-Assessment sessions the following treatments were rendered)   Pre-treatment Symptoms/Complaints:  none  Pain: Initial:   Pain Intensity 1: 0  Post Session: Increased with WB, 3-4/10, FLACC     Therapeutic Activity: (    10 minutes): Therapeutic activities including Chair transfers, Ambulation on level ground and cues for ease and safety of transfers  to improve mobility, strength, balance and coordination. Required moderate Safety awareness training; Tactile cues; Verbal cues to promote static and dynamic balance in standing and promote coordination of bilateral, upper extremity(s), lower extremity(s). Braces/Orthotics/Lines/Etc: Wound vac   O2 Device: Room air  Treatment/Session Assessment:    Response to Treatment:  amb 20 ft with JENNIFER and Robin  Interdisciplinary Collaboration:   Physical Therapist  Registered Nurse  After treatment position/precautions:   Up in chair  Bed/Chair-wheels locked  Bed in low position  Call light within reach  Nurse at bedside   Compliance with Program/Exercises: Will assess as treatment progresses  Recommendations/Intent for next treatment session: \"Next visit will focus on advancements to more challenging activities and reduction in assistance provided\".   Total Treatment Duration:  PT Patient Time In/Time Out  Time In: 1111  Time Out: 05207 [facility]

## 2019-08-07 NOTE — PROGRESS NOTES
Dr. Guzman Wilkins updated again regarding patient's mental status. Patient remains confused and speech is delayed and echolalic at times.  strength is unchanged, Pupils are round, equal, and reactive. Patient does not exhibit any facial asymmetry, numbness, or weakness. No orders received.

## 2019-08-07 NOTE — PROGRESS NOTES
Marylee, PA called regarding patient's mental status. Patient remains confused. Disoriented to place and situation. Speech is clear but responses are delayed and echolaic at times. No orders received.

## 2019-08-07 NOTE — PROGRESS NOTES
Progress Note    Patient: Brittany Campbell MRN: [id number]  SSN: [id number]    YOB: 1950  Age: 68 y.o. Sex: female      Admission Date: 8/6/2019    LOS: 1 day     1 Day Post-Op    PROCEDURE(S):  1. Left common femoral and profunda endarterectomy with CorMatrix patch angioplasty. 2.  Left femoral to above-knee popliteal artery with reverse saphenous vein graft. Subjective:     Patient reports left 4th toe pain. Apprehensive to move due to incision,  wound vac. N/V after breakfast. Provides simple and sometimes nonsensical answers. PD overnight. Patient and family would like to pursue SNF / rehab, will need transition to HD for placement. Discussed with Nephrology.     Objective:     Vitals:    08/07/19 0500 08/07/19 0530 08/07/19 0600 08/07/19 0629   BP: 123/88  114/55 111/82   Pulse: (!) 112 (!) 111 (!) 114 (!) 113   Resp: 10 22 25 18   Temp:       SpO2: 100% 100% 100% 99%   Weight:   125 lb 7.1 oz (56.9 kg)    Height:   5' 0.5\" (1.537 m)         Physical Exam:   GENERAL: alert, cooperative, no distress  LUNG: clear to auscultation bilaterally, normal respiratory effort  HEART: tachycardic  ABDOMEN: soft, mildly tender at LLQ with groin eval, slightly protuberant but not distended, bowel sounds normoactive  EXTREMITIES: warm, UE normal ROM, LE mildly contracted, left 4th toe with black-on-black discoloration; wound vac in place at left groin to distal medial thigh functioning  PULSES: B LE non-palpable, 2+ brisk left DP and PT signals with Doppler, 1+ right PT signal    Lab/Data Review:  BMP:   Lab Results   Component Value Date/Time     08/07/2019 04:09 AM    K 4.3 08/07/2019 04:09 AM     (H) 08/07/2019 04:09 AM    CO2 22 08/07/2019 04:09 AM    AGAP 10 08/07/2019 04:09 AM     (H) 08/07/2019 04:09 AM    BUN 28 (H) 08/07/2019 04:09 AM    CREA 6.69 (H) 08/07/2019 04:09 AM    GFRAA 8 (L) 08/07/2019 04:09 AM    GFRNA 6 (L) 08/07/2019 04:09 AM     CBC:   Lab Results Component Value Date/Time    WBC 11.3 (H) 08/07/2019 04:09 AM    HGB 7.3 (L) 08/07/2019 04:09 AM    HCT 22.6 (L) 08/07/2019 04:09 AM     08/07/2019 04:09 AM        Assessment / Plan / Recommendations / Medical Decision Making:     Principal Problem:    Ischemia of left lower extremity (8/6/2019)    Active Problems:    PAD (peripheral artery disease) (HCC) (6/19/2019)        Pain control  Transfuse 1 unit pRBC  Tunneled CVC  Mobilize, consult PT / OT  CM eval for SNF      Signed By: Michelle Olivares PA-C    August 7, 2019      Physician Assistant with [facility]  Mili Santiago.  Butch Rdz MD / Elliott Martinez MD

## 2019-08-07 NOTE — PROGRESS NOTES
[facility] PROGRESS NOTE    Follow up for:    Subjective:   Patient seen and examined. Chart, notes, labs, imaging, results all reviewed.      ROS:  Gen - no fever, no chills, appetite okay  CV - no chest pain, no orthopnea  Lung - no shortness of breath, no cough  Abd - no tenderness, no nausea, no vomiting  Ext - no edema    Objective:   Exam:  Vitals:    08/07/19 0700 08/07/19 0800 08/07/19 0900 08/07/19 1000   BP: 126/58 109/57 129/58 109/55   Pulse: (!) 115 (!) 111 (!) 109 (!) 111   Resp: 28 22 22 15   Temp: 98.9 °F (37.2 °C)      SpO2: 98% 99% 99% 99%   Weight:       Height:             Intake/Output Summary (Last 24 hours) at 8/7/2019 1103  Last data filed at 8/7/2019 0959  Gross per 24 hour   Intake 1599 ml   Output 1020 ml   Net 579 ml       Current Facility-Administered Medications   Medication Dose Route Frequency    0.9% sodium chloride infusion 250 mL  250 mL IntraVENous PRN    0.9% sodium chloride infusion  25 mL/hr IntraVENous CONTINUOUS    0.9% sodium chloride infusion 250 mL  250 mL IntraVENous PRN    amLODIPine (NORVASC) tablet 10 mg  10 mg Oral DAILY    atorvastatin (LIPITOR) tablet 80 mg  80 mg Oral QHS    hydrALAZINE (APRESOLINE) tablet 10 mg  10 mg Oral DAILY    metoprolol succinate (TOPROL-XL) XL tablet 50 mg  50 mg Oral DAILY    dextrose 5% and 0.9% NaCl infusion 1,000 mL  1,000 mL IntraVENous CONTINUOUS    sodium chloride (NS) flush 5-40 mL  5-40 mL IntraVENous Q8H    sodium chloride (NS) flush 5-40 mL  5-40 mL IntraVENous PRN    acetaminophen (TYLENOL) tablet 650 mg  650 mg Oral Q4H PRN    oxyCODONE-acetaminophen (PERCOCET) 5-325 mg per tablet 1 Tab  1 Tab Oral Q4H PRN    morphine 10 mg/ml injection 5 mg  5 mg IntraVENous Q3H PRN    naloxone (NARCAN) injection 0.4 mg  0.4 mg IntraVENous PRN    aspirin delayed-release tablet 81 mg  81 mg Oral DAILY    diphenhydrAMINE (BENADRYL) injection 12.5 mg  12.5 mg IntraVENous Q4H PRN    tuberculin injection 5 Units  5 Units IntraDERMal ONCE    insulin lispro (HUMALOG) injection   SubCUTAneous AC&HS       EXAM  GEN - Alert, oriented, in no distress  CV - S1, S2, RRR, no rub, murmur, or gallop  Lung - clear to auscultation bilaterally  Abd - soft, nontender, BS present  Ext - no edema    Recent Labs     08/07/19  0409 08/06/19  1446   WBC 11.3* 9.7   HGB 7.3* 8.4*   HCT 22.6* 25.6*    203        Recent Labs     08/07/19  0409 08/06/19  1446    140   K 4.3 4.2   * 107   CO2 22 22   BUN 28* 28*   CREA 6.69* 6.58*   CA 7.5* 7.5*   * 191*   MG  --  1.6*       Assessment and Plan:     1. ESRD on PD  -Will do  PD tonight using  all 1.5%    2. LE bypass    3. Anemia with ESRD - monitor. Gets outpatient [facility].     Jaclyn Heaton MD

## 2019-08-08 LAB
ANION GAP SERPL CALC-SCNC: 11 MMOL/L (ref 7–16)
BUN SERPL-MCNC: 36 MG/DL (ref 8–23)
CALCIUM SERPL-MCNC: 7.8 MG/DL (ref 8.3–10.4)
CHLORIDE SERPL-SCNC: 107 MMOL/L (ref 98–107)
CO2 SERPL-SCNC: 22 MMOL/L (ref 21–32)
CREAT SERPL-MCNC: 7.75 MG/DL (ref 0.6–1)
ERYTHROCYTE [DISTWIDTH] IN BLOOD BY AUTOMATED COUNT: 15.7 % (ref 11.9–14.6)
GLUCOSE BLD STRIP.AUTO-MCNC: 109 MG/DL (ref 65–100)
GLUCOSE BLD STRIP.AUTO-MCNC: 122 MG/DL (ref 65–100)
GLUCOSE BLD STRIP.AUTO-MCNC: 139 MG/DL (ref 65–100)
GLUCOSE BLD STRIP.AUTO-MCNC: 52 MG/DL (ref 65–100)
GLUCOSE BLD STRIP.AUTO-MCNC: 84 MG/DL (ref 65–100)
GLUCOSE SERPL-MCNC: 77 MG/DL (ref 65–100)
HCT VFR BLD AUTO: 28.3 % (ref 35.8–46.3)
HGB BLD-MCNC: 9.2 G/DL (ref 11.7–15.4)
MCH RBC QN AUTO: 32.3 PG (ref 26.1–32.9)
MCHC RBC AUTO-ENTMCNC: 32.5 G/DL (ref 31.4–35)
MCV RBC AUTO: 99.3 FL (ref 79.6–97.8)
MM INDURATION POC: 0 MM (ref 0–5)
NRBC # BLD: 0.04 K/UL (ref 0–0.2)
PLATELET # BLD AUTO: 133 K/UL (ref 150–450)
PMV BLD AUTO: 12.1 FL (ref 9.4–12.3)
POTASSIUM SERPL-SCNC: 4.6 MMOL/L (ref 3.5–5.1)
PPD POC: NEGATIVE NEGATIVE
RBC # BLD AUTO: 2.85 M/UL (ref 4.05–5.2)
SODIUM SERPL-SCNC: 140 MMOL/L (ref 136–145)
WBC # BLD AUTO: 11.7 K/UL (ref 4.3–11.1)

## 2019-08-08 PROCEDURE — 80048 BASIC METABOLIC PNL TOTAL CA: CPT

## 2019-08-08 PROCEDURE — 74011250636 HC RX REV CODE- 250/636: Performed by: SURGERY

## 2019-08-08 PROCEDURE — 74011250636 HC RX REV CODE- 250/636: Performed by: INTERNAL MEDICINE

## 2019-08-08 PROCEDURE — 36415 COLL VENOUS BLD VENIPUNCTURE: CPT

## 2019-08-08 PROCEDURE — 65660000004 HC RM CVT STEPDOWN

## 2019-08-08 PROCEDURE — 97165 OT EVAL LOW COMPLEX 30 MIN: CPT

## 2019-08-08 PROCEDURE — 82962 GLUCOSE BLOOD TEST: CPT

## 2019-08-08 PROCEDURE — 77030019605

## 2019-08-08 PROCEDURE — 90945 DIALYSIS ONE EVALUATION: CPT

## 2019-08-08 PROCEDURE — 87340 HEPATITIS B SURFACE AG IA: CPT

## 2019-08-08 PROCEDURE — 97530 THERAPEUTIC ACTIVITIES: CPT

## 2019-08-08 PROCEDURE — 74011250637 HC RX REV CODE- 250/637: Performed by: SURGERY

## 2019-08-08 PROCEDURE — 85027 COMPLETE CBC AUTOMATED: CPT

## 2019-08-08 RX ADMIN — AMLODIPINE BESYLATE 10 MG: 5 TABLET ORAL at 09:36

## 2019-08-08 RX ADMIN — Medication 10 ML: at 15:44

## 2019-08-08 RX ADMIN — OXYCODONE HYDROCHLORIDE AND ACETAMINOPHEN 1 TABLET: 5; 325 TABLET ORAL at 17:51

## 2019-08-08 RX ADMIN — ATORVASTATIN CALCIUM 80 MG: 40 TABLET, FILM COATED ORAL at 20:56

## 2019-08-08 RX ADMIN — METOPROLOL SUCCINATE 50 MG: 50 TABLET, EXTENDED RELEASE ORAL at 09:35

## 2019-08-08 RX ADMIN — Medication 10 ML: at 05:34

## 2019-08-08 RX ADMIN — HYDRALAZINE HYDROCHLORIDE 10 MG: 10 TABLET, FILM COATED ORAL at 09:35

## 2019-08-08 RX ADMIN — ASPIRIN 81 MG: 81 TABLET ORAL at 09:35

## 2019-08-08 RX ADMIN — Medication 10 ML: at 20:56

## 2019-08-08 RX ADMIN — OXYCODONE HYDROCHLORIDE AND ACETAMINOPHEN 1 TABLET: 5; 325 TABLET ORAL at 05:29

## 2019-08-08 RX ADMIN — MORPHINE SULFATE 5 MG: 10 INJECTION, SOLUTION INTRAMUSCULAR; INTRAVENOUS at 07:13

## 2019-08-08 RX ADMIN — EPOETIN ALFA-EPBX 6000 UNITS: 3000 INJECTION, SOLUTION INTRAVENOUS; SUBCUTANEOUS at 15:44

## 2019-08-08 NOTE — PROGRESS NOTES
TRANSFER - IN REPORT:    Verbal report received from YARELIεωφόροlandy Ποσειroddyώνοlandy Murdock, RN(name) on Liliana Score  being received from CVICU(unit) for routine progression of care      Report consisted of patients Situation, Background, Assessment and   Recommendations(SBAR). Information from the following report(s) SBAR, Kardex, Intake/Output, MAR, Recent Results and Cardiac Rhythm NSR was reviewed with the receiving nurse. Opportunity for questions and clarification was provided. Assessment completed upon patients arrival to unit and care assumed. Dual skin assessment completed upon pt's arrival to room. Sacrum with no redness or breakdown noted. L leg and groin incisions with prevena wound vacs c/d/i. LUQ peritoneal dialysis catheter with dressing c/d/i. R upper chest tunnel cath with dressing c/d/i. Skin dry, warm and fragile overall.

## 2019-08-08 NOTE — PROGRESS NOTES
Progress Note    Patient: Nidhi Boss MRN: [id number]  SSN: [id number]    YOB: 1950  Age: 68 y.o. Sex: female      Admission Date: 8/6/2019    LOS: 2 days     2 Days Post-Op    PROCEDURE(S):  1.  Left common femoral and profunda endarterectomy with CorMatrix patch angioplasty. 2.  Left femoral to above-knee popliteal artery with reverse saphenous vein graft. Subjective:     Patient OOB eating, pain controlled. More appropriate mentation this AM per staff. CVC placed yesterday.     Objective:     Vitals:    08/08/19 0429 08/08/19 0459 08/08/19 0513 08/08/19 0529   BP: 98/54 112/52  113/57   Pulse: 80 80  80   Resp: 18 8  17   Temp:       SpO2: 99%      Weight:   122 lb 9.2 oz (55.6 kg)    Height:            Physical Exam:   GENERAL: alert, cooperative, no distress  LUNG: normal respiratory effort  HEART: regular rate and rhythm  ABDOMEN: soft, nontender, nondistended  EXTREMITIES: warm, UE normal ROM, LE mildly contracted, left 4th toe with black-on-black discoloration; wound vac in place at left groin to distal medial thigh functioning  PULSES: B LE non-palpable, 2+ brisk left DP and PT signals with Doppler, 1+ right PT signal    Lab/Data Review:  BMP:   Lab Results   Component Value Date/Time     08/08/2019 03:39 AM    K 4.6 08/08/2019 03:39 AM     08/08/2019 03:39 AM    CO2 22 08/08/2019 03:39 AM    AGAP 11 08/08/2019 03:39 AM    GLU 77 08/08/2019 03:39 AM    BUN 36 (H) 08/08/2019 03:39 AM    CREA 7.75 (H) 08/08/2019 03:39 AM    GFRAA 7 (L) 08/08/2019 03:39 AM    GFRNA 5 (L) 08/08/2019 03:39 AM     CBC:   Lab Results   Component Value Date/Time    WBC 11.7 (H) 08/08/2019 03:39 AM    HGB 9.2 (L) 08/08/2019 03:39 AM    HCT 28.3 (L) 08/08/2019 03:39 AM     (L) 08/08/2019 03:39 AM        Assessment / Plan / Recommendations / Medical Decision Making:     Principal Problem:    Ischemia of left lower extremity (8/6/2019)    Active Problems:    PAD (peripheral artery disease) ([facility] [address] (6/19/2019)        Pain control  May transfer to CV-S or surgical floor  Awaiting SNF placement      Signed By: Aubree Childress PA-C    August 8, 2019      Physician Assistant with [facility]  Jordy Rutherford.  Erick Renteria MD / Maxwell Ying MD

## 2019-08-08 NOTE — PROGRESS NOTES
Problem: LE Bypass: Post-Op Day 1  Goal: Activity/Safety  Outcome: Progressing Towards Goal  Goal: Diagnostic Test/Procedures  Outcome: Progressing Towards Goal  Goal: Discharge Planning  Outcome: Progressing Towards Goal

## 2019-08-08 NOTE — CDMP QUERY
Pt admitted with ischemic disease for LE bypass . Patient noted to have confusion, ams . Please document in your progress notes and the discharge summary if your are treating any of the following.  
 
      -----  Metabolic Encephalopathy 
-----  Toxic Encephalopathy 
-----  Encephalopathy due to medications or drugs (please specify) 
      -----  Dementia with behavioral disturbances  
-----  Other, please specify 
-----  Clinically unable to determine The medical record reflects the following: 
 
   Risk Factors: 68 yr old with previous CVA, ESRD Clinical Indicators: new inttermittent confusion and altered mentation in patient with no Dementia diagnosis, no CVA symptoms by exam 
 
   Treatment: close monitoring, plans for skilled nursing at [place] Thank you. Kailey Galicia, RN, BSN, CDS Clinical Documentation Management Program 
[facility] AT [address] 
[phone] [email or web address]

## 2019-08-08 NOTE — PROGRESS NOTES
Problem: Patient Education: Go to Patient Education Activity  Goal: Patient/Family Education  Description  1. Patient will complete lower body bathing and dressing with min A and adaptive equipment as needed. 2. Patient will complete toileting with min A.   3. Patient will tolerate 23 minutes of OT treatment with 1-2 rest breaks to increase activity tolerance for ADLs. 4. Patient will complete functional transfers with CGA and adaptive equipment as needed. 5. Patient will complete functional activity while seated edge of bed with SBA. 6. Patient will complete functional mobility for household distances with CGA and adaptive equipment as needed. Timeframe: 7 visits      Outcome: Progressing Towards Goal       OCCUPATIONAL THERAPY: Initial Assessment and AM 8/8/2019  INPATIENT:    Payor: [employer] Files / Plan: [address] [place] [address] HMO / Product Type: [employer] /      NAME/AGE/GENDER: Shabnam English is a 68 y.o. female   PRIMARY DIAGNOSIS:  PAD (peripheral artery disease) ([facility] [address] [I73.9]  Ischemia of left lower extremity [I99.8]  PAD (peripheral artery disease) ([facility] [address] [I73.9] Ischemia of left lower extremity   Ischemia of left lower extremity    Procedure(s) (LRB):  LEFT FEMORAL TO ABOVE KNEE -POPLITEAL BYPASS (Left)  2 Days Post-Op  ICD-10: Treatment Diagnosis:    Generalized Muscle Weakness (M62.81)  Other lack of cordination (R27.8)   Precautions/Allergies:    Fall precautions Codeine and Hydralazine      ASSESSMENT:     Ms. Mary Hart presents for peripheral artery disease and ischemia of L LE, s/p the above procedures, 2 days post op. Upon arrival, pt finishing working with PTA and sitting upright in bedside chair with RN at bedside. Pt presents with some fatigue s/p PT session and only able to orient to person this session. Pt endorses no pain.  D/t pt's increased confusion unsure if social and functional background hx is accurate, however pt living with son in a 1-story home with 2 steps to enter. Pt reports requiring assistance for bathing and dressing from son and was using [facility] and  for functional mobility. Today, pt required min A for sit to stand transfer. Upon return to sitting upright in chair, pt able to complete self-feeding with set-up. BUE AROM and strength (3/5) are generally decreased but WFL; coordination slight impaired. Pt left sitting upright in bedside chair with needs met, call light within reach, and RN notified. At this time, Shabnam English is functioning below baseline for ADls and functional mobility. Pt would benefit from skilled OT services to address OT goals and plan of care. This section established at most recent assessment   PROBLEM LIST (Impairments causing functional limitations):  Decreased Strength  Decreased ADL/Functional Activities  Decreased Transfer Abilities  Decreased Ambulation Ability/Technique  Decreased Balance  Decreased Activity Tolerance  Increased Fatigue  Decreased Cognition   INTERVENTIONS PLANNED: (Benefits and precautions of occupational therapy have been discussed with the patient.)  Activities of daily living training  Adaptive equipment training  Balance training  Clothing management  Community reintergration  Donning&doffing training  Neuromuscular re-eduation  Re-evaluation  Therapeutic activity  Therapeutic exercise     TREATMENT PLAN: Frequency/Duration: Follow patient 3x/week to address above goals. Rehabilitation Potential For Stated Goals: [facility] (at time of discharge pending progress):    Placement: It is my opinion, based on this patient's performance to date, that Ms. Mary Hart may benefit from intensive therapy at a [address] [place] [address] after discharge due to the functional deficits listed above that are likely to improve with skilled rehabilitation and concerns that he/she may be unsafe to be unsupervised at home due to decline in ability to safely complete ADLs and functional mobility. .  Equipment:   TBD               OCCUPATIONAL PROFILE AND HISTORY:   History of Present Injury/Illness (Reason for Referral):  See H&P  Past Medical History/Comorbidities:   Ms. Alysa Bangura  has a past medical history of Anemia, Arrhythmia, Aspirin long-term use ([facility]), CAD (coronary artery disease) (2012), Chronic kidney disease, Chronic obstructive pulmonary disease ([facility] [address] CKD (chronic kidney disease) stage 3, GFR 30-59 ml/min ([facility]) ( ), ESRD on peritoneal dialysis ([facility] [address] Flat affect, GERD (gastroesophageal reflux disease), Heart murmur (not followed per cardiology), History of MI (myocardial infarction) (2012), colonic polyps (2016), Hydronephrosis, Hypertension, Left-sided weakness (2012), Loss of appetite, Peritoneal dialysis catheter in place [facility]), Poor historian, Renal atrophy, right, Renal insufficiency, Smoker, Smoker, Stented coronary artery ([facility]), and Stroke ([facility] [address] (2012). Ms. Alysa Bangura  has a past surgical history that includes colonoscopy (N/A, 9/26/2016); pr left heart cath,percutaneous (4/2012); hx urological; hx carotid endarterectomy (Right, 11/07/2018); vascular surgery procedure unlist (02/28/2019); vascular surgery procedure unlist (07/01/2019); and ir insert tunl cvc w/o port over 5 yr (8/7/2019). Social History/Living Environment:   Home Environment: Private residence  # Steps to Enter: 2  One/Two Story Residence: One story  Living Alone: No  Support Systems: Family member(s)  Patient Expects to be Discharged to[de-identified] Unknown  Current DME Used/Available at Home: Walker, Wheelchair, Cane, straight  Prior Level of Function/Work/Activity:  Assist with ADLs and using RW and SPC for mobility. Personal Factors:          Sex:  female        Age:  68 y.o.         Other factors that influence how disability is experienced by the patient:  multiple co-morbidities     Number of Personal Factors/Comorbidities that affect the Plan of Care: Brief history (0):  LOW COMPLEXITY   ASSESSMENT OF OCCUPATIONAL PERFORMANCE[de-identified]   Activities of Daily Living:   Basic ADLs (From Assessment) Complex ADLs (From Assessment)   Feeding: Setup  Oral Facial Hygiene/Grooming: Setup  Bathing: Moderate assistance  Upper Body Dressing: Moderate assistance  Lower Body Dressing: Moderate assistance  Toileting: Moderate assistance Instrumental ADL  Meal Preparation: Total assistance  Homemaking: Total assistance   Grooming/Bathing/Dressing Activities of Daily Living     Cognitive Retraining  Safety/Judgement: Decreased awareness of environment                       Bed/Mat Mobility  Rolling: Minimum assistance; Moderate assistance  Supine to Sit: Minimum assistance; Moderate assistance  Sit to Stand: Minimum assistance; Moderate assistance  Stand to Sit: Minimum assistance; Moderate assistance     Most Recent Physical Functioning:   Gross Assessment:  AROM: Generally decreased, functional  Strength: Generally decreased, functional  Coordination: Generally decreased, functional               Posture:     Balance:  Sitting: Impaired  Sitting - Static: Fair (occasional)  Sitting - Dynamic: Fair (occasional)  Standing: Impaired  Standing - Static: Fair  Standing - Dynamic : Poor Bed Mobility:  Rolling: Minimum assistance; Moderate assistance  Supine to Sit: Minimum assistance; Moderate assistance  Wheelchair Mobility:     Transfers:  Sit to Stand: Minimum assistance; Moderate assistance  Stand to Sit: Minimum assistance; Moderate assistance            Patient Vitals for the past 6 hrs:   BP BP Patient Position SpO2 Pulse   08/08/19 0729 102/51 -- 93 % 82   08/08/19 0759 114/52 -- -- 85   08/08/19 0859 110/55 -- -- 83   08/08/19 0935 108/54 -- 95 % 82   08/08/19 1059 124/57 At rest;Sitting 95 % 81       Mental Status  Neurologic State: Confused, Drowsy  Orientation Level: Oriented to person, Disoriented to place, Disoriented to situation, Disoriented to time  Cognition: Decreased command following, Decreased attention/concentration  Perception: Appears intact  Perseveration: No perseveration noted  Safety/Judgement: Decreased awareness of environment                          Physical Skills Involved:  Balance  Strength  Activity Tolerance  Gross Motor Control Cognitive Skills Affected (resulting in the inability to perform in a timely and safe manner):  Perception  Executive Function  Short Term Recall  Long Term Memory Psychosocial Skills Affected:  Habits/Routines  Environmental Adaptation   Number of elements that affect the Plan of Care: 5+:  HIGH COMPLEXITY   CLINICAL DECISION MAKIN[address] [place] [address] AM-PAC 6 Clicks   Daily Activity Inpatient Short Form  How much help from another person does the patient currently need. .. Total A Lot A Little None   1. Putting on and taking off regular lower body clothing? ? 1   ? 2   ? 3   ? 4   2. Bathing (including washing, rinsing, drying)? ? 1   ? 2   ? 3   ? 4   3. Toileting, which includes using toilet, bedpan or urinal?   ? 1   ? 2   ? 3   ? 4   4. Putting on and taking off regular upper body clothing? ? 1   ? 2   ? 3   ? 4   5. Taking care of personal grooming such as brushing teeth? ? 1   ? 2   ? 3   ? 4   6. Eating meals? ? 1   ? 2   ? 3   ? 4   © 2007, Trustees of [address] [place] [id number], under license to [employer]. All rights reserved      Score:  Initial: 14 Most Recent: X (Date: -- )    Interpretation of Tool:  Represents activities that are increasingly more difficult (i.e. Bed mobility, Transfers, Gait). Medical Necessity:     Patient demonstrates fair   rehab potential due to higher previous functional level. Reason for Services/Other Comments:  Patient continues to require skilled intervention due to medical complications and patient unable to attend/participate in therapy as expected  .    Use of outcome tool(s) and clinical judgement create a POC that gives a: LOW COMPLEXITY         TREATMENT:   (In addition to Assessment/Re-Assessment sessions the following treatments were rendered)     Pre-treatment Symptoms/Complaints:  No complaints  Pain: Initial:   Pain Intensity 1: 0  Pain Location 1: Leg  Pain Orientation 1: Left  Pain Intervention(s) 1: Emotional support  Post Session:  same     Assessment/Reassessment only, no treatment provided today    Braces/Orthotics/Lines/Etc:   IV  O2 Device: Room air  Treatment/Session Assessment:    Response to Treatment:  tolerated well with no issues noted. Interdisciplinary Collaboration:   Occupational Therapist  Registered Nurse  After treatment position/precautions:   Up in chair  Bed alarm/tab alert on  Call light within reach  RN notified   Compliance with Program/Exercises: Will assess as treatment progresses. Recommendations/Intent for next treatment session: \"Next visit will focus on advancements to more challenging activities and reduction in assistance provided\".   Total Treatment Duration:  OT Patient Time In/Time Out  Time In: 0935  Time Out: Leanne Shahid 20, OT

## 2019-08-08 NOTE — PROGRESS NOTES
Dr Bayron Catalan to bedside and updated. Neuro status much improved. Talking appropriately and feeding self slowly, call light in reach.

## 2019-08-08 NOTE — PROGRESS NOTES
[facility] PROGRESS NOTE    Follow up for: ESRD    Subjective:   Patient seen and examined. Chart, notes, labs, imaging, results all reviewed. Up in chair. Answering questions but short one word response. Mildly confused. No issues with PD overnight. S/p right IJ tunneled catheter placed yesterday.      ROS:  Gen - no fever, no chills, appetite okay  CV - no chest pain, no orthopnea  Lung - no shortness of breath, no cough  Abd - no tenderness, no nausea, no vomiting  Ext - no edema    Objective:   Exam:  Vitals:    08/08/19 0759 08/08/19 0859 08/08/19 0935 08/08/19 1059   BP: 114/52 110/55 108/54 124/57   Pulse: 85 83 82 81   Resp: 20 13 19 21   Temp:    97 °F (36.1 °C)   SpO2:   95% 95%   Weight:       Height:             Intake/Output Summary (Last 24 hours) at 8/8/2019 1345  Last data filed at 8/8/2019 1201  Gross per 24 hour   Intake 910 ml   Output 1 ml   Net 909 ml       Current Facility-Administered Medications   Medication Dose Route Frequency    heparin (PF) 2 units/ml in NS infusion 2,000 Units  1,000 mL Irrigation PRN    dextrose (D50W) injection syrg 25 g  25 g IntraVENous PRN    amLODIPine (NORVASC) tablet 10 mg  10 mg Oral DAILY    atorvastatin (LIPITOR) tablet 80 mg  80 mg Oral QHS    hydrALAZINE (APRESOLINE) tablet 10 mg  10 mg Oral DAILY    metoprolol succinate (TOPROL-XL) XL tablet 50 mg  50 mg Oral DAILY    sodium chloride (NS) flush 5-40 mL  5-40 mL IntraVENous Q8H    sodium chloride (NS) flush 5-40 mL  5-40 mL IntraVENous PRN    acetaminophen (TYLENOL) tablet 650 mg  650 mg Oral Q4H PRN    oxyCODONE-acetaminophen (PERCOCET) 5-325 mg per tablet 1 Tab  1 Tab Oral Q4H PRN    morphine 10 mg/ml injection 5 mg  5 mg IntraVENous Q3H PRN    naloxone (NARCAN) injection 0.4 mg  0.4 mg IntraVENous PRN    aspirin delayed-release tablet 81 mg  81 mg Oral DAILY    diphenhydrAMINE (BENADRYL) injection 12.5 mg  12.5 mg IntraVENous Q4H PRN    insulin lispro (HUMALOG) injection SubCUTAneous AC&HS       EXAM  GEN - Alert, oriented, in no distress  CV - S1, S2, RRR, no rub, murmur, or gallop  Lung - clear to auscultation bilaterally  Abd - soft, nontender, BS present, PD catheter clean, dry, intact  Ext - no edema. Right IJ PC    Recent Labs     08/08/19  0339 08/07/19  0409 08/06/19  1446   WBC 11.7* 11.3* 9.7   HGB 9.2* 7.3* 8.4*   HCT 28.3* 22.6* 25.6*   * 174 203        Recent Labs     08/08/19  0339 08/07/19  0409 08/06/19  1446    141 140   K 4.6 4.3 4.2    109* 107   CO2 22 22 22   BUN 36* 28* 28*   CREA 7.75* 6.69* 6.58*   CA 7.8* 7.5* 7.5*   GLU 77 150* 191*   MG  --   --  1.6*       Assessment and Plan:     1. ESRD on PD  - will need to switch to HD temporarily for STR. S/p tunneled HD catheter placement 8/7. Still in CVICU. Will plan PD tonight with all 1.5% solutions and transition to HD before discharge    2. PAD - s/p Left femoral to above knee popliteal bypass 8/6    3. Anemia with ESRD - s/p prbc 8/7. Resume [facility].  Follow    HOANG Chowdhury

## 2019-08-08 NOTE — PROGRESS NOTES
When provided nighttime meds, patient had vomiting episode. Green, bilious secretions following taking meds. Medication not seen with emesis. Gown and linens changed. No complaints of nausea from patient. States that the emesis was related to taking the pills.

## 2019-08-08 NOTE — PROGRESS NOTES
TRANSFER - OUT REPORT:    Verbal report given to Jin Live RN on Pancho Black  being transferred to [facility] for routine progression of care       Report consisted of patients Situation, Background, Assessment and Recommendations(SBAR). Information from the following report(s) SBAR, Procedure Summary, MAR and Cardiac Rhythm NSR was reviewed with the receiving nurse. Opportunity for questions and clarification was provided.

## 2019-08-08 NOTE — PROGRESS NOTES
Problem: Mobility Impaired (Adult and Pediatric)  Goal: *Acute Goals and Plan of Care (Insert Text)  Description  STG:  (1.)Ms. Josué Fox will move from supine to sit and sit to supine  with MINIMAL ASSIST within 3 treatment day(s). (2.)Ms. Jsoué Fox will transfer from bed to chair and chair to bed with CONTACT GUARD ASSIST using the least restrictive device within 3 treatment day(s). (3.)Ms. Josué Fox will ambulate with CONTACT GUARD ASSIST for 75 feet with the least restrictive device within 3 treatment day(s). LTG:  (1.)Ms. Josué Fox will move from supine to sit and sit to supine  in bed with CONTACT GUARD ASSIST within 7 treatment day(s). (2.)Ms. Josué Fox will transfer from bed to chair and chair to bed with STAND BY ASSIST using the least restrictive device within 7 treatment day(s). (3.)Ms. Josué Fox will ambulate with STAND BY ASSIST for 150+ feet with the least restrictive device within 7 treatment day(s).   ________________________________________________________________________________________________     Outcome: Progressing Towards Goal      PHYSICAL THERAPY: Daily Note and AM 8/8/2019  INPATIENT: PT Visit Days : 2  Payor: Massiel Arzola / Plan: [address] [place] [address] HMO / Product Type: [employer] Medicare /       NAME/AGE/GENDER: Wei Zimmerman is a 68 y.o. female   PRIMARY DIAGNOSIS: PAD (peripheral artery disease) ([facility] [I73.9]  Ischemia of left lower extremity [I99.8]  PAD (peripheral artery disease) ([facility] [I73.9] Ischemia of left lower extremity   Ischemia of left lower extremity    Procedure(s) (LRB):  LEFT FEMORAL TO ABOVE KNEE -POPLITEAL BYPASS (Left)  2 Days Post-Op  ICD-10: Treatment Diagnosis:    · Generalized Muscle Weakness (M62.81)  · Difficulty in walking, Not elsewhere classified (R26.2)  · History of falling (Z91.81)   Precaution/Allergies:  Codeine and Hydralazine      ASSESSMENT:     Ms. Josué Fox is sitting up in bedside chair upon contact and agreeable to PT  treatment this morning. Pt reports her son living with her in 1 story home with 2 steps to enter with railing available. Pt reports use of SPC for gait and requiring assist with ADLs but able to participate. Pt reports history of 2 falls. Patient is very soft spoken. Patient requires assistance to scoot to the edge of the recliner. Sit to stand with moderate assist.  Standing balance is fair. With the walker the patient is able to take a few steps and then needed to sit down. The rolling walker was replaced with the left walker as the patient had decreased upright posture. Gait resumed with the patient requiring several sitting rest breaks. Total gait distance 150 feet. Patient is returned to the recliner positioned for comfort with needs within reach. RN present. Better session as patient has increased her gait distance however additional time is required as patient mobility is very slow. Josias White will benefit from skilled PT (medically necessary) to address decreased strength, decreased balance, decreased functional tolerance, decreased cardiopulmonary endurance affecting participation in basic ADLs and functional tasks. Additional skilled therapy will be needed at discharge. Will continue PT efforts. This section established at most recent assessment   PROBLEM LIST (Impairments causing functional limitations):  1. Decreased Strength  2. Decreased ADL/Functional Activities  3. Decreased Transfer Abilities  4. Decreased Ambulation Ability/Technique  5. Decreased Balance  6. Increased Pain  7. Decreased Activity Tolerance  8. Decreased Pacing Skills  9. Increased Fatigue  10. Decreased Flexibility/Joint Mobility  11. Decreased [place] with Home Exercise Program  12. Decreased Cognition   INTERVENTIONS PLANNED: (Benefits and precautions of physical therapy have been discussed with the patient.)  1. Balance Exercise  2. Bed Mobility  3. Family Education  4. Gait Training  5.  Home Exercise Program (HEP)  6. Neuromuscular Re-education/Strengthening  7. Range of Motion (ROM)  8. Therapeutic Activites  9. Therapeutic Exercise/Strengthening  10. Transfer Training     TREATMENT PLAN: Frequency/Duration: daily for duration of hospital stay  Rehabilitation Potential For Stated Goals: Good     REHAB RECOMMENDATIONS (at time of discharge pending progress):    Placement: It is my opinion, based on this patient's performance to date, that Ms. Ruth Clements may benefit from intensive therapy at an [address] [place] [address] after discharge due to a probable need for multiple therapy disciplines and potential to make ongoing and sustainable functional improvement that is of practical value. .  Equipment:    None at this time              HISTORY:   History of Present Injury/Illness (Reason for Referral):  S/p Procedure(s) (LRB):  LEFT FEMORAL TO ABOVE KNEE -POPLITEAL BYPASS (Left)  Past Medical History/Comorbidities:   Ms. Ruth Clements  has a past medical history of Anemia, Arrhythmia, Aspirin long-term use ([facility]), CAD (coronary artery disease) (2012), Chronic kidney disease, Chronic obstructive pulmonary disease ([facility] CKD (chronic kidney disease) stage 3, GFR 30-59 ml/min ([facility]) ( ), ESRD on peritoneal dialysis ([address] Flat affect, GERD (gastroesophageal reflux disease), Heart murmur (not followed per cardiology), History of MI (myocardial infarction) (2012), colonic polyps (2016), Hydronephrosis, Hypertension, Left-sided weakness (2012), Loss of appetite, Peritoneal dialysis catheter in place [facility]), Poor historian, Renal atrophy, right, Renal insufficiency, Smoker, Smoker, Stented coronary artery ([facility]), and Stroke ([address] (2012).   Ms. Ruth Clements  has a past surgical history that includes colonoscopy (N/A, 9/26/2016); pr left heart cath,percutaneous (4/2012); hx urological; hx carotid endarterectomy (Right, 11/07/2018); vascular surgery procedure unlist (02/28/2019); vascular surgery procedure unlist (07/01/2019); and ir insert claribell cvc w/o port over 5 yr (8/7/2019). Social History/Living Environment:   Home Environment: Private residence  # Steps to Enter: 2  One/Two Story Residence: One story  Living Alone: No  Support Systems: Family member(s)  Patient Expects to be Discharged to[de-identified] Private residence  Current DME Used/Available at Home: Bonna Bill, straight, Walker, Wheelchair  Prior Level of Function/Work/Activity:  Use of SPC for gait, assist with ADLs, 2 falls   Number of Personal Factors/Comorbidities that affect the Plan of Care: 3+: HIGH COMPLEXITY   EXAMINATION:   Most Recent Physical Functioning:   Gross Assessment:                  Posture:     Balance:  Sitting: Impaired  Sitting - Static: Fair (occasional)  Sitting - Dynamic: Fair (occasional)  Standing: Impaired  Standing - Static: Fair  Standing - Dynamic : Poor Bed Mobility:     Wheelchair Mobility:     Transfers:  Sit to Stand: Minimum assistance; Moderate assistance  Stand to Sit: Minimum assistance; Moderate assistance  Gait:     Base of Support: Center of gravity altered;Narrowed  Speed/Akila: Shuffled; Slow  Step Length: Left shortened;Right shortened  Gait Abnormalities: Decreased step clearance  Distance (ft): 150 Feet (ft)(lift walker)  Assistive Device: Other (comment)  Ambulation - Level of Assistance: Minimal assistance; Additional time  Interventions: Safety awareness training      Body Structures Involved:  1. Nerves  2. Bones  3. Joints  4. Muscles  5. Ligaments Body Functions Affected:  1. Sensory/Pain  2. Cardio  3. Respiratory  4. Neuromusculoskeletal  5. Movement Related Activities and Participation Affected:  1. General Tasks and Demands  2. Mobility  3. Self Care  4. Domestic Life  5. Interpersonal Interactions and Relationships  6.  Community, Social and [address] [place] [address]   Number of elements that affect the Plan of Care: 4+: HIGH COMPLEXITY   CLINICAL PRESENTATION:   Presentation: Evolving clinical presentation with changing clinical characteristics: MODERATE COMPLEXITY   CLINICAL DECISION MAKIN[address] [place], [place] [id number] AM-PAC 6 Clicks   Basic Mobility Inpatient Short Form  How much difficulty does the patient currently have. .. Unable A Lot A Little None   1. Turning over in bed (including adjusting bedclothes, sheets and blankets)? ? 1   ? 2   ? 3   ? 4   2. Sitting down on and standing up from a chair with arms ( e.g., wheelchair, bedside commode, etc.)   ? 1   ? 2   ? 3   ? 4   3. Moving from lying on back to sitting on the side of the bed?   ? 1   ? 2   ? 3   ? 4   How much help from another person does the patient currently need. .. Total A Lot A Little None   4. Moving to and from a bed to a chair (including a wheelchair)? ? 1   ? 2   ? 3   ? 4   5. Need to walk in hospital room? ? 1   ? 2   ? 3   ? 4   6. Climbing 3-5 steps with a railing? ? 1   ? 2   ? 3   ? 4   © 2007, Trustees of [address] [place] [id number], under license to [employer]. All rights reserved      Score:  Initial: 15 Most Recent: X (Date: -- )    Interpretation of Tool:  Represents activities that are increasingly more difficult (i.e. Bed mobility, Transfers, Gait). Medical Necessity:     · Patient is expected to demonstrate progress in strength, range of motion, balance, coordination and functional technique  ·  to decrease assistance required with gait, transfers, and functional mobility   · . Reason for Services/Other Comments:  · Patient continues to require skilled intervention due to decreased strength, decreased balance, decreased functional tolerance, decreased cardiopulmonary endurance affecting participation in basic ADLs and functional tasks   · .    Use of outcome tool(s) and clinical judgement create a POC that gives a: Clear prediction of patient's progress: LOW COMPLEXITY            TREATMENT:   (In addition to Assessment/Re-Assessment sessions the following treatments were rendered)   Pre-treatment Symptoms/Complaints:  \"OK\"  Pain: Initial:   Pain Intensity 1: (no number given)  Post Session:  No number voiced     Therapeutic Activity: (    27 minutes): Therapeutic activities including Chair transfers, Ambulation on level ground and cues for ease and safety of transfers  to improve mobility, strength, balance and coordination. Required minimum to moderate assist with  Safety awareness training to promote static and dynamic balance in standing and promote coordination of bilateral, upper extremity(s), lower extremity(s). Braces/Orthotics/Lines/Etc:   · Wound vac   · O2 Device: Room air  Treatment/Session Assessment:    · Response to Treatment: Increased gait distance to 150 feet with several sitting rest breaks, narrow CHRISTOPHER  · Interdisciplinary Collaboration:   o Physical Therapy Assistant  o Registered Nurse  · After treatment position/precautions:   o Up in chair  o Bed/Chair-wheels locked  o Call light within reach  o Nurse at bedside   · Compliance with Program/Exercises: Will assess as treatment progresses  · Recommendations/Intent for next treatment session: \"Next visit will focus on advancements to more challenging activities and reduction in assistance provided\".   Total Treatment Duration:  PT Patient Time In/Time Out  Time In: 0900  Time Out: 0927    Miguel Boo, RONAL

## 2019-08-08 NOTE — PROGRESS NOTES
[facility] to see about getting outpatient slot for Hemodialysis. They do not have any available chair time and are going to see if patient could go to the [facility]. They will contact us to see where they can do a HD slot for patient. PPD completed and will need Hepatitis B surface with A/G reflex orders obtained. After has had first HD then will send information for slot. CM following.

## 2019-08-09 LAB
GLUCOSE BLD STRIP.AUTO-MCNC: 103 MG/DL (ref 65–100)
GLUCOSE BLD STRIP.AUTO-MCNC: 118 MG/DL (ref 65–100)
GLUCOSE BLD STRIP.AUTO-MCNC: 119 MG/DL (ref 65–100)
GLUCOSE BLD STRIP.AUTO-MCNC: 120 MG/DL (ref 65–100)
GLUCOSE BLD STRIP.AUTO-MCNC: 74 MG/DL (ref 65–100)
HBV SURFACE AG SERPL QL IA: NEGATIVE
MM INDURATION POC: 0 MM (ref 0–5)
PPD POC: NEGATIVE NEGATIVE

## 2019-08-09 PROCEDURE — 65660000004 HC RM CVT STEPDOWN

## 2019-08-09 PROCEDURE — 82962 GLUCOSE BLOOD TEST: CPT

## 2019-08-09 PROCEDURE — 97530 THERAPEUTIC ACTIVITIES: CPT

## 2019-08-09 PROCEDURE — 77030019605

## 2019-08-09 PROCEDURE — 74011250637 HC RX REV CODE- 250/637: Performed by: SURGERY

## 2019-08-09 PROCEDURE — 74011250637 HC RX REV CODE- 250/637: Performed by: PHYSICIAN ASSISTANT

## 2019-08-09 RX ORDER — POLYETHYLENE GLYCOL 3350 17 G/17G
17 POWDER, FOR SOLUTION ORAL DAILY
Status: DISCONTINUED | OUTPATIENT
Start: 2019-08-09 | End: 2019-08-13 | Stop reason: HOSPADM

## 2019-08-09 RX ORDER — FACIAL-BODY WIPES
10 EACH TOPICAL DAILY PRN
Status: DISCONTINUED | OUTPATIENT
Start: 2019-08-09 | End: 2019-08-13 | Stop reason: HOSPADM

## 2019-08-09 RX ORDER — AMOXICILLIN 250 MG
1 CAPSULE ORAL DAILY
Status: DISCONTINUED | OUTPATIENT
Start: 2019-08-10 | End: 2019-08-12

## 2019-08-09 RX ADMIN — ACETAMINOPHEN 650 MG: 325 TABLET, FILM COATED ORAL at 13:29

## 2019-08-09 RX ADMIN — POLYETHYLENE GLYCOL 3350 17 G: 17 POWDER, FOR SOLUTION ORAL at 18:25

## 2019-08-09 RX ADMIN — ASPIRIN 81 MG: 81 TABLET ORAL at 08:23

## 2019-08-09 RX ADMIN — ACETAMINOPHEN 650 MG: 325 TABLET, FILM COATED ORAL at 18:24

## 2019-08-09 RX ADMIN — Medication 10 ML: at 06:09

## 2019-08-09 RX ADMIN — Medication 10 ML: at 18:25

## 2019-08-09 RX ADMIN — ACETAMINOPHEN 650 MG: 325 TABLET, FILM COATED ORAL at 08:21

## 2019-08-09 RX ADMIN — Medication 10 ML: at 20:50

## 2019-08-09 RX ADMIN — ATORVASTATIN CALCIUM 80 MG: 40 TABLET, FILM COATED ORAL at 20:50

## 2019-08-09 NOTE — DIALYSIS
Primary RN disconnected PD cycler from patient. Betadine cap intact. Patient tolerated well. UF amount -341mls. Effluent: clear, yellow.

## 2019-08-09 NOTE — PROGRESS NOTES
Problem: Mobility Impaired (Adult and Pediatric)  Goal: *Acute Goals and Plan of Care (Insert Text)  Description  STG:  (1.)Ms. Aaron Baker will move from supine to sit and sit to supine  with MINIMAL ASSIST within 3 treatment day(s). (2.)Ms. Aaron Baker will transfer from bed to chair and chair to bed with CONTACT GUARD ASSIST using the least restrictive device within 3 treatment day(s). (3.)Ms. Aaron Baker will ambulate with CONTACT GUARD ASSIST for 75 feet with the least restrictive device within 3 treatment day(s). LTG:  (1.)Ms. Aaron Baker will move from supine to sit and sit to supine  in bed with CONTACT GUARD ASSIST within 7 treatment day(s). (2.)Ms. Aaron Baker will transfer from bed to chair and chair to bed with STAND BY ASSIST using the least restrictive device within 7 treatment day(s). (3.)Ms. Aaron Baker will ambulate with STAND BY ASSIST for 150+ feet with the least restrictive device within 7 treatment day(s).   ________________________________________________________________________________________________     Outcome: Progressing Towards Goal      PHYSICAL THERAPY: Daily Note and AM 8/9/2019  INPATIENT: PT Visit Days : 3  Payor: Lianna Doe / Plan: [address] [place] [address] HMO / Product Type: [employer] Medicare /       NAME/AGE/GENDER: Brittany Campbell is a 68 y.o. female   PRIMARY DIAGNOSIS: PAD (peripheral artery disease) ([facility] [I73.9]  Ischemia of left lower extremity [I99.8]  PAD (peripheral artery disease) ([facility] [I73.9] Ischemia of left lower extremity   Ischemia of left lower extremity    Procedure(s) (LRB):  LEFT FEMORAL TO ABOVE KNEE -POPLITEAL BYPASS (Left)  3 Days Post-Op  ICD-10: Treatment Diagnosis:    · Generalized Muscle Weakness (M62.81)  · Difficulty in walking, Not elsewhere classified (R26.2)  · History of falling (Z91.81)   Precaution/Allergies:  Codeine and Hydralazine      ASSESSMENT:     Ms. Aaron Baker is supine in the bed  upon contact and agreeable to PT  treatment this morning. Pt reports her son living with her in 1 story home with 2 steps to enter with railing available. Pt reports use of SPC for gait and requiring assist with ADLs but able to participate. Pt reports history of 2 falls. Patient is very soft spoken. Bed mobility with min assist with additional time for task completion. Patient requires assistance to scoot to the edge of the bed. Sit to stand with minimum to moderate assist.  Standing balance is fair. Gait training with rolling walker x 20 feet. Patient has very slow panfilo and decreased upright/ full extension posture, as her knees are flexed. Patient transfers to the recliner with min assist and additional time and verbal cues. Patient is left sitting in the recliner with needs within reach and alarm intact. RN is bedside. Good session however the patient gait distance is less today. Still requires assist with all functional mobility activities. Mobility remains slow. Roly Powell will benefit from skilled PT (medically necessary) to address decreased strength, decreased balance, decreased functional tolerance, decreased cardiopulmonary endurance affecting participation in basic ADLs and functional tasks. Additional skilled therapy will be needed at discharge. Will continue PT efforts. This section established at most recent assessment   PROBLEM LIST (Impairments causing functional limitations):  1. Decreased Strength  2. Decreased ADL/Functional Activities  3. Decreased Transfer Abilities  4. Decreased Ambulation Ability/Technique  5. Decreased Balance  6. Increased Pain  7. Decreased Activity Tolerance  8. Decreased Pacing Skills  9. Increased Fatigue  10. Decreased Flexibility/Joint Mobility  11. Decreased [place] with Home Exercise Program  12. Decreased Cognition   INTERVENTIONS PLANNED: (Benefits and precautions of physical therapy have been discussed with the patient.)  1. Balance Exercise  2. Bed Mobility  3.  Family Education  4. Gait Training  5. Home Exercise Program (HEP)  6. Neuromuscular Re-education/Strengthening  7. Range of Motion (ROM)  8. Therapeutic Activites  9. Therapeutic Exercise/Strengthening  10. Transfer Training     TREATMENT PLAN: Frequency/Duration: daily for duration of hospital stay  Rehabilitation Potential For Stated Goals: Good     REHAB RECOMMENDATIONS (at time of discharge pending progress):    Placement: It is my opinion, based on this patient's performance to date, that Ms. Mary Hart may benefit from intensive therapy at an [address] [place] [address] after discharge due to a probable need for multiple therapy disciplines and potential to make ongoing and sustainable functional improvement that is of practical value. .  Equipment:    None at this time              HISTORY:   History of Present Injury/Illness (Reason for Referral):  S/p Procedure(s) (LRB):  LEFT FEMORAL TO ABOVE KNEE -POPLITEAL BYPASS (Left)  Past Medical History/Comorbidities:   Ms. Mary Hart  has a past medical history of Anemia, Arrhythmia, Aspirin long-term use ([facility]), CAD (coronary artery disease) (2012), Chronic kidney disease, Chronic obstructive pulmonary disease ([facility] CKD (chronic kidney disease) stage 3, GFR 30-59 ml/min ([facility]) ( ), ESRD on peritoneal dialysis ([address] Flat affect, GERD (gastroesophageal reflux disease), Heart murmur (not followed per cardiology), History of MI (myocardial infarction) (2012), colonic polyps (2016), Hydronephrosis, Hypertension, Left-sided weakness (2012), Loss of appetite, Peritoneal dialysis catheter in place [facility]), Poor historian, Renal atrophy, right, Renal insufficiency, Smoker, Smoker, Stented coronary artery ([facility]), and Stroke ([address] (2012).   Ms. Mary Hart  has a past surgical history that includes colonoscopy (N/A, 9/26/2016); pr left heart cath,percutaneous (4/2012); hx urological; hx carotid endarterectomy (Right, 11/07/2018); vascular surgery procedure unlist (02/28/2019); vascular surgery procedure unlist (07/01/2019); and ir insert tunl cvc w/o port over 5 yr (8/7/2019). Social History/Living Environment:   Home Environment: Private residence  # Steps to Enter: 2  One/Two Story Residence: One story  Living Alone: No  Support Systems: Family member(s)  Patient Expects to be Discharged to[de-identified] Unknown  Current DME Used/Available at Home: Yumiko Lazar, Wheelchair, Cane, straight  Prior Level of Function/Work/Activity:  Use of SPC for gait, assist with ADLs, 2 falls   Number of Personal Factors/Comorbidities that affect the Plan of Care: 3+: HIGH COMPLEXITY   EXAMINATION:   Most Recent Physical Functioning:   Gross Assessment:                  Posture:     Balance:  Sitting: Intact  Sitting - Static: Good (unsupported)  Sitting - Dynamic: Fair (occasional)  Standing: Impaired  Standing - Static: Fair  Standing - Dynamic : Fair Bed Mobility:  Rolling: Minimum assistance  Supine to Sit: Minimum assistance  Scooting: Minimum assistance  Wheelchair Mobility:     Transfers:  Sit to Stand: Minimum assistance  Stand to Sit: Minimum assistance  Gait:     Base of Support: Center of gravity altered;Narrowed  Speed/Akila: Shuffled; Slow  Step Length: Left shortened;Right shortened  Gait Abnormalities: Decreased step clearance  Distance (ft): 20 Feet (ft)  Assistive Device: Walker, rolling  Ambulation - Level of Assistance: Minimal assistance; Additional time  Interventions: Safety awareness training      Body Structures Involved:  1. Nerves  2. Bones  3. Joints  4. Muscles  5. Ligaments Body Functions Affected:  1. Sensory/Pain  2. Cardio  3. Respiratory  4. Neuromusculoskeletal  5. Movement Related Activities and Participation Affected:  1. General Tasks and Demands  2. Mobility  3. Self Care  4. Domestic Life  5. Interpersonal Interactions and Relationships  6.  Community, Social and [place]   Number of elements that affect the Plan of Care: 4+: HIGH COMPLEXITY   CLINICAL PRESENTATION:   Presentation: Evolving clinical presentation with changing clinical characteristics: MODERATE COMPLEXITY   CLINICAL DECISION MAKIN [facility] Mobility Inpatient Short Form  How much difficulty does the patient currently have. .. Unable A Lot A Little None   1. Turning over in bed (including adjusting bedclothes, sheets and blankets)? ? 1   ? 2   ? 3   ? 4   2. Sitting down on and standing up from a chair with arms ( e.g., wheelchair, bedside commode, etc.)   ? 1   ? 2   ? 3   ? 4   3. Moving from lying on back to sitting on the side of the bed?   ? 1   ? 2   ? 3   ? 4   How much help from another person does the patient currently need. .. Total A Lot A Little None   4. Moving to and from a bed to a chair (including a wheelchair)? ? 1   ? 2   ? 3   ? 4   5. Need to walk in hospital room? ? 1   ? 2   ? 3   ? 4   6. Climbing 3-5 steps with a railing? ? 1   ? 2   ? 3   ? 4   © , Trustees of [address] [place] [address] [address] [address], under license to [employer]. All rights reserved      Score:  Initial: 15 Most Recent: X (Date: -- )    Interpretation of Tool:  Represents activities that are increasingly more difficult (i.e. Bed mobility, Transfers, Gait). Medical Necessity:     · Patient is expected to demonstrate progress in strength, range of motion, balance, coordination and functional technique  ·  to decrease assistance required with gait, transfers, and functional mobility   · . Reason for Services/Other Comments:  · Patient continues to require skilled intervention due to decreased strength, decreased balance, decreased functional tolerance, decreased cardiopulmonary endurance affecting participation in basic ADLs and functional tasks   · .    Use of outcome tool(s) and clinical judgement create a POC that gives a: Clear prediction of patient's progress: LOW COMPLEXITY            TREATMENT:   (In addition to Assessment/Re-Assessment sessions the following treatments were rendered) Pre-treatment Symptoms/Complaints:  \"OK\"  Pain: Initial:   Pain Intensity 1: (no number given)  Pain Location 1: Incisional, Leg  Pain Orientation 1: Left  Post Session:  No number voiced     Therapeutic Activity: (    23 minutes): Therapeutic activities including Chair transfers, Ambulation on level ground and cues for ease and safety of transfers  to improve mobility, strength, balance and coordination. Required minimum  assist with  Safety awareness training to promote static and dynamic balance in standing and promote coordination of bilateral, upper extremity(s), lower extremity(s). Braces/Orthotics/Lines/Etc:   · Wound vac   · O2 Device: Room air  Treatment/Session Assessment:    · Response to Treatment: gait distance less today due to tolerance  narrow CHRISTOPHER and slow panfilo  · Interdisciplinary Collaboration:   o Physical Therapy Assistant  o Registered Nurse  · After treatment position/precautions:   o Up in chair  o Bed alarm/tab alert on  o Bed/Chair-wheels locked  o Call light within reach  o Nurse at bedside   · Compliance with Program/Exercises: Will assess as treatment progresses  · Recommendations/Intent for next treatment session: \"Next visit will focus on advancements to more challenging activities and reduction in assistance provided\".   Total Treatment Duration:  PT Patient Time In/Time Out  Time In: 0850  Time Out: 0913    Johnny Blackmon PTA

## 2019-08-09 NOTE — PROGRESS NOTES
I offered ensure pt requested chocolate pt had the ensure for about 30 minutes had only taken a few sips encouraged intake pt attempting to swallow and a got choked and vomited moderate amount of green emesis on gown and blanket.  Pt cleaned and new gown provided

## 2019-08-09 NOTE — PROGRESS NOTES
[facility] PROGRESS NOTE    Follow up for: ESRD    Subjective:   Patient seen and examined. Chart, notes, labs, imaging, results all reviewed. Up in chair.       ROS:  Gen - no fever, no chills, appetite okay  CV - no chest pain, no orthopnea  Lung - no shortness of breath, no cough  Abd - no tenderness, no nausea, no vomiting  Ext - no edema    Objective:   Exam:  Vitals:    08/09/19 0600 08/09/19 0700 08/09/19 0822 08/09/19 1110   BP:  110/55 96/51 121/57   Pulse:  87 87 83   Resp:  17  16   Temp:  97.7 °F (36.5 °C)  97.8 °F (36.6 °C)   SpO2:  94%  98%   Weight: 57.7 kg (127 lb 1.6 oz)      Height:             Intake/Output Summary (Last 24 hours) at 8/9/2019 1133  Last data filed at 8/9/2019 0945  Gross per 24 hour   Intake 960 ml   Output 839 ml   Net 121 ml       Current Facility-Administered Medications   Medication Dose Route Frequency    epoetin henry-epbx (RETACRIT) injection 6,000 Units  6,000 Units SubCUTAneous Q7D    heparin (PF) 2 units/ml in NS infusion 2,000 Units  1,000 mL Irrigation PRN    dextrose (D50W) injection syrg 25 g  25 g IntraVENous PRN    amLODIPine (NORVASC) tablet 10 mg  10 mg Oral DAILY    atorvastatin (LIPITOR) tablet 80 mg  80 mg Oral QHS    hydrALAZINE (APRESOLINE) tablet 10 mg  10 mg Oral DAILY    metoprolol succinate (TOPROL-XL) XL tablet 50 mg  50 mg Oral DAILY    sodium chloride (NS) flush 5-40 mL  5-40 mL IntraVENous Q8H    sodium chloride (NS) flush 5-40 mL  5-40 mL IntraVENous PRN    acetaminophen (TYLENOL) tablet 650 mg  650 mg Oral Q4H PRN    oxyCODONE-acetaminophen (PERCOCET) 5-325 mg per tablet 1 Tab  1 Tab Oral Q4H PRN    morphine 10 mg/ml injection 5 mg  5 mg IntraVENous Q3H PRN    naloxone (NARCAN) injection 0.4 mg  0.4 mg IntraVENous PRN    aspirin delayed-release tablet 81 mg  81 mg Oral DAILY    diphenhydrAMINE (BENADRYL) injection 12.5 mg  12.5 mg IntraVENous Q4H PRN    insulin lispro (HUMALOG) injection   SubCUTAneous AC&HS       EXAM  GEN - Alert, oriented, in no distress  CV - S1, S2, RRR, no rub, murmur, or gallop  Lung - clear to auscultation bilaterally  Abd - soft, nontender, BS present, PD catheter clean, dry, intact  Ext - no edema. Right IJ PC    Recent Labs     08/08/19  0339 08/07/19  0409 08/06/19  1446   WBC 11.7* 11.3* 9.7   HGB 9.2* 7.3* 8.4*   HCT 28.3* 22.6* 25.6*   * 174 203        Recent Labs     08/08/19  0339 08/07/19  0409 08/06/19  1446    141 140   K 4.6 4.3 4.2    109* 107   CO2 22 22 22   BUN 36* 28* 28*   CREA 7.75* 6.69* 6.58*   CA 7.8* 7.5* 7.5*   GLU 77 150* 191*   MG  --   --  1.6*       Assessment and Plan:     1. ESRD on PD  - will hold PD tonight and do HD in am     2. PAD - s/p Left femoral to above knee popliteal bypass 8/6    3. Anemia with ESRD - s/p prbc 8/7. Resume [facility].  Follow    Mary Carmen Jean Baptiste MD

## 2019-08-09 NOTE — PROGRESS NOTES
Pt having difficulty swallowing pills I crushed Tylenol and placed applesauce.  Chicken broth and Mirilax  mixed together

## 2019-08-09 NOTE — PROGRESS NOTES
This CM reviewed pt chart this day and it is noted that nephrology has changed pt from peritoneal dialysis to hemodialysis temporarily so that pt has chance of being able to go to SNF for STR. Per pt's nurse this day, pt to have first HD run tentatively tomorrow. Twyla Vega, ESTEFANI, had spoke with [facility] 8/8 and they were working on trying to find a location for pt to have a HD slot. This CM had conversation with pt's son and daughter this day regarding discharge planning. Explained the pt has 3 barriers to discharge to a SNF for STR. The first one is establishing pt on HD as well as getting an outpatient HD slot for pt while she is at [facility]. The second is choosing a SNF for STR that pt would go to - this CM left SNF list in pt's room this day for pt, son, and daughter to review and pick 3 choices. All verbalized understanding that they would have their choices by tomorrow. The final barrier to discharge is that pt will need to have final approval from pt's insurance, [employer], for authorization to transition to SNF for STR. Explained to them that there is a possibility [employer] could deny for any reason. They all verbalized understanding of this explanation. No additional discharge needs at this time. CM to continue to follow. UDPATE: This CM faxed initial PW needed for HD chair slot to [facility] this day. Will continue to update.

## 2019-08-09 NOTE — PROGRESS NOTES
Progress Note    Patient: Miranda Manuel MRN: [id number]  SSN: [id number]    YOB: 1950  Age: 68 y.o. Sex: female      Admission Date: 8/6/2019    LOS: 3 days     3 Days Post-Op    PROCEDURE(S):  1. Left common femoral and profunda endarterectomy with CorMatrix patch angioplasty. 2.  Left femoral to above-knee popliteal artery with reverse saphenous vein graft. Subjective:     Patient has no complaints. Mentation much improved this AM, more appropriately conversant, suspect transient encephalopathy due to anesthesia + poor clearance (GFR 7). Wound vac alerting with air leak, device and dressing evaluated without evidence of leak, device reset and continues to function without alarm. Per CM, patient has to undergo HD x2 successfully prior to clearance for outpatient dialysis while inpatient at [facility]. Awaiting insurance approval.    Objective:     Vitals:    08/09/19 0400 08/09/19 0600 08/09/19 0700 08/09/19 0822   BP: 132/64  110/55 96/51   Pulse: 82  87 87   Resp: 18  17    Temp: 98.4 °F (36.9 °C)  97.7 °F (36.5 °C)    SpO2: 93%  94%    Weight:  127 lb 1.6 oz (57.7 kg)     Height:            Physical Exam:   GENERAL: alert, cooperative, no distress  LUNG: clear to auscultation bilaterally, normal respiratory effort  HEART: regular rate and rhythm, ejection murmur  ABDOMEN: soft, nontender, nondistended, bowel sounds normoactive  EXTREMITIES: warm, UE normal ROM, left 4th toe still dusky but with much less discoloration, foot sensorimotor intact; wound vac in place at left groin to distal medial thigh functioning, cannister empty  PULSES: B LE non-palpable; Doppler signals 2+ left DP, PT, 1+ right PT    Lab/Data Review: All lab results for the last 48 hours reviewed.        Assessment / Plan / Recommendations / Medical Decision Making:     Principal Problem:    Ischemia of left lower extremity (8/6/2019)    Active Problems:    PAD (peripheral artery disease) ([facility] [address] (6/19/2019)        Continue current care  Transition to HD per Nephrology  Awaiting SNF placement, likely early next week      Signed By: Radha Perez PA-C    August 9, 2019      Physician Assistant with [facility]  Geneva Miller MD / Mendoza Caldwell MD

## 2019-08-10 LAB
ABO + RH BLD: NORMAL
BLD PROD TYP BPU: NORMAL
BLOOD BANK CMNT PATIENT-IMP: NORMAL
BLOOD GROUP ANTIBODIES SERPL: NORMAL
BPU ID: NORMAL
CROSSMATCH RESULT,%XM: NORMAL
GLUCOSE BLD STRIP.AUTO-MCNC: 105 MG/DL (ref 65–100)
GLUCOSE BLD STRIP.AUTO-MCNC: 110 MG/DL (ref 65–100)
GLUCOSE BLD STRIP.AUTO-MCNC: 64 MG/DL (ref 65–100)
GLUCOSE BLD STRIP.AUTO-MCNC: 66 MG/DL (ref 65–100)
GLUCOSE BLD STRIP.AUTO-MCNC: 82 MG/DL (ref 65–100)
GLUCOSE BLD STRIP.AUTO-MCNC: 91 MG/DL (ref 65–100)
SPECIMEN EXP DATE BLD: NORMAL
STATUS OF UNIT,%ST: NORMAL
UNIT DIVISION, %UDIV: 0

## 2019-08-10 PROCEDURE — 90935 HEMODIALYSIS ONE EVALUATION: CPT

## 2019-08-10 PROCEDURE — 74011250637 HC RX REV CODE- 250/637: Performed by: PHYSICIAN ASSISTANT

## 2019-08-10 PROCEDURE — 65660000004 HC RM CVT STEPDOWN

## 2019-08-10 PROCEDURE — 82962 GLUCOSE BLOOD TEST: CPT

## 2019-08-10 PROCEDURE — 97530 THERAPEUTIC ACTIVITIES: CPT

## 2019-08-10 PROCEDURE — 74011250637 HC RX REV CODE- 250/637: Performed by: SURGERY

## 2019-08-10 RX ADMIN — Medication 10 ML: at 14:07

## 2019-08-10 RX ADMIN — ASPIRIN 81 MG: 81 TABLET ORAL at 11:15

## 2019-08-10 RX ADMIN — POLYETHYLENE GLYCOL 3350 17 G: 17 POWDER, FOR SOLUTION ORAL at 11:15

## 2019-08-10 RX ADMIN — ATORVASTATIN CALCIUM 80 MG: 40 TABLET, FILM COATED ORAL at 21:16

## 2019-08-10 RX ADMIN — Medication 10 ML: at 06:16

## 2019-08-10 RX ADMIN — METOPROLOL SUCCINATE 50 MG: 50 TABLET, EXTENDED RELEASE ORAL at 11:15

## 2019-08-10 RX ADMIN — Medication 10 ML: at 21:16

## 2019-08-10 RX ADMIN — SENNOSIDES, DOCUSATE SODIUM 1 TABLET: 50; 8.6 TABLET, FILM COATED ORAL at 11:15

## 2019-08-10 NOTE — PROGRESS NOTES
This CM picked up pt's SNF list with pt and family's SNF choices for STR. This CM made referrals to Daniela Whiting, and [facility] via [facility] this day. No additional discharge needs at this time. Pt did receive first HD run this day. Will continue to monitor and update as needed.

## 2019-08-10 NOTE — DIALYSIS
TRANSFER OUT- DIALYSIS    Hemodialysis treatment completed without complications. Patient alert and VS stable  /71  P 107       1 Kgs removed. Flushed both ports with 10 mL of NS.  CVC dressing clean, dry, and intact, tego caps intact, bilateral lumens wrapped with 4x4 gauze. Patient to [id number] after dialysis.

## 2019-08-10 NOTE — PROGRESS NOTES
Problem: Mobility Impaired (Adult and Pediatric)  Goal: *Acute Goals and Plan of Care (Insert Text)  Description  STG:  (1.)Ms. Pipe Og will move from supine to sit and sit to supine  with MINIMAL ASSIST within 3 treatment day(s). (2.)Ms. Pipe Og will transfer from bed to chair and chair to bed with CONTACT GUARD ASSIST using the least restrictive device within 3 treatment day(s). (3.)Ms. Pipe Og will ambulate with CONTACT GUARD ASSIST for 75 feet with the least restrictive device within 3 treatment day(s). LTG:  (1.)Ms. Pipe Og will move from supine to sit and sit to supine  in bed with CONTACT GUARD ASSIST within 7 treatment day(s). (2.)Ms. Pipe Og will transfer from bed to chair and chair to bed with STAND BY ASSIST using the least restrictive device within 7 treatment day(s). (3.)Ms. Pipe Og will ambulate with STAND BY ASSIST for 150+ feet with the least restrictive device within 7 treatment day(s).   ________________________________________________________________________________________________     Outcome: Progressing Towards Goal      PHYSICAL THERAPY: Daily Note and PM 8/10/2019  INPATIENT: PT Visit Days : 4  Payor: Shanthi Farias / Plan: [address] [place] [address] HMO / Product Type: [email or web address] Medicare /       NAME/AGE/GENDER: Adina Severino is a 68 y.o. female   PRIMARY DIAGNOSIS: PAD (peripheral artery disease) ([facility] [address] [I73.9]  Ischemia of left lower extremity [I99.8]  PAD (peripheral artery disease) ([facility] [address] [I73.9] Ischemia of left lower extremity   Ischemia of left lower extremity    Procedure(s) (LRB):  LEFT FEMORAL TO ABOVE KNEE -POPLITEAL BYPASS (Left)  4 Days Post-Op  ICD-10: Treatment Diagnosis:    · Generalized Muscle Weakness (M62.81)  · Difficulty in walking, Not elsewhere classified (R26.2)  · History of falling (Z91.81)   Precaution/Allergies:  Codeine and Hydralazine      ASSESSMENT:     Ms. Pipe Og is sitting edge of bed   upon contact with RN present  and agreeable to PT Treatment. Pt reports her son living with her in 1 story home with 2 steps to enter with railing available. Pt reports use of SPC for gait and requiring assist with ADLs but able to participate. Pt reports history of 2 falls. Patient is very soft spoken. Bed mobility with min assist with additional time for task completion. Patient requires assistance to scoot to the edge of the bed. Sit to stand with minimum to moderate assist.  Standing balance is fair. Gait training with rolling walker x 30 feet. One sitting rest break required. Patient has very slow panfilo and decreased upright/ full extension posture, as her knees are flexed. Patient transfers to the recliner with min assist and additional time and verbal cues. Patient is left sitting in the recliner with needs within reach and alarm intact. RN is bedside. Good session however the patient gait distance is less today. Still requires assist with all functional mobility activities. Mobility remains slow. Lilia Hsieh will benefit from skilled PT (medically necessary) to address decreased strength, decreased balance, decreased functional tolerance, decreased cardiopulmonary endurance affecting participation in basic ADLs and functional tasks. Additional skilled therapy will be needed at discharge. Will continue PT efforts. This section established at most recent assessment   PROBLEM LIST (Impairments causing functional limitations):  1. Decreased Strength  2. Decreased ADL/Functional Activities  3. Decreased Transfer Abilities  4. Decreased Ambulation Ability/Technique  5. Decreased Balance  6. Increased Pain  7. Decreased Activity Tolerance  8. Decreased Pacing Skills  9. Increased Fatigue  10. Decreased Flexibility/Joint Mobility  11. Decreased [place] with Home Exercise Program  12. Decreased Cognition   INTERVENTIONS PLANNED: (Benefits and precautions of physical therapy have been discussed with the patient.)  1.  Balance Exercise  2. Bed Mobility  3. Family Education  4. Gait Training  5. Home Exercise Program (HEP)  6. Neuromuscular Re-education/Strengthening  7. Range of Motion (ROM)  8. Therapeutic Activites  9. Therapeutic Exercise/Strengthening  10. Transfer Training     TREATMENT PLAN: Frequency/Duration: daily for duration of hospital stay  Rehabilitation Potential For Stated Goals: Good     REHAB RECOMMENDATIONS (at time of discharge pending progress):    Placement: It is my opinion, based on this patient's performance to date, that Ms. Michele De Santiago may benefit from intensive therapy at an [address] [place] [address] after discharge due to a probable need for multiple therapy disciplines and potential to make ongoing and sustainable functional improvement that is of practical value. .  Equipment:    None at this time              HISTORY:   History of Present Injury/Illness (Reason for Referral):  S/p Procedure(s) (LRB):  LEFT FEMORAL TO ABOVE KNEE -POPLITEAL BYPASS (Left)  Past Medical History/Comorbidities:   Ms. Michele De Santiago  has a past medical history of Anemia, Arrhythmia, Aspirin long-term use ([facility]), CAD (coronary artery disease) (2012), Chronic kidney disease, Chronic obstructive pulmonary disease ([facility] [address] CKD (chronic kidney disease) stage 3, GFR 30-59 ml/min ([facility]) ( ), ESRD on peritoneal dialysis ([address] Flat affect, GERD (gastroesophageal reflux disease), Heart murmur (not followed per cardiology), History of MI (myocardial infarction) (2012), colonic polyps (2016), Hydronephrosis, Hypertension, Left-sided weakness (2012), Loss of appetite, Peritoneal dialysis catheter in place [facility]), Poor historian, Renal atrophy, right, Renal insufficiency, Smoker, Smoker, Stented coronary artery ([facility]), and Stroke ([facility] [address] (2012).   Ms. Michele De Santiago  has a past surgical history that includes colonoscopy (N/A, 9/26/2016); pr left heart cath,percutaneous (4/2012); hx urological; hx carotid endarterectomy (Right, 11/07/2018); vascular surgery procedure unlist (02/28/2019); vascular surgery procedure unlist (07/01/2019); and ir insert tunl cvc w/o port over 5 yr (8/7/2019). Social History/Living Environment:   Home Environment: Private residence  # Steps to Enter: 2  One/Two Story Residence: One story  Living Alone: No  Support Systems: Family member(s)  Patient Expects to be Discharged to[de-identified] Unknown  Current DME Used/Available at Home: Shruti Sidle, Wheelchair, Cane, straight  Prior Level of Function/Work/Activity:  Use of SPC for gait, assist with ADLs, 2 falls   Number of Personal Factors/Comorbidities that affect the Plan of Care: 3+: HIGH COMPLEXITY   EXAMINATION:   Most Recent Physical Functioning:   Gross Assessment:                  Posture:     Balance:  Sitting: Intact  Sitting - Static: Good (unsupported)  Sitting - Dynamic: Fair (occasional)  Standing: Impaired  Standing - Static: Fair  Standing - Dynamic : Fair Bed Mobility:     Wheelchair Mobility:     Transfers:  Sit to Stand: Minimum assistance  Stand to Sit: Minimum assistance  Gait:     Base of Support: Center of gravity altered;Narrowed  Speed/Akila: Shuffled; Slow  Step Length: Left shortened;Right shortened  Gait Abnormalities: Decreased step clearance  Distance (ft): 30 Feet (ft)  Assistive Device: Walker, rolling  Ambulation - Level of Assistance: Minimal assistance; Additional time  Interventions: Safety awareness training      Body Structures Involved:  1. Nerves  2. Bones  3. Joints  4. Muscles  5. Ligaments Body Functions Affected:  1. Sensory/Pain  2. Cardio  3. Respiratory  4. Neuromusculoskeletal  5. Movement Related Activities and Participation Affected:  1. General Tasks and Demands  2. Mobility  3. Self Care  4. Domestic Life  5. Interpersonal Interactions and Relationships  6.  Community, Social and [place]   Number of elements that affect the Plan of Care: 4+: HIGH COMPLEXITY   CLINICAL PRESENTATION:   Presentation: Evolving clinical presentation with changing clinical characteristics: MODERATE COMPLEXITY   CLINICAL DECISION MAKIN[address] [place] [id number] AM-PAC 6 Clicks   Basic Mobility Inpatient Short Form  How much difficulty does the patient currently have. .. Unable A Lot A Little None   1. Turning over in bed (including adjusting bedclothes, sheets and blankets)? ? 1   ? 2   ? 3   ? 4   2. Sitting down on and standing up from a chair with arms ( e.g., wheelchair, bedside commode, etc.)   ? 1   ? 2   ? 3   ? 4   3. Moving from lying on back to sitting on the side of the bed?   ? 1   ? 2   ? 3   ? 4   How much help from another person does the patient currently need. .. Total A Lot A Little None   4. Moving to and from a bed to a chair (including a wheelchair)? ? 1   ? 2   ? 3   ? 4   5. Need to walk in hospital room? ? 1   ? 2   ? 3   ? 4   6. Climbing 3-5 steps with a railing? ? 1   ? 2   ? 3   ? 4   © , Trustees of [address] [place], [place] [address], under license to [employer]. All rights reserved      Score:  Initial: 15 Most Recent: X (Date: -- )    Interpretation of Tool:  Represents activities that are increasingly more difficult (i.e. Bed mobility, Transfers, Gait). Medical Necessity:     · Patient is expected to demonstrate progress in strength, range of motion, balance, coordination and functional technique  ·  to decrease assistance required with gait, transfers, and functional mobility   · . Reason for Services/Other Comments:  · Patient continues to require skilled intervention due to decreased strength, decreased balance, decreased functional tolerance, decreased cardiopulmonary endurance affecting participation in basic ADLs and functional tasks   · .    Use of outcome tool(s) and clinical judgement create a POC that gives a: Clear prediction of patient's progress: LOW COMPLEXITY            TREATMENT:   (In addition to Assessment/Re-Assessment sessions the following treatments were rendered)   Pre-treatment Symptoms/Complaints:  \"I will try\"  Pain: Initial:   Pain Intensity 1: 0  Pain Location 1: Incisional, Leg  Pain Orientation 1: Left  Post Session:  No number voiced     Therapeutic Activity: (    23 minutes): Therapeutic activities including Chair transfers, Ambulation on level ground and cues for ease and safety of transfers  to improve mobility, strength, balance and coordination. Required minimum  assist with  Safety awareness training to promote static and dynamic balance in standing and promote coordination of bilateral, upper extremity(s), lower extremity(s). Braces/Orthotics/Lines/Etc:   · Wound vac   · O2 Device: Room air  Treatment/Session Assessment:    · Response to Treatment: gait distance increased  narrow CHRISTOPHER and slow panfilo  · Interdisciplinary Collaboration:   o Physical Therapy Assistant  o Registered Nurse  · After treatment position/precautions:   o Up in chair  o Bed alarm/tab alert on  o Bed/Chair-wheels locked  o Call light within reach  o Nurse at bedside   · Compliance with Program/Exercises: Compliant all of the time  · Recommendations/Intent for next treatment session: \"Next visit will focus on advancements to more challenging activities and reduction in assistance provided\".   Total Treatment Duration:  PT Patient Time In/Time Out  Time In: 1410  Time Out: 1433    Portia Rodriguez, RONAL

## 2019-08-10 NOTE — PROGRESS NOTES
Pt attempted to eat food may have had 10% of pasta. I had to be vigilant to have pt drink chicken broth. With much encouragement pt drank 75% of cup prepared.

## 2019-08-10 NOTE — DIALYSIS
TRANSFER IN - DIALYSIS    Received patient in dialysis unit  from [facility] (unit) for ordered procedure. Consent verified for renal replacement therapy. Patient caox4 and vital signs stable. BP97/56 P91    Hemodialysis initiated using RIJ. Aspirated and flushed both ports without difficulty. Dressing clean, dry and intact. Machine settings per MD order. Will monitor during treatment.

## 2019-08-10 NOTE — PROGRESS NOTES
[facility] PROGRESS NOTE    Follow up for: ESRD    Subjective:   Patient seen and examined. Chart, notes, labs, imaging, results all reviewed.      Seen in Hd unit during first session HD       ROS:  Gen - no fever, no chills, appetite okay  CV - no chest pain, no orthopnea  Lung - no shortness of breath, no cough  Abd - no tenderness, no nausea, no vomiting  Ext - no edema    Objective:   Exam:  Vitals:    08/10/19 0830 08/10/19 0900 08/10/19 0930 08/10/19 1002   BP: 104/64 99/58 (!) 89/55 99/58   Pulse: (!) 101 97 (!) 102 (!) 105   Resp:       Temp:       SpO2:       Weight:       Height:             Intake/Output Summary (Last 24 hours) at 8/10/2019 1002  Last data filed at 8/10/2019 5805  Gross per 24 hour   Intake 250 ml   Output 0 ml   Net 250 ml       Current Facility-Administered Medications   Medication Dose Route Frequency    senna-docusate (PERICOLACE) 8.6-50 mg per tablet 1 Tab  1 Tab Oral DAILY    polyethylene glycol (MIRALAX) packet 17 g  17 g Oral DAILY    bisacodyl (DULCOLAX) suppository 10 mg  10 mg Rectal DAILY PRN    epoetin henry-epbx (RETACRIT) injection 6,000 Units  6,000 Units SubCUTAneous Q7D    heparin (PF) 2 units/ml in NS infusion 2,000 Units  1,000 mL Irrigation PRN    dextrose (D50W) injection syrg 25 g  25 g IntraVENous PRN    amLODIPine (NORVASC) tablet 10 mg  10 mg Oral DAILY    atorvastatin (LIPITOR) tablet 80 mg  80 mg Oral QHS    hydrALAZINE (APRESOLINE) tablet 10 mg  10 mg Oral DAILY    metoprolol succinate (TOPROL-XL) XL tablet 50 mg  50 mg Oral DAILY    sodium chloride (NS) flush 5-40 mL  5-40 mL IntraVENous Q8H    sodium chloride (NS) flush 5-40 mL  5-40 mL IntraVENous PRN    acetaminophen (TYLENOL) tablet 650 mg  650 mg Oral Q4H PRN    oxyCODONE-acetaminophen (PERCOCET) 5-325 mg per tablet 1 Tab  1 Tab Oral Q4H PRN    morphine 10 mg/ml injection 5 mg  5 mg IntraVENous Q3H PRN    naloxone (NARCAN) injection 0.4 mg  0.4 mg IntraVENous PRN    aspirin delayed-release tablet 81 mg  81 mg Oral DAILY    diphenhydrAMINE (BENADRYL) injection 12.5 mg  12.5 mg IntraVENous Q4H PRN    insulin lispro (HUMALOG) injection   SubCUTAneous AC&HS       EXAM  GEN - Alert, oriented, in no distress  CV - S1, S2, RRR, no rub, murmur, or gallop  Lung - clear to auscultation bilaterally  Abd - soft, nontender, BS present, PD catheter clean, dry, intact  Ext - no edema. Right IJ PC    Recent Labs     08/08/19  0339   WBC 11.7*   HGB 9.2*   HCT 28.3*   *        Recent Labs     08/08/19  0339      K 4.6      CO2 22   BUN 36*   CREA 7.75*   CA 7.8*   GLU 77       Assessment and Plan:     1. ESRD on PD switched to [facility] now   Seen on first session of HD today - tolerating ell       2. PAD - s/p Left femoral to above knee popliteal bypass 8/6    3. Anemia with ESRD - s/p prbc 8/7. Resume [facility].  Follow    Evelin Uriarte MD

## 2019-08-10 NOTE — PROGRESS NOTES
[address]   [address] [facility]. [address] FAX: [phone]         VASCULAR SURGERY FLOOR PROGRESS NOTE    Admit Date: 2019  POD: 4 Days Post-Op    Procedure:  Procedure(s):  LEFT FEMORAL TO ABOVE KNEE -POPLITEAL BYPASS    Subjective:     Patient has no new complaints. Objective:     Vitals:  Blood pressure 99/58, pulse 97, temperature 98.8 °F (37.1 °C), resp. rate 18, height 5' 0.5\" (1.537 m), weight 130 lb 1.6 oz (59 kg), SpO2 97 %, not currently breastfeeding. Temp (24hrs), Av.3 °F (36.8 °C), Min:97.8 °F (36.6 °C), Max:98.8 °F (37.1 °C)      Intake / Output:    Intake/Output Summary (Last 24 hours) at 8/10/2019 0908  Last data filed at 8/10/2019 8019  Gross per 24 hour   Intake 490 ml   Output 0 ml   Net 490 ml       Physical Exam:    Constitutional: she appears well-developed. No distress. HENT:   Head: Atraumatic. Eyes: Pupils are equal, round, and reactive to light. Neck: Normal range of motion. Cardiovascular: Regular rhythm. Pulmonary/Chest: Effort normal and breath sounds normal. No respiratory distress. Abdominal: Soft. Bowel sounds are normal. she exhibits no distension. There is no tenderness. There is no guarding. No hernia. Musculoskeletal: Normal range of motion. Neurological: She is alert.  CN II- XII grossly intact  Vascular: Incisional wound VAC stable    Labs:   Recent Labs     19  0339   HGB 9.2*   WBC 11.7*   K 4.6   GLU 77       Data Review     Assessment:     Patient Active Problem List    Diagnosis Date Noted    Ischemia of left lower extremity 2019    Atherosclerosis of native arteries of extremity with rest pain ([address] 2019    Atherosclerosis of native arteries of extremities with rest pain, left leg ([address] 2019    Preop cardiovascular exam 2019    ESRD on peritoneal dialysis ([address] 2019    Atherosclerosis of native coronary artery of native heart without angina pectoris 2019    PAD (peripheral artery disease) ([facility] 06/19/2019    Atherosclerosis of native arteries of extremity with intermittent claudication (HCC) 06/19/2019    Ischemia 06/19/2019    Shortness of breath 06/04/2019    History of right-sided carotid endarterectomy 11/08/2018    Carotid stenosis, asymptomatic, right 11/06/2018    Carotid stenosis, right 11/06/2018    Bilateral carotid artery disease ([facility] 08/29/2018    Bilateral carotid artery stenosis 11/28/2017    Pure hypercholesterolemia 11/28/2017    UTI (urinary tract infection) 11/10/2017    Stroke ([address] 11/10/2017    S/P coronary artery stent placement 02/06/2017    CKD stage 5 secondary to hypertension ([address] 02/06/2017    Major depressive disorder with single episode, in remission ([facility] 02/06/2017    Hydronephrosis 02/12/2016    History of CVA (cerebrovascular accident) 02/11/2016    Anemia of renal disease 01/07/2016    Benign essential HTN 01/07/2016    Coronary artery disease involving native coronary artery with angina pectoris ([facility] 01/07/2016    [facility] vascular accident) [facility] 04/28/2012    Hypertensive emergency 04/26/2012    Carotid artery bruit 04/26/2012    CKD (chronic kidney disease) stage 3, GFR 30-59 ml/min ([facility] 04/26/2012    Tobacco use disorder 04/26/2012    GERD (gastroesophageal reflux disease) 04/26/2012    Chest pain, unspecified 04/26/2012    Anemia 04/26/2012       Plan/Recommendations/Medical Decision Making:     Left foot is warm continue treatment plan  Plan discharge early this week    Elements of this note have been dictated using speech recognition software. As a result, errors of speech recognition may have occurred.

## 2019-08-11 LAB
GLUCOSE BLD STRIP.AUTO-MCNC: 112 MG/DL (ref 65–100)
GLUCOSE BLD STRIP.AUTO-MCNC: 92 MG/DL (ref 65–100)
GLUCOSE BLD STRIP.AUTO-MCNC: 92 MG/DL (ref 65–100)
GLUCOSE BLD STRIP.AUTO-MCNC: 99 MG/DL (ref 65–100)

## 2019-08-11 PROCEDURE — 97530 THERAPEUTIC ACTIVITIES: CPT

## 2019-08-11 PROCEDURE — 74011250637 HC RX REV CODE- 250/637: Performed by: PHYSICIAN ASSISTANT

## 2019-08-11 PROCEDURE — 74011250637 HC RX REV CODE- 250/637: Performed by: SURGERY

## 2019-08-11 PROCEDURE — 82962 GLUCOSE BLOOD TEST: CPT

## 2019-08-11 PROCEDURE — 65660000004 HC RM CVT STEPDOWN

## 2019-08-11 RX ADMIN — Medication 10 ML: at 05:03

## 2019-08-11 RX ADMIN — POLYETHYLENE GLYCOL 3350 17 G: 17 POWDER, FOR SOLUTION ORAL at 08:39

## 2019-08-11 RX ADMIN — ACETAMINOPHEN 650 MG: 325 TABLET, FILM COATED ORAL at 08:38

## 2019-08-11 RX ADMIN — ASPIRIN 81 MG: 81 TABLET ORAL at 08:39

## 2019-08-11 RX ADMIN — METOPROLOL SUCCINATE 50 MG: 50 TABLET, EXTENDED RELEASE ORAL at 08:38

## 2019-08-11 RX ADMIN — ACETAMINOPHEN 650 MG: 325 TABLET, FILM COATED ORAL at 21:04

## 2019-08-11 RX ADMIN — SENNOSIDES, DOCUSATE SODIUM 1 TABLET: 50; 8.6 TABLET, FILM COATED ORAL at 08:39

## 2019-08-11 RX ADMIN — AMLODIPINE BESYLATE 10 MG: 5 TABLET ORAL at 12:32

## 2019-08-11 RX ADMIN — Medication 10 ML: at 17:50

## 2019-08-11 RX ADMIN — Medication 10 ML: at 21:47

## 2019-08-11 RX ADMIN — ATORVASTATIN CALCIUM 80 MG: 40 TABLET, FILM COATED ORAL at 21:04

## 2019-08-11 NOTE — PROGRESS NOTES
[facility] PROGRESS NOTE    Follow up for: ESRD    Subjective:   Patient seen and examined. Chart, notes, labs, imaging, results all reviewed.      Seen in CV step     ROS:  Gen - no fever, no chills, appetite okay  CV - no chest pain, no orthopnea  Lung - no shortness of breath, no cough  Abd - no tenderness, no nausea, no vomiting  Ext - no edema    Objective:   Exam:  Vitals:    08/10/19 2251 08/11/19 0300 08/11/19 0400 08/11/19 0738   BP: 118/58  99/51 114/56   Pulse: 86  87 87   Resp: 18  18 16   Temp: 98 °F (36.7 °C)  98 °F (36.7 °C) 99 °F (37.2 °C)   SpO2: 91%  93% 95%   Weight:  57.7 kg (127 lb 1.6 oz)     Height:             Intake/Output Summary (Last 24 hours) at 8/11/2019 1015  Last data filed at 8/11/2019 0100  Gross per 24 hour   Intake 330 ml   Output 1000 ml   Net -670 ml       Current Facility-Administered Medications   Medication Dose Route Frequency    senna-docusate (PERICOLACE) 8.6-50 mg per tablet 1 Tab  1 Tab Oral DAILY    polyethylene glycol (MIRALAX) packet 17 g  17 g Oral DAILY    bisacodyl (DULCOLAX) suppository 10 mg  10 mg Rectal DAILY PRN    epoetin henry-epbx (RETACRIT) injection 6,000 Units  6,000 Units SubCUTAneous Q7D    heparin (PF) 2 units/ml in NS infusion 2,000 Units  1,000 mL Irrigation PRN    dextrose (D50W) injection syrg 25 g  25 g IntraVENous PRN    amLODIPine (NORVASC) tablet 10 mg  10 mg Oral DAILY    atorvastatin (LIPITOR) tablet 80 mg  80 mg Oral QHS    hydrALAZINE (APRESOLINE) tablet 10 mg  10 mg Oral DAILY    metoprolol succinate (TOPROL-XL) XL tablet 50 mg  50 mg Oral DAILY    sodium chloride (NS) flush 5-40 mL  5-40 mL IntraVENous Q8H    sodium chloride (NS) flush 5-40 mL  5-40 mL IntraVENous PRN    acetaminophen (TYLENOL) tablet 650 mg  650 mg Oral Q4H PRN    oxyCODONE-acetaminophen (PERCOCET) 5-325 mg per tablet 1 Tab  1 Tab Oral Q4H PRN    morphine 10 mg/ml injection 5 mg  5 mg IntraVENous Q3H PRN    naloxone (NARCAN) injection 0.4 mg  0.4 mg IntraVENous PRN    aspirin delayed-release tablet 81 mg  81 mg Oral DAILY    diphenhydrAMINE (BENADRYL) injection 12.5 mg  12.5 mg IntraVENous Q4H PRN    insulin lispro (HUMALOG) injection   SubCUTAneous AC&HS       EXAM  GEN - Alert, oriented, in no distress  CV - S1, S2, RRR, no rub, murmur, or gallop  Lung - clear to auscultation bilaterally  Abd - soft, nontender, BS present, PD catheter clean, dry, intact  Ext - no edema. Right IJ PC    No results for input(s): WBC, HGB, HCT, PLT, HGBEXT, HCTEXT, PLTEXT, HGBEXT, HCTEXT, PLTEXT in the last 72 hours. No results for input(s): NA, K, CL, CO2, BUN, CREA, CA, GLU, MG, PHOS in the last 72 hours. Assessment and Plan:     1. ESRD on PD switched to [facility] now   S/p  first session of HD yesterday  - tolerating well  Ordered HD for tomorrow   Need to set up out pt hd slot in [place] hd unit     2. PAD - s/p Left femoral to above knee popliteal bypass 8/6    3. Anemia with ESRD - s/p prbc 8/7. Resume [facility].  Follow    Jackeline Mckeon MD

## 2019-08-11 NOTE — PROGRESS NOTES
Nutrition  Reason for assessment: Received RN generated consult for calorie count: \" Pt not eating on 8/9/19 no lunch or supper eaten. On 8/10/19 2 bites of lunch eaten. Pt chews meat and spits it back out. I tried a ensure on 8/9/19 for lunch she took 2 sips and then spit back up. \"  Nursing admission Malnutrition Screening Tool identified:  Eating Poorly Due to Decreased Appetite: Yes  Assessment:   Diet: Renal, cardiac, mechancisl soft    Food/Nutrition Patient History:  PM of ESRD on CCPD, COPD. GERD, CVA PVD. Admitted after surgery for PVD on 8-6. Pt has been changed to HD to accommodate plan for pt to go to a rehab facility before returning back to her home. Pt denies any issues with appetite or intake PTA. However she does receive a nutritional supplement, like Boost-but iit's not Boost or Ensure per her, from the dialysis clinic. She gets a case once a month and drinks a bottle a day. She is receptive to LxDATA here but  Does not remain consistent in her selection of offered flavored choices. Her other main barrier to eating at this time is lack of salt as a seasoning. She flores snot typically avoid salt at home and may not need to whether she is on PD or HD. Pt is unsure of her dry weight or as to whether she has had any weight loss. Anthropometrics:Height: 5' 0.5\" (153.7 cm),  Pre-op Weight: 52.2 kg 115#), Weight Source: Standing scale, Body mass index is 22.1 kg/m². BMI class of underweight.    Weight hx per EMR ( Based on connect care functionality, RD cannot know if these weight are actual versus stated):  WT / BMI WEIGHT   7/31/2019 117 lb 12.8 oz   7/30/2019 115 lb   7/29/2019 112 lb   7/1/2019 112 lb   6/27/2019 112 lb 6.4 oz   6/19/2019 114 lb   6/7/2019 114 lb 1.6 oz   5/7/2019 120 lb   2/28/2019 122 lb 14.4 oz   2/25/2019 121 lb 5 oz   2/21/2019 137 lb   2/4/2019 137 lb   12/12/2018 137 lb   11/8/2018 137 lb 14.4 oz   11/2/2018 121 lb 2 oz   11/1/2018 116 lb   9/13/2018 116 lb 9/8/2018 116 lb   8/29/2018 116 lb 3.2 oz   8/27/2018 113 lb     Current weight is within UBW wide range  Macronutrient needs:(SBW/64kg)  EER:  7095-5377 kcal /day (30-35 kcal/kg SBW)  EPR:  77-83 grams protein/day (1.2-1.3 grams/kg SBW)  Intake/Comparative Standards: Per RD meal rounds: Just starting to eat lunch which consisted of a bowl of soup, crackers and apple pie. Ivis Cohen Average intake for past 5 day(s)/7 recorded meal(s): 14%. This potentially meets ~15% of kcal and ~15% of protein needs    Nutrition Diagnosis: Inadequate oral intake r/t decreased ability to consume sufficient oral intake as evidenced by decreased intake as above    Intervention:  Will not pursue calorie count at this time d/t expected discharge before results would be available and fact that intake can be assessed as inadequate without a calorie count. Focus will rather be on increasing her intake. Meals and snacks: Liberalize to mechanical soft diet to allow 1 packet of salt at all meals. Nutrition Supplement Therapy: Ensure Enlive tid. Provided pt with a  Bottle to drink after lunch today. Discharge nutrition plan: Continue Ensure Enlive tid. F/U by RD at rehab facility and dialysis clinic.     Elvia Garcia, 64 [address], [facility] [address]

## 2019-08-11 NOTE — PROGRESS NOTES
Bedside shift report received from Mery Cuello RN (offgoing nurse). Report given to Deidre Greene RN. Vital signs stable. No complaints present. Patient in stable condition.

## 2019-08-11 NOTE — PROGRESS NOTES
Problem: Mobility Impaired (Adult and Pediatric)  Goal: *Acute Goals and Plan of Care (Insert Text)  Description  STG:  (1.)Ms. Jerry Carter will move from supine to sit and sit to supine  with MINIMAL ASSIST within 3 treatment day(s). (2.)Ms. Jerry Carter will transfer from bed to chair and chair to bed with CONTACT GUARD ASSIST using the least restrictive device within 3 treatment day(s). (3.)Ms. Jerry Carter will ambulate with CONTACT GUARD ASSIST for 75 feet with the least restrictive device within 3 treatment day(s). LTG:  (1.)Ms. Jerry Carter will move from supine to sit and sit to supine  in bed with CONTACT GUARD ASSIST within 7 treatment day(s). (2.)Ms. Jerry Carter will transfer from bed to chair and chair to bed with STAND BY ASSIST using the least restrictive device within 7 treatment day(s). (3.)Ms. Jerry Carter will ambulate with STAND BY ASSIST for 150+ feet with the least restrictive device within 7 treatment day(s). ________________________________________________________________________________________________     Outcome: Progressing Towards Goal      PHYSICAL THERAPY: Daily Note and PM 8/11/2019  INPATIENT: PT Visit Days : 5  Payor: Soledad Yeager / Plan: [address] [place] [address] HMO / Product Type: [employer] Medicare /       NAME/AGE/GENDER: Lan Persaud is a 68 y.o. female   PRIMARY DIAGNOSIS: PAD (peripheral artery disease) ([facility] [I73.9]  Ischemia of left lower extremity [I99.8]  PAD (peripheral artery disease) ([facility] [I73.9] Ischemia of left lower extremity   Ischemia of left lower extremity    Procedure(s) (LRB):  LEFT FEMORAL TO ABOVE KNEE -POPLITEAL BYPASS (Left)  5 Days Post-Op  ICD-10: Treatment Diagnosis:    · Generalized Muscle Weakness (M62.81)  · Difficulty in walking, Not elsewhere classified (R26.2)  · History of falling (Z91.81)   Precaution/Allergies:  Codeine and Hydralazine      ASSESSMENT:     Ms. Jerry Carter is sitting in the recliner  upon contact and agreeable to PT  Treatment. Pt reports her son living with her in 1 story home with 2 steps to enter with railing available. Pt reports use of SPC for gait and requiring assist with ADLs but able to participate. Pt reports history of 2 falls. Patient is very soft spoken. Sit to stand with minimum to moderate assist.  Standing balance is fair. Gait training with rolling walker x 30 feet. Patient is doing so much better with gait, as she is weight bearing better. Patient has very slow panfilo and decreased upright/ full extension posture, as her knees are flexed, but it is improved. Patient is assisted to supine in the bed with needs within reach and positioned for comfort. Good session. Progress demonstrated. Still requires assist with all functional mobility activities. Mobility remains slow. Sanjuanita Barton will benefit from skilled PT (medically necessary) to address decreased strength, decreased balance, decreased functional tolerance, decreased cardiopulmonary endurance affecting participation in basic ADLs and functional tasks. Additional skilled therapy will be needed at discharge. Will continue PT efforts. This section established at most recent assessment   PROBLEM LIST (Impairments causing functional limitations):  1. Decreased Strength  2. Decreased ADL/Functional Activities  3. Decreased Transfer Abilities  4. Decreased Ambulation Ability/Technique  5. Decreased Balance  6. Increased Pain  7. Decreased Activity Tolerance  8. Decreased Pacing Skills  9. Increased Fatigue  10. Decreased Flexibility/Joint Mobility  11. Decreased Orange with Home Exercise Program  12. Decreased Cognition   INTERVENTIONS PLANNED: (Benefits and precautions of physical therapy have been discussed with the patient.)  1. Balance Exercise  2. Bed Mobility  3. Family Education  4. Gait Training  5. Home Exercise Program (HEP)  6. Neuromuscular Re-education/Strengthening  7. Range of Motion (ROM)  8.  Therapeutic Activites  9. Therapeutic Exercise/Strengthening  10. Transfer Training     TREATMENT PLAN: Frequency/Duration: daily for duration of hospital stay  Rehabilitation Potential For Stated Goals: Good     REHAB RECOMMENDATIONS (at time of discharge pending progress):    Placement: It is my opinion, based on this patient's performance to date, that Ms. Sophia Hewitt may benefit from intensive therapy at an [address] [place] [address] after discharge due to a probable need for multiple therapy disciplines and potential to make ongoing and sustainable functional improvement that is of practical value. .  Equipment:    None at this time              HISTORY:   History of Present Injury/Illness (Reason for Referral):  S/p Procedure(s) (LRB):  LEFT FEMORAL TO ABOVE KNEE -POPLITEAL BYPASS (Left)  Past Medical History/Comorbidities:   Ms. Sophia Hewitt  has a past medical history of Anemia, Arrhythmia, Aspirin long-term use ([facility]), CAD (coronary artery disease) (2012), Chronic kidney disease, Chronic obstructive pulmonary disease ([facility] CKD (chronic kidney disease) stage 3, GFR 30-59 ml/min ([facility]) ( ), ESRD on peritoneal dialysis ([facility] Flat affect, GERD (gastroesophageal reflux disease), Heart murmur (not followed per cardiology), History of MI (myocardial infarction) (2012), colonic polyps (2016), Hydronephrosis, Hypertension, Left-sided weakness (2012), Loss of appetite, Peritoneal dialysis catheter in place [facility]), Poor historian, Renal atrophy, right, Renal insufficiency, Smoker, Smoker, Stented coronary artery ([facility]), and Stroke ([address] (2012). Ms. Sophia Hewitt  has a past surgical history that includes colonoscopy (N/A, 9/26/2016); pr left heart cath,percutaneous (4/2012); hx urological; hx carotid endarterectomy (Right, 11/07/2018); vascular surgery procedure unlist (02/28/2019); vascular surgery procedure unlist (07/01/2019); and ir insert tunl cvc w/o port over 5 yr (8/7/2019).   Social History/Living Environment:   Home Environment: Private residence  # Steps to Enter: 2  One/Two Story Residence: One story  Living Alone: No  Support Systems: Family member(s)  Patient Expects to be Discharged to[de-identified] Unknown  Current DME Used/Available at Home: Basia Loser, Wheelchair, Kev Sherman, thalia  Prior Level of Function/Work/Activity:  Use of SPC for gait, assist with ADLs, 2 falls   Number of Personal Factors/Comorbidities that affect the Plan of Care: 3+: HIGH COMPLEXITY   EXAMINATION:   Most Recent Physical Functioning:   Gross Assessment:                  Posture:     Balance:  Sitting: Intact  Sitting - Static: Good (unsupported)  Sitting - Dynamic: Good (unsupported)  Standing: Impaired  Standing - Static: Fair  Standing - Dynamic : Fair Bed Mobility:  Rolling: Minimum assistance  Supine to Sit: Minimum assistance  Wheelchair Mobility:     Transfers:  Sit to Stand: Minimum assistance  Stand to Sit: Minimum assistance  Gait:     Base of Support: Center of gravity altered;Narrowed  Speed/Akila: Shuffled; Slow  Step Length: Left shortened;Right shortened  Gait Abnormalities: Decreased step clearance  Distance (ft): 30 Feet (ft)  Assistive Device: Walker, rolling  Ambulation - Level of Assistance: Minimal assistance; Additional time  Interventions: Safety awareness training      Body Structures Involved:  1. Nerves  2. Bones  3. Joints  4. Muscles  5. Ligaments Body Functions Affected:  1. Sensory/Pain  2. Cardio  3. Respiratory  4. Neuromusculoskeletal  5. Movement Related Activities and Participation Affected:  1. General Tasks and Demands  2. Mobility  3. Self Care  4. Domestic Life  5. Interpersonal Interactions and Relationships  6.  Community, Social and Gilmer Allen   Number of elements that affect the Plan of Care: 4+: HIGH COMPLEXITY   CLINICAL PRESENTATION:   Presentation: Evolving clinical presentation with changing clinical characteristics: MODERATE COMPLEXITY   CLINICAL DECISION MAKIN [employer] Mobility Inpatient Short Form  How much difficulty does the patient currently have. .. Unable A Lot A Little None   1. Turning over in bed (including adjusting bedclothes, sheets and blankets)? ? 1   ? 2   ? 3   ? 4   2. Sitting down on and standing up from a chair with arms ( e.g., wheelchair, bedside commode, etc.)   ? 1   ? 2   ? 3   ? 4   3. Moving from lying on back to sitting on the side of the bed?   ? 1   ? 2   ? 3   ? 4   How much help from another person does the patient currently need. .. Total A Lot A Little None   4. Moving to and from a bed to a chair (including a wheelchair)? ? 1   ? 2   ? 3   ? 4   5. Need to walk in hospital room? ? 1   ? 2   ? 3   ? 4   6. Climbing 3-5 steps with a railing? ? 1   ? 2   ? 3   ? 4   © 2007, Trustees of [address] [place] [address], under license to [employer]. All rights reserved      Score:  Initial: 15 Most Recent: X (Date: -- )    Interpretation of Tool:  Represents activities that are increasingly more difficult (i.e. Bed mobility, Transfers, Gait). Medical Necessity:     · Patient is expected to demonstrate progress in strength, range of motion, balance, coordination and functional technique  ·  to decrease assistance required with gait, transfers, and functional mobility   · . Reason for Services/Other Comments:  · Patient continues to require skilled intervention due to decreased strength, decreased balance, decreased functional tolerance, decreased cardiopulmonary endurance affecting participation in basic ADLs and functional tasks   · .    Use of outcome tool(s) and clinical judgement create a POC that gives a: Clear prediction of patient's progress: LOW COMPLEXITY            TREATMENT:   (In addition to Assessment/Re-Assessment sessions the following treatments were rendered)   Pre-treatment Symptoms/Complaints:  \"OK\"  Pain: Initial:   Pain Intensity 1: 0  Pain Location 1: Incisional, Leg  Pain Orientation 1: Left  Post Session:  No number voiced Therapeutic Activity: (    13 minutes): Therapeutic activities including Chair transfers, Ambulation on level ground and cues for ease and safety of transfers  to improve mobility, strength, balance and coordination. Required minimum  assist with  Safety awareness training to promote static and dynamic balance in standing and promote coordination of bilateral, upper extremity(s), lower extremity(s). Braces/Orthotics/Lines/Etc:   · Wound vac   · O2 Device: Room air  Treatment/Session Assessment:    · Response to Treatment: gait distance maintained  narrow CHRISTOPHER and slow panfilo  Mobility shows improvement  · Interdisciplinary Collaboration:   o Physical Therapy Assistant  o Registered Nurse  · After treatment position/precautions:   o Supine in bed  o Bed alarm/tab alert on  o Bed/Chair-wheels locked  o Bed in low position  o Call light within reach  o RN notified   · Compliance with Program/Exercises: Compliant all of the time  · Recommendations/Intent for next treatment session: \"Next visit will focus on advancements to more challenging activities and reduction in assistance provided\".   Total Treatment Duration:  PT Patient Time In/Time Out  Time In: 1302  Time Out: [address] [facility], PTA

## 2019-08-11 NOTE — PROGRESS NOTES
[address]   [address] [facility] [address] FAX: [phone]         VASCULAR SURGERY FLOOR PROGRESS NOTE    Admit Date: 2019  POD: 5 Days Post-Op    Procedure:  Procedure(s):  LEFT FEMORAL TO ABOVE KNEE -POPLITEAL BYPASS    Subjective:     Patient has no new complaints. Objective:     Vitals:  Blood pressure 114/56, pulse 87, temperature 99 °F (37.2 °C), resp. rate 16, height 5' 0.5\" (1.537 m), weight 127 lb 1.6 oz (57.7 kg), SpO2 95 %, not currently breastfeeding. Temp (24hrs), Av.2 °F (36.8 °C), Min:97.7 °F (36.5 °C), Max:99 °F (37.2 °C)      Intake / Output:    Intake/Output Summary (Last 24 hours) at 2019 0819  Last data filed at 2019 0100  Gross per 24 hour   Intake 330 ml   Output 1000 ml   Net -670 ml       Physical Exam:    Constitutional: she appears well-developed. No distress. HENT:   Head: Atraumatic. Eyes: Pupils are equal, round, and reactive to light. Neck: Normal range of motion. Cardiovascular: Regular rhythm. Pulmonary/Chest: Effort normal and breath sounds normal. No respiratory distress. Abdominal: Soft. Bowel sounds are normal. she exhibits no distension. There is no tenderness. There is no guarding. No hernia. Musculoskeletal: Normal range of motion. Neurological: She is alert. CN II- XII grossly intact  Vascular: Incisional VAC stable    Labs: No results for input(s): HGB, WBC, K, GLU, HGBEXT in the last 72 hours.     No lab exists for component:  CREA    Data Review     Assessment:     Patient Active Problem List    Diagnosis Date Noted    Ischemia of left lower extremity 2019    Atherosclerosis of native arteries of extremity with rest pain ([address] 2019    Atherosclerosis of native arteries of extremities with rest pain, left leg ([address] 2019    Preop cardiovascular exam 2019    ESRD on peritoneal dialysis ([address] 2019    Atherosclerosis of native coronary artery of native heart without angina pectoris 07/29/2019    PAD (peripheral artery disease) ([address] 06/19/2019    Atherosclerosis of native arteries of extremity with intermittent claudication ([address] 06/19/2019    Ischemia 06/19/2019    Shortness of breath 06/04/2019    History of right-sided carotid endarterectomy 11/08/2018    Carotid stenosis, asymptomatic, right 11/06/2018    Carotid stenosis, right 11/06/2018    Bilateral carotid artery disease ([address] 08/29/2018    Bilateral carotid artery stenosis 11/28/2017    Pure hypercholesterolemia 11/28/2017    UTI (urinary tract infection) 11/10/2017    Stroke ([address] 11/10/2017    S/P coronary artery stent placement 02/06/2017    CKD stage 5 secondary to hypertension ([address] 02/06/2017    Major depressive disorder with single episode, in remission ([address] 02/06/2017    Hydronephrosis 02/12/2016    History of CVA (cerebrovascular accident) 02/11/2016    Anemia of renal disease 01/07/2016    Benign essential HTN 01/07/2016    Coronary artery disease involving native coronary artery with angina pectoris ([address] 01/07/2016    CVA (cerebral vascular accident) ([facility] 04/28/2012    Hypertensive emergency 04/26/2012    Carotid artery bruit 04/26/2012    CKD (chronic kidney disease) stage 3, GFR 30-59 ml/min ([address] 04/26/2012    Tobacco use disorder 04/26/2012    GERD (gastroesophageal reflux disease) 04/26/2012    Chest pain, unspecified 04/26/2012    Anemia 04/26/2012       Plan/Recommendations/Medical Decision Making:     Patient well-perfused left lower extremity  Plan discharge early this week    Elements of this note have been dictated using speech recognition software. As a result, errors of speech recognition may have occurred.

## 2019-08-12 LAB
GLUCOSE BLD STRIP.AUTO-MCNC: 141 MG/DL (ref 65–100)
GLUCOSE BLD STRIP.AUTO-MCNC: 77 MG/DL (ref 65–100)
GLUCOSE BLD STRIP.AUTO-MCNC: 92 MG/DL (ref 65–100)

## 2019-08-12 PROCEDURE — 90935 HEMODIALYSIS ONE EVALUATION: CPT

## 2019-08-12 PROCEDURE — 74011250637 HC RX REV CODE- 250/637: Performed by: SURGERY

## 2019-08-12 PROCEDURE — 65660000004 HC RM CVT STEPDOWN

## 2019-08-12 PROCEDURE — 82962 GLUCOSE BLOOD TEST: CPT

## 2019-08-12 PROCEDURE — 74011250637 HC RX REV CODE- 250/637: Performed by: PHYSICIAN ASSISTANT

## 2019-08-12 RX ORDER — AMOXICILLIN 250 MG
2 CAPSULE ORAL DAILY
Status: DISCONTINUED | OUTPATIENT
Start: 2019-08-12 | End: 2019-08-12

## 2019-08-12 RX ORDER — AMOXICILLIN 250 MG
2 CAPSULE ORAL 2 TIMES DAILY
Status: DISCONTINUED | OUTPATIENT
Start: 2019-08-12 | End: 2019-08-13 | Stop reason: HOSPADM

## 2019-08-12 RX ADMIN — METOPROLOL SUCCINATE 50 MG: 50 TABLET, EXTENDED RELEASE ORAL at 12:26

## 2019-08-12 RX ADMIN — Medication 10 ML: at 20:26

## 2019-08-12 RX ADMIN — AMLODIPINE BESYLATE 10 MG: 5 TABLET ORAL at 12:26

## 2019-08-12 RX ADMIN — ASPIRIN 81 MG: 81 TABLET ORAL at 12:26

## 2019-08-12 RX ADMIN — ATORVASTATIN CALCIUM 80 MG: 40 TABLET, FILM COATED ORAL at 20:26

## 2019-08-12 RX ADMIN — POLYETHYLENE GLYCOL 3350 17 G: 17 POWDER, FOR SOLUTION ORAL at 12:28

## 2019-08-12 RX ADMIN — SENNOSIDES, DOCUSATE SODIUM 1 TABLET: 50; 8.6 TABLET, FILM COATED ORAL at 12:26

## 2019-08-12 RX ADMIN — Medication 10 ML: at 14:00

## 2019-08-12 RX ADMIN — SENNOSIDES, DOCUSATE SODIUM 2 TABLET: 50; 8.6 TABLET, FILM COATED ORAL at 20:26

## 2019-08-12 RX ADMIN — ACETAMINOPHEN 650 MG: 325 TABLET, FILM COATED ORAL at 20:26

## 2019-08-12 RX ADMIN — HYDRALAZINE HYDROCHLORIDE 10 MG: 10 TABLET, FILM COATED ORAL at 12:26

## 2019-08-12 RX ADMIN — Medication 10 ML: at 05:53

## 2019-08-12 NOTE — DIALYSIS
TRANSFER IN - DIALYSIS    Received patient in dialysis unit  from [facility] (unit) for ordered procedure. Consent verified for renal replacement therapy. Patient alert and vital signs stable. /56 P 77     Hemodialysis initiated using right perm catheter. Aspirated and flushed both ports without difficulty. Dressing clean, dry and intact. Machine settings per MD order. Will monitor during treatment.

## 2019-08-12 NOTE — DIALYSIS
TRANSFER OUT- DIALYSIS    Hemodialysis treatment completed without complications. Patient alert and VS stable  /72  P 96       1.0 Kgs removed. Flushed both ports with 10 mL of NS.  CVC dressing clean, dry, and intact, tego caps intact, bilateral lumens wrapped with 4x4 gauze. Patient to [id number] after dialysis.

## 2019-08-12 NOTE — PROGRESS NOTES
Tech found patient with wound vac on floor beeping, notified RN. Incision dressing extension hooked back up to wound vac. Wound vac continues to beep and dressing remains expanded. Attempted to patch with large tegaderms with no success. KAREN Schmitt notified. Olga Case will be by to assess the situation.

## 2019-08-12 NOTE — PROGRESS NOTES
Monitor room called and said patient had long run of vtach. Patient in recliner, alert and oriented. /57.

## 2019-08-12 NOTE — PROGRESS NOTES
Bedside shift report given to Tom Mcclain RN (oncoming nurse) by Saulo Nelson RN (offgoing nurse). Bedside shift report included the following information: SBAR, Kardex, ED Summary, MAR, and Recent Results.

## 2019-08-12 NOTE — PROGRESS NOTES
OT note:  Pt to HD this morning and per chart review is now needing wound vac and dressing attended to. Will re-attempt at a later date.   Eunice Ramirez

## 2019-08-12 NOTE — PROGRESS NOTES
[facility] PROGRESS NOTE    Follow up for: ESRD    Subjective:   Patient seen and examined. Chart, notes, labs, imaging, results all reviewed. Seen on dialysis. Goal UF 1 kg. Tolerating well. Sleeping on dialysis. No c/o when awakened. /53, HR 90.        ROS:  Gen - no fever, no chills, appetite okay  CV - no chest pain, no orthopnea  Lung - no shortness of breath, no cough  Abd - no tenderness, no nausea, no vomiting  Ext - no edema    Objective:   Exam:  Vitals:    08/12/19 0730 08/12/19 0804 08/12/19 0836 08/12/19 0903   BP: 133/59 135/58 91/55 124/53   Pulse: 75 81 62 90   Resp:       Temp:       SpO2:       Weight:       Height:             Intake/Output Summary (Last 24 hours) at 8/12/2019 0927  Last data filed at 8/12/2019 0314  Gross per 24 hour   Intake 1100 ml   Output 125 ml   Net 975 ml       Current Facility-Administered Medications   Medication Dose Route Frequency    senna-docusate (PERICOLACE) 8.6-50 mg per tablet 1 Tab  1 Tab Oral DAILY    polyethylene glycol (MIRALAX) packet 17 g  17 g Oral DAILY    bisacodyl (DULCOLAX) suppository 10 mg  10 mg Rectal DAILY PRN    epoetin henry-epbx (RETACRIT) injection 6,000 Units  6,000 Units SubCUTAneous Q7D    heparin (PF) 2 units/ml in NS infusion 2,000 Units  1,000 mL Irrigation PRN    dextrose (D50W) injection syrg 25 g  25 g IntraVENous PRN    amLODIPine (NORVASC) tablet 10 mg  10 mg Oral DAILY    atorvastatin (LIPITOR) tablet 80 mg  80 mg Oral QHS    hydrALAZINE (APRESOLINE) tablet 10 mg  10 mg Oral DAILY    metoprolol succinate (TOPROL-XL) XL tablet 50 mg  50 mg Oral DAILY    sodium chloride (NS) flush 5-40 mL  5-40 mL IntraVENous Q8H    sodium chloride (NS) flush 5-40 mL  5-40 mL IntraVENous PRN    acetaminophen (TYLENOL) tablet 650 mg  650 mg Oral Q4H PRN    oxyCODONE-acetaminophen (PERCOCET) 5-325 mg per tablet 1 Tab  1 Tab Oral Q4H PRN    morphine 10 mg/ml injection 5 mg  5 mg IntraVENous Q3H PRN    naloxone (NARCAN) injection 0.4 mg  0.4 mg IntraVENous PRN    aspirin delayed-release tablet 81 mg  81 mg Oral DAILY    diphenhydrAMINE (BENADRYL) injection 12.5 mg  12.5 mg IntraVENous Q4H PRN    insulin lispro (HUMALOG) injection   SubCUTAneous AC&HS       EXAM  GEN - Alert, oriented, in no distress  CV - S1, S2, RRR, no rub, murmur, or gallop  Lung - clear to auscultation bilaterally  Abd - soft, nontender, BS present, PD catheter clean, dry, intact  Ext - no edema. Right IJ PC    No results for input(s): WBC, HGB, HCT, PLT, HGBEXT, HCTEXT, PLTEXT, HGBEXT, HCTEXT, PLTEXT in the last 72 hours. No results for input(s): NA, K, CL, CO2, BUN, CREA, CA, GLU, MG, PHOS in the last 72 hours. Assessment and Plan:     ESRD on PD switched to HD now for rehab purposes  - S/p tunneled HD cath placed 8/7  - HD today per routine for clearance and volume  - Needs outpatient clinic slot for HD. CM following   - flush PD cath weekly    PAD - s/p Left femoral to above knee popliteal bypass 8/6    Anemia with ESRD - s/p prbc 8/7. Resumed KALYANI.  Follow    HOANG Ramos

## 2019-08-12 NOTE — PROGRESS NOTES
[address]   [address] [facility] [address] FAX: [phone]         VASCULAR SURGERY FLOOR PROGRESS NOTE    Admit Date: 2019  POD: 6 Days Post-Op    Procedure:  Procedure(s):  LEFT FEMORAL TO ABOVE KNEE -POPLITEAL BYPASS    Subjective:     Patient has no new complaints. Objective:     Vitals:  Blood pressure 121/57, pulse 79, temperature 98.2 °F (36.8 °C), resp. rate 18, height 5' 0.5\" (1.537 m), weight 120 lb (54.4 kg), SpO2 92 %, not currently breastfeeding. Temp (24hrs), Av °F (36.7 °C), Min:96.9 °F (36.1 °C), Max:99 °F (37.2 °C)      Intake / Output:    Intake/Output Summary (Last 24 hours) at 2019 0719  Last data filed at 2019 0314  Gross per 24 hour   Intake 1300 ml   Output 325 ml   Net 975 ml       Physical Exam:    Constitutional: she appears well-developed. No distress. HENT:   Head: Atraumatic. Eyes: Pupils are equal, round, and reactive to light. Neck: Normal range of motion. Cardiovascular: Regular rhythm. Pulmonary/Chest: Effort normal and breath sounds normal. No respiratory distress. Abdominal: Soft. Bowel sounds are normal. she exhibits no distension. There is no tenderness. There is no guarding. No hernia. Musculoskeletal: Normal range of motion. Neurological: She is alert. CN II- XII grossly intact  Vascular: Excisional  wound VAC, left foot is warm    Labs: No results for input(s): HGB, WBC, K, GLU, HGBEXT in the last 72 hours.     No lab exists for component:  CREA    Data Review     Assessment:     Patient Active Problem List    Diagnosis Date Noted    Ischemia of left lower extremity 2019    Atherosclerosis of native arteries of extremity with rest pain ([address] 2019    Atherosclerosis of native arteries of extremities with rest pain, left leg ([address] 2019    Preop cardiovascular exam 2019    ESRD on peritoneal dialysis ([address] 2019    Atherosclerosis of native coronary artery of native heart without angina pectoris 07/29/2019    PAD (peripheral artery disease) [facility] 06/19/2019    Atherosclerosis of native arteries of extremity with intermittent claudication ([facility] 06/19/2019    Ischemia 06/19/2019    Shortness of breath 06/04/2019    History of right-sided carotid endarterectomy 11/08/2018    Carotid stenosis, asymptomatic, right 11/06/2018    Carotid stenosis, right 11/06/2018    Bilateral carotid artery disease ([address] 08/29/2018    Bilateral carotid artery stenosis 11/28/2017    Pure hypercholesterolemia 11/28/2017    UTI (urinary tract infection) 11/10/2017    Stroke ([address] 11/10/2017    S/P coronary artery stent placement 02/06/2017    CKD stage 5 secondary to hypertension ([facility] 02/06/2017    Major depressive disorder with single episode, in remission ([facility] 02/06/2017    Hydronephrosis 02/12/2016    History of CVA (cerebrovascular accident) 02/11/2016    Anemia of renal disease 01/07/2016    Benign essential HTN 01/07/2016    Coronary artery disease involving native coronary artery with angina pectoris ([address] 01/07/2016    CVA (cerebral vascular accident) ([facility] 04/28/2012    Hypertensive emergency 04/26/2012    Carotid artery bruit 04/26/2012    CKD (chronic kidney disease) stage 3, GFR 30-59 ml/min ([facility] 04/26/2012    Tobacco use disorder 04/26/2012    GERD (gastroesophageal reflux disease) 04/26/2012    Chest pain, unspecified 04/26/2012    Anemia 04/26/2012       Plan/Recommendations/Medical Decision Making:     Continue medical treatment  Await for placement    Elements of this note have been dictated using speech recognition software. As a result, errors of speech recognition may have occurred.

## 2019-08-12 NOTE — PROGRESS NOTES
PT note:  Treatment deferred per RN as patients wound vac and dressing are requiring some attention. Will return as time permits.   Paula Velasquez, PTA

## 2019-08-12 NOTE — PROGRESS NOTES
Bedside shift report received from Lauren Rivera RN (offgoing nurse). Report given to Naresh Rooney RN. Vital signs stable. No complaints present. Patient in stable condition.

## 2019-08-12 NOTE — PROGRESS NOTES
Spoke with KAREN Green. Successfully reinforced dressing. It is now clean, dry, and intact. Wound vac is no longer beeping.

## 2019-08-13 VITALS
OXYGEN SATURATION: 95 % | DIASTOLIC BLOOD PRESSURE: 54 MMHG | BODY MASS INDEX: 22.34 KG/M2 | HEART RATE: 80 BPM | HEIGHT: 61 IN | RESPIRATION RATE: 18 BRPM | TEMPERATURE: 97.6 F | WEIGHT: 118.3 LBS | SYSTOLIC BLOOD PRESSURE: 104 MMHG

## 2019-08-13 LAB
ANION GAP SERPL CALC-SCNC: 6 MMOL/L (ref 7–16)
BUN SERPL-MCNC: 23 MG/DL (ref 8–23)
CALCIUM SERPL-MCNC: 7.7 MG/DL (ref 8.3–10.4)
CHLORIDE SERPL-SCNC: 98 MMOL/L (ref 98–107)
CO2 SERPL-SCNC: 35 MMOL/L (ref 21–32)
CREAT SERPL-MCNC: 4.04 MG/DL (ref 0.6–1)
ERYTHROCYTE [DISTWIDTH] IN BLOOD BY AUTOMATED COUNT: 14.9 % (ref 11.9–14.6)
GLUCOSE SERPL-MCNC: 83 MG/DL (ref 65–100)
HCT VFR BLD AUTO: 23.5 % (ref 35.8–46.3)
HGB BLD-MCNC: 7.6 G/DL (ref 11.7–15.4)
MCH RBC QN AUTO: 32.3 PG (ref 26.1–32.9)
MCHC RBC AUTO-ENTMCNC: 32.3 G/DL (ref 31.4–35)
MCV RBC AUTO: 100 FL (ref 79.6–97.8)
NRBC # BLD: 0 K/UL (ref 0–0.2)
PLATELET # BLD AUTO: 136 K/UL (ref 150–450)
PMV BLD AUTO: 12.5 FL (ref 9.4–12.3)
POTASSIUM SERPL-SCNC: 3.4 MMOL/L (ref 3.5–5.1)
RBC # BLD AUTO: 2.35 M/UL (ref 4.05–5.2)
SODIUM SERPL-SCNC: 139 MMOL/L (ref 136–145)
WBC # BLD AUTO: 7.3 K/UL (ref 4.3–11.1)

## 2019-08-13 PROCEDURE — 74011250637 HC RX REV CODE- 250/637: Performed by: PHYSICIAN ASSISTANT

## 2019-08-13 PROCEDURE — 74011250637 HC RX REV CODE- 250/637: Performed by: SURGERY

## 2019-08-13 PROCEDURE — 36415 COLL VENOUS BLD VENIPUNCTURE: CPT

## 2019-08-13 PROCEDURE — 85027 COMPLETE CBC AUTOMATED: CPT

## 2019-08-13 PROCEDURE — 80048 BASIC METABOLIC PNL TOTAL CA: CPT

## 2019-08-13 RX ORDER — POLYETHYLENE GLYCOL 3350 17 G/17G
17 POWDER, FOR SOLUTION ORAL
Qty: 1 EACH | Status: SHIPPED
Start: 2019-08-13

## 2019-08-13 RX ORDER — AMOXICILLIN 250 MG
2 CAPSULE ORAL DAILY
Qty: 30 TAB | Status: SHIPPED
Start: 2019-08-13 | End: 2020-11-11

## 2019-08-13 RX ORDER — OXYCODONE AND ACETAMINOPHEN 5; 325 MG/1; MG/1
1 TABLET ORAL
Qty: 18 TAB | Refills: 0 | Status: SHIPPED | OUTPATIENT
Start: 2019-08-13 | End: 2019-08-16

## 2019-08-13 RX ADMIN — METOPROLOL SUCCINATE 50 MG: 50 TABLET, EXTENDED RELEASE ORAL at 08:16

## 2019-08-13 RX ADMIN — POLYETHYLENE GLYCOL 3350 17 G: 17 POWDER, FOR SOLUTION ORAL at 08:16

## 2019-08-13 RX ADMIN — HYDRALAZINE HYDROCHLORIDE 10 MG: 10 TABLET, FILM COATED ORAL at 08:16

## 2019-08-13 RX ADMIN — Medication 10 ML: at 05:41

## 2019-08-13 RX ADMIN — SENNOSIDES, DOCUSATE SODIUM 2 TABLET: 50; 8.6 TABLET, FILM COATED ORAL at 08:16

## 2019-08-13 RX ADMIN — AMLODIPINE BESYLATE 10 MG: 5 TABLET ORAL at 08:16

## 2019-08-13 RX ADMIN — ASPIRIN 81 MG: 81 TABLET ORAL at 08:16

## 2019-08-13 NOTE — PROGRESS NOTES
Bedside shift report given to Cachorro Sparrow RN (oncoming nurse) by Willard Lewis RN (offgoing nurse). Bedside shift report included the following information: SBAR, Kardex, ED Summary, MAR, and Recent Results.

## 2019-08-13 NOTE — PROGRESS NOTES
Follow up visit to build relationship of connectedness, care & emotional / spiritual support.       __x____    active listening/ guided questions to understand pt's spiritual needs    __x____    exploration of hope & the presence of God to normalize struggles as Holy, reframe, introduce coping skills    ___x___   prayer / blessing of pt & struggles, to convey peace & lessen anxiety     Additional  Comments / interventions:         Arminda Noel, [facility], PhD  Margie Barnhart

## 2019-08-13 NOTE — DISCHARGE SUMMARY
[facility], [address] [facility]          Physician Discharge Summary     Patient: Liliana Alicea MRN: [id number]  SSN: [id number]    YOB: 1950  Age: 68 y.o.   Sex: female       Admission Date: 8/6/2019    Discharge Date: 8/13/2019      Admitting Physician: Jordin Lopez MD     Discharge Provider: Joanne Crump PA-C    Admission Diagnoses: PAD (peripheral artery disease) [facility]) [I73.9]  Ischemia of left lower extremity [I99.8]  PAD (peripheral artery disease) ([facility].) [I73.9]    Discharge Diagnoses:   Problem List as of 8/13/2019 Date Reviewed: 8/13/2019          Codes Class Noted - Resolved    * (Principal) Ischemia of left lower extremity ICD-10-CM: I99.8  ICD-9-CM: 459.9  8/6/2019 - Present        Atherosclerosis of native arteries of extremity with rest pain ([facility] ICD-10-CM: W76.289  ICD-9-CM: 440.22  8/1/2019 - Present        Atherosclerosis of native arteries of extremities with rest pain, left leg ([facility] ICD-10-CM: O96.052  ICD-9-CM: 440.22  7/31/2019 - Present        Preop cardiovascular exam ICD-10-CM: Z01.810  ICD-9-CM: V72.81  7/29/2019 - Present        ESRD on peritoneal dialysis [facility]) ICD-10-CM: N18.6, Z99.2  ICD-9-CM: 585.6, V45.11  7/29/2019 - Present        Atherosclerosis of native coronary artery of native heart without angina pectoris ICD-10-CM: I25.10  ICD-9-CM: 414.01  7/29/2019 - Present        PAD (peripheral artery disease) ([facility] ICD-10-CM: I73.9  ICD-9-CM: 443.9  6/19/2019 - Present        Atherosclerosis of native arteries of extremity with intermittent claudication ([facility] ICD-10-CM: Q85.417  ICD-9-CM: 440.21  6/19/2019 - Present        Ischemia ICD-10-CM: I99.8  ICD-9-CM: 459.9  6/19/2019 - Present        Shortness of breath ICD-10-CM: R06.02  ICD-9-CM: 786.05  6/4/2019 - Present        History of right-sided carotid endarterectomy ICD-10-CM: Z98.890  ICD-9-CM: V45.89  11/8/2018 - Present        Carotid stenosis, asymptomatic, right ICD-10-CM: I65.21  ICD-9-CM: 433.10  11/6/2018 - Present    Overview Signed 12/12/2018 10:01 AM by Tammy Nichols NP     11/7/18 (Dr. Fatou Rodriguez) right carotid endarterectomy with bovine pericardial patch angioplasty             Carotid stenosis, right ICD-10-CM: I65.21  ICD-9-CM: 433.10  11/6/2018 - Present        Bilateral carotid artery disease ([facility] ICD-10-CM: I77.9  ICD-9-CM: 447.9  8/29/2018 - Present        Bilateral carotid artery stenosis ICD-10-CM: I65.23  ICD-9-CM: 433.10, 433.30  11/28/2017 - Present        Pure hypercholesterolemia ICD-10-CM: E78.00  ICD-9-CM: 272.0  11/28/2017 - Present        UTI (urinary tract infection) ICD-10-CM: N39.0  ICD-9-CM: 599.0  11/10/2017 - Present        Stroke ([facility] ICD-10-CM: I63.9  ICD-9-CM: 434.91  11/10/2017 - Present    Overview Signed 11/10/2017 12:03 PM by Rad Coy MD     Frontoparietal CVA, ischemic             S/P coronary artery stent placement ICD-10-CM: Z95.5  ICD-9-CM: V45.82  2/6/2017 - Present        CKD stage 5 secondary to hypertension ([facility] ICD-10-CM: I12.0, N18.5  ICD-9-CM: 403.91, 585.5  2/6/2017 - Present        Major depressive disorder with single episode, in remission [facility]) ICD-10-CM: F32.5  ICD-9-CM: 296.25  2/6/2017 - Present        Hydronephrosis ICD-10-CM: N13.30  ICD-9-CM: 130  2/12/2016 - Present        History of CVA (cerebrovascular accident) ICD-10-CM: Z86.73  ICD-9-CM: V12.54  2/11/2016 - Present        Anemia of renal disease ICD-10-CM: N18.9, D63.1  ICD-9-CM: 285.21  1/7/2016 - Present        Benign essential HTN ICD-10-CM: I10  ICD-9-CM: 401.1  1/7/2016 - Present        Coronary artery disease involving native coronary artery with angina pectoris ([facility] ICD-10-CM: I25.119  ICD-9-CM: 414.01, 413.9  1/7/2016 - Present        CVA (cerebral vascular accident) [facility]) ICD-10-CM: I63.9  ICD-9-CM: 434.91  4/28/2012 - Present        Hypertensive emergency ICD-10-CM: I16.1  ICD-9-CM: 401.9  4/26/2012 - Present        Carotid artery bruit ICD-10-CM: R09.89  ICD-9-CM: 785.9  4/26/2012 - Present    Overview Signed 4/26/2012  8:28 AM by Lucio Cortezlaina     bilateral             CKD (chronic kidney disease) stage 3, GFR 30-59 ml/min ([facility]) ICD-10-CM: N18.3  ICD-9-CM: 585.3  4/26/2012 - Present        Tobacco use disorder ICD-10-CM: F17.200  ICD-9-CM: 305.1  4/26/2012 - Present        GERD (gastroesophageal reflux disease) ICD-10-CM: K21.9  ICD-9-CM: 530.81  4/26/2012 - Present        Chest pain, unspecified ICD-10-CM: R07.9  ICD-9-CM: 786.50  4/26/2012 - Present        Anemia ICD-10-CM: D64.9  ICD-9-CM: 285.9  4/26/2012 - Present               Procedures for this Admission: Procedure(s):  1. Left common femoral and profunda endarterectomy with CorMatrix patch angioplasty. 2.  Left femoral to above-knee popliteal artery with reverse saphenous vein graft. Discharged Condition: stable    Brief History: Lan Persaud was admitted with the following history of present illness. This is a 75yo lady with known CAD, PAD, ESRD on PD and remote stroke, as well as multiple other medical conditions who presented to our office with worsening claudication. Aortogram / B LE arteriogram on 7/1/2019 noted severe right lower extremity peripheral arterial disease with common, profunda and superficial femoral artery occlusions. Dr. Merrill Long recommended endarterectomy of left common femoral and profunda arteries followed by left femoral to above-knee popliteal artery with reverse saphenous vein graft. After discussing need for surgery, risks, alternatives, potential complications and anticipated outcomes with patient and family, the patient elected to proceed. Hospital Course: On day of admission, the patient was taken to the operating room and underwent the above-noted procedure. After extubation in the OR and brief stay in PACU, the patient was transferred to CV ICU to begin formal recovery.  She progressed in a typical, uneventful fashion: her pain was controlled, she tolerated an advancing diet, she voided without difficulty, she mobilized and participated actively with physical / occupational therapies, her bowel function resumed. She did experience <2d of post-operative confusion attributed to anesthesia + poor clearance (GFR 7) but this resolved without issue. She received 1 unit pRBC transfusion for Hgb 7.3. It was determined during recovery that patient would require skilled rehabilitation, thus tunneled central venous catheter was placed for temporary transition from peritoneal dialysis to hemodialysis. She tolerated 2 cycles of inpatient hemodialysis. On POD #7, she was stable and deemed suitable for discharge to skilled nursing facility. Consults: Nephrology    Significant Diagnostic Studies: labs    Treatments:   IV hydration  antibiotics: Ancef  analgesia: acetaminophen, morphine, oxycodone+acetaminophen  cardiac meds: metoprolol, amlodipine, hydralazine  anticoagulation: ASA, heparin  therapies: PT, OT  treatements: hemodialysis, pRBC transfusion  surgery: as noted above    Discharge Exam:  GENERAL: alert, cooperative, no distress  LUNG: clear to auscultation bilaterally, normal respiratory effort  HEART: regular rate and rhythm, ejection murmur  ABDOMEN: soft, nontender, nondistended, bowel sounds normoactive  EXTREMITIES: warm, UE normal ROM, left 4th toe still dusky but with much less discoloration, foot sensorimotor intact; 2 skin tears noted: 1 large (~5.5 x 2.5 x 0.1 cm) at inferior aspect of distal medial thigh; 1 small (~2 x 1 x 0 cm) at superior aspect of groin incision  PULSES: B LE non-palpable; Doppler signals 2+ left DP, PT, 1+ right PT    Disposition: skilled rehabilitation facility    Wound Care Orders  1.  To skin tear at distal medial left thigh under staple line:  - clean daily with wound cleanser  - apply Xeroform gauze to cover tear  - place dry 4x4 over Xeroform, wrap medial thigh with Severo and secure  2. To skin tear at left groin near superior staple line:  - clean daily with wound cleanser  - apply Xeroform gauze to cover tear  - place dry 2x2 over Xeroform, prep skin surrounding prior to applying paper tape to secure  3. To left 4th toe:  - paint daily with Betadine  - place dry 2x2 between toes 3-4  4. Apply emollient lotion to feet at least twice daily; focus on heels. 5. Patient to wear heel suspension boots (Rooke boots) when not ambulating. Patient Instructions:   Current Discharge Medication List      START taking these medications    Details   lanolin-mineral oil-nacl-w.pet (LUBRIDERM) lotion Apply  to affected area two (2) times a day. Qty: 1 Bottle, Refills: prn      polyethylene glycol (MIRALAX) 17 gram packet Take 1 Packet by mouth daily as needed (constipation). Qty: 1 Each, Refills: prn      senna-docusate (PERICOLACE) 8.6-50 mg per tablet Take 2 Tabs by mouth daily. Qty: 30 Tab, Refills: prn         CONTINUE these medications which have CHANGED    Details   oxyCODONE-acetaminophen (PERCOCET) 5-325 mg per tablet Take 1 Tab by mouth every four (4) hours as needed for Pain (for surgery pain) for up to 3 days. Max Daily Amount: 6 Tabs. Qty: 18 Tab, Refills: 0    Associated Diagnoses: S/P femoral-popliteal bypass surgery         CONTINUE these medications which have NOT CHANGED    Details   hydrALAZINE (APRESOLINE) 10 mg tablet Take 10 mg by mouth daily. potassium chloride (K-DUR, KLOR-CON) 20 mEq tablet Take 20 mEq by mouth daily. Refills: 6      atorvastatin (LIPITOR) 80 mg tablet Take 80 mg by mouth nightly. aspirin delayed-release 81 mg tablet Take 81 mg by mouth nightly. metoprolol succinate (TOPROL-XL) 50 mg XL tablet Take 1 Tab by mouth daily. Qty: 180 Tab, Refills: 3    Associated Diagnoses: Benign essential HTN      amLODIPine (NORVASC) 10 mg tablet Take 1 Tab by mouth daily.   Qty: 90 Tab, Refills: 3    Associated Diagnoses: Benign essential HTN ondansetron (ZOFRAN ODT) 8 mg disintegrating tablet Take 1 Tab by mouth every eight (8) hours as needed for Nausea. Qty: 12 Tab, Refills: 0             Reference MD discharge instructions, as well as those provided by nursing for diet, labs, medications, activity, wound care and any outpatient referrals. I have reviewed the patients controlled substance prescription history as maintained in the [place] prescription monitoring program so that the prescription(s) for a controlled substance can be given. Follow-up Appointments   Procedures    FOLLOW UP VISIT Appointment in office with Dr. Chioma Pérez physician assistant, Otilio De Guzman on 8/19/2019 at 2:00 PM.     Follow up in office with Dr. Chioma Pérez physician assistant, Otilio De Guzman on 8/19/2019 at 2:00 PM.       Signed: Jose Torres PA-C  8/13/2019 11:01 AM  Physician Assistant with [facility] Surgery  [facility].  Sharyn Rodriguez MD / Sacha Khan MD

## 2019-08-13 NOTE — PROGRESS NOTES
Discharge instructions, follow up appointments and prescriptions reviewed with patient. Verbalize understanding. All personal belongings taken with patient. Transport will escort patient to [facility].    Hemodialysis and peritoneal dialysis ports clean, dry, and intact. IV and heart monitor removed. Left leg incision is clean, dry, and intact. Dressings to both tape burns. Patient escorted with 2x Rooke boots, gauze, xeroform and all patient belongings. Report given to [facility], Dae Yan. Notified of dry heels, two tape burns, and wound care instructions.

## 2019-08-13 NOTE — PROGRESS NOTES
Materials does not have lubriderm either. Applied lotion form clean supply to both heels. Will relay this order in report.

## 2019-08-13 NOTE — PROGRESS NOTES
Pt with discharge orders this day. Pt approved by [facility] [employer] Liaison [facility] to transition to SNF for STR [facility] # [id number]). Pt with chair time of 10:15 at [facility] on T, Th, and Sat. This CM spoke with pt, daughter, and son regarding authorization from insurance and pt to transition to SNF this day - all remain agreeable. Transport time arranged via Cheril Spindle for  at 2:00pm.      No additional discharge needs at this time. Milestones met.      Care Management Interventions  PCP Verified by CM: Yes([facility] )  Mode of Transport at Discharge: BLS([facility])  Transition of Care Consult (CM Consult): Discharge Planning, SNF  Discharge Durable Medical Equipment: No  Physical Therapy Consult: Yes  Occupational Therapy Consult: Yes  Speech Therapy Consult: No  Current Support Network: Own Home, Lives with Caregiver  Confirm Follow Up Transport: Other (see comment)([facility])  Plan discussed with Pt/Family/Caregiver: Yes  Freedom of Choice Offered: Yes  Discharge Location  Discharge Placement: Skilled nursing facility

## 2019-08-13 NOTE — PROGRESS NOTES
Upon assessment this morning, patient had small blackened area on heel. Asked KAREN Vail to examine it. At this time, no further orders post exam.  Patient has two skin tears after prevena removed. Dressed with xeroform, 4x4, matthew wrap, and tape.

## 2019-08-13 NOTE — PROGRESS NOTES
Bedside shift report received from Gevena Kayser, Replaced by [facility].

## 2019-08-13 NOTE — DISCHARGE INSTRUCTIONS
Patient Education        Femoropopliteal Bypass: What to Expect at [address] will have some pain from the cuts (incisions) the doctor made. This usually gets better after about 1 week. Your doctor will give you pain medicine for this. You can expect your leg to be swollen at first. This is a normal part of recovery and may last 2 or 3 months. You will have stitches or staples in the incisions. If you have stitches, they may dissolve on their own. Or your doctor may take them out 7 to 14 days after your surgery. You will need to take it easy for 2 to 6 weeks at home. It may take 6 to 12 weeks to fully recover. After surgery, blood may flow better throughout your leg, which can decrease leg pain, numbness, and cramping. You will need to have regular checkups with your doctor to make sure the graft is working. This care sheet gives you a general idea about how long it will take for you to recover. But each person recovers at a different pace. Follow the steps below to get better as quickly as possible. How can you care for yourself at home? Activity    · Rest when you feel tired. Getting enough sleep will help you recover.     · Try to walk each day or as often as your doctor tells you. Start by walking a little more than you did the day before. Bit by bit, increase the amount you walk. Walking boosts blood flow and helps prevent pneumonia and constipation.     · Avoid strenuous activities, such as bicycle riding, jogging, weight lifting, or aerobic exercise, until your doctor says it is okay.     · Ask your doctor when you can drive again.     · If you work, you will probably need to take 2 to 6 weeks off, depending on your job.     · You may shower, if your doctor says it is okay. Do not take a bath for the first 2 weeks, or until your doctor tells you it is okay. Diet    · You can eat your normal diet.  If your stomach is upset, try bland, low-fat foods like plain rice, broiled chicken, toast, and yogurt.     · Drink plenty of fluids (unless your doctor tells you not to).     · You may notice that your bowel movements are not regular right after your surgery. This is common. You may want to take a fiber supplement every day. If you have not had a bowel movement after a couple of days, ask your doctor about taking a mild laxative. Medicines    · Your doctor will tell you if and when you can restart your medicines. He or she will also give you instructions about taking any new medicines.     · If you take blood thinners, such as warfarin (Coumadin), clopidogrel (Plavix), or aspirin, be sure to talk to your doctor. He or she will tell you if and when to start taking those medicines again. Make sure that you understand exactly what your doctor wants you to do.     · Be safe with medicines. Take your medicines exactly as prescribed. Call your doctor if you think you are having a problem with your medicine.     · Take pain medicines exactly as directed. ? If the doctor gave you a prescription medicine for pain, take it as prescribed. ? If you are not taking a prescription pain medicine, ask your doctor if you can take an over-the-counter medicine.     · If you think your pain medicine is making you sick to your stomach:  ? Take your medicine after meals (unless your doctor has told you not to). ? Ask your doctor for a different pain medicine.     · If your doctor prescribed antibiotics, take them as directed. Do not stop taking them just because you feel better. You need to take the full course of antibiotics.     · Your doctor may prescribe a blood thinner when you go home. This helps prevent blood clots. Be sure you get instructions about how to take your medicine safely. Blood thinners can cause serious bleeding problems.    Incision care    · If you have bandages on the incisions, follow your doctor's instructions about changing them.     · If you have strips of tape on the incisions, leave the tape on for a week or until it falls off.     · Wash the area daily with warm, soapy water, and pat it dry. Don't use hydrogen peroxide or alcohol, which can slow healing. You may cover the area with a gauze bandage if it weeps or rubs against clothing. Change the bandage every day.     · Keep the area clean and dry.    Elevation    · Prop up your leg on a pillow anytime you sit or lie down for the first 3 days. Try to keep it above the level of your heart. This will help reduce swelling. Follow-up care is a key part of your treatment and safety. Be sure to make and go to all appointments, and call your doctor if you are having problems. It's also a good idea to know your test results and keep a list of the medicines you take. When should you call for help? Call 911 anytime you think you may need emergency care. For example, call if:    · You passed out (lost consciousness).     · You have trouble breathing.    Call your doctor now or seek immediate medical care if:    · You have severe pain in your leg, or it becomes cold, pale, blue, tingly, or numb.     · You have pain that does not get better after you take pain medicine.     · You have loose stitches, or your incisions come open.     · You are bleeding a lot from the incisions.     · You have signs of infection, such as:  ? Increased pain, swelling, warmth, or redness. ? Red streaks leading from the incision. ? Pus draining from the incision. ? A fever.     · You are sick to your stomach or cannot keep fluids down.    Watch closely for any changes in your health, and be sure to contact your doctor if:    · You do not get better as expected. Where can you learn more? Go to http://elfego-court.info/. Enter O356 in the search box to learn more about \"Femoropopliteal Bypass: What to Expect at Home. \"  Current as of: July 22, 2018  Content Version: 12.1  © 4813-2375 [employer].  Care instructions adapted under license by [facility] (which disclaims liability or warranty for this information). If you have questions about a medical condition or this instruction, always ask your healthcare professional. [address] any warranty or liability for your use of this information. Patient Education        Learning About Benefits From Quitting Smoking  How does quitting smoking make you healthier? If you're thinking about quitting smoking, you may have a few reasons to be smoke-free. Your health may be one of them. · When you quit smoking, you lower your risks for cancer, lung disease, heart attack, stroke, blood vessel disease, and blindness from macular degeneration. · When you're smoke-free, you get sick less often, and you heal faster. You are less likely to get colds, flu, bronchitis, and pneumonia. · As a nonsmoker, you may find that your mood is better and you are less stressed. When and how will you feel healthier? Quitting has real health benefits that start from day 1 of being smoke-free. And the longer you stay smoke-free, the healthier you get and the better you feel. The first hours  · After just 20 minutes, your blood pressure and heart rate go down. That means there's less stress on your heart and blood vessels. · Within 12 hours, the level of carbon monoxide in your blood drops back to normal. That makes room for more oxygen. With more oxygen in your body, you may notice that you have more energy than when you smoked. After 2 weeks  · Your lungs start to work better. · Your risk of heart attack starts to drop. After 1 month  · When your lungs are clear, you cough less and breathe deeper, so it's easier to be active. · Your sense of taste and smell return. That means you can enjoy food more than you have since you started smoking. Over the years  · After 1 year, your risk of heart disease is half what it would be if you kept smoking.   · After 5 years, your risk of stroke starts to shrink. Within a few years after that, it's about the same as if you'd never smoked. · After 10 years, your risk of dying from lung cancer is cut by about half. And your risk for many other types of cancer is lower too. How would quitting help others in your life? When you quit smoking, you improve the health of everyone who now breathes in your smoke. · Their heart, lung, and cancer risks drop, much like yours. · They are sick less. For babies and small children, living smoke-free means they're less likely to have ear infections, pneumonia, and bronchitis. · If you're a woman who is or will be pregnant someday, quitting smoking means a healthier . · Children who are close to you are less likely to become adult smokers. Where can you learn more? Go to http://elfegoTowne Parkcourt.info/. Enter 052 [id number] 72 11 in the search box to learn more about \"Learning About Benefits From Quitting Smoking. \"  Current as of: 2018  Content Version: 12.1  © 1661-2916 [employer]. Care instructions adapted under license by [employer] (which disclaims liability or warranty for this information). If you have questions about a medical condition or this instruction, always ask your healthcare professional. [place] [address] any warranty or liability for your use of this information. DISCHARGE SUMMARY from Nurse    PATIENT INSTRUCTIONS:    After general anesthesia or intravenous sedation, for 24 hours or while taking prescription Narcotics:  · Limit your activities  · Do not drive and operate hazardous machinery  · Do not make important personal or business decisions  · Do  not drink alcoholic beverages  · If you have not urinated within 8 hours after discharge, please contact your surgeon on call.     Report the following to your surgeon:  · Excessive pain, swelling, redness or odor of or around the surgical area  · Temperature over 100.5  · Nausea and vomiting lasting longer than 4 hours or if unable to take medications  · Any signs of decreased circulation or nerve impairment to extremity: change in color, persistent  numbness, tingling, coldness or increase pain  · Any questions    What to do at Home      If you experience any of the following symptoms cool painful extremity, warmth or malodorous discharge please follow up with [address] *  Please give a list of your current medications to your Primary Care Provider. *  Please update this list whenever your medications are discontinued, doses are      changed, or new medications (including over-the-counter products) are added. *  Please carry medication information at all times in case of emergency situations. These are general instructions for a healthy lifestyle:    No smoking/ No tobacco products/ Avoid exposure to second hand smoke  Surgeon General's Warning:  Quitting smoking now greatly reduces serious risk to your health. Obesity, smoking, and sedentary lifestyle greatly increases your risk for illness    A healthy diet, regular physical exercise & weight monitoring are important for maintaining a healthy lifestyle    You may be retaining fluid if you have a history of heart failure or if you experience any of the following symptoms:  Weight gain of 3 pounds or more overnight or 5 pounds in a week, increased swelling in our hands or feet or shortness of breath while lying flat in bed. Please call your doctor as soon as you notice any of these symptoms; do not wait until your next office visit. The discharge information has been reviewed with the patient. The patient verbalized understanding. Discharge medications reviewed with the patient and appropriate educational materials and side effects teaching were provided.   ___________________________________________________________________________________________________________________________________

## 2019-08-13 NOTE — PROGRESS NOTES
Patient evaluated in AM rounds prior to D/C to SNF. Prevena disposable wound vac now on application day #8, pending programmed termination; this was removed from left groin / medial thigh incisions. Despite slow, careful removal, this resulted in 2 shear injuries: 1 large (~5.5 x 2.5 x 0.1 cm) at inferior aspect of distal medial thigh; 1 small (~2 x 1 x 0 cm) at superior aspect of groin incision. Discussed with WOC RN Dmitriy Lugo, who recommended daily Xeroform gauze at skin tear sites. Will clarify wound care with SNF and will notify family. Will notify Arthur Dubs  and rep. Raymond Luis PA-C  Physician Assistant with [facility] Vascular Surgery  Pat Iglesias.  Charu Turcios MD / Juli Diaz MD

## 2019-08-14 ENCOUNTER — PATIENT OUTREACH (OUTPATIENT)
Dept: CASE MANAGEMENT | Age: 69
End: 2019-08-14

## 2019-08-14 NOTE — PROGRESS NOTES
This note will not be viewable in [address] Patient discharged to a SNF [facility] facility([facility]). Patient will be included in weekly care coordination calls. Message sent to Jim Rivas RN SNF [facility] [address].

## 2019-08-15 ENCOUNTER — PATIENT OUTREACH (OUTPATIENT)
Dept: CASE MANAGEMENT | Age: 69
End: 2019-08-15

## 2019-08-20 NOTE — PROGRESS NOTES
Downtime progress note entry for [facility] : Emile Martinez RN    Transition of Care Discharge Documentation   Date/Time of [address]     Patient name:  Dann Tapia  :   1950      MRN: [id number]   Pt admitted to SNF     What was the patient hospitalized for? 77 y/o female admitted as observation to hospital 19 with CHF, UTISNF admission requested   Does the patient understand his/her diagnosis and/or treatment and what happened during the hospitalization? Did the patient receive discharge instructions? Review any discharge instructions. Chronic Disease coaching done (list on the side column) Does the patient understand? Were home services ordered (HH,[facility], PT, OT, ST, etc.)? If so, has the first visit occurred? If not, why? (Assist with coordination of services if necessary.)    Was any DME ordered? If so, has it been received? If not, why?  (Assist with coordination of arranging DME orders if necessary.)    Discharge medications reconciled with current medication list?  List changes on side column. Were all new prescriptions filled? If not, why?  (Assist with obtainment of medications if necessary.)    Does the patient understand the purpose and dosing instructions for all medications? (If patient has questions, provide explanation and education.)    Does the patient have any problems in performing ADLs? (If patient is unable to perform ADLs - what is the limiting factor(s)? Do they have a support system that can assist? If no support system is present, discuss possible assistance that they may be able to obtain.)    Does the patient have all follow-up appointments scheduled? 7 day f/up with PCP?    7-14 day f/up with specialist?    If f/up has not been made - what actions has the care  made to accomplish this? Has transportation been arranged? Any other questions or concerns expressed by the patient? If referred for CCM - what RN care manager was the referral assigned?     RN completing HUGO form:

## 2019-08-21 ENCOUNTER — HOSPITAL ENCOUNTER (OUTPATIENT)
Dept: LAB | Age: 69
Discharge: HOME | End: 2019-08-21

## 2019-08-21 LAB
ANION GAP SERPL CALC-SCNC: 9 MMOL/L (ref 7–16)
BASOPHILS # BLD: 0 K/UL (ref 0–0.2)
BASOPHILS NFR BLD: 0 % (ref 0–2)
BUN SERPL-MCNC: 12 MG/DL (ref 8–23)
CALCIUM SERPL-MCNC: 7.6 MG/DL (ref 8.3–10.4)
CHLORIDE SERPL-SCNC: 104 MMOL/L (ref 98–107)
CO2 SERPL-SCNC: 24 MMOL/L (ref 21–32)
CREAT SERPL-MCNC: 4.39 MG/DL (ref 0.6–1)
DIFFERENTIAL METHOD BLD: ABNORMAL
EOSINOPHIL # BLD: 0 K/UL (ref 0–0.8)
EOSINOPHIL NFR BLD: 0 % (ref 0.5–7.8)
ERYTHROCYTE [DISTWIDTH] IN BLOOD BY AUTOMATED COUNT: 14.5 % (ref 11.9–14.6)
GLUCOSE SERPL-MCNC: 82 MG/DL (ref 65–100)
HCT VFR BLD AUTO: 21.7 % (ref 35.8–46.3)
HGB BLD-MCNC: 7 G/DL (ref 11.7–15.4)
IMM GRANULOCYTES # BLD AUTO: 0 K/UL (ref 0–0.5)
IMM GRANULOCYTES NFR BLD AUTO: 0 % (ref 0–5)
LYMPHOCYTES # BLD: 1.7 K/UL (ref 0.5–4.6)
LYMPHOCYTES NFR BLD: 19 % (ref 13–44)
MCH RBC QN AUTO: 32.1 PG (ref 26.1–32.9)
MCHC RBC AUTO-ENTMCNC: 32.3 G/DL (ref 31.4–35)
MCV RBC AUTO: 99.5 FL (ref 79.6–97.8)
MONOCYTES # BLD: 0.6 K/UL (ref 0.1–1.3)
MONOCYTES NFR BLD: 7 % (ref 4–12)
NEUTS SEG # BLD: 6.6 K/UL (ref 1.7–8.2)
NEUTS SEG NFR BLD: 73 % (ref 43–78)
NRBC # BLD: 0 K/UL (ref 0–0.2)
PLATELET # BLD AUTO: 261 K/UL (ref 150–450)
PMV BLD AUTO: 12 FL (ref 9.4–12.3)
POTASSIUM SERPL-SCNC: 3.4 MMOL/L (ref 3.5–5.1)
RBC # BLD AUTO: 2.18 M/UL (ref 4.05–5.2)
SODIUM SERPL-SCNC: 137 MMOL/L (ref 136–145)
WBC # BLD AUTO: 9 K/UL (ref 4.3–11.1)

## 2019-08-21 PROCEDURE — 85025 COMPLETE CBC W/AUTO DIFF WBC: CPT

## 2019-08-21 PROCEDURE — 80048 BASIC METABOLIC PNL TOTAL CA: CPT

## 2019-08-29 ENCOUNTER — HOME HEALTH ADMISSION (OUTPATIENT)
Dept: HOME HEALTH SERVICES | Facility: HOME HEALTH | Age: 69
End: 2019-08-29

## 2019-08-29 PROBLEM — M79.89 LEFT LEG SWELLING: Status: ACTIVE | Noted: 2019-08-29

## 2019-08-29 PROBLEM — T81.30XA WOUND DEHISCENCE: Status: ACTIVE | Noted: 2019-08-29

## 2019-08-29 PROBLEM — Z95.828 H/O EXTREMITY BYPASS GRAFT: Status: ACTIVE | Noted: 2019-08-29

## 2019-09-03 ENCOUNTER — HOSPITAL ENCOUNTER (OUTPATIENT)
Dept: WOUND CARE | Age: 69
Discharge: HOME | End: 2019-09-03
Attending: SURGERY
Payer: MEDICARE

## 2019-09-03 VITALS
BODY MASS INDEX: 21.56 KG/M2 | DIASTOLIC BLOOD PRESSURE: 70 MMHG | SYSTOLIC BLOOD PRESSURE: 118 MMHG | OXYGEN SATURATION: 100 % | WEIGHT: 114.2 LBS | HEIGHT: 61 IN | RESPIRATION RATE: 18 BRPM | TEMPERATURE: 98.6 F | HEART RATE: 72 BPM

## 2019-09-03 PROCEDURE — 87076 CULTURE ANAEROBE IDENT EACH: CPT

## 2019-09-03 PROCEDURE — 87153 DNA/RNA SEQUENCING: CPT

## 2019-09-03 PROCEDURE — 87077 CULTURE AEROBIC IDENTIFY: CPT

## 2019-09-03 PROCEDURE — 87075 CULTR BACTERIA EXCEPT BLOOD: CPT

## 2019-09-03 PROCEDURE — 87186 SC STD MICRODIL/AGAR DIL: CPT

## 2019-09-03 PROCEDURE — 87205 SMEAR GRAM STAIN: CPT

## 2019-09-03 PROCEDURE — 99213 OFFICE O/P EST LOW 20 MIN: CPT

## 2019-09-03 NOTE — WOUND CARE
[address]  [address], [address]   Phone: [phone]  Fax: [phone]    Patient: Shabnam English MRN: [id number]  SSN: [id number]    YOB: 1950  Age: 68 y.o. Sex: female       Return Appointment: 1 week with Morgan White MD    Instructions:   Left Medial Thigh & Left Groin Wounds:  Cleanse wounds with normal saline. Apply Alginate with silver(sheets); cut to size, apply to wound bed. Cover with ABD pads and paper tape. CHANGE DAILY FOR 1 WEEK BY [employer]. Left 4th Toe Wound:  Cleanse wound with normal saline. Apply Iodosorb- apply thin layer to wound bed, cover with cover dressing, change daily. **HOLD WOUND VAC UNTIL VISIT NEXT WEEK WITH WOUND CENTER!**  Keep follow up wit Dr. Salma Cagle. Left Medial Thigh Wound cultured on 9/3/19. Should you experience increased redness, swelling, pain, foul odor, size of wound(s), or have a temperature over 101 degrees please contact the [address] [place] [address] Road at [phone] or if after hours contact your primary care physician or go to the hospital emergency department.     Signed By: Ana Samuels RN     September 3, 2019

## 2019-09-03 NOTE — WOUND CARE
09/03/19 1516   Wound Groin Left   Date First Assessed/Time First Assessed: 09/03/19 1515   Present on Hospital Admission: Yes  Primary Wound Type: Open incision/surgical site  Location: Groin  Wound Location Orientation: Left   Dressing Status Old drainage   Dressing Type   (wet to dry, abd, rolled gauze)   Non-staged Wound Description Full thickness   Wound Length (cm) 5.5 cm   Wound Width (cm) 3.5 cm   Wound Depth (cm) 1.4 cm   Wound Volume (cm^3) 26.95 cm^3   Condition of Base Granulation;Slough   Tissue Type Percent Red 90   Tissue Type Percent Yellow 10   Drainage Amount Moderate   Drainage Color Serosanguinous   Wound Odor None   Cleansing and Cleansing Agents  Normal saline   Dressing Changed Changed/New   Wound Thigh Left;Medial   Date First Assessed/Time First Assessed: 09/03/19 1520   Present on Hospital Admission: Yes  Primary Wound Type: Open incision/surgical site  Location: Thigh  Wound Location Orientation: Left;Medial   Dressing Status Old drainage   Dressing Type   (wet to dry, abd, rolled gauze)   Non-staged Wound Description Full thickness   Wound Length (cm) 8 cm   Wound Width (cm) 3 cm   Wound Depth (cm) 2 cm   Wound Volume (cm^3) 48 cm^3   Condition of Base Granulation;Slough   Tissue Type Percent Red 95   Tissue Type Percent Yellow 5   Drainage Amount Moderate   Drainage Color Serosanguinous   Wound Odor None   Cleansing and Cleansing Agents  Normal saline   Dressing Changed Changed/New   Wound Toe (Comment  which one) Left   Date First Assessed/Time First Assessed: 09/03/19 1522   Present on Hospital Admission: Yes  Primary Wound Type: Vascular  Location: (c) Toe (Comment  which one)  Wound Location Orientation: Left   Dressing Status Dry   Dressing Type   (betadine, gauze)   Non-staged Wound Description Full thickness   Wound Length (cm) 0.5 cm   Wound Width (cm) 0.7 cm   Wound Depth (cm) 0.1 cm   Wound Volume (cm^3) 0.04 cm^3   Condition of Base Slough;Granulation   Tissue Type Percent Red 10   Tissue Type Percent Yellow 90   Drainage Amount Small   Drainage Color Sanguinous   Wound Odor Mild   Cleansing and Cleansing Agents  Normal saline   Dressing Changed Changed/New               Patient is taking Aspirin daily.

## 2019-09-03 NOTE — WOUND CARE
Clinic Level of Care Assessment    NAME:  Clay Vásquezple OF BIRTH:  1950 GENDER: female  MEDICAL RECORD NUMBER:  [id number]   DATE:  9/3/2019      Wound Count Document in [address] [place] [address]  Number of Wounds Assessed Points   No Wounds/Ulcers []   0   Less than Three Wounds/Ulcers []   1   3-6 Wounds/Ulcers [x]   2   Greater than 6 Wounds/Ulcers []   3     Ambulation Status Document in Coord/SHANNAN/Mobility tab  Status Definition Points   Independent Independently able to ambulate. Fully able (without any assistance) to get on/off exam table/chair. []   0   Minimal Physical Assistance Requires physical assistance of one person to ambulate and/or position patient to be examined. Includes necessary physical assistance to position lower extremities on/off stool. [x]   1   Moderate Physical Assistance Requires at least one staff member to physically assist patient in ambulating into treatment room, and on/off exam table. []   2   Full Assistance Requires assistance of at least two staff members to transfer patient into treatment room and/or on/off exam table/chair. \"Total Transfer\". []   3     Dressing Complexity Document in LDA and Write Appropriate Order  Complexity Definition Points   No Dressing  []   0   Simple Minimal, simple dressing. i.e. Band-aid, gauze, simple wrap. []   1   Intermediate Moderately complicated requiring licensed personnel to apply i.e. collagen matrix, ointments, gels, alginates. [x]   2   Complex Complicated requiring licensed personnel to apply dressings 6 or more wounds. []   3     Teaching Effort Document in Education Tab   Effort Definition Points   No Teaching  []   0   Simple Reinforce two or less topics. Document in Education navigator.  []   1   Intermediate Reinforce three to five topics and/or one additional   new topic. Document in Education navigator. [x]   2   Complex Teach more than one new topic.  New patient information   packet reviewed and/or reinforce more than three topics. Document in Education navigator. HBO initial instruction. []   3       Patient Assessment and Planning  Planning Definition Points   Simple Multiple System Simple: Simple follow-up with routine assessment and planning. If Discharged, instructions and long term/follow-up care given to patient/caregiver. Discharged, instructions and/or After Visit Summary given to patient/caregiver and instructions completed. []   1   Intermediate Multiple System Intermediate: Contact with outside resources; i.e. Telephone calls to home health, [facility]. May include filling out forms and writing letters, arranging transportation, communication with insurance , vendors, etc.  Discharged, instructions and/or After Visit Summary given to patient/caregiver and instructions completed. [x]   2   Complex Multiple System Complex: Full, comprehensive assessment and planning. Follow the entire navigator under Wound Visit charting filling out each tab which includes OP Adm Database Screening, Education and CarePlan  HBO risk assessment completed. Discharged, instructions and/or After Visit Summary given to patient/caregiver and instructions completed.    []   3           Is this the Patient's First Visit to the [address] [place] [address] Road  Yes      Is this Patient Established @ [facility]  yes             Clinical Level of Care      Points  0-2  Level 1 []     Points  3-5  Level 2 []     Points  6-9  Level 3 [x]     Points  10-12  Level 4 []     Points  13-15  Level 5 []       Electronically signed by Ana Samuels RN on 9/3/2019 at 3:40 PM

## 2019-09-03 NOTE — DISCHARGE INSTRUCTIONS
Left Medial Thigh & Left Groin Wounds:  Cleanse wounds with normal saline. Apply Alginate with silver(sheets); cut to size, apply to wound bed. Cover with ABD pads and paper tape. CHANGE DAILY FOR 1 WEEK BY [employer] [address] [employer]. Left 4th Toe Wound:  Cleanse wound with normal saline. Apply Iodosorb- apply thin layer to wound bed, cover with cover dressing, change daily. HOLD WOUND VAC FOR 1 WEEK UNTIL NEXT WOUND CENTER VISIT. Keep follow up wit Dr. Crystal Cordova. Left Medial Thigh Wound cultured on 9/3/19.

## 2019-09-03 NOTE — PROGRESS NOTES
Wound Center Progress Note    Patient: Josesito Hope MRN: [id number]  SSN: [id number]    YOB: 1950  Age: 68 y.o. Sex: female      Subjective:     Chief Complaint:  Josesito Hope is a 68 y.o. BLACK OR  female who presents with left groin, left supra-popliteal, and left fourth toe wounds following a left femoral endarterectomy and femoral-popliteal bypass on 8/6. wound of {  History of Present Illness:     After discharge from the hospital she was treated for a time with a wound VAC in the wounds have pinked up and are decreasing in size. The left fourth toe which appeared to be gangrenous and is quite tender today looks as if it is getting some blood supply and may heal on its own. Wound Caused By: non-healed surgical wound for femoropopliteal bypass  Associated Signs and Symptoms:pain and drainageTiming: Continuous  Quality: Aching, Burning  Severity: 5/10  Modifying Factors: age an*d diabetes  Current Wound Care: see nurse's notes.     Past Medical History:   Diagnosis Date    Anemia     denies hx of blood transfusion    Arrhythmia     murmur; sinus tach    Aspirin long-term use SINAN    continue    CAD (coronary artery disease) 2012    MI, 1 stent 2012-    Chronic kidney disease     peritoneal dialysis qPM    Chronic obstructive pulmonary disease (HCC)     no inhalers; no treatment; smokes 1 ppd since age 19    CKD (chronic kidney disease) stage 3, GFR 30-59 ml/min ([facility])           ESRD on [facility] dialysis ([facility] [address]     Flat affect     GERD (gastroesophageal reflux disease)     resolved per patient; no current meds 7/30/19    Heart murmur not followed per cardiology    EF 65-70 %-- on echo 11/2017    History of MI (myocardial infarction) 2012    stents x 1---- per pt-- does not see a cardiologist    Hx of colonic polyps 2016    adenoma    Hydronephrosis     Hypertension     daily meds    Left-sided weakness 2012    left leg r/t cva    Loss of appetite     states within the last several months    Peritoneal dialysis catheter in place [facility])     patient does dialysis QPM    Poor historian     Renal atrophy, right     Renal insufficiency     ? --- pt unsure if sees nephrologist    Smoker     46 yr hx/ 1 pk per day    Smoker     1 ppd sicne age 19    Stented coronary artery Xience SINAN    proximal LAD 2012-- pt states does not see a cardio    Stroke ([address] 2012    with slight left  sided weakness residual      Past Surgical History:   Procedure Laterality Date    COLONOSCOPY N/A 9/26/2016    Pedro--one desc TA, one rectal hyperplastic--5 year recall    HX CAROTID ENDARTERECTOMY Right 11/07/2018    HX UROLOGICAL      stent to left kidney--multi times    IR INSERT TUNL CVC W/O PORT OVER 5 YR  8/7/2019    NC LEFT HEART CATH,PERCUTANEOUS  4/2012    Xience LAD    VASCULAR SURGERY PROCEDURE UNLIST  02/28/2019    PD catheter placement    VASCULAR SURGERY PROCEDURE UNLIST  07/01/2019    ULTRASOUND GUIDED ACCESS BILATERAL LOWER EXTREMITY ARTERIOGRAM (Left Groin)    VASCULAR SURGERY PROCEDURE UNLIST  08/06/2019    Left common femoral and profunda endarterectomy with CorMatrix patch angioplasty. ,Left femoral to above-knee popliteal artery with reverse saphenous vein graft     Family History   Problem Relation Age of Onset    Hypertension Mother     Heart Disease Mother     Stroke Father     No Known Problems Sister     No Known Problems Brother     No Known Problems Sister     No Known Problems Sister     No Known Problems Sister     No Known Problems Brother     No Known Problems Brother       Social History     Tobacco Use    Smoking status: Current Every Day Smoker     Packs/day: 1.00     Years: 51.00     Pack years: 51.00    Smokeless tobacco: Never Used   Substance Use Topics    Alcohol use: No       Prior to Admission medications    Medication Sig Start Date End Date Taking?  Authorizing Provider   lanolin-mineral oil-nacl-w.pet (LUBRIDERM) lotion Apply  to affected area two (2) times a day. 8/13/19   KAREN Holt   polyethylene glycol (MIRALAX) 17 gram packet Take 1 Packet by mouth daily as needed (constipation). 8/13/19   KAREN Holt   senna-docusate (PERICOLACE) 8.6-50 mg per tablet Take 2 Tabs by mouth daily. 8/13/19   KAREN Holt   hydrALAZINE (APRESOLINE) 10 mg tablet Take 10 mg by mouth daily. Provider, Historical   potassium chloride (K-DUR, KLOR-CON) 20 mEq tablet Take 20 mEq by mouth daily. 7/10/19   Provider, Historical   atorvastatin (LIPITOR) 80 mg tablet Take 80 mg by mouth nightly. Provider, Historical   ondansetron (ZOFRAN ODT) 8 mg disintegrating tablet Take 1 Tab by mouth every eight (8) hours as needed for Nausea. 2/5/19   Umu Fairchild MD   aspirin delayed-release 81 mg tablet Take 81 mg by mouth nightly. Provider, Historical   metoprolol succinate (TOPROL-XL) 50 mg XL tablet Take 1 Tab by mouth daily. Patient taking differently: Take 50 mg by mouth nightly. 11/28/17   Marielle Adams MD   amLODIPine (NORVASC) 10 mg tablet Take 1 Tab by mouth daily. Patient taking differently: Take 10 mg by mouth nightly. 11/28/17   Angela Camara MD     Allergies   Allergen Reactions    Codeine Nausea and Vomiting    Hydralazine Nausea and Vomiting        Review of Systems:  A comprehensive review of systems was negative except for that written in the History of Present Illness. Lab Results   Component Value Date/Time    Hemoglobin A1c <3.5 (L) 11/10/2017 05:17 AM        Immunization History   Administered Date(s) Administered    TB Skin Test (PPD) Intradermal 11/10/2017, 08/06/2019       Body mass index is 21.94 kg/m². Counseling regarding nutrition done: No     Current medications:  Current Outpatient Medications   Medication Sig Dispense Refill    lanolin-mineral oil-nacl-w.pet (LUBRIDERM) lotion Apply  to affected area two (2) times a day.  1 Bottle prn    polyethylene glycol (MIRALAX) 17 gram packet Take 1 Packet by mouth daily as needed (constipation). 1 Each prn    senna-docusate (PERICOLACE) 8.6-50 mg per tablet Take 2 Tabs by mouth daily. 30 Tab prn    hydrALAZINE (APRESOLINE) 10 mg tablet Take 10 mg by mouth daily.  potassium chloride (K-DUR, KLOR-CON) 20 mEq tablet Take 20 mEq by mouth daily. 6    atorvastatin (LIPITOR) 80 mg tablet Take 80 mg by mouth nightly.  ondansetron (ZOFRAN ODT) 8 mg disintegrating tablet Take 1 Tab by mouth every eight (8) hours as needed for Nausea. 12 Tab 0    aspirin delayed-release 81 mg tablet Take 81 mg by mouth nightly.  metoprolol succinate (TOPROL-XL) 50 mg XL tablet Take 1 Tab by mouth daily. (Patient taking differently: Take 50 mg by mouth nightly.) 180 Tab 3    amLODIPine (NORVASC) 10 mg tablet Take 1 Tab by mouth daily. (Patient taking differently: Take 10 mg by mouth nightly.) 90 Tab 3         Objective:     Physical Exam:     Visit Vitals  /70 (BP 1 Location: Left arm, BP Patient Position: At rest;Sitting)   Pulse 72   Temp 98.6 °F (37 °C)   Resp 18   Ht 5' 0.5\" (1.537 m)   Wt 51.8 kg (114 lb 3.2 oz)   SpO2 100%   BMI 21.94 kg/m²       General: well developed, well nourished, pleasant , NAD. Hygiene good  Psych: cooperative. Pleasant. No anxiety or depression. Normal mood and affect. Neuro: alert and oriented to person/place/situation. Otherwise nonfocal.  Derm: Normal turgor for age, dry skin  HEENT: Normocephalic, atraumatic. EOMI. Conjunctiva clear. No scleral icterus. Neck: Normal range of motion. No masses. Chest: Good air entry bilaterally. Respirations nonlabored  Cardio: Normal heart sounds,no rubs, murmurs or gallops  Abdomen: Soft, nontender, nondistended, normoactive bowel sounds  Lower extremities: color normal; temperature normal. Hair growth is not present. Calves are supple, nontender, approximately equally sized in comparison.  Capillary refill <3 sec            Ulcer Description:   Wound Abdomen (Active)   Number of days: 187       Wound Groin Right (Active)   Number of days: 64       Wound Leg Left;Proximal (Active)   Number of days: 28       Wound Groin Left (Active)   Number of days: 28       Wound Leg Left;Distal (Active)   Number of days: 28       Wound Toe (Comment  which one) Anterior; Left 2cm [place] IRA shaped white  08/09/19 (Active)   Number of days: 25       Wound Groin Left (Active)   Dressing Status Old drainage 9/3/2019  3:16 PM   Non-staged Wound Description Full thickness 9/3/2019  3:16 PM   Wound Length (cm) 5.5 cm 9/3/2019  3:16 PM   Wound Width (cm) 3.5 cm 9/3/2019  3:16 PM   Wound Depth (cm) 1.4 cm 9/3/2019  3:16 PM   Wound Volume (cm^3) 26.95 cm^3 9/3/2019  3:16 PM   Condition of Base Granulation;Slough 9/3/2019  3:16 PM   Tissue Type Percent Red 90 9/3/2019  3:16 PM   Tissue Type Percent Yellow 10 9/3/2019  3:16 PM   Drainage Amount Moderate 9/3/2019  3:16 PM   Drainage Color Serosanguinous 9/3/2019  3:16 PM   Wound Odor None 9/3/2019  3:16 PM   Cleansing and Cleansing Agents  Normal saline 9/3/2019  3:16 PM   Dressing Changed Changed/New 9/3/2019  3:16 PM   Number of days: 0       Wound Thigh Left;Medial (Active)   Dressing Status Old drainage 9/3/2019  3:16 PM   Non-staged Wound Description Full thickness 9/3/2019  3:16 PM   Wound Length (cm) 8 cm 9/3/2019  3:16 PM   Wound Width (cm) 3 cm 9/3/2019  3:16 PM   Wound Depth (cm) 2 cm 9/3/2019  3:16 PM   Wound Volume (cm^3) 48 cm^3 9/3/2019  3:16 PM   Condition of Base Granulation;Slough 9/3/2019  3:16 PM   Tissue Type Percent Red 95 9/3/2019  3:16 PM   Tissue Type Percent Yellow 5 9/3/2019  3:16 PM   Drainage Amount Moderate 9/3/2019  3:16 PM   Drainage Color Serosanguinous 9/3/2019  3:16 PM   Wound Odor None 9/3/2019  3:16 PM   Cleansing and Cleansing Agents  Normal saline 9/3/2019  3:16 PM   Dressing Changed Changed/New 9/3/2019  3:16 PM   Number of days: 0       Wound Toe (Comment  which one) Left (Active) Dressing Status Dry 9/3/2019  3:16 PM   Non-staged Wound Description Full thickness 9/3/2019  3:16 PM   Wound Length (cm) 0.5 cm 9/3/2019  3:16 PM   Wound Width (cm) 0.7 cm 9/3/2019  3:16 PM   Wound Depth (cm) 0.1 cm 9/3/2019  3:16 PM   Wound Volume (cm^3) 0.04 cm^3 9/3/2019  3:16 PM   Condition of Base Slough;Granulation 9/3/2019  3:16 PM   Tissue Type Percent Red 10 9/3/2019  3:16 PM   Tissue Type Percent Yellow 90 9/3/2019  3:16 PM   Drainage Amount Small 9/3/2019  3:16 PM   Drainage Color Sanguinous 9/3/2019  3:16 PM   Wound Odor Mild 9/3/2019  3:16 PM   Cleansing and Cleansing Agents  Normal saline 9/3/2019  3:16 PM   Dressing Changed Changed/New 9/3/2019  3:16 PM   Number of days: 0         Data Review:   No results found for this or any previous visit (from the past 24 hour(s)). Assessment:     68 y.o. female with left groin, left popliteal, and left fourth toe wounds ischemic ulcer.     Problem List  Date Reviewed: 8/29/2019          Codes Class Noted    Wound dehiscence ICD-10-CM: T81.30XA  ICD-9-CM: 998.30  8/29/2019        H/O extremity bypass graft ICD-10-CM: Z95.828  ICD-9-CM: V45.89  8/29/2019        Left leg swelling ICD-10-CM: M79.89  ICD-9-CM: 729.81  8/29/2019        Ischemia of left lower extremity ICD-10-CM: I99.8  ICD-9-CM: 459.9  8/6/2019        Atherosclerosis of native arteries of extremity with rest pain [facility]) ICD-10-CM: M04.157  ICD-9-CM: 440.22  8/1/2019        Atherosclerosis of native arteries of extremities with rest pain, left leg ([facility] [address] ICD-10-CM: N63.083  ICD-9-CM: 440.22  7/31/2019        Preop cardiovascular exam ICD-10-CM: Z01.810  ICD-9-CM: V72.81  7/29/2019        ESRD on peritoneal dialysis [facility]) ICD-10-CM: N18.6, Z99.2  ICD-9-CM: 585.6, V45.11  7/29/2019        Atherosclerosis of native coronary artery of native heart without angina pectoris ICD-10-CM: I25.10  ICD-9-CM: 414.01  7/29/2019        PAD (peripheral artery disease) ([facility]) ICD-10-CM: I73.9  ICD-9-CM: 443.9 6/19/2019        Atherosclerosis of native arteries of extremity with intermittent claudication ([facility] ICD-10-CM: Y37.391  ICD-9-CM: 440.21  6/19/2019        Ischemia ICD-10-CM: I99.8  ICD-9-CM: 459.9  6/19/2019        Shortness of breath ICD-10-CM: R06.02  ICD-9-CM: 786.05  6/4/2019        History of right-sided carotid endarterectomy ICD-10-CM: Z98.890  ICD-9-CM: V45.89  11/8/2018        Carotid stenosis, asymptomatic, right ICD-10-CM: I65.21  ICD-9-CM: 433.10  11/6/2018    Overview Signed 12/12/2018 10:01 AM by Jagdeep Myers NP     11/7/18 (Dr. Jumana Rdz) right carotid endarterectomy with bovine pericardial patch angioplasty             Carotid stenosis, right ICD-10-CM: I65.21  ICD-9-CM: 433.10  11/6/2018        Bilateral carotid artery disease ([facility] ICD-10-CM: I77.9  ICD-9-CM: 447.9  8/29/2018        Bilateral carotid artery stenosis ICD-10-CM: I65.23  ICD-9-CM: 433.10, 433.30  11/28/2017        Pure hypercholesterolemia ICD-10-CM: E78.00  ICD-9-CM: 272.0  11/28/2017        UTI (urinary tract infection) ICD-10-CM: N39.0  ICD-9-CM: 599.0  11/10/2017        Stroke ([facility] ICD-10-CM: I63.9  ICD-9-CM: 434.91  11/10/2017    Overview Signed 11/10/2017 12:03 PM by Christopher Bedolla MD     Frontoparietal CVA, ischemic             S/P coronary artery stent placement ICD-10-CM: Z95.5  ICD-9-CM: V45.82  2/6/2017        CKD stage 5 secondary to hypertension ([facility] ICD-10-CM: I12.0, N18.5  ICD-9-CM: 403.91, 585.5  2/6/2017        Major depressive disorder with single episode, in remission [facility]) ICD-10-CM: F32.5  ICD-9-CM: 296.25  2/6/2017        Hydronephrosis ICD-10-CM: N13.30  ICD-9-CM: 243  2/12/2016        History of CVA (cerebrovascular accident) ICD-10-CM: N52.57  ICD-9-CM: V12.54  2/11/2016        Anemia of renal disease ICD-10-CM: N18.9, D63.1  ICD-9-CM: 285.21  1/7/2016        Benign essential HTN ICD-10-CM: I10  ICD-9-CM: 401.1  1/7/2016        Coronary artery disease involving native coronary artery with angina pectoris ([facility] [address] ICD-10-CM: I25.119  ICD-9-CM: 414.01, 413.9  1/7/2016        CVA (cerebral vascular accident) [facility]) ICD-10-CM: I63.9  ICD-9-CM: 434.91  4/28/2012        Hypertensive emergency ICD-10-CM: I16.1  ICD-9-CM: 401.9  4/26/2012        Carotid artery bruit ICD-10-CM: R09.89  ICD-9-CM: 785.9  4/26/2012    Overview Signed 4/26/2012  8:28 AM by Addi Noel     bilateral             CKD (chronic kidney disease) stage 3, GFR 30-59 ml/min ([facility]) ICD-10-CM: N18.3  ICD-9-CM: 585.3  4/26/2012        Tobacco use disorder ICD-10-CM: F17.200  ICD-9-CM: 305.1  4/26/2012        GERD (gastroesophageal reflux disease) ICD-10-CM: K21.9  ICD-9-CM: 530.81  4/26/2012        Chest pain, unspecified ICD-10-CM: R07.9  ICD-9-CM: 786.50  4/26/2012        Anemia ICD-10-CM: D64.9  ICD-9-CM: 285.9  4/26/2012               Needs:  Good local wound care  Edema management  Nutrition optimization  Good Diabetic control    Plan:     No orders of the defined types were placed in this encounter. Patient understood and agrees with plan. Questions answered. Follow-up Information    None            Any procedures done today in the [address] [facility] are documented in a separate note in [facility] and made part of this record by reference.      Dictated using voice recognition software; proofread, but unrecognized errors may exist.    Signed By: Angie Ruiz MD     September 3, 2019

## 2019-09-06 LAB
BACTERIA SPEC CULT: ABNORMAL
GRAM STN SPEC: ABNORMAL
GRAM STN SPEC: ABNORMAL
SERVICE CMNT-IMP: ABNORMAL

## 2019-09-18 LAB
Lab: NORMAL
REFERENCE LAB,REFLB: NORMAL
TEST DESCRIPTION:,ATST: NORMAL

## 2019-09-19 PROBLEM — Z01.810 PREOP CARDIOVASCULAR EXAM: Status: RESOLVED | Noted: 2019-07-29 | Resolved: 2019-09-19

## 2019-09-20 LAB
BACTERIA SPEC CULT: ABNORMAL
BACTERIA SPEC CULT: ABNORMAL
SERVICE CMNT-IMP: ABNORMAL

## 2020-10-11 ENCOUNTER — HOSPITAL ENCOUNTER (EMERGENCY)
Age: 70
Discharge: HOME | End: 2020-10-12
Attending: EMERGENCY MEDICINE
Payer: MEDICARE

## 2020-10-11 ENCOUNTER — APPOINTMENT (OUTPATIENT)
Dept: GENERAL RADIOLOGY | Age: 70
End: 2020-10-11
Attending: EMERGENCY MEDICINE
Payer: MEDICARE

## 2020-10-11 DIAGNOSIS — R06.02 SOB (SHORTNESS OF BREATH): Primary | ICD-10-CM

## 2020-10-11 DIAGNOSIS — R06.2 WHEEZING: ICD-10-CM

## 2020-10-11 DIAGNOSIS — J20.9 ACUTE BRONCHITIS, UNSPECIFIED ORGANISM: ICD-10-CM

## 2020-10-11 LAB
ALBUMIN SERPL-MCNC: 2.6 G/DL (ref 3.2–4.6)
ALBUMIN/GLOB SERPL: 0.5 {RATIO} (ref 1.2–3.5)
ALP SERPL-CCNC: 63 U/L (ref 50–136)
ALT SERPL-CCNC: 15 U/L (ref 12–65)
ANION GAP SERPL CALC-SCNC: 8 MMOL/L (ref 7–16)
AST SERPL-CCNC: 34 U/L (ref 15–37)
BASOPHILS # BLD: 0 K/UL (ref 0–0.2)
BASOPHILS NFR BLD: 0 % (ref 0–2)
BILIRUB SERPL-MCNC: 0.4 MG/DL (ref 0.2–1.1)
BNP SERPL-MCNC: 5637 PG/ML (ref 5–125)
BUN SERPL-MCNC: 33 MG/DL (ref 8–23)
CALCIUM SERPL-MCNC: 8 MG/DL (ref 8.3–10.4)
CHLORIDE SERPL-SCNC: 98 MMOL/L (ref 98–107)
CO2 SERPL-SCNC: 30 MMOL/L (ref 21–32)
CREAT SERPL-MCNC: 11.9 MG/DL (ref 0.6–1)
DIFFERENTIAL METHOD BLD: ABNORMAL
EOSINOPHIL # BLD: 0.2 K/UL (ref 0–0.8)
EOSINOPHIL NFR BLD: 3 % (ref 0.5–7.8)
ERYTHROCYTE [DISTWIDTH] IN BLOOD BY AUTOMATED COUNT: 14.2 % (ref 11.9–14.6)
GLOBULIN SER CALC-MCNC: 5.7 G/DL (ref 2.3–3.5)
GLUCOSE SERPL-MCNC: 93 MG/DL (ref 65–100)
HCT VFR BLD AUTO: 34.2 % (ref 35.8–46.3)
HGB BLD-MCNC: 11.6 G/DL (ref 11.7–15.4)
IMM GRANULOCYTES # BLD AUTO: 0 K/UL (ref 0–0.5)
IMM GRANULOCYTES NFR BLD AUTO: 1 % (ref 0–5)
LYMPHOCYTES # BLD: 2.9 K/UL (ref 0.5–4.6)
LYMPHOCYTES NFR BLD: 38 % (ref 13–44)
MCH RBC QN AUTO: 32 PG (ref 26.1–32.9)
MCHC RBC AUTO-ENTMCNC: 33.9 G/DL (ref 31.4–35)
MCV RBC AUTO: 94.2 FL (ref 79.6–97.8)
MONOCYTES # BLD: 0.5 K/UL (ref 0.1–1.3)
MONOCYTES NFR BLD: 7 % (ref 4–12)
NEUTS SEG # BLD: 4 K/UL (ref 1.7–8.2)
NEUTS SEG NFR BLD: 52 % (ref 43–78)
NRBC # BLD: 0 K/UL (ref 0–0.2)
PLATELET # BLD AUTO: 178 K/UL (ref 150–450)
PMV BLD AUTO: 12.7 FL (ref 9.4–12.3)
POTASSIUM SERPL-SCNC: 4.4 MMOL/L (ref 3.5–5.1)
PROT SERPL-MCNC: 8.3 G/DL (ref 6.3–8.2)
RBC # BLD AUTO: 3.63 M/UL (ref 4.05–5.2)
SODIUM SERPL-SCNC: 136 MMOL/L (ref 136–145)
TROPONIN-HIGH SENSITIVITY: 135.4 PG/ML (ref 0–14)
TROPONIN-HIGH SENSITIVITY: 144.7 PG/ML (ref 0–14)
WBC # BLD AUTO: 7.6 K/UL (ref 4.3–11.1)

## 2020-10-11 PROCEDURE — 71045 X-RAY EXAM CHEST 1 VIEW: CPT

## 2020-10-11 PROCEDURE — 83880 ASSAY OF NATRIURETIC PEPTIDE: CPT

## 2020-10-11 PROCEDURE — 84484 ASSAY OF TROPONIN QUANT: CPT

## 2020-10-11 PROCEDURE — 85025 COMPLETE CBC W/AUTO DIFF WBC: CPT

## 2020-10-11 PROCEDURE — 80053 COMPREHEN METABOLIC PANEL: CPT

## 2020-10-11 PROCEDURE — 94760 N-INVAS EAR/PLS OXIMETRY 1: CPT

## 2020-10-11 PROCEDURE — 93005 ELECTROCARDIOGRAM TRACING: CPT | Performed by: EMERGENCY MEDICINE

## 2020-10-11 PROCEDURE — 74011000250 HC RX REV CODE- 250: Performed by: EMERGENCY MEDICINE

## 2020-10-11 PROCEDURE — 94640 AIRWAY INHALATION TREATMENT: CPT

## 2020-10-11 PROCEDURE — 94664 DEMO&/EVAL PT USE INHALER: CPT

## 2020-10-11 PROCEDURE — 77030013140 HC MSK NEB VYRM -A

## 2020-10-11 PROCEDURE — 99285 EMERGENCY DEPT VISIT HI MDM: CPT

## 2020-10-11 RX ORDER — IPRATROPIUM BROMIDE AND ALBUTEROL SULFATE 2.5; .5 MG/3ML; MG/3ML
3 SOLUTION RESPIRATORY (INHALATION)
Status: COMPLETED | OUTPATIENT
Start: 2020-10-11 | End: 2020-10-11

## 2020-10-11 RX ADMIN — IPRATROPIUM BROMIDE AND ALBUTEROL SULFATE 3 ML: .5; 3 SOLUTION RESPIRATORY (INHALATION) at 22:01

## 2020-10-12 VITALS
OXYGEN SATURATION: 98 % | WEIGHT: 115 LBS | RESPIRATION RATE: 21 BRPM | BODY MASS INDEX: 21.71 KG/M2 | SYSTOLIC BLOOD PRESSURE: 113 MMHG | HEIGHT: 61 IN | TEMPERATURE: 98.1 F | HEART RATE: 91 BPM | DIASTOLIC BLOOD PRESSURE: 64 MMHG

## 2020-10-12 LAB
ATRIAL RATE: 86 BPM
CALCULATED P AXIS, ECG09: 65 DEGREES
CALCULATED R AXIS, ECG10: 62 DEGREES
CALCULATED T AXIS, ECG11: -160 DEGREES
DIAGNOSIS, 93000: NORMAL
P-R INTERVAL, ECG05: 162 MS
Q-T INTERVAL, ECG07: 336 MS
QRS DURATION, ECG06: 74 MS
QTC CALCULATION (BEZET), ECG08: 402 MS
VENTRICULAR RATE, ECG03: 86 BPM

## 2020-10-12 PROCEDURE — 94664 DEMO&/EVAL PT USE INHALER: CPT

## 2020-10-12 PROCEDURE — 94640 AIRWAY INHALATION TREATMENT: CPT

## 2020-10-12 PROCEDURE — 74011000250 HC RX REV CODE- 250: Performed by: EMERGENCY MEDICINE

## 2020-10-12 PROCEDURE — 94760 N-INVAS EAR/PLS OXIMETRY 1: CPT

## 2020-10-12 RX ORDER — ALBUTEROL SULFATE 90 UG/1
2 AEROSOL, METERED RESPIRATORY (INHALATION)
Qty: 1 INHALER | Refills: 0 | Status: SHIPPED | OUTPATIENT
Start: 2020-10-12

## 2020-10-12 RX ORDER — IPRATROPIUM BROMIDE AND ALBUTEROL SULFATE 2.5; .5 MG/3ML; MG/3ML
3 SOLUTION RESPIRATORY (INHALATION)
Status: COMPLETED | OUTPATIENT
Start: 2020-10-12 | End: 2020-10-12

## 2020-10-12 RX ADMIN — IPRATROPIUM BROMIDE AND ALBUTEROL SULFATE 3 ML: .5; 3 SOLUTION RESPIRATORY (INHALATION) at 00:12

## 2020-10-12 NOTE — DISCHARGE INSTRUCTIONS
Use inhlaer 2 puffs every 6 hours  1,200mg mucinex DM every 12 hours for a week. Stop smoking  Drink plenty of fluids    Patient Education        Bronchitis: Care Instructions  Your Care Instructions     Bronchitis is inflammation of the bronchial tubes, which carry air to the lungs. The tubes swell and produce mucus, or phlegm. The mucus and inflamed bronchial tubes make you cough. You may have trouble breathing. Most cases of bronchitis are caused by viruses like those that cause colds. Antibiotics usually do not help and they may be harmful. Bronchitis usually develops rapidly and lasts about 2 to 3 weeks in otherwise healthy people. Follow-up care is a key part of your treatment and safety. Be sure to make and go to all appointments, and call your doctor if you are having problems. It's also a good idea to know your test results and keep a list of the medicines you take. How can you care for yourself at home? · Take all medicines exactly as prescribed. Call your doctor if you think you are having a problem with your medicine. · Get some extra rest.  · Take an over-the-counter pain medicine, such as acetaminophen (Tylenol), ibuprofen (Advil, Motrin), or naproxen (Aleve) to reduce fever and relieve body aches. Read and follow all instructions on the label. · Do not take two or more pain medicines at the same time unless the doctor told you to. Many pain medicines have acetaminophen, which is Tylenol. Too much acetaminophen (Tylenol) can be harmful. · Take an over-the-counter cough medicine that contains dextromethorphan to help quiet a dry, hacking cough so that you can sleep. Avoid cough medicines that have more than one active ingredient. Read and follow all instructions on the label. · Breathe moist air from a humidifier, hot shower, or sink filled with hot water. The heat and moisture will thin mucus so you can cough it out. · Do not smoke. Smoking can make bronchitis worse.  If you need help quitting, talk to your doctor about stop-smoking programs and medicines. These can increase your chances of quitting for good. When should you call for help? Call 911 anytime you think you may need emergency care. For example, call if:    · You have severe trouble breathing. Call your doctor now or seek immediate medical care if:    · You have new or worse trouble breathing.     · You cough up dark brown or bloody mucus (sputum).     · You have a new or higher fever.     · You have a new rash. Watch closely for changes in your health, and be sure to contact your doctor if:    · You cough more deeply or more often, especially if you notice more mucus or a change in the color of your mucus.     · You are not getting better as expected. Where can you learn more? Go to http://elfego-court.info/  Enter H333 in the search box to learn more about \"Bronchitis: Care Instructions. \"  Current as of: February 24, 2020               Content Version: 12.6  © 2006-2020 [employer]. Care instructions adapted under license by [employer] (which disclaims liability or warranty for this information). If you have questions about a medical condition or this instruction, always ask your healthcare professional. [place] [address] any warranty or liability for your use of this information.

## 2020-10-12 NOTE — ED TRIAGE NOTES
Pt arrived via [facility] from home c/o shob that started this afternoon. Pt is on PD dialysis and did dialyze pta of EMS. Pt reports \"heavy breathing\" when she is sitting and moving. Reports that the shob comes and goes. Reports nonproductive cough.  Denies cp, fever, meds pta

## 2020-10-12 NOTE — ED NOTES
I have reviewed discharge instructions with the patient. The patient verbalized understanding. Patient left ED via Discharge Method: wheelchair to Home with son  Opportunity for questions and clarification provided. Patient given 1 scripts. To continue your aftercare when you leave the hospital, you may receive an automated call from our care team to check in on how you are doing. This is a free service and part of our promise to provide the best care and service to meet your aftercare needs.  If you have questions, or wish to unsubscribe from this service please call [phone]. Thank you for Choosing our [facility] Emergency Department.

## 2020-10-12 NOTE — ED PROVIDER NOTES
64-year-old female on peritoneal dialysis at home presents with shortness of breath. Symptom onset 5 or 6 hours ago worse with exertion patient is a smoker and has some wheezing, but has no formal diagnosis of asthma or COPD. No nebulizers or inhalers at home. She reports a dry cough but no fevers or chills, no myalgias. She did run of peritoneal dialysis just prior to arrival, has no belly pain. Past Medical History:   Diagnosis Date    Anemia     denies hx of blood transfusion    Arrhythmia     murmur; sinus tach    Aspirin long-term use SINAN    continue    CAD (coronary artery disease) 2012    MI, 1 stent 2012-    Chronic kidney disease     peritoneal dialysis qPM    Chronic obstructive pulmonary disease (HCC)     no inhalers; no treatment; smokes 1 ppd since age 19    CKD (chronic kidney disease) stage 3, GFR 30-59 ml/min ([facility])           ESRD on [facility] ([facility] [address]     Flat affect     GERD (gastroesophageal reflux disease)     resolved per patient; no current meds 7/30/19    Heart murmur not followed per cardiology    EF 65-70 %-- on echo 11/2017    History of MI (myocardial infarction) 2012    stents x 1---- per pt-- does not see a cardiologist    Hx of colonic polyps 2016    adenoma    Hydronephrosis     Hypertension     daily meds    Left-sided weakness 2012    left leg r/t cva    Loss of appetite     states within the last several months    Peritoneal dialysis catheter in place [facility])     patient does dialysis QPM    Poor historian     Renal atrophy, right     Renal insufficiency     ? --- pt unsure if sees nephrologist    Smoker     46 yr hx/ 1 pk per day    Smoker     1 ppd sicne age 19    Stented coronary artery Xience SINAN    proximal LAD 2012-- pt states does not see a cardio    Stroke ([facility] [address] 2012    with slight left  sided weakness residual       Past Surgical History:   Procedure Laterality Date    COLONOSCOPY N/A 9/26/2016    Pedro--one desc TA, one rectal hyperplastic--5 year recall    HX CAROTID ENDARTERECTOMY Right 11/07/2018    HX UROLOGICAL      stent to left kidney--multi times    IR INSERT TUNL CVC W/O PORT OVER 5 YR  8/7/2019    TX LEFT HEART CATH,PERCUTANEOUS  4/2012    Xience LAD    VASCULAR SURGERY PROCEDURE UNLIST  02/28/2019    PD catheter placement    VASCULAR SURGERY PROCEDURE UNLIST  07/01/2019    ULTRASOUND GUIDED ACCESS BILATERAL LOWER EXTREMITY ARTERIOGRAM (Left Groin)    VASCULAR SURGERY PROCEDURE UNLIST  08/06/2019    Left common femoral and profunda endarterectomy with CorMatrix patch angioplasty. ,Left femoral to above-knee popliteal artery with reverse saphenous vein graft         Family History:   Problem Relation Age of Onset    Hypertension Mother     Heart Disease Mother     Stroke Father     No Known Problems Sister     No Known Problems Brother     No Known Problems Sister     No Known Problems Sister     No Known Problems Sister     No Known Problems Brother     No Known Problems Brother        Social History     Socioeconomic History    Marital status:      Spouse name: Not on file    Number of children: Not on file    Years of education: Not on file    Highest education level: Not on file   Occupational History    Not on file   Social Needs    Financial resource strain: Not on file    Food insecurity     Worry: Not on file     Inability: Not on file    Transportation needs     Medical: Not on file     Non-medical: Not on file   Tobacco Use    Smoking status: Current Every Day Smoker     Packs/day: 1.00     Years: 51.00     Pack years: 51.00    Smokeless tobacco: Never Used   Substance and Sexual Activity    Alcohol use: No    Drug use: No    Sexual activity: Not on file   Lifestyle    Physical activity     Days per week: Not on file     Minutes per session: Not on file    Stress: Not on file   Relationships    Social connections     Talks on phone: Not on file     Gets together: Not on file Attends Bahai service: Not on file     Active member of club or organization: Not on file     Attends meetings of clubs or organizations: Not on file     Relationship status: Not on file    Intimate partner violence     Fear of current or ex partner: Not on file     Emotionally abused: Not on file     Physically abused: Not on file     Forced sexual activity: Not on file   Other Topics Concern    Not on file   Social History Narrative    Not on file         ALLERGIES: Codeine and Hydralazine    Review of Systems   Constitutional: Positive for fatigue. Negative for chills and fever. HENT: Negative for rhinorrhea and sore throat. Eyes: Negative for discharge and redness. Respiratory: Positive for cough, shortness of breath and wheezing. Cardiovascular: Negative for chest pain and palpitations. Gastrointestinal: Negative for abdominal pain, diarrhea, nausea and vomiting. Musculoskeletal: Negative for arthralgias and back pain. Skin: Negative for rash. Neurological: Negative for dizziness and headaches. All other systems reviewed and are negative. Vitals:    10/11/20 2159 10/11/20 2201 10/11/20 2228 10/11/20 2236   BP: 109/63  (!) 110/59    Pulse: 82  86 88   Resp: 22  20 15   Temp:       SpO2: 98% 97% 96% 98%   Weight:       Height:                Physical Exam  Vitals signs and nursing note reviewed. Constitutional:       General: She is not in acute distress. Appearance: Normal appearance. She is well-developed. She is not ill-appearing, toxic-appearing or diaphoretic. HENT:      Head: Normocephalic and atraumatic. Eyes:      General: No scleral icterus. Right eye: No discharge. Left eye: No discharge. Conjunctiva/sclera: Conjunctivae normal.      Pupils: Pupils are equal, round, and reactive to light. Neck:      Musculoskeletal: Normal range of motion and neck supple. Cardiovascular:      Rate and Rhythm: Normal rate and regular rhythm.       Heart sounds: Normal heart sounds. No murmur. No gallop. Pulmonary:      Effort: Pulmonary effort is normal. No respiratory distress. Breath sounds: Wheezing present. No rales. Abdominal:      General: Bowel sounds are normal.      Palpations: Abdomen is soft. Tenderness: There is no abdominal tenderness. There is no guarding. Musculoskeletal: Normal range of motion. Skin:     General: Skin is warm and dry. Neurological:      General: No focal deficit present. Mental Status: She is alert and oriented to person, place, and time. Mental status is at baseline. Motor: No abnormal muscle tone. Comments: cni 2-12 grossly   Psychiatric:         Mood and Affect: Mood normal.         Behavior: Behavior normal.          MDM  Number of Diagnoses or Management Options  Diagnosis management comments: Medical decision making note:  Dyspnea with wheezing, improved after a single DuoNeb here in the ER patient wants to go home. EKG and chest x-ray are okay, BNP and troponin elevated. Suspect troponin is probably strain/leak from volume overload. Will repeat a 2-hour troponin and if okay patient will be released with a spacer and prescription for an inhaler. This concludes the \"medical decision making note\" part of this emergency department visit note.            Procedures

## 2020-10-13 ENCOUNTER — HOSPITAL ENCOUNTER (EMERGENCY)
Age: 70
Discharge: HOME | End: 2020-10-13
Attending: EMERGENCY MEDICINE
Payer: MEDICARE

## 2020-10-13 ENCOUNTER — PATIENT OUTREACH (OUTPATIENT)
Dept: CASE MANAGEMENT | Age: 70
End: 2020-10-13

## 2020-10-13 VITALS
OXYGEN SATURATION: 98 % | RESPIRATION RATE: 16 BRPM | SYSTOLIC BLOOD PRESSURE: 99 MMHG | WEIGHT: 123 LBS | HEART RATE: 91 BPM | BODY MASS INDEX: 23.22 KG/M2 | TEMPERATURE: 99.1 F | DIASTOLIC BLOOD PRESSURE: 56 MMHG | HEIGHT: 61 IN

## 2020-10-13 DIAGNOSIS — R53.81 MALAISE AND FATIGUE: Primary | ICD-10-CM

## 2020-10-13 DIAGNOSIS — R53.83 MALAISE AND FATIGUE: Primary | ICD-10-CM

## 2020-10-13 LAB
ALBUMIN SERPL-MCNC: 2.8 G/DL (ref 3.2–4.6)
ALBUMIN/GLOB SERPL: 0.5 {RATIO} (ref 1.2–3.5)
ALP SERPL-CCNC: 56 U/L (ref 50–136)
ALT SERPL-CCNC: 15 U/L (ref 12–65)
ANION GAP SERPL CALC-SCNC: 12 MMOL/L (ref 7–16)
AST SERPL-CCNC: 28 U/L (ref 15–37)
BASOPHILS # BLD: 0 K/UL (ref 0–0.2)
BASOPHILS NFR BLD: 0 % (ref 0–2)
BILIRUB SERPL-MCNC: 0.5 MG/DL (ref 0.2–1.1)
BUN SERPL-MCNC: 32 MG/DL (ref 8–23)
CALCIUM SERPL-MCNC: 8.4 MG/DL (ref 8.3–10.4)
CHLORIDE SERPL-SCNC: 99 MMOL/L (ref 98–107)
CO2 SERPL-SCNC: 27 MMOL/L (ref 21–32)
CREAT SERPL-MCNC: 11.9 MG/DL (ref 0.6–1)
DIFFERENTIAL METHOD BLD: ABNORMAL
EOSINOPHIL # BLD: 0.2 K/UL (ref 0–0.8)
EOSINOPHIL NFR BLD: 2 % (ref 0.5–7.8)
ERYTHROCYTE [DISTWIDTH] IN BLOOD BY AUTOMATED COUNT: 14.2 % (ref 11.9–14.6)
GLOBULIN SER CALC-MCNC: 5.5 G/DL (ref 2.3–3.5)
GLUCOSE SERPL-MCNC: 98 MG/DL (ref 65–100)
HCT VFR BLD AUTO: 35.1 % (ref 35.8–46.3)
HGB BLD-MCNC: 11.5 G/DL (ref 11.7–15.4)
IMM GRANULOCYTES # BLD AUTO: 0 K/UL (ref 0–0.5)
IMM GRANULOCYTES NFR BLD AUTO: 0 % (ref 0–5)
LYMPHOCYTES # BLD: 1.9 K/UL (ref 0.5–4.6)
LYMPHOCYTES NFR BLD: 23 % (ref 13–44)
MCH RBC QN AUTO: 31.1 PG (ref 26.1–32.9)
MCHC RBC AUTO-ENTMCNC: 32.8 G/DL (ref 31.4–35)
MCV RBC AUTO: 94.9 FL (ref 79.6–97.8)
MONOCYTES # BLD: 0.6 K/UL (ref 0.1–1.3)
MONOCYTES NFR BLD: 8 % (ref 4–12)
NEUTS SEG # BLD: 5.4 K/UL (ref 1.7–8.2)
NEUTS SEG NFR BLD: 66 % (ref 43–78)
NRBC # BLD: 0 K/UL (ref 0–0.2)
PLATELET # BLD AUTO: 158 K/UL (ref 150–450)
PMV BLD AUTO: 12.8 FL (ref 9.4–12.3)
POTASSIUM SERPL-SCNC: 4.2 MMOL/L (ref 3.5–5.1)
PROT SERPL-MCNC: 8.3 G/DL (ref 6.3–8.2)
RBC # BLD AUTO: 3.7 M/UL (ref 4.05–5.2)
SODIUM SERPL-SCNC: 138 MMOL/L (ref 136–145)
TROPONIN-HIGH SENSITIVITY: 174.9 PG/ML (ref 0–14)
WBC # BLD AUTO: 8.1 K/UL (ref 4.3–11.1)

## 2020-10-13 PROCEDURE — 99284 EMERGENCY DEPT VISIT MOD MDM: CPT

## 2020-10-13 PROCEDURE — 84484 ASSAY OF TROPONIN QUANT: CPT

## 2020-10-13 PROCEDURE — 80053 COMPREHEN METABOLIC PANEL: CPT

## 2020-10-13 PROCEDURE — 85025 COMPLETE CBC W/AUTO DIFF WBC: CPT

## 2020-10-13 NOTE — PROGRESS NOTES
Transitions of Care outreach in follow up to ED visit 10/11-10/12  Presented to ED with wheezing, dry cough, shortness of breath. Patient reports today that she just doesn't feel right. Also states she has not made any urine since yesterday. Subsequent call to [facility], spoke with PD RN - Marlen Gomez  Dialysis nurse reports patient does not make a lot of urine anyway, the last clearance was about 200 ml. Patient on 5 cycles of PD. Dialysis RN to call and then report back. Discussed patient returning to the ED    Care Coordination call - Adelina Larkin requesting lab results; able to obtain those from ER. Dialysis physician is consulting on \"next steps. \"    Plan -   Follow up in 1-2 days

## 2020-10-13 NOTE — ED TRIAGE NOTES
Pt arrived via EMS with c/o n/v and weakness. Pt was seen here recently for similar sx.  /66  SpO 98 Temp 98.3

## 2020-10-13 NOTE — LETTER
[facility] EMERGENCY DEPT 
[address] [facility] [address] 
[phone] Work/School Note Date: 10/13/2020 To Whom It May concern: 
 
Lisa Jones, accompanied by son, was seen and treated today in the emergency room by the following provider(s): 
Attending Provider: Mika Sahu MD.   
 
Sincerely, 
 
 
 
 
Leighton Quesada

## 2020-10-13 NOTE — ED PROVIDER NOTES
She was seen 2 days ago in the ER for shortness of breath and fatigue. Is a dialysis patient. Had 2 troponins high-sensitivity that were fairly similar. Right around 140. Daughter was called today with concern from dialysis due to chart review noting elevated troponin. They wanted patient to come back in for reevaluation. Patient is asymptomatic here with no complaints. The history is provided by the patient. No  was used. Fatigue   This is a new problem. The current episode started 2 days ago. The problem has been resolved. There was no focality noted. Pertinent negatives include no focal weakness, no mental status change, no unresponsiveness and no disorientation. There has been no fever. Pertinent negatives include no shortness of breath, no chest pain, no vomiting, no altered mental status, no confusion, no headaches and no nausea.         Past Medical History:   Diagnosis Date    Anemia     denies hx of blood transfusion    Arrhythmia     murmur; sinus tach    Aspirin long-term use SINAN    continue    CAD (coronary artery disease) 2012    MI, 1 stent 2012-    Chronic kidney disease     peritoneal dialysis qPM    Chronic obstructive pulmonary disease (HCC)     no inhalers; no treatment; smokes 1 ppd since age 19    CKD (chronic kidney disease) stage 3, GFR 30-59 ml/min ([facility])           ESRD on peritoneal dialysis ([facility] [address]     Flat affect     GERD (gastroesophageal reflux disease)     resolved per patient; no current meds 7/30/19    Heart murmur not followed per cardiology    EF 65-70 %-- on echo 11/2017    History of MI (myocardial infarction) 2012    stents x 1---- per pt-- does not see a cardiologist    Hx of colonic polyps 2016    adenoma    Hydronephrosis     Hypertension     daily meds    Left-sided weakness 2012    left leg r/t cva    Loss of appetite     states within the last several months    Peritoneal dialysis catheter in place [facility])     patient does dialysis QPM    Poor historian     Renal atrophy, right     Renal insufficiency     ? --- pt unsure if sees nephrologist    Smoker     46 yr hx/ 1 pk per day    Smoker     1 ppd sicne age 19    Stented coronary artery Xience SINAN    proximal LAD 2012-- pt states does not see a cardio    Stroke ([address] 2012    with slight left  sided weakness residual       Past Surgical History:   Procedure Laterality Date    COLONOSCOPY N/A 9/26/2016    Pedro--one desc TA, one rectal hyperplastic--5 year recall    HX CAROTID ENDARTERECTOMY Right 11/07/2018    HX UROLOGICAL      stent to left kidney--multi times    IR INSERT TUNL CVC W/O PORT OVER 5 YR  8/7/2019    [place] LEFT HEART CATH,PERCUTANEOUS  4/2012    Xience LAD    VASCULAR SURGERY PROCEDURE UNLIST  02/28/2019    PD catheter placement    VASCULAR SURGERY PROCEDURE UNLIST  07/01/2019    ULTRASOUND GUIDED ACCESS BILATERAL LOWER EXTREMITY ARTERIOGRAM (Left Groin)    VASCULAR SURGERY PROCEDURE UNLIST  08/06/2019    Left common femoral and profunda endarterectomy with CorMatrix patch angioplasty. ,Left femoral to above-knee popliteal artery with reverse saphenous vein graft         Family History:   Problem Relation Age of Onset    Hypertension Mother     Heart Disease Mother     Stroke Father     No Known Problems Sister     No Known Problems Brother     No Known Problems Sister     No Known Problems Sister     No Known Problems Sister     No Known Problems Brother     No Known Problems Brother        Social History     Socioeconomic History    Marital status:      Spouse name: Not on file    Number of children: Not on file    Years of education: Not on file    Highest education level: Not on file   Occupational History    Not on file   Social Needs    Financial resource strain: Not on file    Food insecurity     Worry: Not on file     Inability: Not on file    Transportation needs     Medical: Not on file     Non-medical: Not on file   Tobacco Use    Smoking status: Current Every Day Smoker     Packs/day: 1.00     Years: 51.00     Pack years: 51.00    Smokeless tobacco: Never Used   Substance and Sexual Activity    Alcohol use: No    Drug use: No    Sexual activity: Not on file   Lifestyle    Physical activity     Days per week: Not on file     Minutes per session: Not on file    Stress: Not on file   Relationships    Social connections     Talks on phone: Not on file     Gets together: Not on file     Attends Latter day service: Not on file     Active member of club or organization: Not on file     Attends meetings of clubs or organizations: Not on file     Relationship status: Not on file    Intimate partner violence     Fear of current or ex partner: Not on file     Emotionally abused: Not on file     Physically abused: Not on file     Forced sexual activity: Not on file   Other Topics Concern    Not on file   Social History Narrative    Not on file         ALLERGIES: Codeine and Hydralazine    Review of Systems   Constitutional: Positive for fatigue. Negative for chills and fever. Eyes: Negative for pain and redness. Respiratory: Negative for chest tightness, shortness of breath and wheezing. Cardiovascular: Negative for chest pain and leg swelling. Gastrointestinal: Negative for abdominal pain, diarrhea, nausea and vomiting. Musculoskeletal: Negative for back pain, gait problem, neck pain and neck stiffness. Skin: Negative for color change and rash. Neurological: Negative for focal weakness, weakness, numbness and headaches. Psychiatric/Behavioral: Negative for confusion. Vitals:    10/13/20 1804   BP: 118/71   Pulse: (!) 107   Resp: 16   Temp: 99.3 °F (37.4 °C)   SpO2: 97%   Weight: 55.8 kg (123 lb)   Height: 5' 1\" (1.549 m)            Physical Exam  Constitutional:       Appearance: She is well-developed. HENT:      Head: Normocephalic and atraumatic. Neck:      Musculoskeletal: Normal range of motion and neck supple. Cardiovascular:      Rate and Rhythm: Normal rate and regular rhythm. Pulmonary:      Effort: Pulmonary effort is normal.      Breath sounds: Normal breath sounds. Abdominal:      General: Bowel sounds are normal.      Palpations: Abdomen is soft. Tenderness: There is no abdominal tenderness. Musculoskeletal: Normal range of motion. Skin:     General: Skin is warm and dry. Neurological:      Mental Status: She is alert and oriented to person, place, and time. MDM  Number of Diagnoses or Management Options  Diagnosis management comments: Trop 174 slightly elevated in dialysis pt with no symptoms. Likely due to volume and troponin leak. Will discharge. Amount and/or Complexity of Data Reviewed  Clinical lab tests: ordered and reviewed    Patient Progress  Patient progress: stable         Procedures      Results Include:    Recent Results (from the past 24 hour(s))   CBC WITH AUTOMATED DIFF    Collection Time: 10/13/20  6:06 PM   Result Value Ref Range    WBC 8.1 4.3 - 11.1 K/uL    RBC 3.70 (L) 4.05 - 5.2 M/uL    HGB 11.5 (L) 11.7 - 15.4 g/dL    HCT 35.1 (L) 35.8 - 46.3 %    MCV 94.9 79.6 - 97.8 FL    MCH 31.1 26.1 - 32.9 PG    MCHC 32.8 31.4 - 35.0 g/dL    RDW 14.2 11.9 - 14.6 %    PLATELET 716 033 - 491 K/uL    MPV 12.8 (H) 9.4 - 12.3 FL    ABSOLUTE NRBC 0.00 0.0 - 0.2 K/uL    DF AUTOMATED      NEUTROPHILS 66 43 - 78 %    LYMPHOCYTES 23 13 - 44 %    MONOCYTES 8 4.0 - 12.0 %    EOSINOPHILS 2 0.5 - 7.8 %    BASOPHILS 0 0.0 - 2.0 %    IMMATURE GRANULOCYTES 0 0.0 - 5.0 %    ABS. NEUTROPHILS 5.4 1.7 - 8.2 K/UL    ABS. LYMPHOCYTES 1.9 0.5 - 4.6 K/UL    ABS. MONOCYTES 0.6 0.1 - 1.3 K/UL    ABS. EOSINOPHILS 0.2 0.0 - 0.8 K/UL    ABS. BASOPHILS 0.0 0.0 - 0.2 K/UL    ABS. IMM.  GRANS. 0.0 0.0 - 0.5 K/UL   METABOLIC PANEL, COMPREHENSIVE    Collection Time: 10/13/20  6:06 PM   Result Value Ref Range    Sodium 138 136 - 145 mmol/L    Potassium 4.2 3.5 - 5.1 mmol/L    Chloride 99 98 - 107 mmol/L CO2 27 21 - 32 mmol/L    Anion gap 12 7 - 16 mmol/L    Glucose 98 65 - 100 mg/dL    BUN 32 (H) 8 - 23 MG/DL    Creatinine 11.90 (H) 0.6 - 1.0 MG/DL    GFR est AA 4 (L) >60 ml/min/1.73m2    GFR est non-AA 3 (L) >60 ml/min/1.73m2    Calcium 8.4 8.3 - 10.4 MG/DL    Bilirubin, total 0.5 0.2 - 1.1 MG/DL    ALT (SGPT) 15 12 - 65 U/L    AST (SGOT) 28 15 - 37 U/L    Alk. phosphatase 56 50 - 136 U/L    Protein, total 8.3 (H) 6.3 - 8.2 g/dL    Albumin 2.8 (L) 3.2 - 4.6 g/dL    Globulin 5.5 (H) 2.3 - 3.5 g/dL    A-G Ratio 0.5 (L) 1.2 - 3.5                     Contains critical data  TROPONIN-HIGH SENSITIVITY (Final result)    Component (Lab Inquiry)   Collection Time  Result Time  TROPHS    10/13/20 18:06:00  10/13/20 20:45:24  174.9   RESULTS VERIFIED, PHON. Ricka Distad Ricka Distad High Panic            Final result

## 2020-10-14 ENCOUNTER — PATIENT OUTREACH (OUTPATIENT)
Dept: CASE MANAGEMENT | Age: 70
End: 2020-10-14

## 2020-10-14 NOTE — ED NOTES
I have reviewed discharge instructions with the patient and caregiver. The patient and caregiver verbalized understanding. Patient left ED via Discharge Method: ambulatory to Home with transport from son. The patient is ambulatory upon exit and appears in no acute distress. The patient has been provided discharge instructions and follow up information. The patient and son do not have any questions at this time. Opportunity for questions and clarification provided. Patient given 0 scripts. To continue your aftercare when you leave the hospital, you may receive an automated call from our care team to check in on how you are doing. This is a free service and part of our promise to provide the best care and service to meet your aftercare needs.  If you have questions, or wish to unsubscribe from this service please call [phone]. Thank you for Choosing our [facility] Emergency Department.

## 2020-10-14 NOTE — DISCHARGE INSTRUCTIONS

## 2020-10-20 ENCOUNTER — APPOINTMENT (OUTPATIENT)
Dept: GENERAL RADIOLOGY | Age: 70
End: 2020-10-20
Attending: EMERGENCY MEDICINE
Payer: MEDICARE

## 2020-10-20 ENCOUNTER — HOSPITAL ENCOUNTER (EMERGENCY)
Age: 70
Discharge: HOME | End: 2020-10-20
Attending: EMERGENCY MEDICINE
Payer: MEDICARE

## 2020-10-20 VITALS
DIASTOLIC BLOOD PRESSURE: 70 MMHG | HEIGHT: 61 IN | WEIGHT: 123 LBS | HEART RATE: 99 BPM | RESPIRATION RATE: 18 BRPM | OXYGEN SATURATION: 95 % | BODY MASS INDEX: 23.22 KG/M2 | TEMPERATURE: 98.5 F | SYSTOLIC BLOOD PRESSURE: 111 MMHG

## 2020-10-20 DIAGNOSIS — R19.7 DIARRHEA OF PRESUMED INFECTIOUS ORIGIN: ICD-10-CM

## 2020-10-20 DIAGNOSIS — R11.2 NON-INTRACTABLE VOMITING WITH NAUSEA, UNSPECIFIED VOMITING TYPE: Primary | ICD-10-CM

## 2020-10-20 LAB
ALBUMIN SERPL-MCNC: 3.1 G/DL (ref 3.2–4.6)
ALBUMIN/GLOB SERPL: 0.5 {RATIO} (ref 1.2–3.5)
ALP SERPL-CCNC: 59 U/L (ref 50–136)
ALT SERPL-CCNC: 18 U/L (ref 12–65)
ANION GAP SERPL CALC-SCNC: 12 MMOL/L (ref 7–16)
AST SERPL-CCNC: 23 U/L (ref 15–37)
BASOPHILS # BLD: 0.1 K/UL (ref 0–0.2)
BASOPHILS NFR BLD: 1 % (ref 0–2)
BILIRUB SERPL-MCNC: 0.5 MG/DL (ref 0.2–1.1)
BUN SERPL-MCNC: 33 MG/DL (ref 8–23)
CALCIUM SERPL-MCNC: 9.3 MG/DL (ref 8.3–10.4)
CHLORIDE SERPL-SCNC: 95 MMOL/L (ref 98–107)
CO2 SERPL-SCNC: 29 MMOL/L (ref 21–32)
CREAT SERPL-MCNC: 13.8 MG/DL (ref 0.6–1)
DIFFERENTIAL METHOD BLD: ABNORMAL
EOSINOPHIL # BLD: 0.1 K/UL (ref 0–0.8)
EOSINOPHIL NFR BLD: 1 % (ref 0.5–7.8)
ERYTHROCYTE [DISTWIDTH] IN BLOOD BY AUTOMATED COUNT: 14.3 % (ref 11.9–14.6)
GLOBULIN SER CALC-MCNC: 5.8 G/DL (ref 2.3–3.5)
GLUCOSE SERPL-MCNC: 95 MG/DL (ref 65–100)
HCT VFR BLD AUTO: 35.8 % (ref 35.8–46.3)
HGB BLD-MCNC: 12.1 G/DL (ref 11.7–15.4)
IMM GRANULOCYTES # BLD AUTO: 0.1 K/UL (ref 0–0.5)
IMM GRANULOCYTES NFR BLD AUTO: 1 % (ref 0–5)
LACTATE SERPL-SCNC: 2.5 MMOL/L (ref 0.4–2)
LYMPHOCYTES # BLD: 3.1 K/UL (ref 0.5–4.6)
LYMPHOCYTES NFR BLD: 32 % (ref 13–44)
MCH RBC QN AUTO: 31.8 PG (ref 26.1–32.9)
MCHC RBC AUTO-ENTMCNC: 33.8 G/DL (ref 31.4–35)
MCV RBC AUTO: 94 FL (ref 79.6–97.8)
MONOCYTES # BLD: 0.6 K/UL (ref 0.1–1.3)
MONOCYTES NFR BLD: 6 % (ref 4–12)
NEUTS SEG # BLD: 5.8 K/UL (ref 1.7–8.2)
NEUTS SEG NFR BLD: 60 % (ref 43–78)
NRBC # BLD: 0.02 K/UL (ref 0–0.2)
PLATELET # BLD AUTO: 176 K/UL (ref 150–450)
PMV BLD AUTO: 12.4 FL (ref 9.4–12.3)
POTASSIUM SERPL-SCNC: 3.4 MMOL/L (ref 3.5–5.1)
PROT SERPL-MCNC: 8.9 G/DL (ref 6.3–8.2)
RBC # BLD AUTO: 3.81 M/UL (ref 4.05–5.2)
SODIUM SERPL-SCNC: 136 MMOL/L (ref 136–145)
WBC # BLD AUTO: 9.8 K/UL (ref 4.3–11.1)

## 2020-10-20 PROCEDURE — 83605 ASSAY OF LACTIC ACID: CPT

## 2020-10-20 PROCEDURE — 71046 X-RAY EXAM CHEST 2 VIEWS: CPT

## 2020-10-20 PROCEDURE — 96374 THER/PROPH/DIAG INJ IV PUSH: CPT

## 2020-10-20 PROCEDURE — 93005 ELECTROCARDIOGRAM TRACING: CPT | Performed by: EMERGENCY MEDICINE

## 2020-10-20 PROCEDURE — 85025 COMPLETE CBC W/AUTO DIFF WBC: CPT

## 2020-10-20 PROCEDURE — 99284 EMERGENCY DEPT VISIT MOD MDM: CPT

## 2020-10-20 PROCEDURE — 80053 COMPREHEN METABOLIC PANEL: CPT

## 2020-10-20 PROCEDURE — 74011250636 HC RX REV CODE- 250/636: Performed by: EMERGENCY MEDICINE

## 2020-10-20 PROCEDURE — 96361 HYDRATE IV INFUSION ADD-ON: CPT

## 2020-10-20 RX ORDER — ONDANSETRON 8 MG/1
8 TABLET, ORALLY DISINTEGRATING ORAL
Qty: 12 TAB | Refills: 0 | Status: ON HOLD | OUTPATIENT
Start: 2020-10-20 | End: 2020-11-11 | Stop reason: SDUPTHER

## 2020-10-20 RX ORDER — SODIUM CHLORIDE 9 MG/ML
500 INJECTION, SOLUTION INTRAVENOUS ONCE
Status: COMPLETED | OUTPATIENT
Start: 2020-10-20 | End: 2020-10-20

## 2020-10-20 RX ORDER — ONDANSETRON 2 MG/ML
4 INJECTION INTRAMUSCULAR; INTRAVENOUS
Status: COMPLETED | OUTPATIENT
Start: 2020-10-20 | End: 2020-10-20

## 2020-10-20 RX ADMIN — SODIUM CHLORIDE 500 ML: 900 INJECTION, SOLUTION INTRAVENOUS at 17:59

## 2020-10-20 RX ADMIN — ONDANSETRON 4 MG: 2 INJECTION INTRAMUSCULAR; INTRAVENOUS at 19:47

## 2020-10-20 NOTE — ED TRIAGE NOTES
Per ems report patient to ed from home. Patient to triage in wheelchair with mask in place. Patient complains of generalized weakness, nausea, vomiting, for 1 week. Per ems patient was orthostatic negative with them. They placed 18 G iv in pts left AC, bgl was 112, hr 102, bp 104/60.

## 2020-10-21 ENCOUNTER — PATIENT OUTREACH (OUTPATIENT)
Dept: CASE MANAGEMENT | Age: 70
End: 2020-10-21

## 2020-10-21 LAB
ATRIAL RATE: 93 BPM
CALCULATED P AXIS, ECG09: 70 DEGREES
CALCULATED R AXIS, ECG10: 60 DEGREES
CALCULATED T AXIS, ECG11: -115 DEGREES
DIAGNOSIS, 93000: NORMAL
P-R INTERVAL, ECG05: 148 MS
Q-T INTERVAL, ECG07: 354 MS
QRS DURATION, ECG06: 74 MS
QTC CALCULATION (BEZET), ECG08: 440 MS
VENTRICULAR RATE, ECG03: 93 BPM

## 2020-10-21 NOTE — ED NOTES
I have reviewed discharge instructions with the patient. The patient verbalized understanding. Patient left ED via Discharge Method: ambulatory to Home . Opportunity for questions and clarification provided. Patient given 1 scripts. To continue your aftercare when you leave the hospital, you may receive an automated call from our care team to check in on how you are doing. This is a free service and part of our promise to provide the best care and service to meet your aftercare needs.  If you have questions, or wish to unsubscribe from this service please call [phone]. Thank you for Choosing our [facility] [employer] [facility] Emergency Department.

## 2020-10-21 NOTE — PROGRESS NOTES
Outreached to patient/patient's children for MARY WHITESIDE for ED visit on 10/20/2020 for vomiting and diarrhea of presumed infectious origin  Patient's children feel that she is not feeling much better today   Patient's children stated that they may take patient to [facility] ER to get a second opinion and to try to find out what is wrong with patient  Encouraged patient's children to make sure that they are following up with patient's dialysis doctor to obtain any and all recommendations   This appears to have been a non COVID related ED visit   Patient contacted regarding recent discharge and COVID-19 risk. Discussed COVID-19 related testing which was not done at this time. Test results were not done. Patient informed of results, if available? N/a     Care Transition Nurse/ Ambulatory Care Manager/ LPN Care Coordinator contacted the family by telephone to perform post discharge assessment. Verified name and  with family as identifiers. Patient has following risk factors of: chronic kidney disease. CTN/ACM/LPN reviewed discharge instructions, medical action plan and red flags related to discharge diagnosis. Reviewed and educated them on any new and changed medications related to discharge diagnosis. Advised obtaining a 90-day supply of all daily and as-needed medications. Advance Care Planning:   Does patient have an Advance Directive: patient's children are her healthcare decision makers    Education provided regarding infection prevention, and signs and symptoms of COVID-19 and when to seek medical attention with family who verbalized understanding. Discussed exposure protocols and quarantine from [address] you at higher risk for severe illness  and given an opportunity for questions and concerns. The family agrees to contact the COVID-19 hotline [phone] or PCP office for questions related to their healthcare. CTN/ACM/LPN provided contact information for future reference.     From CDC: Are you at higher risk for severe illness?  Wash your hands often.  Avoid close contact (6 feet, which is about two arm lengths) with people who are sick.  Put distance between yourself and other people if COVID-19 is spreading in your community.  Clean and disinfect frequently touched surfaces.  Avoid all cruise travel and non-essential air travel.  Call your healthcare professional if you have concerns about COVID-19 and your underlying condition or if you are sick.     For more information on steps you can take to protect yourself, see CDC's How to Protect Yourself      Patient/family/caregiver given information for Lydia Gudino and agrees to enroll no    No further outreaches scheduled due to patient is being followed closely by dialysis physician   Due to ESRD/peritoneal dialysis and co-morbidity diagnoses patient will probably have health episodes that will warrant unavoidable hospitalizations and medical interventions for the remainder of the patients lifetime

## 2020-10-21 NOTE — DISCHARGE INSTRUCTIONS
Drink plenty of fluids    Avoid fatty greasy or spicy foods    No dairy products    Metabolic Panel:   Recent Labs     10/20/20  1627      K 3.4*   CL 95*   CO2 29   AGAP 12   BUN 33*   CREA 13.80*   CA 9.3   GLU 95     Blood Counts  Recent Labs     10/20/20  1627   WBC 9.8   RBC 3.81*   HGB 12.1   HCT 35.8        Hepatic Panel  Recent Labs     10/20/20  1627   ALT 18   AP 59   TP 8.9*   ALB 3.1*   GLOB 5.8*   AGRAT 0.5*

## 2020-11-04 ENCOUNTER — APPOINTMENT (OUTPATIENT)
Dept: GENERAL RADIOLOGY | Age: 70
DRG: 391 | End: 2020-11-04
Attending: EMERGENCY MEDICINE
Payer: MEDICARE

## 2020-11-04 ENCOUNTER — APPOINTMENT (OUTPATIENT)
Dept: CT IMAGING | Age: 70
DRG: 391 | End: 2020-11-04
Attending: EMERGENCY MEDICINE
Payer: MEDICARE

## 2020-11-04 ENCOUNTER — HOSPITAL ENCOUNTER (INPATIENT)
Age: 70
LOS: 7 days | Discharge: HOME HEALTH | DRG: 391 | End: 2020-11-11
Attending: EMERGENCY MEDICINE | Admitting: INTERNAL MEDICINE
Payer: MEDICARE

## 2020-11-04 DIAGNOSIS — Z99.2 ESRD ON PERITONEAL DIALYSIS (HCC): ICD-10-CM

## 2020-11-04 DIAGNOSIS — R10.13 ABDOMINAL PAIN, EPIGASTRIC: ICD-10-CM

## 2020-11-04 DIAGNOSIS — D73.5 SPLENIC INFARCT: ICD-10-CM

## 2020-11-04 DIAGNOSIS — R11.2 NON-INTRACTABLE VOMITING WITH NAUSEA, UNSPECIFIED VOMITING TYPE: ICD-10-CM

## 2020-11-04 DIAGNOSIS — N18.6 ESRD ON PERITONEAL DIALYSIS (HCC): ICD-10-CM

## 2020-11-04 DIAGNOSIS — K52.9 COLITIS: Primary | ICD-10-CM

## 2020-11-04 PROBLEM — R19.7 NAUSEA VOMITING AND DIARRHEA: Status: ACTIVE | Noted: 2020-11-04

## 2020-11-04 LAB
ALBUMIN SERPL-MCNC: 2.5 G/DL (ref 3.2–4.6)
ALBUMIN/GLOB SERPL: 0.4 {RATIO} (ref 1.2–3.5)
ALP SERPL-CCNC: 52 U/L (ref 50–136)
ALT SERPL-CCNC: 9 U/L (ref 12–65)
AMORPH CRY URNS QL MICRO: ABNORMAL
ANION GAP SERPL CALC-SCNC: 13 MMOL/L (ref 7–16)
APPEARANCE FLD: NORMAL
APPEARANCE UR: CLEAR
APTT PPP: 26.6 SEC (ref 24.1–35.1)
AST SERPL-CCNC: 28 U/L (ref 15–37)
BACTERIA SPEC CULT: NORMAL
BACTERIA URNS QL MICRO: ABNORMAL /HPF
BASOPHILS # BLD: 0 K/UL (ref 0–0.2)
BASOPHILS NFR BLD: 0 % (ref 0–2)
BILIRUB SERPL-MCNC: 0.4 MG/DL (ref 0.2–1.1)
BILIRUB UR QL: ABNORMAL
BUN SERPL-MCNC: 30 MG/DL (ref 8–23)
CALCIUM SERPL-MCNC: 9 MG/DL (ref 8.3–10.4)
CHLORIDE SERPL-SCNC: 95 MMOL/L (ref 98–107)
CO2 SERPL-SCNC: 27 MMOL/L (ref 21–32)
COLOR FLD: COLORLESS
COLOR UR: YELLOW
CREAT SERPL-MCNC: 11.9 MG/DL (ref 0.6–1)
CRP SERPL-MCNC: 4.7 MG/DL (ref 0–0.9)
DIFFERENTIAL METHOD BLD: ABNORMAL
EOSINOPHIL # BLD: 0.1 K/UL (ref 0–0.8)
EOSINOPHIL NFR BLD: 1 % (ref 0.5–7.8)
EPI CELLS #/AREA URNS HPF: ABNORMAL /HPF
ERYTHROCYTE [DISTWIDTH] IN BLOOD BY AUTOMATED COUNT: 13.8 % (ref 11.9–14.6)
GLOBULIN SER CALC-MCNC: 6 G/DL (ref 2.3–3.5)
GLUCOSE SERPL-MCNC: 198 MG/DL (ref 65–100)
GLUCOSE UR STRIP.AUTO-MCNC: NEGATIVE MG/DL
HCT VFR BLD AUTO: 34.5 % (ref 35.8–46.3)
HGB BLD-MCNC: 11.5 G/DL (ref 11.7–15.4)
HGB UR QL STRIP: ABNORMAL
IMM GRANULOCYTES # BLD AUTO: 0.1 K/UL (ref 0–0.5)
IMM GRANULOCYTES NFR BLD AUTO: 1 % (ref 0–5)
KETONES UR QL STRIP.AUTO: ABNORMAL MG/DL
LEUKOCYTE ESTERASE UR QL STRIP.AUTO: ABNORMAL
LIPASE SERPL-CCNC: 163 U/L (ref 73–393)
LYMPHOCYTES # BLD: 2.1 K/UL (ref 0.5–4.6)
LYMPHOCYTES NFR BLD: 22 % (ref 13–44)
LYMPHOCYTES NFR BRONCH MANUAL: 78 %
MACROPHAGES NFR BRONCH MANUAL: 11 %
MCH RBC QN AUTO: 31.9 PG (ref 26.1–32.9)
MCHC RBC AUTO-ENTMCNC: 33.3 G/DL (ref 31.4–35)
MCV RBC AUTO: 95.6 FL (ref 79.6–97.8)
MONOCYTES # BLD: 0.3 K/UL (ref 0.1–1.3)
MONOCYTES NFR BLD: 3 % (ref 4–12)
NEUTROPHILS NFR BRONCH MANUAL: 11 %
NEUTS SEG # BLD: 6.8 K/UL (ref 1.7–8.2)
NEUTS SEG NFR BLD: 72 % (ref 43–78)
NITRITE UR QL STRIP.AUTO: POSITIVE
NRBC # BLD: 0 K/UL (ref 0–0.2)
NUC CELL # FLD: 146 /CU MM
OTHER OBSERVATIONS,UCOM: ABNORMAL
PH UR STRIP: 7.5 [PH] (ref 5–9)
PLATELET # BLD AUTO: 230 K/UL (ref 150–450)
PMV BLD AUTO: 12.7 FL (ref 9.4–12.3)
POTASSIUM SERPL-SCNC: 3.3 MMOL/L (ref 3.5–5.1)
PROT SERPL-MCNC: 8.5 G/DL (ref 6.3–8.2)
PROT UR STRIP-MCNC: >300 MG/DL
RBC # BLD AUTO: 3.61 M/UL (ref 4.05–5.2)
RBC # FLD: <1000 /CU MM
RBC #/AREA URNS HPF: >100 /HPF
SERVICE CMNT-IMP: NORMAL
SODIUM SERPL-SCNC: 135 MMOL/L (ref 138–145)
SP GR UR REFRACTOMETRY: 1.02 (ref 1–1.02)
SPECIMEN SOURCE FLD: NORMAL
TROPONIN-HIGH SENSITIVITY: 230.8 PG/ML (ref 0–14)
TROPONIN-HIGH SENSITIVITY: 274.4 PG/ML (ref 0–14)
UFH PPP CHRO-ACNC: >1.1 IU/ML (ref 0.3–0.7)
UROBILINOGEN UR QL STRIP.AUTO: 0.2 EU/DL (ref 0.2–1)
WBC # BLD AUTO: 9.4 K/UL (ref 4.3–11.1)
WBC URNS QL MICRO: >100 /HPF

## 2020-11-04 PROCEDURE — 85520 HEPARIN ASSAY: CPT

## 2020-11-04 PROCEDURE — 86140 C-REACTIVE PROTEIN: CPT

## 2020-11-04 PROCEDURE — 89050 BODY FLUID CELL COUNT: CPT

## 2020-11-04 PROCEDURE — 74177 CT ABD & PELVIS W/CONTRAST: CPT

## 2020-11-04 PROCEDURE — 86580 TB INTRADERMAL TEST: CPT | Performed by: INTERNAL MEDICINE

## 2020-11-04 PROCEDURE — 74011000258 HC RX REV CODE- 258: Performed by: EMERGENCY MEDICINE

## 2020-11-04 PROCEDURE — 74011000636 HC RX REV CODE- 636: Performed by: EMERGENCY MEDICINE

## 2020-11-04 PROCEDURE — 96365 THER/PROPH/DIAG IV INF INIT: CPT

## 2020-11-04 PROCEDURE — 80053 COMPREHEN METABOLIC PANEL: CPT

## 2020-11-04 PROCEDURE — 83690 ASSAY OF LIPASE: CPT

## 2020-11-04 PROCEDURE — 99285 EMERGENCY DEPT VISIT HI MDM: CPT

## 2020-11-04 PROCEDURE — 84484 ASSAY OF TROPONIN QUANT: CPT

## 2020-11-04 PROCEDURE — 96375 TX/PRO/DX INJ NEW DRUG ADDON: CPT

## 2020-11-04 PROCEDURE — 87205 SMEAR GRAM STAIN: CPT

## 2020-11-04 PROCEDURE — 93005 ELECTROCARDIOGRAM TRACING: CPT | Performed by: INTERNAL MEDICINE

## 2020-11-04 PROCEDURE — 85730 THROMBOPLASTIN TIME PARTIAL: CPT

## 2020-11-04 PROCEDURE — 36415 COLL VENOUS BLD VENIPUNCTURE: CPT

## 2020-11-04 PROCEDURE — 74011250636 HC RX REV CODE- 250/636: Performed by: INTERNAL MEDICINE

## 2020-11-04 PROCEDURE — 85025 COMPLETE CBC W/AUTO DIFF WBC: CPT

## 2020-11-04 PROCEDURE — 2709999900 HC NON-CHARGEABLE SUPPLY

## 2020-11-04 PROCEDURE — 93005 ELECTROCARDIOGRAM TRACING: CPT | Performed by: EMERGENCY MEDICINE

## 2020-11-04 PROCEDURE — 51701 INSERT BLADDER CATHETER: CPT

## 2020-11-04 PROCEDURE — 65660000000 HC RM CCU STEPDOWN

## 2020-11-04 PROCEDURE — 74011250636 HC RX REV CODE- 250/636: Performed by: EMERGENCY MEDICINE

## 2020-11-04 PROCEDURE — 74011000302 HC RX REV CODE- 302: Performed by: INTERNAL MEDICINE

## 2020-11-04 PROCEDURE — 87493 C DIFF AMPLIFIED PROBE: CPT

## 2020-11-04 PROCEDURE — 81003 URINALYSIS AUTO W/O SCOPE: CPT

## 2020-11-04 PROCEDURE — 74022 RADEX COMPL AQT ABD SERIES: CPT

## 2020-11-04 RX ORDER — POLYETHYLENE GLYCOL 3350 17 G/17G
17 POWDER, FOR SOLUTION ORAL DAILY PRN
Status: DISCONTINUED | OUTPATIENT
Start: 2020-11-04 | End: 2020-11-11 | Stop reason: HOSPADM

## 2020-11-04 RX ORDER — ACETAMINOPHEN 650 MG/1
650 SUPPOSITORY RECTAL
Status: DISCONTINUED | OUTPATIENT
Start: 2020-11-04 | End: 2020-11-11 | Stop reason: HOSPADM

## 2020-11-04 RX ORDER — ACETAMINOPHEN 325 MG/1
650 TABLET ORAL
Status: DISCONTINUED | OUTPATIENT
Start: 2020-11-04 | End: 2020-11-11 | Stop reason: HOSPADM

## 2020-11-04 RX ORDER — PROMETHAZINE HYDROCHLORIDE 25 MG/1
12.5 TABLET ORAL
Status: DISCONTINUED | OUTPATIENT
Start: 2020-11-04 | End: 2020-11-11 | Stop reason: HOSPADM

## 2020-11-04 RX ORDER — MORPHINE SULFATE 2 MG/ML
4 INJECTION, SOLUTION INTRAMUSCULAR; INTRAVENOUS ONCE
Status: COMPLETED | OUTPATIENT
Start: 2020-11-04 | End: 2020-11-04

## 2020-11-04 RX ORDER — ATORVASTATIN CALCIUM 80 MG/1
80 TABLET, FILM COATED ORAL
Status: DISCONTINUED | OUTPATIENT
Start: 2020-11-04 | End: 2020-11-11 | Stop reason: HOSPADM

## 2020-11-04 RX ORDER — ASPIRIN 81 MG/1
81 TABLET ORAL
Status: DISCONTINUED | OUTPATIENT
Start: 2020-11-04 | End: 2020-11-11 | Stop reason: HOSPADM

## 2020-11-04 RX ORDER — CIPROFLOXACIN 2 MG/ML
400 INJECTION, SOLUTION INTRAVENOUS ONCE
Status: COMPLETED | OUTPATIENT
Start: 2020-11-04 | End: 2020-11-04

## 2020-11-04 RX ORDER — METRONIDAZOLE 500 MG/100ML
500 INJECTION, SOLUTION INTRAVENOUS EVERY 8 HOURS
Status: DISCONTINUED | OUTPATIENT
Start: 2020-11-04 | End: 2020-11-11 | Stop reason: HOSPADM

## 2020-11-04 RX ORDER — HEPARIN SODIUM 5000 [USP'U]/100ML
18-36 INJECTION, SOLUTION INTRAVENOUS
Status: DISCONTINUED | OUTPATIENT
Start: 2020-11-04 | End: 2020-11-11 | Stop reason: HOSPADM

## 2020-11-04 RX ORDER — ONDANSETRON 2 MG/ML
4 INJECTION INTRAMUSCULAR; INTRAVENOUS
Status: DISCONTINUED | OUTPATIENT
Start: 2020-11-04 | End: 2020-11-11 | Stop reason: HOSPADM

## 2020-11-04 RX ORDER — AMLODIPINE BESYLATE 10 MG/1
10 TABLET ORAL
Status: DISCONTINUED | OUTPATIENT
Start: 2020-11-04 | End: 2020-11-11 | Stop reason: HOSPADM

## 2020-11-04 RX ORDER — SODIUM CHLORIDE 0.9 % (FLUSH) 0.9 %
5-40 SYRINGE (ML) INJECTION EVERY 8 HOURS
Status: DISCONTINUED | OUTPATIENT
Start: 2020-11-04 | End: 2020-11-11 | Stop reason: HOSPADM

## 2020-11-04 RX ORDER — SODIUM CHLORIDE 0.9 % (FLUSH) 0.9 %
5-40 SYRINGE (ML) INJECTION AS NEEDED
Status: DISCONTINUED | OUTPATIENT
Start: 2020-11-04 | End: 2020-11-11 | Stop reason: HOSPADM

## 2020-11-04 RX ORDER — HEPARIN SODIUM 5000 [USP'U]/ML
80 INJECTION, SOLUTION INTRAVENOUS; SUBCUTANEOUS ONCE
Status: COMPLETED | OUTPATIENT
Start: 2020-11-04 | End: 2020-11-04

## 2020-11-04 RX ORDER — METRONIDAZOLE 500 MG/100ML
500 INJECTION, SOLUTION INTRAVENOUS
Status: COMPLETED | OUTPATIENT
Start: 2020-11-04 | End: 2020-11-04

## 2020-11-04 RX ORDER — CIPROFLOXACIN 2 MG/ML
400 INJECTION, SOLUTION INTRAVENOUS EVERY 24 HOURS
Status: DISCONTINUED | OUTPATIENT
Start: 2020-11-05 | End: 2020-11-07

## 2020-11-04 RX ORDER — METOPROLOL SUCCINATE 50 MG/1
50 TABLET, EXTENDED RELEASE ORAL
Status: DISCONTINUED | OUTPATIENT
Start: 2020-11-04 | End: 2020-11-11 | Stop reason: HOSPADM

## 2020-11-04 RX ORDER — SODIUM CHLORIDE 0.9 % (FLUSH) 0.9 %
10 SYRINGE (ML) INJECTION
Status: COMPLETED | OUTPATIENT
Start: 2020-11-04 | End: 2020-11-04

## 2020-11-04 RX ORDER — ONDANSETRON 2 MG/ML
4 INJECTION INTRAMUSCULAR; INTRAVENOUS
Status: COMPLETED | OUTPATIENT
Start: 2020-11-04 | End: 2020-11-04

## 2020-11-04 RX ORDER — POLYETHYLENE GLYCOL 3350 17 G/17G
17 POWDER, FOR SOLUTION ORAL
Status: DISCONTINUED | OUTPATIENT
Start: 2020-11-04 | End: 2020-11-11 | Stop reason: HOSPADM

## 2020-11-04 RX ADMIN — SODIUM CHLORIDE 500 ML: 900 INJECTION, SOLUTION INTRAVENOUS at 11:40

## 2020-11-04 RX ADMIN — Medication 10 ML: at 16:00

## 2020-11-04 RX ADMIN — METRONIDAZOLE 500 MG: 500 INJECTION, SOLUTION INTRAVENOUS at 18:09

## 2020-11-04 RX ADMIN — HEPARIN SODIUM AND DEXTROSE 18 UNITS/KG/HR: 5000; 5 INJECTION INTRAVENOUS at 15:38

## 2020-11-04 RX ADMIN — TUBERCULIN PURIFIED PROTEIN DERIVATIVE 5 UNITS: 5 INJECTION, SOLUTION INTRADERMAL at 15:58

## 2020-11-04 RX ADMIN — CIPROFLOXACIN 400 MG: 2 INJECTION, SOLUTION INTRAVENOUS at 11:35

## 2020-11-04 RX ADMIN — MORPHINE SULFATE 4 MG: 2 INJECTION, SOLUTION INTRAMUSCULAR; INTRAVENOUS at 09:23

## 2020-11-04 RX ADMIN — SODIUM CHLORIDE 100 ML: 900 INJECTION, SOLUTION INTRAVENOUS at 09:54

## 2020-11-04 RX ADMIN — Medication 10 ML: at 21:41

## 2020-11-04 RX ADMIN — ONDANSETRON 4 MG: 2 INJECTION INTRAMUSCULAR; INTRAVENOUS at 09:23

## 2020-11-04 RX ADMIN — Medication 10 ML: at 09:54

## 2020-11-04 RX ADMIN — METRONIDAZOLE 500 MG: 500 INJECTION, SOLUTION INTRAVENOUS at 12:36

## 2020-11-04 RX ADMIN — ONDANSETRON 4 MG: 2 INJECTION INTRAMUSCULAR; INTRAVENOUS at 21:36

## 2020-11-04 RX ADMIN — HEPARIN SODIUM 4400 UNITS: 5000 INJECTION INTRAVENOUS; SUBCUTANEOUS at 15:37

## 2020-11-04 RX ADMIN — IOPAMIDOL 100 ML: 755 INJECTION, SOLUTION INTRAVENOUS at 09:54

## 2020-11-04 NOTE — ED PROVIDER NOTES
64-year-old female with history of ESRD on PD daily presents with complaint of nausea, vomiting, diarrhea, generalized abdominal discomfort over the past 3 to 4 weeks. States symptoms worsened over the past several days. Patient states that her dialysate is clear. Denies fever, chills, flank pain, constipation, chest pain, shortness of breath, dysuria, hematuria, dizziness, weakness. Patient denies any recent sick contacts. The history is provided by the patient. No  was used. Diarrhea    This is a new problem. The current episode started more than 1 week ago. The problem occurs constantly. The problem has not changed since onset. The pain is located in the generalized abdominal region. The quality of the pain is cramping. The pain is at a severity of 4/10. The pain is mild. Associated symptoms include diarrhea, nausea and vomiting. Pertinent negatives include no anorexia, no fever, no belching, no flatus, no hematochezia, no melena, no constipation, no dysuria, no hematuria, no arthralgias and no myalgias. The pain is worsened by palpation. The pain is relieved by nothing.         Past Medical History:   Diagnosis Date    Anemia     denies hx of blood transfusion    Arrhythmia     murmur; sinus tach    Aspirin long-term use SINAN    continue    CAD (coronary artery disease) 2012    MI, 1 stent 2012-    Chronic kidney disease     peritoneal dialysis qPM    Chronic obstructive pulmonary disease (HCC)     no inhalers; no treatment; smokes 1 ppd since age 19    CKD (chronic kidney disease) stage 3, GFR 30-59 ml/min           ESRD on peritoneal dialysis ([facility] [address]     Flat affect     GERD (gastroesophageal reflux disease)     resolved per patient; no current meds 7/30/19    Heart murmur not followed per cardiology    EF 65-70 %-- on echo 11/2017    History of MI (myocardial infarction) 2012    stents x 1---- per pt-- does not see a cardiologist    Hx of colonic polyps 2016    adenoma    Hydronephrosis     Hypertension     daily meds    Left-sided weakness 2012    left leg r/t cva    Loss of appetite     states within the last several months    Peritoneal dialysis catheter in place [facility])     patient does dialysis QPM    Poor historian     Renal atrophy, right     Renal insufficiency     ? --- pt unsure if sees nephrologist    Smoker     48 yr hx/ 1 pk per day    Smoker     1 ppd sicne age 19    Stented coronary artery Xience SINAN    proximal LAD 2012-- pt states does not see a cardio    Stroke ([address] 2012    with slight left  sided weakness residual       Past Surgical History:   Procedure Laterality Date    COLONOSCOPY N/A 9/26/2016    Pedro--one desc TA, one rectal hyperplastic--5 year recall    HX CAROTID ENDARTERECTOMY Right 11/07/2018    HX UROLOGICAL      stent to left kidney--multi times    IR INSERT TUNL CVC W/O PORT OVER 5 YR  8/7/2019    IN LEFT HEART CATH,PERCUTANEOUS  4/2012    Xience LAD    VASCULAR SURGERY PROCEDURE UNLIST  02/28/2019    PD catheter placement    VASCULAR SURGERY PROCEDURE UNLIST  07/01/2019    ULTRASOUND GUIDED ACCESS BILATERAL LOWER EXTREMITY ARTERIOGRAM (Left Groin)    VASCULAR SURGERY PROCEDURE UNLIST  08/06/2019    Left common femoral and profunda endarterectomy with CorMatrix patch angioplasty. ,Left femoral to above-knee popliteal artery with reverse saphenous vein graft         Family History:   Problem Relation Age of Onset    Hypertension Mother     Heart Disease Mother     Stroke Father     No Known Problems Sister     No Known Problems Brother     No Known Problems Sister     No Known Problems Sister     No Known Problems Sister     No Known Problems Brother     No Known Problems Brother        Social History     Socioeconomic History    Marital status:      Spouse name: Not on file    Number of children: Not on file    Years of education: Not on file    Highest education level: Not on file   Occupational History    Not on file   Social Needs    Financial resource strain: Not on file    Food insecurity     Worry: Not on file     Inability: Not on file    Transportation needs     Medical: Not on file     Non-medical: Not on file   Tobacco Use    Smoking status: Current Every Day Smoker     Packs/day: 0.50     Years: 51.00     Pack years: 25.50    Smokeless tobacco: Never Used   Substance and Sexual Activity    Alcohol use: No    Drug use: No    Sexual activity: Not on file   Lifestyle    Physical activity     Days per week: Not on file     Minutes per session: Not on file    Stress: Not on file   Relationships    Social connections     Talks on phone: Not on file     Gets together: Not on file     Attends [employer] service: Not on file     Active member of club or organization: Not on file     Attends meetings of clubs or organizations: Not on file     Relationship status: Not on file    Intimate partner violence     Fear of current or ex partner: Not on file     Emotionally abused: Not on file     Physically abused: Not on file     Forced sexual activity: Not on file   Other Topics Concern    Not on file   Social History Narrative    Not on file         ALLERGIES: Codeine and Hydralazine    Review of Systems   Constitutional: Negative for chills, diaphoresis and fever. HENT: Negative for congestion, rhinorrhea and sore throat. Respiratory: Negative for cough and wheezing. Cardiovascular: Negative for palpitations. Gastrointestinal: Positive for abdominal pain, diarrhea, nausea and vomiting. Negative for anorexia, blood in stool, constipation, flatus, hematochezia and melena. Genitourinary: Negative for dysuria, flank pain and hematuria. Musculoskeletal: Negative for arthralgias, myalgias, neck pain and neck stiffness. Skin: Negative for rash. Neurological: Negative for dizziness, syncope, weakness and light-headedness.        Vitals:    11/04/20 0748   BP: (!) 116/58   Resp: 24   Weight: 55.3 kg (122 lb)   Height: 5' 1\" (1.549 m)            Physical Exam  Vitals signs and nursing note reviewed. Constitutional:       Comments: Chronically ill-appearing. HENT:      Head: Normocephalic. Mouth/Throat:      Mouth: Mucous membranes are moist.   Eyes:      Extraocular Movements: Extraocular movements intact. Pupils: Pupils are equal, round, and reactive to light. Neck:      Musculoskeletal: Normal range of motion. Cardiovascular:      Rate and Rhythm: Normal rate and regular rhythm. Pulses: Normal pulses. Heart sounds: Normal heart sounds. Pulmonary:      Effort: Pulmonary effort is normal.      Breath sounds: Normal breath sounds. Comments: CTAB. Abdominal:      General: Bowel sounds are normal.      Palpations: Abdomen is soft. Tenderness: There is no guarding or rebound. Comments: Soft. Mild epigastric TTP. No rebound or guarding. No peritoneal signs. No CVAT. PD catheter site; c/d/i. Musculoskeletal: Normal range of motion. Neurological:      General: No focal deficit present. Mental Status: She is alert and oriented to person, place, and time. Motor: No weakness. Comments: No focal deficits. Strength 5/5 throughout.            MDM  Number of Diagnoses or Management Options  Abdominal pain, epigastric: new and requires workup  Colitis: new and requires workup  ESRD on peritoneal dialysis [facility]):   Non-intractable vomiting with nausea, unspecified vomiting type: new and requires workup  Splenic infarct:   Diagnosis management comments: DDx- UTI/pyelonephritis, urinary retention, ureterolithiasis/nephrolithiasis, pancreatitis, gastritis, GERD, cancer (pancreatic, gastric, hepatic, colon, renal, bladder), peptic ulcer, perforated gastric ulcer, Boerhaave syndrome (esophageal rupture), biliary colic, acute cholecystitis, ascending cholangitis, hepatitis, Dwsq-Dalg-Ukdzqk Syndrome, hepatomegaly, hepatic or subdiaphragmatic abscess, cirrhosis, SBP, AAA rupture, aortic dissection, perforation of other intra-abdominal organs, acute appendicitis, SBO, LBO, diverticulitis, intraabdominal abscess, psoas abscess, diverticulosis, Crohn's Disease, Ulcerative Colitis, SBP, IBS, C. Difficile, constipation, gastroenteritis, mesenteric adenitis, mesenteric ischemic, ischemic bowel, hernia, testicular torsion, ACS, pleurisy, PNA, etc.  ========================================================================================  Informed by nursing the patient had significant malodorous loose stool. C. difficile order placed. WBC normal. CRP elevated. XR w/ findings noted below. [facility] recommend CT. CT w/ acute on chronic colitis as well as hypodensities in the spleen but likely present multifocal splenic infarcts splenic artery atherosclerosis. C. difficile negative. Orders placed for Cipro and Flagyl. Dr. Aniyah Milner with [facility] contacted me and gave report of acute on chronic colitis, and hypodensities in the spleen likely representing multifocal splenic infarcts. Also reported other chronic changes. Hospitalist consulted for admission. CT results now mention severe multifocal vascular atherosclerosis with 3.0 cm infrarenal AAA. There is severe narrowing of the superior mesenteric artery without definite evidence of superior mesenteric arterial occlusion. Additionally there are postoperative changes in the left inguinal region adjacent to the left common femoral artery, the proximal aspects of the profunda femoris are occluded. Will contact Vascular Surgery. DP/PT pulses 2+ and equal bilaterally. Will consult with Vascular surgery in regards to CT . Dr. Deloras Dakins states chronic findings and hx of  left common femoral and profunda endarterectomy with core matrix patch angioplasty and a left femoral to above-the-knee popliteal artery with reverse saphenous vein graft on 8/6/2019.         Amount and/or Complexity of Data Reviewed  Clinical lab tests: ordered and reviewed  Tests in the radiology section of CPT®: ordered and reviewed  Tests in the medicine section of CPT®: ordered and reviewed  Review and summarize past medical records: yes  Discuss the patient with other providers: yes  Independent visualization of images, tracings, or specimens: yes    Risk of Complications, Morbidity, and/or Mortality  Presenting problems: moderate  Diagnostic procedures: moderate  Management options: moderate  General comments: EKG with findings similar to previous. No acute ST elevation noted. Troponin is elevated. Will repeat. Pt denies CP. Patient Progress  Patient progress: stable    ED Course as of Nov 04 1153   Wed Nov 04, 2020   0912 XR acute abdomen w/ chest IMPRESSION:     1. No evidence of acute cardiopulmonary disease.     2. Lucency beneath the hemidiaphragm, most likely pneumoperitoneum. While this  may be related to the peritoneal dialysis catheter, perforated hollow viscus  cannot be radiographically excluded.     3. Nonobstructive bowel gas pattern. [DF]   3672 Radiologist recommends CT abd/pelvis w/ IV contrast given XR findings. Order placed. [DF]   1105 CT abd/pelvis IMPRESSION:     1. Moderate volume pneumoperitoneum. This is favored to be related to the  peritoneal dialysis catheter. There are no focally suspicious areas of bowel  perforation within the limits of imaging technique; however, perforated hollow  viscus cannot be definitively excluded.     2. There is submucosal fatty deposition extending from the cecum through the  level of the descending colon. There is subtle pericolonic stranding. Findings  favor an acute on chronic colitis.     3. Multifocal splenic hypodensities or a small surrounding perihepatic free  fluid.  Absent a history of trauma and in the setting of severe atherosclerosis  of the splenic artery, multifocal splenic infarctions are favored.     4. Concentric bladder wall thickening and mucosal enhancement, likely related to  the decompressed state of the bladder and the ureteral stent. However,  correlation for superimposed infection is recommended.     5. There is severe multifocal visceral atherosclerosis. 3.0 cm infrarenal  abdominal aortic aneurysm. There is severe narrowing of the superior mesenteric  artery, without definite evidence of superior mesenteric arterial occlusion.     6. There are postoperative changes in the left inguinal region adjacent to the  left common femoral artery. The proximal aspects of the profunda femoris are  occluded (axial image 82). The imaged portions of the superficial femoral artery  patent. Correlation for distal pulses recommended. [DF]      ED Course User Index  [DF] Miranda Sanford MD       EKG    Date/Time: 11/4/2020 8:16 AM  Performed by: Miranda Sanford MD  Authorized by:  Miranda Sanford MD     ECG reviewed by ED Physician in the absence of a cardiologist: yes    Previous ECG:     Previous ECG:  Compared to current    Similarity:  No change  Rate:     ECG rate:  76    ECG rate assessment: normal    Rhythm:     Rhythm: sinus rhythm    Ectopy:     Ectopy: none    QRS:     QRS axis:  Normal    QRS intervals:  Normal  Conduction:     Conduction: normal    ST segments:     ST segments:  Normal  T waves:     T waves: normal          Results Include:    Recent Results (from the past 24 hour(s))   CBC WITH AUTOMATED DIFF    Collection Time: 11/04/20  7:55 AM   Result Value Ref Range    WBC 9.4 4.3 - 11.1 K/uL    RBC 3.61 (L) 4.05 - 5.2 M/uL    HGB 11.5 (L) 11.7 - 15.4 g/dL    HCT 34.5 (L) 35.8 - 46.3 %    MCV 95.6 79.6 - 97.8 FL    MCH 31.9 26.1 - 32.9 PG    MCHC 33.3 31.4 - 35.0 g/dL    RDW 13.8 11.9 - 14.6 %    PLATELET 661 574 - 673 K/uL    MPV 12.7 (H) 9.4 - 12.3 FL    ABSOLUTE NRBC 0.00 0.0 - 0.2 K/uL    DF AUTOMATED      NEUTROPHILS 72 43 - 78 %    LYMPHOCYTES 22 13 - 44 %    MONOCYTES 3 (L) 4.0 - 12.0 %    EOSINOPHILS 1 0.5 - 7.8 %    BASOPHILS 0 0.0 - 2.0 %    IMMATURE GRANULOCYTES 1 0.0 - 5.0 %    ABS. NEUTROPHILS 6.8 1.7 - 8.2 K/UL    ABS. LYMPHOCYTES 2.1 0.5 - 4.6 K/UL    ABS. MONOCYTES 0.3 0.1 - 1.3 K/UL    ABS. EOSINOPHILS 0.1 0.0 - 0.8 K/UL    ABS. BASOPHILS 0.0 0.0 - 0.2 K/UL    ABS. IMM. GRANS. 0.1 0.0 - 0.5 K/UL   METABOLIC PANEL, COMPREHENSIVE    Collection Time: 11/04/20  7:55 AM   Result Value Ref Range    Sodium 135 (L) 138 - 145 mmol/L    Potassium 3.3 (L) 3.5 - 5.1 mmol/L    Chloride 95 (L) 98 - 107 mmol/L    CO2 27 21 - 32 mmol/L    Anion gap 13 7 - 16 mmol/L    Glucose 198 (H) 65 - 100 mg/dL    BUN 30 (H) 8 - 23 MG/DL    Creatinine 11.90 (H) 0.6 - 1.0 MG/DL    GFR est AA 4 (L) >60 ml/min/1.73m2    GFR est non-AA 3 (L) >60 ml/min/1.73m2    Calcium 9.0 8.3 - 10.4 MG/DL    Bilirubin, total 0.4 0.2 - 1.1 MG/DL    ALT (SGPT) 9 (L) 12 - 65 U/L    AST (SGOT) 28 15 - 37 U/L    Alk.  phosphatase 52 50 - 136 U/L    Protein, total 8.5 (H) 6.3 - 8.2 g/dL    Albumin 2.5 (L) 3.2 - 4.6 g/dL    Globulin 6.0 (H) 2.3 - 3.5 g/dL    A-G Ratio 0.4 (L) 1.2 - 3.5     LIPASE    Collection Time: 11/04/20  7:55 AM   Result Value Ref Range    Lipase 163 73 - 393 U/L   TROPONIN-HIGH SENSITIVITY    Collection Time: 11/04/20  7:55 AM   Result Value Ref Range    Troponin-High Sensitivity 274.4 (HH) 0 - 14 pg/mL   C REACTIVE PROTEIN, QT    Collection Time: 11/04/20  7:55 AM   Result Value Ref Range    C-Reactive protein 4.7 (H) 0.0 - 0.9 mg/dL   URINALYSIS W/ RFLX MICROSCOPIC    Collection Time: 11/04/20  8:25 AM   Result Value Ref Range    Color YELLOW      Appearance CLEAR      Specific gravity 1.020 1.001 - 1.023      pH (UA) 7.5 5.0 - 9.0      Protein >300 (A) NEG mg/dL    Glucose Negative NEG mg/dL    Ketone TRACE (A) NEG mg/dL    Bilirubin MODERATE (A) NEG      Blood LARGE (A) NEG      Urobilinogen 0.2 0.2 - 1.0 EU/dL    Nitrites Positive (A) NEG      Leukocyte Esterase LARGE (A) NEG      WBC >100 0 /hpf    RBC >100 0 /hpf    Epithelial cells 5-10 0 /hpf    Bacteria 4+ (H) 0 /hpf Amorphous Crystals 2+ (H) 0    Other observations RESULTS VERIFIED MANUALLY                    Jose Dhillon MD; 11/4/2020 @8:00 AM Voice dictation software was used during the making of this note. This software is not perfect and grammatical and other typographical errors may be present.   This note has not been proofread for errors.  ===================================================================

## 2020-11-04 NOTE — DIALYSIS
Peritoneal fluid specimen obtained, effluent clear and pale yellow. Peritoneal dialysis initiated as ordered. Peritoneal catheter exit site cleaned with Chlorhexidine scrub and Gentamicin cream applied to PD cath exit site. Dressing changed, site has no s/s of redness, swelling, or exudate. Patient states no complaints at this time. Report given to primary RN.

## 2020-11-04 NOTE — H&P
Hospitalist Note     Admit Date:  2020  7:47 AM   Name:  Christine Hancock   Age:  65 y.o.  :  1950   MRN:  [id number]   PCP:  Coni Black MD  Treatment Team: Attending Provider: Katie Fernandez MD; Primary Nurse: Mylene Daugherty, RN; Consulting Provider: Nixon Cohn MD    HPI/Subjective:   Ms. Angella Vieyra is a 64 y/o AAF with a h/o ESRD on PD at home, COPD, GERD, PAD,  HTN, HLD, CAD who presents today with c/o a 3-4 week history of N/V, weakness and poor PO intake. She is with her daughter, Vicky Herrera, who helps with history. She has been compliant with dialysis. Afebrile, no chest pain, palpitations, SOB/SIMMS, hypoxia. She developed non-bloody diarrhea yesterday. No recent abx. She's had generalized abdominal pain as well with minimal vomiting. Labs are largely unremarkable, Cr up but PD patient. 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 Vascular surgery consulted via ER and no acute intervention required for vascular issues and ok from their perspective to anticoagulate the infarcts. She has been given antibiotics. Hospitalist consulted for admission and further management. 10 systems reviewed and negative except as noted in HPI.   Past Medical History:   Diagnosis Date    Anemia     denies hx of blood transfusion    Arrhythmia     murmur; sinus tach    Aspirin long-term use SINAN    continue    CAD (coronary artery disease)     MI, 1 stent -    Chronic kidney disease     peritoneal dialysis qPM    Chronic obstructive pulmonary disease (HCC)     no inhalers; no treatment; smokes 1 ppd since age 19    CKD (chronic kidney disease) stage 3, GFR 30-59 ml/min           ESRD on peritoneal dialysis (HCC)     Flat affect     GERD (gastroesophageal reflux disease)     resolved per patient; no current meds 19    Heart murmur not followed per cardiology    EF 65-70 %-- on echo 2017    History of MI (myocardial infarction) 2012    stents x 1---- per pt-- does not see a cardiologist    Hx of colonic polyps 2016    adenoma    Hydronephrosis     Hypertension     daily meds    Left-sided weakness 2012    left leg r/t cva    Loss of appetite     states within the last several months    Peritoneal dialysis catheter in place [facility])     patient does dialysis QPM    Poor historian     Renal atrophy, right     Renal insufficiency     ? --- pt unsure if sees nephrologist    Smoker     48 yr hx/ 1 pk per day    Smoker     1 ppd sicne age 19    Stented coronary artery Xience SINAN    proximal LAD 2012-- pt states does not see a cardio    Stroke ([address] 2012    with slight left  sided weakness residual      Past Surgical History:   Procedure Laterality Date    COLONOSCOPY N/A 9/26/2016    Pedro--one desc TA, one rectal hyperplastic--5 year recall    HX CAROTID ENDARTERECTOMY Right 11/07/2018    HX UROLOGICAL      stent to left kidney--multi times    IR INSERT TUNL CVC W/O PORT OVER 5 YR  8/7/2019    PA LEFT HEART CATH,PERCUTANEOUS  4/2012    Xience LAD    VASCULAR SURGERY PROCEDURE UNLIST  02/28/2019    PD catheter placement    VASCULAR SURGERY PROCEDURE UNLIST  07/01/2019    ULTRASOUND GUIDED ACCESS BILATERAL LOWER EXTREMITY ARTERIOGRAM (Left Groin)    VASCULAR SURGERY PROCEDURE UNLIST  08/06/2019    Left common femoral and profunda endarterectomy with CorMatrix patch angioplasty. ,Left femoral to above-knee popliteal artery with reverse saphenous vein graft      Allergies   Allergen Reactions    Codeine Nausea and Vomiting    Hydralazine Nausea and Vomiting      Social History     Tobacco Use    Smoking status: Current Every Day Smoker     Packs/day: 0.50     Years: 51.00     Pack years: 25.50    Smokeless tobacco: Never Used   Substance Use Topics    Alcohol use: No      Family History   Problem Relation Age of Onset    Hypertension Mother     Heart Disease Mother     Stroke Father  No Known Problems Sister     No Known Problems Brother     No Known Problems Sister     No Known Problems Sister     No Known Problems Sister     No Known Problems Brother     No Known Problems Brother       Family history reviewed and noncontributory. Immunization History   Administered Date(s) Administered    TB Skin Test (PPD) Intradermal 11/10/2017, 08/06/2019     PTA Medications:  Current Outpatient Medications   Medication Instructions    albuterol (PROVENTIL HFA, VENTOLIN HFA, PROAIR HFA) 90 mcg/actuation inhaler 2 Puffs, Inhalation, EVERY 6 HOURS AS NEEDED    amLODIPine (NORVASC) 10 mg, Oral, DAILY    aspirin delayed-release 81 mg, Oral, EVERY BEDTIME    atorvastatin (LIPITOR) 80 mg, Oral, EVERY BEDTIME    lanolin-mineral oil-nacl-w.pet (LUBRIDERM) lotion Topical, 2 TIMES DAILY    metoprolol succinate (TOPROL-XL) 50 mg, Oral, DAILY    ondansetron (ZOFRAN ODT) 8 mg, Oral, EVERY 8 HOURS AS NEEDED    polyethylene glycol (MIRALAX) 17 g, Oral, DAILY AS NEEDED    potassium chloride (K-DUR, KLOR-CON) 20 mEq tablet 20 mEq, Oral, DAILY    senna-docusate (PERICOLACE) 8.6-50 mg per tablet 2 Tabs, Oral, DAILY       Objective:     Patient Vitals for the past 24 hrs:   Temp Pulse Resp BP SpO2   11/04/20 1101  78  (!) 108/58 100 %   11/04/20 1030  76  (!) 118/44 100 %   11/04/20 1018  75  139/64 100 %   11/04/20 1017  75   100 %   11/04/20 0922  78  (!) 159/72 100 %   11/04/20 0823  85   100 %   11/04/20 0811 97.4 °F (36.3 °C) 76   100 %   11/04/20 0748   24 (!) 116/58      Oxygen Therapy  O2 Sat (%): 100 % (11/04/20 1101)  Pulse via Oximetry: 78 beats per minute (11/04/20 1101)  O2 Device: Room air (11/04/20 0811)    Estimated body mass index is 23.05 kg/m² as calculated from the following:    Height as of this encounter: 5' 1\" (1.549 m). Weight as of this encounter: 55.3 kg (122 lb).   No intake or output data in the 24 hours ending 11/04/20 1236    *Note that automatically entered I/Os may not be accurate; dependent on patient compliance with collection and accurate  by assistants. Physical Exam:  General:    Thin, chronically ill appearing. Eyes:   Normal sclerae. Extraocular movements intact. HENT:  Normocephalic, atraumatic. Moist mucous membranes  CV:   RRR. No m/r/g. No edema  Lungs:  CTAB. No wheezing, rhonchi, or rales. Unlabored  Abdomen: Soft, generalized mild ttp but more so on right side, PD cath in place and insertion site appears normal.  Extremities: Warm and dry. No cyanosis or clubbing  Neurologic: CN II-XII grossly intact. Sensation intact. Skin:     No rashes. No jaundice. Normal coloration  Psych:  Normal mood and affect. I reviewed the labs, imaging, EKGs, telemetry, and other studies done this admission. Data Reviewed:   Recent Results (from the past 24 hour(s))   CBC WITH AUTOMATED DIFF    Collection Time: 11/04/20  7:55 AM   Result Value Ref Range    WBC 9.4 4.3 - 11.1 K/uL    RBC 3.61 (L) 4.05 - 5.2 M/uL    HGB 11.5 (L) 11.7 - 15.4 g/dL    HCT 34.5 (L) 35.8 - 46.3 %    MCV 95.6 79.6 - 97.8 FL    MCH 31.9 26.1 - 32.9 PG    MCHC 33.3 31.4 - 35.0 g/dL    RDW 13.8 11.9 - 14.6 %    PLATELET 688 533 - 551 K/uL    MPV 12.7 (H) 9.4 - 12.3 FL    ABSOLUTE NRBC 0.00 0.0 - 0.2 K/uL    DF AUTOMATED      NEUTROPHILS 72 43 - 78 %    LYMPHOCYTES 22 13 - 44 %    MONOCYTES 3 (L) 4.0 - 12.0 %    EOSINOPHILS 1 0.5 - 7.8 %    BASOPHILS 0 0.0 - 2.0 %    IMMATURE GRANULOCYTES 1 0.0 - 5.0 %    ABS. NEUTROPHILS 6.8 1.7 - 8.2 K/UL    ABS. LYMPHOCYTES 2.1 0.5 - 4.6 K/UL    ABS. MONOCYTES 0.3 0.1 - 1.3 K/UL    ABS. EOSINOPHILS 0.1 0.0 - 0.8 K/UL    ABS. BASOPHILS 0.0 0.0 - 0.2 K/UL    ABS. IMM.  GRANS. 0.1 0.0 - 0.5 K/UL   METABOLIC PANEL, COMPREHENSIVE    Collection Time: 11/04/20  7:55 AM   Result Value Ref Range    Sodium 135 (L) 138 - 145 mmol/L    Potassium 3.3 (L) 3.5 - 5.1 mmol/L    Chloride 95 (L) 98 - 107 mmol/L    CO2 27 21 - 32 mmol/L    Anion gap 13 7 - 16 mmol/L    Glucose 198 (H) 65 - 100 mg/dL    BUN 30 (H) 8 - 23 MG/DL    Creatinine 11.90 (H) 0.6 - 1.0 MG/DL    GFR est AA 4 (L) >60 ml/min/1.73m2    GFR est non-AA 3 (L) >60 ml/min/1.73m2    Calcium 9.0 8.3 - 10.4 MG/DL    Bilirubin, total 0.4 0.2 - 1.1 MG/DL    ALT (SGPT) 9 (L) 12 - 65 U/L    AST (SGOT) 28 15 - 37 U/L    Alk. phosphatase 52 50 - 136 U/L    Protein, total 8.5 (H) 6.3 - 8.2 g/dL    Albumin 2.5 (L) 3.2 - 4.6 g/dL    Globulin 6.0 (H) 2.3 - 3.5 g/dL    A-G Ratio 0.4 (L) 1.2 - 3.5     LIPASE    Collection Time: 11/04/20  7:55 AM   Result Value Ref Range    Lipase 163 73 - 393 U/L   TROPONIN-HIGH SENSITIVITY    Collection Time: 11/04/20  7:55 AM   Result Value Ref Range    Troponin-High Sensitivity 274.4 (HH) 0 - 14 pg/mL   C REACTIVE PROTEIN, QT    Collection Time: 11/04/20  7:55 AM   Result Value Ref Range    C-Reactive protein 4.7 (H) 0.0 - 0.9 mg/dL   URINALYSIS W/ RFLX MICROSCOPIC    Collection Time: 11/04/20  8:25 AM   Result Value Ref Range    Color YELLOW      Appearance CLEAR      Specific gravity 1.020 1.001 - 1.023      pH (UA) 7.5 5.0 - 9.0      Protein >300 (A) NEG mg/dL    Glucose Negative NEG mg/dL    Ketone TRACE (A) NEG mg/dL    Bilirubin MODERATE (A) NEG      Blood LARGE (A) NEG      Urobilinogen 0.2 0.2 - 1.0 EU/dL    Nitrites Positive (A) NEG      Leukocyte Esterase LARGE (A) NEG      WBC >100 0 /hpf    RBC >100 0 /hpf    Epithelial cells 5-10 0 /hpf    Bacteria 4+ (H) 0 /hpf    Amorphous Crystals 2+ (H) 0    Other observations RESULTS VERIFIED MANUALLY     C. DIFFICILE/EPI PCR    Collection Time: 11/04/20  8:26 AM    Specimen: Stool   Result Value Ref Range    Special Requests: NO SPECIAL REQUESTS      Culture result:        Toxigenic C. difficile NEGATIVE                         C. difficile target DNA sequences are not detected.    TROPONIN-HIGH SENSITIVITY    Collection Time: 11/04/20 11:33 AM   Result Value Ref Range    Troponin-High Sensitivity 230.8 (HH) 0 - 14 pg/mL All Micro Results     Procedure Component Value Units Date/Time    C. DIFFICILE/EPI PCR [[id number]] Collected:  11/04/20 0826    Order Status:  Completed Specimen:  Stool Updated:  11/04/20 1055     Special Requests: NO SPECIAL REQUESTS        Culture result:       Toxigenic C. difficile NEGATIVE                         C. difficile target DNA sequences are not detected. Medications Administered     ciprofloxacin (CIPRO) 400 mg in D5W IVPB (premix)     Admin Date  11/04/2020 Action  New Bag Dose  400 mg Rate  200 mL/hr Route  IntraVENous Administered By  Mariela Hilton RN          iopamidoL (ISOVUE-370) 76 % injection 100 mL     Admin Date  11/04/2020 Action  Given Dose  100 mL Route  IntraVENous Administered By  Jennifer Momin          morphine injection 4 mg     Admin Date  11/04/2020 Action  Given Dose  4 mg Route  IntraVENous Administered By  Itzel Batista          ondansetron [facility]) injection 4 mg     Admin Date  11/04/2020 Action  Given Dose  4 mg Route  IntraVENous Administered By  Itzel Batista J          saline peripheral flush soln 10 mL     Admin Date  11/04/2020 Action  Given Dose  10 mL Route  InterCATHeter Administered By  Jennifer Momin          sodium chloride 0.9 % bolus infusion 100 mL     Admin Date  11/04/2020 Action  New Bag Dose  100 mL Route  IntraVENous Administered By  [employer] Momin          sodium chloride 0.9 % bolus infusion 500 mL     Admin Date  11/04/2020 Action  New Bag Dose  500 mL Route  IntraVENous Administered By  Mariela Hilton RN                Other Studies:  No results found for this visit on 11/04/20.     Xr Abd Acute W 1 V Chest    Result Date: 11/4/2020  EXAMINATION: CHEST AND ABDOMEN RADIOGRAPH 11/4/2020 8:46 AM ACCESSION NUMBER: [id number] INDICATION: abd pain COMPARISON: Chest x-ray 6/4/2019, 5/7/2019, 10/20/2020, CT abdomen and pelvis 2/9/2016 TECHNIQUE: A PA upright view of the chest and AP upright and supine views of the abdomen were obtained. FINDINGS: The visualized portions of the heart and pericardium are unremarkable. Right acromioclavicular joint arthropathy. No evidence of focal airspace disease, pneumothorax, or pleural effusion. There is a lucency beneath the diaphragm on the upright views, likely free intraperitoneal gas. A peritoneal dialysis catheter overlies the pelvis. A left-sided ureteral stent overlies expected position of the left kidney, left ureter, and urinary bladder. Nonobstructive bowel gas pattern. Left inguinal surgical clips. Lumbar spine spondylosis. IMPRESSION: 1. No evidence of acute cardiopulmonary disease. 2. Lucency beneath the hemidiaphragm, most likely pneumoperitoneum. While this may be related to the peritoneal dialysis catheter, perforated hollow viscus cannot be radiographically excluded. 3. Nonobstructive bowel gas pattern. Discussed with Dr. Pretty Done by Dr. Yetta Lennox at 9:03 AM 11/4/2020. VOICE DICTATED BY: Dr. Radames Mayorga    Result Date: 11/4/2020  EXAMINATION: CT  ABDOMEN / PELVIS   11/4/2020 10:06 AM ACCESSION NUMBER:  [id number] INDICATION:  Abd pain COMPARISON: Same-day abdominal x-ray, CT abdomen and pelvis 2/9/2016 and 4/28/2012 TECHNIQUE: Contiguous axial computed tomographic images were obtained from the domes of the diaphragm to the symphysis pubis after the administration of intravenous contrast. Coronal reconstructions were also performed. Radiation dose reduction techniques were used for this study:  Our CT scanners use one or all of the following: Automated exposure control, adjustment of the mA and/or kVp according to patient's size, iterative reconstruction. FINDINGS: LIMITED THORAX:Multivessel coronary artery atherosclerosis. Unremarkable included portions of the pericardium. Bibasilar atelectasis. PERITONEUM/MESENTERY: There is free gas beneath the diaphragms. This corresponds to the abnormality described on the immediate prior radiographs. There are further multiple small foci of free gas in the region of the hepatic hilum and adjacent to the anterior abdominal wall extending through the level of the pelvis. There is a moderate volume of low-density free fluid in the pelvis adjacent to the peritoneal dialysis catheter, likely secondary to the dialysis catheter. No lymphadenopathy. GI TRACT: The appendix is normal. The terminal ileum is unremarkable. There is no evidence of a small bowel obstruction. There is colonic wall thickening extending from the level of the cecum through the mid descending colon. There is no pneumatosis or definite focal colonic wall defect. The stomach is unremarkable. SPLEEN: There are multiple areas of linear hypoenhancement throughout the spleen, some of which traverses the full-thickness of the splenic cortex. There is a small volume of adjacent perisplenic free fluid. ADRENALS: Normal PANCREAS: Normal LIVER: Normal GALLBLADDER: Cholelithiasis. KIDNEYS: Atrophic right kidney. Left renal cortical thinning. Left nephroureteral stent in satisfactory position. Multiple left renal calculi. BLADDER/: The urinary bladder is incompletely distended with a ureteral stent in place. There is concentric bladder wall thickening with mucosal hyperenhancement. There is a subtle hypodense peripheral enhancing focus adjacent to the urethra. This is present on prior exams and may be a surgically implanted device. VESSELS: The main portal vein and major tributary branches are patent. There is atherosclerosis of the abdominal aorta and branch vessels. This includes severe multifocal superior mesenteric artery narrowing. Infrarenal abdominal aortic aneurysm measuring up to 3 cm. There is a thrombosed penetrating ulcer of the suprarenal abdominal aorta, measuring 1.6 x 0.9 cm (axial image 28). There are postoperative changes in the left inguinal region adjacent to the left common femoral artery.  The proximal aspect of the profunda femoris artery is occluded (axial image 18). BONES/SOFT TISSUES: Lumbar spine spondylosis without suspicious lytic or blastic bony lesions. IMPRESSION: 1. Moderate volume pneumoperitoneum. This is favored to be related to the peritoneal dialysis catheter. There are no focally suspicious areas of bowel perforation within the limits of imaging technique; however, perforated hollow viscus cannot be definitively excluded. 2. There is submucosal fatty deposition extending from the cecum through the level of the descending colon. There is subtle pericolonic stranding. Findings favor an acute on chronic colitis. 3. Multifocal splenic hypodensities or a small surrounding perihepatic free fluid. Absent a history of trauma and in the setting of severe atherosclerosis of the splenic artery, multifocal splenic infarctions are favored. 4. Concentric bladder wall thickening and mucosal enhancement, likely related to the decompressed state of the bladder and the ureteral stent. However, correlation for superimposed infection is recommended. 5. There is severe multifocal visceral atherosclerosis. 3.0 cm infrarenal abdominal aortic aneurysm. There is severe narrowing of the superior mesenteric artery, without definite evidence of superior mesenteric arterial occlusion. 6. There are postoperative changes in the left inguinal region adjacent to the left common femoral artery. The proximal aspects of the profunda femoris are occluded (axial image 82). The imaged portions of the superficial femoral artery patent. Correlation for distal pulses recommended. Discussed with Dr. Nancy Cesar by Dr. Yumiko Singh at 10:52 AM 11/4/2020.  VOICE DICTATED BY: Dr. 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.5  ICD-9-CM: 289.59  11/4/2020 - Present Yes        Nausea vomiting and diarrhea ICD-10-CM: R11.2, R19.7  ICD-9-CM: 787.91, 787.01  11/4/2020 - Present Yes        ESRD on peritoneal dialysis [facility]) ICD-10-CM: N18.6, Z99.2  ICD-9-CM: 585.6, V45.11  7/29/2019 - Present Yes        PAD (peripheral artery disease) ([facility] [address] ICD-10-CM: I73.9  ICD-9-CM: 443.9  6/19/2019 - Present Yes        History of right-sided carotid endarterectomy ICD-10-CM: Z98.890  ICD-9-CM: V45.89  11/8/2018 - Present Yes        Pure hypercholesterolemia ICD-10-CM: E78.00  ICD-9-CM: 272.0  11/28/2017 - Present Yes        S/P coronary artery stent placement ICD-10-CM: Z95.5  ICD-9-CM: V45.82  2/6/2017 - Present Yes        Anemia of renal disease ICD-10-CM: N18.9, D63.1  ICD-9-CM: 285.21  1/7/2016 - Present Yes        CVA (cerebral vascular accident) [facility]) ICD-10-CM: I63.9  ICD-9-CM: 434.91  4/28/2012 - Present Yes              Plan:  # Acute colitis   - Con't IV abx. Denies any prior history of colonoscopy. Encouraged PO intake. Symptomatic mgmt otherwise. # Probable splenic infarcts   - Infarcts favored on CT based on splenic artery disease. Could consider TTE if concern for cardioembolic. Remote telemetry. Ok with vascular to anticoagulate. Start heparin drip with bolus. Probably will need transition to warfarin. # N/V/D   - Cdiff sent, PO Vanc if positive. Supportive care otherwise. # ESRD on PD   - Nephrology for PD. # HTN   - Home meds    # PAD   - ASA, statin    Discharge planning: Unclear, PT/OT/PPD/CM. DVT ppx ordered: Heparin drip. Code status:  Full code. Daughter, Bharathi Quiroz, is POA.   Estimated LOS:  Greater than 2 midnights  Risk:  high    Signed:  Mary Ross MD

## 2020-11-04 NOTE — CONSULTS
[address]   [address] [place] [address]. [address] FAX: [phone]    Vascular Surgery Consult    Subjective:      Guera Dwyer is a 65 y.o. female who presented to the ER with complaints of 3 to 4-week history of nausea, vomiting, weakness and abdominal pain. She is a peritoneal dialysis patient and dialyzes nightly. She did perform dialysis last night. She denies any fevers and chills. She does report diarrhea yesterday and today. CT scan was notable for colitis from the cecum to the descending colon. 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 She is known to our practice and has undergone a 1. Left common femoral and profunda endarterectomy with CorMatrix patch angioplasty. 2.  Left femoral to above-knee popliteal artery with reverse saphenous vein graft by Dr. Jody Darnell on 8/6/19. He remains ASA. PMH: anemia, arrhythmia, CAD, ESRD on PD, GERD, heart murmur, h/o MI, HTN, CVA, smoker.      Past Medical History:   Diagnosis Date    Anemia     denies hx of blood transfusion    Arrhythmia     murmur; sinus tach    Aspirin long-term use SINAN    continue    CAD (coronary artery disease) 2012    MI, 1 stent 2012-    Chronic kidney disease     peritoneal dialysis qPM    Chronic obstructive pulmonary disease (HCC)     no inhalers; no treatment; smokes 1 ppd since age 19    CKD (chronic kidney disease) stage 3, GFR 30-59 ml/min           ESRD on peritoneal dialysis ([address]     Flat affect     GERD (gastroesophageal reflux disease)     resolved per patient; no current meds 7/30/19    Heart murmur not followed per cardiology    EF 65-70 %-- on echo 11/2017    History of MI (myocardial infarction) 2012    stents x 1---- per pt-- does not see a cardiologist    Hx of colonic polyps 2016    adenoma    Hydronephrosis     Hypertension     daily meds    Left-sided weakness 2012    left leg r/t cva    Loss of appetite     states within the last several months    Peritoneal dialysis catheter in place [facility])     patient does dialysis QPM    Poor historian     Renal atrophy, right     Renal insufficiency     ? --- pt unsure if sees nephrologist    Smoker     46 yr hx/ 1 pk per day    Smoker     1 ppd sicne age 19    Stented coronary artery Xience SINAN    proximal LAD 2012-- pt states does not see a cardio    Stroke ([address] 2012    with slight left  sided weakness residual     Past Surgical History:   Procedure Laterality Date    COLONOSCOPY N/A 9/26/2016    Pedro--one desc TA, one rectal hyperplastic--5 year recall    HX CAROTID ENDARTERECTOMY Right 11/07/2018    HX UROLOGICAL      stent to left kidney--multi times    IR INSERT TUNL CVC W/O PORT OVER 5 YR  8/7/2019    [place] LEFT HEART CATH,PERCUTANEOUS  4/2012    Xience LAD    VASCULAR SURGERY PROCEDURE UNLIST  02/28/2019    PD catheter placement    VASCULAR SURGERY PROCEDURE UNLIST  07/01/2019    ULTRASOUND GUIDED ACCESS BILATERAL LOWER EXTREMITY ARTERIOGRAM (Left Groin)    VASCULAR SURGERY PROCEDURE UNLIST  08/06/2019    Left common femoral and profunda endarterectomy with CorMatrix patch angioplasty. ,Left femoral to above-knee popliteal artery with reverse saphenous vein graft      Family History   Problem Relation Age of Onset    Hypertension Mother     Heart Disease Mother     Stroke Father     No Known Problems Sister     No Known Problems Brother     No Known Problems Sister     No Known Problems Sister     No Known Problems Sister     No Known Problems Brother     No Known Problems Brother      Social History     Socioeconomic History    Marital status:      Spouse name: Not on file    Number of children: Not on file    Years of education: Not on file    Highest education level: Not on file   Tobacco Use    Smoking status: Current Every Day Smoker     Packs/day: 0.50     Years: 51.00     Pack years: 25.50    Smokeless tobacco: Never Used   Substance and Sexual Activity    Alcohol use: No    Drug use: No      Current Facility-Administered Medications   Medication Dose Route Frequency    amLODIPine (NORVASC) tablet 10 mg  10 mg Oral QHS    atorvastatin (LIPITOR) tablet 80 mg  80 mg Oral QHS    aspirin delayed-release tablet 81 mg  81 mg Oral QHS    metoprolol succinate (TOPROL-XL) XL tablet 50 mg  50 mg Oral QHS    polyethylene glycol (MIRALAX) packet 17 g  17 g Oral DAILY PRN    sodium chloride (NS) flush 5-40 mL  5-40 mL IntraVENous Q8H    sodium chloride (NS) flush 5-40 mL  5-40 mL IntraVENous PRN    acetaminophen (TYLENOL) tablet 650 mg  650 mg Oral Q6H PRN    Or    acetaminophen (TYLENOL) suppository 650 mg  650 mg Rectal Q6H PRN    polyethylene glycol (MIRALAX) packet 17 g  17 g Oral DAILY PRN    promethazine (PHENERGAN) tablet 12.5 mg  12.5 mg Oral Q6H PRN    Or    ondansetron (ZOFRAN) injection 4 mg  4 mg IntraVENous Q6H PRN    tuberculin injection 5 Units  5 Units IntraDERMal ONCE    heparin (porcine) injection 4,400 Units  80 Units/kg IntraVENous ONCE    heparin 25,000 units in dextrose 500 mL infusion  18-36 Units/kg/hr IntraVENous TITRATE    [START ON 11/5/2020] ciprofloxacin (CIPRO) 400 mg in D5W IVPB (premix)  400 mg IntraVENous Q24H    metroNIDAZOLE (FLAGYL) IVPB premix 500 mg  500 mg IntraVENous Q8H      Allergies   Allergen Reactions    Codeine Nausea and Vomiting    Hydralazine Nausea and Vomiting       Review of Systems:  A comprehensive review of systems was negative except for that written in the History of Present Illness.     Objective:     Patient Vitals for the past 8 hrs:   BP Temp Pulse Resp SpO2 Height Weight   11/04/20 1315 110/72 97.8 °F (36.6 °C) 78 21 99 %     11/04/20 1101 (!) 108/58  78  100 %     11/04/20 1030 (!) 118/44  76  100 %     11/04/20 1018 139/64  75  100 %     11/04/20 1017   75  100 %     11/04/20 0922 (!) 159/72  78  100 %     11/04/20 0823   85  100 %     11/04/20 0811  97.4 °F (36.3 °C) 76  100 %     20 0748 (!) 116/58   24  5' 1\" (1.549 m) 122 lb (55.3 kg)     Temp (24hrs), Av.6 °F (36.4 °C), Min:97.4 °F (36.3 °C), Max:97.8 °F (36.6 °C)      Physical Exam:  GENERAL: alert, cooperative, no distress, appears stated age, LUNG: clear to auscultation bilaterally, HEART: regular rate and rhythm, S1, S2 normal, no murmur, click, rub or gallop, EXTREMITIES: palpable femoral pulses. PD catheter in place. EXAMINATION: CT  ABDOMEN / PELVIS   2020 10:06 AM     ACCESSION NUMBER:  [id number]     INDICATION:  Abd pain     COMPARISON: Same-day abdominal x-ray, CT abdomen and pelvis 2016 and  2012     TECHNIQUE: Contiguous axial computed tomographic images were obtained from the  domes of the diaphragm to the symphysis pubis after the administration of  intravenous contrast. Coronal reconstructions were also performed.     Radiation dose reduction techniques were used for this study:  Our CT scanners  use one or all of the following: Automated exposure control, adjustment of the  mA and/or kVp according to patient's size, iterative reconstruction.     FINDINGS:     LIMITED THORAX:Multivessel coronary artery atherosclerosis. Unremarkable  included portions of the pericardium. Bibasilar atelectasis.     PERITONEUM/MESENTERY: There is free gas beneath the diaphragms. This corresponds  to the abnormality described on the immediate prior radiographs. There are  further multiple small foci of free gas in the region of the hepatic hilum and  adjacent to the anterior abdominal wall extending through the level of the  pelvis.     There is a moderate volume of low-density free fluid in the pelvis adjacent to  the peritoneal dialysis catheter, likely secondary to the dialysis catheter.     No lymphadenopathy.     GI TRACT: The appendix is normal. The terminal ileum is unremarkable. There is  no evidence of a small bowel obstruction.  There is colonic wall thickening  extending from the level of the cecum through the mid descending colon. There is  no pneumatosis or definite focal colonic wall defect. The stomach is  unremarkable.     SPLEEN: There are multiple areas of linear hypoenhancement throughout the  spleen, some of which traverses the full-thickness of the splenic cortex. There  is a small volume of adjacent perisplenic free fluid.     ADRENALS: Normal     PANCREAS: Normal     LIVER: Normal     GALLBLADDER: Cholelithiasis.     KIDNEYS: Atrophic right kidney. Left renal cortical thinning. Left  nephroureteral stent in satisfactory position. Multiple left renal calculi.     BLADDER/: The urinary bladder is incompletely distended with a ureteral stent  in place. There is concentric bladder wall thickening with mucosal  hyperenhancement.     There is a subtle hypodense peripheral enhancing focus adjacent to the urethra. This is present on prior exams and may be a surgically implanted device.     VESSELS: The main portal vein and major tributary branches are patent. There is  atherosclerosis of the abdominal aorta and branch vessels. This includes severe  multifocal superior mesenteric artery narrowing. Infrarenal abdominal aortic  aneurysm measuring up to 3 cm. There is a thrombosed penetrating ulcer of the  suprarenal abdominal aorta, measuring 1.6 x 0.9 cm (axial image 28).    There are postoperative changes in the left inguinal region adjacent to the left  common femoral artery. The proximal aspect of the profunda femoris artery is  occluded (axial image 18).     BONES/SOFT TISSUES: Lumbar spine spondylosis without suspicious lytic or blastic  bony lesions.     IMPRESSION  IMPRESSION:     1. Moderate volume pneumoperitoneum. This is favored to be related to the  peritoneal dialysis catheter. There are no focally suspicious areas of bowel  perforation within the limits of imaging technique; however, perforated hollow  viscus cannot be definitively excluded.     2.  There is submucosal fatty deposition extending from the cecum through the  level of the descending colon. There is subtle pericolonic stranding. Findings  favor an acute on chronic colitis.     3. Multifocal splenic hypodensities or a small surrounding perihepatic free  fluid. Absent a history of trauma and in the setting of severe atherosclerosis  of the splenic artery, multifocal splenic infarctions are favored.     4. Concentric bladder wall thickening and mucosal enhancement, likely related to  the decompressed state of the bladder and the ureteral stent. However,  correlation for superimposed infection is recommended.     5. There is severe multifocal visceral atherosclerosis. 3.0 cm infrarenal  abdominal aortic aneurysm. There is severe narrowing of the superior mesenteric  artery, without definite evidence of superior mesenteric arterial occlusion.     6. There are postoperative changes in the left inguinal region adjacent to the  left common femoral artery. The proximal aspects of the profunda femoris are  occluded (axial image 82). The imaged portions of the superficial femoral artery  patent. Correlation for distal pulses recommended.          Assessment/Plan:      Patient is a 68-year-old female who was admitted for colitis. On her CT she was noted to have a 3 cm abdominal aortic aneurysm and splenic infarcts. She is well-known to our practice and has undergone a left common femoral endarterectomy by Dr. Nirali Dillon last year. She is overdue for an arterial duplex and would recommend a follow-up once stable and discharged from the hospital.  In regards to her splenic infarcts, it is okay from a vascular standpoint to start heparin. She will follow-up in our office as an outpatient with an arterial duplex. Formation of plan with Dr. Verona Lowry. Signed By: Lauren Villar NP     November 4, 2020       Elements of this note have been dictated using speech recognition software.  As a result, errors of speech recognition may have occurred.

## 2020-11-04 NOTE — ED NOTES
TRANSFER - OUT REPORT:    Verbal report given to Reta Montiel (name) on [facility]  being transferred to [facility] (unit) for routine progression of care       Report consisted of patients Situation, Background, Assessment and   Recommendations(SBAR). Information from the following report(s) SBAR was reviewed with the receiving nurse. Lines:   Peripheral IV 11/04/20 Right Antecubital (Active)        Opportunity for questions and clarification was provided.       Patient transported with:

## 2020-11-04 NOTE — PROGRESS NOTES
TRANSFER - IN REPORT:    Verbal report received from [facility] ISABELLE, RN(name) on [place]  being received from ED(unit) for routine progression of care      Report consisted of patients Situation, Background, Assessment and   Recommendations(SBAR). Information from the following report(s) SBAR, Kardex, Procedure Summary, Intake/Output and MAR was reviewed with the receiving nurse. Opportunity for questions and clarification was provided. Assessment completed upon patients arrival to unit and care assumed.

## 2020-11-04 NOTE — PROGRESS NOTES
Patient with hx of arrhytmia, placed on telemetry since admission. Monitor room calls to report patient has had an episode of HR down to 42 and 2nd degree type 1 heart block, upon arrival to room to assess patient she reports an recent incident of emesis. Dr David Arthur notified. Orders received to perform EKG. Patient stable now on NSR and HR of 81 per monitor room. Resting in bed with eyes closed, respirations even and unlabored on room air. Will continue to monitor and follow up EKG results.

## 2020-11-04 NOTE — ED TRIAGE NOTES
Pt arrivves per EMS from home for complaints of weakness, nausea and vomiting x 4 weeks. Seen once for same. PT is home PD dialysis pt. Ran last night. EMS states bgl [phone].

## 2020-11-04 NOTE — CONSULTS
Nephrology consult    Admission Date:  11/4/2020    Admission Diagnosis  Colitis [K52.9]  Splenic infarct [D73.5]  Nausea vomiting and diarrhea [R11.2, R19.7]    We are asked by Dr. Rolo Mcnair    History of Present Illness: Ms. Lakisha Rodriguez is a 65 y.o female with PMH significant for COPD, GERD, PAD, HTN, HLD, CAD and ESRD on PD admitted with complaints of nausea, vomiting weakness and abdominal discomfort. CT abd with multifocal splenis infarctions, likely chronic colitis now on flagyl IV, and severe multifocal visceral atherosclerosis. 3.0 cm infrarenal abdominal aortic aneurysm. We are consulted for ESRD. From a renal standpoint she is PD dependent via cycler followed at [facility], she reports last PD last night, with clear pale yellow PD effluent. Patient seen and examined on rounds she reports abdominal tenderness, nausea, episode of vomiting this AM and loose stools no issues with PD over night. Denies SOB, CP, fever/chills, dizziness, syncope, or edema.      Past Medical History:   Diagnosis Date    Anemia     denies hx of blood transfusion    Arrhythmia     murmur; sinus tach    Aspirin long-term use SINAN    continue    CAD (coronary artery disease) 2012    MI, 1 stent 2012-    Chronic kidney disease     peritoneal dialysis qPM    Chronic obstructive pulmonary disease (HCC)     no inhalers; no treatment; smokes 1 ppd since age 19    CKD (chronic kidney disease) stage 3, GFR 30-59 ml/min           [facility]D [facility]     Flat affect     GERD (gastroesophageal reflux disease)     resolved per patient; no current meds 7/30/19    Heart murmur not followed per cardiology    EF 65-70 %-- on echo 11/2017    History of MI (myocardial infarction) 2012    stents x 1---- per pt-- does not see a cardiologist    Hx of colonic polyps 2016    adenoma    Hydronephrosis     Hypertension     daily meds    Left-sided weakness 2012    left leg r/t cva    Loss of appetite     states within the last several months    Peritoneal dialysis catheter in place [facility])     patient does dialysis QPM    Poor historian     Renal atrophy, right     Renal insufficiency     ? --- pt unsure if sees nephrologist    Smoker     46 yr hx/ 1 pk per day    Smoker     1 ppd sicne age 19    Stented coronary artery Xience SINAN    proximal LAD 2012-- pt states does not see a cardio    Stroke ([address] 2012    with slight left  sided weakness residual      Past Surgical History:   Procedure Laterality Date    COLONOSCOPY N/A 9/26/2016    Pedro--one desc TA, one rectal hyperplastic--5 year recall    HX CAROTID ENDARTERECTOMY Right 11/07/2018    HX UROLOGICAL      stent to left kidney--multi times    IR INSERT TUNL CVC W/O PORT OVER 5 YR  8/7/2019    MD LEFT HEART CATH,PERCUTANEOUS  4/2012    Xience LAD    VASCULAR SURGERY PROCEDURE UNLIST  02/28/2019    PD catheter placement    VASCULAR SURGERY PROCEDURE UNLIST  07/01/2019    ULTRASOUND GUIDED ACCESS BILATERAL LOWER EXTREMITY ARTERIOGRAM (Left Groin)    VASCULAR SURGERY PROCEDURE UNLIST  08/06/2019    Left common femoral and profunda endarterectomy with CorMatrix patch angioplasty. ,Left femoral to above-knee popliteal artery with reverse saphenous vein graft      Current Facility-Administered Medications   Medication Dose Route Frequency    amLODIPine (NORVASC) tablet 10 mg  10 mg Oral QHS    atorvastatin (LIPITOR) tablet 80 mg  80 mg Oral QHS    aspirin delayed-release tablet 81 mg  81 mg Oral QHS    metoprolol succinate (TOPROL-XL) XL tablet 50 mg  50 mg Oral QHS    polyethylene glycol (MIRALAX) packet 17 g  17 g Oral DAILY PRN    sodium chloride (NS) flush 5-40 mL  5-40 mL IntraVENous Q8H    sodium chloride (NS) flush 5-40 mL  5-40 mL IntraVENous PRN    acetaminophen (TYLENOL) tablet 650 mg  650 mg Oral Q6H PRN    Or    acetaminophen (TYLENOL) suppository 650 mg  650 mg Rectal Q6H PRN    polyethylene glycol (MIRALAX) packet 17 g  17 g Oral DAILY PRN    promethazine (PHENERGAN) tablet 12.5 mg  12.5 mg Oral Q6H PRN    Or    ondansetron (ZOFRAN) injection 4 mg  4 mg IntraVENous Q6H PRN    tuberculin injection 5 Units  5 Units IntraDERMal ONCE    heparin (porcine) injection 4,400 Units  80 Units/kg IntraVENous ONCE    heparin 25,000 units in dextrose 500 mL infusion  18-36 Units/kg/hr IntraVENous TITRATE    [START ON 11/5/2020] ciprofloxacin (CIPRO) 400 mg in D5W IVPB (premix)  400 mg IntraVENous Q24H    metroNIDAZOLE (FLAGYL) IVPB premix 500 mg  500 mg IntraVENous Q8H     Allergies   Allergen Reactions    Codeine Nausea and Vomiting    Hydralazine Nausea and Vomiting      Social History     Tobacco Use    Smoking status: Current Every Day Smoker     Packs/day: 0.50     Years: 51.00     Pack years: 25.50    Smokeless tobacco: Never Used   Substance Use Topics    Alcohol use: No      Family History   Problem Relation Age of Onset    Hypertension Mother     Heart Disease Mother     Stroke Father     No Known Problems Sister     No Known Problems Brother     No Known Problems Sister     No Known Problems Sister     No Known Problems Sister     No Known Problems Brother     No Known Problems Brother         Review of Systems  Gen - no fever, no chills, appetite poor  HEENT - no sore throat, no decreased vision, no hearing loss  CV - no chest pain, no palpitation, no orthopnea  Lung - no shortness of breath, no cough, no hemoptysis  Abd - + tenderness, + nausea/vomiting/diarrhea, no bloody stool  Ext - no edema, no clubbing, no cyanosis  Musculoskeletal - no joint pain, no back pain  Neurologic - no headaches, no dizziness, no seizures  Psychiatric - no anxiety, no depression  Skin - no rashes, no pupura  Genitourinary - no dysuria     Objective:     Vitals:    11/04/20 1017 11/04/20 1018 11/04/20 1030 11/04/20 1101   BP:  139/64 (!) 118/44 (!) 108/58   Pulse: 75 75 76 78   Resp:       Temp:       SpO2: 100% 100% 100% 100%   Weight:       Height: No intake or output data in the 24 hours ending 11/04/20 1347    Physical Exam  GEN :in no distress, alert and oriented  HEENT: anicteric sclerae, eomi. Oropharynx without lesions. Mucous membranes are moist.  Neck - supple without JVD, no thyromegaly. No lymphadenopathy. CV - regular rate and rhythm, no murmur, no rub  Lung - clear bilaterally, lungs expand symmetrically  Chest wall - normal appearance  Abd - soft, nontender, bowel sounds present, no hepatosplenomegaly  Ext - no clubbing, no cyanosis, no edema  Neurologic - nonfocal  Genitourinary - bladder nonpalpable  Skin - no rashes, no purpura, no ecchymoses  Psychiatric: Normal mood and affect. Data Review:   Recent Labs     11/04/20  0755   WBC 9.4   HGB 11.5*   HCT 34.5*        Recent Labs     11/04/20  0755   *   K 3.3*   CL 95*   CO2 27   BUN 30*   CREA 11.90*   *   CA 9.0     No results for input(s): PH, PCO2, PO2, PCO2 in the last 72 hours.     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    Nausea vomiting and diarrhea 11/04/2020    Wound dehiscence 08/29/2019    H/O extremity bypass graft 08/29/2019    Left leg swelling 08/29/2019    Ischemia of left lower extremity 08/06/2019    Atherosclerosis of native arteries of extremity with rest pain ([address] 08/01/2019    Atherosclerosis of native arteries of extremities with rest pain, left leg ([address] 07/31/2019    ESRD on peritoneal dialysis ([address] 07/29/2019    Atherosclerosis of native coronary artery of native heart without angina pectoris 07/29/2019    PAD (peripheral artery disease) ([address] 06/19/2019    Atherosclerosis of native arteries of extremity with intermittent claudication ([address] 06/19/2019    Ischemia 06/19/2019    Shortness of breath 06/04/2019    History of right-sided carotid endarterectomy 11/08/2018    Carotid stenosis, asymptomatic, right 11/06/2018    Carotid stenosis, right 11/06/2018    Bilateral carotid artery disease ([facility] 08/29/2018    Bilateral carotid artery stenosis 11/28/2017    Pure hypercholesterolemia 11/28/2017    UTI (urinary tract infection) 11/10/2017    Stroke ([facility] 11/10/2017    S/P coronary artery stent placement 02/06/2017    CKD stage 5 secondary to hypertension ([facility] 02/06/2017    Major depressive disorder with single episode, in remission ([facility] 02/06/2017    Hydronephrosis 02/12/2016    History of CVA (cerebrovascular accident) 02/11/2016    Anemia of renal disease 01/07/2016    Benign essential HTN 01/07/2016    Coronary artery disease involving native coronary artery with angina pectoris ([facility] 01/07/2016    CVA (cerebral vascular accident) ([facility] 04/28/2012    Hypertensive emergency 04/26/2012    Carotid artery bruit 04/26/2012    CKD (chronic kidney disease) stage 3, GFR 30-59 ml/min ([facility]) 04/26/2012    Tobacco use disorder 04/26/2012    GERD (gastroesophageal reflux disease) 04/26/2012    Chest pain, unspecified 04/26/2012    Anemia 04/26/2012       Impression:    Plan:   1. ESRD on PD  -PD with all 1.5% PD fluid, check cell count and cultures    2. Colitis- on IV flagyl, cipro per hosp    3. Abnormal abd CT with probable splenic infarcts- heparin with bridge to warfarin planned per primary    4.  HTN/Volume stable

## 2020-11-05 LAB
ANION GAP SERPL CALC-SCNC: 9 MMOL/L (ref 7–16)
ATRIAL RATE: 76 BPM
ATRIAL RATE: 82 BPM
BASOPHILS # BLD: 0 K/UL (ref 0–0.2)
BASOPHILS NFR BLD: 0 % (ref 0–2)
BUN SERPL-MCNC: 29 MG/DL (ref 8–23)
CALCIUM SERPL-MCNC: 8.3 MG/DL (ref 8.3–10.4)
CALCULATED P AXIS, ECG09: 49 DEGREES
CALCULATED P AXIS, ECG09: 61 DEGREES
CALCULATED R AXIS, ECG10: 36 DEGREES
CALCULATED R AXIS, ECG10: 52 DEGREES
CALCULATED T AXIS, ECG11: 143 DEGREES
CALCULATED T AXIS, ECG11: 85 DEGREES
CHLORIDE SERPL-SCNC: 97 MMOL/L (ref 98–107)
CO2 SERPL-SCNC: 29 MMOL/L (ref 21–32)
CREAT SERPL-MCNC: 10.9 MG/DL (ref 0.6–1)
DIAGNOSIS, 93000: NORMAL
DIAGNOSIS, 93000: NORMAL
DIFFERENTIAL METHOD BLD: ABNORMAL
EOSINOPHIL # BLD: 0.1 K/UL (ref 0–0.8)
EOSINOPHIL NFR BLD: 1 % (ref 0.5–7.8)
ERYTHROCYTE [DISTWIDTH] IN BLOOD BY AUTOMATED COUNT: 13.5 % (ref 11.9–14.6)
GLUCOSE SERPL-MCNC: 108 MG/DL (ref 65–100)
HCT VFR BLD AUTO: 32 % (ref 35.8–46.3)
HGB BLD-MCNC: 10.7 G/DL (ref 11.7–15.4)
IMM GRANULOCYTES # BLD AUTO: 0.1 K/UL (ref 0–0.5)
IMM GRANULOCYTES NFR BLD AUTO: 1 % (ref 0–5)
INR PPP: 1.2
LYMPHOCYTES # BLD: 1.9 K/UL (ref 0.5–4.6)
LYMPHOCYTES NFR BLD: 20 % (ref 13–44)
MCH RBC QN AUTO: 31.4 PG (ref 26.1–32.9)
MCHC RBC AUTO-ENTMCNC: 33.4 G/DL (ref 31.4–35)
MCV RBC AUTO: 93.8 FL (ref 79.6–97.8)
MM INDURATION POC: 0 MM (ref 0–5)
MONOCYTES # BLD: 0.5 K/UL (ref 0.1–1.3)
MONOCYTES NFR BLD: 5 % (ref 4–12)
NEUTS SEG # BLD: 6.8 K/UL (ref 1.7–8.2)
NEUTS SEG NFR BLD: 73 % (ref 43–78)
NRBC # BLD: 0 K/UL (ref 0–0.2)
P-R INTERVAL, ECG05: 160 MS
P-R INTERVAL, ECG05: 164 MS
PLATELET # BLD AUTO: 191 K/UL (ref 150–450)
PMV BLD AUTO: 12.2 FL (ref 9.4–12.3)
POTASSIUM SERPL-SCNC: 3 MMOL/L (ref 3.5–5.1)
PPD POC: NEGATIVE NEGATIVE
PROTHROMBIN TIME: 15.8 SEC (ref 12.5–14.7)
Q-T INTERVAL, ECG07: 426 MS
Q-T INTERVAL, ECG07: 438 MS
QRS DURATION, ECG06: 84 MS
QRS DURATION, ECG06: 90 MS
QTC CALCULATION (BEZET), ECG08: 492 MS
QTC CALCULATION (BEZET), ECG08: 497 MS
RBC # BLD AUTO: 3.41 M/UL (ref 4.05–5.2)
SODIUM SERPL-SCNC: 135 MMOL/L (ref 136–145)
UFH PPP CHRO-ACNC: 0.74 IU/ML (ref 0.3–0.7)
UFH PPP CHRO-ACNC: 0.83 IU/ML (ref 0.3–0.7)
UFH PPP CHRO-ACNC: >1.1 IU/ML (ref 0.3–0.7)
VENTRICULAR RATE, ECG03: 76 BPM
VENTRICULAR RATE, ECG03: 82 BPM
WBC # BLD AUTO: 9.4 K/UL (ref 4.3–11.1)

## 2020-11-05 PROCEDURE — 36415 COLL VENOUS BLD VENIPUNCTURE: CPT

## 2020-11-05 PROCEDURE — 85520 HEPARIN ASSAY: CPT

## 2020-11-05 PROCEDURE — 97116 GAIT TRAINING THERAPY: CPT

## 2020-11-05 PROCEDURE — 2709999900 HC NON-CHARGEABLE SUPPLY

## 2020-11-05 PROCEDURE — 97161 PT EVAL LOW COMPLEX 20 MIN: CPT

## 2020-11-05 PROCEDURE — 90945 DIALYSIS ONE EVALUATION: CPT

## 2020-11-05 PROCEDURE — 97165 OT EVAL LOW COMPLEX 30 MIN: CPT

## 2020-11-05 PROCEDURE — 74011250637 HC RX REV CODE- 250/637: Performed by: INTERNAL MEDICINE

## 2020-11-05 PROCEDURE — 85610 PROTHROMBIN TIME: CPT

## 2020-11-05 PROCEDURE — 65660000000 HC RM CCU STEPDOWN

## 2020-11-05 PROCEDURE — 93306 TTE W/DOPPLER COMPLETE: CPT

## 2020-11-05 PROCEDURE — 97112 NEUROMUSCULAR REEDUCATION: CPT

## 2020-11-05 PROCEDURE — 85025 COMPLETE CBC W/AUTO DIFF WBC: CPT

## 2020-11-05 PROCEDURE — 74011250636 HC RX REV CODE- 250/636: Performed by: INTERNAL MEDICINE

## 2020-11-05 PROCEDURE — 80048 BASIC METABOLIC PNL TOTAL CA: CPT

## 2020-11-05 RX ORDER — WARFARIN 2.5 MG/1
2.5 TABLET ORAL EVERY EVENING
Status: DISCONTINUED | OUTPATIENT
Start: 2020-11-05 | End: 2020-11-07

## 2020-11-05 RX ADMIN — METRONIDAZOLE 500 MG: 500 INJECTION, SOLUTION INTRAVENOUS at 14:43

## 2020-11-05 RX ADMIN — ASPIRIN 81 MG: 81 TABLET, COATED ORAL at 21:49

## 2020-11-05 RX ADMIN — HEPARIN SODIUM AND DEXTROSE 9 UNITS/KG/HR: 5000; 5 INJECTION INTRAVENOUS at 22:06

## 2020-11-05 RX ADMIN — Medication 10 ML: at 15:01

## 2020-11-05 RX ADMIN — AMLODIPINE BESYLATE 10 MG: 10 TABLET ORAL at 21:55

## 2020-11-05 RX ADMIN — ATORVASTATIN CALCIUM 80 MG: 80 TABLET, FILM COATED ORAL at 21:49

## 2020-11-05 RX ADMIN — METRONIDAZOLE 500 MG: 500 INJECTION, SOLUTION INTRAVENOUS at 05:37

## 2020-11-05 RX ADMIN — WARFARIN SODIUM 2.5 MG: 2.5 TABLET ORAL at 17:40

## 2020-11-05 RX ADMIN — METRONIDAZOLE 500 MG: 500 INJECTION, SOLUTION INTRAVENOUS at 22:03

## 2020-11-05 RX ADMIN — METOPROLOL SUCCINATE 50 MG: 50 TABLET, EXTENDED RELEASE ORAL at 21:55

## 2020-11-05 RX ADMIN — CIPROFLOXACIN 400 MG: 2 INJECTION, SOLUTION INTRAVENOUS at 11:45

## 2020-11-05 NOTE — PROGRESS NOTES
Problem: Self Care Deficits Care Plan (Adult)  Goal: *Acute Goals and Plan of Care (Insert Text)  Description:   1. Patient will complete lower body bathing and dressing with Supervision and adaptive equipment as needed. 2. Patient will complete toileting with Supervision and adaptive equipment as needed. 3. Patient will tolerate 23 minutes of OT treatment with 1-2 rest breaks to increase activity tolerance for ADLs. 4. Patient will complete functional transfers with Supervision and adaptive equipment as needed. 5. Patient will complete standing ADL with Supervision and good static and dynamic standing balance. Timeframe: 7 visits     Outcome: Progressing Towards Goal    OCCUPATIONAL THERAPY: Initial Assessment, Daily Note, and PM 11/5/2020  INPATIENT: OT Visit Days: 1  Payor: Oliverio Mcnultyy / Plan: [address] [place] [address] HMO / Product Type: [employer] /      NAME/AGE/GENDER: Lisa Jones is a 65 y.o. 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 Aniyah Lozano is a 65 y.o. female admitted for Colitis. At baseline, patient lives with son \"Everett\" in mobile home with 2 BALJEET. Pt reports she receives assistance from son for lower body dressing and reports that she recently needed assistance transferring into and out of the bathtub. Pt is dependent on son for IADLs and driving. Pt reports use of SPC for functional mobility within the home but endorses use of w/c for community distances in which she is dependent on her son to propel. Pt also reports that she has shower \"stool\" and rollator available for use. Per chart review, pt has hx of prior CVA with L sided deficits.     Pt found supine in bed slightly lethargic but agreeable to initial OT/PT evaluation to increase activity tolerance and likelihood of return to baseline. OT worked on balance while PT facilitated gait. Pt reports no pain prior to or following functional mobility. Pt is observed to perseverate throughout session on prior question that was asked. Patient completes supine to sit with CGA and additional time. Seated edge of bed, pt with intact seated balance. BUE generally decreased yet functional for ROM, strength, and coordination with deficits greater in L compared to R side. Pt requires Min A for sock management. Pt requires CGA/Min A with use of HHA to complete sit to stand. Upon standing, pt with fair static and fair dynamic standing balance. Pt requires CGA with use of HHA to walk from bed to room door. Pt presents with slight lean to R side when completing functional mobility. Pt provided with minimal verbal, visual, and manual cueing to orient to midline and improve standing posture and balance. Pt requires CGA with use of RW to complete hallway mobility. Pt requires one seated rest break in chair without HR. Following seated rest break, pt requires Min A with use of RW to complete sit to stand and CGA with use of RW to return to room and complete stand to sit edge of bed. Pt requires Min A for sit to supine. Once in supine, pt repositioned for comfort with all needs met and within reach. RN notified. Pt is currently functioning below baseline for ADLs and functional mobility and would benefit from acute OT to increase independence and safety with ADLs and functional mobility. Will follow for duration of hospital stay to address the above goals. Patient was educated on role and plan of care of occupational therapy. This section established at most recent assessment   PROBLEM LIST (Impairments causing functional limitations):  1. Decreased Strength  2. Decreased ADL/Functional Activities  3. Decreased Transfer Abilities  4.  Decreased Ambulation Ability/Technique  5. Decreased Balance  6. Decreased Activity Tolerance  7. Increased Fatigue  8. Decreased Flexibility/Joint Mobility  9. Decreased Cognition   INTERVENTIONS PLANNED: (Benefits and precautions of occupational therapy have been discussed with the patient.)  1. Activities of daily living training  2. Adaptive equipment training  3. Balance training  4. Neuromuscular re-eduation  5. Re-evaluation  6. Therapeutic activity  7. Therapeutic exercise     TREATMENT PLAN: Frequency/Duration: Follow patient 3x/week to address above goals. Rehabilitation Potential For Stated Goals: Good     REHAB RECOMMENDATIONS (at time of discharge pending progress):    Placement: It is my opinion, based on this patient's performance to date, that Ms. Malena Lara may benefit from [address] after discharge due to the functional deficits listed above that are likely to improve with skilled rehabilitation because he/she has multiple medical issues that affect his/her functional mobility in the community. Pt has son in his 42's who does not work and is available to provide 24/7 supervision/assist.  Equipment:    TBD              OCCUPATIONAL PROFILE AND HISTORY:   History of Present Injury/Illness (Reason for Referral):  See H & P  Past Medical History/Comorbidities:   Ms. Malena Lara  has a past medical history of Anemia, Arrhythmia, Aspirin long-term use ([facility]), CAD (coronary artery disease) (2012), Chronic kidney disease, Chronic obstructive pulmonary disease ([facility] [address] CKD (chronic kidney disease) stage 3, GFR 30-59 ml/min ( ), ESRD on peritoneal dialysis ([facility] [address] Flat affect, GERD (gastroesophageal reflux disease), Heart murmur (not followed per cardiology), History of MI (myocardial infarction) (2012), colonic polyps (2016), Hydronephrosis, Hypertension, Left-sided weakness (2012), Loss of appetite, Peritoneal dialysis catheter in place [facility]), Poor historian, Renal atrophy, right, Renal insufficiency, Smoker, Smoker, Stented coronary artery ([facility]), and Stroke ([facility] [address] (2012). Ms. Mariely Nelson  has a past surgical history that includes colonoscopy (N/A, 9/26/2016); pr left heart cath,percutaneous (4/2012); hx urological; ir insert tunl cvc w/o port over 5 yr (8/7/2019); hx carotid endarterectomy (Right, 11/07/2018); vascular surgery procedure unlist (02/28/2019); vascular surgery procedure unlist (07/01/2019); and vascular surgery procedure unlist (08/06/2019). Social History/Living Environment:   Home Environment: Trailer/mobile home  # Steps to Enter: 2  Rails to Enter: Yes  Hand Rails : Left  Wheelchair Ramp: No  One/Two Story Residence: One story  Living Alone: No  Support Systems: Child(emelia)  Patient Expects to be Discharged to[de-identified] Trailer/mobile home  Current DME Used/Available at Home: Benito Harness, quad, Shower chair, Walker, rollator, Wheelchair  Tub or Shower Type: Tub/Shower combination  Prior Level of Function/Work/Activity:  At baseline, patient lives with son \"Everett\" in mobile home with 2 BALJEET. Pt reports she receives assistance from son for lower body dressing and reports that she recently needed assistance transferring into and out of the bathtub. Pt is dependent on son for IADLs and driving. Pt reports use of SPC for functional mobility within the home but endorses use of w/c for community distances in which she is dependent on her son to propel. Pt also reports that she has shower \"stool\" and rollator available for use. Per chart review, pt has hx of prior CVA with L sided deficits. Personal Factors:          Sex:  female        Age:  65 y.o.    Number of Personal Factors/Comorbidities that affect the Plan of Care: Extensive review of physical, cognitive, and psychosocial performance (3+):  HIGH COMPLEXITY   ASSESSMENT OF OCCUPATIONAL PERFORMANCE[de-identified]   Activities of Daily Living:   Basic ADLs (From Assessment) Complex ADLs (From Assessment)   Feeding: Setup  Oral Facial Hygiene/Grooming: Setup  Bathing: Minimum assistance  Upper Body Dressing: Minimum assistance  Lower Body Dressing: Minimum assistance  Toileting: Minimum assistance Instrumental ADL  Meal Preparation: Total assistance  Homemaking: Total assistance   Grooming/Bathing/Dressing Activities of Daily Living     Cognitive Retraining  Safety/Judgement: Fall prevention                       Bed/Mat Mobility  Supine to Sit: Contact guard assistance; Additional time  Sit to Supine: Minimum assistance; Additional time  Sit to Stand: Contact guard assistance;Minimum assistance; Additional time  Stand to Sit: Contact guard assistance;Minimum assistance; Additional time  Scooting: Contact guard assistance; Additional time     Most Recent Physical Functioning:   Gross Assessment:  AROM: Generally decreased, functional  Strength: Generally decreased, functional  Coordination: Generally decreased, functional               Posture:  Posture (WDL): Exceptions to WDL  Posture Assessment: Forward head, Rounded shoulders  Balance:  Sitting: Intact  Standing: Impaired  Standing - Static: Fair  Standing - Dynamic : Fair Bed Mobility:  Supine to Sit: Contact guard assistance; Additional time  Sit to Supine: Minimum assistance; Additional time  Scooting: Contact guard assistance; Additional time  Wheelchair Mobility:     Transfers:  Sit to Stand: Contact guard assistance;Minimum assistance; Additional time  Stand to Sit: Contact guard assistance;Minimum assistance; Additional time            Patient Vitals for the past 6 hrs:   BP SpO2 Pulse   11/05/20 1133 115/71 93 % 86       Mental Status  Neurologic State: Alert  Orientation Level: Oriented to person, Oriented to situation, Disoriented to place  Cognition: Follows commands  Perception: Appears intact  Perseveration: Perseverates during conversation  Safety/Judgement: Fall prevention                          Physical Skills Involved:  1. Range of Motion  2. Balance  3. Strength  4. Activity Tolerance  5.  Gross Motor Control Cognitive Skills Affected (resulting in the inability to perform in a timely and safe manner):  1. None noted Psychosocial Skills Affected:  1. Habits/Routines  2. Environmental Adaptation  3. Social Interaction   Number of elements that affect the Plan of Care: 5+:  HIGH COMPLEXITY   CLINICAL DECISION MAKIN[address] [place] [address] AM-PAC 6 Clicks   Daily Activity Inpatient Short Form  How much help from another person does the patient currently need. .. Total A Lot A Little None   1. Putting on and taking off regular lower body clothing? [] 1   [] 2   [x] 3   [] 4   2. Bathing (including washing, rinsing, drying)? [] 1   [] 2   [x] 3   [] 4   3. Toileting, which includes using toilet, bedpan or urinal?   [] 1   [] 2   [x] 3   [] 4   4. Putting on and taking off regular upper body clothing? [] 1   [] 2   [x] 3   [] 4   5. Taking care of personal grooming such as brushing teeth? [] 1   [] 2   [x] 3   [] 4   6. Eating meals? [] 1   [] 2   [x] 3   [] 4   © , Trustees of [address] [place] [address], under license to [employer]. All rights reserved      Score:  Initial: 18, completed, 2020 Most Recent: X (Date: -- )    Interpretation of Tool:  Represents activities that are increasingly more difficult (i.e. Bed mobility, Transfers, Gait). Medical Necessity:     · Skilled intervention continues to be required due to decreased independence, safety, balance, strength, and activity tolerance for performance of ADLs and functional activity. .  Reason for Services/Other Comments:  · Patient continues to require skilled intervention due to medical complications and patient unable to attend/participate in therapy as expected.    Use of outcome tool(s) and clinical judgement create a POC that gives a: LOW COMPLEXITY         TREATMENT:   (In addition to Assessment/Re-Assessment sessions the following treatments were rendered)     Pre-treatment Symptoms/Complaints:  No complaints  Pain: Initial:   Pain Intensity 1: 0  Post Session:  Unchanged     Today's treatment session addressed Decreased Strength, Decreased ADL/Functional Activities, Decreased Balance, Decreased Activity Tolerance, Increased Fatigue, Decreased Flexibility/Joint Mobility and Decreased Cognition to progress towards achieving goal(s) listed above. During this session, Physical Therapy addressed  Gait to progress towards their discipline specific goal(s). Co-treatment was necessary to improve patient's cognitive participation, ability to follow higher level commands, ability to increase activity demands and ability to return to normal functional activity. Neuromuscular Re-education: (8 minutes):  Exercise/activities below to improve balance, coordination and posture. Required minimal visual, verbal, manual and tactile cues to promote static and dynamic balance in standing. Upon standing, pt with fair static and fair dynamic standing balance. Pt requires CGA with use of HHA to walk from bed to room door. Pt presents with slight lean to R side when completing functional mobility. Pt provided with minimal verbal, visual, and manual cueing to orient to midline and improve standing posture and balance. Braces/Orthotics/Lines/Etc:   · IV  · O2 Device: Room air  Treatment/Session Assessment:    · Response to Treatment: Pt requires one seated rest break when completing functional mobility. · Interdisciplinary Collaboration:   o Physical Therapist  o Occupational Therapist  o Registered Nurse  · After treatment position/precautions:   o Supine in bed  o Bed alarm/tab alert on  o Bed/Chair-wheels locked  o Bed in low position  o Call light within reach  o RN notified   · Compliance with Program/Exercises: Compliant all of the time. · Recommendations/Intent for next treatment session: \"Next visit will focus on advancements to more challenging activities and reduction in assistance provided\".   Total Treatment Duration:  OT Patient Time In/Time Out  Time In: 1507  Time Out: [address] [facility] Mynor, OTR/L

## 2020-11-05 NOTE — PROGRESS NOTES
11/04/20 1400   Dual Skin Pressure Injury Assessment   Dual Skin Pressure Injury Assessment WDL   Second Care Provider (Based on [address] [place] [address]) FRANK Laws   Skin Integumentary   Skin Integumentary (WDL) X    Pressure  Injury Documentation No Pressure Injury Noted-Pressure Ulcer Prevention Initiated   Skin Color Appropriate for ethnicity   Skin Condition/Temp Dry;Warm;Fragile   Turgor Epidermis thin w/ loss of subcut tissue   Skin Integrity Other (comment)  (PD access cath to LUQ w/dsng c/d/i)

## 2020-11-05 NOTE — PROGRESS NOTES
Heparin drip decreased by 1 units/kg/hr. Per protocol results were 0.74. Running at 11 units/kg/ hr now. Will order lab re-draw for 2045.

## 2020-11-05 NOTE — PROGRESS NOTES
END OF SHIFT NOTE:    INTAKE/OUTPUT  11/04 0701 - 11/05 0700  In: 284.8 [I.V.:284.8]  Out: -   Voiding: NO  Catheter: NO  Drain:              Flatus: Patient does have flatus present. Stool:  0 occurrences. Characteristics:  Stool Assessment  Stool Appearance: Watery(per patient)    Emesis: 0 occurrences. Characteristics:   Emesis Assessment  Appearance: Undigested food, Yellow  Emesis Amount: Small    VITAL SIGNS  Patient Vitals for the past 12 hrs:   Temp Pulse Resp BP SpO2   11/05/20 0341 98 °F (36.7 °C) 89 20 115/60 98 %   11/05/20 0028 97.7 °F (36.5 °C) 97 20 115/67 94 %   11/04/20 2121 97.6 °F (36.4 °C) 85 20 112/67 100 %   11/04/20 1949 97.9 °F (36.6 °C) 85 20 (!) 98/59 94 %       Pain Assessment  Pain Intensity 1: 0 (11/04/20 1915)  Pain Location 1: Abdomen     Patient Stated Pain Goal: 0    Ambulating  No    Shift report to be given to oncoming nurse at the bedside.     [address]

## 2020-11-05 NOTE — PROGRESS NOTES
END OF SHIFT NOTE:    INTAKE/OUTPUT  No intake/output data recorded. Voiding: YES, oliguria, did not voided this shift. Catheter: NO  Drain:              Flatus: Patient does have flatus present. Stool:  0 occurrences. Characteristics:  Stool Assessment  Stool Appearance: Watery(per patient)    Emesis: 1 occurrences. Characteristics: small, food particles, during dinner. VITAL SIGNS  Patient Vitals for the past 12 hrs:   Temp Pulse Resp BP SpO2   11/04/20 1545 97.9 °F (36.6 °C) 79 20 115/69 99 %   11/04/20 1315 97.8 °F (36.6 °C) 78 21 110/72 99 %   11/04/20 1101  78  (!) 108/58 100 %   11/04/20 1030  76  (!) 118/44 100 %   11/04/20 1018  75  139/64 100 %   11/04/20 1017  75   100 %   11/04/20 0922  78  (!) 159/72 100 %   11/04/20 0823  85   100 %   11/04/20 0811 97.4 °F (36.3 °C) 76   100 %   11/04/20 0748   24 (!) 116/58        Pain Assessment  Pain Intensity 1: 6 (11/04/20 0748)  Pain Location 1: Abdomen          Ambulating  Yes, short distances to chair and pain. Shift report given to oncoming nurse at the bedside.     Alvaro Thompson RN

## 2020-11-05 NOTE — PROGRESS NOTES
Name: Mona Rivas MRN: [id number]  : 1950  Age:70 y.o.  female  Admit Date:  2020 LOS: 1      Hospitalist Progress Note     Reason for Admission:  Colitis [K52.9]  Splenic infarct [D73.5]  Nausea vomiting and diarrhea [R11.2, R19.7]    Hospital Course:  Please refer to the admission H&P for details of presentation. In summary, Mona Rivas is a 65 y.o. female with medical history significant for ESRD on PD at home, COPD, GERD, PAD,  HTN, HLD, CAD who was seen for nausea/vomiting/diarrhea and found to have colitis, splenic infarcts and 3cm infrarenal AAA on CT. Vascular surgery was consulted. No immediate intervention was needed and to follow up as  Outpatient for arterial duplex. Patient was started on heparin gtt for splenic infarcts and started on IV Abx      Subjective/24 hr Events (20) :  Patient is seen and examined at bedside. AAOx2. Pleasant. Continues to have nausea/vomiting and diarrhea but has improved slightly. Having some crampy abdominal pain overnight. Patient denies fever, chills, chest pains, shortness of breath      ROS: 10 point review of systems is otherwise negative with the exception of the elements mentioned above.     Objective:    Patient Vitals for the past 24 hrs:   Temp Pulse Resp BP SpO2   20 1133 97.7 °F (36.5 °C) 86 20 115/71 93 %   20 0717 98.2 °F (36.8 °C) 87 20 110/67 99 %   20 0341 98 °F (36.7 °C) 89 20 115/60 98 %   20 0028 97.7 °F (36.5 °C) 97 20 115/67 94 %   20 2121 97.6 °F (36.4 °C) 85 20 112/67 100 %   20 1949 97.9 °F (36.6 °C) 85 20 (!) 98/59 94 %   20 1545 97.9 °F (36.6 °C) 79 20 115/69 99 %   20 1315 97.8 °F (36.6 °C) 78 21 110/72 99 %     Oxygen Therapy  O2 Sat (%): 93 % (20 1133)  Pulse via Oximetry: 78 beats per minute (20 1101)  O2 Device: Room air (20 8477)    Estimated body mass index is 23.05 kg/m² as calculated from the following:    Height as of this encounter: 5' 1\" (1.549 m). Weight as of this encounter: 55.3 kg (122 lb). Intake/Output Summary (Last 24 hours) at 11/5/2020 1304  Last data filed at 11/5/2020 1554  Gross per 24 hour   Intake 284.75 ml   Output 136 ml   Net 148.75 ml       *Note that automatically entered I/Os may not be accurate; dependent on patient compliance with collection and accurate  by techs. Physical Exam:   General:     alert, awake, no acute distress. Well nourished  Head:   normocephalic, atraumatic  Eyes, Ears, nose: PERRL, EOMI. Normal conjunctiva  Neck:    supple, non-tender. Trachea midline. Lungs:   CTAB, no wheezing, rhonchi, rales  Cardiac:   RRR, Normal S1 and S2. Abdomen:   Soft, non distended, TTP to LQ, +BS, +PD catheter  Extremities:   Warm, dry. No edema   Skin:   No rashes, no jaundice  Neuro:  AAOx2. No gross focal neurological deficit  Psychiatric:  No anxiety, calm, cooperative    Data Review:  I have reviewed all labs, meds, and studies from the last 24 hours:      Labs:    Recent Results (from the past 24 hour(s))   PTT    Collection Time: 11/04/20  1:35 PM   Result Value Ref Range    aPTT 26.6 24.1 - 35.1 SEC   CELL COUNT, BODY FLUID    Collection Time: 11/04/20  3:43 PM   Result Value Ref Range    BODY FLUID TYPE PERITONEAL FLUID      FLUID COLOR COLORLESS      FLUID APPEARANCE CLOUDY      FLUID RBC CT. <1,000 /cu mm    FLUID WBC COUNT 146 /cu mm    BRCH NEUTROPHIL 11 %    BRCH LYMPHS 78 %    BRCH MACROPHAGES 11 %   CULTURE, BODY FLUID Revonda Real STAIN    Collection Time: 11/04/20  3:43 PM    Specimen: Peritoneal Fluid;  Body Fluid   Result Value Ref Range    Special Requests: NO SPECIAL REQUESTS      GRAM STAIN NO WBC'S SEEN      GRAM STAIN NO DEFINITE ORGANISM SEEN      Culture result: NO GROWTH 1 DAY     EKG, 12 LEAD, SUBSEQUENT    Collection Time: 11/04/20  6:37 PM   Result Value Ref Range    Ventricular Rate 82 BPM    Atrial Rate 82 BPM    P-R Interval 160 ms    QRS Duration 84 ms Q-T Interval 426 ms    QTC Calculation (Bezet) 497 ms    Calculated P Axis 49 degrees    Calculated R Axis 36 degrees    Calculated T Axis 143 degrees    Diagnosis       Normal sinus rhythm  Possible Left atrial enlargement  Left ventricular hypertrophy with repolarization abnormality  Abnormal ECG  When compared with ECG of 04-NOV-2020 08:03,  T wave inversion no longer evident in Inferior leads  Inverted T waves have replaced nonspecific T wave abnormality in Lateral   leads  Confirmed by HALLE HERNANDEZ (), JOSHUA GOYAL ([id number]) on 11/5/2020 7:54:26 AM     HEPARIN XA UFH    Collection Time: 11/04/20  8:53 PM   Result Value Ref Range    Heparin Xa UFH >1.1 (H) 0.3 - 0.7 IU/mL   METABOLIC PANEL, BASIC    Collection Time: 11/05/20  4:59 AM   Result Value Ref Range    Sodium 135 (L) 136 - 145 mmol/L    Potassium 3.0 (L) 3.5 - 5.1 mmol/L    Chloride 97 (L) 98 - 107 mmol/L    CO2 29 21 - 32 mmol/L    Anion gap 9 7 - 16 mmol/L    Glucose 108 (H) 65 - 100 mg/dL    BUN 29 (H) 8 - 23 MG/DL    Creatinine 10.90 (H) 0.6 - 1.0 MG/DL    GFR est AA 4 (L) >60 ml/min/1.73m2    GFR est non-AA 4 (L) >60 ml/min/1.73m2    Calcium 8.3 8.3 - 10.4 MG/DL   CBC WITH AUTOMATED DIFF    Collection Time: 11/05/20  4:59 AM   Result Value Ref Range    WBC 9.4 4.3 - 11.1 K/uL    RBC 3.41 (L) 4.05 - 5.2 M/uL    HGB 10.7 (L) 11.7 - 15.4 g/dL    HCT 32.0 (L) 35.8 - 46.3 %    MCV 93.8 79.6 - 97.8 FL    MCH 31.4 26.1 - 32.9 PG    MCHC 33.4 31.4 - 35.0 g/dL    RDW 13.5 11.9 - 14.6 %    PLATELET 238 926 - 392 K/uL    MPV 12.2 9.4 - 12.3 FL    ABSOLUTE NRBC 0.00 0.0 - 0.2 K/uL    DF AUTOMATED      NEUTROPHILS 73 43 - 78 %    LYMPHOCYTES 20 13 - 44 %    MONOCYTES 5 4.0 - 12.0 %    EOSINOPHILS 1 0.5 - 7.8 %    BASOPHILS 0 0.0 - 2.0 %    IMMATURE GRANULOCYTES 1 0.0 - 5.0 %    ABS. NEUTROPHILS 6.8 1.7 - 8.2 K/UL    ABS. LYMPHOCYTES 1.9 0.5 - 4.6 K/UL    ABS. MONOCYTES 0.5 0.1 - 1.3 K/UL    ABS. EOSINOPHILS 0.1 0.0 - 0.8 K/UL    ABS. BASOPHILS 0.0 0.0 - 0.2 K/UL    ABS. IMM. Breanna Motts. 0.1 0.0 - 0.5 K/UL   HEPARIN XA UFH    Collection Time: 11/05/20  4:59 AM   Result Value Ref Range    Heparin Xa UFH >1.1 (H) 0.3 - 0.7 IU/mL         All Micro Results     Procedure Component Value Units Date/Time    CULTURE, BODY FLUID W Katherin Maddox [[id number]] Collected:  11/04/20 1543    Order Status:  Completed Specimen: Body Fluid from Peritoneal Fluid Updated:  11/05/20 1256     Special Requests: NO SPECIAL REQUESTS        GRAM STAIN NO WBC'S SEEN         NO DEFINITE ORGANISM SEEN        Culture result: NO GROWTH 1 DAY       C. DIFFICILE/EPI PCR [[id number]] Collected:  11/04/20 0826    Order Status:  Completed Specimen:  Stool Updated:  11/04/20 1054     Special Requests: NO SPECIAL REQUESTS        Culture result:       Toxigenic C. difficile NEGATIVE                         C. difficile target DNA sequences are not detected.                 Current Meds:  Current Facility-Administered Medications   Medication Dose Route Frequency    amLODIPine (NORVASC) tablet 10 mg  10 mg Oral QHS    atorvastatin (LIPITOR) tablet 80 mg  80 mg Oral QHS    aspirin delayed-release tablet 81 mg  81 mg Oral QHS    metoprolol succinate (TOPROL-XL) XL tablet 50 mg  50 mg Oral QHS    polyethylene glycol (MIRALAX) packet 17 g  17 g Oral DAILY PRN    sodium chloride (NS) flush 5-40 mL  5-40 mL IntraVENous Q8H    sodium chloride (NS) flush 5-40 mL  5-40 mL IntraVENous PRN    acetaminophen (TYLENOL) tablet 650 mg  650 mg Oral Q6H PRN    Or    acetaminophen (TYLENOL) suppository 650 mg  650 mg Rectal Q6H PRN    polyethylene glycol (MIRALAX) packet 17 g  17 g Oral DAILY PRN    promethazine (PHENERGAN) tablet 12.5 mg  12.5 mg Oral Q6H PRN    Or    ondansetron (ZOFRAN) injection 4 mg  4 mg IntraVENous Q6H PRN    tuberculin injection 5 Units  5 Units IntraDERMal ONCE    heparin 25,000 units in dextrose 500 mL infusion  18-36 Units/kg/hr IntraVENous TITRATE    ciprofloxacin (CIPRO) 400 mg in D5W IVPB (premix)  400 mg IntraVENous Q24H    metroNIDAZOLE (FLAGYL) IVPB premix 500 mg  500 mg IntraVENous Q8H         Other Studies:  Xr Abd Acute W 1 V Chest    Result Date: 11/4/2020  EXAMINATION: CHEST AND ABDOMEN RADIOGRAPH 11/4/2020 8:46 AM ACCESSION NUMBER: [id number] INDICATION: abd pain COMPARISON: Chest x-ray 6/4/2019, 5/7/2019, 10/20/2020, CT abdomen and pelvis 2/9/2016 TECHNIQUE: A PA upright view of the chest and AP upright and supine views of the abdomen were obtained. FINDINGS: The visualized portions of the heart and pericardium are unremarkable. Right acromioclavicular joint arthropathy. No evidence of focal airspace disease, pneumothorax, or pleural effusion. There is a lucency beneath the diaphragm on the upright views, likely free intraperitoneal gas. A peritoneal dialysis catheter overlies the pelvis. A left-sided ureteral stent overlies expected position of the left kidney, left ureter, and urinary bladder. Nonobstructive bowel gas pattern. Left inguinal surgical clips. Lumbar spine spondylosis. IMPRESSION: 1. No evidence of acute cardiopulmonary disease. 2. Lucency beneath the hemidiaphragm, most likely pneumoperitoneum. While this may be related to the peritoneal dialysis catheter, perforated hollow viscus cannot be radiographically excluded. 3. Nonobstructive bowel gas pattern. Discussed with Dr. Yariel Lui by Dr. Esequiel Rushing at 9:03 AM 11/4/2020. VOICE DICTATED BY: Dr. Sury Means    Result Date: 11/4/2020  EXAMINATION: CT  ABDOMEN / PELVIS   11/4/2020 10:06 AM ACCESSION NUMBER:  [id number] INDICATION:  Abd pain COMPARISON: Same-day abdominal x-ray, CT abdomen and pelvis 2/9/2016 and 4/28/2012 TECHNIQUE: Contiguous axial computed tomographic images were obtained from the domes of the diaphragm to the symphysis pubis after the administration of intravenous contrast. Coronal reconstructions were also performed.  Radiation dose reduction techniques were used for this study:  Our CT scanners use one or all of the following: Automated exposure control, adjustment of the mA and/or kVp according to patient's size, iterative reconstruction. FINDINGS: LIMITED THORAX:Multivessel coronary artery atherosclerosis. Unremarkable included portions of the pericardium. Bibasilar atelectasis. PERITONEUM/MESENTERY: There is free gas beneath the diaphragms. This corresponds to the abnormality described on the immediate prior radiographs. There are further multiple small foci of free gas in the region of the hepatic hilum and adjacent to the anterior abdominal wall extending through the level of the pelvis. There is a moderate volume of low-density free fluid in the pelvis adjacent to the peritoneal dialysis catheter, likely secondary to the dialysis catheter. No lymphadenopathy. GI TRACT: The appendix is normal. The terminal ileum is unremarkable. There is no evidence of a small bowel obstruction. There is colonic wall thickening extending from the level of the cecum through the mid descending colon. There is no pneumatosis or definite focal colonic wall defect. The stomach is unremarkable. SPLEEN: There are multiple areas of linear hypoenhancement throughout the spleen, some of which traverses the full-thickness of the splenic cortex. There is a small volume of adjacent perisplenic free fluid. ADRENALS: Normal PANCREAS: Normal LIVER: Normal GALLBLADDER: Cholelithiasis. KIDNEYS: Atrophic right kidney. Left renal cortical thinning. Left nephroureteral stent in satisfactory position. Multiple left renal calculi. BLADDER/: The urinary bladder is incompletely distended with a ureteral stent in place. There is concentric bladder wall thickening with mucosal hyperenhancement. There is a subtle hypodense peripheral enhancing focus adjacent to the urethra. This is present on prior exams and may be a surgically implanted device. VESSELS: The main portal vein and major tributary branches are patent.  There is atherosclerosis of the abdominal aorta and branch vessels. This includes severe multifocal superior mesenteric artery narrowing. Infrarenal abdominal aortic aneurysm measuring up to 3 cm. There is a thrombosed penetrating ulcer of the suprarenal abdominal aorta, measuring 1.6 x 0.9 cm (axial image 28). There are postoperative changes in the left inguinal region adjacent to the left common femoral artery. The proximal aspect of the profunda femoris artery is occluded (axial image 18). BONES/SOFT TISSUES: Lumbar spine spondylosis without suspicious lytic or blastic bony lesions. IMPRESSION: 1. Moderate volume pneumoperitoneum. This is favored to be related to the peritoneal dialysis catheter. There are no focally suspicious areas of bowel perforation within the limits of imaging technique; however, perforated hollow viscus cannot be definitively excluded. 2. There is submucosal fatty deposition extending from the cecum through the level of the descending colon. There is subtle pericolonic stranding. Findings favor an acute on chronic colitis. 3. Multifocal splenic hypodensities or a small surrounding perihepatic free fluid. Absent a history of trauma and in the setting of severe atherosclerosis of the splenic artery, multifocal splenic infarctions are favored. 4. Concentric bladder wall thickening and mucosal enhancement, likely related to the decompressed state of the bladder and the ureteral stent. However, correlation for superimposed infection is recommended. 5. There is severe multifocal visceral atherosclerosis. 3.0 cm infrarenal abdominal aortic aneurysm. There is severe narrowing of the superior mesenteric artery, without definite evidence of superior mesenteric arterial occlusion. 6. There are postoperative changes in the left inguinal region adjacent to the left common femoral artery. The proximal aspects of the profunda femoris are occluded (axial image 82). The imaged portions of the superficial femoral artery patent. Correlation for distal pulses recommended. Discussed with Dr. Scott Beckford by Dr. Willy Holly at 10:52 AM 11/4/2020. VOICE DICTATED BY: Dr. Ihsan Calderon Problems    Diagnosis Date Noted    Colitis 11/04/2020    Splenic infarct 11/04/2020    Nausea vomiting and diarrhea 11/04/2020    ESRD [facility] peritoneal [facility]) 07/29/2019    PAD (peripheral artery disease) ([facility] [address] 06/19/2019    History of right-sided carotid endarterectomy 11/08/2018    Pure hypercholesterolemia 11/28/2017    S/P coronary artery stent placement 02/06/2017    Anemia of renal disease 01/07/2016    CVA (cerebral vascular accident) ([facility] [address] 04/28/2012       Plan:  Acute Colitis  - continue with abx and PRN meds for symptomatic management. - check stool studies. If continues to have watery diarrhea, ?check cdiff    Splenic Infarcts  Seen on CT scan. - remote tele  - echo (?cardioembolic)  - heparin gtt for now (ok by vascular). Bridge to warfarin    3cm infrarenal AAA   Vascular recommends no immediate intervention. Out patient follow up    ESRD on PD  Nephrology on board for dialysus    HTN // PAD : continue with home meds    Diet:  DIET RENAL  DIET NUTRITIONAL SUPPLEMENTS  DVT PPx: heparin gtt  Code: Full Code  Dispo: pending clinical course and PT/OT Eval  Estimated Discharge: TBD based on clinical course    Discussed with daughter over the phone. Answered all questions/concerns. Labs/Imaging Reviewed. Patient is HIGH risk due to current condition and comorbid conditions as well as requiring frequent monitoring and high risk of decline. Plan discussed with staff, patient/family and are in agreement.       Signed By: Dony Tucker MD     November 5, 2020

## 2020-11-05 NOTE — PROGRESS NOTES
CM met with patient with daughter, Alicia John (patient's POA) at bedside. Patient states that she lives in a mobile home, [place]STE home with her son, Uday Sharpe. Patient requires assistance with all ADL's, children help assist. DME's include dialysis machine, wheelchair, rollator, cane, shower chair. ESRD - PD at home. She completes her dialysis at night 7-9 hours while sleeping. Patient's son sets up her machine for her. Demographics, insurance and PCP confirmed. PT and OT evals and recommendations pending. CM remains available to assist with d/c planning. SNF vs. HH.      Care Management Interventions  PCP Verified by CM: Yes(Dr. Maxwell Meade )  Transition of Care Consult (CM Consult): Discharge Planning  Physical Therapy Consult: Yes  Occupational Therapy Consult: Yes  Speech Therapy Consult: No  Current Support Network: Relative's Home(patient lives with her son, Uday Sharpe. )  Confirm Follow Up Transport: Family  Discharge Location  Discharge Placement: Unable to determine at this time

## 2020-11-05 NOTE — PROGRESS NOTES
[facility] PROGRESS NOTE    Follow up for: ESRD onPD     Subjective:   Patient seen and examined. Chart, notes, labs, imaging, results all reviewed.      ROS:  Gen - no fever, no chills, appetite okay  CV - no chest pain, no orthopnea  Lung - no shortness of breath, no cough  Abd - no tenderness, no nausea, no vomiting  Ext - no edema    Objective:   Exam:  Vitals:    11/04/20 2121 11/05/20 0028 11/05/20 0341 11/05/20 0717   BP: 112/67 115/67 115/60 110/67   Pulse: 85 97 89 87   Resp: 20 20 20 20   Temp: 97.6 °F (36.4 °C) 97.7 °F (36.5 °C) 98 °F (36.7 °C) 98.2 °F (36.8 °C)   SpO2: 100% 94% 98% 99%   Weight:       Height:             Intake/Output Summary (Last 24 hours) at 11/5/2020 1045  Last data filed at 11/5/2020 0829  Gross per 24 hour   Intake 284.75 ml   Output 136 ml   Net 148.75 ml       Current Facility-Administered Medications   Medication Dose Route Frequency    amLODIPine (NORVASC) tablet 10 mg  10 mg Oral QHS    atorvastatin (LIPITOR) tablet 80 mg  80 mg Oral QHS    aspirin delayed-release tablet 81 mg  81 mg Oral QHS    metoprolol succinate (TOPROL-XL) XL tablet 50 mg  50 mg Oral QHS    polyethylene glycol (MIRALAX) packet 17 g  17 g Oral DAILY PRN    sodium chloride (NS) flush 5-40 mL  5-40 mL IntraVENous Q8H    sodium chloride (NS) flush 5-40 mL  5-40 mL IntraVENous PRN    acetaminophen (TYLENOL) tablet 650 mg  650 mg Oral Q6H PRN    Or    acetaminophen (TYLENOL) suppository 650 mg  650 mg Rectal Q6H PRN    polyethylene glycol (MIRALAX) packet 17 g  17 g Oral DAILY PRN    promethazine (PHENERGAN) tablet 12.5 mg  12.5 mg Oral Q6H PRN    Or    ondansetron (ZOFRAN) injection 4 mg  4 mg IntraVENous Q6H PRN    tuberculin injection 5 Units  5 Units IntraDERMal ONCE    heparin 25,000 units in dextrose 500 mL infusion  18-36 Units/kg/hr IntraVENous TITRATE    ciprofloxacin (CIPRO) 400 mg in D5W IVPB (premix)  400 mg IntraVENous Q24H    metroNIDAZOLE (FLAGYL) IVPB premix 500 mg  500 mg IntraVENous Q8H       EXAM  GEN - Alert, oriented, in no distress  CV - S1, S2, RRR, no rub, murmur, or gallop  Lung - clear to auscultation bilaterally  Abd - soft, nontender, BS present  Ext - no edema    Recent Labs     11/05/20 0459 11/04/20  0755   WBC 9.4 9.4   HGB 10.7* 11.5*   HCT 32.0* 34.5*    230        Recent Labs     11/05/20 0459 11/04/20  0755   * 135*   K 3.0* 3.3*   CL 97* 95*   CO2 29 27   BUN 29* 30*   CREA 10.90* 11.90*   CA 8.3 9.0   * 198*       Assessment and Plan:     1. ESRD on PD  -PD with all 1.5% PD fluid, check cell count and cultures     2. Colitis- on IV flagyl, cipro per hosp     3. Abnormal abd CT with probable splenic infarcts- heparin with bridge to warfarin planned per primary     4.  HTN/Volume stable  Araceli Cordero MD

## 2020-11-05 NOTE — PROGRESS NOTES
Problem: Mobility Impaired (Adult and Pediatric)  Goal: *Acute Goals and Plan of Care (Insert Text)  Outcome: Progressing Towards Goal  Note: LTG:  (1.)Ms. Herlinda Nicole will move from supine to sit and sit to supine , scoot up and down, and roll side to side with INDEPENDENT within 7 treatment day(s) from flat surface without handrail. (2.)Ms. Herlinda Nicole will transfer from bed to chair and chair to bed with MODIFIED INDEPENDENCE using the least restrictive device within 7 treatment day(s). (3.)Ms. Herlinda Nicole will ambulate with STAND BY ASSIST for 250+ feet with the least restrictive device within 7 treatment day(s) while maintaining normal vital signs. (4.)Ms. Herlinda Nicole will perform 2 steps with L HR and CGA within 7 treatment days for safety ascending and descending stairs for home.   ___________________________________________________________________________________________      PHYSICAL THERAPY: Initial Assessment and PM 11/5/2020  INPATIENT: PT Visit Days : 1  Payor: Kassie Reed / Plan: [address] [place] [address] HMO / Product Type: [employer] /       NAME/AGE/GENDER: Enid Liz is a 65 y.o. 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 Herlinda Nicole presents with decreased bed mobility, transfers, ambulation, balance, activity tolerance, and overall general functional mobility s/p hospital admission with above. Pt to ED 11/4/20 with general weakness, n/v x 4 weeks. Pt with abd aneurysm per chart, on Heparin currently. RN ok'd assessment. Pt with history significant for CVA with L side deficits. Pt with preservation noted with tasks and in conversation, some decreased response, flat affect.  Pt reports uses quad cane at baseline in home, w/c in community. Pt son with increased assist recently with ADLs. Pt states no falls at home. Pt required additional time for all mobility, overall grossly CGA to MIN A with transfers and ambulation. Pt on room air. Pt ambulated into hallway, treatment to include gait training with ambulation in hallway, weight shifting, posture, step length. Pt with seated break after 50', reports due to fatigue. Pt returned to supine after mobility and left with needs in reach. PT to follow for acute care needs to address deficit noted above. Pt most likely can return home with HHPT at discharge with family assist.      This section established at most recent assessment   PROBLEM LIST (Impairments causing functional limitations):  Decreased ADL/Functional Activities  Decreased Transfer Abilities  Decreased Ambulation Ability/Technique  Decreased Balance  Decreased Activity Tolerance   INTERVENTIONS PLANNED: (Benefits and precautions of physical therapy have been discussed with the patient.)  Balance Exercise  Bed Mobility  Family Education  Gait Training  Home Exercise Program (HEP)  Therapeutic Activites  Therapeutic Exercise/Strengthening  Transfer Training     TREATMENT PLAN: Frequency/Duration: 3 times a week for duration of hospital stay  Rehabilitation Potential For Stated Goals: Good     REHAB RECOMMENDATIONS (at time of discharge pending progress):    Placement: It is my opinion, based on this patient's performance to date, that Ms. Angella Vieyra may benefit from [address] after discharge due to the functional deficits listed above that are likely to improve with skilled rehabilitation because he/she has multiple medical issues that affect his/her functional mobility in the community.   Equipment:   None at this time              HISTORY:   History of Present Injury/Illness (Reason for Referral):  Ms. Angella Vieyra is a 66 y/o AAF with a h/o ESRD on PD at home, COPD, GERD, PAD,  HTN, HLD, CAD who presents today with c/o a 3-4 week history of N/V, weakness and poor PO intake. She is with her daughter, Mone Vieira, who helps with history. She has been compliant with dialysis. Afebrile, no chest pain, palpitations, SOB/SIMMS, hypoxia. She developed non-bloody diarrhea yesterday. No recent abx. She's had generalized abdominal pain as well with minimal vomiting. Labs are largely unremarkable, Cr up but PD patient. 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 Vascular surgery consulted via ER and no acute intervention required for vascular issues and ok from their perspective to anticoagulate the infarcts. She has been given antibiotics. Hospitalist consulted for admission and further management. Past Medical History/Comorbidities:   Ms. Raad England  has a past medical history of Anemia, Arrhythmia, Aspirin long-term use ([facility]), CAD (coronary artery disease) (2012), Chronic kidney disease, Chronic obstructive pulmonary disease ([facility] [address] CKD (chronic kidney disease) stage 3, GFR 30-59 ml/min ( ), ESRD on peritoneal dialysis ([address] Flat affect, GERD (gastroesophageal reflux disease), Heart murmur (not followed per cardiology), History of MI (myocardial infarction) (2012), colonic polyps (2016), Hydronephrosis, Hypertension, Left-sided weakness (2012), Loss of appetite, Peritoneal dialysis catheter in place [facility]), Poor historian, Renal atrophy, right, Renal insufficiency, Smoker, Smoker, Stented coronary artery ([facility]), and Stroke ([address] (2012). Ms. Raad England  has a past surgical history that includes colonoscopy (N/A, 9/26/2016); pr left heart cath,percutaneous (4/2012); hx urological; ir insert tunl cvc w/o port over 5 yr (8/7/2019); hx carotid endarterectomy (Right, 11/07/2018); vascular surgery procedure unlist (02/28/2019); vascular surgery procedure unlist (07/01/2019); and vascular surgery procedure unlist (08/06/2019).   Social History/Living Environment:   Home Environment: (P) Trailer/mobile home  # Steps to Enter: (P) 2  Rails to Enter: (P) Yes  Hand Rails : (P) Left  Wheelchair Ramp: (P) No  One/Two Story Residence: (P) One story  Living Alone: (P) No  Support Systems: (P) Child(emelia)  Patient Expects to be Discharged to[de-identified] (P) Trailer/mobile home  Current DME Used/Available at Home: (P) Cane, quad, Shower chair, Walker, rollator, Wheelchair  Tub or Shower Type: (P) Tub/Shower combination  Prior Level of Function/Work/Activity:  Lives with son, mod I at baseline with quad cane, assist ADLs, RA  Personal Factors:          Sex:  female        Age:  65 y.o. Overall Behavior:  agreeable    Number of Personal Factors/Comorbidities that affect the Plan of Care:  CVA, CAD, age, COPD 3+: HIGH COMPLEXITY   EXAMINATION:   Most Recent Physical Functioning:   Gross Assessment:  AROM: Generally decreased, functional(B LE)  Strength: Generally decreased, functional(B LE, L LE < R LE old CVA)  Coordination: Generally decreased, functional               Posture:  Posture (WDL): Exceptions to WDL  Posture Assessment: Forward head, Rounded shoulders  Balance:  Sitting: Intact  Standing: Impaired  Standing - Static: Good;Fair  Standing - Dynamic : Fair Bed Mobility:  Supine to Sit: Contact guard assistance;Minimum assistance; Additional time  Sit to Supine: Minimum assistance; Additional time  Scooting: Contact guard assistance  Wheelchair Mobility:     Transfers:  Sit to Stand: Contact guard assistance;Minimum assistance  Stand to Sit: Contact guard assistance;Minimum assistance  Gait:     Base of Support: Narrowed  Speed/Akila: Slow  Step Length: Left shortened;Right shortened  Swing Pattern: Left symmetrical;Right symmetrical  Gait Abnormalities: Decreased step clearance;Shuffling gait  Distance (ft): 100 Feet (ft)(seated break after 50)  Assistive Device: Gait belt(HHA and close contact)  Ambulation - Level of Assistance: Minimal assistance  Interventions: Tactile cues; Verbal cues      Body Structures Involved:  Muscles Body Functions Affected: Movement Related Activities and Participation Affected:  General Tasks and Demands  Mobility  Self Care   Number of elements that affect the Plan of Care: 4+: HIGH COMPLEXITY   CLINICAL PRESENTATION:   Presentation: Evolving clinical presentation with changing clinical characteristics: MODERATE COMPLEXITY   CLINICAL DECISION MAKIN [facility] Mobility Inpatient Short Form  How much difficulty does the patient currently have. .. Unable A Lot A Little None   1. Turning over in bed (including adjusting bedclothes, sheets and blankets)? [] 1   [] 2   [x] 3   [] 4   2. Sitting down on and standing up from a chair with arms ( e.g., wheelchair, bedside commode, etc.)   [] 1   [] 2   [x] 3   [] 4   3. Moving from lying on back to sitting on the side of the bed? [] 1   [] 2   [x] 3   [] 4   How much help from another person does the patient currently need. .. Total A Lot A Little None   4. Moving to and from a bed to a chair (including a wheelchair)? [] 1   [] 2   [x] 3   [] 4   5. Need to walk in hospital room? [] 1   [] 2   [x] 3   [] 4   6. Climbing 3-5 steps with a railing? [] 1   [] 2   [x] 3   [] 4   © , Trustees of [address] [place] [address] [address] [address], under license to "[email or web address]". All rights reserved      Score:  Initial: 18 Most Recent: X (Date: -- )    Interpretation of Tool:  Represents activities that are increasingly more difficult (i.e. Bed mobility, Transfers, Gait). Medical Necessity:     Patient is expected to demonstrate progress in   strength, range of motion, balance, and coordination   to   decrease assistance required with overall functional mobility, transfers, ambulation  . Patient demonstrates   good   rehab potential due to higher previous functional level.   Reason for Services/Other Comments:  Patient   continues to require present interventions due to patient's inability to perform bed mobility, transfers, ambulation safely and effectively  . Use of outcome tool(s) and clinical judgement create a POC that gives a: Clear prediction of patient's progress: LOW COMPLEXITY            TREATMENT:   (In addition to Assessment/Re-Assessment sessions the following treatments were rendered)   Pre-treatment Symptoms/Complaints:  \"ok\"  Pain: Initial:   Pain Intensity 1: 0  Post Session:  0/10 post session     Gait Training ( 8 min):  Gait training to improve and/or restore physical functioning as related to mobility, strength, balance, and coordination. Ambulated 100 Feet (ft)(seated break after 50) with Minimal assistance using a Gait belt(HHA and close contact) and minimal Tactile cues; Verbal cues related to their stance phase and stride length to promote proper body alignment, promote proper body posture, and promote proper body mechanics. Instruction in performance of posture, step length, weight shifting to correct  functional ambulation . Today's treatment session addressed Decreased Ambulation Ability/Technique and Decreased Balance to progress towards achieving goal(s) 3. During this session, Occupational Therapy addressed  balance and there activity  to progress towards their discipline specific goal(s). Co-treatment was necessary to improve patient's cognitive participation and ability to follow higher level commands. Braces/Orthotics/Lines/Etc:   IV  O2 Device: Room air  Treatment/Session Assessment:    Response to Treatment:  tolerated well  Interdisciplinary Collaboration:   Physical Therapist  Occupational Therapist  Registered Nurse  After treatment position/precautions:   Supine in bed  Bed/Chair-wheels locked  Bed in low position  Call light within reach  RN notified   Compliance with Program/Exercises: Will assess as treatment progresses  Recommendations/Intent for next treatment session:   \"Next visit will focus on advancements to more challenging activities\".   Total Treatment Duration:  PT Patient Time In/Time Out  Time In: 1507  Time Out: [facility]

## 2020-11-05 NOTE — PROGRESS NOTES
Comprehensive Nutrition Assessment    Type and Reason for Visit: Initial, Positive nutrition screen    Nutrition Recommendations/Plan:   Continue current diet  Start Boost Plus daily - vanilla flavor preference    Nutrition Assessment:  Patient with PMH significant for ESRD on HD, COPD, GERD, PAD, HTN, HLD, CAD, MI, CVA. She presented  with nausea, vomiting, and poor PO for 3-4 weeks. She was found to have acute colitis and AAA on CT. Patient seen this am following PD and echo. She is a vague historian and repeats \"I just couldn't eat\" to several questions. When asked directly, she reports poor appetite. She reports no breakfast this am and no dinner last night. She does not provide more specific recall. She does tell me that she drinks Boost at home and likes vanilla. She agrees to Boost while inpatient. Labs reviewed and significant for K+ 3, Na 135, Glucose 108    Estimated Daily Nutrient Needs:  Energy (kcal): 8181-3535(66-54 kcal/kg IBW (47.7 kg))   Protein (g): 57-62(1.2-1.3 g/kg IBW (47.7kg))     Current Nutrition Therapies:  DIET RENAL Regular    Anthropometric Measures:  · Height:  5' 1\" (154.9 cm)  · Current Body Wt:  55.3 kg (121 lb 14.6 oz)(weight source not specified)   · Body mass index is 23.05 kg/m². · BMI Category:  Normal weight (BMI 22.0-24.9) age over 72     · Weight history per review of outpt office visits: it appears UBW ~122-123#. Last office visit was 10/28/2020 with weight of 122#. She did weight 114# 10/13/2019, which indicates weight gain.     Nutrition Diagnosis:   · Inadequate oral intake related to (poor appetite) as evidenced by (patient reported barrier to PO, diet recall)    Nutrition Interventions:   Food and/or Nutrient Delivery: Continue current diet, Start oral nutrition supplement  Coordination of Nutrition Care: (Discussed with [facility], RN)    Goals:  Meet at least 75% nutrition needs within 7 days       Nutrition Monitoring and Evaluation:   Food/Nutrient Intake Outcomes: Food and nutrient intake    Discharge Planning:    Continue oral nutrition supplement     [address], , LD on 11/5/2020 at 11:23 AM  Contact: [phone]

## 2020-11-05 NOTE — DIALYSIS
Disconnected PD cycler from patient. Betadine cap intact. Patient tolerated well. UF amount -136mls.  Effluent: clear, yellow

## 2020-11-05 NOTE — PROGRESS NOTES
Warfarin dosing per pharmacist    Heron Joaquin is a 65 y.o. female. Height: 5' 1\" (154.9 cm)    Weight: 55.3 kg (122 lb)    Indication:  Splenic infarcts    Goal INR:  2-3    Home dose:  New start    Risk factors or significant drug interactions:  metronidazole    Other anticoagulants:  Heparin    Daily Monitoring  Date  INR     Warfarin dose HGB              Notes  11/5  1.2  2.5 mg  10.7        Patient currently on heparin; bridging to warfarin. Will start with 2.5 mg tonight since patient weighs < 60 kg. Thank you,  Janine Swann, Pharm. D.   PGY1 Pharmacy Resident  [phone]

## 2020-11-06 LAB
BACTERIA SPEC CULT: NORMAL
BASOPHILS # BLD: 0.1 K/UL (ref 0–0.2)
BASOPHILS NFR BLD: 1 % (ref 0–2)
DIFFERENTIAL METHOD BLD: ABNORMAL
EOSINOPHIL # BLD: 0.2 K/UL (ref 0–0.8)
EOSINOPHIL NFR BLD: 2 % (ref 0.5–7.8)
ERYTHROCYTE [DISTWIDTH] IN BLOOD BY AUTOMATED COUNT: 13.3 % (ref 11.9–14.6)
GRAM STN SPEC: NORMAL
GRAM STN SPEC: NORMAL
HCT VFR BLD AUTO: 26.4 % (ref 35.8–46.3)
HGB BLD-MCNC: 9.1 G/DL (ref 11.7–15.4)
IMM GRANULOCYTES # BLD AUTO: 0.1 K/UL (ref 0–0.5)
IMM GRANULOCYTES NFR BLD AUTO: 1 % (ref 0–5)
INR PPP: 1.3
LYMPHOCYTES # BLD: 2.4 K/UL (ref 0.5–4.6)
LYMPHOCYTES NFR BLD: 27 % (ref 13–44)
MCH RBC QN AUTO: 32.2 PG (ref 26.1–32.9)
MCHC RBC AUTO-ENTMCNC: 34.5 G/DL (ref 31.4–35)
MCV RBC AUTO: 93.3 FL (ref 79.6–97.8)
MM INDURATION POC: 0 MM (ref 0–5)
MONOCYTES # BLD: 0.6 K/UL (ref 0.1–1.3)
MONOCYTES NFR BLD: 7 % (ref 4–12)
NEUTS SEG # BLD: 5.6 K/UL (ref 1.7–8.2)
NEUTS SEG NFR BLD: 63 % (ref 43–78)
NRBC # BLD: 0.02 K/UL (ref 0–0.2)
PLATELET # BLD AUTO: 172 K/UL (ref 150–450)
PMV BLD AUTO: 12.4 FL (ref 9.4–12.3)
PPD POC: NEGATIVE NEGATIVE
PROTHROMBIN TIME: 16 SEC (ref 12.5–14.7)
RBC # BLD AUTO: 2.83 M/UL (ref 4.05–5.2)
SERVICE CMNT-IMP: NORMAL
UFH PPP CHRO-ACNC: 0.38 IU/ML (ref 0.3–0.7)
UFH PPP CHRO-ACNC: 0.44 IU/ML (ref 0.3–0.7)
WBC # BLD AUTO: 9 K/UL (ref 4.3–11.1)

## 2020-11-06 PROCEDURE — 85520 HEPARIN ASSAY: CPT

## 2020-11-06 PROCEDURE — 85610 PROTHROMBIN TIME: CPT

## 2020-11-06 PROCEDURE — 85025 COMPLETE CBC W/AUTO DIFF WBC: CPT

## 2020-11-06 PROCEDURE — 90945 DIALYSIS ONE EVALUATION: CPT

## 2020-11-06 PROCEDURE — 36415 COLL VENOUS BLD VENIPUNCTURE: CPT

## 2020-11-06 PROCEDURE — 74011250637 HC RX REV CODE- 250/637: Performed by: INTERNAL MEDICINE

## 2020-11-06 PROCEDURE — 65660000000 HC RM CCU STEPDOWN

## 2020-11-06 PROCEDURE — 74011250636 HC RX REV CODE- 250/636: Performed by: INTERNAL MEDICINE

## 2020-11-06 PROCEDURE — 2709999900 HC NON-CHARGEABLE SUPPLY

## 2020-11-06 RX ORDER — ACETAMINOPHEN 325 MG/1
650 TABLET ORAL
Status: ON HOLD | COMMUNITY
End: 2020-11-11 | Stop reason: SDUPTHER

## 2020-11-06 RX ORDER — POTASSIUM CHLORIDE 20 MEQ/1
20 TABLET, EXTENDED RELEASE ORAL DAILY
Status: DISCONTINUED | OUTPATIENT
Start: 2020-11-07 | End: 2020-11-08

## 2020-11-06 RX ADMIN — Medication 10 ML: at 15:07

## 2020-11-06 RX ADMIN — ATORVASTATIN CALCIUM 80 MG: 80 TABLET, FILM COATED ORAL at 21:32

## 2020-11-06 RX ADMIN — METRONIDAZOLE 500 MG: 500 INJECTION, SOLUTION INTRAVENOUS at 05:31

## 2020-11-06 RX ADMIN — ASPIRIN 81 MG: 81 TABLET, COATED ORAL at 21:32

## 2020-11-06 RX ADMIN — WARFARIN SODIUM 2.5 MG: 2.5 TABLET ORAL at 17:22

## 2020-11-06 RX ADMIN — METRONIDAZOLE 500 MG: 500 INJECTION, SOLUTION INTRAVENOUS at 15:07

## 2020-11-06 RX ADMIN — METRONIDAZOLE 500 MG: 500 INJECTION, SOLUTION INTRAVENOUS at 21:43

## 2020-11-06 RX ADMIN — CIPROFLOXACIN 400 MG: 2 INJECTION, SOLUTION INTRAVENOUS at 11:46

## 2020-11-06 RX ADMIN — HEPARIN SODIUM AND DEXTROSE 9 UNITS/KG/HR: 5000; 5 INJECTION INTRAVENOUS at 11:58

## 2020-11-06 RX ADMIN — Medication 10 ML: at 21:43

## 2020-11-06 NOTE — PROGRESS NOTES
[facility] PROGRESS NOTE    Follow up for: ESRD onPD     Subjective:   Patient seen and examined. Chart, notes, labs, imaging, results all reviewed.      ROS:  Gen - no fever, no chills, appetite okay  CV - no chest pain, no orthopnea  Lung - no shortness of breath, no cough  Abd - no tenderness, no nausea, no vomiting  Ext - no edema    Objective:   Exam:  Vitals:    11/06/20 0552 11/06/20 0705 11/06/20 0800 11/06/20 1131   BP:  (!) 97/59  101/63   Pulse:  69 69 73   Resp:  18  18   Temp:  97.9 °F (36.6 °C)  97.6 °F (36.4 °C)   SpO2:  95%  96%   Weight: 58.1 kg (128 lb)      Height:             Intake/Output Summary (Last 24 hours) at 11/6/2020 1305  Last data filed at 11/6/2020 0829  Gross per 24 hour   Intake 310.33 ml   Output 539 ml   Net -228.67 ml       Current Facility-Administered Medications   Medication Dose Route Frequency    warfarin (COUMADIN) tablet 2.5 mg  2.5 mg Oral QPM    [Held by provider] amLODIPine (NORVASC) tablet 10 mg  10 mg Oral QHS    atorvastatin (LIPITOR) tablet 80 mg  80 mg Oral QHS    aspirin delayed-release tablet 81 mg  81 mg Oral QHS    metoprolol succinate (TOPROL-XL) XL tablet 50 mg  50 mg Oral QHS    polyethylene glycol (MIRALAX) packet 17 g  17 g Oral DAILY PRN    sodium chloride (NS) flush 5-40 mL  5-40 mL IntraVENous Q8H    sodium chloride (NS) flush 5-40 mL  5-40 mL IntraVENous PRN    acetaminophen (TYLENOL) tablet 650 mg  650 mg Oral Q6H PRN    Or    acetaminophen (TYLENOL) suppository 650 mg  650 mg Rectal Q6H PRN    polyethylene glycol (MIRALAX) packet 17 g  17 g Oral DAILY PRN    promethazine (PHENERGAN) tablet 12.5 mg  12.5 mg Oral Q6H PRN    Or    ondansetron (ZOFRAN) injection 4 mg  4 mg IntraVENous Q6H PRN    heparin 25,000 units in dextrose 500 mL infusion  18-36 Units/kg/hr IntraVENous TITRATE    ciprofloxacin (CIPRO) 400 mg in D5W IVPB (premix)  400 mg IntraVENous Q24H    metroNIDAZOLE (FLAGYL) IVPB premix 500 mg  500 mg IntraVENous Q8H EXAM  GEN - Alert, oriented, in no distress  CV - S1, S2, RRR, no rub, murmur, or gallop  Lung - clear to auscultation bilaterally  Abd - soft, nontender, BS present  Ext - no edema    Recent Labs     11/06/20  0255 11/05/20  0459 11/04/20  0755   WBC 9.0 9.4 9.4   HGB 9.1* 10.7* 11.5*   HCT 26.4* 32.0* 34.5*    191 230        Recent Labs     11/05/20  0459 11/04/20  0755   * 135*   K 3.0* 3.3*   CL 97* 95*   CO2 29 27   BUN 29* 30*   CREA 10.90* 11.90*   CA 8.3 9.0   * 198*       Assessment and Plan:     1. ESRD on PD  -PD with all 1.5% PD fluid, check cell count and cultures     2. Colitis- on IV flagyl, cipro per hosp     3. Abnormal abd CT with probable splenic infarcts- heparin with bridge to warfarin planned per primary     4. HTN/Volume stable    5.  Hypokalemia - Placed on daily dose of Ken Barrios MD

## 2020-11-06 NOTE — PROGRESS NOTES
Heparin drip decreased by 2 units/kg/hr. Per protocol results were 0.83. Running at 9 units/kg/ hr now.  Will order lab re-draw for [phone]

## 2020-11-06 NOTE — PROGRESS NOTES
Warfarin dosing per pharmacist    Deedee Perez is a 65 y.o. female. Height: 5' 1\" (154.9 cm)    Weight: 58.1 kg (128 lb)    Indication:  Splenic infarcts    Goal INR:  2-3    Home dose:  New start    Risk factors or significant drug interactions:  metronidazole    Other anticoagulants:  Heparin    Daily Monitoring  Date  INR     Warfarin dose HGB              Notes  11/5  1.2  2.5 mg  10.7    11/6  1.3  2.5 mg  9.1    Patient currently on heparin; bridging to warfarin. Will give another 2.5 mg tonight. Thank you,  Janine Swann, Pharm. D.   PGY1 Pharmacy Resident  [phone]

## 2020-11-06 NOTE — DIALYSIS
Disconnected PD cycler from patient. Betadine cap intact. Patient tolerated well. UF amount: 539 ml removed. Effluent: clear, yellow.

## 2020-11-06 NOTE — PROGRESS NOTES
END OF SHIFT NOTE:    INTAKE/OUTPUT  11/04 0701 - 11/05 0700  In: 284.8 [I.V.:284.8]  Out: -   Voiding: YES (OLIGURIA)  Catheter: NO  Drain:              Flatus: Patient does have flatus present. Stool:  0 occurrences. Characteristics:  Stool Assessment  Stool Appearance: Watery(per patient)    Emesis: 0 occurrences. Characteristics:   Emesis Assessment  Appearance: Undigested food, Yellow  Emesis Amount: Small    VITAL SIGNS  Patient Vitals for the past 12 hrs:   Temp Pulse Resp BP SpO2   11/05/20 1445 98.3 °F (36.8 °C) 88 17 112/65 96 %   11/05/20 1133 97.7 °F (36.5 °C) 86 20 115/71 93 %   11/05/20 0717 98.2 °F (36.8 °C) 87 20 110/67 99 %       Pain Assessment  Pain Intensity 1: 0 (11/05/20 1530)  Pain Location 1: Abdomen     Patient Stated Pain Goal: 0    Ambulating  Yes    Shift report given to oncoming nurse at the bedside.     Rolo Reyes

## 2020-11-06 NOTE — PROGRESS NOTES
Hospitalist Note     Admit Date:  2020  7:47 AM   Name:  Enid Liz   Age:  65 y.o.  :  1950   MRN:  [id number]   PCP:  Marcelle Brink MD  Treatment Team: Attending Provider: Jeannie Dallas MD; Consulting Provider: Shabnam Miller MD; Consulting Provider: Stephanie Arango MD; Utilization Review: Rangel Cruz RN; Care Manager: Qiana Alvarez; Primary Nurse: Julissa Alexander RN; Physical Therapist: Aramis Peres PT    HPI/Subjective: In summary, Enid Liz is a 65 y.o. female with medical history significant for ESRD on PD at home, COPD, GERD, PAD,  HTN, HLD, CAD who was seen for nausea/vomiting/diarrhea and found to have colitis, splenic infarcts and 3cm infrarenal AAA on CT. Vascular surgery was consulted. No immediate intervention was needed and to follow up as  Outpatient for arterial duplex. Patient was started on heparin gtt for splenic infarcts and started on IV Abx     - pt says she is still having watery brown stools. Mild lower quad abd cramping pain. No fevers, CP, SOB, melena, BRBPR    No other complaints  Objective:     Patient Vitals for the past 24 hrs:   Temp Pulse Resp BP SpO2   20 0800  69      20 0705 97.9 °F (36.6 °C) 69 18 (!) 97/59 95 %   20 0434 98.1 °F (36.7 °C) 81 18 105/88 96 %   20 2351 98 °F (36.7 °C) 79 18 93/87 97 %   20 2152  92  110/63    20 1924 98.3 °F (36.8 °C) 91 18 104/60 98 %   20 1445 98.3 °F (36.8 °C) 88 17 112/65 96 %     Oxygen Therapy  O2 Sat (%): 95 % (20 0705)  Pulse via Oximetry: 78 beats per minute (20 1101)  O2 Device: Room air (20 1530)    Estimated body mass index is 24.19 kg/m² as calculated from the following:    Height as of this encounter: 5' 1\" (1.549 m). Weight as of this encounter: 58.1 kg (128 lb).     Intake/Output Summary (Last 24 hours) at 2020 1141  Last data filed at 2020 0829  Gross per 24 hour   Intake 310.33 ml   Output 539 ml   Net -228.67 ml       *Note that automatically entered I/Os may not be accurate; dependent on patient compliance with collection and accurate  by techs. General:    Well nourished. No overt distress  CV:   RRR. No edema. No JVD  Lungs:   Even, Unlabored  Abdomen:   Mild TTP lower quadrants. nondistended. Extremities: Warm and dry. No cyanosis   Skin:     No rashes. No jaundice. Normal coloration  Neuro:  No gross focal deficits. Alert    Data Reviewed:  I have reviewed all labs, meds, and studies from the last 24 hours:  Recent Results (from the past 24 hour(s))   HEPARIN XA UFH    Collection Time: 11/05/20  1:38 PM   Result Value Ref Range    Heparin Xa UFH 0.74 (H) 0.3 - 0.7 IU/mL   PROTHROMBIN TIME + INR    Collection Time: 11/05/20  3:46 PM   Result Value Ref Range    Prothrombin time 15.8 (H) 12.5 - 14.7 sec    INR 1.2     PLEASE READ & DOCUMENT PPD TEST IN 24 HRS    Collection Time: 11/05/20  3:58 PM   Result Value Ref Range    PPD Negative Negative    mm Induration 0 0 - 5 mm   HEPARIN XA UFH    Collection Time: 11/05/20  9:25 PM   Result Value Ref Range    Heparin Xa UFH 0.83 (H) 0.3 - 0.7 IU/mL   CBC WITH AUTOMATED DIFF    Collection Time: 11/06/20  2:55 AM   Result Value Ref Range    WBC 9.0 4.3 - 11.1 K/uL    RBC 2.83 (L) 4.05 - 5.2 M/uL    HGB 9.1 (L) 11.7 - 15.4 g/dL    HCT 26.4 (L) 35.8 - 46.3 %    MCV 93.3 79.6 - 97.8 FL    MCH 32.2 26.1 - 32.9 PG    MCHC 34.5 31.4 - 35.0 g/dL    RDW 13.3 11.9 - 14.6 %    PLATELET 438 590 - 577 K/uL    MPV 12.4 (H) 9.4 - 12.3 FL    ABSOLUTE NRBC 0.02 0.0 - 0.2 K/uL    DF AUTOMATED      NEUTROPHILS 63 43 - 78 %    LYMPHOCYTES 27 13 - 44 %    MONOCYTES 7 4.0 - 12.0 %    EOSINOPHILS 2 0.5 - 7.8 %    BASOPHILS 1 0.0 - 2.0 %    IMMATURE GRANULOCYTES 1 0.0 - 5.0 %    ABS. NEUTROPHILS 5.6 1.7 - 8.2 K/UL    ABS. LYMPHOCYTES 2.4 0.5 - 4.6 K/UL    ABS. MONOCYTES 0.6 0.1 - 1.3 K/UL    ABS. EOSINOPHILS 0.2 0.0 - 0.8 K/UL    ABS. BASOPHILS 0.1 0.0 - 0.2 K/UL    ABS. IMM.  GRANS. 0.1 0.0 - 0.5 K/UL   HEPARIN XA UFH    Collection Time: 11/06/20  2:55 AM   Result Value Ref Range    Heparin Xa UFH 0.38 0.3 - 0.7 IU/mL   PROTHROMBIN TIME + INR    Collection Time: 11/06/20  2:55 AM   Result Value Ref Range    Prothrombin time 16.0 (H) 12.5 - 14.7 sec    INR 1.3     HEPARIN XA UFH    Collection Time: 11/06/20 10:25 AM   Result Value Ref Range    Heparin Xa UFH 0.44 0.3 - 0.7 IU/mL       Current Meds:  Current Facility-Administered Medications   Medication Dose Route Frequency    warfarin (COUMADIN) tablet 2.5 mg  2.5 mg Oral QPM    amLODIPine (NORVASC) tablet 10 mg  10 mg Oral QHS    atorvastatin (LIPITOR) tablet 80 mg  80 mg Oral QHS    aspirin delayed-release tablet 81 mg  81 mg Oral QHS    metoprolol succinate (TOPROL-XL) XL tablet 50 mg  50 mg Oral QHS    polyethylene glycol (MIRALAX) packet 17 g  17 g Oral DAILY PRN    sodium chloride (NS) flush 5-40 mL  5-40 mL IntraVENous Q8H    sodium chloride (NS) flush 5-40 mL  5-40 mL IntraVENous PRN    acetaminophen (TYLENOL) tablet 650 mg  650 mg Oral Q6H PRN    Or    acetaminophen (TYLENOL) suppository 650 mg  650 mg Rectal Q6H PRN    polyethylene glycol (MIRALAX) packet 17 g  17 g Oral DAILY PRN    promethazine (PHENERGAN) tablet 12.5 mg  12.5 mg Oral Q6H PRN    Or    ondansetron (ZOFRAN) injection 4 mg  4 mg IntraVENous Q6H PRN    heparin 25,000 units in dextrose 500 mL infusion  18-36 Units/kg/hr IntraVENous TITRATE    ciprofloxacin (CIPRO) 400 mg in D5W IVPB (premix)  400 mg IntraVENous Q24H    metroNIDAZOLE (FLAGYL) IVPB premix 500 mg  500 mg IntraVENous Q8H       Other Studies:  Results for orders placed or performed during the hospital encounter of 11/04/20   2D ECHO COMPLETE ADULT (TTE) W OR WO CONTR    Narrative    [place]  [facility] [address] [facility], [address] [facility]  [phone]    Transthoracic Echocardiogram  2D, M-mode, Doppler, and Color Doppler    Patient: Claudeen Gainer  MR #: [id number]  : 15-Osmar-1950  Age: 65 years  Gender: Female  Study date: 2020  Account #: [de-identified]  Height: 61 in  Weight: 121.7 lb  BSA: 1.53 mï¾²  Status:Routine  Location: 210  BP: 110/ 67    Allergies: CODEINE, HYDRALAZINE    Sonographer:  Ally Funes [facility]  Group:  [facility]  Referring Physician:  July Williamson MD  Reading Physician:  Nhung La MD    INDICATIONS: Splenic infarcts, eval for PFO/shunts. PROCEDURE: This was a routine study. A transthoracic echocardiogram was  performed. The study included complete 2D imaging, M-mode, complete spectral  Doppler, and color Doppler. Intravenous contrast (agitated saline) was  administered. Image quality was adequate. LEFT VENTRICLE: Size was normal. Systolic function was normal. Ejection  fraction was estimated in the range of 65 % to 70 %. There were no regional  wall motion abnormalities. There was moderate concentric hypertrophy. Avg.  E/e': 17.6. There is Left ventricular diastolic dysfunction. RIGHT VENTRICLE: The size was normal. Systolic function was normal. The  tricuspid jet envelope definition was inadequate for estimation of RV   systolic  pressure. LEFT ATRIUM: Size was normal.    ATRIAL SEPTUM: Contrast injection was performed. There was no right-to-left  shunt, with provocative maneuvers to increase right atrial pressure. RIGHT ATRIUM: Size was normal.    SYSTEMIC VEINS: IVC: The inferior vena cava was normal in size and course. AORTIC VALVE: The valve was structurally normal, tri-commissural. There was   no  evidence for stenosis. There was no insufficiency. MITRAL VALVE: Valve structure was normal. There was chordal MARTHA noted with a  mild peak outflow gradient ~15mmHg. There was no evidence for stenosis. There  was trivial regurgitation. TRICUSPID VALVE: The valve structure was normal. There was no evidence for  stenosis.  There was trivial regurgitation. PULMONIC VALVE: The valve structure was normal. There was no evidence for  stenosis. There was no insufficiency. PERICARDIUM: There was no pericardial effusion. AORTA: The root exhibited normal size. SUMMARY:    -  Left ventricle: Systolic function was normal. Ejection fraction was  estimated in the range of 65 % to 70 %. There were no regional wall motion  abnormalities. There was moderate concentric hypertrophy. -  Atrial septum: Contrast injection was performed. There was no   right-to-left  shunt, with provocative maneuvers to increase right atrial pressure. SYSTEM MEASUREMENT TABLES    2D mode  AoR Diam (2D): 2.8 cm  LA Dimension (2D): 3.1 cm  IVS/LVPW (2D): 1  IVSd (2D): 1.6 cm  LVIDd (2D): 3.1 cm  LVIDs (2D): 1.8 cm  LVOT Area (2D): 2.8 cm2  LVPWd (2D): 1.6 cm    Unspecified Scan Mode  Peak Grad; Mean; Antegrade Flow: 11 mm[Hg]  Vmax; Antegrade Flow: 164 cm/s  LVOT Diam: 1.9 cm    Prepared and signed by    Marti Stratton MD  Signed [id number] 13:11:30         No results found. All Micro Results     Procedure Component Value Units Date/Time    CULTURE, BODY FLUID Aimee Nadira [[id number]] Collected:  11/04/20 1543    Order Status:  Completed Specimen: Body Fluid from Peritoneal Fluid Updated:  11/06/20 0850     Special Requests: NO SPECIAL REQUESTS        GRAM STAIN NO WBC'S SEEN         NO DEFINITE ORGANISM SEEN        Culture result: NO GROWTH 2 DAYS       C. DIFFICILE/EPI PCR [[id number]] Collected:  11/04/20 2057    Order Status:  Completed Specimen:  Stool Updated:  11/04/20 6648     Special Requests: NO SPECIAL REQUESTS        Culture result:       Toxigenic C. difficile NEGATIVE                         C. difficile target DNA sequences are not detected.                 SARS-CoV-2 Lab Results  \"Novel Coronavirus\" Test: No results found for: COV2NT   \"Emergent Disease\" Test: No results found for: EDPR  \"SARS-COV-2\" Test: No results found for: XGCOVT  Rapid Test: No results found for: COVR         Assessment and Plan:     Hospital Problems as of 11/6/2020 Date Reviewed: 10/28/2020          Codes Class Noted - Resolved POA    * (Principal) Colitis ICD-10-CM: K52.9  ICD-9-CM: 558.9  11/4/2020 - Present Yes        Splenic infarct ICD-10-CM: D73.5  ICD-9-CM: 289.59  11/4/2020 - Present Yes        Nausea vomiting and diarrhea ICD-10-CM: R11.2, R19.7  ICD-9-CM: 787.91, 787.01  11/4/2020 - Present Yes        ESRD on peritoneal dialysis [facility]) ICD-10-CM: N18.6, Z99.2  ICD-9-CM: 585.6, V45.11  7/29/2019 - Present Yes        PAD (peripheral artery disease) ([facility] [address] ICD-10-CM: I73.9  ICD-9-CM: 443.9  6/19/2019 - Present Yes        History of right-sided carotid endarterectomy ICD-10-CM: Z98.890  ICD-9-CM: V45.89  11/8/2018 - Present Yes        Pure hypercholesterolemia ICD-10-CM: E78.00  ICD-9-CM: 272.0  11/28/2017 - Present Yes        S/P coronary artery stent placement ICD-10-CM: Z95.5  ICD-9-CM: V45.82  2/6/2017 - Present Yes        Anemia of renal disease ICD-10-CM: N18.9, D63.1  ICD-9-CM: 285.21  1/7/2016 - Present Yes        CVA (cerebral vascular accident) [facility]) ICD-10-CM: I63.9  ICD-9-CM: 434.91  4/28/2012 - Present Yes              Plan:  Acute Colitis  - cipro/flagyl  - cdiff pending  -check FOBT     Splenic Infarcts  - echo unremarkable  - heparin gtt (ok by vascular). Bridge to warfarin. pharmD managing    Anemia  -check iron studies  -Check FOBT  -monitor     3cm infrarenal AAA   -Vascular recommends no immediate intervention. Out patient follow up     ESRD on PD  -Nephrology on board for dialysus     HTN // PAD :   -hold norvasc; BP too low this morning    DC planning/Dispo:    -PT/OT rec home health    Diet:  DIET RENAL  DIET NUTRITIONAL SUPPLEMENTS  DVT ppx: On AC    Other listed chronic conditions stable, cont current management.     Signed:  Elsie Thompson MD

## 2020-11-06 NOTE — PROGRESS NOTES
Heparin drip rate has no rate change. Per protocol results were 0.38. Running at 9 units/kg/ hr. Will order lab re-draw for 1000. This is the first WNL result.

## 2020-11-06 NOTE — PROGRESS NOTES
END OF SHIFT NOTE:    INTAKE/OUTPUT  11/05 0701 - 11/06 0700  In: 310.3 [I.V.:310.3]  Out: 136   Voiding: YES  Catheter: NO  Drain:              Flatus: Patient does have flatus present. Stool:  0 occurrences. Characteristics:  Stool Assessment  Stool Appearance: Watery(per patient)  Stool Amount: Smear(a smear in the brief, not in the rectal area)    Emesis: 0 occurrences. VITAL SIGNS  Patient Vitals for the past 12 hrs:   Temp Pulse Resp BP SpO2   11/06/20 0705 97.9 °F (36.6 °C) 69 18 (!) 97/59 95 %   11/06/20 0434 98.1 °F (36.7 °C) 81 18 105/88 96 %   11/05/20 2351 98 °F (36.7 °C) 79 18 93/87 97 %   11/05/20 2152  92  110/63        Pain Assessment  Pain Intensity 1: 0 (11/05/20 1935)  Pain Location 1: Abdomen     Patient Stated Pain Goal: 0    Ambulating  Yes    Shift report given to oncoming nurse at the bedside.     [address]

## 2020-11-07 PROBLEM — Z99.2 ESRD ON PERITONEAL DIALYSIS (HCC): Chronic | Status: ACTIVE | Noted: 2019-07-29

## 2020-11-07 PROBLEM — Z98.890 HISTORY OF RIGHT-SIDED CAROTID ENDARTERECTOMY: Chronic | Status: ACTIVE | Noted: 2018-11-08

## 2020-11-07 PROBLEM — E78.00 PURE HYPERCHOLESTEROLEMIA: Chronic | Status: ACTIVE | Noted: 2017-11-28

## 2020-11-07 PROBLEM — Z95.5 S/P CORONARY ARTERY STENT PLACEMENT: Chronic | Status: ACTIVE | Noted: 2017-02-06

## 2020-11-07 PROBLEM — I73.9 PAD (PERIPHERAL ARTERY DISEASE) (HCC): Chronic | Status: ACTIVE | Noted: 2019-06-19

## 2020-11-07 PROBLEM — N18.6 ESRD ON PERITONEAL DIALYSIS (HCC): Chronic | Status: ACTIVE | Noted: 2019-07-29

## 2020-11-07 LAB
BASOPHILS # BLD: 0 K/UL (ref 0–0.2)
BASOPHILS NFR BLD: 0 % (ref 0–2)
DIFFERENTIAL METHOD BLD: ABNORMAL
EOSINOPHIL # BLD: 0.2 K/UL (ref 0–0.8)
EOSINOPHIL NFR BLD: 2 % (ref 0.5–7.8)
ERYTHROCYTE [DISTWIDTH] IN BLOOD BY AUTOMATED COUNT: 13.4 % (ref 11.9–14.6)
FERRITIN SERPL-MCNC: 4766 NG/ML (ref 8–388)
HCT VFR BLD AUTO: 28.1 % (ref 35.8–46.3)
HEMOCCULT STL QL: NEGATIVE
HGB BLD-MCNC: 9.2 G/DL (ref 11.7–15.4)
HGB BLD-MCNC: 9.4 G/DL (ref 11.7–15.4)
IMM GRANULOCYTES # BLD AUTO: 0.1 K/UL (ref 0–0.5)
IMM GRANULOCYTES NFR BLD AUTO: 2 % (ref 0–5)
INR PPP: 1.3
IRON SATN MFR SERPL: 76 %
IRON SERPL-MCNC: 141 UG/DL (ref 35–150)
LYMPHOCYTES # BLD: 2.1 K/UL (ref 0.5–4.6)
LYMPHOCYTES NFR BLD: 24 % (ref 13–44)
MCH RBC QN AUTO: 31.2 PG (ref 26.1–32.9)
MCHC RBC AUTO-ENTMCNC: 33.5 G/DL (ref 31.4–35)
MCV RBC AUTO: 93.4 FL (ref 79.6–97.8)
MONOCYTES # BLD: 0.5 K/UL (ref 0.1–1.3)
MONOCYTES NFR BLD: 6 % (ref 4–12)
NEUTS SEG # BLD: 5.6 K/UL (ref 1.7–8.2)
NEUTS SEG NFR BLD: 66 % (ref 43–78)
NRBC # BLD: 0 K/UL (ref 0–0.2)
PLATELET # BLD AUTO: 176 K/UL (ref 150–450)
PMV BLD AUTO: 12 FL (ref 9.4–12.3)
PROTHROMBIN TIME: 16.4 SEC (ref 12.5–14.7)
RBC # BLD AUTO: 3.01 M/UL (ref 4.05–5.2)
TIBC SERPL-MCNC: 186 UG/DL (ref 250–450)
UFH PPP CHRO-ACNC: 0.28 IU/ML (ref 0.3–0.7)
UFH PPP CHRO-ACNC: 0.44 IU/ML (ref 0.3–0.7)
WBC # BLD AUTO: 8.5 K/UL (ref 4.3–11.1)

## 2020-11-07 PROCEDURE — 85018 HEMOGLOBIN: CPT

## 2020-11-07 PROCEDURE — 74011250637 HC RX REV CODE- 250/637: Performed by: INTERNAL MEDICINE

## 2020-11-07 PROCEDURE — 2709999900 HC NON-CHARGEABLE SUPPLY

## 2020-11-07 PROCEDURE — 83540 ASSAY OF IRON: CPT

## 2020-11-07 PROCEDURE — 90945 DIALYSIS ONE EVALUATION: CPT

## 2020-11-07 PROCEDURE — 74011250636 HC RX REV CODE- 250/636: Performed by: INTERNAL MEDICINE

## 2020-11-07 PROCEDURE — 65660000000 HC RM CCU STEPDOWN

## 2020-11-07 PROCEDURE — 82728 ASSAY OF FERRITIN: CPT

## 2020-11-07 PROCEDURE — 74011000258 HC RX REV CODE- 258: Performed by: INTERNAL MEDICINE

## 2020-11-07 PROCEDURE — 85610 PROTHROMBIN TIME: CPT

## 2020-11-07 PROCEDURE — 85520 HEPARIN ASSAY: CPT

## 2020-11-07 PROCEDURE — 0097U GASTROINTESTINAL PROFILE, STOOL, PCR: CPT

## 2020-11-07 PROCEDURE — 36415 COLL VENOUS BLD VENIPUNCTURE: CPT

## 2020-11-07 PROCEDURE — 82272 OCCULT BLD FECES 1-3 TESTS: CPT

## 2020-11-07 PROCEDURE — 85025 COMPLETE CBC W/AUTO DIFF WBC: CPT

## 2020-11-07 RX ORDER — WARFARIN 2 MG/1
4 TABLET ORAL ONCE
Status: COMPLETED | OUTPATIENT
Start: 2020-11-07 | End: 2020-11-08

## 2020-11-07 RX ORDER — HEPARIN SODIUM 5000 [USP'U]/ML
20 INJECTION, SOLUTION INTRAVENOUS; SUBCUTANEOUS ONCE
Status: COMPLETED | OUTPATIENT
Start: 2020-11-07 | End: 2020-11-07

## 2020-11-07 RX ORDER — WARFARIN 2.5 MG/1
2.5 TABLET ORAL EVERY EVENING
Status: DISCONTINUED | OUTPATIENT
Start: 2020-11-08 | End: 2020-11-08

## 2020-11-07 RX ADMIN — Medication 10 ML: at 18:47

## 2020-11-07 RX ADMIN — CEFTRIAXONE SODIUM 1 G: 1 INJECTION, POWDER, FOR SOLUTION INTRAMUSCULAR; INTRAVENOUS at 14:30

## 2020-11-07 RX ADMIN — CIPROFLOXACIN 400 MG: 2 INJECTION, SOLUTION INTRAVENOUS at 11:43

## 2020-11-07 RX ADMIN — METRONIDAZOLE 500 MG: 500 INJECTION, SOLUTION INTRAVENOUS at 14:29

## 2020-11-07 RX ADMIN — METOPROLOL SUCCINATE 50 MG: 50 TABLET, EXTENDED RELEASE ORAL at 21:46

## 2020-11-07 RX ADMIN — Medication 10 ML: at 21:47

## 2020-11-07 RX ADMIN — HEPARIN SODIUM 1150 UNITS: 5000 INJECTION INTRAVENOUS; SUBCUTANEOUS at 05:41

## 2020-11-07 RX ADMIN — METRONIDAZOLE 500 MG: 500 INJECTION, SOLUTION INTRAVENOUS at 21:46

## 2020-11-07 RX ADMIN — Medication 10 ML: at 05:58

## 2020-11-07 RX ADMIN — METRONIDAZOLE 500 MG: 500 INJECTION, SOLUTION INTRAVENOUS at 05:52

## 2020-11-07 RX ADMIN — PROMETHAZINE HYDROCHLORIDE 12.5 MG: 25 TABLET ORAL at 22:04

## 2020-11-07 RX ADMIN — POTASSIUM CHLORIDE 20 MEQ: 1500 TABLET, EXTENDED RELEASE ORAL at 09:13

## 2020-11-07 RX ADMIN — ATORVASTATIN CALCIUM 80 MG: 80 TABLET, FILM COATED ORAL at 21:46

## 2020-11-07 NOTE — PROGRESS NOTES
Warfarin dosing per pharmacist    Rhea Don is a 65 y.o. female. Height: 5' 1\" (154.9 cm)    Weight: 58.1 kg (128 lb)    Indication:  Splenic infarcts    Goal INR:  2-3    Home dose:  New start    Risk factors or significant drug interactions:  Metronidazole, ciprofloxacin    Other anticoagulants:  Heparin    Daily Monitoring  Date  INR     Warfarin dose HGB              Notes  11/5  1.2  2.5 mg  10.7    11/6  1.3  2.5 mg  9.1  11/7  1.3  4 mg  9.4     Patient currently on heparin; bridging to warfarin. Increase to 4 mg this evening. Continue to monitor closely given expected drug interaction with metronidazole and ciprofloxacin. Thank you,  Cy Oliveira, Pharm. D.   Clinical Pharmacist  [phone]

## 2020-11-07 NOTE — PROGRESS NOTES
Patient has not eaten very much food since admission according to the chart/patient. Will place a nutrition consult.

## 2020-11-07 NOTE — DIALYSIS
Disconnected PD cycler from patient. Betadine cap intact. Patient tolerated well. UF amount -626mls.  Effluent: clear

## 2020-11-07 NOTE — PROGRESS NOTES
Hospitalist Note     Admit Date:  2020  7:47 AM   Name:  Alejandro Tejada   Age:  65 y.o.  :  1950   MRN:  [id number]   PCP:  Derrick Barajas MD  Treatment Team: Attending Provider: Ting Weldon MD; Consulting Provider: Nadia Negrete MD; Consulting Provider: Elmo Mock MD; Utilization Review: Melinda Olivas RN; Care Manager: Soraya Chandler; Primary Nurse: Sima Arita RN    HPI/Subjective: In summary, Alejandro Tejada is a 65 y.o. female with medical history significant for ESRD on PD at home, COPD, GERD, PAD,  HTN, HLD, CAD who was seen for nausea/vomiting/diarrhea and found to have colitis, splenic infarcts and 3cm infrarenal AAA on CT. Vascular surgery was consulted. No immediate intervention was needed and to follow up as  Outpatient for arterial duplex. Patient was started on heparin gtt for splenic infarcts and started on IV Abx   - pt says she is still having watery brown stools. Mild lower quad abd cramping pain. No fevers, CP, SOB, melena, BRBPR     - pt denies abd pain, n/v.  Still some loose stools. Encouraged PO intake, says she doesn't have much appetite. No fevers. No other complaints  Objective:     Patient Vitals for the past 24 hrs:   Temp Pulse Resp BP SpO2   20 0350 98 °F (36.7 °C) 81 18 94/68 98 %   20 0009 98.1 °F (36.7 °C) 75 18 95/67 95 %   20 1928 97.7 °F (36.5 °C) 74 18 107/64 98 %   20 1842    115/60    20 1527 97.6 °F (36.4 °C) 94 18 (!) 80/50 94 %   20 1131 97.6 °F (36.4 °C) 73 18 101/63 96 %     Oxygen Therapy  O2 Sat (%): 98 % (20 0350)  Pulse via Oximetry: 78 beats per minute (20 1101)  O2 Device: Room air (20 1530)    Estimated body mass index is 24.19 kg/m² as calculated from the following:    Height as of this encounter: 5' 1\" (1.549 m). Weight as of this encounter: 58.1 kg (128 lb).     Intake/Output Summary (Last 24 hours) at 2020 5341  Last data filed at 11/7/2020 9402  Gross per 24 hour   Intake 718.52 ml   Output 589 ml   Net 129.52 ml       *Note that automatically entered I/Os may not be accurate; dependent on patient compliance with collection and accurate  by techs. General:    Well nourished. No overt distress  CV:   RRR. No edema. No JVD  Lungs:   Even, Unlabored  Abdomen:   Soft, nontender, nondistended. Extremities: Warm and dry. No cyanosis   Skin:     No rashes. No jaundice. Normal coloration  Neuro:  No gross focal deficits. Alert    Data Reviewed:  I have reviewed all labs, meds, and studies from the last 24 hours:  Recent Results (from the past 24 hour(s))   HEPARIN XA UFH    Collection Time: 11/06/20 10:25 AM   Result Value Ref Range    Heparin Xa UFH 0.44 0.3 - 0.7 IU/mL   PLEASE READ & DOCUMENT PPD TEST IN 48 HRS    Collection Time: 11/06/20  3:30 PM   Result Value Ref Range    PPD Negative Negative    mm Induration 0 0 - 5 mm   OCCULT BLOOD, STOOL    Collection Time: 11/07/20  2:58 AM   Result Value Ref Range    Occult blood, stool Negative NEG     CBC WITH AUTOMATED DIFF    Collection Time: 11/07/20  3:45 AM   Result Value Ref Range    WBC 8.5 4.3 - 11.1 K/uL    RBC 3.01 (L) 4.05 - 5.2 M/uL    HGB 9.4 (L) 11.7 - 15.4 g/dL    HCT 28.1 (L) 35.8 - 46.3 %    MCV 93.4 79.6 - 97.8 FL    MCH 31.2 26.1 - 32.9 PG    MCHC 33.5 31.4 - 35.0 g/dL    RDW 13.4 11.9 - 14.6 %    PLATELET 513 879 - 873 K/uL    MPV 12.0 9.4 - 12.3 FL    ABSOLUTE NRBC 0.00 0.0 - 0.2 K/uL    DF AUTOMATED      NEUTROPHILS 66 43 - 78 %    LYMPHOCYTES 24 13 - 44 %    MONOCYTES 6 4.0 - 12.0 %    EOSINOPHILS 2 0.5 - 7.8 %    BASOPHILS 0 0.0 - 2.0 %    IMMATURE GRANULOCYTES 2 0.0 - 5.0 %    ABS. NEUTROPHILS 5.6 1.7 - 8.2 K/UL    ABS. LYMPHOCYTES 2.1 0.5 - 4.6 K/UL    ABS. MONOCYTES 0.5 0.1 - 1.3 K/UL    ABS. EOSINOPHILS 0.2 0.0 - 0.8 K/UL    ABS. BASOPHILS 0.0 0.0 - 0.2 K/UL    ABS. IMM.  GRANS. 0.1 0.0 - 0.5 K/UL   PROTHROMBIN TIME + INR    Collection Time: 11/07/20  3:45 AM   Result Value Ref Range    Prothrombin time 16.4 (H) 12.5 - 14.7 sec    INR 1.3     FERRITIN    Collection Time: 11/07/20  3:45 AM   Result Value Ref Range    Ferritin 4,766 (H) 8 - 388 NG/ML   TRANSFERRIN SATURATION    Collection Time: 11/07/20  3:45 AM   Result Value Ref Range    Iron 141 35 - 150 ug/dL    TIBC 186 (L) 250 - 450 ug/dL    Transferrin Saturation 76 >20 %   HEPARIN XA UFH    Collection Time: 11/07/20  3:45 AM   Result Value Ref Range    Heparin Xa UFH 0.28 (L) 0.3 - 0.7 IU/mL       Current Meds:  Current Facility-Administered Medications   Medication Dose Route Frequency    potassium chloride (K-DUR, KLOR-CON) SR tablet 20 mEq  20 mEq Oral DAILY    warfarin (COUMADIN) tablet 2.5 mg  2.5 mg Oral QPM    [Held by provider] amLODIPine (NORVASC) tablet 10 mg  10 mg Oral QHS    atorvastatin (LIPITOR) tablet 80 mg  80 mg Oral QHS    aspirin delayed-release tablet 81 mg  81 mg Oral QHS    metoprolol succinate (TOPROL-XL) XL tablet 50 mg  50 mg Oral QHS    polyethylene glycol (MIRALAX) packet 17 g  17 g Oral DAILY PRN    sodium chloride (NS) flush 5-40 mL  5-40 mL IntraVENous Q8H    sodium chloride (NS) flush 5-40 mL  5-40 mL IntraVENous PRN    acetaminophen (TYLENOL) tablet 650 mg  650 mg Oral Q6H PRN    Or    acetaminophen (TYLENOL) suppository 650 mg  650 mg Rectal Q6H PRN    polyethylene glycol (MIRALAX) packet 17 g  17 g Oral DAILY PRN    promethazine (PHENERGAN) tablet 12.5 mg  12.5 mg Oral Q6H PRN    Or    ondansetron (ZOFRAN) injection 4 mg  4 mg IntraVENous Q6H PRN    heparin 25,000 units in dextrose 500 mL infusion  18-36 Units/kg/hr IntraVENous TITRATE    ciprofloxacin (CIPRO) 400 mg in D5W IVPB (premix)  400 mg IntraVENous Q24H    metroNIDAZOLE (FLAGYL) IVPB premix 500 mg  500 mg IntraVENous Q8H       Other Studies:  Results for orders placed or performed during the hospital encounter of 11/04/20   2D ECHO COMPLETE ADULT (TTE) [address] [place] [address] Formerly [facility], [facility] [address] [facility], [address] [facility]  [phone]    Transthoracic Echocardiogram  2D, M-mode, Doppler, and Color Doppler    Patient: Mela De La Cruz  MR #: [id number]  : 15-Osmar-1950  Age: 65 years  Gender: Female  Study date: 2020  Account #: [de-identified]  Height: 61 in  Weight: 121.7 lb  BSA: 1.53 mï¾²  Status:Routine  Location: 210  BP: 110/ 67    Allergies: CODEINE, HYDRALAZINE    Sonographer:  Kera Miranda RDCS  Group:  [facility] [[email or web address] Cardiology  Referring Physician:  Greer Yoon MD  Reading Physician:  James Fox MD    INDICATIONS: Splenic infarcts, eval for PFO/shunts. PROCEDURE: This was a routine study. A transthoracic echocardiogram was  performed. The study included complete 2D imaging, M-mode, complete spectral  Doppler, and color Doppler. Intravenous contrast (agitated saline) was  administered. Image quality was adequate. LEFT VENTRICLE: Size was normal. Systolic function was normal. Ejection  fraction was estimated in the range of 65 % to 70 %. There were no regional  wall motion abnormalities. There was moderate concentric hypertrophy. Avg.  E/e': 17.6. There is Left ventricular diastolic dysfunction. RIGHT VENTRICLE: The size was normal. Systolic function was normal. The  tricuspid jet envelope definition was inadequate for estimation of RV   systolic  pressure. LEFT ATRIUM: Size was normal.    ATRIAL SEPTUM: Contrast injection was performed. There was no right-to-left  shunt, with provocative maneuvers to increase right atrial pressure. RIGHT ATRIUM: Size was normal.    SYSTEMIC VEINS: IVC: The inferior vena cava was normal in size and course. AORTIC VALVE: The valve was structurally normal, tri-commissural. There was   no  evidence for stenosis. There was no insufficiency. MITRAL VALVE: Valve structure was normal. There was chordal MARTHA noted with a  mild peak outflow gradient ~15mmHg.  There was no evidence for stenosis. There  was trivial regurgitation. TRICUSPID VALVE: The valve structure was normal. There was no evidence for  stenosis. There was trivial regurgitation. PULMONIC VALVE: The valve structure was normal. There was no evidence for  stenosis. There was no insufficiency. PERICARDIUM: There was no pericardial effusion. AORTA: The root exhibited normal size. SUMMARY:    -  Left ventricle: Systolic function was normal. Ejection fraction was  estimated in the range of 65 % to 70 %. There were no regional wall motion  abnormalities. There was moderate concentric hypertrophy. -  Atrial septum: Contrast injection was performed. There was no   right-to-left  shunt, with provocative maneuvers to increase right atrial pressure. SYSTEM MEASUREMENT TABLES    2D mode  AoR Diam (2D): 2.8 cm  LA Dimension (2D): 3.1 cm  IVS/LVPW (2D): 1  IVSd (2D): 1.6 cm  LVIDd (2D): 3.1 cm  LVIDs (2D): 1.8 cm  LVOT Area (2D): 2.8 cm2  LVPWd (2D): 1.6 cm    Unspecified Scan Mode  Peak Grad; Mean; Antegrade Flow: 11 mm[Hg]  Vmax; Antegrade Flow: 164 cm/s  LVOT Diam: 1.9 cm    Prepared and signed by    Yasmin Valdes MD  Signed [id number] 13:11:30         No results found. All Micro Results     Procedure Component Value Units Date/Time    CULTURE, BODY FLUID Olena Dub [[id number]] Collected:  11/04/20 1543    Order Status:  Completed Specimen: Body Fluid from Peritoneal Fluid Updated:  11/06/20 0850     Special Requests: NO SPECIAL REQUESTS        GRAM STAIN NO WBC'S SEEN         NO DEFINITE ORGANISM SEEN        Culture result: NO GROWTH 2 DAYS       C. DIFFICILE/EPI PCR [[id number]] Collected:  11/04/20 0876    Order Status:  Completed Specimen:  Stool Updated:  11/04/20 1055     Special Requests: NO SPECIAL REQUESTS        Culture result:       Toxigenic C. difficile NEGATIVE                         C. difficile target DNA sequences are not detected.                 SARS-CoV-2 Lab Results  \"Novel Coronavirus\" Test: No results found for: COV2NT   \"Emergent Disease\" Test: No results found for: EDPR  \"SARS-COV-2\" Test: No results found for: XGCOVT  Rapid Test: No results found for: COVR         Assessment and Plan:     Hospital Problems as of 11/7/2020 Date Reviewed: 10/28/2020          Codes Class Noted - Resolved POA    * (Principal) Colitis ICD-10-CM: K52.9  ICD-9-CM: 558.9  11/4/2020 - Present Yes        Splenic infarct ICD-10-CM: D73.5  ICD-9-CM: 289.59  11/4/2020 - Present Yes        Nausea vomiting and diarrhea ICD-10-CM: R11.2, R19.7  ICD-9-CM: 787.91, 787.01  11/4/2020 - Present Yes        ESRD on peritoneal dialysis [facility]) ICD-10-CM: N18.6, Z99.2  ICD-9-CM: 585.6, V45.11  7/29/2019 - Present Yes        PAD (peripheral artery disease) ([facility] [address] ICD-10-CM: I73.9  ICD-9-CM: 443.9  6/19/2019 - Present Yes        History of right-sided carotid endarterectomy ICD-10-CM: Z98.890  ICD-9-CM: V45.89  11/8/2018 - Present Yes        Pure hypercholesterolemia ICD-10-CM: E78.00  ICD-9-CM: 272.0  11/28/2017 - Present Yes        S/P coronary artery stent placement ICD-10-CM: Z95.5  ICD-9-CM: V45.82  2/6/2017 - Present Yes        Anemia of renal disease ICD-10-CM: N18.9, D63.1  ICD-9-CM: 285.21  1/7/2016 - Present Yes        CVA (cerebral vascular accident) [facility]) ICD-10-CM: I63.9  ICD-9-CM: 434.91  4/28/2012 - Present Yes              Plan:  Acute Colitis  - cipro/flagyl  -cdiff neg    Splenic Infarcts  - echo unremarkable  - heparin gtt (ok by vascular). Bridge to warfarin. pharmD managing. Main barrier to DC at this point. Cannot bridge with lovenox    Anemia  -likely CKD  -iron studies ok  -FOBT neg  -monitor     3cm infrarenal AAA, PAD  -Vascular recommends no immediate intervention. Out patient follow up; vascular will schedule     ESRD on PD  -Nephrology on board for dialysis     HTN :   - low albeit asymptomatic.  likely will not resume norvasc at OR.     OR planning/Dispo:    -PT/OT rec home health    Diet:  DIET RENAL  DIET NUTRITIONAL SUPPLEMENTS  DVT ppx: On AC    Other listed chronic conditions stable, cont current management.     Signed:  Rizwana Calvin MD

## 2020-11-07 NOTE — PROGRESS NOTES
END OF SHIFT NOTE:    INTAKE/OUTPUT  11/06 0701 - 11/07 0700  In: 726.5 [P.O.:22; I.V.:704.5]  Out: 589 [Urine:50]  Voiding: YES  Catheter: NO  Drain:        Flatus: Patient does have flatus present. Stool:  1 occurrences. Characteristics:  Stool Assessment  Stool Color: Rogerio Lennon, Green(sample sent to lab)  Stool Appearance: Soft  Stool Amount: Small  Stool Source/Status: Rectum    Emesis: 0 occurrences. VITAL SIGNS  Patient Vitals for the past 12 hrs:   Temp Pulse Resp BP SpO2   11/07/20 0350 98 °F (36.7 °C) 81 18 94/68 98 %   11/07/20 0009 98.1 °F (36.7 °C) 75 18 95/67 95 %   11/06/20 1928 97.7 °F (36.5 °C) 74 18 107/64 98 %       Pain Assessment  Pain Intensity 1: 0 (11/06/20 2005)  Pain Location 1: Abdomen     Patient Stated Pain Goal: 0    Ambulating  Yes    Shift report to be given to oncoming nurse at the bedside.     [address] [facility] [place]

## 2020-11-07 NOTE — PROGRESS NOTES
END OF SHIFT NOTE:    Patient not eating very well. INTAKE/OUTPUT  11/05 0701 - 11/06 0700  In: 310.3 [I.V.:310.3]  Out: 136   Voiding: YES  Catheter: NO  Drain:      Flatus: Patient does have flatus present. Stool:  1 occurrences. Characteristics:  Stool Assessment  Stool Color: Corlis Jumper  Stool Appearance: Formed  Stool Amount: Small  Stool Source/Status: Rectum    Emesis: 0 occurrences. Characteristics:   Emesis Assessment  Appearance: Undigested food, Yellow  Emesis Amount: Small    VITAL SIGNS  Patient Vitals for the past 12 hrs:   Temp Pulse Resp BP SpO2   11/06/20 1928 97.7 °F (36.5 °C) 74 18 107/64 98 %   11/06/20 1842    115/60    11/06/20 1527 97.6 °F (36.4 °C) 94 18 (!) 80/50 94 %   11/06/20 1131 97.6 °F (36.4 °C) 73 18 101/63 96 %       Pain Assessment  Pain Intensity 1: 0 (11/06/20 1234)  Pain Location 1: Abdomen     Patient Stated Pain Goal: 0    Ambulating  Yes; to chair and bathroom. Shift report given to oncoming nurse at the bedside.     Dmitry Marquez RN

## 2020-11-07 NOTE — PROGRESS NOTES
[facility] PROGRESS NOTE    Follow up for: ESRD onPD     Subjective:   Patient seen and examined. Chart, notes, labs, imaging, results all reviewed.      ROS:  Gen - no fever, no chills, appetite okay  CV - no chest pain, no orthopnea  Lung - no shortness of breath, no cough  Abd - no tenderness, no nausea, no vomiting  Ext - no edema    Objective:   Exam:  Vitals:    11/07/20 0009 11/07/20 0350 11/07/20 0800 11/07/20 1105   BP: 95/67 94/68 (!) 113/55 (!) 124/59   Pulse: 75 81 79 81   Resp: 18 18 19 17   Temp: 98.1 °F (36.7 °C) 98 °F (36.7 °C) 97.6 °F (36.4 °C) 97.6 °F (36.4 °C)   SpO2: 95% 98% 100% 99%   Weight:       Height:             Intake/Output Summary (Last 24 hours) at 11/7/2020 1357  Last data filed at 11/7/2020 1323  Gross per 24 hour   Intake 716.52 ml   Output 726 ml   Net -9.48 ml       Current Facility-Administered Medications   Medication Dose Route Frequency    [START ON 11/8/2020] warfarin (COUMADIN) tablet 2.5 mg  2.5 mg Oral QPM    warfarin (COUMADIN) tablet 4 mg  4 mg Oral ONCE    cefTRIAXone (ROCEPHIN) 1 g in 0.9% sodium chloride (MBP/ADV) 50 mL  1 g IntraVENous Q24H    potassium chloride (K-DUR, KLOR-CON) SR tablet 20 mEq  20 mEq Oral DAILY    [Held by provider] amLODIPine (NORVASC) tablet 10 mg  10 mg Oral QHS    atorvastatin (LIPITOR) tablet 80 mg  80 mg Oral QHS    [Held by provider] aspirin delayed-release tablet 81 mg  81 mg Oral QHS    metoprolol succinate (TOPROL-XL) XL tablet 50 mg  50 mg Oral QHS    polyethylene glycol (MIRALAX) packet 17 g  17 g Oral DAILY PRN    sodium chloride (NS) flush 5-40 mL  5-40 mL IntraVENous Q8H    sodium chloride (NS) flush 5-40 mL  5-40 mL IntraVENous PRN    acetaminophen (TYLENOL) tablet 650 mg  650 mg Oral Q6H PRN    Or    acetaminophen (TYLENOL) suppository 650 mg  650 mg Rectal Q6H PRN    polyethylene glycol (MIRALAX) packet 17 g  17 g Oral DAILY PRN    promethazine (PHENERGAN) tablet 12.5 mg  12.5 mg Oral Q6H PRN    Or    ondansetron (ZOFRAN) injection 4 mg  4 mg IntraVENous Q6H PRN    [Held by provider] heparin 25,000 units in dextrose 500 mL infusion  18-36 Units/kg/hr IntraVENous TITRATE    metroNIDAZOLE (FLAGYL) IVPB premix 500 mg  500 mg IntraVENous Q8H       EXAM  GEN - Alert, oriented, in no distress  CV - S1, S2, RRR, no rub, murmur, or gallop  Lung - clear to auscultation bilaterally  Abd - soft, nontender, BS present  Ext - no edema    Recent Labs     11/07/20  0345 11/06/20  0255 11/05/20  0459   WBC 8.5 9.0 9.4   HGB 9.4* 9.1* 10.7*   HCT 28.1* 26.4* 32.0*    172 191        Recent Labs     11/05/20  0459   *   K 3.0*   CL 97*   CO2 29   BUN 29*   CREA 10.90*   CA 8.3   *       Assessment and Plan:     1. ESRD on PD  -PD with all 1.5% PD fluid, check cell count and cultures     2. Colitis- on IV flagyl, cipro per hosp     3. Abnormal abd CT with probable splenic infarcts- heparin with bridge to warfarin planned per primary     4. HTN/Volume stable    5.  Hypokalemia - Placed on daily dose of Rickey Sierra MD

## 2020-11-07 NOTE — PROGRESS NOTES
CM spoke with patient at bedside. PT/OT recommending home with home health. Pt is agreeable to [facility]. She has not preference of agency and agrees to [facility]. Referral placed to [facility] for RN, PT, OT services. CM will remain available to assist with discharge needs.

## 2020-11-07 NOTE — PROGRESS NOTES
Stool sample collected. Patient voided 50cc of bloody urine. Patient is Oliguria. Amount is normal. Hospitalist notified. No orders received at this time. Please continue to monitor for further bleeding episodes.

## 2020-11-08 ENCOUNTER — HOME HEALTH ADMISSION (OUTPATIENT)
Dept: HOME HEALTH SERVICES | Facility: HOME HEALTH | Age: 70
End: 2020-11-08

## 2020-11-08 LAB
ANION GAP SERPL CALC-SCNC: 13 MMOL/L (ref 7–16)
BUN SERPL-MCNC: 18 MG/DL (ref 8–23)
CALCIUM SERPL-MCNC: 9 MG/DL (ref 8.3–10.4)
CHLORIDE SERPL-SCNC: 102 MMOL/L (ref 98–107)
CO2 SERPL-SCNC: 22 MMOL/L (ref 21–32)
CREAT SERPL-MCNC: 9.58 MG/DL (ref 0.6–1)
GLUCOSE SERPL-MCNC: 86 MG/DL (ref 65–100)
HGB BLD-MCNC: 9.5 G/DL (ref 11.7–15.4)
INR PPP: 1.2
POTASSIUM SERPL-SCNC: 3 MMOL/L (ref 3.5–5.1)
PROTHROMBIN TIME: 15.8 SEC (ref 12.5–14.7)
SODIUM SERPL-SCNC: 137 MMOL/L (ref 138–145)

## 2020-11-08 PROCEDURE — 74011250637 HC RX REV CODE- 250/637: Performed by: INTERNAL MEDICINE

## 2020-11-08 PROCEDURE — 65660000000 HC RM CCU STEPDOWN

## 2020-11-08 PROCEDURE — 74011250636 HC RX REV CODE- 250/636: Performed by: INTERNAL MEDICINE

## 2020-11-08 PROCEDURE — 90945 DIALYSIS ONE EVALUATION: CPT

## 2020-11-08 PROCEDURE — 85018 HEMOGLOBIN: CPT

## 2020-11-08 PROCEDURE — 36415 COLL VENOUS BLD VENIPUNCTURE: CPT

## 2020-11-08 PROCEDURE — 80048 BASIC METABOLIC PNL TOTAL CA: CPT

## 2020-11-08 PROCEDURE — 74011000258 HC RX REV CODE- 258: Performed by: INTERNAL MEDICINE

## 2020-11-08 PROCEDURE — 2709999900 HC NON-CHARGEABLE SUPPLY

## 2020-11-08 PROCEDURE — 85610 PROTHROMBIN TIME: CPT

## 2020-11-08 RX ORDER — WARFARIN SODIUM 5 MG/1
5 TABLET ORAL EVERY EVENING
Status: DISCONTINUED | OUTPATIENT
Start: 2020-11-08 | End: 2020-11-10

## 2020-11-08 RX ADMIN — Medication 10 ML: at 21:39

## 2020-11-08 RX ADMIN — METRONIDAZOLE 500 MG: 500 INJECTION, SOLUTION INTRAVENOUS at 06:29

## 2020-11-08 RX ADMIN — ACETAMINOPHEN 650 MG: 325 TABLET, FILM COATED ORAL at 21:36

## 2020-11-08 RX ADMIN — METRONIDAZOLE 500 MG: 500 INJECTION, SOLUTION INTRAVENOUS at 21:37

## 2020-11-08 RX ADMIN — WARFARIN SODIUM 5 MG: 5 TABLET ORAL at 18:18

## 2020-11-08 RX ADMIN — METOPROLOL SUCCINATE 50 MG: 50 TABLET, EXTENDED RELEASE ORAL at 21:36

## 2020-11-08 RX ADMIN — METRONIDAZOLE 500 MG: 500 INJECTION, SOLUTION INTRAVENOUS at 14:49

## 2020-11-08 RX ADMIN — ONDANSETRON 4 MG: 2 INJECTION INTRAMUSCULAR; INTRAVENOUS at 19:09

## 2020-11-08 RX ADMIN — ATORVASTATIN CALCIUM 80 MG: 80 TABLET, FILM COATED ORAL at 21:36

## 2020-11-08 RX ADMIN — CEFTRIAXONE SODIUM 1 G: 1 INJECTION, POWDER, FOR SOLUTION INTRAMUSCULAR; INTRAVENOUS at 14:49

## 2020-11-08 RX ADMIN — Medication 10 ML: at 14:50

## 2020-11-08 RX ADMIN — WARFARIN SODIUM 4 MG: 2 TABLET ORAL at 00:29

## 2020-11-08 NOTE — DIALYSIS
Peritoneal dialysis initiated as ordered. 650ml clear effluent UF from tx yesterday. Peritoneal catheter exit site cleaned with Chlorhexidine scrub and Gentamicin cream applied to PD cath exit site. Dressing changed, site has no s/s of redness, swelling, or exudate. Patient states no complaints at this time. Report given to primaryRN.

## 2020-11-08 NOTE — PROGRESS NOTES
Warfarin dosing per pharmacist    Melina Velasquez is a 65 y.o. female. Height: 5' 1\" (154.9 cm)    Weight: 56.2 kg (123 lb 14.4 oz)    Indication:  Splenic infarcts    Goal INR:  2-3    Home dose:  New start    Risk factors or significant drug interactions:  Metronidazole, ciprofloxacin (stopped)    Other anticoagulants:  Heparin    Daily Monitoring  Date  INR     Warfarin dose HGB              Notes  11/5  1.2  2.5 mg  10.7    11/6  1.3  2.5 mg  9.1  11/7  1.3  4 mg  9.4   11/8  1.2  5 mg  9.2    Patient currently on heparin; bridging to warfarin. Expected drug interaction with metronidazole. Ciprofloxacin change to ceftriaxone. INR down to 1.2. Increase to 5 mg. Monitoring daily INR. Thank you,  Mariana Najera, Pharm. D.   Clinical Pharmacist  [phone]

## 2020-11-08 NOTE — PROGRESS NOTES
END OF SHIFT NOTE:    INTAKE/OUTPUT  11/07 0701 - 11/08 0700  In: 6 [P.O.:6]  Out: 726 [Urine:100]  Voiding: YES-hematuria/voiding small amounts with small clots   Catheter: NO  Drain:    PD cath intact. PD fluid clear. Flatus: Patient does have flatus present. Stool:  1 occurrences. Small amt loose green stool. Characteristics:  Stool Assessment  Stool Color: Green  Stool Appearance: Loose  Stool Amount: Small  Stool Source/Status: Rectum    Emesis: 0 occurrences. Characteristics:   Emesis Assessment  Appearance: Undigested food, Yellow  Emesis Amount: Small    VITAL SIGNS  Patient Vitals for the past 12 hrs:   Temp Pulse Resp BP SpO2   11/08/20 0337 98.1 °F (36.7 °C) 68 18 (!) 115/55 94 %   11/08/20 0012 98.6 °F (37 °C) 87 18 120/64 99 %   11/07/20 2015 98.1 °F (36.7 °C) 84 18 (!) 108/52 98 %       Pain Assessment  Pain Intensity 1: 0(denies) (11/07/20 1323)  Pain Location 1: Abdomen     Patient Stated Pain Goal: 0    Ambulating  Yes with help to bathroom  063PD cath disconnected via aseptic technique. Shift report given to oncoming nurse at the bedside.     Lauren Spine, RN

## 2020-11-08 NOTE — PROGRESS NOTES
[facility] PROGRESS NOTE    Follow up for: ESRD onPD     Subjective:   Patient seen and examined. Chart, notes, labs, imaging, results all reviewed.      ROS:  Gen - no fever, no chills, appetite okay  CV - no chest pain, no orthopnea  Lung - no shortness of breath, no cough  Abd - no tenderness, no nausea, no vomiting  Ext - no edema    Objective:   Exam:  Vitals:    11/08/20 0012 11/08/20 0337 11/08/20 0615 11/08/20 0727   BP: 120/64 (!) 115/55  (!) 121/56   Pulse: 87 68  96   Resp: 18 18  18   Temp: 98.6 °F (37 °C) 98.1 °F (36.7 °C)  98.1 °F (36.7 °C)   SpO2: 99% 94%  96%   Weight:   56.2 kg (123 lb 14.4 oz)    Height:             Intake/Output Summary (Last 24 hours) at 11/8/2020 1130  Last data filed at 11/8/2020 1015  Gross per 24 hour   Intake    Output 150 ml   Net -150 ml       Current Facility-Administered Medications   Medication Dose Route Frequency    warfarin (COUMADIN) tablet 5 mg  5 mg Oral QPM    cefTRIAXone (ROCEPHIN) 1 g in 0.9% sodium chloride (MBP/ADV) 50 mL  1 g IntraVENous Q24H    [Held by provider] amLODIPine (NORVASC) tablet 10 mg  10 mg Oral QHS    atorvastatin (LIPITOR) tablet 80 mg  80 mg Oral QHS    [Held by provider] aspirin delayed-release tablet 81 mg  81 mg Oral QHS    metoprolol succinate (TOPROL-XL) XL tablet 50 mg  50 mg Oral QHS    polyethylene glycol (MIRALAX) packet 17 g  17 g Oral DAILY PRN    sodium chloride (NS) flush 5-40 mL  5-40 mL IntraVENous Q8H    sodium chloride (NS) flush 5-40 mL  5-40 mL IntraVENous PRN    acetaminophen (TYLENOL) tablet 650 mg  650 mg Oral Q6H PRN    Or    acetaminophen (TYLENOL) suppository 650 mg  650 mg Rectal Q6H PRN    polyethylene glycol (MIRALAX) packet 17 g  17 g Oral DAILY PRN    promethazine (PHENERGAN) tablet 12.5 mg  12.5 mg Oral Q6H PRN    Or    ondansetron (ZOFRAN) injection 4 mg  4 mg IntraVENous Q6H PRN    [Held by provider] heparin 25,000 units in dextrose 500 mL infusion  18-36 Units/kg/hr IntraVENous TITRATE    metroNIDAZOLE (FLAGYL) IVPB premix 500 mg  500 mg IntraVENous Q8H       EXAM  GEN - Alert, oriented, in no distress  CV - S1, S2, RRR, no rub, murmur, or gallop  Lung - clear to auscultation bilaterally  Abd - soft, nontender, BS present  Ext - no edema    Recent Labs     11/08/20  0328 11/07/20  1901 11/07/20  0345 11/06/20  0255   WBC  --   --  8.5 9.0   HGB 9.5* 9.2* 9.4* 9.1*   HCT  --   --  28.1* 26.4*   PLT  --   --  176 172        Recent Labs     11/08/20  0328   *   K 3.0*      CO2 22   BUN 18   CREA 9.58*   CA 9.0   GLU 86       Assessment and Plan:     1. ESRD on PD  -PD with all 1.5% PD fluid, cell count - wnl     2. Colitis- on IV flagyl, cipro per hosp     3. Abnormal abd CT with probable splenic infarcts- heparin with bridge to warfarin planned per primary     4. HTN/Volume stable    5.  Hypokalemia - Placed on daily dose of Marielena Hidalgo MD

## 2020-11-08 NOTE — PROGRESS NOTES
END OF SHIFT NOTE:    INTAKE/OUTPUT  11/06 0701 - 11/07 0700  In: 726.5 [P.O.:22; I.V.:704.5]  Out: 589 [Urine:50]  Voiding: YES; 50 ml of dark red bloody urine today. Catheter: NO  Drain:      Flatus: Patient does have flatus present. Stool:  1 occurrences. Characteristics:  Stool Assessment  Stool Color: Green  Stool Appearance: Loose  Stool Amount: Small  Stool Source/Status: Rectum    Emesis: 0 occurrences. Characteristics:   Emesis Assessment  Appearance: Undigested food, Yellow  Emesis Amount: Small    VITAL SIGNS  Patient Vitals for the past 12 hrs:   Temp Pulse Resp BP SpO2   11/07/20 1620 97.5 °F (36.4 °C) 80 17 (!) 109/52 97 %   11/07/20 1105 97.6 °F (36.4 °C) 81 17 (!) 124/59 99 %   11/07/20 0800 97.6 °F (36.4 °C) 79 19 (!) 113/55 100 %       Pain Assessment  Pain Intensity 1: 0(denies) (11/07/20 1323)  Pain Location 1: Abdomen     Patient Stated Pain Goal: 0    Ambulating  Yes; to bathroom with assist.    Shift report given to oncoming nurse at the bedside.     Lalit Joseph RN

## 2020-11-08 NOTE — PROGRESS NOTES
Hospitalist Note     Admit Date:  2020  7:47 AM   Name:  Christine Hancock   Age:  65 y.o.  :  1950   MRN:  [id number]   PCP:  Coni Black MD  Treatment Team: Attending Provider: Serge Villanueva MD; Consulting Provider: Nixon Cohn MD; Consulting Provider: Dagmar Wheeler MD; Utilization Review: Rogelio Mcintyre RN; Care Manager: Cathy Adrian; Primary Nurse: Ezra Gayle RN    HPI/Subjective: In summary, Christine Hancock is a 65 y.o. female with medical history significant for ESRD on PD at home, COPD, GERD, PAD,  HTN, HLD, CAD who was seen for nausea/vomiting/diarrhea and found to have colitis, splenic infarcts and 3cm infrarenal AAA on CT. Vascular surgery was consulted. No immediate intervention was needed and to follow up as  Outpatient for arterial duplex. Patient was started on heparin gtt for splenic infarcts and started on IV Abx   - pt says she is still having watery brown stools. Mild lower quad abd cramping pain. No fevers, CP, SOB, melena, BRBPR   - pt denies abd pain, n/v.  Still some loose stools. Encouraged PO intake, says she doesn't have much appetite. No fevers.  - had some dark hematuria yesterday afternoon. Heparin gtt was held. Hb did not drop much yest afternoon. Today pending. Last night nursing reported it was improved, more pink than red. Getting ready to urinate again today.   No abd pain, BRBPR, melena, fevers    No other complaints  Objective:     Patient Vitals for the past 24 hrs:   Temp Pulse Resp BP SpO2   20 0727 98.1 °F (36.7 °C) 96 18 (!) 121/56 96 %   20 0337 98.1 °F (36.7 °C) 68 18 (!) 115/55 94 %   20 0012 98.6 °F (37 °C) 87 18 120/64 99 %   20 98.1 °F (36.7 °C) 84 18 (!) 108/52 98 %   20 1620 97.5 °F (36.4 °C) 80 17 (!) 109/52 97 %   20 1105 97.6 °F (36.4 °C) 81 17 (!) 124/59 99 %     Oxygen Therapy  O2 Sat (%): 96 % (20 07)  Pulse via Oximetry: 78 beats per minute (11/04/20 1101)  O2 Device: Room air (11/05/20 1530)    Estimated body mass index is 23.41 kg/m² as calculated from the following:    Height as of this encounter: 5' 1\" (1.549 m). Weight as of this encounter: 56.2 kg (123 lb 14.4 oz). Intake/Output Summary (Last 24 hours) at 11/8/2020 0939  Last data filed at 11/8/2020 0001  Gross per 24 hour   Intake    Output 100 ml   Net -100 ml       *Note that automatically entered I/Os may not be accurate; dependent on patient compliance with collection and accurate  by techs. General:    Well nourished. No overt distress  CV:   RRR. No edema. No JVD  Lungs:   Even, Unlabored  Abdomen:   Soft, nontender, nondistended. Extremities: Warm and dry. No cyanosis   Skin:     No rashes. No jaundice. Normal coloration  Neuro:  No gross focal deficits.   Alert    Data Reviewed:  I have reviewed all labs, meds, and studies from the last 24 hours:  Recent Results (from the past 24 hour(s))   HEPARIN XA UFH    Collection Time: 11/07/20 11:52 AM   Result Value Ref Range    Heparin Xa UFH 0.44 0.3 - 0.7 IU/mL   HEMOGLOBIN    Collection Time: 11/07/20  7:01 PM   Result Value Ref Range    HGB 9.2 (L) 11.7 - 15.4 g/dL   PROTHROMBIN TIME + INR    Collection Time: 11/08/20  3:28 AM   Result Value Ref Range    Prothrombin time 15.8 (H) 12.5 - 14.7 sec    INR 1.2         Current Meds:  Current Facility-Administered Medications   Medication Dose Route Frequency    warfarin (COUMADIN) tablet 2.5 mg  2.5 mg Oral QPM    cefTRIAXone (ROCEPHIN) 1 g in 0.9% sodium chloride (MBP/ADV) 50 mL  1 g IntraVENous Q24H    [Held by provider] amLODIPine (NORVASC) tablet 10 mg  10 mg Oral QHS    atorvastatin (LIPITOR) tablet 80 mg  80 mg Oral QHS    [Held by provider] aspirin delayed-release tablet 81 mg  81 mg Oral QHS    metoprolol succinate (TOPROL-XL) XL tablet 50 mg  50 mg Oral QHS    polyethylene glycol (MIRALAX) packet 17 g  17 g Oral DAILY PRN    sodium chloride (NS) flush 5-40 mL  5-40 mL IntraVENous Q8H    sodium chloride (NS) flush 5-40 mL  5-40 mL IntraVENous PRN    acetaminophen (TYLENOL) tablet 650 mg  650 mg Oral Q6H PRN    Or    acetaminophen (TYLENOL) suppository 650 mg  650 mg Rectal Q6H PRN    polyethylene glycol (MIRALAX) packet 17 g  17 g Oral DAILY PRN    promethazine (PHENERGAN) tablet 12.5 mg  12.5 mg Oral Q6H PRN    Or    ondansetron (ZOFRAN) injection 4 mg  4 mg IntraVENous Q6H PRN    [Held by provider] heparin 25,000 units in dextrose 500 mL infusion  18-36 Units/kg/hr IntraVENous TITRATE    metroNIDAZOLE (FLAGYL) IVPB premix 500 mg  500 mg IntraVENous Q8H       Other Studies:  Results for orders placed or performed during the hospital encounter of 20   2D ECHO COMPLETE ADULT (TTE) W OR [address] [facility] [address] [facility], [facility] [address] [facility]  [phone]    Transthoracic Echocardiogram  2D, M-mode, Doppler, and Color Doppler    Patient: Mariana Parker  MR #: [id number]  : 15-Osmar-1950  Age: 65 years  Gender: Female  Study date: 2020  Account #: [de-identified]  Height: 61 in  Weight: 121.7 lb  BSA: 1.53 mï¾²  Status:Routine  Location: 210  BP: 110/ 67    Allergies: CODEINE, HYDRALAZINE    Sonographer:  Mamadou Morrell [facility] [place]  Group:  [facility]  Referring Physician:  Maurizio Morgan MD  Reading Physician:  Camilla Garcias MD    INDICATIONS: Splenic infarcts, eval for PFO/shunts. PROCEDURE: This was a routine study. A transthoracic echocardiogram was  performed. The study included complete 2D imaging, M-mode, complete spectral  Doppler, and color Doppler. Intravenous contrast (agitated saline) was  administered. Image quality was adequate. LEFT VENTRICLE: Size was normal. Systolic function was normal. Ejection  fraction was estimated in the range of 65 % to 70 %. There were no regional  wall motion abnormalities. There was moderate concentric hypertrophy. Avg.  E/e': 17.6. There is Left ventricular diastolic dysfunction. RIGHT VENTRICLE: The size was normal. Systolic function was normal. The  tricuspid jet envelope definition was inadequate for estimation of RV   systolic  pressure. LEFT ATRIUM: Size was normal.    ATRIAL SEPTUM: Contrast injection was performed. There was no right-to-left  shunt, with provocative maneuvers to increase right atrial pressure. RIGHT ATRIUM: Size was normal.    SYSTEMIC VEINS: IVC: The inferior vena cava was normal in size and course. AORTIC VALVE: The valve was structurally normal, tri-commissural. There was   no  evidence for stenosis. There was no insufficiency. MITRAL VALVE: Valve structure was normal. There was chordal MARTHA noted with a  mild peak outflow gradient ~15mmHg. There was no evidence for stenosis. There  was trivial regurgitation. TRICUSPID VALVE: The valve structure was normal. There was no evidence for  stenosis. There was trivial regurgitation. PULMONIC VALVE: The valve structure was normal. There was no evidence for  stenosis. There was no insufficiency. PERICARDIUM: There was no pericardial effusion. AORTA: The root exhibited normal size. SUMMARY:    -  Left ventricle: Systolic function was normal. Ejection fraction was  estimated in the range of 65 % to 70 %. There were no regional wall motion  abnormalities. There was moderate concentric hypertrophy. -  Atrial septum: Contrast injection was performed. There was no   right-to-left  shunt, with provocative maneuvers to increase right atrial pressure. SYSTEM MEASUREMENT TABLES    2D mode  AoR Diam (2D): 2.8 cm  LA Dimension (2D): 3.1 cm  IVS/LVPW (2D): 1  IVSd (2D): 1.6 cm  LVIDd (2D): 3.1 cm  LVIDs (2D): 1.8 cm  LVOT Area (2D): 2.8 cm2  LVPWd (2D): 1.6 cm    Unspecified Scan Mode  Peak Grad; Mean; Antegrade Flow: 11 mm[Hg]  Vmax;  Antegrade Flow: 164 cm/s  LVOT Diam: 1.9 cm    Prepared and signed by    Lo Oswald MD  Signed 05-Nov-2020 13:11:30         No results found. All Micro Results     Procedure Component Value Units Date/Time    CULTURE, BODY FLUID Radha Sea [[id number]] Collected:  11/04/20 1543    Order Status:  Completed Specimen: Body Fluid from Peritoneal Fluid Updated:  11/06/20 0850     Special Requests: NO SPECIAL REQUESTS        GRAM STAIN NO WBC'S SEEN         NO DEFINITE ORGANISM SEEN        Culture result: NO GROWTH 2 DAYS       C. DIFFICILE/EPI PCR [[id number]] Collected:  11/04/20 0826    Order Status:  Completed Specimen:  Stool Updated:  11/04/20 1055     Special Requests: NO SPECIAL REQUESTS        Culture result:       Toxigenic C. difficile NEGATIVE                         C. difficile target DNA sequences are not detected.                 SARS-CoV-2 Lab Results  \"Novel Coronavirus\" Test: No results found for: COV2NT   \"Emergent Disease\" Test: No results found for: EDPR  \"SARS-COV-2\" Test: No results found for: XGCOVT  Rapid Test: No results found for: COVR         Assessment and Plan:     Hospital Problems as of 11/8/2020 Date Reviewed: 10/28/2020          Codes Class Noted - Resolved POA    * (Principal) Colitis ICD-10-CM: K52.9  ICD-9-CM: 558.9  11/4/2020 - Present Yes        Splenic infarct ICD-10-CM: D73.5  ICD-9-CM: 289.59  11/4/2020 - Present Yes        Nausea vomiting and diarrhea ICD-10-CM: R11.2, R19.7  ICD-9-CM: 787.91, 787.01  11/4/2020 - Present Yes        ESRD on peritoneal dialysis ([facility] [address] (Chronic) ICD-10-CM: N18.6, Z99.2  ICD-9-CM: 585.6, V45.11  7/29/2019 - Present Yes        PAD (peripheral artery disease) (HCC) (Chronic) ICD-10-CM: I73.9  ICD-9-CM: 443.9  6/19/2019 - Present Yes        History of right-sided carotid endarterectomy (Chronic) ICD-10-CM: S24.726  ICD-9-CM: V45.89  11/8/2018 - Present Yes        Pure hypercholesterolemia (Chronic) ICD-10-CM: E78.00  ICD-9-CM: 272.0  11/28/2017 - Present Yes        UTI (urinary tract infection) ICD-10-CM: N39.0  ICD-9-CM: 599.0  11/10/2017 - Present Yes        S/P coronary artery stent placement (Chronic) ICD-10-CM: Z95.5  ICD-9-CM: V45.82  2/6/2017 - Present Yes        Anemia of renal disease (Chronic) ICD-10-CM: N18.9, D63.1  ICD-9-CM: 285.21  1/7/2016 - Present Yes        CVA (cerebral vascular accident) ([facility] [address] (Chronic) ICD-10-CM: I63.9  ICD-9-CM: 434.91  4/28/2012 - Present Yes              Plan:  Acute Colitis  - switched cipro to rocephin for UTI. Cont flagyl  -cdiff neg    Splenic Infarcts  - echo unremarkable  - holding heparin due to hematuria. Monitor.    - Hb stable so still cont warfarin. pharmD managing. Main barrier to DC at this point. UTI  - per UA 11/4. Rocephin. No urine cx was sent unfortunately    ESRD on PD  -Nephrology following  -K held pending repeat BMP. None recent    Anemia  -likely CKD, maybe some from hematuria. -iron studies ok  -FOBT neg  -monitor     3cm infrarenal AAA, PAD  -Vascular recommends no immediate intervention. Out patient follow up; vascular will schedule     HTN :   - low albeit asymptomatic.  likely will not resume norvasc at DC. DC planning/Dispo:    -PT/OT rec home health    Diet:  DIET RENAL  DIET NUTRITIONAL SUPPLEMENTS  DVT ppx: On AC    Other listed chronic conditions stable, cont current management.     Signed:  Carolyn Padilla MD

## 2020-11-09 LAB
ANION GAP SERPL CALC-SCNC: 12 MMOL/L (ref 7–16)
BUN SERPL-MCNC: 17 MG/DL (ref 8–23)
CALCIUM SERPL-MCNC: 8.7 MG/DL (ref 8.3–10.4)
CHLORIDE SERPL-SCNC: 104 MMOL/L (ref 98–107)
CO2 SERPL-SCNC: 23 MMOL/L (ref 21–32)
CREAT SERPL-MCNC: 9.21 MG/DL (ref 0.6–1)
GLUCOSE SERPL-MCNC: 99 MG/DL (ref 65–100)
HGB BLD-MCNC: 9 G/DL (ref 11.7–15.4)
INR PPP: 1.5
POTASSIUM SERPL-SCNC: 3 MMOL/L (ref 3.5–5.1)
PROTHROMBIN TIME: 18.3 SEC (ref 12.5–14.7)
SODIUM SERPL-SCNC: 139 MMOL/L (ref 136–145)

## 2020-11-09 PROCEDURE — 36415 COLL VENOUS BLD VENIPUNCTURE: CPT

## 2020-11-09 PROCEDURE — 90945 DIALYSIS ONE EVALUATION: CPT

## 2020-11-09 PROCEDURE — 94760 N-INVAS EAR/PLS OXIMETRY 1: CPT

## 2020-11-09 PROCEDURE — 74011250637 HC RX REV CODE- 250/637: Performed by: INTERNAL MEDICINE

## 2020-11-09 PROCEDURE — 80048 BASIC METABOLIC PNL TOTAL CA: CPT

## 2020-11-09 PROCEDURE — 2709999900 HC NON-CHARGEABLE SUPPLY

## 2020-11-09 PROCEDURE — 97530 THERAPEUTIC ACTIVITIES: CPT

## 2020-11-09 PROCEDURE — 74011000258 HC RX REV CODE- 258: Performed by: INTERNAL MEDICINE

## 2020-11-09 PROCEDURE — 65660000000 HC RM CCU STEPDOWN

## 2020-11-09 PROCEDURE — 74011250636 HC RX REV CODE- 250/636: Performed by: INTERNAL MEDICINE

## 2020-11-09 PROCEDURE — 85610 PROTHROMBIN TIME: CPT

## 2020-11-09 PROCEDURE — 85018 HEMOGLOBIN: CPT

## 2020-11-09 RX ADMIN — ONDANSETRON 4 MG: 2 INJECTION INTRAMUSCULAR; INTRAVENOUS at 16:31

## 2020-11-09 RX ADMIN — Medication 5 ML: at 22:00

## 2020-11-09 RX ADMIN — WARFARIN SODIUM 5 MG: 5 TABLET ORAL at 17:43

## 2020-11-09 RX ADMIN — Medication 10 ML: at 14:20

## 2020-11-09 RX ADMIN — CEFTRIAXONE SODIUM 1 G: 1 INJECTION, POWDER, FOR SOLUTION INTRAMUSCULAR; INTRAVENOUS at 14:19

## 2020-11-09 RX ADMIN — METRONIDAZOLE 500 MG: 500 INJECTION, SOLUTION INTRAVENOUS at 14:19

## 2020-11-09 RX ADMIN — METRONIDAZOLE 500 MG: 500 INJECTION, SOLUTION INTRAVENOUS at 22:17

## 2020-11-09 RX ADMIN — ATORVASTATIN CALCIUM 80 MG: 80 TABLET, FILM COATED ORAL at 22:17

## 2020-11-09 RX ADMIN — POTASSIUM BICARBONATE 40 MEQ: 782 TABLET, EFFERVESCENT ORAL at 11:11

## 2020-11-09 RX ADMIN — METOPROLOL SUCCINATE 50 MG: 50 TABLET, EXTENDED RELEASE ORAL at 22:17

## 2020-11-09 NOTE — DIALYSIS
Verified pt disconnected from PD cycler. Betadine cap intact. Patient tolerated well. UF amount -635ml. Effluent: Clear, Yellow.

## 2020-11-09 NOTE — PROGRESS NOTES
END OF SHIFT NOTE:    INTAKE/OUTPUT  11/08 0701 - 11/09 0700  In: 304 [I.V.:304]  Out: 781 [Urine:130]  Voiding: YES voided a few drops of urine tonight; too small of an amount to measure  Catheter: NO  Drain:              Flatus: Patient does have flatus present. Stool:  0 occurrences. Characteristics:  Stool Assessment  Stool Color: Brown  Stool Appearance: Soft  Stool Amount: Small  Stool Source/Status: Rectum    Emesis: 0 occurrences. Characteristics:   Emesis Assessment  Appearance: Undigested food, Yellow  Emesis Amount: Small    VITAL SIGNS  Patient Vitals for the past 12 hrs:   Temp Pulse Resp BP SpO2   11/09/20 0011 98 °F (36.7 °C) 98 18 124/76 98 %   11/08/20 1920 98 °F (36.7 °C) 93 18 124/61 93 %       Pain Assessment  Pain Intensity 1: 0 (11/08/20 1315)  Pain Location 1: Abdomen     Patient Stated Pain Goal: 0    Ambulating  Yes with help to bathroom. PD cycle completed and pt unhooked from machine via aseptic technique. PD fluid clear yellow    Shift report given to oncoming nurse at the bedside.     Lg Lassiter RN

## 2020-11-09 NOTE — PROGRESS NOTES
END OF SHIFT NOTE:    INTAKE/OUTPUT  11/07 0701 - 11/08 0700  In: 6 [P.O.:6]  Out: 726 [Urine:100]  Voiding: YES; bloody urine  Catheter: NO  Drain:      Flatus: Patient does have flatus present. Stool:  3 occurrences. Characteristics:  Stool Assessment  Stool Color: Brown  Stool Appearance: Soft  Stool Amount: Small  Stool Source/Status: Rectum    Emesis: 0 occurrences. Characteristics:   Emesis Assessment  Appearance: Undigested food, Yellow  Emesis Amount: Small    VITAL SIGNS  Patient Vitals for the past 12 hrs:   Temp Pulse Resp BP SpO2   11/08/20 1612 97.8 °F (36.6 °C) 82 18 122/64 91 %   11/08/20 1128 97.6 °F (36.4 °C) 88 18 131/67 96 %       Pain Assessment  Pain Intensity 1: 0 (11/08/20 1315)  Pain Location 1: Abdomen     Patient Stated Pain Goal: 0    Ambulating  Yes; to chair and bathroom. Shift report given to oncoming nurse at the bedside.     Lalit Joseph RN

## 2020-11-09 NOTE — CONSULTS
Comprehensive Nutrition Assessment    Type and Reason for Visit: Reassess, Consult(Poor PO > 5 days)    Nutrition Recommendations/Plan:   MD to liberalize diet. Continue Boost TID    Nutrition Assessment:  Patient with PMH significant for ESRD on PD, COPD, GERD, PAD, HTN, HLD, CAD, MI, CVA. She presented  with nausea, vomiting, and poor PO for 3-4 weeks. She was found to have acute colitis and AAA on CT. Patient seen in follow-up. She states that she continues with poor PO due to appetite still not fully recovered and food not having any taste. She states that she is averaging about 1 Boost per day. She agrees to try to increase intake of supplement until appetite returns to baseline. Discussed with Dr. Bassam Mahoney patient not eating well and would possibly benefit from liberalizing diet. Estimated Daily Nutrient Needs:  Energy (kcal): 9259-5869(51-42 kcal/kg IBW (47.7 kg))   Protein (g): 57-62(1.2-1.3 g/kg IBW (47.7kg))     Current Nutrition Therapies:  DIET RENAL Regular  DIET NUTRITIONAL SUPPLEMENTS All Meals; Other ( )    Anthropometric Measures:  · Height:  5' 1\" (154.9 cm)  · Current Body Wt:  57.1 kg (125 lb 14.1 oz)(bed scale)   · Body mass index is 23.79 kg/m².   · BMI Category:  Normal weight (BMI 22.0-24.9) age over 72       Nutrition Diagnosis:   · Inadequate oral intake related to (poor appetite and preferences for meals) as evidenced by (patient reported barrier to PO, diet recall)    Nutrition Interventions:   Food and/or Nutrient Delivery: Modify current diet, Continue oral nutrition supplement  Coordination of Nutrition Care: (Discussed with Dr. Bassam Mahoney)    Goals:  Meet at least 75% nutrition needs within 7 days       Nutrition Monitoring and Evaluation:   Food/Nutrient Intake Outcomes: Food and nutrient intake    Discharge Planning:    Continue oral nutrition supplement     [address] [place], LD on 11/9/2020 at 11:15 AM  Contact: [phone]

## 2020-11-09 NOTE — PROGRESS NOTES
Received call from Karen Nguyen that she had received a call from [facility] intake over weekend and they are not able to accept pt for services d/t she has not fiscally been seen by PCP in last 6 mths, she has had tele visits with PCP that are noted in the chart. CM met with pt at bedside today, explained this to pt and she is agreeable to new referral being sent to [facility]. Order/clinical faxed. CM will remain available as needed for further dc needs.

## 2020-11-09 NOTE — PROGRESS NOTES
Warfarin dosing per pharmacist    Fallon Plascencia is a 65 y.o. female. Height: 5' 1\" (154.9 cm)    Weight: 57.1 kg (125 lb 14.4 oz)    Indication:  Splenic infarcts    Goal INR:  2-3    Home dose:  New start    Risk factors or significant drug interactions:  Metronidazole, ciprofloxacin (stopped 11/7)    Other anticoagulants:  Heparin    Daily Monitoring  Date  INR     Warfarin dose HGB              Notes  11/5  1.2  2.5 mg  10.7    11/6  1.3  2.5 mg  9.1  11/7  1.3  4 mg  9.4  Cipro changed to ceftriaxone  11/8  1.2  5 mg  9.2  11/9  1.5  5 mg  9.0    Bridging to warfarin, however heparin currently on hold due to hematuria. Per MD note continue with warfarin since HgB stable. Expected drug interaction with metronidazole. INR increased to 1.5 today. Will give another 5 mg today. Monitoring daily INR. Thank you,  Janine Swann, Pharm. D.   PGY1 Pharmacy Resident  [phone]

## 2020-11-09 NOTE — PROGRESS NOTES
Hospitalist Note     Admit Date:  2020  7:47 AM   Name:  Rolando Reyes   Age:  65 y.o.  :  1950   MRN:  [id number]   PCP:  Khalif Ascencio MD  Treatment Team: Attending Provider: Femi Osorio MD; Consulting Provider: Mian Mcguire MD; Consulting Provider: Jessica Grimaldo MD; Utilization Review: Sophy Joshi RN; Physical Therapist: Joe Schmidt, PT; Occupational Therapist: Imelda Parmar; Care Manager: Mindi Mi RN    HPI/Subjective: In summary, Rolando Reyes is a 65 y.o. female with medical history significant for ESRD on PD at home, COPD, GERD, PAD,  HTN, HLD, CAD who was seen for nausea/vomiting/diarrhea and found to have colitis, splenic infarcts and 3cm infrarenal AAA on CT. Vascular surgery was consulted. No immediate intervention was needed and to follow up as  Outpatient for arterial duplex. Patient was started on heparin gtt for splenic infarcts and started on IV Abx   - pt says she is still having watery brown stools. Mild lower quad abd cramping pain. No fevers, CP, SOB, melena, BRBPR   - pt denies abd pain, n/v.  Still some loose stools. Encouraged PO intake, says she doesn't have much appetite. No fevers.  - had some dark hematuria yesterday afternoon. Heparin gtt was held. Hb did not drop much yest afternoon. Today pending. Last night nursing reported it was improved, more pink than red. Getting ready to urinate again today. No abd pain, BRBPR, melena, fevers     - says hematuria resolved. Urine yellow/clear per pt. Asked her to monitor. She denies abd pain. \"very little\" diarrhea anymore. Overall feeling better. INR not at goal yet.     No other complaints  Objective:     Patient Vitals for the past 24 hrs:   Temp Pulse Resp BP SpO2   20 0826     98 %   20 0745 98.5 °F (36.9 °C) 75 16 (!) 99/57 99 %   20 0411 97.9 °F (36.6 °C) 71 18 100/68 97 %   20 0011 98 °F (36.7 °C) 98 18 124/76 98 %   11/08/20 1920 98 °F (36.7 °C) 93 18 124/61 93 %   11/08/20 1612 97.8 °F (36.6 °C) 82 18 122/64 91 %   11/08/20 1128 97.6 °F (36.4 °C) 88 18 131/67 96 %     Oxygen Therapy  O2 Sat (%): 98 % (11/09/20 0826)  Pulse via Oximetry: 78 beats per minute (11/09/20 0826)  O2 Device: Room air (11/09/20 0826)    Estimated body mass index is 23.79 kg/m² as calculated from the following:    Height as of this encounter: 5' 1\" (1.549 m). Weight as of this encounter: 57.1 kg (125 lb 14.4 oz). Intake/Output Summary (Last 24 hours) at 11/9/2020 1039  Last data filed at 11/9/2020 0902  Gross per 24 hour   Intake 304 ml   Output 1366 ml   Net -1062 ml       *Note that automatically entered I/Os may not be accurate; dependent on patient compliance with collection and accurate  by [employer]. General:    Well nourished. No overt distress  CV:   RRR. No edema. No JVD  Lungs:   Even, Unlabored  Abdomen:   Soft, nontender, nondistended. Extremities: Warm and dry. No cyanosis   Skin:     No rashes. No jaundice. Normal coloration  Neuro:  No gross focal deficits.   Alert    Data Reviewed:  I have reviewed all labs, meds, and studies from the last 24 hours:  Recent Results (from the past 24 hour(s))   PROTHROMBIN TIME + INR    Collection Time: 11/09/20  4:10 AM   Result Value Ref Range    Prothrombin time 18.3 (H) 12.5 - 14.7 sec    INR 1.5     HEMOGLOBIN    Collection Time: 11/09/20  4:10 AM   Result Value Ref Range    HGB 9.0 (L) 11.7 - 38.8 g/dL   METABOLIC PANEL, BASIC    Collection Time: 11/09/20  4:10 AM   Result Value Ref Range    Sodium 139 136 - 145 mmol/L    Potassium 3.0 (L) 3.5 - 5.1 mmol/L    Chloride 104 98 - 107 mmol/L    CO2 23 21 - 32 mmol/L    Anion gap 12 7 - 16 mmol/L    Glucose 99 65 - 100 mg/dL    BUN 17 8 - 23 MG/DL    Creatinine 9.21 (H) 0.6 - 1.0 MG/DL    GFR est AA 5 (L) >60 ml/min/1.73m2    GFR est non-AA 4 (L) >60 ml/min/1.73m2    Calcium 8.7 8.3 - 10.4 MG/DL       Current Meds:  Current Facility-Administered Medications   Medication Dose Route Frequency    warfarin (COUMADIN) tablet 5 mg  5 mg Oral QPM    cefTRIAXone (ROCEPHIN) 1 g in 0.9% sodium chloride (MBP/ADV) 50 mL  1 g IntraVENous Q24H    [Held by provider] amLODIPine (NORVASC) tablet 10 mg  10 mg Oral QHS    atorvastatin (LIPITOR) tablet 80 mg  80 mg Oral QHS    [Held by provider] aspirin delayed-release tablet 81 mg  81 mg Oral QHS    metoprolol succinate (TOPROL-XL) XL tablet 50 mg  50 mg Oral QHS    polyethylene glycol (MIRALAX) packet 17 g  17 g Oral DAILY PRN    sodium chloride (NS) flush 5-40 mL  5-40 mL IntraVENous Q8H    sodium chloride (NS) flush 5-40 mL  5-40 mL IntraVENous PRN    acetaminophen (TYLENOL) tablet 650 mg  650 mg Oral Q6H PRN    Or    acetaminophen (TYLENOL) suppository 650 mg  650 mg Rectal Q6H PRN    polyethylene glycol (MIRALAX) packet 17 g  17 g Oral DAILY PRN    promethazine (PHENERGAN) tablet 12.5 mg  12.5 mg Oral Q6H PRN    Or    ondansetron (ZOFRAN) injection 4 mg  4 mg IntraVENous Q6H PRN    [Held by provider] heparin 25,000 units in dextrose 500 mL infusion  18-36 Units/kg/hr IntraVENous TITRATE    metroNIDAZOLE (FLAGYL) IVPB premix 500 mg  500 mg IntraVENous Q8H       Other Studies:  Results for orders placed or performed during the hospital encounter of 20   2D ECHO COMPLETE ADULT (TTE) [address]  One [address] [place] [address]  [phone]    Transthoracic Echocardiogram  2D, M-mode, Doppler, and Color Doppler    Patient: Danette Valverde  MR #: [id number]  : 15-Osmar-1950  Age: 65 years  Gender: Female  Study date: 2020  Account #: [de-identified]  Height: 61 in  Weight: 121.7 lb  BSA: 1.53 mï¾²  Status:Routine  Location: 210  BP: 110/ 67    Allergies: CODEINE, HYDRALAZINE    Sonographer:  Callie King, RDCS  Group:  [facility]  Referring Physician:  Lopez Westbrook MD  Reading Physician:  Rene Lockett MD    INDICATIONS: Splenic infarcts, eval for PFO/shunts. PROCEDURE: This was a routine study. A transthoracic echocardiogram was  performed. The study included complete 2D imaging, M-mode, complete spectral  Doppler, and color Doppler. Intravenous contrast (agitated saline) was  administered. Image quality was adequate. LEFT VENTRICLE: Size was normal. Systolic function was normal. Ejection  fraction was estimated in the range of 65 % to 70 %. There were no regional  wall motion abnormalities. There was moderate concentric hypertrophy. Avg.  E/e': 17.6. There is Left ventricular diastolic dysfunction. RIGHT VENTRICLE: The size was normal. Systolic function was normal. The  tricuspid jet envelope definition was inadequate for estimation of RV   systolic  pressure. LEFT ATRIUM: Size was normal.    ATRIAL SEPTUM: Contrast injection was performed. There was no right-to-left  shunt, with provocative maneuvers to increase right atrial pressure. RIGHT ATRIUM: Size was normal.    SYSTEMIC VEINS: IVC: The inferior vena cava was normal in size and course. AORTIC VALVE: The valve was structurally normal, tri-commissural. There was   no  evidence for stenosis. There was no insufficiency. MITRAL VALVE: Valve structure was normal. There was chordal MARTHA noted with a  mild peak outflow gradient ~15mmHg. There was no evidence for stenosis. There  was trivial regurgitation. TRICUSPID VALVE: The valve structure was normal. There was no evidence for  stenosis. There was trivial regurgitation. PULMONIC VALVE: The valve structure was normal. There was no evidence for  stenosis. There was no insufficiency. PERICARDIUM: There was no pericardial effusion. AORTA: The root exhibited normal size. SUMMARY:    -  Left ventricle: Systolic function was normal. Ejection fraction was  estimated in the range of 65 % to 70 %. There were no regional wall motion  abnormalities.  There was moderate concentric hypertrophy. -  Atrial septum: Contrast injection was performed. There was no   right-to-left  shunt, with provocative maneuvers to increase right atrial pressure. SYSTEM MEASUREMENT TABLES    2D mode  AoR Diam (2D): 2.8 cm  LA Dimension (2D): 3.1 cm  IVS/LVPW (2D): 1  IVSd (2D): 1.6 cm  LVIDd (2D): 3.1 cm  LVIDs (2D): 1.8 cm  LVOT Area (2D): 2.8 cm2  LVPWd (2D): 1.6 cm    Unspecified Scan Mode  Peak Grad; Mean; Antegrade Flow: 11 mm[Hg]  Vmax; Antegrade Flow: 164 cm/s  LVOT Diam: 1.9 cm    Prepared and signed by    Lo Oswald MD  Signed [id number] 13:11:30         No results found. All Micro Results     Procedure Component Value Units Date/Time    CULTURE, BODY FLUID Evaristo Melier [[id number]] Collected:  11/04/20 1543    Order Status:  Completed Specimen: Body Fluid from Peritoneal Fluid Updated:  11/06/20 0850     Special Requests: NO SPECIAL REQUESTS        GRAM STAIN NO WBC'S SEEN         NO DEFINITE ORGANISM SEEN        Culture result: NO GROWTH 2 DAYS       C. DIFFICILE/EPI PCR [[id number]] Collected:  11/04/20 0826    Order Status:  Completed Specimen:  Stool Updated:  11/04/20 1055     Special Requests: NO SPECIAL REQUESTS        Culture result:       Toxigenic C. difficile NEGATIVE                         C. difficile target DNA sequences are not detected.                 SARS-CoV-2 Lab Results  \"Novel Coronavirus\" Test: No results found for: COV2NT   \"Emergent Disease\" Test: No results found for: EDPR  \"SARS-COV-2\" Test: No results found for: XGCOVT  Rapid Test: No results found for: COVR         Assessment and Plan:     Hospital Problems as of 11/9/2020 Date Reviewed: 10/28/2020          Codes Class Noted - Resolved POA    * (Principal) Colitis ICD-10-CM: K52.9  ICD-9-CM: 558.9  11/4/2020 - Present Yes        Splenic infarct ICD-10-CM: D73.5  ICD-9-CM: 289.59  11/4/2020 - Present Yes        Nausea vomiting and diarrhea ICD-10-CM: R11.2, R19.7  ICD-9-CM: 787.91, 787.01 11/4/2020 - Present Yes        ESRD on peritoneal dialysis ([facility] [address] (Chronic) ICD-10-CM: N18.6, Z99.2  ICD-9-CM: 585.6, V45.11  7/29/2019 - Present Yes        PAD (peripheral artery disease) (HCC) (Chronic) ICD-10-CM: I73.9  ICD-9-CM: 443.9  6/19/2019 - Present Yes        History of right-sided carotid endarterectomy (Chronic) ICD-10-CM: C19.758  ICD-9-CM: V45.89  11/8/2018 - Present Yes        Pure hypercholesterolemia (Chronic) ICD-10-CM: E78.00  ICD-9-CM: 272.0  11/28/2017 - Present Yes        UTI (urinary tract infection) ICD-10-CM: N39.0  ICD-9-CM: 599.0  11/10/2017 - Present Yes        S/P coronary artery stent placement (Chronic) ICD-10-CM: Z95.5  ICD-9-CM: V45.82  2/6/2017 - Present Yes        Anemia of renal disease (Chronic) ICD-10-CM: N18.9, D63.1  ICD-9-CM: 285.21  1/7/2016 - Present Yes        CVA (cerebral vascular accident) ([facility] (Chronic) ICD-10-CM: I63.9  ICD-9-CM: 434.91  4/28/2012 - Present Yes              Plan:  Splenic Infarcts  - echo unremarkable  - holding heparin, ASA due to hematuria. It has resolved but her INR is climbing and I think we can continue to hold for now. Monitor.    - Hb relatively stable so still cont warfarin. pharmD managing. Main barrier to DC at this point. UTI  - per UA 11/4. Rocephin. No urine cx was sent unfortunately    ESRD on PD, FEN  -Nephrology following  -give K today.  -resume home daily K  -check Mg  -monitor BMP    Acute Colitis  - switched cipro to rocephin 11/8 for UTI. Cont flagyl  -cdiff neg    Anemia  -likely CKD, maybe some from hematuria. -iron studies ok  -FOBT neg  -monitor     3cm infrarenal AAA, PAD  -Vascular recommends no immediate intervention. Out patient follow up; vascular will schedule     HTN :   - low albeit asymptomatic.  likely will not resume norvasc at DC unless becomes elevated    DC planning/Dispo:    -PT/OT rec home health    Diet:  DIET RENAL  DIET NUTRITIONAL SUPPLEMENTS  DVT ppx:   On AC    Other listed chronic conditions stable, cont current management.     Signed:  Nayla Fernandes MD

## 2020-11-09 NOTE — PROGRESS NOTES
Renal Progress Note    Admission Date: 11/4/2020   Subjective:      Feels ok.     Objective:     Physical Exam:    Patient Vitals for the past 8 hrs:   BP Temp Pulse Resp SpO2 Weight   11/09/20 0826     98 %    11/09/20 0745 (!) 99/57 98.5 °F (36.9 °C) 75 16 99 %    11/09/20 0638      57.1 kg (125 lb 14.4 oz)   11/09/20 0411 100/68 97.9 °F (36.6 °C) 71 18 97 %       Gen: comfortable , NAD  HEENT: moist membranes  CV: S1, S2  Lungs: Clear bilaterally  Extem: no edema     Current Facility-Administered Medications   Medication Dose Route Frequency    warfarin (COUMADIN) tablet 5 mg  5 mg Oral QPM    cefTRIAXone (ROCEPHIN) 1 g in 0.9% sodium chloride (MBP/ADV) 50 mL  1 g IntraVENous Q24H    [Held by provider] amLODIPine (NORVASC) tablet 10 mg  10 mg Oral QHS    atorvastatin (LIPITOR) tablet 80 mg  80 mg Oral QHS    [Held by provider] aspirin delayed-release tablet 81 mg  81 mg Oral QHS    metoprolol succinate (TOPROL-XL) XL tablet 50 mg  50 mg Oral QHS    polyethylene glycol (MIRALAX) packet 17 g  17 g Oral DAILY PRN    sodium chloride (NS) flush 5-40 mL  5-40 mL IntraVENous Q8H    sodium chloride (NS) flush 5-40 mL  5-40 mL IntraVENous PRN    acetaminophen (TYLENOL) tablet 650 mg  650 mg Oral Q6H PRN    Or    acetaminophen (TYLENOL) suppository 650 mg  650 mg Rectal Q6H PRN    polyethylene glycol (MIRALAX) packet 17 g  17 g Oral DAILY PRN    promethazine (PHENERGAN) tablet 12.5 mg  12.5 mg Oral Q6H PRN    Or    ondansetron (ZOFRAN) injection 4 mg  4 mg IntraVENous Q6H PRN    [Held by provider] heparin 25,000 units in dextrose 500 mL infusion  18-36 Units/kg/hr IntraVENous TITRATE    metroNIDAZOLE (FLAGYL) IVPB premix 500 mg  500 mg IntraVENous Q8H            Data Review:     LABS:   Recent Results (from the past 12 hour(s))   PROTHROMBIN TIME + INR    Collection Time: 11/09/20  4:10 AM   Result Value Ref Range    Prothrombin time 18.3 (H) 12.5 - 14.7 sec    INR 1.5     HEMOGLOBIN Collection Time: 11/09/20  4:10 AM   Result Value Ref Range    HGB 9.0 (L) 11.7 - 69.1 g/dL   METABOLIC PANEL, BASIC    Collection Time: 11/09/20  4:10 AM   Result Value Ref Range    Sodium 139 136 - 145 mmol/L    Potassium 3.0 (L) 3.5 - 5.1 mmol/L    Chloride 104 98 - 107 mmol/L    CO2 23 21 - 32 mmol/L    Anion gap 12 7 - 16 mmol/L    Glucose 99 65 - 100 mg/dL    BUN 17 8 - 23 MG/DL    Creatinine 9.21 (H) 0.6 - 1.0 MG/DL    GFR est AA 5 (L) >60 ml/min/1.73m2    GFR est non-AA 4 (L) >60 ml/min/1.73m2    Calcium 8.7 8.3 - 10.4 MG/DL         Plan:     Principal Problem:    Colitis (11/4/2020)    Active Problems:    CVA (cerebral vascular accident) ([facility] (4/28/2012)      Anemia of renal disease (1/7/2016)      S/P coronary artery stent placement (2/6/2017)      UTI (urinary tract infection) (11/10/2017)      Pure hypercholesterolemia (11/28/2017)      History of right-sided carotid endarterectomy (11/8/2018)      PAD (peripheral artery disease) ([facility] (6/19/2019)      ESRD on peritoneal dialysis ([facility] (7/29/2019)      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 ESRD on PD  -PD with all 1.5% PD fluid, cell count - wnl  - typically she uses 1.5 and 2.5% at home but no sign of fluid overload at this point     2. Colitis- on IV flagyl, cipro per hosp     3. Abnormal abd CT with probable splenic infarcts- heparin with bridge to warfarin         4.  Hypokalemia -supplementing

## 2020-11-09 NOTE — PROGRESS NOTES
Lost IV site. Unable to restart x4 attempts. Refused further attempts tonight.  Message left with IV team to attempt in am.

## 2020-11-09 NOTE — PROGRESS NOTES
Problem: Mobility Impaired (Adult and Pediatric)  Goal: *Acute Goals and Plan of Care (Insert Text)  Outcome: Progressing Towards Goal  Note: LTG:  (1.)Ms. Malena Lara will move from supine to sit and sit to supine , scoot up and down, and roll side to side with INDEPENDENT within 7 treatment day(s) from flat surface without handrail. (2.)Ms. Malena Lara will transfer from bed to chair and chair to bed with MODIFIED INDEPENDENCE using the least restrictive device within 7 treatment day(s). (3.)Ms. Malena Lara will ambulate with STAND BY ASSIST for 250+ feet with the least restrictive device within 7 treatment day(s) while maintaining normal vital signs. (4.)Ms. Malena Lara will perform 2 steps with L HR and CGA within 7 treatment days for safety ascending and descending stairs for home.   ___________________________________________________________________________________________      PHYSICAL THERAPY: Daily Note and AM 11/9/2020  INPATIENT: PT Visit Days : 2  Payor: Daniela Armas / Plan: [address] [place] [address] HMO / Product Type: Managed Care Medicare /       NAME/AGE/GENDER: Yesika Rodriguez is a 65 y.o. 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 She reports she feels better today. She states she has not been eating much since she does not really like the food choices. She participated in BLE AROM strength exercises in supine and sitting. She is modified independent with her bed mobility with good sitting balance on the EOB. Sit to stand was CGA and she requested the RW today for mobility.   She ambulated 50' x 2 using the RW with CGA  She had a seated rest break during gait. She states she feels weak and likes to be cautious. Her gait was steady and controlled and she primarily needed assistance turning the RW and manipulating it in the room. She reports she has a cane and RW at home for use as needed. She elected to sit up in the bedside recliner once back in her room. She was seated and positioned for comfort with her call light and personal items in reach. She was kind and cooperative with therapy. She appears to be making slow steady gains towards her goals but she did not tolerate increased gait distance today and she elected to use a RW. This section established at most recent assessment   PROBLEM LIST (Impairments causing functional limitations):  1. Decreased ADL/Functional Activities  2. Decreased Transfer Abilities  3. Decreased Ambulation Ability/Technique  4. Decreased Balance  5. Decreased Activity Tolerance   INTERVENTIONS PLANNED: (Benefits and precautions of physical therapy have been discussed with the patient.)  1. Balance Exercise  2. Bed Mobility  3. Family Education  4. Gait Training  5. Home Exercise Program (HEP)  6. Therapeutic Activites  7. Therapeutic Exercise/Strengthening  8. Transfer Training     TREATMENT PLAN: Frequency/Duration: 3 times a week for duration of hospital stay  Rehabilitation Potential For Stated Goals: Good     REHAB RECOMMENDATIONS (at time of discharge pending progress):    Placement: It is my opinion, based on this patient's performance to date, that Ms. Herlinda Nicole may benefit from [address] after discharge due to the functional deficits listed above that are likely to improve with skilled rehabilitation because he/she has multiple medical issues that affect his/her functional mobility in the community.   Equipment:    None at this time              HISTORY:   History of Present Injury/Illness (Reason for Referral):  Ms. Herlinda Nicole is a 66 y/o AAF with a h/o ESRD on PD at home, COPD, GERD, PAD,  HTN, HLD, CAD who presents today with c/o a 3-4 week history of N/V, weakness and poor PO intake. She is with her daughter, Genaro Dennison, who helps with history. She has been compliant with dialysis. Afebrile, no chest pain, palpitations, SOB/SIMMS, hypoxia. She developed non-bloody diarrhea yesterday. No recent abx. She's had generalized abdominal pain as well with minimal vomiting. Labs are largely unremarkable, Cr up but PD patient. 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 Vascular surgery consulted via ER and no acute intervention required for vascular issues and ok from their perspective to anticoagulate the infarcts. She has been given antibiotics. Hospitalist consulted for admission and further management. Past Medical History/Comorbidities:   Ms. Malena Lara  has a past medical history of Anemia, Arrhythmia, Aspirin long-term use ([facility]), CAD (coronary artery disease) (2012), Chronic kidney disease, Chronic obstructive pulmonary disease ([facility] [address] CKD (chronic kidney disease) stage 3, GFR 30-59 ml/min ( ), ESRD on peritoneal dialysis ([facility] [address] Flat affect, GERD (gastroesophageal reflux disease), Heart murmur (not followed per cardiology), History of MI (myocardial infarction) (2012), colonic polyps (2016), Hydronephrosis, Hypertension, Left-sided weakness (2012), Loss of appetite, Peritoneal dialysis catheter in place [facility]), Poor historian, Renal atrophy, right, Renal insufficiency, Smoker, Smoker, Stented coronary artery ([facility]), and Stroke ([facility] [address] (2012). Ms. Malena Lara  has a past surgical history that includes colonoscopy (N/A, 9/26/2016); pr left heart cath,percutaneous (4/2012); hx urological; ir insert tunl cvc w/o port over 5 yr (8/7/2019); hx carotid endarterectomy (Right, 11/07/2018); vascular surgery procedure unlist (02/28/2019); vascular surgery procedure unlist (07/01/2019); and vascular surgery procedure unlist (08/06/2019).   Social History/Living Environment:   Home Environment: Trailer/mobile home  # Steps to Enter: 2  Rails to Enter: Yes  Hand Rails : Left  Wheelchair Ramp: No  One/Two Story Residence: One story  Living Alone: No  Support Systems: Child(emelia)  Patient Expects to be Discharged to[de-identified] Trailer/mobile home  Current DME Used/Available at Home: [address], quad, Shower chair, Walker, rollator, Wheelchair  Tub or Shower Type: Tub/Shower combination  Prior Level of Function/Work/Activity:  Lives with son, mod I at baseline with quad cane, assist ADLs, RA  Personal Factors:          Sex:  female        Age:  65 y.o. Overall Behavior:  agreeable    Number of Personal Factors/Comorbidities that affect the Plan of Care:  CVA, CAD, age, COPD 3+: HIGH COMPLEXITY   EXAMINATION:   Most Recent Physical Functioning:   Gross Assessment:                  Posture:     Balance:  Sitting: Intact  Standing: Impaired  Standing - Static: Good;Constant support  Standing - Dynamic : Fair;Constant support Bed Mobility:  Supine to Sit: Modified independent  Sit to Supine: Modified independent  Scooting: Independent  Wheelchair Mobility:     Transfers:  Sit to Stand: Stand-by assistance  Stand to Sit: Stand-by assistance  Gait:     Base of Support: Narrowed  Speed/Akila: Slow  Step Length: Left shortened;Right shortened  Distance (ft): 50 Feet (ft)(50' x 2)  Assistive Device: Walker, rolling  Ambulation - Level of Assistance: Contact guard assistance  Interventions: Verbal cues;Manual cues; Safety awareness training      Body Structures Involved:  1. Muscles Body Functions Affected:  1. Movement Related Activities and Participation Affected:  1. General Tasks and Demands  2. Mobility  3.  Self Care   Number of elements that affect the Plan of Care: 4+: HIGH COMPLEXITY   CLINICAL PRESENTATION:   Presentation: Evolving clinical presentation with changing clinical characteristics: MODERATE COMPLEXITY   CLINICAL DECISION MAKIN [facility] [address] [address] AM-PAC 3 Clicks   Basic Mobility Inpatient Short Form  How much difficulty does the patient currently have. .. Unable A Lot A Little None   1. Turning over in bed (including adjusting bedclothes, sheets and blankets)? [] 1   [] 2   [x] 3   [] 4   2. Sitting down on and standing up from a chair with arms ( e.g., wheelchair, bedside commode, etc.)   [] 1   [] 2   [x] 3   [] 4   3. Moving from lying on back to sitting on the side of the bed? [] 1   [] 2   [x] 3   [] 4   How much help from another person does the patient currently need. .. Total A Lot A Little None   4. Moving to and from a bed to a chair (including a wheelchair)? [] 1   [] 2   [x] 3   [] 4   5. Need to walk in hospital room? [] 1   [] 2   [x] 3   [] 4   6. Climbing 3-5 steps with a railing? [] 1   [] 2   [x] 3   [] 4   © 2007, Trustees of [address] [place] [address], under license to [employer]. All rights reserved      Score:  Initial: 18 Most Recent: X (Date: -- )    Interpretation of Tool:  Represents activities that are increasingly more difficult (i.e. Bed mobility, Transfers, Gait). Medical Necessity:     · Patient is expected to demonstrate progress in   · strength, range of motion, balance, and coordination  ·  to   · decrease assistance required with overall functional mobility, transfers, ambulation  · .  · Patient demonstrates   · good  ·  rehab potential due to higher previous functional level. Reason for Services/Other Comments:  · Patient   · continues to require present interventions due to patient's inability to perform bed mobility, transfers, ambulation safely and effectively  · . Use of outcome tool(s) and clinical judgement create a POC that gives a: Clear prediction of patient's progress: LOW COMPLEXITY            TREATMENT:   (In addition to Assessment/Re-Assessment sessions the following treatments were rendered)   Pre-treatment Symptoms/Complaints:  Patient had no complaints of pain, just weakness today.   Pain: Initial: Pain Intensity 1: 0  Post Session:  0/10 post session     Therapeutic Activity: (  26 mintues): Therapeutic activities including Bed transfers, Chair transfers, Ambulation on level ground and sit to stand and standing to improve mobility, strength and balance. Required minimal Verbal cues;Manual cues; Safety awareness training to promote static and dynamic balance in standing. Date:  11/9 Date:   Date:     Activity/Exercise Parameters Parameters Parameters   Ankle pumps 2 x 10     Heel slides 2 x 10     Hip abd/adduction 2 x 10     Seated knee extension 2 x 10     Seated hip flexion 2 x 10                       Braces/Orthotics/Lines/Etc:   · IV  · O2 Device: Room air  Treatment/Session Assessment:    · Response to Treatment:  tolerated well  · Interdisciplinary Collaboration:   o Physical Therapist  o Registered Nurse  · After treatment position/precautions:   o Up in chair  o Bed in low position  o Call light within reach  o RN notified   · Compliance with Program/Exercises: Will assess as treatment progresses  · Recommendations/Intent for next treatment session: \"Next visit will focus on advancements to more challenging activities\".   Total Treatment Duration:  PT Patient Time In/Time Out  Time In: 0955  Time Out: [facility] [address]

## 2020-11-10 LAB
ADENOVIRUS F 40/41: NOT DETECTED
ANION GAP SERPL CALC-SCNC: 7 MMOL/L (ref 7–16)
ASTROVIRUS: NOT DETECTED
BUN SERPL-MCNC: 14 MG/DL (ref 8–23)
C DIFFICILE TOXIN A/B: NOT DETECTED
CALCIUM SERPL-MCNC: 8.4 MG/DL (ref 8.3–10.4)
CAMPYLOBACTER: NOT DETECTED
CHLORIDE SERPL-SCNC: 106 MMOL/L (ref 98–107)
CO2 SERPL-SCNC: 27 MMOL/L (ref 21–32)
CREAT SERPL-MCNC: 9.26 MG/DL (ref 0.6–1)
CRYPTOSPORIDIUM, CRYPTOSPORIDIUM: NOT DETECTED
CYCLOSPORA CAYETANENSIS: NOT DETECTED
E COLI O157: NORMAL
ENTAMOEBA HISTOLYTICA: NOT DETECTED
ENTEROAGGREGATIVE E COLI: NOT DETECTED
ENTEROPATHOGENIC E COLI (EPEC), EPEC: NOT DETECTED
ENTEROTOXIGENIC E COLI (ETEC), ETEC: NOT DETECTED
ERYTHROCYTE [DISTWIDTH] IN BLOOD BY AUTOMATED COUNT: 14 % (ref 11.9–14.6)
GIARDIA LAMBLIA: NOT DETECTED
GLUCOSE SERPL-MCNC: 78 MG/DL (ref 65–100)
HCT VFR BLD AUTO: 26.5 % (ref 35.8–46.3)
HGB BLD-MCNC: 8.5 G/DL (ref 11.7–15.4)
INR PPP: 2.4
MAGNESIUM SERPL-MCNC: 2.3 MG/DL (ref 1.8–2.4)
MCH RBC QN AUTO: 30.8 PG (ref 26.1–32.9)
MCHC RBC AUTO-ENTMCNC: 32.1 G/DL (ref 31.4–35)
MCV RBC AUTO: 96 FL (ref 79.6–97.8)
NOROVIRUS GI/GII: NOT DETECTED
NRBC # BLD: 0 K/UL (ref 0–0.2)
PLATELET # BLD AUTO: 183 K/UL (ref 150–450)
PLESIOMONAS SHIGELLOIDES: NOT DETECTED
PMV BLD AUTO: 12.2 FL (ref 9.4–12.3)
POTASSIUM SERPL-SCNC: 2.9 MMOL/L (ref 3.5–5.1)
PROTHROMBIN TIME: 26.2 SEC (ref 12.5–14.7)
RBC # BLD AUTO: 2.76 M/UL (ref 4.05–5.2)
ROTAVIRUS A: NOT DETECTED
SALMONELLA: NOT DETECTED
SAPOVIRUS: NOT DETECTED
SHIGA-TOXIN-PRODUCING E COLI: NOT DETECTED
SHIGELLA/ENTEROINVASIVE E COLI (EIEC), EIEC: NOT DETECTED
SODIUM SERPL-SCNC: 140 MMOL/L (ref 138–145)
SPECIMEN SOURCE: NORMAL
VIBRIO CHOLERAE: NOT DETECTED
VIBRIO: NOT DETECTED
WBC # BLD AUTO: 6.5 K/UL (ref 4.3–11.1)
YERSINIA ENTEROCOLITICA: NOT DETECTED

## 2020-11-10 PROCEDURE — 74011250637 HC RX REV CODE- 250/637: Performed by: INTERNAL MEDICINE

## 2020-11-10 PROCEDURE — 77030041974 HC CATH SYS PERIPH TELE -B

## 2020-11-10 PROCEDURE — 85027 COMPLETE CBC AUTOMATED: CPT

## 2020-11-10 PROCEDURE — 65660000000 HC RM CCU STEPDOWN

## 2020-11-10 PROCEDURE — 76937 US GUIDE VASCULAR ACCESS: CPT

## 2020-11-10 PROCEDURE — 97535 SELF CARE MNGMENT TRAINING: CPT

## 2020-11-10 PROCEDURE — 80048 BASIC METABOLIC PNL TOTAL CA: CPT

## 2020-11-10 PROCEDURE — 74011250636 HC RX REV CODE- 250/636: Performed by: INTERNAL MEDICINE

## 2020-11-10 PROCEDURE — 36415 COLL VENOUS BLD VENIPUNCTURE: CPT

## 2020-11-10 PROCEDURE — 2709999900 HC NON-CHARGEABLE SUPPLY

## 2020-11-10 PROCEDURE — 83735 ASSAY OF MAGNESIUM: CPT

## 2020-11-10 PROCEDURE — 74011000258 HC RX REV CODE- 258: Performed by: INTERNAL MEDICINE

## 2020-11-10 PROCEDURE — 85610 PROTHROMBIN TIME: CPT

## 2020-11-10 PROCEDURE — 97530 THERAPEUTIC ACTIVITIES: CPT

## 2020-11-10 RX ORDER — POTASSIUM CHLORIDE 14.9 MG/ML
20 INJECTION INTRAVENOUS ONCE
Status: COMPLETED | OUTPATIENT
Start: 2020-11-10 | End: 2020-11-10

## 2020-11-10 RX ORDER — WARFARIN 2.5 MG/1
2.5 TABLET ORAL EVERY EVENING
Status: DISCONTINUED | OUTPATIENT
Start: 2020-11-10 | End: 2020-11-11 | Stop reason: HOSPADM

## 2020-11-10 RX ADMIN — METOPROLOL SUCCINATE 50 MG: 50 TABLET, EXTENDED RELEASE ORAL at 21:26

## 2020-11-10 RX ADMIN — POTASSIUM BICARBONATE 20 MEQ: 782 TABLET, EFFERVESCENT ORAL at 17:16

## 2020-11-10 RX ADMIN — METRONIDAZOLE 500 MG: 500 INJECTION, SOLUTION INTRAVENOUS at 14:18

## 2020-11-10 RX ADMIN — Medication 10 ML: at 21:40

## 2020-11-10 RX ADMIN — CEFTRIAXONE SODIUM 1 G: 1 INJECTION, POWDER, FOR SOLUTION INTRAMUSCULAR; INTRAVENOUS at 13:44

## 2020-11-10 RX ADMIN — POTASSIUM BICARBONATE 20 MEQ: 782 TABLET, EFFERVESCENT ORAL at 08:52

## 2020-11-10 RX ADMIN — ACETAMINOPHEN 650 MG: 325 TABLET, FILM COATED ORAL at 22:00

## 2020-11-10 RX ADMIN — METRONIDAZOLE 500 MG: 500 INJECTION, SOLUTION INTRAVENOUS at 05:56

## 2020-11-10 RX ADMIN — Medication 10 ML: at 13:56

## 2020-11-10 RX ADMIN — Medication 10 ML: at 05:56

## 2020-11-10 RX ADMIN — METRONIDAZOLE 500 MG: 500 INJECTION, SOLUTION INTRAVENOUS at 21:26

## 2020-11-10 RX ADMIN — ATORVASTATIN CALCIUM 80 MG: 80 TABLET, FILM COATED ORAL at 21:26

## 2020-11-10 RX ADMIN — POTASSIUM CHLORIDE 20 MEQ: 200 INJECTION, SOLUTION INTRAVENOUS at 10:26

## 2020-11-10 RX ADMIN — Medication 10 ML: at 21:27

## 2020-11-10 NOTE — PROGRESS NOTES
Problem: Self Care Deficits Care Plan (Adult)  Goal: *Acute Goals and Plan of Care (Insert Text)  Description:   1. Patient will complete lower body bathing and dressing with Supervision and adaptive equipment as needed. 2. Patient will complete toileting with Supervision and adaptive equipment as needed. 3. Patient will tolerate 23 minutes of OT treatment with 1-2 rest breaks to increase activity tolerance for ADLs. 4. Patient will complete functional transfers with Supervision and adaptive equipment as needed. 5. Patient will complete standing ADL with Supervision and good static and dynamic standing balance. Timeframe: 7 visits     Outcome: Progressing Towards Goal    OCCUPATIONAL THERAPY: Daily Note and PM 11/10/2020  INPATIENT: OT Visit Days: 2  Payor: Dustin Frames / Plan: [address] [place] [address] HMO / Product Type: [employer] /      NAME/AGE/GENDER: Rhea Don is a 65 y.o. 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 Mariely Nelson is a 65 y.o. female admitted for Colitis. At baseline, patient lives with son \"Everett\" in mobile home with 2 BALJEET. Pt reports she receives assistance from son for lower body dressing and reports that she recently needed assistance transferring into and out of the bathtub. Pt is dependent on son for IADLs and driving. Pt reports use of SPC for functional mobility within the home but endorses use of w/c for community distances in which she is dependent on her son to propel. Pt also reports that she has shower \"stool\" and rollator available for use. Per chart review, pt has hx of prior CVA with L sided deficits.     11/10/2020: Pt found seated in recliner chair just recently having finished PT treatment session. Alert and agreeable to OT treatment session. Pt reports minimal pain in UE secondary to burning pain from potassium IV. RN in room prior to start of treatment session and aware. Pt requires SBA with use of RW to complete sit to stand. Upon standing, pt with good static and fair dynamic standing balance. Pt requires CGA with use of RW to walk from recliner chair to sink. Pt requires SBA/intermittent CGA for dynamic reaching to brush teeth at sink. Pt requires no physical assist to don toothpaste or manipulate toothbrush. Pt requires CGA with use of RW to walk from sink to recliner chair. Pt requires SBA to complete stand to sit. Pt very kindly declined further participation in ADLs/functional mobility secondary to increased fatigue. Pt left seated in recliner chair with all needs met and within reach. RN notified. Will continue POC. This section established at most recent assessment   PROBLEM LIST (Impairments causing functional limitations):  1. Decreased Strength  2. Decreased ADL/Functional Activities  3. Decreased Transfer Abilities  4. Decreased Ambulation Ability/Technique  5. Decreased Balance  6. Decreased Activity Tolerance  7. Increased Fatigue  8. Decreased Flexibility/Joint Mobility  9. Decreased Cognition   INTERVENTIONS PLANNED: (Benefits and precautions of occupational therapy have been discussed with the patient.)  1. Activities of daily living training  2. Adaptive equipment training  3. Balance training  4. Neuromuscular re-eduation  5. Re-evaluation  6. Therapeutic activity  7. Therapeutic exercise     TREATMENT PLAN: Frequency/Duration: Follow patient 3x/week to address above goals. Rehabilitation Potential For Stated Goals: Good     REHAB RECOMMENDATIONS (at time of discharge pending progress):    Placement: It is my opinion, based on this patient's performance to date, that Ms. Zaria Christie may benefit from [address] after discharge due to the functional deficits listed above that are likely to improve with skilled rehabilitation because he/she has multiple medical issues that affect his/her functional mobility in the community. Pt has son in his 42's who does not work and is available to provide 24/7 supervision/assist.  Equipment:    TBD              OCCUPATIONAL PROFILE AND HISTORY:   History of Present Injury/Illness (Reason for Referral):  See H & P  Past Medical History/Comorbidities:   Ms. Malena Lara  has a past medical history of Anemia, Arrhythmia, Aspirin long-term use ([facility]), CAD (coronary artery disease) (2012), Chronic kidney disease, Chronic obstructive pulmonary disease ([facility] [address] CKD (chronic kidney disease) stage 3, GFR 30-59 ml/min ( ), ESRD on peritoneal dialysis ([facility] [address] Flat affect, GERD (gastroesophageal reflux disease), Heart murmur (not followed per cardiology), History of MI (myocardial infarction) (2012), colonic polyps (2016), Hydronephrosis, Hypertension, Left-sided weakness (2012), Loss of appetite, Peritoneal dialysis catheter in place [facility]), Poor historian, Renal atrophy, right, Renal insufficiency, Smoker, Smoker, Stented coronary artery ([facility]), and Stroke ([facility] [address] (2012). Ms. Malena Lara  has a past surgical history that includes colonoscopy (N/A, 9/26/2016); pr left heart cath,percutaneous (4/2012); hx urological; ir insert tunl cvc w/o port over 5 yr (8/7/2019); hx carotid endarterectomy (Right, 11/07/2018); vascular surgery procedure unlist (02/28/2019); vascular surgery procedure unlist (07/01/2019); and vascular surgery procedure unlist (08/06/2019).   Social History/Living Environment:   Home Environment: Trailer/mobile home  # Steps to Enter: 2  Rails to Enter: Yes  Hand Rails : Left  Wheelchair Ramp: No  One/Two Story Residence: One story  Living Alone: No  Support Systems: Child(emelia)  Patient Expects to be Discharged to[de-identified] Trailer/mobile home  Current DME Used/Available at Home: [address] [facility], [address] [facility] , [address] [facility] chair, Mushtaq Chavez, rollator, Wheelchair  Tub or Shower Type: Tub/Shower combination  Prior Level of Function/Work/Activity:  At baseline, patient lives with son \"Everett\" in mobile home with 2 BALJEET. Pt reports she receives assistance from son for lower body dressing and reports that she recently needed assistance transferring into and out of the bathtub. Pt is dependent on son for IADLs and driving. Pt reports use of SPC for functional mobility within the home but endorses use of w/c for community distances in which she is dependent on her son to propel. Pt also reports that she has shower \"stool\" and rollator available for use. Per chart review, pt has hx of prior CVA with L sided deficits. Personal Factors:          Sex:  female        Age:  65 y.o. Number of Personal Factors/Comorbidities that affect the Plan of Care: Extensive review of physical, cognitive, and psychosocial performance (3+):  HIGH COMPLEXITY   ASSESSMENT OF OCCUPATIONAL PERFORMANCE[de-identified]   Activities of Daily Living:   Basic ADLs (From Assessment) Complex ADLs (From Assessment)   Feeding: Setup  Oral Facial Hygiene/Grooming: Setup  Bathing: Minimum assistance  Upper Body Dressing: Minimum assistance  Lower Body Dressing: Minimum assistance  Toileting: Minimum assistance Instrumental ADL  Meal Preparation: Total assistance  Homemaking: Total assistance   Grooming/Bathing/Dressing Activities of Daily Living   Grooming  Grooming Assistance: Stand-by assistance;Contact guard assistance  Position Performed: Standing  Brushing Teeth: Stand-by assistance;Contact guard assistance                         Bed/Mat Mobility  Sit to Stand: Stand-by assistance  Stand to Sit: Stand-by assistance     Most Recent Physical Functioning:   Gross Assessment:  AROM: Generally decreased, functional  Strength: Generally decreased, functional  Coordination: Generally decreased, functional               Posture:  Posture (WDL): Exceptions to WDL  Posture Assessment:  Forward head, Rounded shoulders  Balance:  Sitting: Intact  Standing: Impaired  Standing - Static: Good(+)  Standing - Dynamic : Fair Bed Mobility:     Wheelchair Mobility:     Transfers:  Sit to Stand: Stand-by assistance  Stand to Sit: Stand-by assistance            Patient Vitals for the past 6 hrs:   BP SpO2 Pulse   11/10/20 1128 100/65  66   11/10/20 1537 103/61 100 % 74       Mental Status  Neurologic State: Alert  Orientation Level: Oriented to person, Oriented to place, Oriented to time, Disoriented to situation  Cognition: Follows commands  Perception: Appears intact  Perseveration: Perseverates during conversation  Safety/Judgement: Fall prevention                          Physical Skills Involved:  1. Range of Motion  2. Balance  3. Strength  4. Activity Tolerance  5. Gross Motor Control Cognitive Skills Affected (resulting in the inability to perform in a timely and safe manner):  1. None noted Psychosocial Skills Affected:  1. Habits/Routines  2. Environmental Adaptation  3. Social Interaction   Number of elements that affect the Plan of Care: 5+:  HIGH COMPLEXITY   CLINICAL DECISION MAKIN[address] [place] [id number] AM-PAC 6 Clicks   Daily Activity Inpatient Short Form  How much help from another person does the patient currently need. .. Total A Lot A Little None   1. Putting on and taking off regular lower body clothing? [] 1   [] 2   [x] 3   [] 4   2. Bathing (including washing, rinsing, drying)? [] 1   [] 2   [x] 3   [] 4   3. Toileting, which includes using toilet, bedpan or urinal?   [] 1   [] 2   [x] 3   [] 4   4. Putting on and taking off regular upper body clothing? [] 1   [] 2   [x] 3   [] 4   5. Taking care of personal grooming such as brushing teeth? [] 1   [] 2   [x] 3   [] 4   6. Eating meals? [] 1   [] 2   [x] 3   [] 4   © , Trustees of [address] [place] [address] [address] [address], under license to [employer].  All rights reserved      Score:  Initial: 18, completed, 2020 Most Recent: X (Date: -- ) Interpretation of Tool:  Represents activities that are increasingly more difficult (i.e. Bed mobility, Transfers, Gait). Medical Necessity:     · Skilled intervention continues to be required due to decreased independence, safety, balance, strength, and activity tolerance for performance of ADLs and functional activity. .  Reason for Services/Other Comments:  · Patient continues to require skilled intervention due to medical complications and patient unable to attend/participate in therapy as expected. Use of outcome tool(s) and clinical judgement create a POC that gives a: LOW COMPLEXITY         TREATMENT:   (In addition to Assessment/Re-Assessment sessions the following treatments were rendered)     Pre-treatment Symptoms/Complaints:  Pt with slight burning pain in UE from potassium IV. RN in room prior to start of treatment session and aware. Pain: Initial:   Pain Intensity 1: 2  Post Session:  Unchanged     Self Care: (18 minutes): Procedure(s) utilized to improve and/or restore self-care/home management as related to grooming. Required minimal visual, verbal and tactile cueing to facilitate activities of daily living skills. Pt requires CGA with use of RW to walk from recliner chair to sink. Pt requires SBA/intermittent CGA for dynamic reaching to brush teeth at sink. Pt requires no physical assist to don toothpaste or manipulate toothbrush. Braces/Orthotics/Lines/Etc:   · IV  · O2 Device: Room air  Treatment/Session Assessment:    · Response to Treatment: Pt with increased fatigue following participation in treatment session. · Interdisciplinary Collaboration:   o Physical Therapy Assistant  o Occupational Therapist  o Registered Nurse  · After treatment position/precautions:   o Up in chair  o Bed/Chair-wheels locked  o Bed in low position  o Call light within reach  o RN notified   · Compliance with Program/Exercises: Compliant all of the time.   · Recommendations/Intent for next treatment session: \"Next visit will focus on advancements to more challenging activities and reduction in assistance provided\".   Total Treatment Duration:  OT Patient Time In/Time Out  Time In: 1611  Time Out: 1629     JAYNE Cameron/ALYSON

## 2020-11-10 NOTE — DIALYSIS
Disconnected PD cycler from patient. Betadine cap intact. Patient tolerated well. UF amount: 705ml. Effluent: clear, yellow.

## 2020-11-10 NOTE — PROGRESS NOTES
Warfarin dosing per pharmacist    Roz Arreola is a 65 y.o. female. Height: 5' 1\" (154.9 cm)    Weight: 56.5 kg (124 lb 8 oz)    Indication:  Splenic infarcts    Goal INR:  2-3    Home dose:  New start    Risk factors or significant drug interactions:  Metronidazole, ciprofloxacin (stopped 11/7)    Other anticoagulants:  Heparin    Daily Monitoring  Date  INR     Warfarin dose HGB              Notes  11/5  1.2  2.5 mg  10.7    11/6  1.3  2.5 mg  9.1  11/7  1.3  4 mg  9.4           Cipro changed to ceftriaxone  11/8  1.2  5 mg  9.5  11/9  1.5  5 mg  9.0  11/10  2.4  hold  8.5    Bridging to warfarin, however heparin currently on hold due to hematuria. Per MD note continue with warfarin since HgB stable. Expected drug interaction with metronidazole. INR increased to 2.4 today. Will hold warfarin today due to large increase in INR. Monitoring daily INR. Thank you,  Janine Swann, Pharm. D.   PGY1 Pharmacy Resident  [phone]

## 2020-11-10 NOTE — PROGRESS NOTES
END OF SHIFT NOTE:    INTAKE/OUTPUT  11/08 0701 - 11/09 0700  In: 304 [I.V.:304]  Out: 781 [Urine:130]  Voiding  Catheter: NO  Drain:              Flatus: Patient does have flatus present. Stool:  3 occurrences. Characteristics:  Stool Assessment  Stool Color: Green  Stool Appearance: Loose  Stool Amount: Small  Stool Source/Status: Rectum    Emesis: 0 occurrences. Characteristics:   Emesis Assessment  Appearance: Undigested food, Yellow  Emesis Amount: Small    VITAL SIGNS  Patient Vitals for the past 12 hrs:   Temp Pulse Resp BP SpO2   11/09/20 1400 97.7 °F (36.5 °C) 71 14 (!) 107/53 100 %   11/09/20 1058 98 °F (36.7 °C) 65 16 (!) 91/52 99 %   11/09/20 0826     98 %   11/09/20 0745 98.5 °F (36.9 °C) 75 16 (!) 99/57 99 %       Pain Assessment  Pain Intensity 1: 0 (11/09/20 1425)  Pain Location 1: Abdomen     Patient Stated Pain Goal: 0    Ambulating  Yes    Shift report given to oncoming nurse at the bedside.     Donaldo Carbajal

## 2020-11-10 NOTE — PROGRESS NOTES
Renal Progress Note    Admission Date: 11/4/2020   Subjective:      No new complaints    Objective:     Physical Exam:    Patient Vitals for the past 8 hrs:   BP Temp Pulse Resp SpO2 Weight   11/10/20 0708 101/63 97.5 °F (36.4 °C) 69 17 99 %    11/10/20 0644      56.5 kg (124 lb 8 oz)   11/10/20 0347 (!) 97/55 97.5 °F (36.4 °C) 68 18 94 %       Gen: comfortable , NAD  HEENT: moist membranes  CV: S1, S2  Lungs: Clear bilaterally  Extem: no edema     Current Facility-Administered Medications   Medication Dose Route Frequency    [Held by provider] warfarin (COUMADIN) tablet 2.5 mg  2.5 mg Oral QPM    potassium bicarb-citric acid (EFFER-K) tablet 20 mEq  20 mEq Oral DAILY    cefTRIAXone (ROCEPHIN) 1 g in 0.9% sodium chloride (MBP/ADV) 50 mL  1 g IntraVENous Q24H    [Held by provider] amLODIPine (NORVASC) tablet 10 mg  10 mg Oral QHS    atorvastatin (LIPITOR) tablet 80 mg  80 mg Oral QHS    [Held by provider] aspirin delayed-release tablet 81 mg  81 mg Oral QHS    metoprolol succinate (TOPROL-XL) XL tablet 50 mg  50 mg Oral QHS    polyethylene glycol (MIRALAX) packet 17 g  17 g Oral DAILY PRN    sodium chloride (NS) flush 5-40 mL  5-40 mL IntraVENous Q8H    sodium chloride (NS) flush 5-40 mL  5-40 mL IntraVENous PRN    acetaminophen (TYLENOL) tablet 650 mg  650 mg Oral Q6H PRN    Or    acetaminophen (TYLENOL) suppository 650 mg  650 mg Rectal Q6H PRN    polyethylene glycol (MIRALAX) packet 17 g  17 g Oral DAILY PRN    promethazine (PHENERGAN) tablet 12.5 mg  12.5 mg Oral Q6H PRN    Or    ondansetron (ZOFRAN) injection 4 mg  4 mg IntraVENous Q6H PRN    [Held by provider] heparin 25,000 units in dextrose 500 mL infusion  18-36 Units/kg/hr IntraVENous TITRATE    metroNIDAZOLE (FLAGYL) IVPB premix 500 mg  500 mg IntraVENous Q8H            Data Review:     LABS:   Recent Results (from the past 12 hour(s))   PROTHROMBIN TIME + INR    Collection Time: 11/10/20  5:11 AM   Result Value Ref Range Prothrombin time 26.2 (H) 12.5 - 14.7 sec    INR 2.4     CBC W/O DIFF    Collection Time: 11/10/20  5:11 AM   Result Value Ref Range    WBC 6.5 4.3 - 11.1 K/uL    RBC 2.76 (L) 4.05 - 5.2 M/uL    HGB 8.5 (L) 11.7 - 15.4 g/dL    HCT 26.5 (L) 35.8 - 46.3 %    MCV 96.0 79.6 - 97.8 FL    MCH 30.8 26.1 - 32.9 PG    MCHC 32.1 31.4 - 35.0 g/dL    RDW 14.0 11.9 - 14.6 %    PLATELET 185 073 - 433 K/uL    MPV 12.2 9.4 - 12.3 FL    ABSOLUTE NRBC 0.00 0.0 - 0.2 K/uL   METABOLIC PANEL, BASIC    Collection Time: 11/10/20  5:11 AM   Result Value Ref Range    Sodium 140 138 - 145 mmol/L    Potassium 2.9 (L) 3.5 - 5.1 mmol/L    Chloride 106 98 - 107 mmol/L    CO2 27 21 - 32 mmol/L    Anion gap 7 7 - 16 mmol/L    Glucose 78 65 - 100 mg/dL    BUN 14 8 - 23 MG/DL    Creatinine 9.26 (H) 0.6 - 1.0 MG/DL    GFR est AA 5 (L) >60 ml/min/1.73m2    GFR est non-AA 4 (L) >60 ml/min/1.73m2    Calcium 8.4 8.3 - 10.4 MG/DL   MAGNESIUM    Collection Time: 11/10/20  5:11 AM   Result Value Ref Range    Magnesium 2.3 1.8 - 2.4 mg/dL         Plan:     Principal Problem:    Colitis (11/4/2020)    Active Problems:    CVA (cerebral vascular accident) ([facility] (4/28/2012)      Anemia of renal disease (1/7/2016)      S/P coronary artery stent placement (2/6/2017)      UTI (urinary tract infection) (11/10/2017)      Pure hypercholesterolemia (11/28/2017)      History of right-sided carotid endarterectomy (11/8/2018)      PAD (peripheral artery disease) ([facility] (6/19/2019)      ESRD on peritoneal dialysis ([address] (7/29/2019)      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 ESRD on PD  -PD with all 1.5%  Did well with tx last night  - typically she uses 1.5 and 2.5% at home but no sign of fluid overload at this point     2. Colitis- on IV flagyl, cipro per hosp     3. Abnormal abd CT with probable splenic infarcts- heparin with bridge to warfarin         4.  Hypokalemia -supplementing - will Increase

## 2020-11-10 NOTE — PROGRESS NOTES
Attempted Endurance 6cm, 20 gauge IV in patient's left upper arm with ultrasound guidance times 1. Patient refused to be stuck again by me or my partner Rhesa Bernheim RN.

## 2020-11-10 NOTE — PROGRESS NOTES
Hospitalist Note     Admit Date:  2020  7:47 AM   Name:  Rolando Reyes   Age:  65 y.o.  :  1950   MRN:  [id number]   PCP:  Khalif Ascencio MD  Treatment Team: Attending Provider: Femi Osorio MD; Consulting Provider: Jessica Grimaldo MD; Utilization Review: Sophy Joshi RN; Care Manager: Mindi Mi RN; Primary Nurse: Emeriat Lo RN    HPI/Subjective: In summary, Rolando Reyes is a 65 y.o. female with medical history significant for ESRD on PD at home, COPD, GERD, PAD,  HTN, HLD, CAD who was seen for nausea/vomiting/diarrhea and found to have colitis, splenic infarcts and 3cm infrarenal AAA on CT. Vascular surgery was consulted. No immediate intervention was needed and to follow up as  Outpatient for arterial duplex. Patient was started on heparin gtt for splenic infarcts and started on IV Abx    11/10: Patient was seen at bedside. Her appetite is improving. Her INR is already therapeutic. Hopefully she will be able to go home soon. No other complaints  Objective:     Patient Vitals for the past 24 hrs:   Temp Pulse Resp BP SpO2   11/10/20 1537 97.6 °F (36.4 °C) 74 17 103/61 100 %   11/10/20 1128 97.8 °F (36.6 °C) 66 16 100/65    11/10/20 0708 97.5 °F (36.4 °C) 69 17 101/63 99 %   11/10/20 0347 97.5 °F (36.4 °C) 68 18 (!) 97/55 94 %   20 2325 97.8 °F (36.6 °C) 82 16 113/62 92 %   20 2217  77  103/62    20 2040 97.8 °F (36.6 °C) 78 15 98/64 91 %     Oxygen Therapy  O2 Sat (%): 100 % (11/10/20 1537)  Pulse via Oximetry: 78 beats per minute (20 0826)  O2 Device: Room air (11/10/20 1357)    Estimated body mass index is 23.52 kg/m² as calculated from the following:    Height as of this encounter: 5' 1\" (1.549 m). Weight as of this encounter: 56.5 kg (124 lb 8 oz).     Intake/Output Summary (Last 24 hours) at 11/10/2020 1714  Last data filed at 11/10/2020 1028  Gross per 24 hour   Intake 320 ml   Output 755 ml   Net -435 ml       *Note that automatically entered I/Os may not be accurate; dependent on patient compliance with collection and accurate  by techs. General:    Well nourished. No overt distress  CV:   RRR. No edema. No JVD  Lungs:   Even, Unlabored  Abdomen:   Soft, nontender, nondistended. Extremities: Warm and dry. No cyanosis   Skin:     No rashes. No jaundice. Normal coloration  Neuro:  No gross focal deficits.   Alert    Data Reviewed:  I have reviewed all labs, meds, and studies from the last 24 hours:  Recent Results (from the past 24 hour(s))   PROTHROMBIN TIME + INR    Collection Time: 11/10/20  5:11 AM   Result Value Ref Range    Prothrombin time 26.2 (H) 12.5 - 14.7 sec    INR 2.4     CBC W/O DIFF    Collection Time: 11/10/20  5:11 AM   Result Value Ref Range    WBC 6.5 4.3 - 11.1 K/uL    RBC 2.76 (L) 4.05 - 5.2 M/uL    HGB 8.5 (L) 11.7 - 15.4 g/dL    HCT 26.5 (L) 35.8 - 46.3 %    MCV 96.0 79.6 - 97.8 FL    MCH 30.8 26.1 - 32.9 PG    MCHC 32.1 31.4 - 35.0 g/dL    RDW 14.0 11.9 - 14.6 %    PLATELET 199 425 - 207 K/uL    MPV 12.2 9.4 - 12.3 FL    ABSOLUTE NRBC 0.00 0.0 - 0.2 K/uL   METABOLIC PANEL, BASIC    Collection Time: 11/10/20  5:11 AM   Result Value Ref Range    Sodium 140 138 - 145 mmol/L    Potassium 2.9 (L) 3.5 - 5.1 mmol/L    Chloride 106 98 - 107 mmol/L    CO2 27 21 - 32 mmol/L    Anion gap 7 7 - 16 mmol/L    Glucose 78 65 - 100 mg/dL    BUN 14 8 - 23 MG/DL    Creatinine 9.26 (H) 0.6 - 1.0 MG/DL    GFR est AA 5 (L) >60 ml/min/1.73m2    GFR est non-AA 4 (L) >60 ml/min/1.73m2    Calcium 8.4 8.3 - 10.4 MG/DL   MAGNESIUM    Collection Time: 11/10/20  5:11 AM   Result Value Ref Range    Magnesium 2.3 1.8 - 2.4 mg/dL       Current Meds:  Current Facility-Administered Medications   Medication Dose Route Frequency    [Held by provider] warfarin (COUMADIN) tablet 2.5 mg  2.5 mg Oral QPM    potassium bicarb-citric acid (EFFER-K) tablet 20 mEq  20 mEq Oral BID    cefTRIAXone (ROCEPHIN) 1 g in 0.9% sodium chloride (MBP/ADV) 50 mL MBP  1 g IntraVENous Q24H    [Held by provider] amLODIPine (NORVASC) tablet 10 mg  10 mg Oral QHS    atorvastatin (LIPITOR) tablet 80 mg  80 mg Oral QHS    [Held by provider] aspirin delayed-release tablet 81 mg  81 mg Oral QHS    metoprolol succinate (TOPROL-XL) XL tablet 50 mg  50 mg Oral QHS    polyethylene glycol (MIRALAX) packet 17 g  17 g Oral DAILY PRN    sodium chloride (NS) flush 5-40 mL  5-40 mL IntraVENous Q8H    sodium chloride (NS) flush 5-40 mL  5-40 mL IntraVENous PRN    acetaminophen (TYLENOL) tablet 650 mg  650 mg Oral Q6H PRN    Or    acetaminophen (TYLENOL) suppository 650 mg  650 mg Rectal Q6H PRN    polyethylene glycol (MIRALAX) packet 17 g  17 g Oral DAILY PRN    promethazine (PHENERGAN) tablet 12.5 mg  12.5 mg Oral Q6H PRN    Or    ondansetron (ZOFRAN) injection 4 mg  4 mg IntraVENous Q6H PRN    [Held by provider] heparin 25,000 units in dextrose 500 mL infusion  18-36 Units/kg/hr IntraVENous TITRATE    metroNIDAZOLE (FLAGYL) IVPB premix 500 mg  500 mg IntraVENous Q8H       Other Studies:  Results for orders placed or performed during the hospital encounter of 20   2D ECHO COMPLETE ADULT (TTE) W OR [address]  [phone]    Transthoracic Echocardiogram  2D, M-mode, Doppler, and Color Doppler    Patient: Aguila Christianson  MR #: [id number]  : 15-Osmar-1950  Age: 65 years  Gender: Female  Study date: 2020  Account #: [de-identified]  Height: 61 in  Weight: 121.7 lb  BSA: 1.53 mï¾²  Status:Routine  Location: 210  BP: 110/ 67    Allergies: CODEINE, HYDRALAZINE    Sonographer:  Dejah Morales RDCS  Group:  [facility]  Referring Physician:  Georgina Daigle MD  Reading Physician:  Gregg Sotelo MD    INDICATIONS: Splenic infarcts, eval for PFO/shunts. PROCEDURE: This was a routine study.  A transthoracic echocardiogram was  performed. The study included complete 2D imaging, M-mode, complete spectral  Doppler, and color Doppler. Intravenous contrast (agitated saline) was  administered. Image quality was adequate. LEFT VENTRICLE: Size was normal. Systolic function was normal. Ejection  fraction was estimated in the range of 65 % to 70 %. There were no regional  wall motion abnormalities. There was moderate concentric hypertrophy. Avg.  E/e': 17.6. There is Left ventricular diastolic dysfunction. RIGHT VENTRICLE: The size was normal. Systolic function was normal. The  tricuspid jet envelope definition was inadequate for estimation of RV   systolic  pressure. LEFT ATRIUM: Size was normal.    ATRIAL SEPTUM: Contrast injection was performed. There was no right-to-left  shunt, with provocative maneuvers to increase right atrial pressure. RIGHT ATRIUM: Size was normal.    SYSTEMIC VEINS: IVC: The inferior vena cava was normal in size and course. AORTIC VALVE: The valve was structurally normal, tri-commissural. There was   no  evidence for stenosis. There was no insufficiency. MITRAL VALVE: Valve structure was normal. There was chordal MARTHA noted with a  mild peak outflow gradient ~15mmHg. There was no evidence for stenosis. There  was trivial regurgitation. TRICUSPID VALVE: The valve structure was normal. There was no evidence for  stenosis. There was trivial regurgitation. PULMONIC VALVE: The valve structure was normal. There was no evidence for  stenosis. There was no insufficiency. PERICARDIUM: There was no pericardial effusion. AORTA: The root exhibited normal size. SUMMARY:    -  Left ventricle: Systolic function was normal. Ejection fraction was  estimated in the range of 65 % to 70 %. There were no regional wall motion  abnormalities. There was moderate concentric hypertrophy. -  Atrial septum: Contrast injection was performed.  There was no   right-to-left  shunt, with provocative maneuvers to increase right atrial pressure. SYSTEM MEASUREMENT TABLES    2D mode  AoR Diam (2D): 2.8 cm  LA Dimension (2D): 3.1 cm  IVS/LVPW (2D): 1  IVSd (2D): 1.6 cm  LVIDd (2D): 3.1 cm  LVIDs (2D): 1.8 cm  LVOT Area (2D): 2.8 cm2  LVPWd (2D): 1.6 cm    Unspecified Scan Mode  Peak Grad; Mean; Antegrade Flow: 11 mm[Hg]  Vmax; Antegrade Flow: 164 cm/s  LVOT Diam: 1.9 cm    Prepared and signed by    Binta Velasco MD  Signed [id number] 13:11:30         No results found. All Micro Results     Procedure Component Value Units Date/Time    CULTURE, BODY FLUID Hardeep Amaya [[id number]] Collected:  11/04/20 1543    Order Status:  Completed Specimen: Body Fluid from Peritoneal Fluid Updated:  11/06/20 0850     Special Requests: NO SPECIAL REQUESTS        GRAM STAIN NO WBC'S SEEN         NO DEFINITE ORGANISM SEEN        Culture result: NO GROWTH 2 DAYS       C. DIFFICILE/EPI PCR [[id number]] Collected:  11/04/20 0826    Order Status:  Completed Specimen:  Stool Updated:  11/04/20 1055     Special Requests: NO SPECIAL REQUESTS        Culture result:       Toxigenic C. difficile NEGATIVE                         C. difficile target DNA sequences are not detected.                 SARS-CoV-2 Lab Results  \"Novel Coronavirus\" Test: No results found for: COV2NT   \"Emergent Disease\" Test: No results found for: EDPR  \"SARS-COV-2\" Test: No results found for: XGCOVT  Rapid Test: No results found for: COVR         Assessment and Plan:     Hospital Problems as of 11/10/2020 Date Reviewed: 10/28/2020          Codes Class Noted - Resolved POA    * (Principal) Colitis ICD-10-CM: K52.9  ICD-9-CM: 558.9  11/4/2020 - Present Yes        Splenic infarct ICD-10-CM: D73.5  ICD-9-CM: 289.59  11/4/2020 - Present Yes        Nausea vomiting and diarrhea ICD-10-CM: R11.2, R19.7  ICD-9-CM: 787.91, 787.01  11/4/2020 - Present Yes        ESRD on peritoneal dialysis ([facility] (Chronic) ICD-10-CM: N18.6, Z99.2  ICD-9-CM: 585.6, V45.11  7/29/2019 - Present Yes        PAD (peripheral artery disease) ([facility]) (Chronic) ICD-10-CM: I73.9  ICD-9-CM: 443.9  6/19/2019 - Present Yes        History of right-sided carotid endarterectomy (Chronic) ICD-10-CM: W78.810  ICD-9-CM: V45.89  11/8/2018 - Present Yes        Pure hypercholesterolemia (Chronic) ICD-10-CM: E78.00  ICD-9-CM: 272.0  11/28/2017 - Present Yes        UTI (urinary tract infection) ICD-10-CM: N39.0  ICD-9-CM: 599.0  11/10/2017 - Present Yes        S/P coronary artery stent placement (Chronic) ICD-10-CM: Z95.5  ICD-9-CM: V45.82  2/6/2017 - Present Yes        Anemia of renal disease (Chronic) ICD-10-CM: N18.9, D63.1  ICD-9-CM: 285.21  1/7/2016 - Present Yes        CVA (cerebral vascular accident) ([facility] (Chronic) ICD-10-CM: I63.9  ICD-9-CM: 434.91  4/28/2012 - Present Yes              Plan:  Splenic Infarcts  - echo unremarkable  -Continue Coumadin. INR is already therapeutic. UTI  - per UA 11/4. Rocephin. No urine cx was sent unfortunately    ESRD on PD, FEN  -Nephrology following  -give IV K today. Acute Colitis  -Managed with IV ceftriaxone and Flagyl    Anemia  -likely CKD, maybe some from hematuria. -iron studies ok  -FOBT neg  -monitor     3cm infrarenal AAA, PAD  -Vascular recommends no immediate intervention. Out patient follow up; vascular will schedule         DC planning/Dispo:    -PT/OT rec home health    Diet:  DIET NUTRITIONAL SUPPLEMENTS  DIET REGULAR  DVT ppx: On AC    Other listed chronic conditions stable, cont current management.     Signed:  Cynthia Willingham MD

## 2020-11-10 NOTE — PROGRESS NOTES
Problem: Mobility Impaired (Adult and Pediatric)  Goal: *Acute Goals and Plan of Care (Insert Text)  Outcome: Progressing Towards Goal  Note: LTG:  (1.)Ms. Janneth Hoover will move from supine to sit and sit to supine , scoot up and down, and roll side to side with INDEPENDENT within 7 treatment day(s) from flat surface without handrail. (2.)Ms. Janneth Hoover will transfer from bed to chair and chair to bed with MODIFIED INDEPENDENCE using the least restrictive device within 7 treatment day(s). (3.)Ms. Janneth Hoover will ambulate with STAND BY ASSIST for 250+ feet with the least restrictive device within 7 treatment day(s) while maintaining normal vital signs. (4.)Ms. Janneth Hoover will perform 2 steps with L HR and CGA within 7 treatment days for safety ascending and descending stairs for home.   ___________________________________________________________________________________________      PHYSICAL THERAPY: Daily Note and PM 11/10/2020  INPATIENT: PT Visit Days : 3  Payor: Jamie Hylton / Plan: [address] [place] [address] HMO / Product Type: Managed Care Medicare /       NAME/AGE/GENDER: Roz Arreola is a 65 y.o. 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 Pt stood and ambulated in melissa 60' x 2 with rolling walker and CGA. Pt did require a sitting rest break. Pt returned to chair in room and then requested to use bathroom. Pt ambulated into the bathroom with CGA, voided, and then ambulated back to chair. Pt was left sitting up with needs in reach. Pt is making slow steady progress towards goals with a little increase in gait distance this afternoon.   Will continue with POC. This section established at most recent assessment   PROBLEM LIST (Impairments causing functional limitations):  1. Decreased ADL/Functional Activities  2. Decreased Transfer Abilities  3. Decreased Ambulation Ability/Technique  4. Decreased Balance  5. Decreased Activity Tolerance   INTERVENTIONS PLANNED: (Benefits and precautions of physical therapy have been discussed with the patient.)  1. Balance Exercise  2. Bed Mobility  3. Family Education  4. Gait Training  5. Home Exercise Program (HEP)  6. Therapeutic Activites  7. Therapeutic Exercise/Strengthening  8. Transfer Training     TREATMENT PLAN: Frequency/Duration: 3 times a week for duration of hospital stay  Rehabilitation Potential For Stated Goals: Good     REHAB RECOMMENDATIONS (at time of discharge pending progress):    Placement: It is my opinion, based on this patient's performance to date, that Ms. Magno Toussaint may benefit from [address] after discharge due to the functional deficits listed above that are likely to improve with skilled rehabilitation because he/she has multiple medical issues that affect his/her functional mobility in the community. Equipment:    None at this time              HISTORY:   History of Present Injury/Illness (Reason for Referral):  Ms. Magno Toussaint is a 64 y/o AAF with a h/o ESRD on PD at home, COPD, GERD, PAD,  HTN, HLD, CAD who presents today with c/o a 3-4 week history of N/V, weakness and poor PO intake. She is with her daughter, Mariela Regalado, who helps with history. She has been compliant with dialysis. Afebrile, no chest pain, palpitations, SOB/SIMMS, hypoxia. She developed non-bloody diarrhea yesterday. No recent abx. She's had generalized abdominal pain as well with minimal vomiting. Labs are largely unremarkable, Cr up but PD patient. 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  Vascular surgery consulted via ER and no acute intervention required for vascular issues and ok from their perspective to anticoagulate the infarcts. She has been given antibiotics. Hospitalist consulted for admission and further management. Past Medical History/Comorbidities:   Ms. Donita Fothergill  has a past medical history of Anemia, Arrhythmia, Aspirin long-term use ([facility]), CAD (coronary artery disease) (2012), Chronic kidney disease, Chronic obstructive pulmonary disease ([facility] [address] CKD (chronic kidney disease) stage 3, GFR 30-59 ml/min ( ), ESRD on peritoneal dialysis ([facility] [address] Flat affect, GERD (gastroesophageal reflux disease), Heart murmur (not followed per cardiology), History of MI (myocardial infarction) (2012), colonic polyps (2016), Hydronephrosis, Hypertension, Left-sided weakness (2012), Loss of appetite, Peritoneal dialysis catheter in place [facility]), Poor historian, Renal atrophy, right, Renal insufficiency, Smoker, Smoker, Stented coronary artery ([facility]), and Stroke ([facility] [address] (2012). Ms. Donita Fothergill  has a past surgical history that includes colonoscopy (N/A, 9/26/2016); pr left heart cath,percutaneous (4/2012); hx urological; ir insert tunl cvc w/o port over 5 yr (8/7/2019); hx carotid endarterectomy (Right, 11/07/2018); vascular surgery procedure unlist (02/28/2019); vascular surgery procedure unlist (07/01/2019); and vascular surgery procedure unlist (08/06/2019). Social History/Living Environment:   Home Environment: Trailer/mobile home  # Steps to Enter: 2  Rails to Enter: Yes  Hand Rails : Left  Wheelchair Ramp: No  One/Two Story Residence: One story  Living Alone: No  Support Systems: Child(emelia)  Patient Expects to be Discharged to[de-identified] Trailer/mobile home  Current DME Used/Available at Home: [place], quad, Shower chair, Walker, rollator, Wheelchair  Tub or Shower Type: Tub/Shower combination  Prior Level of Function/Work/Activity:  Lives with son, mod I at baseline with quad cane, assist ADLs, RA  Personal Factors:          Sex:  female        Age:  65 y.o. Overall Behavior:  agreeable    Number of Personal Factors/Comorbidities that affect the Plan of Care:  CVA, CAD, age, COPD 3+: HIGH COMPLEXITY   EXAMINATION:   Most Recent Physical Functioning:   Gross Assessment:                  Posture:     Balance:  Sitting: Intact  Standing - Static: Good;Constant support  Standing - Dynamic : Fair;Constant support Bed Mobility:     Wheelchair Mobility:     Transfers:  Sit to Stand: Stand-by assistance  Stand to Sit: Stand-by assistance  Gait:     Base of Support: Narrowed  Speed/Akila: Slow  Step Length: Left shortened;Right shortened  Distance (ft): 60 Feet (ft)(x  2)  Assistive Device: Walker, rolling  Ambulation - Level of Assistance: Contact guard assistance  Interventions: Safety awareness training;Verbal cues      Body Structures Involved:  1. Muscles Body Functions Affected:  1. Movement Related Activities and Participation Affected:  1. General Tasks and Demands  2. Mobility  3. Self Care   Number of elements that affect the Plan of Care: 4+: HIGH COMPLEXITY   CLINICAL PRESENTATION:   Presentation: Evolving clinical presentation with changing clinical characteristics: MODERATE COMPLEXITY   CLINICAL DECISION MAKIN [facility] Inpatient Short Form  How much difficulty does the patient currently have. .. Unable A Lot A Little None   1. Turning over in bed (including adjusting bedclothes, sheets and blankets)? [] 1   [] 2   [x] 3   [] 4   2. Sitting down on and standing up from a chair with arms ( e.g., wheelchair, bedside commode, etc.)   [] 1   [] 2   [x] 3   [] 4   3. Moving from lying on back to sitting on the side of the bed? [] 1   [] 2   [x] 3   [] 4   How much help from another person does the patient currently need. .. Total A Lot A Little None   4. Moving to and from a bed to a chair (including a wheelchair)? [] 1   [] 2   [x] 3   [] 4   5. Need to walk in hospital room?    [] 1   [] 2   [x] 3   [] 4 6.  Climbing 3-5 steps with a railing? [] 1   [] 2   [x] 3   [] 4   © 2007, Trustees of [address] [place] [address] [address] [address], under license to [employer]. All rights reserved      Score:  Initial: 18 Most Recent: X (Date: -- )    Interpretation of Tool:  Represents activities that are increasingly more difficult (i.e. Bed mobility, Transfers, Gait). Medical Necessity:     · Patient is expected to demonstrate progress in   · strength, range of motion, balance, and coordination  ·  to   · decrease assistance required with overall functional mobility, transfers, ambulation  · .  · Patient demonstrates   · good  ·  rehab potential due to higher previous functional level. Reason for Services/Other Comments:  · Patient   · continues to require present interventions due to patient's inability to perform bed mobility, transfers, ambulation safely and effectively  · . Use of outcome tool(s) and clinical judgement create a POC that gives a: Clear prediction of patient's progress: LOW COMPLEXITY            TREATMENT:   (In addition to Assessment/Re-Assessment sessions the following treatments were rendered)   Pre-treatment Symptoms/Complaints:  Patient had no complaints of pain, just weakness today. Pain: Initial:      Post Session:  0/10 post session     Therapeutic Activity: (  27 mintues): Therapeutic activities including Bed transfers, Chair transfers, toilet transfers, Ambulation on level ground and sit to stand and standing to improve mobility, strength and balance. Required minimal Safety awareness training;Verbal cues to promote static and dynamic balance in standing.       Date:  11/9 Date:   Date:     Activity/Exercise Parameters Parameters Parameters   Ankle pumps 2 x 10     Heel slides 2 x 10     Hip abd/adduction 2 x 10     Seated knee extension 2 x 10     Seated hip flexion 2 x 10                       Braces/Orthotics/Lines/Etc:   · IV  · O2 Device: Room air  Treatment/Session Assessment:    · Response to Treatment: tolerated well  · Interdisciplinary Collaboration:   o Physical Therapy Assistant  o Registered Nurse  · After treatment position/precautions:   o Up in chair  o Bed in low position  o Call light within reach  o RN notified   · Compliance with Program/Exercises: Will assess as treatment progresses  · Recommendations/Intent for next treatment session: \"Next visit will focus on advancements to more challenging activities\".   Total Treatment Duration:  PT Patient Time In/Time Out  Time In: 6280  Time Out: 1001 [facility]

## 2020-11-10 NOTE — DIALYSIS
Peritoneal dialysis initiated as ordered. Dressing changed, site has no s/s of redness, swelling, or exudate. Patient states no complaints at this time.

## 2020-11-10 NOTE — ROUTINE PROCESS
Verbal bedside report given to Norton du sac, oncoming RN. Patient's situation, background, assessment and recommendations provided. Opportunity for questions provided. Oncoming RN assumed care of patient.

## 2020-11-11 VITALS
OXYGEN SATURATION: 91 % | TEMPERATURE: 97.9 F | WEIGHT: 124.5 LBS | SYSTOLIC BLOOD PRESSURE: 106 MMHG | DIASTOLIC BLOOD PRESSURE: 40 MMHG | HEIGHT: 61 IN | BODY MASS INDEX: 23.5 KG/M2 | RESPIRATION RATE: 16 BRPM | HEART RATE: 71 BPM

## 2020-11-11 LAB
ANION GAP SERPL CALC-SCNC: 8 MMOL/L (ref 7–16)
BUN SERPL-MCNC: 17 MG/DL (ref 8–23)
CALCIUM SERPL-MCNC: 7.9 MG/DL (ref 8.3–10.4)
CHLORIDE SERPL-SCNC: 106 MMOL/L (ref 98–107)
CO2 SERPL-SCNC: 27 MMOL/L (ref 21–32)
CREAT SERPL-MCNC: 8.51 MG/DL (ref 0.6–1)
GLUCOSE SERPL-MCNC: 107 MG/DL (ref 65–100)
HGB BLD-MCNC: 8.8 G/DL (ref 11.7–15.4)
INR PPP: 2.8
POTASSIUM SERPL-SCNC: 3 MMOL/L (ref 3.5–5.1)
PROTHROMBIN TIME: 29.3 SEC (ref 12.5–14.7)
SODIUM SERPL-SCNC: 141 MMOL/L (ref 136–145)

## 2020-11-11 PROCEDURE — 80048 BASIC METABOLIC PNL TOTAL CA: CPT

## 2020-11-11 PROCEDURE — 74011250637 HC RX REV CODE- 250/637: Performed by: INTERNAL MEDICINE

## 2020-11-11 PROCEDURE — 85018 HEMOGLOBIN: CPT

## 2020-11-11 PROCEDURE — 2709999900 HC NON-CHARGEABLE SUPPLY

## 2020-11-11 PROCEDURE — 74011250636 HC RX REV CODE- 250/636: Performed by: INTERNAL MEDICINE

## 2020-11-11 PROCEDURE — 36415 COLL VENOUS BLD VENIPUNCTURE: CPT

## 2020-11-11 PROCEDURE — 85610 PROTHROMBIN TIME: CPT

## 2020-11-11 RX ORDER — METRONIDAZOLE 500 MG/1
500 TABLET ORAL 2 TIMES DAILY
Qty: 8 TAB | Refills: 0 | Status: SHIPPED | OUTPATIENT
Start: 2020-11-11 | End: 2020-11-15

## 2020-11-11 RX ORDER — METOPROLOL SUCCINATE 25 MG/1
25 TABLET, EXTENDED RELEASE ORAL DAILY
Qty: 30 TAB | Refills: 1 | Status: SHIPPED | OUTPATIENT
Start: 2020-11-11

## 2020-11-11 RX ORDER — POTASSIUM CHLORIDE 20 MEQ/1
20 TABLET, EXTENDED RELEASE ORAL DAILY
Qty: 30 TAB | Refills: 1 | Status: SHIPPED | OUTPATIENT
Start: 2020-11-11

## 2020-11-11 RX ORDER — CEFPODOXIME PROXETIL 100 MG/1
100 TABLET, FILM COATED ORAL 2 TIMES DAILY
Qty: 8 TAB | Refills: 0 | Status: SHIPPED | OUTPATIENT
Start: 2020-11-11 | End: 2020-11-15

## 2020-11-11 RX ORDER — WARFARIN 2.5 MG/1
TABLET ORAL
Qty: 15 TAB | Refills: 0 | Status: SHIPPED | OUTPATIENT
Start: 2020-11-11

## 2020-11-11 RX ORDER — ONDANSETRON 8 MG/1
8 TABLET, ORALLY DISINTEGRATING ORAL
Qty: 30 TAB | Refills: 0 | Status: SHIPPED | OUTPATIENT
Start: 2020-11-11

## 2020-11-11 RX ORDER — ACETAMINOPHEN 325 MG/1
650 TABLET ORAL
Qty: 30 TAB | Refills: 0 | Status: SHIPPED | OUTPATIENT
Start: 2020-11-11

## 2020-11-11 RX ORDER — POTASSIUM CHLORIDE 14.9 MG/ML
20 INJECTION INTRAVENOUS ONCE
Status: COMPLETED | OUTPATIENT
Start: 2020-11-11 | End: 2020-11-11

## 2020-11-11 RX ADMIN — METRONIDAZOLE 500 MG: 500 INJECTION, SOLUTION INTRAVENOUS at 06:43

## 2020-11-11 RX ADMIN — Medication 10 ML: at 06:44

## 2020-11-11 RX ADMIN — ACETAMINOPHEN 650 MG: 325 TABLET, FILM COATED ORAL at 13:15

## 2020-11-11 RX ADMIN — POTASSIUM BICARBONATE 20 MEQ: 782 TABLET, EFFERVESCENT ORAL at 08:20

## 2020-11-11 RX ADMIN — POTASSIUM CHLORIDE 20 MEQ: 14.9 INJECTION, SOLUTION INTRAVENOUS at 11:06

## 2020-11-11 NOTE — PROGRESS NOTES
Renal Progress Note    Admission Date: 11/4/2020   Subjective:      No new complaints- hopes to go home    Objective:     Physical Exam:    Patient Vitals for the past 8 hrs:   BP Temp Pulse Resp SpO2   11/11/20 0751 103/60 97.9 °F (36.6 °C) 69 16 96 %   11/11/20 0424 98/66 98 °F (36.7 °C) 88 16 96 %      Gen: comfortable , NAD  HEENT: moist membranes  CV: S1, S2  Lungs: Clear bilaterally  Extem: no edema     Current Facility-Administered Medications   Medication Dose Route Frequency    [Held by provider] warfarin (COUMADIN) tablet 2.5 mg  2.5 mg Oral QPM    potassium bicarb-citric acid (EFFER-K) tablet 20 mEq  20 mEq Oral BID    cefTRIAXone (ROCEPHIN) 1 g in 0.9% sodium chloride (MBP/ADV) 50 mL MBP  1 g IntraVENous Q24H    [Held by provider] amLODIPine (NORVASC) tablet 10 mg  10 mg Oral QHS    atorvastatin (LIPITOR) tablet 80 mg  80 mg Oral QHS    [Held by provider] aspirin delayed-release tablet 81 mg  81 mg Oral QHS    metoprolol succinate (TOPROL-XL) XL tablet 50 mg  50 mg Oral QHS    polyethylene glycol (MIRALAX) packet 17 g  17 g Oral DAILY PRN    sodium chloride (NS) flush 5-40 mL  5-40 mL IntraVENous Q8H    sodium chloride (NS) flush 5-40 mL  5-40 mL IntraVENous PRN    acetaminophen (TYLENOL) tablet 650 mg  650 mg Oral Q6H PRN    Or    acetaminophen (TYLENOL) suppository 650 mg  650 mg Rectal Q6H PRN    polyethylene glycol (MIRALAX) packet 17 g  17 g Oral DAILY PRN    promethazine (PHENERGAN) tablet 12.5 mg  12.5 mg Oral Q6H PRN    Or    ondansetron (ZOFRAN) injection 4 mg  4 mg IntraVENous Q6H PRN    [Held by provider] heparin 25,000 units in dextrose 500 mL infusion  18-36 Units/kg/hr IntraVENous TITRATE    metroNIDAZOLE (FLAGYL) IVPB premix 500 mg  500 mg IntraVENous Q8H            Data Review:     LABS:   Recent Results (from the past 12 hour(s))   PROTHROMBIN TIME + INR    Collection Time: 11/11/20  3:49 AM   Result Value Ref Range    Prothrombin time 29.3 (H) 12.5 - 14.7 sec    INR 2.8 HEMOGLOBIN    Collection Time: 11/11/20  3:49 AM   Result Value Ref Range    HGB 8.8 (L) 11.7 - 16.3 g/dL   METABOLIC PANEL, BASIC    Collection Time: 11/11/20  3:49 AM   Result Value Ref Range    Sodium 141 136 - 145 mmol/L    Potassium 3.0 (L) 3.5 - 5.1 mmol/L    Chloride 106 98 - 107 mmol/L    CO2 27 21 - 32 mmol/L    Anion gap 8 7 - 16 mmol/L    Glucose 107 (H) 65 - 100 mg/dL    BUN 17 8 - 23 MG/DL    Creatinine 8.51 (H) 0.6 - 1.0 MG/DL    GFR est AA 6 (L) >60 ml/min/1.73m2    GFR est non-AA 5 (L) >60 ml/min/1.73m2    Calcium 7.9 (L) 8.3 - 10.4 MG/DL         Plan:     Principal Problem:    Colitis (11/4/2020)    Active Problems:    CVA (cerebral vascular accident) ([facility] [address] (4/28/2012)      Anemia of renal disease (1/7/2016)      S/P coronary artery stent placement (2/6/2017)      UTI (urinary tract infection) (11/10/2017)      Pure hypercholesterolemia (11/28/2017)      History of right-sided carotid endarterectomy (11/8/2018)      PAD (peripheral artery disease) ([facility] [address] (6/19/2019)      ESRD on peritoneal dialysis ([facility] [address] (7/29/2019)      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 ESRD on PD  -PD with all 1.5%  Did well with tx last night  - typically she uses 1.5 and 2.5% at home but no sign of fluid overload at this point  - if discharges can resume home PD therapy     2. Colitis-      3. Abnormal abd CT with probable splenic infarcts- heparin with bridge to warfarin - INR at 2.8        4.  Hypokalemia -supplementing     She was still connected to cycler at the time of my visit

## 2020-11-11 NOTE — DISCHARGE INSTRUCTIONS
Activity: Activity as tolerated  Diet: Cardiac Diet  Wound Care: None needed      Colitis: Care Instructions  Your Care Instructions  Colitis is the medical term for swelling (inflammation) of the intestine. It can be caused by different things, such as an infection or loss of blood flow in the intestine. Other causes are problems like Crohn's disease or ulcerative colitis. Symptoms may include fever, diarrhea that may be bloody, or belly pain. Sometimes symptoms go away without treatment. But you may need treatment or more tests, such as blood tests or a stool test. Or you may need imaging tests like a CT scan or a colonoscopy. In some cases, the doctor may want to test a sample of tissue from the intestine. This test is called a biopsy. The doctor has checked you carefully, but problems can develop later. If you notice any problems or new symptoms, get medical treatment right away. Follow-up care is a key part of your treatment and safety. Be sure to make and go to all appointments, and call your doctor if you are having problems. It's also a good idea to know your test results and keep a list of the medicines you take. How can you care for yourself at home? · Rest until you feel better. · Your doctor may recommend that you eat bland foods. These include rice, dry toast or crackers, bananas, and applesauce. · To prevent dehydration, drink plenty of fluids. Choose water and other caffeine-free clear liquids until you feel better. If you have kidney, heart, or liver disease and have to limit fluids, talk with your doctor before you increase the amount of fluids you drink. · Be safe with medicines. Take your medicines exactly as prescribed. Call your doctor if you think you are having a problem with your medicine. You will get more details on the specific medicines your doctor prescribes. When should you call for help? Call 911 anytime you think you may need emergency care.  For example, call if:    · You passed out (lost consciousness).     · Your stools are maroon or very bloody. Call your doctor now or seek immediate medical care if:    · You have new or worse belly pain.     · You have a fever.     · You are vomiting.     · You cannot pass stools or gas.     · You have new or more blood in your stools. Watch closely for changes in your health, and be sure to contact your doctor if:    · You have new or worse symptoms.     · You are losing weight.     · You do not get better as expected. Where can you learn more? Go to http://www.gray.com/  Enter [id number] in the search box to learn more about \"Colitis: Care Instructions. \"  Current as of: April 15, 2020               Content Version: 12.6  © 4899-5445 [employer]. Care instructions adapted under license by [employer] (which disclaims liability or warranty for this information). If you have questions about a medical condition or this instruction, always ask your healthcare professional. [place] [address] any warranty or liability for your use of this information. Abdominal Aortic Aneurysm Repair Surgery: Before Your Surgery  What is an abdominal aortic aneurysm repair? Open surgery is done to repair an abdominal aortic aneurysm. It is called an open surgery because the abdomen is opened so the doctor can see and work on the aorta. Open surgery is the traditional method of repair. Your aorta is a large artery that carries blood from your heart through your belly to the rest of your body. Without surgery to fix this problem, your aorta could burst. This can cause death. To repair the aneurysm, a doctor uses a man-made tube (called a graft) to replace the weak and bulging section of the aorta in the belly. General anesthesia is used for this surgery. You will probably spend 5 to 7 days in the hospital. Kathie Vega will need to take it easy for at least 4 to 6 weeks at home.   Follow-up care is a key part of your treatment and safety. Be sure to make and go to all appointments, and call your doctor if you are having problems. It's also a good idea to know your test results and keep a list of the medicines you take. How do you prepare for surgery? Surgery can be stressful. This information will help you understand what you can expect. And it will help you safely prepare for surgery. Preparing for surgery    · Be sure you have someone to take you home. Anesthesia and pain medicine will make it unsafe for you to drive or get home on your own.     · Understand exactly what surgery is planned, along with the risks, benefits, and other options.     · Tell your doctor ALL the medicines, vitamins, supplements, and herbal remedies you take. Some may increase the risk of problems during your surgery. Your doctor will tell you if you should stop taking any of them before the surgery and how soon to do it.     · If you take aspirin or some other blood thinner, ask your doctor if you should stop taking it before your surgery. Make sure that you understand exactly what your doctor wants you to do. These medicines increase the risk of bleeding.     · Make sure your doctor and the hospital have a copy of your advance directive. If you don't have one, you may want to prepare one. It lets others know your health care wishes. It's a good thing to have before any type of surgery or procedure. What happens on the day of surgery? · Follow the instructions exactly about when to stop eating and drinking. If you don't, your surgery may be canceled. If your doctor told you to take your medicines on the day of surgery, take them with only a sip of water.     · Take a bath or shower before you come in for your surgery. Do not apply lotions, perfumes, deodorants, or nail polish.     · Do not shave the surgical site yourself.     · Take off all jewelry and piercings. And take out contact lenses, if you wear them.    At the hospital or surgery center   · Bring a picture ID.     · The area for surgery is often marked to make sure there are no errors.     · You may get an epidural catheter, which is a tiny tube that puts pain medicine into the area in your back around your spinal cord. The epidural will prevent pain after surgery.     · You will be kept comfortable and safe by your anesthesia provider. You may be asleep during the surgery.     · The surgery will take about 2 to 3 hours.     · You may have a plastic tube in your nose that goes down the back of your throat into your stomach to drain stomach juices. The tube is usually removed 1 to 3 days after surgery.     · You will be encouraged to get out of bed the day after surgery. You will have lots of help doing this.     · Your doctor will have you do breathing exercises. These help to keep your lungs healthy. When should you call for help? Call 911 anytime you think you may need emergency care. For example, call if:    · You passed out (lost consciousness).     · You have severe trouble breathing.     · You have sudden chest pain and shortness of breath, or you cough up blood or foamy, pink mucus.     · You have severe pain in your belly.     · You have chest pain or pressure. This may occur with:  ? Sweating. ? Shortness of breath. ? Nausea or vomiting. ? Pain that spreads from the chest to the neck, jaw, or one or both shoulders or arms. ? Dizziness or lightheadedness. ? A fast or uneven pulse. After calling 911, chew 1 adult-strength aspirin. Wait for an ambulance. Do not try to drive yourself.    Call your doctor now or seek immediate medical care if:    · You have new or increased shortness of breath.     · You are dizzy or lightheaded, or you feel like you may faint.     · You are sick to your stomach or cannot keep fluids down.     · You have pain that does not get better after you take pain medicine.     · You have a fever over 100°F.     · You have loose stitches, or your incision comes open.     · Bright red blood has soaked through the bandage over your incision.     · You have signs of infection, such as:  ? Increased pain, swelling, warmth, or redness. ? Red streaks leading from the incision. ? Pus draining from the incision. ? Swollen lymph nodes in your neck, armpits, or groin. ? A fever.     · You have signs of a blood clot, such as:  ? Pain in your calf, back of the knee, thigh, or groin. ? Redness and swelling in your leg or groin. Watch closely for any changes in your health, and be sure to contact your doctor if:    · You have sudden weight gain, such as 3 pounds or more in 2 to 3 days.     · You have increased swelling in your legs, ankles, or feet.     · You have any concerns about your incision. Where can you learn more? Go to http://elfego-court.info/  Enter I016 in the search box to learn more about \"Abdominal Aortic Aneurysm Repair Surgery: Before Your Surgery. \"  Current as of: March 4, 2020               Content Version: 12.6  © 5028-2654 [employer]. Care instructions adapted under license by [employer] (which disclaims liability or warranty for this information). If you have questions about a medical condition or this instruction, always ask your healthcare professional. [place] [address] any warranty or liability for your use of this information.

## 2020-11-11 NOTE — ROUTINE PROCESS
Discharge instructions reviewed with patient/ patient's daughter. Patient verbalized/ signed agreement/ understanding. Paper copy placed in chart.

## 2020-11-11 NOTE — PROGRESS NOTES
Warfarin dosing per pharmacist    Vandana Callahan is a 65 y.o. female. Height: 5' 1\" (154.9 cm)    Weight: 56.5 kg (124 lb 8 oz)    Indication:  Splenic infarcts    Goal INR:  2-3    Home dose:  New start    Risk factors or significant drug interactions:  Metronidazole, ciprofloxacin (stopped 11/7)    Other anticoagulants:  Heparin    Daily Monitoring  Date  INR     Warfarin dose HGB              Notes  11/5  1.2  2.5 mg  10.7    11/6  1.3  2.5 mg  9.1  11/7  1.3  4 mg  9.4           Cipro changed to ceftriaxone  11/8  1.2  5 mg  9.5  11/9  1.5  5 mg  9.0  11/10  2.4  Hold  8.5  11/11  2.8  Hold  8.8    Bridging to warfarin, however heparin currently on hold due to hematuria. Per MD note continue with warfarin since HgB stable. Expected drug interaction with metronidazole. INR increased to 2.8 today despite holding yesterday's dose. Will hold today's dose as well. Thank you,  Janine Swann, Pharm. D.   PGY1 Pharmacy Resident  [phone]

## 2020-11-11 NOTE — PROGRESS NOTES
Per MD pt needs to be seen by [facility] RN in am for labs to be drawn and results sent to PCP, called spoke with Bernetta Boxer RN with [facility] to update them on need for pt to be seen in am and states they will take care of this, new home care order to include INR test orders with instructions to send results to PCP and DC summary faxed to [facility] today. Pt to be dc home with family.

## 2020-11-11 NOTE — PROGRESS NOTES
END OF SHIFT NOTE:    INTAKE/OUTPUT  11/09 0701 - 11/10 0700  In: 200 [I.V.:200]  Out: 685 [Urine:50]  Voiding: YES  Catheter: NO  Drain:              Flatus: Patient does have flatus present. Stool:  3 occurrences. Characteristics:  Stool Assessment  Stool Color: Green  Stool Appearance: Loose  Stool Amount: Small  Stool Source/Status: Rectum    Emesis: 0 occurrences. Characteristics:   Emesis Assessment  Appearance: Undigested food, Yellow  Emesis Amount: Small    VITAL SIGNS  Patient Vitals for the past 12 hrs:   Temp Pulse Resp BP SpO2   11/10/20 1537 97.6 °F (36.4 °C) 74 17 103/61 100 %   11/10/20 1128 97.8 °F (36.6 °C) 66 16 100/65        Pain Assessment  Pain Intensity 1: 2 (11/10/20 1611)  Pain Location 1: Abdomen     Patient Stated Pain Goal: 0    Ambulating  Yes    Shift report given to oncoming nurse at the bedside.     Milderd Economy

## 2020-11-11 NOTE — DISCHARGE SUMMARY
Discharge Summary     Patient: Guera Dwyer MRN: [id number]  SSN: [id number]    YOB: 1950  Age: 65 y.o.   Sex: female       Admit Date: 11/4/2020    Discharge Date: 11/11/2020      Admission Diagnoses: Colitis [K52.9]  Splenic infarct [D73.5]  Nausea vomiting and diarrhea [R11.2, R19.7]    Discharge Diagnoses:   Problem List as of 11/11/2020 Date Reviewed: 10/28/2020          Codes Class Noted - Resolved    * (Principal) Colitis ICD-10-CM: K52.9  ICD-9-CM: 558.9  11/4/2020 - Present        Splenic infarct ICD-10-CM: D73.5  ICD-9-CM: 289.59  11/4/2020 - Present        Nausea vomiting and diarrhea ICD-10-CM: R11.2, R19.7  ICD-9-CM: 787.91, 787.01  11/4/2020 - Present        Wound dehiscence ICD-10-CM: T81.30XA  ICD-9-CM: 998.30  8/29/2019 - Present        H/O extremity bypass graft ICD-10-CM: Z95.828  ICD-9-CM: V45.89  8/29/2019 - Present        Left leg swelling ICD-10-CM: M79.89  ICD-9-CM: 729.81  8/29/2019 - Present        Ischemia of left lower extremity ICD-10-CM: I99.8  ICD-9-CM: 459.9  8/6/2019 - Present        Atherosclerosis of native arteries of extremity with rest pain [facility]) ICD-10-CM: H50.519  ICD-9-CM: 440.22  8/1/2019 - Present        Atherosclerosis of native arteries of extremities with rest pain, left leg ([facility] ICD-10-CM: B17.827  ICD-9-CM: 440.22  7/31/2019 - Present        ESRD on peritoneal dialysis ([facility] (Chronic) ICD-10-CM: N18.6, Z99.2  ICD-9-CM: 585.6, V45.11  7/29/2019 - Present        Atherosclerosis of native coronary artery of native heart without angina pectoris ICD-10-CM: I25.10  ICD-9-CM: 414.01  7/29/2019 - Present        PAD (peripheral artery disease) ([facility] (Chronic) ICD-10-CM: I73.9  ICD-9-CM: 443.9  6/19/2019 - Present        Atherosclerosis of native arteries of extremity with intermittent claudication ([facility] ICD-10-CM: Z20.960  ICD-9-CM: 440.21  6/19/2019 - Present        Ischemia ICD-10-CM: I99.8  ICD-9-CM: 459.9  6/19/2019 - Present        Shortness of breath ICD-10-CM: R06.02  ICD-9-CM: 786.05  6/4/2019 - Present        History of right-sided carotid endarterectomy (Chronic) ICD-10-CM: F54.467  ICD-9-CM: V45.89  11/8/2018 - Present        Carotid stenosis, asymptomatic, right ICD-10-CM: I65.21  ICD-9-CM: 433.10  11/6/2018 - Present    Overview Signed 12/12/2018 10:01 AM by Bailee Gerber NP     11/7/18 (Dr. Nichole Ybarra) right carotid endarterectomy with bovine pericardial patch angioplasty             Carotid stenosis, right ICD-10-CM: I65.21  ICD-9-CM: 433.10  11/6/2018 - Present        Bilateral carotid artery disease ([facility] ICD-10-CM: I77.9  ICD-9-CM: 447.9  8/29/2018 - Present        Bilateral carotid artery stenosis ICD-10-CM: I65.23  ICD-9-CM: 433.10, 433.30  11/28/2017 - Present        Pure hypercholesterolemia (Chronic) ICD-10-CM: E78.00  ICD-9-CM: 272.0  11/28/2017 - Present        UTI (urinary tract infection) ICD-10-CM: N39.0  ICD-9-CM: 599.0  11/10/2017 - Present        Stroke ([facility] ICD-10-CM: I63.9  ICD-9-CM: 434.91  11/10/2017 - Present    Overview Signed 11/10/2017 12:03 PM by Skylar Fajardo MD     Frontoparietal CVA, ischemic             S/P coronary artery stent placement (Chronic) ICD-10-CM: Z95.5  ICD-9-CM: V45.82  2/6/2017 - Present        CKD stage 5 secondary to hypertension ([facility] ICD-10-CM: I12.0, N18.5  ICD-9-CM: 403.91, 585.5  2/6/2017 - Present        Major depressive disorder with single episode, in remission [facility]) ICD-10-CM: F32.5  ICD-9-CM: 296.25  2/6/2017 - Present        Hydronephrosis ICD-10-CM: N13.30  ICD-9-CM: 365  2/12/2016 - Present        History of CVA (cerebrovascular accident) ICD-10-CM: Z86.73  ICD-9-CM: V12.54  2/11/2016 - Present        Anemia of renal disease (Chronic) ICD-10-CM: N18.9, D63.1  ICD-9-CM: 285.21  1/7/2016 - Present        Benign essential HTN ICD-10-CM: I10  ICD-9-CM: 401.1  1/7/2016 - Present        Coronary artery disease involving native coronary artery with angina pectoris ([facility] ICD-10-CM: I25.119  ICD-9-CM: 414.01, 413.9  1/7/2016 - Present        CVA (cerebral vascular accident) ([facility] (Chronic) ICD-10-CM: I63.9  ICD-9-CM: 434.91  4/28/2012 - Present        Hypertensive emergency ICD-10-CM: I16.1  ICD-9-CM: 401.9  4/26/2012 - Present        Carotid artery bruit ICD-10-CM: R09.89  ICD-9-CM: 785.9  4/26/2012 - Present    Overview Signed 4/26/2012  8:28 AM by Stella Alla     bilateral             CKD (chronic kidney disease) stage 3, GFR 30-59 ml/min ([facility]) ICD-10-CM: N18.30  ICD-9-CM: 585.3  4/26/2012 - Present        Tobacco use disorder ICD-10-CM: F17.200  ICD-9-CM: 305.1  4/26/2012 - Present        GERD (gastroesophageal reflux disease) ICD-10-CM: K21.9  ICD-9-CM: 530.81  4/26/2012 - Present        Chest pain, unspecified ICD-10-CM: R07.9  ICD-9-CM: 786.50  4/26/2012 - Present        Anemia ICD-10-CM: D64.9  ICD-9-CM: 285.9  4/26/2012 - Present        RESOLVED: Preop cardiovascular exam ICD-10-CM: Z01.810  ICD-9-CM: V72.81  7/29/2019 - 9/19/2019               Discharge Condition: [facility] Course:     Lunsford Buba a 65 y. o. female with medical history significant for ESRD on PD at home, COPD, GERD, PAD,  HTN, HLD, CAD who was initially seen in our ER on 11/4/20 for abdominal pain, nausea/vomiting/diarrhea. A CT scan of the abdomen was done in the ER and she was  found to have colitis, splenic infarcts and a 3cm infrarenal AAA. Vascular surgery was consulted. No immediate intervention was needed and to follow up as  Outpatient with arterial duplex was recommended. Vascular surgery also  Agreed with anticoagulation for her splenic infarcts. Patient was started on heparin gtt and  IV antibiotics. She was transitioned to oral coumadin. She was initially started on coumadin 2.5 mg daily but her dose was increased to 5 mg/day. Most likely due to interaction with antibiotics ( she was on cipro-flagyl) her INR went up. INR is 2.8 on 11/11/20 after 2 days holding coumadin.  She is clinically stable for discharge. Her abdominal pain, N/V and diarrhea have resolved. She will complete 4 more days of oral Vantin + Flagyl. Regarding her anticoagulation, her home care services will drawn an INR level tomorrow and will contact her PCP for instructions. She has been given a prescription of coumadin 2.5 mg po daily, however, she has been instructed NOT to start the medication until she is told to do so by her PCP. She will need to continue taking potassium supplements due to chronic hypokalemia. She will follow up with vascular surgery and nephrology. PE:    General:          Well nourished. No overt distress  CV:                  RRR. No edema. No JVD  Lungs:             Even, Unlabored  Abdomen:        Soft, nontender, nondistended. Extremities:     Warm and dry. No cyanosis   Skin:                No rashes. No jaundice. Normal coloration  Neuro:             No gross focal deficits. Alert    Consults: Nephrology and Vascular Surgery    Significant Diagnostic Studies: see hospital course     Disposition: home    Discharge Medications:   Current Discharge Medication List      START taking these medications    Details   cefpodoxime (VANTIN) 100 mg tablet Take 1 Tab by mouth two (2) times a day for 4 days. Qty: 8 Tab, Refills: 0      metroNIDAZOLE (FLAGYL) 500 mg tablet Take 1 Tab by mouth two (2) times a day for 4 days. Qty: 8 Tab, Refills: 0      warfarin (COUMADIN) 2.5 mg tablet Warfarin 2.5 mg po every evening. TO BE STARTED UNDER DIRECTION OF PCP  Patient started on coumadin due to splenic infarcts. INR on 11/11/20 is 2.8. Holding coumadin. INR to be repeaated on 11/12/20, result to be faxed to PCP  Qty: 15 Tab, Refills: 0         CONTINUE these medications which have CHANGED    Details   acetaminophen (TylenoL) 325 mg tablet Take 2 Tabs by mouth every six (6) hours as needed for Pain.   Qty: 30 Tab, Refills: 0      ondansetron (Zofran ODT) 8 mg disintegrating tablet Take 1 Tab by mouth every eight (8) hours as needed for Nausea. Qty: 30 Tab, Refills: 0      potassium chloride (K-DUR, KLOR-CON) 20 mEq tablet Take 1 Tab by mouth daily. Qty: 30 Tab, Refills: 1      metoprolol succinate (TOPROL-XL) 25 mg XL tablet Take 1 Tab by mouth daily. Qty: 30 Tab, Refills: 1         CONTINUE these medications which have NOT CHANGED    Details   lanolin-mineral oil-nacl-w.pet (LUBRIDERM) lotion Apply  to affected area two (2) times a day. Qty: 1 Bottle, Refills: prn      polyethylene glycol (MIRALAX) 17 gram packet Take 1 Packet by mouth daily as needed (constipation). Qty: 1 Each, Refills: prn      albuterol (PROVENTIL HFA, VENTOLIN HFA, PROAIR HFA) 90 mcg/actuation inhaler Take 2 Puffs by inhalation every six (6) hours as needed for Wheezing or Shortness of Breath. Qty: 1 Inhaler, Refills: 0      aspirin delayed-release 81 mg tablet Take 81 mg by mouth nightly. STOP taking these medications       senna-docusate (PERICOLACE) 8.6-50 mg per tablet Comments:   Reason for Stopping:         atorvastatin (LIPITOR) 80 mg tablet Comments:   Reason for Stopping:         amLODIPine (NORVASC) 10 mg tablet Comments:   Reason for Stopping:               Activity: Activity as tolerated  Diet: Cardiac Diet  Wound Care: None needed    Follow-up Appointments   Procedures    FOLLOW UP VISIT Appointment in: One Week pcp     pcp     Standing Status:   Standing     Number of Occurrences:   1     Order Specific Question:   Appointment in     Answer: One Week    FOLLOW UP VISIT Appointment in: Other (Specify) As scheduled with nephrology     As scheduled with nephrology     Standing Status:   Standing     Number of Occurrences:   1     Standing Expiration Date:   11/12/2020     Order Specific Question:   Appointment in     Answer:    Other (Specify)    FOLLOW UP VISIT Appointment in: Other (Specify) Vascular surgery 2- 3 weeks - 3 cm infrarenal aortic aneurysm - splenic infarcts on coumadin     Vascular surgery 2- 3 weeks - 3 cm infrarenal aortic aneurysm - splenic infarcts on coumadin     Standing Status:   Standing     Number of Occurrences:   1     Standing Expiration Date:   11/12/2020     Order Specific Question:   Appointment in     Answer:    Other (Specify)       Signed By: Elizabeth Browne MD     November 11, 2020

## 2020-11-11 NOTE — DIALYSIS
Disconnected PD cycler from patient. Betadine cap intact. Patient tolerated well. UF amount -872mls.  Effluent: clear

## 2020-11-11 NOTE — PROGRESS NOTES
Patient has rested quietly overnight and been observed during hourly rounding, her PD continued overnight, and she had one episode that we had to troubleshoot and it returned to working conditions, after another nurse came and worked with it. She will continue to be monitored and assisted until day shift assumes care of this patient.

## 2020-11-12 NOTE — ADT AUTH CERT NOTES
URGENT REQUEST: 
 
REF #  [id number] PLEASE SEND FAX OR CALL WITH UPDATE OF DAYS APPROVED AND STATUS OF P2P DC SUMMARY HAS BEEN SUBMITTED ON 11/11 [facility] 
  
[phone] FAX 
[phone] PHONE

## 2020-11-22 ENCOUNTER — HOSPITAL ENCOUNTER (INPATIENT)
Age: 70
LOS: 7 days | Discharge: HOSPICE | DRG: 981 | End: 2020-11-29
Attending: EMERGENCY MEDICINE | Admitting: INTERNAL MEDICINE
Payer: MEDICARE

## 2020-11-22 ENCOUNTER — APPOINTMENT (OUTPATIENT)
Dept: GENERAL RADIOLOGY | Age: 70
DRG: 981 | End: 2020-11-22
Attending: EMERGENCY MEDICINE
Payer: MEDICARE

## 2020-11-22 DIAGNOSIS — R11.2 NAUSEA VOMITING AND DIARRHEA: Primary | ICD-10-CM

## 2020-11-22 DIAGNOSIS — E87.6 HYPOKALEMIA: ICD-10-CM

## 2020-11-22 DIAGNOSIS — R19.7 NAUSEA VOMITING AND DIARRHEA: Primary | ICD-10-CM

## 2020-11-22 LAB
ALBUMIN SERPL-MCNC: 2.6 G/DL (ref 3.2–4.6)
ALBUMIN/GLOB SERPL: 0.5 {RATIO} (ref 1.2–3.5)
ALP SERPL-CCNC: 49 U/L (ref 50–136)
ALT SERPL-CCNC: 19 U/L (ref 12–65)
ANION GAP SERPL CALC-SCNC: 11 MMOL/L (ref 7–16)
APPEARANCE UR: ABNORMAL
AST SERPL-CCNC: 36 U/L (ref 15–37)
ATRIAL RATE: 91 BPM
BACTERIA SPEC CULT: NORMAL
BACTERIA URNS QL MICRO: ABNORMAL /HPF
BASOPHILS # BLD: 0 K/UL (ref 0–0.2)
BASOPHILS NFR BLD: 1 % (ref 0–2)
BILIRUB SERPL-MCNC: 0.4 MG/DL (ref 0.2–1.1)
BILIRUB UR QL: ABNORMAL
BUN SERPL-MCNC: 25 MG/DL (ref 8–23)
CALCIUM SERPL-MCNC: 8.3 MG/DL (ref 8.3–10.4)
CALCULATED P AXIS, ECG09: 75 DEGREES
CALCULATED R AXIS, ECG10: 55 DEGREES
CALCULATED T AXIS, ECG11: -108 DEGREES
CHLORIDE SERPL-SCNC: 101 MMOL/L (ref 98–107)
CO2 SERPL-SCNC: 29 MMOL/L (ref 21–32)
COLOR UR: ABNORMAL
CREAT SERPL-MCNC: 11.9 MG/DL (ref 0.6–1)
DIAGNOSIS, 93000: NORMAL
DIFFERENTIAL METHOD BLD: ABNORMAL
EOSINOPHIL # BLD: 0.1 K/UL (ref 0–0.8)
EOSINOPHIL NFR BLD: 2 % (ref 0.5–7.8)
EPI CELLS #/AREA URNS HPF: ABNORMAL /HPF
ERYTHROCYTE [DISTWIDTH] IN BLOOD BY AUTOMATED COUNT: 13.9 % (ref 11.9–14.6)
GLOBULIN SER CALC-MCNC: 5.5 G/DL (ref 2.3–3.5)
GLUCOSE SERPL-MCNC: 133 MG/DL (ref 65–100)
GLUCOSE UR STRIP.AUTO-MCNC: NEGATIVE MG/DL
HCT VFR BLD AUTO: 32 % (ref 35.8–46.3)
HGB BLD-MCNC: 10.5 G/DL (ref 11.7–15.4)
HGB UR QL STRIP: ABNORMAL
IMM GRANULOCYTES # BLD AUTO: 0 K/UL (ref 0–0.5)
IMM GRANULOCYTES NFR BLD AUTO: 0 % (ref 0–5)
INR PPP: 1.5
KETONES UR QL STRIP.AUTO: ABNORMAL MG/DL
LACTATE SERPL-SCNC: 2.3 MMOL/L (ref 0.4–2)
LACTATE SERPL-SCNC: 3.3 MMOL/L (ref 0.4–2)
LEUKOCYTE ESTERASE UR QL STRIP.AUTO: ABNORMAL
LYMPHOCYTES # BLD: 1.9 K/UL (ref 0.5–4.6)
LYMPHOCYTES NFR BLD: 28 % (ref 13–44)
MAGNESIUM SERPL-MCNC: 2 MG/DL (ref 1.8–2.4)
MCH RBC QN AUTO: 31.2 PG (ref 26.1–32.9)
MCHC RBC AUTO-ENTMCNC: 32.8 G/DL (ref 31.4–35)
MCV RBC AUTO: 95 FL (ref 79.6–97.8)
MONOCYTES # BLD: 0.5 K/UL (ref 0.1–1.3)
MONOCYTES NFR BLD: 7 % (ref 4–12)
NEUTS SEG # BLD: 4.2 K/UL (ref 1.7–8.2)
NEUTS SEG NFR BLD: 62 % (ref 43–78)
NITRITE UR QL STRIP.AUTO: NEGATIVE
NRBC # BLD: 0 K/UL (ref 0–0.2)
OTHER OBSERVATIONS,UCOM: ABNORMAL
P-R INTERVAL, ECG05: 162 MS
PH UR STRIP: 6 [PH] (ref 5–9)
PLATELET # BLD AUTO: 207 K/UL (ref 150–450)
PMV BLD AUTO: 12 FL (ref 9.4–12.3)
POTASSIUM SERPL-SCNC: 2.5 MMOL/L (ref 3.5–5.1)
PROT SERPL-MCNC: 8.1 G/DL (ref 6.3–8.2)
PROT UR STRIP-MCNC: 100 MG/DL
PROTHROMBIN TIME: 17.9 SEC (ref 12.5–14.7)
Q-T INTERVAL, ECG07: 326 MS
QRS DURATION, ECG06: 84 MS
QTC CALCULATION (BEZET), ECG08: 400 MS
RBC # BLD AUTO: 3.37 M/UL (ref 4.05–5.2)
RBC #/AREA URNS HPF: ABNORMAL /HPF
SERVICE CMNT-IMP: NORMAL
SODIUM SERPL-SCNC: 141 MMOL/L (ref 136–145)
SP GR UR REFRACTOMETRY: 1.02 (ref 1–1.02)
TROPONIN-HIGH SENSITIVITY: 310.3 PG/ML (ref 0–14)
UROBILINOGEN UR QL STRIP.AUTO: 1 EU/DL (ref 0.2–1)
VENTRICULAR RATE, ECG03: 91 BPM
WBC # BLD AUTO: 6.7 K/UL (ref 4.3–11.1)
WBC URNS QL MICRO: >100 /HPF

## 2020-11-22 PROCEDURE — 74011250637 HC RX REV CODE- 250/637: Performed by: INTERNAL MEDICINE

## 2020-11-22 PROCEDURE — 84484 ASSAY OF TROPONIN QUANT: CPT

## 2020-11-22 PROCEDURE — 96366 THER/PROPH/DIAG IV INF ADDON: CPT

## 2020-11-22 PROCEDURE — 96365 THER/PROPH/DIAG IV INF INIT: CPT

## 2020-11-22 PROCEDURE — 74011000302 HC RX REV CODE- 302: Performed by: INTERNAL MEDICINE

## 2020-11-22 PROCEDURE — 65660000000 HC RM CCU STEPDOWN

## 2020-11-22 PROCEDURE — 87086 URINE CULTURE/COLONY COUNT: CPT

## 2020-11-22 PROCEDURE — 87040 BLOOD CULTURE FOR BACTERIA: CPT

## 2020-11-22 PROCEDURE — 74011000258 HC RX REV CODE- 258: Performed by: INTERNAL MEDICINE

## 2020-11-22 PROCEDURE — 96375 TX/PRO/DX INJ NEW DRUG ADDON: CPT

## 2020-11-22 PROCEDURE — 86580 TB INTRADERMAL TEST: CPT | Performed by: INTERNAL MEDICINE

## 2020-11-22 PROCEDURE — 74011250636 HC RX REV CODE- 250/636: Performed by: INTERNAL MEDICINE

## 2020-11-22 PROCEDURE — 71045 X-RAY EXAM CHEST 1 VIEW: CPT

## 2020-11-22 PROCEDURE — 36415 COLL VENOUS BLD VENIPUNCTURE: CPT

## 2020-11-22 PROCEDURE — 81001 URINALYSIS AUTO W/SCOPE: CPT

## 2020-11-22 PROCEDURE — 87493 C DIFF AMPLIFIED PROBE: CPT

## 2020-11-22 PROCEDURE — 83605 ASSAY OF LACTIC ACID: CPT

## 2020-11-22 PROCEDURE — 99285 EMERGENCY DEPT VISIT HI MDM: CPT

## 2020-11-22 PROCEDURE — 80053 COMPREHEN METABOLIC PANEL: CPT

## 2020-11-22 PROCEDURE — 83735 ASSAY OF MAGNESIUM: CPT

## 2020-11-22 PROCEDURE — 85610 PROTHROMBIN TIME: CPT

## 2020-11-22 PROCEDURE — 85025 COMPLETE CBC W/AUTO DIFF WBC: CPT

## 2020-11-22 PROCEDURE — 74011250636 HC RX REV CODE- 250/636: Performed by: EMERGENCY MEDICINE

## 2020-11-22 PROCEDURE — 2709999900 HC NON-CHARGEABLE SUPPLY

## 2020-11-22 RX ORDER — OXYCODONE HYDROCHLORIDE 5 MG/1
5 TABLET ORAL
Status: DISCONTINUED | OUTPATIENT
Start: 2020-11-22 | End: 2020-11-29 | Stop reason: HOSPADM

## 2020-11-22 RX ORDER — ZOLPIDEM TARTRATE 5 MG/1
5 TABLET ORAL
Status: DISCONTINUED | OUTPATIENT
Start: 2020-11-22 | End: 2020-11-29 | Stop reason: HOSPADM

## 2020-11-22 RX ORDER — POLYETHYLENE GLYCOL 3350 17 G/17G
17 POWDER, FOR SOLUTION ORAL DAILY PRN
Status: DISCONTINUED | OUTPATIENT
Start: 2020-11-22 | End: 2020-11-29 | Stop reason: HOSPADM

## 2020-11-22 RX ORDER — SODIUM CHLORIDE 0.9 % (FLUSH) 0.9 %
5-40 SYRINGE (ML) INJECTION AS NEEDED
Status: DISCONTINUED | OUTPATIENT
Start: 2020-11-22 | End: 2020-11-29 | Stop reason: HOSPADM

## 2020-11-22 RX ORDER — MIRTAZAPINE 15 MG/1
15 TABLET, FILM COATED ORAL
Status: DISCONTINUED | OUTPATIENT
Start: 2020-11-22 | End: 2020-11-29 | Stop reason: HOSPADM

## 2020-11-22 RX ORDER — SODIUM CHLORIDE 0.9 % (FLUSH) 0.9 %
5-40 SYRINGE (ML) INJECTION EVERY 8 HOURS
Status: DISCONTINUED | OUTPATIENT
Start: 2020-11-22 | End: 2020-11-29 | Stop reason: HOSPADM

## 2020-11-22 RX ORDER — WARFARIN 2.5 MG/1
2.5 TABLET ORAL ONCE
Status: COMPLETED | OUTPATIENT
Start: 2020-11-22 | End: 2020-11-22

## 2020-11-22 RX ORDER — GUAIFENESIN 600 MG/1
1200 TABLET, EXTENDED RELEASE ORAL
Status: DISCONTINUED | OUTPATIENT
Start: 2020-11-22 | End: 2020-11-29 | Stop reason: HOSPADM

## 2020-11-22 RX ORDER — GUAIFENESIN/DEXTROMETHORPHAN 100-10MG/5
10 SYRUP ORAL
Status: DISCONTINUED | OUTPATIENT
Start: 2020-11-22 | End: 2020-11-29 | Stop reason: HOSPADM

## 2020-11-22 RX ORDER — POTASSIUM CHLORIDE 14.9 MG/ML
20 INJECTION INTRAVENOUS ONCE
Status: COMPLETED | OUTPATIENT
Start: 2020-11-22 | End: 2020-11-22

## 2020-11-22 RX ORDER — ONDANSETRON 2 MG/ML
4 INJECTION INTRAMUSCULAR; INTRAVENOUS
Status: DISCONTINUED | OUTPATIENT
Start: 2020-11-22 | End: 2020-11-29 | Stop reason: HOSPADM

## 2020-11-22 RX ORDER — ONDANSETRON 4 MG/1
4 TABLET, ORALLY DISINTEGRATING ORAL
Status: DISCONTINUED | OUTPATIENT
Start: 2020-11-22 | End: 2020-11-29 | Stop reason: HOSPADM

## 2020-11-22 RX ORDER — METOPROLOL SUCCINATE 25 MG/1
25 TABLET, EXTENDED RELEASE ORAL DAILY
Status: DISCONTINUED | OUTPATIENT
Start: 2020-11-23 | End: 2020-11-29 | Stop reason: HOSPADM

## 2020-11-22 RX ORDER — SODIUM CHLORIDE 9 MG/ML
75 INJECTION, SOLUTION INTRAVENOUS CONTINUOUS
Status: DISCONTINUED | OUTPATIENT
Start: 2020-11-22 | End: 2020-11-29 | Stop reason: HOSPADM

## 2020-11-22 RX ORDER — POTASSIUM CHLORIDE 7.45 MG/ML
10 INJECTION INTRAVENOUS AS NEEDED
Status: DISCONTINUED | OUTPATIENT
Start: 2020-11-22 | End: 2020-11-29 | Stop reason: HOSPADM

## 2020-11-22 RX ORDER — LORAZEPAM 0.5 MG/1
0.5 TABLET ORAL
Status: DISCONTINUED | OUTPATIENT
Start: 2020-11-22 | End: 2020-11-29 | Stop reason: HOSPADM

## 2020-11-22 RX ORDER — POTASSIUM CHLORIDE 14.9 MG/ML
20 INJECTION INTRAVENOUS
Status: COMPLETED | OUTPATIENT
Start: 2020-11-22 | End: 2020-11-23

## 2020-11-22 RX ORDER — FACIAL-BODY WIPES
10 EACH TOPICAL DAILY PRN
Status: DISCONTINUED | OUTPATIENT
Start: 2020-11-22 | End: 2020-11-29 | Stop reason: HOSPADM

## 2020-11-22 RX ORDER — AMOXICILLIN 250 MG
2 CAPSULE ORAL
Status: DISCONTINUED | OUTPATIENT
Start: 2020-11-22 | End: 2020-11-29 | Stop reason: HOSPADM

## 2020-11-22 RX ORDER — MAGNESIUM SULFATE HEPTAHYDRATE 40 MG/ML
2 INJECTION, SOLUTION INTRAVENOUS AS NEEDED
Status: DISCONTINUED | OUTPATIENT
Start: 2020-11-22 | End: 2020-11-29 | Stop reason: HOSPADM

## 2020-11-22 RX ORDER — OXYCODONE HYDROCHLORIDE 5 MG/1
10 TABLET ORAL
Status: DISCONTINUED | OUTPATIENT
Start: 2020-11-22 | End: 2020-11-29 | Stop reason: HOSPADM

## 2020-11-22 RX ORDER — FAMOTIDINE 20 MG/1
20 TABLET, FILM COATED ORAL
Status: DISCONTINUED | OUTPATIENT
Start: 2020-11-22 | End: 2020-11-29 | Stop reason: HOSPADM

## 2020-11-22 RX ORDER — MAG HYDROX/ALUMINUM HYD/SIMETH 200-200-20
30 SUSPENSION, ORAL (FINAL DOSE FORM) ORAL
Status: DISCONTINUED | OUTPATIENT
Start: 2020-11-22 | End: 2020-11-29 | Stop reason: HOSPADM

## 2020-11-22 RX ORDER — HYDRALAZINE HYDROCHLORIDE 50 MG/1
50 TABLET, FILM COATED ORAL
Status: DISCONTINUED | OUTPATIENT
Start: 2020-11-22 | End: 2020-11-29 | Stop reason: HOSPADM

## 2020-11-22 RX ORDER — POTASSIUM CHLORIDE 20 MEQ/1
20 TABLET, EXTENDED RELEASE ORAL DAILY
Status: DISCONTINUED | OUTPATIENT
Start: 2020-11-23 | End: 2020-11-29 | Stop reason: HOSPADM

## 2020-11-22 RX ORDER — ASPIRIN 81 MG/1
81 TABLET ORAL
Status: DISCONTINUED | OUTPATIENT
Start: 2020-11-22 | End: 2020-11-29 | Stop reason: HOSPADM

## 2020-11-22 RX ORDER — ACETAMINOPHEN 325 MG/1
650 TABLET ORAL
Status: DISCONTINUED | OUTPATIENT
Start: 2020-11-22 | End: 2020-11-29 | Stop reason: HOSPADM

## 2020-11-22 RX ORDER — ACETAMINOPHEN 650 MG/1
650 SUPPOSITORY RECTAL
Status: DISCONTINUED | OUTPATIENT
Start: 2020-11-22 | End: 2020-11-29 | Stop reason: HOSPADM

## 2020-11-22 RX ADMIN — POTASSIUM CHLORIDE 20 MEQ: 200 INJECTION, SOLUTION INTRAVENOUS at 14:36

## 2020-11-22 RX ADMIN — ASPIRIN 81 MG: 81 TABLET, COATED ORAL at 21:48

## 2020-11-22 RX ADMIN — WARFARIN SODIUM 2.5 MG: 2.5 TABLET ORAL at 21:49

## 2020-11-22 RX ADMIN — Medication 10 ML: at 21:50

## 2020-11-22 RX ADMIN — TUBERCULIN PURIFIED PROTEIN DERIVATIVE 5 UNITS: 5 INJECTION, SOLUTION INTRADERMAL at 21:50

## 2020-11-22 RX ADMIN — SODIUM CHLORIDE 75 ML/HR: 900 INJECTION, SOLUTION INTRAVENOUS at 21:49

## 2020-11-22 RX ADMIN — POTASSIUM CHLORIDE 20 MEQ: 200 INJECTION, SOLUTION INTRAVENOUS at 21:48

## 2020-11-22 RX ADMIN — CEFTRIAXONE SODIUM 1 G: 1 INJECTION, POWDER, FOR SOLUTION INTRAMUSCULAR; INTRAVENOUS at 18:07

## 2020-11-22 NOTE — ED PROVIDER NOTES
13-year-old female has history of reactive airway disease, coronary artery disease and chronic renal failure with peritoneal dialysis. Also history of hypertension and stroke in the past.  Started up with fatigue and vomiting and diarrhea this morning. No blood with the diarrhea. She has had no fever and chills. No syncope. No chest pain or shortness of breath. Old records were reviewed. Patient was recently admitted November 4-11 because of diarrhea and found to have colitis. She also had splenic infarcts. She was placed on warfarin. Was discharged home on Vantin and Flagyl. She states that the diarrhea seemed to improve and had no vomiting but diarrhea worsened this morning and the vomiting started back. The history is provided by the patient. Diarrhea    This is a new problem. The current episode started 3 to 5 hours ago. The pain is associated with vomiting. The patient is experiencing no pain. Associated symptoms include diarrhea, nausea and vomiting. Pertinent negatives include no fever, no hematochezia, no melena, no headaches, no chest pain and no back pain. Nothing worsens the pain. The pain is relieved by nothing.         Past Medical History:   Diagnosis Date    Anemia     denies hx of blood transfusion    Arrhythmia     murmur; sinus tach    Aspirin long-term use SINAN    continue    CAD (coronary artery disease) 2012    MI, 1 stent 2012-    Chronic kidney disease     peritoneal dialysis qPM    Chronic obstructive pulmonary disease (HCC)     no inhalers; no treatment; smokes 1 ppd since age 19    CKD (chronic kidney disease) stage 3, GFR 30-59 ml/min           ESRD on peritoneal dialysis ([facility] [address]     Flat affect     GERD (gastroesophageal reflux disease)     resolved per patient; no current meds 7/30/19    Heart murmur not followed per cardiology    EF 65-70 %-- on echo 11/2017    History of MI (myocardial infarction) 2012    stents x 1---- per pt-- does not see a cardiologist    Hx of colonic polyps 2016    adenoma    Hydronephrosis     Hypertension     daily meds    Left-sided weakness 2012    left leg r/t cva    Loss of appetite     states within the last several months    Peritoneal dialysis catheter in place [facility])     patient does dialysis QPM    Poor historian     Renal atrophy, right     Renal insufficiency     ? --- pt unsure if sees nephrologist    Smoker     48 yr hx/ 1 pk per day    Smoker     1 ppd sicne age 19    Stented coronary artery Xience SINAN    proximal LAD 2012-- pt states does not see a cardio    Stroke ([address] 2012    with slight left  sided weakness residual       Past Surgical History:   Procedure Laterality Date    COLONOSCOPY N/A 9/26/2016    Pedro--one desc TA, one rectal hyperplastic--5 year recall    HX CAROTID ENDARTERECTOMY Right 11/07/2018    HX UROLOGICAL      stent to left kidney--multi times    IR INSERT TUNL CVC W/O PORT OVER 5 YR  8/7/2019    MN LEFT HEART CATH,PERCUTANEOUS  4/2012    Xience LAD    VASCULAR SURGERY PROCEDURE UNLIST  02/28/2019    PD catheter placement    VASCULAR SURGERY PROCEDURE UNLIST  07/01/2019    ULTRASOUND GUIDED ACCESS BILATERAL LOWER EXTREMITY ARTERIOGRAM (Left Groin)    VASCULAR SURGERY PROCEDURE UNLIST  08/06/2019    Left common femoral and profunda endarterectomy with CorMatrix patch angioplasty. ,Left femoral to above-knee popliteal artery with reverse saphenous vein graft         Family History:   Problem Relation Age of Onset    Hypertension Mother     Heart Disease Mother     Stroke Father     No Known Problems Sister     No Known Problems Brother     No Known Problems Sister     No Known Problems Sister     No Known Problems Sister     No Known Problems Brother     No Known Problems Brother        Social History     Socioeconomic History    Marital status:      Spouse name: Not on file    Number of children: Not on file    Years of education: Not on file    Highest education level: Not on file   Occupational History    Not on file   Social Needs    Financial resource strain: Not on file    Food insecurity     Worry: Not on file     Inability: Not on file    Transportation needs     Medical: Not on file     Non-medical: Not on file   Tobacco Use    Smoking status: Current Every Day Smoker     Packs/day: 0.50     Years: 51.00     Pack years: 25.50    Smokeless tobacco: Never Used   Substance and Sexual Activity    Alcohol use: No    Drug use: No    Sexual activity: Not on file   Lifestyle    Physical activity     Days per week: Not on file     Minutes per session: Not on file    Stress: Not on file   Relationships    Social connections     Talks on phone: Not on file     Gets together: Not on file     Attends Religion service: Not on file     Active member of club or organization: Not on file     Attends meetings of clubs or organizations: Not on file     Relationship status: Not on file    Intimate partner violence     Fear of current or ex partner: Not on file     Emotionally abused: Not on file     Physically abused: Not on file     Forced sexual activity: Not on file   Other Topics Concern    Not on file   Social History Narrative    Not on file         ALLERGIES: Codeine and Hydralazine    Review of Systems   Constitutional: Negative for chills and fever. Respiratory: Negative for cough and shortness of breath. Cardiovascular: Negative for chest pain and palpitations. Gastrointestinal: Positive for diarrhea, nausea and vomiting. Negative for abdominal pain, hematochezia and melena. Genitourinary: Negative for flank pain. Musculoskeletal: Negative for back pain and neck pain. Skin: Negative for color change and rash. Neurological: Negative for syncope and headaches. All other systems reviewed and are negative.       Vitals:    11/22/20 1241   BP: (!) 101/56   Pulse: 94   Resp: 20   Temp: 97.8 °F (36.6 °C)   SpO2: 96%   Weight: 56.2 kg (124 lb)   Height: 5' 1\" (1.549 m)            Physical Exam  Vitals signs and nursing note reviewed. Constitutional:       General: She is not in acute distress. Appearance: She is well-developed. HENT:      Head: Normocephalic and atraumatic. Right Ear: External ear normal.      Left Ear: External ear normal.      Mouth/Throat:      Pharynx: No oropharyngeal exudate. Eyes:      Conjunctiva/sclera: Conjunctivae normal.      Pupils: Pupils are equal, round, and reactive to light. Neck:      Musculoskeletal: Normal range of motion and neck supple. Cardiovascular:      Rate and Rhythm: Normal rate and regular rhythm. Heart sounds: No murmur. Pulmonary:      Effort: No respiratory distress. Breath sounds: Normal breath sounds. Abdominal:      General: Bowel sounds are normal.      Palpations: Abdomen is soft. There is no mass. Tenderness: There is no abdominal tenderness. There is no guarding or rebound. Hernia: No hernia is present. Skin:     General: Skin is warm and dry. Neurological:      Mental Status: She is alert and oriented to person, place, and time. Gait: Gait normal.      Comments: Nl speech   Psychiatric:         Speech: Speech normal.          MDM  Number of Diagnoses or Management Options  Diagnosis management comments: Patient hypotensive in the field. Received small fluid bolus. Blood pressure normalizing at this point. Patient states no nausea at this point. States she just feels tired but no complaints of pain or shortness of breath. Check screening lab work. Amount and/or Complexity of Data Reviewed  Clinical lab tests: ordered and reviewed  Tests in the medicine section of CPT®: ordered and reviewed  Review and summarize past medical records: yes  Independent visualization of images, tracings, or specimens: yes (EKG reveals normal sinus rhythm. Nonspecific ST depression.   No ectopy.)    Risk of Complications, Morbidity, and/or Mortality  Presenting problems: moderate  Diagnostic procedures: minimal  Management options: low    Patient Progress  Patient progress: stable         Procedures      . dl  No results found. Results Include:    Recent Results (from the past 24 hour(s))   CBC WITH AUTOMATED DIFF    Collection Time: 11/22/20 12:52 PM   Result Value Ref Range    WBC 6.7 4.3 - 11.1 K/uL    RBC 3.37 (L) 4.05 - 5.2 M/uL    HGB 10.5 (L) 11.7 - 15.4 g/dL    HCT 32.0 (L) 35.8 - 46.3 %    MCV 95.0 79.6 - 97.8 FL    MCH 31.2 26.1 - 32.9 PG    MCHC 32.8 31.4 - 35.0 g/dL    RDW 13.9 11.9 - 14.6 %    PLATELET 212 797 - 751 K/uL    MPV 12.0 9.4 - 12.3 FL    ABSOLUTE NRBC 0.00 0.0 - 0.2 K/uL    DF AUTOMATED      NEUTROPHILS 62 43 - 78 %    LYMPHOCYTES 28 13 - 44 %    MONOCYTES 7 4.0 - 12.0 %    EOSINOPHILS 2 0.5 - 7.8 %    BASOPHILS 1 0.0 - 2.0 %    IMMATURE GRANULOCYTES 0 0.0 - 5.0 %    ABS. NEUTROPHILS 4.2 1.7 - 8.2 K/UL    ABS. LYMPHOCYTES 1.9 0.5 - 4.6 K/UL    ABS. MONOCYTES 0.5 0.1 - 1.3 K/UL    ABS. EOSINOPHILS 0.1 0.0 - 0.8 K/UL    ABS. BASOPHILS 0.0 0.0 - 0.2 K/UL    ABS. IMM. GRANS. 0.0 0.0 - 0.5 K/UL   METABOLIC PANEL, COMPREHENSIVE    Collection Time: 11/22/20 12:52 PM   Result Value Ref Range    Sodium 141 136 - 145 mmol/L    Potassium 2.5 (L) 3.5 - 5.1 mmol/L    Chloride 101 98 - 107 mmol/L    CO2 29 21 - 32 mmol/L    Anion gap 11 7 - 16 mmol/L    Glucose 133 (H) 65 - 100 mg/dL    BUN 25 (H) 8 - 23 MG/DL    Creatinine 11.90 (H) 0.6 - 1.0 MG/DL    GFR est AA 4 (L) >60 ml/min/1.73m2    GFR est non-AA 3 (L) >60 ml/min/1.73m2    Calcium 8.3 8.3 - 10.4 MG/DL    Bilirubin, total 0.4 0.2 - 1.1 MG/DL    ALT (SGPT) 19 12 - 65 U/L    AST (SGOT) 36 15 - 37 U/L    Alk.  phosphatase 49 (L) 50 - 136 U/L    Protein, total 8.1 6.3 - 8.2 g/dL    Albumin 2.6 (L) 3.2 - 4.6 g/dL    Globulin 5.5 (H) 2.3 - 3.5 g/dL    A-G Ratio 0.5 (L) 1.2 - 3.5     TROPONIN-HIGH SENSITIVITY    Collection Time: 11/22/20 12:52 PM   Result Value Ref Range    Troponin-High Sensitivity 310.3 (HH) 0 - 14 pg/mL LACTIC ACID    Collection Time: 11/22/20 12:52 PM   Result Value Ref Range    Lactic acid 3.3 (H) 0.4 - 2.0 MMOL/L   PROTHROMBIN TIME + INR    Collection Time: 11/22/20 12:52 PM   Result Value Ref Range    Prothrombin time 17.9 (H) 12.5 - 14.7 sec    INR 1.5     EKG, 12 LEAD, INITIAL    Collection Time: 11/22/20 12:55 PM   Result Value Ref Range    Ventricular Rate 91 BPM    Atrial Rate 91 BPM    P-R Interval 162 ms    QRS Duration 84 ms    Q-T Interval 326 ms    QTC Calculation (Bezet) 400 ms    Calculated P Axis 75 degrees    Calculated R Axis 55 degrees    Calculated T Axis -108 degrees    Diagnosis       !! AGE AND GENDER SPECIFIC ECG ANALYSIS !! Normal sinus rhythm  Possible Left atrial enlargement  ST & T wave abnormality, consider inferior ischemia  ST & T wave abnormality, consider anterolateral ischemia  Abnormal ECG  When compared with ECG of 22-NOV-2020 12:50,  No significant change was found     URINALYSIS W/ RFLX MICROSCOPIC    Collection Time: 11/22/20  2:00 PM   Result Value Ref Range    Color RED      Appearance CLOUDY      Specific gravity 1.021 1.001 - 1.023      pH (UA) 6.0 5.0 - 9.0      Protein 100 (A) NEG mg/dL    Glucose Negative mg/dL    Ketone TRACE (A) NEG mg/dL    Bilirubin SMALL (A) NEG      Blood SMALL (A) NEG      Urobilinogen 1.0 0.2 - 1.0 EU/dL    Nitrites Negative NEG      Leukocyte Esterase LARGE (A) NEG      WBC >100 0 /hpf    RBC 20-50 0 /hpf    Epithelial cells 10-20 0 /hpf    Bacteria 3+ (H) 0 /hpf    Other observations RESULTS VERIFIED MANUALLY     C. DIFFICILE/EPI PCR    Collection Time: 11/22/20  2:00 PM    Specimen: Stool   Result Value Ref Range    Special Requests: NO SPECIAL REQUESTS      Culture result:        Toxigenic C. diff NEGATIVE/ [id number] PRESUMPTIVE NEGATIVE   LACTIC ACID    Collection Time: 11/22/20  2:41 PM   Result Value Ref Range    Lactic acid 2.3 (H) 0.4 - 2.0 MMOL/L         Patient with a couple episodes of diarrhea here. No vomiting.   Both son and daughter relate history of persistent episodes of vomiting diarrhea since discharge from hospital.  Have discussed with hospitalist for admission.

## 2020-11-22 NOTE — ED TRIAGE NOTES
Pt BIB EMS for weakness, vomiting and diarrhea. Reports that she had a normal PD run last night. Initial BP 80/p, given 350 mL IVF. Given 4 mg zofran. No fever, denies pain.

## 2020-11-22 NOTE — H&P
Hospitalist Note     Admit Date:  2020 12:37 PM   Name:  Guera Dwyer   Age:  65 y.o.  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 She is a [employer]. After recent discharge earlier this month she was feeling better. For the last few days she began feeling poorly again and had NBNB emesis, watery brown diarrhea several times a day. PO intake decreased. Denies fevers, chills, abd pain, CP, SOB, cough. 10 systems reviewed and negative except as noted in HPI. Past Medical History:   Diagnosis Date    Anemia     denies hx of blood transfusion    Arrhythmia     murmur; sinus tach    Aspirin long-term use SINAN    continue    CAD (coronary artery disease)     MI, 1 stent 2012-    Chronic kidney disease     peritoneal dialysis qPM    Chronic obstructive pulmonary disease (HCC)     no inhalers; no treatment; smokes 1 ppd since age 19    CKD (chronic kidney disease) stage 3, GFR 30-59 ml/min           ESRD on peritoneal dialysis ([facility] [address]     Flat affect     GERD (gastroesophageal reflux disease)     resolved per patient; no current meds 19    Heart murmur not followed per cardiology    EF 65-70 %-- on echo 2017    History of MI (myocardial infarction) 2012    stents x 1---- per pt-- does not see a cardiologist    Hx of colonic polyps 2016    adenoma    Hydronephrosis     Hypertension     daily meds    Left-sided weakness 2012    left leg r/t cva    Loss of appetite     states within the last several months    Peritoneal dialysis catheter in place [facility])     patient does dialysis QPM    Poor historian     Renal atrophy, right     Renal insufficiency     ? --- pt unsure if sees nephrologist    Smoker     46 yr hx/ 1 pk per day    Smoker     1 ppd sicne age 19    Stented coronary artery Xience SINAN    proximal LAD 2012-- pt states does not see a cardio    Stroke ([address] 2012    with slight left  sided weakness residual      Past Surgical History:   Procedure Laterality Date    COLONOSCOPY N/A 9/26/2016    Pedro--one desc TA, one rectal hyperplastic--5 year recall    HX CAROTID ENDARTERECTOMY Right 11/07/2018    HX UROLOGICAL      stent to left kidney--multi times    IR INSERT TUNL CVC W/O PORT OVER 5 YR  8/7/2019    GA LEFT HEART CATH,PERCUTANEOUS  4/2012    Xience LAD    VASCULAR SURGERY PROCEDURE UNLIST  02/28/2019    PD catheter placement    VASCULAR SURGERY PROCEDURE UNLIST  07/01/2019    ULTRASOUND GUIDED ACCESS BILATERAL LOWER EXTREMITY ARTERIOGRAM (Left Groin)    VASCULAR SURGERY PROCEDURE UNLIST  08/06/2019    Left common femoral and profunda endarterectomy with CorMatrix patch angioplasty. ,Left femoral to above-knee popliteal artery with reverse saphenous vein graft      Allergies   Allergen Reactions    Codeine Nausea and Vomiting    Hydralazine Nausea and Vomiting      Social History     Tobacco Use    Smoking status: Current Every Day Smoker     Packs/day: 0.50     Years: 51.00     Pack years: 25.50    Smokeless tobacco: Never Used   Substance Use Topics    Alcohol use: No      Family History   Problem Relation Age of Onset    Hypertension Mother     Heart Disease Mother     Stroke Father     No Known Problems Sister     No Known Problems Brother     No Known Problems Sister     No Known Problems Sister     No Known Problems Sister     No Known Problems Brother     No Known Problems Brother       Family history reviewed and noncontributory.   Immunization History   Administered Date(s) Administered    TB Skin Test (PPD) Intradermal 11/10/2017, 08/06/2019, 11/04/2020     PTA Medications:  Current Outpatient Medications   Medication Instructions    acetaminophen (TYLENOL) 650 mg, Oral, EVERY 6 HOURS AS NEEDED    albuterol (PROVENTIL HFA, VENTOLIN HFA, PROAIR HFA) 90 mcg/actuation inhaler 2 Puffs, Inhalation, EVERY 6 HOURS AS NEEDED    aspirin delayed-release 81 mg, Oral, EVERY BEDTIME    lanolin-mineral oil-nacl-w.pet (LUBRIDERM) lotion Topical, 2 TIMES DAILY    metoprolol succinate (TOPROL-XL) 25 mg, Oral, DAILY    ondansetron (ZOFRAN ODT) 8 mg, Oral, EVERY 8 HOURS AS NEEDED    polyethylene glycol (MIRALAX) 17 g, Oral, DAILY AS NEEDED    potassium chloride (K-DUR, KLOR-CON) 20 mEq tablet 20 mEq, Oral, DAILY    warfarin (COUMADIN) 2.5 mg tablet Warfarin 2.5 mg po every evening. TO BE STARTED UNDER DIRECTION OF PCP<BR>Patient started on coumadin due to splenic infarcts. <BR>INR on 11/11/20 is 2.8. Holding coumadin. <BR>INR to be repeaated on 11/12/20, result to be faxed to PCP       Objective:     Patient Vitals for the past 24 hrs:   Temp Pulse Resp BP SpO2   11/22/20 1621  94 29 139/63    11/22/20 1601  91 (!) 60 123/60    11/22/20 1421  94 (!) 46 119/63    11/22/20 1241 97.8 °F (36.6 °C) 94 20 (!) 101/56 96 %     Oxygen Therapy  O2 Sat (%): 96 % (11/22/20 1241)  O2 Device: Room air (11/22/20 1241)    Estimated body mass index is 23.43 kg/m² as calculated from the following:    Height as of this encounter: 5' 1\" (1.549 m). Weight as of this encounter: 56.2 kg (124 lb). No intake or output data in the 24 hours ending 11/22/20 1721    *Note that automatically entered I/Os may not be accurate; dependent on patient compliance with collection and accurate  by assistants. Physical Exam:  General:    Well nourished. No overt distress. Eyes:   Normal sclerae. Extraocular movements intact. HENT:  Normocephalic, atraumatic. Moist mucous membranes  CV:   RRR. No m/r/g. No edema  Lungs:  CTAB. No wheezing, rhonchi, or rales. Unlabored  Abdomen: Soft, nontender, nondistended. Extremities: Warm and dry.   No cyanosis or clubbing  Neurologic: CN II-XII grossly intact. Sensation intact. Skin:     No rashes. No jaundice. Normal coloration  Psych:  Normal mood and affect. I reviewed the labs, imaging, EKGs, telemetry, and other studies done this admission. Data Reviewed:   Recent Results (from the past 24 hour(s))   CBC WITH AUTOMATED DIFF    Collection Time: 11/22/20 12:52 PM   Result Value Ref Range    WBC 6.7 4.3 - 11.1 K/uL    RBC 3.37 (L) 4.05 - 5.2 M/uL    HGB 10.5 (L) 11.7 - 15.4 g/dL    HCT 32.0 (L) 35.8 - 46.3 %    MCV 95.0 79.6 - 97.8 FL    MCH 31.2 26.1 - 32.9 PG    MCHC 32.8 31.4 - 35.0 g/dL    RDW 13.9 11.9 - 14.6 %    PLATELET 485 948 - 860 K/uL    MPV 12.0 9.4 - 12.3 FL    ABSOLUTE NRBC 0.00 0.0 - 0.2 K/uL    DF AUTOMATED      NEUTROPHILS 62 43 - 78 %    LYMPHOCYTES 28 13 - 44 %    MONOCYTES 7 4.0 - 12.0 %    EOSINOPHILS 2 0.5 - 7.8 %    BASOPHILS 1 0.0 - 2.0 %    IMMATURE GRANULOCYTES 0 0.0 - 5.0 %    ABS. NEUTROPHILS 4.2 1.7 - 8.2 K/UL    ABS. LYMPHOCYTES 1.9 0.5 - 4.6 K/UL    ABS. MONOCYTES 0.5 0.1 - 1.3 K/UL    ABS. EOSINOPHILS 0.1 0.0 - 0.8 K/UL    ABS. BASOPHILS 0.0 0.0 - 0.2 K/UL    ABS. IMM. GRANS. 0.0 0.0 - 0.5 K/UL   METABOLIC PANEL, COMPREHENSIVE    Collection Time: 11/22/20 12:52 PM   Result Value Ref Range    Sodium 141 136 - 145 mmol/L    Potassium 2.5 (L) 3.5 - 5.1 mmol/L    Chloride 101 98 - 107 mmol/L    CO2 29 21 - 32 mmol/L    Anion gap 11 7 - 16 mmol/L    Glucose 133 (H) 65 - 100 mg/dL    BUN 25 (H) 8 - 23 MG/DL    Creatinine 11.90 (H) 0.6 - 1.0 MG/DL    GFR est AA 4 (L) >60 ml/min/1.73m2    GFR est non-AA 3 (L) >60 ml/min/1.73m2    Calcium 8.3 8.3 - 10.4 MG/DL    Bilirubin, total 0.4 0.2 - 1.1 MG/DL    ALT (SGPT) 19 12 - 65 U/L    AST (SGOT) 36 15 - 37 U/L    Alk.  phosphatase 49 (L) 50 - 136 U/L    Protein, total 8.1 6.3 - 8.2 g/dL    Albumin 2.6 (L) 3.2 - 4.6 g/dL    Globulin 5.5 (H) 2.3 - 3.5 g/dL    A-G Ratio 0.5 (L) 1.2 - 3.5     TROPONIN-HIGH SENSITIVITY    Collection Time: 11/22/20 12:52 PM   Result Value Ref Range    Troponin-High Sensitivity 310.3 (HH) 0 - 14 pg/mL   LACTIC ACID    Collection Time: 11/22/20 12:52 PM   Result Value Ref Range    Lactic acid 3.3 (H) 0.4 - 2.0 MMOL/L   PROTHROMBIN TIME + INR    Collection Time: 11/22/20 12:52 PM   Result Value Ref Range    Prothrombin time 17.9 (H) 12.5 - 14.7 sec    INR 1.5     MAGNESIUM    Collection Time: 11/22/20 12:52 PM   Result Value Ref Range    Magnesium 2.0 1.8 - 2.4 mg/dL   EKG, 12 LEAD, INITIAL    Collection Time: 11/22/20 12:55 PM   Result Value Ref Range    Ventricular Rate 91 BPM    Atrial Rate 91 BPM    P-R Interval 162 ms    QRS Duration 84 ms    Q-T Interval 326 ms    QTC Calculation (Bezet) 400 ms    Calculated P Axis 75 degrees    Calculated R Axis 55 degrees    Calculated T Axis -108 degrees    Diagnosis       Normal sinus rhythm  Possible Left atrial enlargement  ST & T wave abnormality, consider inferior ischemia  ST & T wave abnormality, consider anterolateral ischemia  Abnormal ECG  When compared with ECG of 22-NOV-2020 12:50,  No significant change was found  Confirmed by Rita Alvarez [id number]) on 11/22/2020 5:09:04 PM     URINALYSIS W/ RFLX MICROSCOPIC    Collection Time: 11/22/20  2:00 PM   Result Value Ref Range    Color RED      Appearance CLOUDY      Specific gravity 1.021 1.001 - 1.023      pH (UA) 6.0 5.0 - 9.0      Protein 100 (A) NEG mg/dL    Glucose Negative mg/dL    Ketone TRACE (A) NEG mg/dL    Bilirubin SMALL (A) NEG      Blood SMALL (A) NEG      Urobilinogen 1.0 0.2 - 1.0 EU/dL    Nitrites Negative NEG      Leukocyte Esterase LARGE (A) NEG      WBC >100 0 /hpf    RBC 20-50 0 /hpf    Epithelial cells 10-20 0 /hpf    Bacteria 3+ (H) 0 /hpf    Other observations RESULTS VERIFIED MANUALLY     C. DIFFICILE/EPI PCR    Collection Time: 11/22/20  2:00 PM    Specimen: Stool   Result Value Ref Range    Special Requests: NO SPECIAL REQUESTS      Culture result:        Toxigenic C. diff NEGATIVE/ 027-NAP1-BI PRESUMPTIVE NEGATIVE   LACTIC ACID    Collection Time: 11/22/20  2:41 PM   Result Value Ref Range    Lactic acid 2.3 (H) 0.4 - 2.0 MMOL/L       All Micro Results     Procedure Component Value Units Date/Time    CULTURE, BLOOD [[id number]]     Order Status:  Sent Specimen:  Blood     CULTURE, BLOOD [[id number]]     Order Status:  Sent Specimen:  Blood     CULTURE, URINE [[id number]]     Order Status:  Sent Specimen:  Urine     CULTURE, URINE [[id number]]     Order Status:  Canceled Specimen:  Urine     C. DIFFICILE/EPI PCR [[id number]] Collected:  11/22/20 1400    Order Status:  Completed Specimen:  Stool Updated:  11/22/20 1528     Special Requests: NO SPECIAL REQUESTS        Culture result:       Toxigenic C. diff NEGATIVE/ 027-NAP1-BI PRESUMPTIVE NEGATIVE                Medications Administered     potassium chloride 20 mEq in 100 ml IVPB     Admin Date  11/22/2020 Action  New Bag Dose  20 mEq Rate  50 mL/hr Route  IntraVENous Administered By  Ester Krishnan RN                Other Studies:  No results found for this visit on 11/22/20. No results found.     SARS-CoV-2 Lab Results  \"Novel Coronavirus\" Test: No results found for: COV2NT   \"Emergent Disease\" Test: No results found for: EDPR  \"SARS-COV-2\" Test: No results found for: XGCOVT  Rapid Test: No results found for: COVR           Assessment and Plan:     Hospital Problems as of 11/22/2020 Date Reviewed: 10/28/2020          Codes Class Noted - Resolved POA    Nausea vomiting and diarrhea ICD-10-CM: R11.2, R19.7  ICD-9-CM: 787.91, 787.01  11/4/2020 - Present Yes        ESRD on peritoneal dialysis [facility]) (Chronic) ICD-10-CM: N18.6, Z99.2  ICD-9-CM: 585.6, V45.11  7/29/2019 - Present Yes        PAD (peripheral artery disease) (HCC) (Chronic) ICD-10-CM: I73.9  ICD-9-CM: 443.9  6/19/2019 - Present Yes        History of right-sided carotid endarterectomy (Chronic) ICD-10-CM: G57.801  ICD-9-CM: V45.89  11/8/2018 - Present Yes        Pure hypercholesterolemia (Chronic) ICD-10-CM: E78.00  ICD-9-CM: 272.0  11/28/2017 - Present Yes        * (Principal) UTI (urinary tract infection) ICD-10-CM: N39.0  ICD-9-CM: 599.0  11/10/2017 - Present Yes        S/P coronary artery stent placement (Chronic) ICD-10-CM: Z95.5  ICD-9-CM: V45.82  2/6/2017 - Present Yes        History of CVA (cerebrovascular accident) (Chronic) ICD-10-CM: A66.15  ICD-9-CM: V12.54  2/11/2016 - Present Yes        Anemia of renal disease (Chronic) ICD-10-CM: N18.9, D63.1  ICD-9-CM: 285.21  1/7/2016 - Present Yes              Plan:  UTI  -may explain her symptoms. unclear why her urine is still showing UTI after taking vantin and flagyl as outpatient  -strong emphasis on needing urine culture  -start rocephin  -cdiff neg  -CXR reviewed personally at bedside, no changes from prior, unremarkable    ESRD on PD  -nephrology consulted    FEN  -IVF given lack of PO intake. Monitor volume status  -replace K  -check Mg    Splenic infarcts, subtherapeutic INR  -pharmD consulted for warfarin    Discharge planning:    -weak.   May need rehab.   -PT/OT  -PPD ordered    DVT ppx ordered  Code status:  Full  Estimated LOS:  Greater than 2 midnights  Risk:  high    Signed:  Elsie Thompson MD

## 2020-11-22 NOTE — LETTER
[address] [facility] EMERGENCY DEPT 
ONE  [address] [facility] [address] 
[phone] Work/School Note Date: 11/22/2020 To Whom It May concern: 
 
Lorne Coy was in the ED with his mother today.   
 
 
Sincerely,

## 2020-11-23 ENCOUNTER — APPOINTMENT (OUTPATIENT)
Dept: ULTRASOUND IMAGING | Age: 70
DRG: 981 | End: 2020-11-23
Attending: NURSE PRACTITIONER
Payer: MEDICARE

## 2020-11-23 LAB
ANION GAP SERPL CALC-SCNC: 11 MMOL/L (ref 7–16)
APTT PPP: 32 SEC (ref 24.1–35.1)
BASOPHILS # BLD: 0.1 K/UL (ref 0–0.2)
BASOPHILS NFR BLD: 1 % (ref 0–2)
BUN SERPL-MCNC: 27 MG/DL (ref 8–23)
CALCIUM SERPL-MCNC: 7.9 MG/DL (ref 8.3–10.4)
CHLORIDE SERPL-SCNC: 106 MMOL/L (ref 98–107)
CO2 SERPL-SCNC: 24 MMOL/L (ref 21–32)
CREAT SERPL-MCNC: 12.1 MG/DL (ref 0.6–1)
DIFFERENTIAL METHOD BLD: ABNORMAL
EOSINOPHIL # BLD: 0.2 K/UL (ref 0–0.8)
EOSINOPHIL NFR BLD: 4 % (ref 0.5–7.8)
ERYTHROCYTE [DISTWIDTH] IN BLOOD BY AUTOMATED COUNT: 13.9 % (ref 11.9–14.6)
GLUCOSE SERPL-MCNC: 89 MG/DL (ref 65–100)
HCT VFR BLD AUTO: 25.5 % (ref 35.8–46.3)
HGB BLD-MCNC: 8.5 G/DL (ref 11.7–15.4)
IMM GRANULOCYTES # BLD AUTO: 0 K/UL (ref 0–0.5)
IMM GRANULOCYTES NFR BLD AUTO: 1 % (ref 0–5)
INR PPP: 1.7
LACTATE SERPL-SCNC: 1.4 MMOL/L (ref 0.4–2)
LACTATE SERPL-SCNC: 3.5 MMOL/L (ref 0.4–2)
LYMPHOCYTES # BLD: 2.5 K/UL (ref 0.5–4.6)
LYMPHOCYTES NFR BLD: 38 % (ref 13–44)
MAGNESIUM SERPL-MCNC: 1.9 MG/DL (ref 1.8–2.4)
MCH RBC QN AUTO: 31.3 PG (ref 26.1–32.9)
MCHC RBC AUTO-ENTMCNC: 33.3 G/DL (ref 31.4–35)
MCV RBC AUTO: 93.8 FL (ref 79.6–97.8)
MM INDURATION POC: 0 MM (ref 0–5)
MONOCYTES # BLD: 0.6 K/UL (ref 0.1–1.3)
MONOCYTES NFR BLD: 9 % (ref 4–12)
NEUTS SEG # BLD: 3.2 K/UL (ref 1.7–8.2)
NEUTS SEG NFR BLD: 49 % (ref 43–78)
NRBC # BLD: 0 K/UL (ref 0–0.2)
PLATELET # BLD AUTO: 155 K/UL (ref 150–450)
PMV BLD AUTO: 11.9 FL (ref 9.4–12.3)
POTASSIUM SERPL-SCNC: 3 MMOL/L (ref 3.5–5.1)
PPD POC: NEGATIVE NEGATIVE
PROTHROMBIN TIME: 20.3 SEC (ref 12.5–14.7)
RBC # BLD AUTO: 2.72 M/UL (ref 4.05–5.2)
SODIUM SERPL-SCNC: 141 MMOL/L (ref 138–145)
WBC # BLD AUTO: 6.6 K/UL (ref 4.3–11.1)

## 2020-11-23 PROCEDURE — 74011250636 HC RX REV CODE- 250/636: Performed by: NURSE PRACTITIONER

## 2020-11-23 PROCEDURE — 97161 PT EVAL LOW COMPLEX 20 MIN: CPT

## 2020-11-23 PROCEDURE — 74011000258 HC RX REV CODE- 258: Performed by: INTERNAL MEDICINE

## 2020-11-23 PROCEDURE — 85025 COMPLETE CBC W/AUTO DIFF WBC: CPT

## 2020-11-23 PROCEDURE — 85610 PROTHROMBIN TIME: CPT

## 2020-11-23 PROCEDURE — 36415 COLL VENOUS BLD VENIPUNCTURE: CPT

## 2020-11-23 PROCEDURE — 97530 THERAPEUTIC ACTIVITIES: CPT

## 2020-11-23 PROCEDURE — 65660000000 HC RM CCU STEPDOWN

## 2020-11-23 PROCEDURE — 83735 ASSAY OF MAGNESIUM: CPT

## 2020-11-23 PROCEDURE — 85730 THROMBOPLASTIN TIME PARTIAL: CPT

## 2020-11-23 PROCEDURE — 74011250636 HC RX REV CODE- 250/636: Performed by: INTERNAL MEDICINE

## 2020-11-23 PROCEDURE — 97165 OT EVAL LOW COMPLEX 30 MIN: CPT

## 2020-11-23 PROCEDURE — 97535 SELF CARE MNGMENT TRAINING: CPT

## 2020-11-23 PROCEDURE — 74011250637 HC RX REV CODE- 250/637: Performed by: INTERNAL MEDICINE

## 2020-11-23 PROCEDURE — 93970 EXTREMITY STUDY: CPT

## 2020-11-23 PROCEDURE — 80048 BASIC METABOLIC PNL TOTAL CA: CPT

## 2020-11-23 PROCEDURE — 83605 ASSAY OF LACTIC ACID: CPT

## 2020-11-23 PROCEDURE — 2709999900 HC NON-CHARGEABLE SUPPLY

## 2020-11-23 RX ORDER — POTASSIUM CHLORIDE 14.9 MG/ML
20 INJECTION INTRAVENOUS
Status: COMPLETED | OUTPATIENT
Start: 2020-11-23 | End: 2020-11-24

## 2020-11-23 RX ADMIN — OXYCODONE HYDROCHLORIDE 5 MG: 5 TABLET ORAL at 11:35

## 2020-11-23 RX ADMIN — Medication 10 ML: at 21:13

## 2020-11-23 RX ADMIN — POTASSIUM CHLORIDE 20 MEQ: 200 INJECTION, SOLUTION INTRAVENOUS at 08:49

## 2020-11-23 RX ADMIN — Medication 10 ML: at 05:13

## 2020-11-23 RX ADMIN — POTASSIUM CHLORIDE 20 MEQ: 200 INJECTION, SOLUTION INTRAVENOUS at 00:24

## 2020-11-23 RX ADMIN — POTASSIUM CHLORIDE 20 MEQ: 20 TABLET, EXTENDED RELEASE ORAL at 08:49

## 2020-11-23 RX ADMIN — METOPROLOL SUCCINATE 25 MG: 25 TABLET, EXTENDED RELEASE ORAL at 08:49

## 2020-11-23 RX ADMIN — SODIUM CHLORIDE 75 ML/HR: 900 INJECTION, SOLUTION INTRAVENOUS at 11:00

## 2020-11-23 RX ADMIN — POTASSIUM CHLORIDE 20 MEQ: 200 INJECTION, SOLUTION INTRAVENOUS at 11:00

## 2020-11-23 RX ADMIN — ONDANSETRON 4 MG: 2 INJECTION INTRAMUSCULAR; INTRAVENOUS at 08:56

## 2020-11-23 RX ADMIN — ASPIRIN 81 MG: 81 TABLET, COATED ORAL at 21:12

## 2020-11-23 RX ADMIN — Medication 10 ML: at 08:56

## 2020-11-23 RX ADMIN — Medication 10 ML: at 14:27

## 2020-11-23 RX ADMIN — CEFTRIAXONE SODIUM 1 G: 1 INJECTION, POWDER, FOR SOLUTION INTRAMUSCULAR; INTRAVENOUS at 17:20

## 2020-11-23 NOTE — PROGRESS NOTES
Rx consulted? YES  Warfarin indication:  hx  splenic infarcts  Goal INR:    2-3  Home dose:  2.5mg   Dose ordered:   2.5mg  Baseline INR:  1.5  Other anticoagulants/bridge:   no  Drug Interactions:   ceftriaxone    Date     INR      Hgb/Plt       dose  11/22     1.5      10.5/207      2.5mg x1    Ht Readings from Last 1 Encounters:   11/22/20 5' 1\" (1.549 m)      Wt Readings from Last 1 Encounters:   11/22/20 56.2 kg (124 lb)       Lab Results   Component Value Date/Time    BUN 25 (H) 11/22/2020 12:52 PM    Creatinine 11.90 (H) 11/22/2020 12:52 PM    PLATELET 880 53/68/8176 12:52 PM    HGB 10.5 (L) 11/22/2020 12:52 PM    INR 1.5 11/22/2020 12:52 PM     65 y.o., female, Body mass index is 23.43 kg/m². Estimated Creatinine Clearance: 3.2 mL/min (A) (based on SCr of 11.9 mg/dL (H)). Assessment/Plan: Pt recently started on warfarin in 11/5/20. INR was 2.8 on 11/11. Pt off warfarin until 11/16 then to start back w/ 2.5mg. Pt admitted w/nausea, vomiting, diarrhea that has been occurring for several days. No signs or symptoms of bleeding noted. Pharmacy to give pt onetime dose of warfarin 2.5mg and will reassess in AM.  Pharmacy to monitor INR daily and for signs and symptoms of bleeding.

## 2020-11-23 NOTE — CONSULTS
Nephrology consult    Admission Date:  11/22/2020    Admission Diagnosis  UTI (urinary tract infection) [N39.0]    We are asked by Dr. Jesusa Seip    History of Present Illness: Ms. Magno Toussaint is a 65 y.o female with PMH significant for for COPD, GERD, PAD, HTN, HLD, CAD and ESRD on PD admitted with complaints weakness, nausea, vomiting, diarrhea for last 2-3 days. Evidence of UTI on UA started on Rocephin, recent hospitalization for colitis. We are consulted for ESRD. From a renal standpoint she is PD dependent via cycler followed at [facility], she reports last PD 11/21 evening denies issues with filling and draining, with clear pale yellow PD effluent, no abd pain. Patient seen and examined on rounds, reports N/V better tolerating clear liquids. Denies dysuria, abd pain, fever/chills, no SOB, cough, CP or edema.        Past Medical History:   Diagnosis Date    Anemia     denies hx of blood transfusion    Arrhythmia     murmur; sinus tach    Aspirin long-term use SINAN    continue    CAD (coronary artery disease) 2012    MI, 1 stent 2012-    Chronic kidney disease     peritoneal dialysis qPM    Chronic obstructive pulmonary disease (HCC)     no inhalers; no treatment; smokes 1 ppd since age 19    CKD (chronic kidney disease) stage 3, GFR 30-59 ml/min           ESRD on peritoneal dialysis ([facility] [address]     Flat affect     GERD (gastroesophageal reflux disease)     resolved per patient; no current meds 7/30/19    Heart murmur not followed per cardiology    EF 65-70 %-- on echo 11/2017    History of MI (myocardial infarction) 2012    stents x 1---- per pt-- does not see a cardiologist    Hx of colonic polyps 2016    adenoma    Hydronephrosis     Hypertension     daily meds    Left-sided weakness 2012    left leg r/t cva    Loss of appetite     states within the last several months    Peritoneal dialysis catheter in place [facility])     patient does dialysis QPM    Poor historian     Renal atrophy, right     Renal insufficiency     ? --- pt unsure if sees nephrologist    Smoker     48 yr hx/ 1 pk per day    Smoker     1 ppd sicne age 19    Stented coronary artery Xience SINAN    proximal LAD 2012-- pt states does not see a cardio    Stroke ([address] 2012    with slight left  sided weakness residual      Past Surgical History:   Procedure Laterality Date    COLONOSCOPY N/A 9/26/2016    Pedro--one desc TA, one rectal hyperplastic--5 year recall    HX CAROTID ENDARTERECTOMY Right 11/07/2018    HX UROLOGICAL      stent to left kidney--multi times    IR INSERT TUNL CVC W/O PORT OVER 5 YR  8/7/2019    MT LEFT HEART CATH,PERCUTANEOUS  4/2012    Xience LAD    VASCULAR SURGERY PROCEDURE UNLIST  02/28/2019    PD catheter placement    VASCULAR SURGERY PROCEDURE UNLIST  07/01/2019    ULTRASOUND GUIDED ACCESS BILATERAL LOWER EXTREMITY ARTERIOGRAM (Left Groin)    VASCULAR SURGERY PROCEDURE UNLIST  08/06/2019    Left common femoral and profunda endarterectomy with CorMatrix patch angioplasty. ,Left femoral to above-knee popliteal artery with reverse saphenous vein graft      Current Facility-Administered Medications   Medication Dose Route Frequency    aspirin delayed-release tablet 81 mg  81 mg Oral QHS    metoprolol succinate (TOPROL-XL) XL tablet 25 mg  25 mg Oral DAILY    potassium chloride (K-DUR, KLOR-CON) SR tablet 20 mEq  20 mEq Oral DAILY    0.9% sodium chloride infusion  75 mL/hr IntraVENous CONTINUOUS    sodium chloride (NS) flush 5-40 mL  5-40 mL IntraVENous Q8H    sodium chloride (NS) flush 5-40 mL  5-40 mL IntraVENous PRN    potassium chloride 10 mEq in 100 ml IVPB  10 mEq IntraVENous PRN    magnesium sulfate 2 g/50 ml IVPB (premix or compounded)  2 g IntraVENous PRN    acetaminophen (TYLENOL) tablet 650 mg  650 mg Oral Q6H PRN    Or    acetaminophen (TYLENOL) suppository 650 mg  650 mg Rectal Q6H PRN    polyethylene glycol (MIRALAX) packet 17 g  17 g Oral DAILY PRN    bisacodyL (DULCOLAX) suppository 10 mg  10 mg Rectal DAILY PRN    famotidine (PEPCID) tablet 20 mg  20 mg Oral DAILY PRN    promethazine (PHENERGAN) with saline injection 25 mg  25 mg IntraVENous Q6H PRN    ondansetron (ZOFRAN ODT) tablet 4 mg  4 mg Oral Q8H PRN    Or    ondansetron (ZOFRAN) injection 4 mg  4 mg IntraVENous Q6H PRN    guaiFENesin ER (MUCINEX) tablet 1,200 mg  1,200 mg Oral BID PRN    alum-mag hydroxide-simeth (MYLANTA) oral suspension 30 mL  30 mL Oral Q4H PRN    oxyCODONE IR (ROXICODONE) tablet 5 mg  5 mg Oral Q4H PRN    oxyCODONE IR (ROXICODONE) tablet 10 mg  10 mg Oral Q4H PRN    zolpidem (AMBIEN) tablet 5 mg  5 mg Oral QHS PRN    guaiFENesin-dextromethorphan (ROBITUSSIN DM) 100-10 mg/5 mL syrup 10 mL  10 mL Oral Q4H PRN    senna-docusate (PERICOLACE) 8.6-50 mg per tablet 2 Tab  2 Tab Oral DAILY PRN    LORazepam (ATIVAN) tablet 0.5 mg  0.5 mg Oral Q4H PRN    mirtazapine (REMERON) tablet 15 mg  15 mg Oral QHS PRN    hydrALAZINE (APRESOLINE) tablet 50 mg  50 mg Oral QID PRN    cefTRIAXone (ROCEPHIN) 1 g in 0.9% sodium chloride (MBP/ADV) 50 mL MBP  1 g IntraVENous Q24H    tuberculin injection 5 Units  5 Units IntraDERMal ONCE    WARFARIN INFORMATION NOTE (COUMADIN)   Other PRN     Allergies   Allergen Reactions    Codeine Nausea and Vomiting    Hydralazine Nausea and Vomiting      Social History     Tobacco Use    Smoking status: Current Every Day Smoker     Packs/day: 0.50     Years: 51.00     Pack years: 25.50    Smokeless tobacco: Never Used   Substance Use Topics    Alcohol use: No      Family History   Problem Relation Age of Onset    Hypertension Mother     Heart Disease Mother     Stroke Father     No Known Problems Sister     No Known Problems Brother     No Known Problems Sister     No Known Problems Sister     No Known Problems Sister     No Known Problems Brother     No Known Problems Brother         Review of Systems  Gen - no fever, no chills, appetite better, + weakness  HEENT - no sore throat  CV - no chest pain, no palpitation, no orthopnea  Lung - no shortness of breath, no cough, no hemoptysis  Abd - no tenderness, + nausea/vomiting- better, no bloody stool  Ext - no edema, no clubbing, no cyanosis  Musculoskeletal - no joint pain, no back pain  Neurologic - no headaches, no dizziness, no seizures  Psychiatric - no anxiety, no depression  Skin - no rashes, no pupura  Genitourinary - no dysuria     Objective:     Vitals:    11/22/20 2357 11/23/20 0000 11/23/20 0400 11/23/20 0619   BP: 132/71  135/71    Pulse: 81 92 88    Resp: 18  18    Temp: 98 °F (36.7 °C)  97.8 °F (36.6 °C)    SpO2: 98%  95%    Weight:    52.9 kg (116 lb 9.6 oz)   Height:           Intake/Output Summary (Last 24 hours) at 11/23/2020 0715  Last data filed at 11/23/2020 7331  Gross per 24 hour   Intake 1055 ml   Output    Net 1055 ml       Physical Exam  GEN :in no distress, alert and oriented  HEENT: anicteric sclerae, Mucous membranes are moist.  Neck - supple without JVD  CV - regular rate, S1, S2, no Rub   Lung - clear bilaterally, lungs expand symmetrically  Abd - soft, nontender, bowel sounds present, no hepatosplenomegaly  Ext - no clubbing, no cyanosis, no edema  Neurologic - nonfocal  Genitourinary - bladder nonpalpable  Skin - no rashes, no purpura  Psychiatric: Normal mood and affect. Access: PD catheter intact, clean and dry       Data Review:   Recent Labs     11/23/20  0457 11/22/20  1252   WBC 6.6 6.7   HGB 8.5* 10.5*   HCT 25.5* 32.0*    207     Recent Labs     11/23/20  0457 11/22/20  1252    141   K 3.0* 2.5*    101   CO2 24 29   BUN 27* 25*   CREA 12.10* 11.90*   GLU 89 133*   CA 7.9* 8.3   MG 1.9 2.0     No results for input(s): PH, PCO2, PO2, PCO2 in the last 72 hours.     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    Nausea vomiting and diarrhea 11/04/2020    Wound dehiscence 08/29/2019    H/O extremity bypass graft 08/29/2019    Left leg swelling 08/29/2019    Ischemia of left lower extremity 08/06/2019    Atherosclerosis of native arteries of extremity with rest pain ([address] 08/01/2019    Atherosclerosis of native arteries of extremities with rest pain, left leg ([address] 07/31/2019    ESRD on peritoneal dialysis ([address] 07/29/2019    Atherosclerosis of native coronary artery of native heart without angina pectoris 07/29/2019    PAD (peripheral artery disease) ([address] 06/19/2019    Atherosclerosis of native arteries of extremity with intermittent claudication (HCC) 06/19/2019    Ischemia 06/19/2019    Shortness of breath 06/04/2019    History of right-sided carotid endarterectomy 11/08/2018    Carotid stenosis, asymptomatic, right 11/06/2018    Carotid stenosis, right 11/06/2018    Bilateral carotid artery disease ([address] 08/29/2018    Bilateral carotid artery stenosis 11/28/2017    Pure hypercholesterolemia 11/28/2017    UTI (urinary tract infection) 11/10/2017    Stroke ([address] 11/10/2017    S/P coronary artery stent placement 02/06/2017    CKD stage 5 secondary to hypertension ([address] 02/06/2017    Major depressive disorder with single episode, in remission ([address] 02/06/2017    Hydronephrosis 02/12/2016    History of CVA (cerebrovascular accident) 02/11/2016    Anemia of renal disease 01/07/2016    Benign essential HTN 01/07/2016    Coronary artery disease involving native coronary artery with angina pectoris ([address] 01/07/2016    Hypertensive emergency 04/26/2012    Carotid artery bruit 04/26/2012    Tobacco use disorder 04/26/2012    GERD (gastroesophageal reflux disease) 04/26/2012    Chest pain, unspecified 04/26/2012    Anemia 04/26/2012       Impression:    Plan:   1. ESRD on PD [address] [place] [address]  Will plan to transition pt from PD to HD in the setting of ischemic bowel and splenic infarcts.    Consult IR for TCC placement tomorrow and HD for clearance and low volume  Consult CW for HD slot 2. UTI Abx per primary     3. Splenic infracts INR 1.5, warfarin per hosp     4. Anemia trend, transfuse hgb <7.0    5.  Hypokalemia replete PO

## 2020-11-23 NOTE — PROGRESS NOTES
Pt had critical called. Blood cx with GNR in aerobic bottle from Left forearm. Pt is on Rocephin for UTI. MD notified.

## 2020-11-23 NOTE — PROGRESS NOTES
Hourly rounds completed this shift, report given to oncoming nurse. Pt resting in bed, call light within reach. Pt denies pain at present.

## 2020-11-23 NOTE — PROGRESS NOTES
Call to IR, spoke with Remington Quinn, regarding Coumadin, pt for tunneled catheter placement tomorrow, new order received from [employer], [place], hold Coumadin tonight, PT/INR in am. PT/INR is previously ordered daily.

## 2020-11-23 NOTE — PROGRESS NOTES
Care Management Interventions  PCP Verified by CM: Yes(Lynn Parish)  Mode of Transport at Discharge: Other (see comment)(To be determined based on needs)  Transition of Care Consult (CM Consult): [address]: No  Reason Outside [place]: (refused to see her as she had not seen her PCP in 6 months)  Discharge Durable Medical Equipment: No  Physical Therapy Consult: Yes  Occupational Therapy Consult: Yes  Speech Therapy Consult: No  Current Support Network: Lives with Caregiver  Confirm Follow Up Transport: Family  Likely Resource Information Provided?: No  Discharge Location  Discharge Placement: Unable to determine at this time    CM met with patient in room. Patient alert and orient and verified all demographic information to be correct. Patient stated she lives with her son in a mobile home that has 2 steps to enter the residence. DME: Wheelchair, Rollator, Adin Parish, Shower Chair    PD: son sets up machine for patient to run every night     ADL: patient stated she is dependent with ADL's and gets help from her son and daughter. Pharmacy: [facility], Patient stated he is able to afford her needed medications    Patient stated she is current with [facility]. Per previous CM notes patient was set up with [facility] however they were unable to accept her as a patient as patient has not seen her PCP in 6 months. Patient stated she would be willing to discharge to STR. Many STR facilities do not accept patients who are on PD. CM will contact facilities to confirm. PT/OT has been consulted, PPD has been ordered. CM will continue to follow  Patient during hospitalization for discharge planning and needs. Please consult or notify CM for new needs.     Readmission Assessment  Number of days since last admission?: 8-30 days  Previous disposition: Home with Home Health  Who is being interviewed?: Patient  What was the patient's/caregiver's perception as to why they think they needed to return back to the hospital?: Other (Comment)(got sicker and needed to return)  Did you visit your Primary Care Physician after you left the hospital, before you returned this time?: No  Why weren't you able to visit your PCP?: Other (Comment)(Was not out of the hospital long enough to see her.  Patient did have an appointment.)  Did you see a specialist, such as Cardiac, Pulmonary, Orthopedic Physician, etc. after you left the hospital?: No  Who advised the patient to return to the hospital?: Self-referral  Does the patient report anything that got in the way of taking their medications?: No  In our efforts to provide the best possible care to you and others like you, can you think of anything that we could have done to help you after you left the hospital the first time, so that you might not have needed to return so soon?: Other (Comment)(no needs)

## 2020-11-23 NOTE — PROGRESS NOTES
Warfarin dosing per pharmacist    Lashaun Decker is a 65 y.o. female. Height: 5' 1\" (154.9 cm)    Weight: 52.9 kg (116 lb 9.6 oz)    Indication:  Splenic infarcts    Goal INR:  2-3    Home dose:  2.5 mg qhs    Risk factors or significant drug interactions:  none    Other anticoagulants:  none    Daily Monitoring  Date  INR     Warfarin dose HGB              Notes  11/22  1.5  2.5 mg  10.5  11/23  1.7  2.5 mg  ---       Pharmacy is consulted to manage warfarin for Ms. Sy Vicente. She was initiated on warfarin here earlier this month and INR brenda rapidly on 2.5 mg - the patient was also on cipro at that time. She has been holding warfarin prior to this admission and her INR is low. INR pending for today. Will re-initiate warfarin at 2.5 mg qhs and follow INR trend. Daily INR. Pharmacy will continue to follow. Please call with any questions. Thank you,  David Johnson, PharmD, BCPS  Clinical Pharmacist  [phone]      Addendum:    INR returned at 1.7. Will proceed with warfarin 2.5 mg this evening as per above.     Thank you,  Marian Gambino, PharmD, [address]  Clinical Pharmacist  [phone]

## 2020-11-23 NOTE — INTERDISCIPLINARY ROUNDS
Interdisciplinary Rounds completed. Nursing, Case Management, and Physician  present. Plan of care reviewed and updated. Nephrology/PT/OT following.

## 2020-11-23 NOTE — PROGRESS NOTES
Problem: Self Care Deficits Care Plan (Adult)  Goal: *Acute Goals and Plan of Care (Insert Text)  Outcome: Progressing Towards Goal  Note: 1. Patient will complete lower body bathing and dressing with modified independence and adaptive equipment as needed. 2. Patient will complete toileting with modified independence. 3. Patient will tolerate 30 minutes of OT treatment with 2-3 rest breaks to increase activity tolerance for ADLs. 4. Patient will complete functional transfers with modified independence and adaptive equipment as needed. 5. Patient will complete functional mobility for household distances with modified independence and good safety awareness. Timeframe: 7 visits        OCCUPATIONAL THERAPY: Initial Assessment, Daily Note, and AM 11/23/2020  INPATIENT: OT Visit Days: 1  Payor: Ysabel Bose / Plan: [address] [place] [address] HMO / Product Type: [employer] /      NAME/AGE/GENDER: Zina Lemus is a 65 y.o. female   PRIMARY DIAGNOSIS:  UTI (urinary tract infection) [N39.0] UTI (urinary tract infection) UTI (urinary tract infection)        ICD-10: Treatment Diagnosis:    Generalized Muscle Weakness (M62.81)  Other lack of cordination (R27.8)   Precautions/Allergies:     Codeine and Hydralazine      ASSESSMENT:     Ms. Corey Hernandes presents to the hospital with a UTI. Pt up in the bathroom in the shower upon arrival. Pt is alert with somewhat of a flat affect. Pt was able to  the shower with minimal assistance requiring minimal to moderate assistance with bathing tasks. Pt's standing balance is fair with pt requiring holding to the wall for support. Pt able to don socks in seated position. Pt did require the use of a walker and minimal assistance for functional mobility with pt needing cues for safety and staying inside the walker. Pt denies any pain and did not have any complaints during today's session.  Pt is currently functioning below her baseline and will benefit from OT services to address states goals and plan of care. This section established at most recent assessment   PROBLEM LIST (Impairments causing functional limitations):  Decreased Strength  Decreased ADL/Functional Activities  Decreased Transfer Abilities  Decreased Ambulation Ability/Technique  Decreased Balance  Decreased Activity Tolerance  Decreased [place] with Home Exercise Program   INTERVENTIONS PLANNED: (Benefits and precautions of occupational therapy have been discussed with the patient.)  Activities of daily living training  Adaptive equipment training  Balance training  Clothing management  Donning&doffing training  Neuromuscular re-eduation  Therapeutic activity  Therapeutic exercise     TREATMENT PLAN: Frequency/Duration: Follow patient 3 times per week to address above goals. Rehabilitation Potential For Stated Goals: Excellent     REHAB RECOMMENDATIONS (at time of discharge pending progress):    Placement: It is my opinion, based on this patient's performance to date, that Ms. Donita Fothergill may benefit from [address] after discharge due to the functional deficits listed above that are likely to improve with skilled rehabilitation because he/she has multiple medical issues that affect his/her functional mobility in the community.   Equipment:   TBD              OCCUPATIONAL PROFILE AND HISTORY:   History of Present Injury/Illness (Reason for Referral):  See H&P  Past Medical History/Comorbidities:   Ms. Donita Fothergill  has a past medical history of Anemia, Arrhythmia, Aspirin long-term use (SINAN), CAD (coronary artery disease) (2012), Chronic kidney disease, Chronic obstructive pulmonary disease ([facility] [address] CKD (chronic kidney disease) stage 3, GFR 30-59 ml/min ( ), ESRD on peritoneal dialysis ([facility] [address] Flat affect, GERD (gastroesophageal reflux disease), Heart murmur (not followed per cardiology), History of MI (myocardial infarction) (2012), colonic polyps (2016), Hydronephrosis, Hypertension, Left-sided weakness (2012), Loss of appetite, Peritoneal dialysis catheter in place [facility]), Poor historian, Renal atrophy, right, Renal insufficiency, Smoker, Smoker, Stented coronary artery ([facility] SINAN), and Stroke ([facility] [address] (2012). Ms. Bubba Pruitt  has a past surgical history that includes colonoscopy (N/A, 9/26/2016); pr left heart cath,percutaneous (4/2012); hx urological; ir insert tunl cvc w/o port over 5 yr (8/7/2019); hx carotid endarterectomy (Right, 11/07/2018); vascular surgery procedure unlist (02/28/2019); vascular surgery procedure unlist (07/01/2019); and vascular surgery procedure unlist (08/06/2019). Social History/Living Environment:   Home Environment: Private residence  One/Two Story Residence: One story  Living Alone: No  Support Systems: Family member(s)  Patient Expects to be Discharged to[de-identified] Private residence  Current DME Used/Available at Home: [place], straight, Shower chair, Walker, rolling  Tub or Shower Type: Tub/Shower combination  Prior Level of Function/Work/Activity:  Pt lives at home with her son. Pt uses a cane/walker for functional mobility. Pt denies any falls. Pt is typically modified independent with ADL. Pt reports son is at home with her all day. Personal Factors: Other factors that influence how disability is experienced by the patient:  multiple co-morbidities    Number of Personal Factors/Comorbidities that affect the Plan of Care: Expanded review of therapy/medical records (1-2):  MODERATE COMPLEXITY   ASSESSMENT OF OCCUPATIONAL PERFORMANCE[de-identified]   Activities of Daily Living:   Basic ADLs (From Assessment) Complex ADLs (From Assessment)   Feeding: Setup  Oral Facial Hygiene/Grooming: Minimum assistance  Bathing: Moderate assistance  Upper Body Dressing: Stand-by assistance  Lower Body Dressing:  Moderate assistance  Toileting: Minimum assistance Instrumental ADL  Meal Preparation: Moderate assistance  Homemaking: Maximum assistance   Grooming/Bathing/Dressing Activities of Daily Living Grooming  Washing Face: Stand-by assistance Cognitive Retraining  Safety/Judgement: Awareness of environment; Fall prevention         Lower Body Bathing  Bathing Assistance: Moderate assistance  Perineal  : Moderate assistance  Position Performed: Standing  Cues: Tactile cues provided;Verbal cues provided;Visual cues provided  Lower Body :  Moderate assistance  Position Performed: Seated in chair  Cues: Tactile cues provided;Verbal cues provided;Visual cues provided     Upper Body [address]: Minimum  assistance Functional Transfers  Bathroom Mobility: Minimum assistance  Shower Transfer: Minimum assistance   Lower Body Dressing Assistance  Socks: Minimum assistance Bed/Mat Mobility  Sit to Stand: Minimum assistance  Stand to Sit: Minimum assistance  Bed to Chair: Minimum assistance  Scooting: Contact guard assistance     Most Recent Physical Functioning:   Gross Assessment:  AROM: Generally decreased, functional  Strength: Generally decreased, functional  Coordination: Generally decreased, functional               Posture:     Balance:  Sitting: Impaired  Sitting - Static: Good (unsupported)  Sitting - Dynamic: Fair (occasional)  Standing: Impaired  Standing - Static: Fair  Standing - Dynamic : Fair Bed Mobility:  Scooting: Contact guard assistance  Wheelchair Mobility:     Transfers:  Sit to Stand: Minimum assistance  Stand to Sit: Minimum assistance  Bed to Chair: Minimum assistance            Patient Vitals for the past 6 hrs:   BP BP Patient Position SpO2 Pulse   11/23/20 0733 (!) 140/68 At rest 97 % 99       Mental Status  Neurologic State: Alert  Orientation Level: Oriented to person, Oriented to place, Oriented to time  Cognition: Follows commands  Perception: Appears intact  Perseveration: No perseveration noted  Safety/Judgement: Awareness of environment, Fall prevention                          Physical Skills Involved:  Balance  Strength  Activity Tolerance Cognitive Skills Affected (resulting in the inability to perform in a timely and safe manner):  Executive Function Psychosocial Skills Affected:  Habits/Routines   Number of elements that affect the Plan of Care: 3-5:  MODERATE COMPLEXITY   CLINICAL DECISION MAKIN[address] [place] [address] AM-PAC 6 Clicks   Daily Activity Inpatient Short Form  How much help from another person does the patient currently need. .. Total A Lot A Little None   1. Putting on and taking off regular lower body clothing? [] 1   [x] 2   [] 3   [] 4   2. Bathing (including washing, rinsing, drying)? [] 1   [x] 2   [] 3   [] 4   3. Toileting, which includes using toilet, bedpan or urinal?   [] 1   [] 2   [x] 3   [] 4   4. Putting on and taking off regular upper body clothing? [] 1   [] 2   [x] 3   [] 4   5. Taking care of personal grooming such as brushing teeth? [] 1   [] 2   [x] 3   [] 4   6. Eating meals? [] 1   [] 2   [x] 3   [] 4   © , Trustees of [address] [place] [address], under license to [employer]. All rights reserved      Score:  Initial: 16 Most Recent: X (Date: -- )    Interpretation of Tool:  Represents activities that are increasingly more difficult (i.e. Bed mobility, Transfers, Gait). Medical Necessity:     Patient demonstrates   good and excellent   rehab potential due to higher previous functional level. Reason for Services/Other Comments:  Patient continues to require skilled intervention due to   Decreased independence with ADL/functional transfers. .   Use of outcome tool(s) and clinical judgement create a POC that gives a: LOW COMPLEXITY         TREATMENT:   (In addition to Assessment/Re-Assessment sessions the following treatments were rendered)     Pre-treatment Symptoms/Complaints:    Pain: Initial:   Pain Intensity 1: 0  Post Session:  0     Self Care: (10 minutes): Procedure(s) (per grid) utilized to improve and/or restore self-care/home management as related to dressing, bathing, and grooming.  Required minimal to moderate visual, verbal, manual, and tactile cueing to facilitate activities of daily living skills and compensatory activities. Braces/Orthotics/Lines/Etc:   IV  PEG   O2 Device: Room air  Treatment/Session Assessment:    Response to Treatment:  Pt tolerated it well with no major complaints. Interdisciplinary Collaboration:   Occupational Therapist  Registered Nurse  After treatment position/precautions:   Up in chair  Bed alarm/tab alert on  Bed/Chair-wheels locked  Call light within reach  RN notified   Compliance with Program/Exercises: Will assess as treatment progresses. Recommendations/Intent for next treatment session: \"Next visit will focus on advancements to more challenging activities and reduction in assistance provided\".   Total Treatment Duration:  OT Patient Time In/Time Out  Time In: 0929  Time Out: [address] [facility],

## 2020-11-23 NOTE — CONSULTS
[address] [facility] FAX: [phone]    Vascular Surgery Consult    Subjective:      Jackson Brownlee is a 65 y.o. female patient with ESRD who we are asked to see for removal of her peritoneal dialysis catheter. Her nephrology team is concerned that she switch from PD to HD due to chronic mesenteric ischemia, which is a contraindication to PD. She currently has no issues with the functionality of the catheter and no evidence on exam of peritonitis. Dr. Jose Young did broach the subject of possibly getting hospice involved in her care, but at this point the patient plans to continue with dialysis and other life supportive measures. A consult has been placed for a tunneled dialysis catheter and also vein mapping was done today, as if she does wish to proceed with long term HD she will need a permanent access placed.      Past Medical History:   Diagnosis Date    Anemia     denies hx of blood transfusion    Arrhythmia     murmur; sinus tach    Aspirin long-term use SINAN    continue    CAD (coronary artery disease) 2012    MI, 1 stent 2012-    Chronic kidney disease     peritoneal dialysis qPM    Chronic obstructive pulmonary disease (HCC)     no inhalers; no treatment; smokes 1 ppd since age 19    CKD (chronic kidney disease) stage 3, GFR 30-59 ml/min           ESRD on peritoneal dialysis ([facility] [address]     Flat affect     GERD (gastroesophageal reflux disease)     resolved per patient; no current meds 7/30/19    Heart murmur not followed per cardiology    EF 65-70 %-- on echo 11/2017    History of MI (myocardial infarction) 2012    stents x 1---- per pt-- does not see a cardiologist    Hx of colonic polyps 2016    adenoma    Hydronephrosis     Hypertension     daily meds    Left-sided weakness 2012    left leg r/t cva    Loss of appetite     states within the last several months    Peritoneal dialysis catheter in place [facility])     patient does dialysis QPM    Poor historian     Renal atrophy, right     Renal insufficiency     ? --- pt unsure if sees nephrologist    Smoker     46 yr hx/ 1 pk per day    Smoker     1 ppd sicne age 19    Stented coronary artery [facility] S[facility]AN    proximal LAD 2012-- pt states does not see a cardio    Stroke ([address] 2012    with slight left  sided weakness residual     Past Surgical History:   Procedure Laterality Date    COLONOSCOPY N/A 9/26/2016    Pedro--one desc TA, one rectal hyperplastic--5 year recall    HX CAROTID ENDARTERECTOMY Right 11/07/2018    HX UROLOGICAL      stent to left kidney--multi times    IR INSERT TUNL CVC W/O PORT OVER 5 YR  8/7/2019    CO LEFT HEART CATH,PERCUTANEOUS  4/2012    [facility] LAD    VASCULAR SURGERY PROCEDURE UNLIST  02/28/2019    PD catheter placement    VASCULAR SURGERY PROCEDURE UNLIST  07/01/2019    ULTRASOUND GUIDED ACCESS BILATERAL LOWER EXTREMITY ARTERIOGRAM (Left Groin)    VASCULAR SURGERY PROCEDURE UNLIST  08/06/2019    Left common femoral and profunda endarterectomy with CorMatrix patch angioplasty. ,Left femoral to above-knee popliteal artery with reverse saphenous vein graft      Family History   Problem Relation Age of Onset    Hypertension Mother     Heart Disease Mother     Stroke Father     No Known Problems Sister     No Known Problems Brother     No Known Problems Sister     No Known Problems Sister     No Known Problems Sister     No Known Problems Brother     No Known Problems Brother      Social History     Socioeconomic History    Marital status:      Spouse name: Not on file    Number of children: Not on file    Years of education: Not on file    Highest education level: Not on file   Tobacco Use    Smoking status: Current Every Day Smoker     Packs/day: 0.50     Years: 51.00     Pack years: 25.50    Smokeless tobacco: Never Used   Substance and Sexual Activity    Alcohol use: No    Drug use: No      Current Facility-Administered Medications Medication Dose Route Frequency    warfarin (COUMADIN) tablet 2.5 mg  2.5 mg Oral QPM    aspirin delayed-release tablet 81 mg  81 mg Oral QHS    metoprolol succinate (TOPROL-XL) XL tablet 25 mg  25 mg Oral DAILY    potassium chloride (K-DUR, KLOR-CON) SR tablet 20 mEq  20 mEq Oral DAILY    0.9% sodium chloride infusion  75 mL/hr IntraVENous CONTINUOUS    sodium chloride (NS) flush 5-40 mL  5-40 mL IntraVENous Q8H    sodium chloride (NS) flush 5-40 mL  5-40 mL IntraVENous PRN    potassium chloride 10 mEq in 100 ml IVPB  10 mEq IntraVENous PRN    magnesium sulfate 2 g/50 ml IVPB (premix or compounded)  2 g IntraVENous PRN    acetaminophen (TYLENOL) tablet 650 mg  650 mg Oral Q6H PRN    Or    acetaminophen (TYLENOL) suppository 650 mg  650 mg Rectal Q6H PRN    polyethylene glycol (MIRALAX) packet 17 g  17 g Oral DAILY PRN    bisacodyL (DULCOLAX) suppository 10 mg  10 mg Rectal DAILY PRN    famotidine (PEPCID) tablet 20 mg  20 mg Oral DAILY PRN    promethazine (PHENERGAN) with saline injection 25 mg  25 mg IntraVENous Q6H PRN    ondansetron (ZOFRAN ODT) tablet 4 mg  4 mg Oral Q8H PRN    Or    ondansetron (ZOFRAN) injection 4 mg  4 mg IntraVENous Q6H PRN    guaiFENesin ER (MUCINEX) tablet 1,200 mg  1,200 mg Oral BID PRN    alum-mag hydroxide-simeth (MYLANTA) oral suspension 30 mL  30 mL Oral Q4H PRN    oxyCODONE IR (ROXICODONE) tablet 5 mg  5 mg Oral Q4H PRN    oxyCODONE IR (ROXICODONE) tablet 10 mg  10 mg Oral Q4H PRN    zolpidem (AMBIEN) tablet 5 mg  5 mg Oral QHS PRN    guaiFENesin-dextromethorphan (ROBITUSSIN DM) 100-10 mg/5 mL syrup 10 mL  10 mL Oral Q4H PRN    senna-docusate (PERICOLACE) 8.6-50 mg per tablet 2 Tab  2 Tab Oral DAILY PRN    LORazepam (ATIVAN) tablet 0.5 mg  0.5 mg Oral Q4H PRN    mirtazapine (REMERON) tablet 15 mg  15 mg Oral QHS PRN    hydrALAZINE (APRESOLINE) tablet 50 mg  50 mg Oral QID PRN    cefTRIAXone (ROCEPHIN) 1 g in 0.9% sodium chloride (MBP/ADV) 50 mL MBP  1 g IntraVENous Q24H    tuberculin injection 5 Units  5 Units IntraDERMal ONCE      Allergies   Allergen Reactions    Codeine Nausea and Vomiting    Hydralazine Nausea and Vomiting       Review of Systems:  Pertinent items are noted in the History of Present Illness. Objective:     Patient Vitals for the past 8 hrs:   BP Temp Pulse Resp SpO2   20 1214 137/73 98.1 °F (36.7 °C) 90 18 95 %     Temp (24hrs), Av °F (36.7 °C), Min:97.8 °F (36.6 °C), Max:98.1 °F (36.7 °C)      Physical Exam:  GENERAL: alert, cooperative, no distress, appears stated age, LUNG: resp even/unlab on room air, ABDOMEN: soft, non-distended, non-tender, PD catheter clean and intact, EXTREMITIES:  extremities normal, atraumatic, no cyanosis or edema, no edema    Assessment:     Need for PD catheter removal  Need for permanent HD access    Plan:     Vein mapping done today, will be reviewed with Dr. Janay Nelson. Pt scheduled for PD catheter removal on Wednesday. Will not need to hold coumadin in preparation for surgery. If the patient and her family do not wish to pursue hospice and want to continue with aggressive life supportive measures, will need to plan for permanent HD access in the future. Discussed with patient and she agrees to the plan of care. All questions answered. Signed By: Patrice Kincaid NP     2020       Elements of this note have been dictated using speech recognition software. As a result, errors of speech recognition may have occurred.

## 2020-11-23 NOTE — PROGRESS NOTES
Problem: Mobility Impaired (Adult and Pediatric)  Goal: *Acute Goals and Plan of Care (Insert Text)  Note:   LTG:  (1.)Ms. Zaria Christie will move from supine to sit and sit to supine , scoot up and down, and roll side to side in bed with STAND BY ASSIST within 7 treatment day(s). (2.)Ms. Zaria Christie will transfer from bed to chair and chair to bed with STAND BY ASSIST using the least restrictive device within 7 treatment day(s). (3.)Ms. Zaria Christie will ambulate with STAND BY ASSIST for 200+ feet with the least restrictive device within 7 treatment day(s). ________________________________________________________________________________________________       PHYSICAL THERAPY: Initial Assessment and PM 11/23/2020  INPATIENT: PT Visit Days : 1  Payor: Saeed Peña / Plan: [address] [place] [address] HMO / Product Type: [employer] /       NAME/AGE/GENDER: Vandana Callahan is a 65 y.o. female   PRIMARY DIAGNOSIS: UTI (urinary tract infection) [N39.0] UTI (urinary tract infection) UTI (urinary tract infection)        ICD-10: Treatment Diagnosis:    Generalized Muscle Weakness (M62.81)  Difficulty in walking, Not elsewhere classified (R26.2)   Precaution/Allergies:  Codeine and Hydralazine      ASSESSMENT:     Ms. Zaria Christie presents supine at arrival agrees to participate. She was on the L side of the bed and got out on R side which required extra time and slight periodic Robin pushes when her momentum stopped moving fwd and was drifting bwd. At EOB she stood with Robin and ambulated 80ft with SPC CGA. Pt walked close to rail on L, but never used, however forearm was sometimes used for stability. She returned to room and sat in recliner. Her impairments include decreased functional mobility, decreased balance, decreased strength, decreased safety awareness, increased risk for falls. Pt would benefit from further PT while in house to address these impairments to help improve to prior level of independence.       This section established at most recent assessment   PROBLEM LIST (Impairments causing functional limitations):  Decreased Strength  Decreased ADL/Functional Activities  Decreased Transfer Abilities  Decreased Ambulation Ability/Technique  Decreased Balance  Increased Pain  Decreased Activity Tolerance   INTERVENTIONS PLANNED: (Benefits and precautions of physical therapy have been discussed with the patient.)  Balance Exercise  Bed Mobility  Gait Training  Home Exercise Program (HEP)  Neuromuscular Re-education/Strengthening  Range of Motion (ROM)  Therapeutic Activites  Therapeutic Exercise/Strengthening  Transfer Training     TREATMENT PLAN: Frequency/Duration: 3 times a week for duration of hospital stay  Rehabilitation Potential For Stated Goals: Good     REHAB RECOMMENDATIONS (at time of discharge pending progress):    Placement: It is my opinion, based on this patient's performance to date, that Ms. Sy Vicente may benefit from intensive therapy at a [address] [place] [address] after discharge due to the functional deficits listed above that are likely to improve with skilled rehabilitation and concerns that he/she may be unsafe to be unsupervised at home due to decreased function . Equipment:   None at this time              HISTORY:   History of Present Injury/Illness (Reason for Referral):  25-year-old female has history of reactive airway disease, coronary artery disease and chronic renal failure with peritoneal dialysis. Also history of hypertension and stroke in the past.  Started up with fatigue and vomiting and diarrhea this morning. No blood with the diarrhea. She has had no fever and chills. No syncope. No chest pain or shortness of breath. Old records were reviewed. Patient was recently admitted November 4-11 because of diarrhea and found to have colitis. She also had splenic infarcts. She was placed on warfarin. Was discharged home on Vantin and Flagyl.   She states that the diarrhea seemed to improve and had no vomiting but diarrhea worsened this morning and the vomiting started back. Past Medical History/Comorbidities:   Ms. Matilda Pimentel  has a past medical history of Anemia, Arrhythmia, Aspirin long-term use (SINAN), CAD (coronary artery disease) (2012), Chronic kidney disease, Chronic obstructive pulmonary disease ([facility] [address] CKD (chronic kidney disease) stage 3, GFR 30-59 ml/min ( ), ESRD on peritoneal dialysis ([facility] [address] Flat affect, GERD (gastroesophageal reflux disease), Heart murmur (not followed per cardiology), History of MI (myocardial infarction) (2012), colonic polyps (2016), Hydronephrosis, Hypertension, Left-sided weakness (2012), Loss of appetite, Peritoneal dialysis catheter in place [facility]), Poor historian, Renal atrophy, right, Renal insufficiency, Smoker, Smoker, Stented coronary artery (Xience SINAN), and Stroke ([facility] [address] (2012). Ms. Matilda Pimentel  has a past surgical history that includes colonoscopy (N/A, 9/26/2016); pr left heart cath,percutaneous (4/2012); hx urological; ir insert tunl cvc w/o port over 5 yr (8/7/2019); hx carotid endarterectomy (Right, 11/07/2018); vascular surgery procedure unlist (02/28/2019); vascular surgery procedure unlist (07/01/2019); and vascular surgery procedure unlist (08/06/2019). Social History/Living Environment:   Home Environment: Private residence  One/Two Story Residence: One story  Living Alone: No  Support Systems: Family member(s)  Patient Expects to be Discharged to[de-identified] Private residence  Current DME Used/Available at Home: Cane, straight  Tub or Shower Type: Tub/Shower combination  Prior Level of Function/Work/Activity:  Pt lives in house with son that works during day. She has SPC and mod IND with ADLs.       Number of Personal Factors/Comorbidities that affect the Plan of Care: 0: LOW COMPLEXITY   EXAMINATION:   Most Recent Physical Functioning:   Gross Assessment:  AROM: Generally decreased, functional  Strength: Generally decreased, functional  Coordination: Within functional limits               Posture:     Balance:  Sitting: Impaired  Sitting - Static: Good (unsupported)  Sitting - Dynamic: Fair (occasional)  Standing: Impaired  Standing - Static: Fair  Standing - Dynamic : Fair Bed Mobility:  Rolling: Minimum assistance; Additional time  Supine to Sit: Minimum assistance; Additional time  Wheelchair Mobility:     Transfers:  Sit to Stand: Minimum assistance  Stand to Sit: Minimum assistance  Gait:            Body Structures Involved:  Nerves  Eyes and Ears  Bones  Joints  Muscles  Ligaments Body Functions Affected:  Mental  Sensory/Pain  Neuromusculoskeletal  Movement Related Activities and Participation Affected:  General Tasks and Demands  Mobility  [address] [place] [address] and [address] [place] [address]   Number of elements that affect the Plan of Care: 1-2: LOW COMPLEXITY   CLINICAL PRESENTATION:   Presentation: Stable and uncomplicated: LOW COMPLEXITY   CLINICAL DECISION MAKING:   [place] Form  How much difficulty does the patient currently have. .. Unable A Lot A Little None   1. Turning over in bed (including adjusting bedclothes, sheets and blankets)? [] 1   [] 2   [x] 3   [] 4   2. Sitting down on and standing up from a chair with arms ( e.g., wheelchair, bedside commode, etc.)   [] 1   [] 2   [x] 3   [] 4   3. Moving from lying on back to sitting on the side of the bed? [] 1   [] 2   [x] 3   [] 4   How much help from another person does the patient currently need. .. Total A Lot A Little None   4. Moving to and from a bed to a chair (including a wheelchair)? [] 1   [] 2   [x] 3   [] 4   5. Need to walk in hospital room? [] 1   [] 2   [x] 3   [] 4   6. Climbing 3-5 steps with a railing? [] 1   [] 2   [x] 3   [] 4   © 2007, Trustees of [address] [place] [address] [address] [id number], under license to [employer].  All rights reserved      Score:  Initial: 18 Most Recent: X (Date: -- )    Interpretation of Tool: Represents activities that are increasingly more difficult (i.e. Bed mobility, Transfers, Gait). Medical Necessity:     Skilled intervention continues to be required due to decreased function. Reason for Services/Other Comments:  Patient continues to require skilled intervention due to   medical complications and patient unable to attend/participate in therapy as expected  . Use of outcome tool(s) and clinical judgement create a POC that gives a: Clear prediction of patient's progress: LOW COMPLEXITY            TREATMENT:   (In addition to Assessment/Re-Assessment sessions the following treatments were rendered)   Pre-treatment Symptoms/Complaints:  none  Pain: Initial:   Pain Intensity 1: 0  Post Session:  0     Therapeutic Activity: (    11min): Therapeutic activities including Bed transfers, Chair transfers, and Ambulation on level ground to improve mobility, strength, balance, and coordination. Required moderate   to promote static and dynamic balance in standing, promote coordination of bilateral, lower extremity(s), and promote motor control of bilateral, lower extremity(s). Braces/Orthotics/Lines/Etc:   IV  O2 Device: Room air  Treatment/Session Assessment:    Response to Treatment:  see above  Interdisciplinary Collaboration:   Physical Therapist  Registered Nurse  After treatment position/precautions:   Up in chair  Call light within reach  RN notified   Compliance with Program/Exercises: Will assess as treatment progresses  Recommendations/Intent for next treatment session: \"Next visit will focus on advancements to more challenging activities and reduction in assistance provided\".   Total Treatment Duration:  PT Patient Time In/Time Out  Time In: 1455  Time Out: [address], DPT

## 2020-11-23 NOTE — PROGRESS NOTES
Perfect serve message sent to Bouchra Rodas NP, informing call from [facility], in [address] [facility], reporting multiple thrombus BUE observed with vein mapping and report will be in connect care soon.  Message acknowledged by Bouchra Rodas NP.

## 2020-11-23 NOTE — PROGRESS NOTES
Progress Note    2020  Admit Date: 2020 12:37 PM   NAME: Halie Gustafson   :  1950   MRN:  [id number]   Attending: Ina Chandler MD  PCP:  Markus Harper MD  Treatment Team: Attending Provider: Ina Chandler MD; Consulting Provider: Amelia Cuevas MD; Primary Nurse: Marquez Dickey, RN; Nurse Practitioner: Leighann Tapia NP; Physical Therapist: SHAYAN OlmosT; Occupational Therapist: Zahra Phillips OT; Care Manager: Magdalene Lennon, FRANK; Utilization Review: Carleen Zhong, FRANK; Consulting Provider: Ken Munoz MD    Full Code   SUBJECTIVE:   Ms. Raad England is a 64 yo female with PMH of anemia, CAD, ESRD on PD, GERD, COPD, HTN, recent splenic infarcts on warfarin, recent admission for acute colitis s/p abx who presented with c/o n/v/d, malaise. Poor po intake and weight loss. Pt found to have UTI on UA. Started on rocephin. Nephrology consulted, will need to switch to HD from PD. Poor prognosis, Hospice candidate per Nephrology. IR for [facility] Surgery for PD cath removal per Nephrology. Reports n/v better. Tolerating clear liquids.      Past Medical History:   Diagnosis Date    Anemia     denies hx of blood transfusion    Arrhythmia     murmur; sinus tach    Aspirin long-term use SINAN    continue    CAD (coronary artery disease)     MI, 1 stent -    Chronic kidney disease     peritoneal dialysis qPM    Chronic obstructive pulmonary disease (HCC)     no inhalers; no treatment; smokes 1 ppd since age 19    CKD (chronic kidney disease) stage 3, GFR 30-59 ml/min           ESRD on peritoneal dialysis ([facility] [address]     Flat affect     GERD (gastroesophageal reflux disease)     resolved per patient; no current meds 19    Heart murmur not followed per cardiology    EF 65-70 %-- on echo 2017    History of MI (myocardial infarction)     stents x 1---- per pt-- does not see a cardiologist    Hx of colonic polyps  adenoma    Hydronephrosis     Hypertension     daily meds    Left-sided weakness 2012    left leg r/t cva    Loss of appetite     states within the last several months    Peritoneal dialysis catheter in place [facility])     patient does dialysis QPM    Poor historian     Renal atrophy, right     Renal insufficiency     ? --- pt unsure if sees nephrologist    Smoker     46 yr hx/ 1 pk per day    Smoker     1 ppd sicne age 19    Stented coronary artery Xience SINAN    proximal LAD 2012-- pt states does not see a cardio    Stroke ([address] 2012    with slight left  sided weakness residual     Recent Results (from the past 24 hour(s))   CULTURE, BLOOD    Collection Time: 11/22/20  5:34 PM    Specimen: Blood   Result Value Ref Range    Special Requests: RIGHT  Antecubital        Culture result: NO GROWTH AFTER 18 HOURS     CULTURE, BLOOD    Collection Time: 11/22/20  5:55 PM    Specimen: Blood   Result Value Ref Range    Special Requests: RIGHT  Antecubital        Culture result: NO GROWTH AFTER 17 HOURS     LACTIC ACID    Collection Time: 11/22/20 11:05 PM   Result Value Ref Range    Lactic acid 3.5 (H) 0.4 - 2.0 MMOL/L   METABOLIC PANEL, BASIC    Collection Time: 11/23/20  4:57 AM   Result Value Ref Range    Sodium 141 138 - 145 mmol/L    Potassium 3.0 (L) 3.5 - 5.1 mmol/L    Chloride 106 98 - 107 mmol/L    CO2 24 21 - 32 mmol/L    Anion gap 11 7 - 16 mmol/L    Glucose 89 65 - 100 mg/dL    BUN 27 (H) 8 - 23 MG/DL    Creatinine 12.10 (H) 0.6 - 1.0 MG/DL    GFR est AA 4 (L) >60 ml/min/1.73m2    GFR est non-AA 3 (L) >60 ml/min/1.73m2    Calcium 7.9 (L) 8.3 - 10.4 MG/DL   CBC WITH AUTOMATED DIFF    Collection Time: 11/23/20  4:57 AM   Result Value Ref Range    WBC 6.6 4.3 - 11.1 K/uL    RBC 2.72 (L) 4.05 - 5.2 M/uL    HGB 8.5 (L) 11.7 - 15.4 g/dL    HCT 25.5 (L) 35.8 - 46.3 %    MCV 93.8 79.6 - 97.8 FL    MCH 31.3 26.1 - 32.9 PG    MCHC 33.3 31.4 - 35.0 g/dL    RDW 13.9 11.9 - 14.6 %    PLATELET 826 840 - 749 K/uL    MPV 11.9 9.4 - 12.3 FL    ABSOLUTE NRBC 0.00 0.0 - 0.2 K/uL    DF AUTOMATED      NEUTROPHILS 49 43 - 78 %    LYMPHOCYTES 38 13 - 44 %    MONOCYTES 9 4.0 - 12.0 %    EOSINOPHILS 4 0.5 - 7.8 %    BASOPHILS 1 0.0 - 2.0 %    IMMATURE GRANULOCYTES 1 0.0 - 5.0 %    ABS. NEUTROPHILS 3.2 1.7 - 8.2 K/UL    ABS. LYMPHOCYTES 2.5 0.5 - 4.6 K/UL    ABS. MONOCYTES 0.6 0.1 - 1.3 K/UL    ABS. EOSINOPHILS 0.2 0.0 - 0.8 K/UL    ABS. BASOPHILS 0.1 0.0 - 0.2 K/UL    ABS. IMM.  GRANS. 0.0 0.0 - 0.5 K/UL   MAGNESIUM    Collection Time: 11/23/20  4:57 AM   Result Value Ref Range    Magnesium 1.9 1.8 - 2.4 mg/dL   LACTIC ACID    Collection Time: 11/23/20  4:57 AM   Result Value Ref Range    Lactic acid 1.4 0.4 - 2.0 MMOL/L     Allergies   Allergen Reactions    Codeine Nausea and Vomiting    Hydralazine Nausea and Vomiting     Current Facility-Administered Medications   Medication Dose Route Frequency Provider Last Rate Last Dose    warfarin (COUMADIN) tablet 2.5 mg  2.5 mg Oral QPM Da Yu MD        aspirin delayed-release tablet 81 mg  81 mg Oral QHS Da Yu MD   81 mg at 11/22/20 2148    metoprolol succinate (TOPROL-XL) XL tablet 25 mg  25 mg Oral DAILY Da Yu MD   25 mg at 11/23/20 0849    potassium chloride (K-DUR, KLOR-CON) SR tablet 20 mEq  20 mEq Oral DAILY Da Yu MD   20 mEq at 11/23/20 0849    0.9% sodium chloride infusion  75 mL/hr IntraVENous CONTINUOUS Da Yu MD 75 mL/hr at 11/23/20 1100 75 mL/hr at 11/23/20 1100    sodium chloride (NS) flush 5-40 mL  5-40 mL IntraVENous Jeralene Bridgeman, Levis Meigs, MD   10 mL at 11/23/20 0513    sodium chloride (NS) flush 5-40 mL  5-40 mL IntraVENous PRN Da Yu MD   10 mL at 11/23/20 0856    potassium chloride 10 mEq in 100 ml IVPB  10 mEq IntraVENous PRN Da Yu MD        magnesium sulfate 2 g/50 ml IVPB (premix or compounded)  2 g IntraVENous PRN MD Rufus Mccallum [employer] acetaminophen (TYLENOL) tablet 650 mg  650 mg Oral Q6H PRN Tuyet Nielsen MD        Or    acetaminophen (TYLENOL) suppository 650 mg  650 mg Rectal Q6H PRN Tuyet Nielsen MD        polyethylene glycol [facility]) packet 17 g  17 g Oral DAILY PRN Tuyet Nielsen MD        bisacodyL (DULCOLAX) suppository 10 mg  10 mg Rectal DAILY PRN Tuyet Nielsen MD        famotidine (PEPCID) tablet 20 mg  20 mg Oral DAILY PRN Tuyet Nielsen MD        [facility]) with saline injection 25 mg  25 mg IntraVENous Q6H PRN Tuyet Nielsen MD        ondansetron (ZOFRAN ODT) tablet 4 mg  4 mg Oral Q8H PRN Tuyet Nielsen MD        Or    ondansetron [facility]) injection 4 mg  4 mg IntraVENous Q6H PRN Tuyet Nielsen MD   4 mg at 11/23/20 4311    guaiFENesin ER ([address]) tablet 1,200 mg  1,200 mg Oral BID PRN Tuyet Nielsen MD        alum-mag hydroxide-[facility]) oral suspension 30 mL  30 mL Oral Q4H PRN Tuyet Nielsen MD        oxyCODONE IR (ROXICODONE) tablet 5 mg  5 mg Oral Q4H PRN Tuyet Nielsen MD   5 mg at 11/23/20 1135    oxyCODONE IR (ROXICODONE) tablet 10 mg  10 mg Oral Q4H PRN Tuyet Nielsen MD        zolpidem [facility]) tablet 5 mg  5 mg Oral QHS PRN Tuyet Nielsen MD        guaiFENesin-dextromethorphan [facility] DM) 100-10 mg/5 mL syrup 10 mL  10 mL Oral Q4H PRN Tuyet Nielsen MD        senna-docusate (PERICOLACE) 8.6-50 mg per tablet 2 Tab  2 Tab Oral DAILY PRN Tuyet Nielsen MD        LORazepam (ATIVAN) tablet 0.5 mg  0.5 mg Oral Q4H PRN Tuyet Nielsen MD        mirtazapine (REMERON) tablet 15 mg  15 mg Oral QHS PRN Tuyet Nielsen MD        hydrALAZINE (APRESOLINE) tablet 50 mg  50 mg Oral QID PRN Tuyet Nielsen MD        cefTRIAXone (ROCEPHIN) 1 g in 0.9% sodium chloride (MBP/ADV) 50 mL MBP  1 g IntraVENous Q24H Tuyet Nielsen  mL/hr at 11/22/20 1807 1 g at 20 1807    tuberculin injection 5 Units  5 Units IntraDERMal ONCE Aramis Azevedo MD   5 Units at 20 2150       Review of Systems negative with exception of pertinent positives noted above  PHYSICAL EXAM     Visit Vitals  /73 (BP 1 Location: Right arm, BP Patient Position: Sitting)   Pulse 90   Temp 98.1 °F (36.7 °C)   Resp 18   Ht 5' 1\" (1.549 m)   Wt 52.9 kg (116 lb 9.6 oz)   SpO2 95%   BMI 22.03 kg/m²      Temp (24hrs), Av °F (36.7 °C), Min:97.8 °F (36.6 °C), Max:98.1 °F (36.7 °C)    Oxygen Therapy  O2 Sat (%): 95 % (20 1214)  Pulse via Oximetry: 92 beats per minute (20 1801)  O2 Device: Room air (20 1241)    Intake/Output Summary (Last 24 hours) at 2020 1536  Last data filed at 2020 1427  Gross per 24 hour   Intake 2431 ml   Output    Net 2431 ml      General: No acute distress. Appears chronically ill    Lungs: CTA bilaterally. Resp even and nonlabored  Heart:  S1S2 present without murmurs rubs gallops. RRR. No LE edema  Abdomen: Soft, non tender, non distended. BS present. PD cath present  Extremities: Moves ext spontaneously. No cyanosis  Neurologic:  A/O X3.   No focal deficits         Results summary of Diagnostic Studies/Procedures copied from within [facility] EMR:        Sebastien Leung 96 Problems    Diagnosis Date Noted    Nausea vomiting and diarrhea 2020    ESRD on [facility] ([facility] [address] 2019    PAD (peripheral artery disease) ([facility] [address] [facility] 2019    History of right-sided carotid endarterectomy 2018    Pure hypercholesterolemia 2017    UTI (urinary tract infection) 11/10/2017    S/P coronary artery stent placement 2017    History of CVA (cerebrovascular accident) 2016    Anemia of renal disease 2016     Plan:    UTI  unclear why her urine is still showing UTI after taking vantin and flagyl as outpatient  continue rocephin  cdiff neg  CXR no changes from prior, unremarkable     ESRD on PD  nephrology consulted  Plans to consult IR for tunneled cath, transition to HD  General Surgery       Splenic infarcts, subtherapeutic INR  Pharmacy to dose warfarin    Hypokalemia  Replace  BMP in AM          Notes, labs, VS, diagnostic testing reviewed      Plan of Care Discussed with: Supervising MD Dr. Davion Martinez, care team, pt        Olga Quiñones, NP

## 2020-11-23 NOTE — PROGRESS NOTES
Pt alert and oriented this shift; rested well. Bed in low position and call light/ personal items within reach. Will continue to monitor and give bedside shift report to oncoming day shift nurse.

## 2020-11-23 NOTE — PROGRESS NOTES
11/22/20 2127   Dual Skin Pressure Injury Assessment   Dual Skin Pressure Injury Assessment X   Second Care Provider (Based on [address] [place] [address]) Herminio MARIE   Skin Integumentary   Skin Integumentary (WDL) X    Pressure  Injury Documentation No Pressure Injury Noted-Pressure Ulcer Prevention Initiated  (scar to buttocks)   Skin Color Appropriate for ethnicity   Skin Condition/Temp Warm;Dry  (PD access to left abd, dressing c,d,i)   Skin Integrity Abrasion  (sm abrasion to mid chest, old fistula access scar left thigh)

## 2020-11-24 ENCOUNTER — ANESTHESIA EVENT (OUTPATIENT)
Dept: SURGERY | Age: 70
DRG: 981 | End: 2020-11-24
Payer: MEDICARE

## 2020-11-24 ENCOUNTER — APPOINTMENT (OUTPATIENT)
Dept: INTERVENTIONAL RADIOLOGY/VASCULAR | Age: 70
DRG: 981 | End: 2020-11-24
Attending: NURSE PRACTITIONER
Payer: MEDICARE

## 2020-11-24 LAB
ANION GAP SERPL CALC-SCNC: 10 MMOL/L (ref 7–16)
BASOPHILS # BLD: 0 K/UL (ref 0–0.2)
BASOPHILS NFR BLD: 1 % (ref 0–2)
BUN SERPL-MCNC: 27 MG/DL (ref 8–23)
CALCIUM SERPL-MCNC: 7.1 MG/DL (ref 8.3–10.4)
CHLORIDE SERPL-SCNC: 114 MMOL/L (ref 98–107)
CO2 SERPL-SCNC: 20 MMOL/L (ref 21–32)
CREAT SERPL-MCNC: 12 MG/DL (ref 0.6–1)
DIFFERENTIAL METHOD BLD: ABNORMAL
EOSINOPHIL # BLD: 0.3 K/UL (ref 0–0.8)
EOSINOPHIL NFR BLD: 6 % (ref 0.5–7.8)
ERYTHROCYTE [DISTWIDTH] IN BLOOD BY AUTOMATED COUNT: 14.1 % (ref 11.9–14.6)
GLUCOSE SERPL-MCNC: 70 MG/DL (ref 65–100)
HCT VFR BLD AUTO: 23.9 % (ref 35.8–46.3)
HGB BLD-MCNC: 7.7 G/DL (ref 11.7–15.4)
IMM GRANULOCYTES # BLD AUTO: 0 K/UL (ref 0–0.5)
IMM GRANULOCYTES NFR BLD AUTO: 0 % (ref 0–5)
INR PPP: 1.8
LYMPHOCYTES # BLD: 1.9 K/UL (ref 0.5–4.6)
LYMPHOCYTES NFR BLD: 40 % (ref 13–44)
MCH RBC QN AUTO: 31.7 PG (ref 26.1–32.9)
MCHC RBC AUTO-ENTMCNC: 32.2 G/DL (ref 31.4–35)
MCV RBC AUTO: 98.4 FL (ref 79.6–97.8)
MM INDURATION POC: 0 MM (ref 0–5)
MONOCYTES # BLD: 0.4 K/UL (ref 0.1–1.3)
MONOCYTES NFR BLD: 9 % (ref 4–12)
NEUTS SEG # BLD: 2.1 K/UL (ref 1.7–8.2)
NEUTS SEG NFR BLD: 45 % (ref 43–78)
NRBC # BLD: 0 K/UL (ref 0–0.2)
PLATELET # BLD AUTO: 141 K/UL (ref 150–450)
PMV BLD AUTO: 12.1 FL (ref 9.4–12.3)
POTASSIUM SERPL-SCNC: 3.9 MMOL/L (ref 3.5–5.1)
PPD POC: NEGATIVE NEGATIVE
PROTHROMBIN TIME: 20.9 SEC (ref 12.5–14.7)
RBC # BLD AUTO: 2.43 M/UL (ref 4.05–5.2)
SODIUM SERPL-SCNC: 144 MMOL/L (ref 138–145)
WBC # BLD AUTO: 4.7 K/UL (ref 4.3–11.1)

## 2020-11-24 PROCEDURE — 77001 FLUOROGUIDE FOR VEIN DEVICE: CPT

## 2020-11-24 PROCEDURE — 65660000000 HC RM CCU STEPDOWN

## 2020-11-24 PROCEDURE — 74011250636 HC RX REV CODE- 250/636: Performed by: PHYSICIAN ASSISTANT

## 2020-11-24 PROCEDURE — C1894 INTRO/SHEATH, NON-LASER: HCPCS

## 2020-11-24 PROCEDURE — 74011250636 HC RX REV CODE- 250/636: Performed by: INTERNAL MEDICINE

## 2020-11-24 PROCEDURE — 74011000250 HC RX REV CODE- 250: Performed by: PHYSICIAN ASSISTANT

## 2020-11-24 PROCEDURE — 2709999900 HC NON-CHARGEABLE SUPPLY

## 2020-11-24 PROCEDURE — 74011000258 HC RX REV CODE- 258: Performed by: INTERNAL MEDICINE

## 2020-11-24 PROCEDURE — 74011250637 HC RX REV CODE- 250/637: Performed by: INTERNAL MEDICINE

## 2020-11-24 PROCEDURE — 85610 PROTHROMBIN TIME: CPT

## 2020-11-24 PROCEDURE — 77030018719 HC DRSG PTCH ANTIMIC J&J -A

## 2020-11-24 PROCEDURE — 02H633Z INSERTION OF INFUSION DEVICE INTO RIGHT ATRIUM, PERCUTANEOUS APPROACH: ICD-10-PCS | Performed by: RADIOLOGY

## 2020-11-24 PROCEDURE — 36415 COLL VENOUS BLD VENIPUNCTURE: CPT

## 2020-11-24 PROCEDURE — B548ZZA ULTRASONOGRAPHY OF SUPERIOR VENA CAVA, GUIDANCE: ICD-10-PCS | Performed by: RADIOLOGY

## 2020-11-24 PROCEDURE — 90935 HEMODIALYSIS ONE EVALUATION: CPT

## 2020-11-24 PROCEDURE — 0JH63XZ INSERTION OF TUNNELED VASCULAR ACCESS DEVICE INTO CHEST SUBCUTANEOUS TISSUE AND FASCIA, PERCUTANEOUS APPROACH: ICD-10-PCS | Performed by: RADIOLOGY

## 2020-11-24 PROCEDURE — 80048 BASIC METABOLIC PNL TOTAL CA: CPT

## 2020-11-24 PROCEDURE — C1769 GUIDE WIRE: HCPCS

## 2020-11-24 PROCEDURE — 5A1D70Z PERFORMANCE OF URINARY FILTRATION, INTERMITTENT, LESS THAN 6 HOURS PER DAY: ICD-10-PCS | Performed by: INTERNAL MEDICINE

## 2020-11-24 PROCEDURE — 77030031131 HC SUT MXN P COVD -B

## 2020-11-24 PROCEDURE — 77030018389 HC SPNG HEMSTAT1 J&J -B

## 2020-11-24 PROCEDURE — 77030002916 HC SUT ETHLN J&J -A

## 2020-11-24 PROCEDURE — C1750 CATH, HEMODIALYSIS,LONG-TERM: HCPCS

## 2020-11-24 PROCEDURE — 85025 COMPLETE CBC W/AUTO DIFF WBC: CPT

## 2020-11-24 PROCEDURE — 77030010507 HC ADH SKN DERMBND J&J -B

## 2020-11-24 RX ORDER — HEPARIN SODIUM 1000 [USP'U]/ML
2000-5000 INJECTION, SOLUTION INTRAVENOUS; SUBCUTANEOUS ONCE
Status: COMPLETED | OUTPATIENT
Start: 2020-11-24 | End: 2020-11-24

## 2020-11-24 RX ORDER — SODIUM CHLORIDE 9 MG/ML
25 INJECTION, SOLUTION INTRAVENOUS CONTINUOUS
Status: DISCONTINUED | OUTPATIENT
Start: 2020-11-24 | End: 2020-11-24

## 2020-11-24 RX ORDER — FENTANYL CITRATE 50 UG/ML
25-50 INJECTION, SOLUTION INTRAMUSCULAR; INTRAVENOUS
Status: DISCONTINUED | OUTPATIENT
Start: 2020-11-24 | End: 2020-11-24

## 2020-11-24 RX ORDER — LIDOCAINE HYDROCHLORIDE AND EPINEPHRINE 10; 10 MG/ML; UG/ML
2-20 INJECTION, SOLUTION INFILTRATION; PERINEURAL ONCE
Status: COMPLETED | OUTPATIENT
Start: 2020-11-24 | End: 2020-11-24

## 2020-11-24 RX ADMIN — Medication 10 ML: at 21:55

## 2020-11-24 RX ADMIN — METOPROLOL SUCCINATE 25 MG: 25 TABLET, EXTENDED RELEASE ORAL at 14:23

## 2020-11-24 RX ADMIN — SODIUM CHLORIDE 75 ML/HR: 900 INJECTION, SOLUTION INTRAVENOUS at 01:14

## 2020-11-24 RX ADMIN — LIDOCAINE HYDROCHLORIDE AND EPINEPHRINE 150 MG: 10; 10 INJECTION, SOLUTION INFILTRATION; PERINEURAL at 09:02

## 2020-11-24 RX ADMIN — SODIUM CHLORIDE 25 ML/HR: 900 INJECTION, SOLUTION INTRAVENOUS at 08:50

## 2020-11-24 RX ADMIN — HEPARIN SODIUM 3200 UNITS: 1000 INJECTION INTRAVENOUS; SUBCUTANEOUS at 09:15

## 2020-11-24 RX ADMIN — CEFTRIAXONE SODIUM 1 G: 1 INJECTION, POWDER, FOR SOLUTION INTRAMUSCULAR; INTRAVENOUS at 17:57

## 2020-11-24 RX ADMIN — WARFARIN SODIUM 2.5 MG: 2 TABLET ORAL at 17:57

## 2020-11-24 RX ADMIN — POTASSIUM CHLORIDE 20 MEQ: 20 TABLET, EXTENDED RELEASE ORAL at 14:23

## 2020-11-24 RX ADMIN — Medication 10 ML: at 05:02

## 2020-11-24 RX ADMIN — ASPIRIN 81 MG: 81 TABLET, COATED ORAL at 21:55

## 2020-11-24 RX ADMIN — FENTANYL CITRATE 25 MCG: 50 INJECTION, SOLUTION INTRAMUSCULAR; INTRAVENOUS at 09:02

## 2020-11-24 RX ADMIN — SODIUM CHLORIDE 75 ML/HR: 900 INJECTION, SOLUTION INTRAVENOUS at 21:59

## 2020-11-24 RX ADMIN — Medication 10 ML: at 14:23

## 2020-11-24 RX ADMIN — FENTANYL CITRATE 25 MCG: 50 INJECTION, SOLUTION INTRAMUSCULAR; INTRAVENOUS at 08:56

## 2020-11-24 NOTE — ADT AUTH CERT NOTES
Patient Demographics Patient Name Mary Akins  
[id number]  Sex Female    
1950  Address [address]  Phone [phone] (Home) *Preferred*  
[phone] [facility] CSN:   
[id number] Admit Date:  Admit Time  Room  Bed 2020  12:37 PM  619 [[id number]]  01 [890] Attending Providers Provider  Pager  From  To Heidi Patel MD   20 Idalmis Frost MD   20 Emergency Contact(s) Name  Relation  Home  Work  Mobile Mary Kate Lui  Daughter  [phone] Arthur Hernandez  Son    [phone] Utilization Reviews  
 
   
procedure  by Jarret Balderas RN  
 
   
Review Entered  Review Status 2020 11:43  In Primary   
   
Criteria Review Interventional Radiology Brief Procedure Note 
  
Patient: Zina Lemus MRN: [id number]        SSN: [id number] YOB: 1950          Age: 65 y.o. Sex: female  
  
Date of Procedure: 2020 
  
Pre-Procedure Diagnosis: ESRD 
  
Post-Procedure Diagnosis: SAME 
  
Procedure(s): Tunneled Central Venous Catheter 
  
Brief Description of Procedure: US, fluoro guided right IJ tunneled HD catheter placed Implants:  Tunnelled Hemodialysis Catheter 
  
Findings: catheter tip in right atrium  
  
Complications: None 
  
Recommendations: ok to use catheter  
  
Follow Up: referring MD  
   
Urinary Tract Infection (UTI) - Care Day 2 (2020) by Jarret Balderas RN  
 
   
Review Entered  Review Status 2020 11:41  Completed   
   
Criteria Review Care Day: 2 Care Date: 2020 Level of Care:   
Guideline Day 1 Level Of Care (X) Floor [G] Clinical Status   
(X) * Clinical Indications met [H] 2020 11:41:33 EST by Di JONES RECOMMENDS INPATIENT   
(X) Possible dysuria, chills, and flank pain Routes (X) IV fluids, medications 11/24/2020 11:41:33 EST by Kamran Reyes   
  NS DRIP @ 75, ZOFRAN 4 MG IV X 1, K CHL 20 MEQ IV X 3   
( ) Diet as tolerated 11/24/2020 11:41:33 EST by Trent Amor NPO Interventions (X) Possible blood cultures 11/24/2020 11:41:33 EST by Kamran Reyes   
  NGTD   
(X) Renal function tests, CBC   
11/24/2020 11:41:33 EST by Kamran Reyes   
  HGB 8.5, HCT 25.5, PT 20.3, K 3.0, BUN 27, CREAT 12.10, GFR 4   
(X) Possible CT, ultrasound, or other imaging test [C]   
11/24/2020 11:41:33 EST by Kamran MRORISON US: Bilateral upper extremity veins as above. Thrombus is noted within the veins 
of both medial cubital fossas (X) Possible urinary catheter [E] Medications (X) Antibiotics 11/24/2020 11:41:33 EST by Kamran Reyes   
  ROCEPHIN 1G QD IV   
* Milestone Additional Notes NEPH CONSULT I agree with assessment and plan. I have d/w pt and daughter Susan Zhou and both sons on the phone her prognosis is poor. 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 I recommend Hospice support for pt and family. She will need to switch dialysis modalities from PD to HD Consult IR for [facility] Consult Surgery for PD cath removal   
  
  
IM NOTE Poor po intake and weight loss.  Pt found to have UTI on UA. Started on rocephin. Nephrology consulted, will need to switch to HD from PD.  Poor prognosis, Hospice candidate per Nephrology. Andrew Fletcher for [facility] and General Surgery for PD cath removal per Nephrology. Peña Pack n/v better.  Tolerating clear liquids. General:       No acute distress.  Appears chronically ill     
Lungs:          CTA bilaterally. Resp even and nonlabored Heart:            S1S2 present without murmurs rubs gallops. RRR. No LE edema Abdomen:    Soft, non tender, non distended. BS present. PD cath present Extremities: Moves ext spontaneously. No cyanosis Neurologic:  A/O X3.  No focal deficits   
    
Plan:   
    
UTI   
unclear why her urine is still showing UTI after taking vantin and flagyl as outpatient   
continue rocephin   
cdiff neg CXR no changes from prior, unremarkable   
    
ESRD on PD   
nephrology consulted Plans to consult IR for tunneled cath, transition to HD General Surgery   
    
 Splenic infarcts, subtherapeutic INR Pharmacy to dose warfarin   
    
Hypokalemia Replace BMP in AM   
    
    
  
  
VASC SURG CONSULT   
at this point the patient plans to continue with dialysis and other life supportive measures. A consult has been placed for a tunneled dialysis catheter and also vein mapping was done today, as if she does wish to proceed with long term HD she will need a permanent access placed. Need for PD catheter removal   
Need for permanent HD access Vein mapping done today, will be reviewed with Dr. Rosas Lorenzo. Pt scheduled for PD catheter removal on Wednesday. Will not need to hold coumadin in preparation for surgery. If the patient and her family do not wish to pursue hospice and want to continue with aggressive life supportive measures, will need to plan for permanent HD access in the future. Discussed with patient and she agrees to the plan of care. All questions answered. /62 TEMP 98.6 HR 76 RR 18   
O2 99 ON RA   
  
  
PT, OT, DIALYSIS, ELEV HOB, NPO, VASC SURG CONSULT, IR CONSULT

## 2020-11-24 NOTE — PROGRESS NOTES
Marlin Turner  Admission Date: 11/22/2020             Renal Daily Progress Note: 11/24/2020    The patient's chart is reviewed and the patient is discussed with the staff. Follow up ESRD  Subjective:   patient seen and examined on HD, s/p right TCC placed today transitioning from PD. Tolerating dialysis, reports hunger.   Labs and chart reviewed    ROS:  General: no fever/chills, appetite ok  CV: no CP  Lung: no SOB, no cough  GI: no N/V/D  Ext: no edema    Current Facility-Administered Medications   Medication Dose Route Frequency    warfarin (COUMADIN) tablet 2.5 mg  2.5 mg Oral QPM    aspirin delayed-release tablet 81 mg  81 mg Oral QHS    metoprolol succinate (TOPROL-XL) XL tablet 25 mg  25 mg Oral DAILY    potassium chloride (K-DUR, KLOR-CON) SR tablet 20 mEq  20 mEq Oral DAILY    0.9% sodium chloride infusion  75 mL/hr IntraVENous CONTINUOUS    sodium chloride (NS) flush 5-40 mL  5-40 mL IntraVENous Q8H    sodium chloride (NS) flush 5-40 mL  5-40 mL IntraVENous PRN    potassium chloride 10 mEq in 100 ml IVPB  10 mEq IntraVENous PRN    magnesium sulfate 2 g/50 ml IVPB (premix or compounded)  2 g IntraVENous PRN    acetaminophen (TYLENOL) tablet 650 mg  650 mg Oral Q6H PRN    Or    acetaminophen (TYLENOL) suppository 650 mg  650 mg Rectal Q6H PRN    polyethylene glycol (MIRALAX) packet 17 g  17 g Oral DAILY PRN    bisacodyL (DULCOLAX) suppository 10 mg  10 mg Rectal DAILY PRN    famotidine (PEPCID) tablet 20 mg  20 mg Oral DAILY PRN    promethazine (PHENERGAN) with saline injection 25 mg  25 mg IntraVENous Q6H PRN    ondansetron (ZOFRAN ODT) tablet 4 mg  4 mg Oral Q8H PRN    Or    ondansetron (ZOFRAN) injection 4 mg  4 mg IntraVENous Q6H PRN    guaiFENesin ER (MUCINEX) tablet 1,200 mg  1,200 mg Oral BID PRN    alum-mag hydroxide-simeth (MYLANTA) oral suspension 30 mL  30 mL Oral Q4H PRN    oxyCODONE IR (ROXICODONE) tablet 5 mg  5 mg Oral Q4H PRN    oxyCODONE IR (ROXICODONE) tablet 10 mg  10 mg Oral Q4H PRN    zolpidem (AMBIEN) tablet 5 mg  5 mg Oral QHS PRN    guaiFENesin-dextromethorphan (ROBITUSSIN DM) 100-10 mg/5 mL syrup 10 mL  10 mL Oral Q4H PRN    senna-docusate (PERICOLACE) 8.6-50 mg per tablet 2 Tab  2 Tab Oral DAILY PRN    LORazepam (ATIVAN) tablet 0.5 mg  0.5 mg Oral Q4H PRN    mirtazapine (REMERON) tablet 15 mg  15 mg Oral QHS PRN    hydrALAZINE (APRESOLINE) tablet 50 mg  50 mg Oral QID PRN    cefTRIAXone (ROCEPHIN) 1 g in 0.9% sodium chloride (MBP/ADV) 50 mL MBP  1 g IntraVENous Q24H         Objective:     Vitals:    11/24/20 0915 11/24/20 0957 11/24/20 1030 11/24/20 1035   BP: (!) 112/57 (!) 113/56 (!) 80/37 (!) 90/38   Pulse: 78 80 (!) 58 (!) 59   Resp: 12      Temp:       SpO2: 100%      Weight:       Height:         Intake and Output:   11/22 1901 - 11/24 0700  In: 3586 [P.O.:180; I.V.:3406]  Out: -   11/24 0701 - 11/24 1900  In: -   Out: 10 [Urine:10]    Physical Exam:   Constitutional:  the patient is well developed and in no acute distress, alert and oriented   HEENT:  Sclera clear, pupils equal, oral mucosa moist  Lungs: clear bilaterally   Cardiovascular:  Regular rate, S1, S2, no Rub   Abd/GI: soft and non-tender; with positive bowel sounds. Ext: warm without cyanosis. There is no lower leg edema. Skin:  no jaundice or rashes   Neuro: no gross neuro deficits   Psychiatric: Calm.    Access: Right TCC- intact, PD catheter intact, clean and dry      LAB  Recent Labs     11/24/20  0620 11/23/20  1538 11/23/20  0457 11/22/20  1252   WBC 4.7  --  6.6 6.7   HGB 7.7*  --  8.5* 10.5*   HCT 23.9*  --  25.5* 32.0*   *  --  155 207   INR 1.8 1.7  --  1.5     Recent Labs     11/24/20  0620 11/23/20  0457 11/22/20  1252    141 141   K 3.9 3.0* 2.5*   * 106 101   CO2 20* 24 29   GLU 70 89 133*   BUN 27* 27* 25*   CREA 12.00* 12.10* 11.90*   MG  --  1.9 2.0   ALB  --   --  2.6*     No results for input(s): PH, PCO2, PO2, HCO3 in the last 72 hours.      Assessment:  (Medical Decision Making)     Hospital Problems  Date Reviewed: 10/28/2020          Codes Class Noted POA    Nausea vomiting and diarrhea ICD-10-CM: R11.2, R19.7  ICD-9-CM: 787.91, 787.01  11/4/2020 Yes        ESRD on peritoneal dialysis [facility]) (Chronic) ICD-10-CM: N18.6, Z99.2  ICD-9-CM: 585.6, V45.11  7/29/2019 Yes        PAD (peripheral artery disease) (HCC) (Chronic) ICD-10-CM: I73.9  ICD-9-CM: 443.9  6/19/2019 Yes        History of right-sided carotid endarterectomy (Chronic) ICD-10-CM: U89.138  ICD-9-CM: V45.89  11/8/2018 Yes        Pure hypercholesterolemia (Chronic) ICD-10-CM: E78.00  ICD-9-CM: 272.0  11/28/2017 Yes        * (Principal) UTI (urinary tract infection) ICD-10-CM: N39.0  ICD-9-CM: 599.0  11/10/2017 Yes        S/P coronary artery stent placement (Chronic) ICD-10-CM: Z95.5  ICD-9-CM: V45.82  2/6/2017 Yes        History of CVA (cerebrovascular accident) (Chronic) ICD-10-CM: T44.34  ICD-9-CM: V12.54  2/11/2016 Yes        Anemia of renal disease (Chronic) ICD-10-CM: N18.9, D63.1  ICD-9-CM: 285.21  1/7/2016 Yes              Plan:  (Medical Decision Making)   1. ESRD on PD [address]  Transition from PD to HD, seen on HD, tolerating dialysis  Appreciate CW for HD slot   - vascular to d/c PD catheter planned for tomorrow      2. UTI Abx per primary      3. Splenic infracts INR 1.5, warfarin per hosp      4. Anemia trend, transfuse hgb <7.0     5.  Hypokalemia improved     Amarjit Liang NP

## 2020-11-24 NOTE — DIALYSIS
TRANSFER IN - DIALYSIS    Received patient in dialysis unit  from IR (unit) for ordered procedure. Consent verified for renal replacement therapy. Patient  Drowsy, but responds to voice and vital signs stable. BP 91/45 P 68    Hemodialysis initiated using Right CVC. Aspirated and flushed both ports without difficulty. Dressing clean, dry and intact. Machine settings per MD order. Heparin 0 unit bolus and 0 units/hr. Will monitor during treatment.

## 2020-11-24 NOTE — PROGRESS NOTES
TRANSFER - OUT REPORT:    Verbal report given to Roberto Casey RN on Amira Tejeda  being transferred to 6th floor for routine post - op       Report consisted of patients Situation, Background, Assessment and   Recommendations(SBAR). Information from the following report(s) SBAR, Kardex, Procedure Summary and MAR was reviewed with the receiving nurse. Lines:   Peripheral IV 11/22/20 Right Antecubital (Active)   Site Assessment Clean, dry, & intact 11/24/20 0259   Phlebitis Assessment 0 11/24/20 0259   Infiltration Assessment 0 11/24/20 0259   Dressing Status Clean, dry, & intact 11/24/20 0259   Dressing Type Tape;Transparent 11/24/20 0259   Hub Color/Line Status Patent 11/24/20 0259   Alcohol Cap Used No 11/24/20 0259       Saline Lock 11/22/20 Left Antecubital (Active)   Site Assessment Clean, dry, & intact 11/24/20 0259   Phlebitis Assessment 0 11/24/20 0259   Infiltration Assessment 0 11/24/20 0259   Dressing Status Clean, dry, & intact 11/24/20 0259   Dressing Type Tape;Transparent 11/24/20 0259   Hub Color/Line Status Infusing;Patent 11/24/20 0259        Opportunity for questions and clarification was provided.     Patient to stop at Dialysis department first.

## 2020-11-24 NOTE — PROGRESS NOTES
TRANSFER - OUT REPORT:    Verbal report given to FRANK Youssef on Jackson Brownlee  being transferred to Dialysis for routine post - op       Report consisted of patients Situation, Background, Assessment and   Recommendations(SBAR). Information from the following report(s) SBAR, Kardex, Procedure Summary and MAR was reviewed with the receiving nurse. Lines:   Peripheral IV 11/22/20 Right Antecubital (Active)   Site Assessment Clean, dry, & intact 11/24/20 0259   Phlebitis Assessment 0 11/24/20 0259   Infiltration Assessment 0 11/24/20 0259   Dressing Status Clean, dry, & intact 11/24/20 0259   Dressing Type Tape;Transparent 11/24/20 0259   Hub Color/Line Status Patent 11/24/20 0259   Alcohol Cap Used No 11/24/20 0259       Saline Lock 11/22/20 Left Antecubital (Active)   Site Assessment Clean, dry, & intact 11/24/20 0259   Phlebitis Assessment 0 11/24/20 0259   Infiltration Assessment 0 11/24/20 0259   Dressing Status Clean, dry, & intact 11/24/20 0259   Dressing Type Tape;Transparent 11/24/20 0259   Hub Color/Line Status Infusing;Patent 11/24/20 0259        Opportunity for questions and clarification was provided. Tunneled line is OK to be used.

## 2020-11-24 NOTE — PROGRESS NOTES
Warfarin dosing per pharmacist    Roz Arreola is a 65 y.o. female. Height: 5' 1\" (154.9 cm)    Weight: 52.6 kg (116 lb)    Indication:  Splenic infarcts    Goal INR:  2-3    Home dose:  2.5 mg qhs    Risk factors or significant drug interactions:  none    Other anticoagulants:  none    Daily Monitoring  Date  INR     Warfarin dose HGB              Notes  11/22  1.5  2.5 mg  10.5  11/23  1.7  Held  ---  11/24  1.8  2.5 mg  7.7       Pharmacy is consulted to manage warfarin for Ms. Janneth Hoover. She was initiated on warfarin here earlier this month and INR brenda rapidly on 2.5 mg - the patient was also on cipro at that time. She has been holding warfarin prior to this admission and her INR is low. INR 1.8. Warfarin dose was held last night. Plan for tunneled cath placement today. Continue with warfarin 2.5 mg tonight. Daily INR. Pharmacy will continue to follow. Please call with any questions.     Thank you,  Negrita Peres, PharmD, BCPS  Clinical Pharmacist  [phone]

## 2020-11-24 NOTE — PROCEDURES
Department of Interventional Radiology  [phone]        Interventional Radiology Brief Procedure Note    Patient: Franco Browne MRN: [id number]  SSN: [id number]    YOB: 1950  Age: 65 y.o.   Sex: female      Date of Procedure: 11/24/2020    Pre-Procedure Diagnosis: ESRD    Post-Procedure Diagnosis: SAME    Procedure(s): Tunneled Central Venous Catheter    Brief Description of Procedure: US, fluoro guided right IJ tunneled HD catheter placed    Performed By: West Spears PA-C     Assistants: None    Anesthesia:Fentanyl    Estimated Blood Loss: Less than 10ml    Specimens:  None    Implants:  Tunnelled Hemodialysis Catheter    Findings: catheter tip in right atrium     Complications: None    Recommendations: ok to use catheter     Follow Up: referring MD    Signed By: West Spears PA-C     November 24, 2020

## 2020-11-24 NOTE — PROGRESS NOTES
[address] [facility]  Yen[address] MARCO Rogers  [address], [address] [facility] [address]   No:  [phone]  Fax:  [phone]                                                                 Channing Bennett (: 1950) is doing fine today. She reports she is ready for surgery tomorrow. Nephrology team requests PD catheter out because they feel she needs to transition to HD now. TCC was placed today.      Chief Complaint   Patient presents with    Diarrhea     Patient Active Problem List   Diagnosis Code    Hypertensive emergency I16.1    Carotid artery bruit R09.89    Tobacco use disorder F17.200    GERD (gastroesophageal reflux disease) K21.9    Chest pain, unspecified R07.9    Anemia D64.9    Anemia of renal disease N18.9, D63.1    Benign essential HTN I10    Coronary artery disease involving native coronary artery with angina pectoris ([facility] [address] I25.119    History of CVA (cerebrovascular accident) Z86.73    Hydronephrosis N13.30    S/P coronary artery stent placement Z95.5    CKD stage 5 secondary to hypertension ([facility]) I12.0, N18.5    Major depressive disorder with single episode, in remission ([facility] [address] F32.5    UTI (urinary tract infection) N39.0    Stroke ([facility] [address] I63.9    Bilateral carotid artery stenosis I65.23    Pure hypercholesterolemia E78.00    Bilateral carotid artery disease ([facility]) I77.9    Carotid stenosis, asymptomatic, right I65.21    Carotid stenosis, right I65.21    History of right-sided carotid endarterectomy Z98.890    Shortness of breath R06.02    PAD (peripheral artery disease) ([facility]) I73.9    Atherosclerosis of native arteries of extremity with intermittent claudication ([facility]) I70.219    Ischemia I99.8    ESRD on peritoneal dialysis ([facility]) N18.6, Z99.2    Atherosclerosis of native coronary artery of native heart without angina pectoris I25.10    Atherosclerosis of native arteries of extremities with rest pain, left leg ([facility]) I70.222    Atherosclerosis of native arteries of extremity with rest pain ([facility]) I70.229    Ischemia of left lower extremity I99.8    Wound dehiscence T81.30XA    H/O extremity bypass graft Z95.828    Left leg swelling M79.89    Colitis K52.9    Splenic infarct D73.5    Nausea vomiting and diarrhea R11.2, R19.7     Past Medical History:   Diagnosis Date    Anemia     denies hx of blood transfusion    Arrhythmia     murmur; sinus tach    Aspirin long-term use SINAN    continue    CAD (coronary artery disease) 2012    MI, 1 stent 2012-    Chronic kidney disease     peritoneal dialysis qPM    Chronic obstructive pulmonary disease (HCC)     no inhalers; no treatment; smokes 1 ppd since age 19    CKD (chronic kidney disease) stage 3, GFR 30-59 ml/min           ESRD on peritoneal dialysis ([facility])     Flat affect     GERD (gastroesophageal reflux disease)     resolved per patient; no current meds 7/30/19    Heart murmur not followed per cardiology    EF 65-70 %-- on echo 11/2017    History of MI (myocardial infarction) 2012    stents x 1---- per pt-- does not see a cardiologist    Hx of colonic polyps 2016    adenoma    Hydronephrosis     Hypertension     daily meds    Left-sided weakness 2012    left leg r/t cva    Loss of appetite     states within the last several months    Peritoneal dialysis catheter in place [facility])     patient does dialysis QPM    Poor historian     Renal atrophy, right     Renal insufficiency     ? --- pt unsure if sees nephrologist    Smoker     46 yr hx/ 1 pk per day    Smoker     1 ppd sicne age 19    Stented coronary artery Xience SINAN    proximal LAD 2012-- pt states does not see a cardio    Stroke ([facility] [address] 2012    with slight left  sided weakness residual     Past Surgical History:   Procedure Laterality Date    COLONOSCOPY N/A 9/26/2016    Pedro--one desc TA, one rectal hyperplastic--5 year recall    HX CAROTID ENDARTERECTOMY Right 11/07/2018  HX UROLOGICAL      stent to left kidney--multi times    IR INSERT TUNL CVC W/O PORT OVER 5 YR  8/7/2019    MA LEFT HEART CATH,PERCUTANEOUS  4/2012    Xience LAD    VASCULAR SURGERY PROCEDURE UNLIST  02/28/2019    PD catheter placement    VASCULAR SURGERY PROCEDURE UNLIST  07/01/2019    ULTRASOUND GUIDED ACCESS BILATERAL LOWER EXTREMITY ARTERIOGRAM (Left Groin)    VASCULAR SURGERY PROCEDURE UNLIST  08/06/2019    Left common femoral and profunda endarterectomy with CorMatrix patch angioplasty. ,Left femoral to above-knee popliteal artery with reverse saphenous vein graft     Social History     Socioeconomic History    Marital status:      Spouse name: Not on file    Number of children: Not on file    Years of education: Not on file    Highest education level: Not on file   Tobacco Use    Smoking status: Current Every Day Smoker     Packs/day: 0.50     Years: 51.00     Pack years: 25.50    Smokeless tobacco: Never Used   Substance and Sexual Activity    Alcohol use: No    Drug use: No     Allergies   Allergen Reactions    Codeine Nausea and Vomiting    Hydralazine Nausea and Vomiting     Current Facility-Administered Medications   Medication    warfarin (COUMADIN) tablet 2.5 mg    aspirin delayed-release tablet 81 mg    metoprolol succinate (TOPROL-XL) XL tablet 25 mg    potassium chloride (K-DUR, KLOR-CON) SR tablet 20 mEq    0.9% sodium chloride infusion    sodium chloride (NS) flush 5-40 mL    sodium chloride (NS) flush 5-40 mL    potassium chloride 10 mEq in 100 ml IVPB    magnesium sulfate 2 g/50 ml IVPB (premix or compounded)    acetaminophen (TYLENOL) tablet 650 mg    Or    acetaminophen (TYLENOL) suppository 650 mg    polyethylene glycol (MIRALAX) packet 17 g    bisacodyL (DULCOLAX) suppository 10 mg    famotidine (PEPCID) tablet 20 mg    promethazine (PHENERGAN) with saline injection 25 mg    ondansetron (ZOFRAN ODT) tablet 4 mg    Or    ondansetron (ZOFRAN) injection 4 mg    guaiFENesin ER (MUCINEX) tablet 1,200 mg    alum-mag hydroxide-simeth (MYLANTA) oral suspension 30 mL    oxyCODONE IR (ROXICODONE) tablet 5 mg    oxyCODONE IR (ROXICODONE) tablet 10 mg    zolpidem (AMBIEN) tablet 5 mg    guaiFENesin-dextromethorphan (ROBITUSSIN DM) 100-10 mg/5 mL syrup 10 mL    senna-docusate (PERICOLACE) 8.6-50 mg per tablet 2 Tab    LORazepam (ATIVAN) tablet 0.5 mg    mirtazapine (REMERON) tablet 15 mg    hydrALAZINE (APRESOLINE) tablet 50 mg    cefTRIAXone (ROCEPHIN) 1 g in 0.9% sodium chloride (MBP/ADV) 50 mL MBP       Visit Vitals  BP (!) 116/50 (BP 1 Location: Right arm, BP Patient Position: At rest)   Pulse 74   Temp 97.3 °F (36.3 °C)   Resp 12   Ht 5' 1\" (1.549 m)   Wt 116 lb (52.6 kg)   SpO2 95%   BMI 21.92 kg/m²       Physical Examination  Physical Exam  Constitutional:       Appearance: Normal appearance. She is obese. She is not ill-appearing. Cardiovascular:      Rate and Rhythm: Normal rate and regular rhythm. Pulmonary:      Effort: Pulmonary effort is normal.   Abdominal:      General: Abdomen is flat. Palpations: Abdomen is soft. Skin:     General: Skin is warm and dry. Neurological:      Mental Status: She is alert. Psychiatric:         Mood and Affect: Mood normal.         Behavior: Behavior normal.         Assessment/Plan:  ESRD, per request from nephrology will remove PD catheter tomorrow in the OR   Preop orders written. Risks/benefits discussed with the patient and all questions answered.     Adeola Friedman NP

## 2020-11-24 NOTE — ANESTHESIA PREPROCEDURE EVALUATION
Relevant Problems   No relevant active problems       Anesthetic History     PONV          Review of Systems / Medical History  Patient summary reviewed and pertinent labs reviewed    Pulmonary    COPD: mild      Smoker         Neuro/Psych       CVA (left-sided weakness)  Psychiatric history     Cardiovascular    Hypertension: well controlled          Past MI, CAD, PAD and cardiac stents (x 1 2012)    Exercise tolerance: <4 METS  Comments: EF 65%  11/20    Takes coumadin 2.5 mg and bASA (bedtime)   GI/Hepatic/Renal     GERD    Renal disease (has been doing PD; had HD today 11/24): ESRD       Endo/Other        Anemia (7.7 hgb)     Other Findings            Physical Exam    Airway  Mallampati: II  TM Distance: 4 - 6 cm  Neck ROM: normal range of motion   Mouth opening: Normal     Cardiovascular  Regular rate and rhythm,  S1 and S2 normal,  no murmur, click, rub, or gallop  Rhythm: regular  Rate: normal         Dental    Dentition: Full lower dentures and Full upper dentures     Pulmonary  Breath sounds clear to auscultation               Abdominal  Abdominal exam normal       Other Findings            Anesthetic Plan    ASA: 4  Anesthesia type: general          Induction: Intravenous  Anesthetic plan and risks discussed with: Patient      For PD catheter removal.    Type and Cross. 1 unit PRBC ordered.

## 2020-11-24 NOTE — PROGRESS NOTES
Problem: Falls - Risk of  Goal: *Absence of Falls  Description: Document Pop Aponte Fall Risk and appropriate interventions in the flowsheet. Outcome: Progressing Towards Goal  Note: Fall Risk Interventions:  Mobility Interventions: Communicate number of staff needed for ambulation/transfer         Medication Interventions: Evaluate medications/consider consulting pharmacy, Patient to call before getting OOB, Teach patient to arise slowly    Elimination Interventions: Call light in reach, Patient to call for help with toileting needs, Stay With Me (per policy), Toilet paper/wipes in reach, Toileting schedule/hourly rounds              Problem: Patient Education: Go to Patient Education Activity  Goal: Patient/Family Education  Outcome: Progressing Towards Goal     Problem: Pressure Injury - Risk of  Goal: *Prevention of pressure injury  Description: Document Izaiah Scale and appropriate interventions in the flowsheet.   Outcome: Progressing Towards Goal  Note: Pressure Injury Interventions:       Moisture Interventions: Absorbent underpads, Apply protective barrier, creams and emollients, Check for incontinence Q2 hours and as needed, Maintain skin hydration (lotion/cream), Minimize layers, Moisture barrier    Activity Interventions: Assess need for specialty bed, Increase time out of bed, Pressure redistribution bed/mattress(bed type)    Mobility Interventions: Assess need for specialty bed, HOB 30 degrees or less, Pressure redistribution bed/mattress (bed type)    Nutrition Interventions: Document food/fluid/supplement intake                     Problem: Patient Education: Go to Patient Education Activity  Goal: Patient/Family Education  Outcome: Progressing Towards Goal     Problem: Diarrhea (Adult and Pediatrics)  Goal: *Absence of diarrhea  Outcome: Resolved/Met  Goal: *PALLIATIVE CARE:  Absence of diarrhea  Outcome: Resolved/Met     Problem: Patient Education: Go to Patient Education Activity  Goal: Patient/Family Education  Outcome: Resolved/Met     Problem: Patient Education: Go to Patient Education Activity  Goal: Patient/Family Education  Outcome: Progressing Towards Goal     Problem: Patient Education: Go to Patient Education Activity  Goal: Patient/Family Education  Outcome: Progressing Towards Goal

## 2020-11-24 NOTE — PROGRESS NOTES
Progress Note    2020  Admit Date: 2020 12:37 PM   NAME: Isaac Merlos   :  1950   MRN:  [id number]   Attending: Nicola Suazo MD  PCP:  Tess Godfrey MD  Treatment Team: Attending Provider: Nicola Suazo MD; Consulting Provider: Jose Raul Garcia MD; Nurse Practitioner: Tra Abrams NP; Care Manager: Tanvi Farley, RN; Utilization Review: Jessica Johnson RN; Consulting Provider: Lidia Adhikari MD; Primary Nurse: Fadi Berry; Occupational Therapist: Stacey Marks OT    Full Code   SUBJECTIVE:   Ms. 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 Poor po intake and weight loss. Pt found to have UTI on UA. Started on rocephin. Nephrology consulted, will need to switch to HD from PD. Poor prognosis, Hospice candidate per Nephrology. s/p right TCC today and Vascular for PD cath removal tomorrow. Reports n/v better.       Past Medical History:   Diagnosis Date    Anemia     denies hx of blood transfusion    Arrhythmia     murmur; sinus tach    Aspirin long-term use SINAN    continue    CAD (coronary artery disease)     MI, 1 stent -    Chronic kidney disease     peritoneal dialysis qPM    Chronic obstructive pulmonary disease (HCC)     no inhalers; no treatment; smokes 1 ppd since age 19    CKD (chronic kidney disease) stage 3, GFR 30-59 ml/min           ESRD on peritoneal dialysis ([facility]     Flat affect     GERD (gastroesophageal reflux disease)     resolved per patient; no current meds 19    Heart murmur not followed per cardiology    EF 65-70 %-- on echo 2017    History of MI (myocardial infarction) 2012    stents x 1---- per pt-- does not see a cardiologist    Hx of colonic polyps 2016    adenoma    Hydronephrosis     Hypertension     daily meds    Left-sided weakness 2012    left leg r/t cva    Loss of appetite     states within the last several months    Peritoneal dialysis catheter in place [facility])     patient does dialysis QPM    Poor historian     Renal atrophy, right     Renal insufficiency     ? --- pt unsure if sees nephrologist    Smoker     46 yr hx/ 1 pk per day    Smoker     1 ppd sicne age 19    Stented coronary artery Xience SINAN    proximal LAD 2012-- pt states does not see a cardio    Stroke ([address] 2012    with slight left  sided weakness residual     Recent Results (from the past 24 hour(s))   PTT    Collection Time: 11/23/20  3:38 PM   Result Value Ref Range    aPTT 32.0 24.1 - 35.1 SEC   PROTHROMBIN TIME + INR    Collection Time: 11/23/20  3:38 PM   Result Value Ref Range    Prothrombin time 20.3 (H) 12.5 - 14.7 sec    INR 1.7     PLEASE READ & DOCUMENT PPD TEST IN 24 HRS    Collection Time: 11/23/20  9:50 PM   Result Value Ref Range    PPD Negative Negative    mm Induration 0 0 - 5 mm   CBC WITH AUTOMATED DIFF    Collection Time: 11/24/20  6:20 AM   Result Value Ref Range    WBC 4.7 4.3 - 11.1 K/uL    RBC 2.43 (L) 4.05 - 5.2 M/uL    HGB 7.7 (L) 11.7 - 15.4 g/dL    HCT 23.9 (L) 35.8 - 46.3 %    MCV 98.4 (H) 79.6 - 97.8 FL    MCH 31.7 26.1 - 32.9 PG    MCHC 32.2 31.4 - 35.0 g/dL    RDW 14.1 11.9 - 14.6 %    PLATELET 591 (L) 809 - 450 K/uL    MPV 12.1 9.4 - 12.3 FL    ABSOLUTE NRBC 0.00 0.0 - 0.2 K/uL    DF AUTOMATED      NEUTROPHILS 45 43 - 78 %    LYMPHOCYTES 40 13 - 44 %    MONOCYTES 9 4.0 - 12.0 %    EOSINOPHILS 6 0.5 - 7.8 %    BASOPHILS 1 0.0 - 2.0 %    IMMATURE GRANULOCYTES 0 0.0 - 5.0 %    ABS. NEUTROPHILS 2.1 1.7 - 8.2 K/UL    ABS. LYMPHOCYTES 1.9 0.5 - 4.6 K/UL    ABS. MONOCYTES 0.4 0.1 - 1.3 K/UL    ABS. EOSINOPHILS 0.3 0.0 - 0.8 K/UL    ABS. BASOPHILS 0.0 0.0 - 0.2 K/UL    ABS. IMM.  GRANS. 0.0 0.0 - 0.5 K/UL   METABOLIC PANEL, BASIC    Collection Time: 11/24/20  6:20 AM   Result Value Ref Range    Sodium 144 138 - 145 mmol/L    Potassium 3.9 3.5 - 5.1 mmol/L    Chloride 114 (H) 98 - 107 mmol/L    CO2 20 (L) 21 - 32 mmol/L    Anion gap 10 7 - 16 mmol/L    Glucose 70 65 - 100 mg/dL    BUN 27 (H) 8 - 23 MG/DL    Creatinine 12.00 (H) 0.6 - 1.0 MG/DL    GFR est AA 4 (L) >60 ml/min/1.73m2    GFR est non-AA 3 (L) >60 ml/min/1.73m2    Calcium 7.1 (L) 8.3 - 10.4 MG/DL   PROTHROMBIN TIME + INR    Collection Time: 11/24/20  6:20 AM   Result Value Ref Range    Prothrombin time 20.9 (H) 12.5 - 14.7 sec    INR 1.8       Allergies   Allergen Reactions    Codeine Nausea and Vomiting    Hydralazine Nausea and Vomiting     Current Facility-Administered Medications   Medication Dose Route Frequency Provider Last Rate Last Dose    warfarin (COUMADIN) tablet 2.5 mg  2.5 mg Oral QPM Mendoza Mac MD   Stopped at 11/23/20 1800    aspirin delayed-release tablet 81 mg  81 mg Oral QHS Mendoza Mac MD   81 mg at 11/23/20 2112    metoprolol succinate (TOPROL-XL) XL tablet 25 mg  25 mg Oral DAILY Mendoza Mac MD   25 mg at 11/24/20 1423    potassium chloride (K-DUR, KLOR-CON) SR tablet 20 mEq  20 mEq Oral DAILY Mendoza Mac MD   20 mEq at 11/24/20 1423    0.9% sodium chloride infusion  75 mL/hr IntraVENous CONTINUOUS Mendoza Mac MD 75 mL/hr at 11/24/20 1407 75 mL/hr at 11/24/20 1407    sodium chloride (NS) flush 5-40 mL  5-40 mL IntraVENous Q8H Mendoza Mac MD   10 mL at 11/24/20 1423    sodium chloride (NS) flush 5-40 mL  5-40 mL IntraVENous PRN Mendoza Mac MD   10 mL at 11/23/20 0856    potassium chloride 10 mEq in 100 ml IVPB  10 mEq IntraVENous PRN Mendoza Mac MD        magnesium sulfate 2 g/50 ml IVPB (premix or compounded)  2 g IntraVENous PRN Mendoza Mac MD        acetaminophen (TYLENOL) tablet 650 mg  650 mg Oral Q6H PRN Mendoza Mac MD        Or    acetaminophen (TYLENOL) suppository 650 mg  650 mg Rectal Q6H PRN Mendoza Mac MD        polyethylene glycol (MIRALAX) packet 17 g  17 g Oral DAILY PRN Doug Shields MD        bisacodyL (DULCOLAX) suppository 10 mg  10 mg Rectal DAILY PRN Doug Shields MD        famotidine (PEPCID) tablet 20 mg  20 mg Oral DAILY PRN Doug Shields MD        promethazine [facility]) with saline injection 25 mg  25 mg IntraVENous Q6H PRN Doug Shields MD        ondansetron (ZOFRAN ODT) tablet 4 mg  4 mg Oral Q8H PRN Doug Shields MD        Or    ondansetron [facility]) injection 4 mg  4 mg IntraVENous Q6H PRN Doug Shields MD   4 mg at 11/23/20 9721    guaiFENesin ER ([address]) tablet 1,200 mg  1,200 mg Oral BID PRN Doug Shields MD        alum-mag [facility]) oral suspension 30 mL  30 mL Oral Q4H PRN Doug Shields MD        oxyCODONE IR (ROXICODONE) tablet 5 mg  5 mg Oral Q4H PRN Doug Shields MD   5 mg at 11/23/20 1135    oxyCODONE IR (ROXICODONE) tablet 10 mg  10 mg Oral Q4H PRN Doug Shields MD        zolpidem [facility]) tablet 5 mg  5 mg Oral QHS PRN Doug Shields MD        guaiFENesin-dextromethorphan [facility]) 100-10 mg/5 mL syrup 10 mL  10 mL Oral Q4H PRN Doug Shields MD        senna-docusate (PERICOLACE) 8.6-50 mg per tablet 2 Tab  2 Tab Oral DAILY PRN Doug Shields MD        LORazepam (ATIVAN) tablet 0.5 mg  0.5 mg Oral Q4H PRN Doug Shields MD        mirtazapine (REMERON) tablet 15 mg  15 mg Oral QHS PRN Doug Shields MD        hydrALAZINE (APRESOLINE) tablet 50 mg  50 mg Oral QID PRN Doug Shields MD        cefTRIAXone (ROCEPHIN) 1 g in 0.9% sodium chloride (MBP/ADV) 50 mL MBP  1 g IntraVENous Q24H Doug Shields MD   Stopped at 11/24/20 0700       Review of Systems negative with exception of pertinent positives noted above  PHYSICAL EXAM     Visit Vitals  BP (!) 111/52   Pulse 83   Temp 97.9 °F (36.6 °C)   Resp 12   Ht 5' 1\" (1.549 m)   Wt 52.6 kg (116 lb)   SpO2 100%   BMI 21.92 kg/m²      Temp (24hrs), Av.4 °F (36.9 °C), Min:97.9 °F (36.6 °C), Max:98.9 °F (37.2 °C)    Oxygen Therapy  O2 Sat (%): 100 % (20 0915)  Pulse via Oximetry: 92 beats per minute (20 1801)  O2 Device: Room air (20 0915)    Intake/Output Summary (Last 24 hours) at 2020 1451  Last data filed at 2020 1307  Gross per 24 hour   Intake 1154.97 ml   Output 10 ml   Net 1144.97 ml      General:       No acute distress. Appears chronically ill    Lungs:          CTA bilaterally. Resp even and nonlabored  Heart:            S1S2 present without murmurs rubs gallops. RRR. No LE edema  Abdomen:    Soft, non tender, non distended. BS present. PD cath present  Extremities: Moves ext spontaneously. No cyanosis  Neurologic:  A/O X3.   No focal deficits       Results summary of Diagnostic Studies/Procedures copied from within [facility] EMR:        Sebastien Leung [address] Problems    Diagnosis Date Noted    Nausea vomiting and diarrhea 2020    ESRD on [facility] dialysis ([facility] 2019    PAD (peripheral artery disease) ([facility] 2019    History of right-sided carotid endarterectomy 2018    Pure hypercholesterolemia 2017    UTI (urinary tract infection) 11/10/2017    S/P coronary artery stent placement 2017    History of CVA (cerebrovascular accident) 2016    Anemia of renal disease 2016     Plan:    UTI  unclear why her urine is still showing UTI after taking vantin and flagyl as outpatient  continue rocephin  cdiff neg  CXR no changes from prior, unremarkable     ESRD on PD  nephrology consulted  S/p TCC today, transitioning from PD to HD  Vascular to d/c PD cath tomorrow         Splenic infarcts, subtherapeutic INR  Pharmacy to dose warfarin     Hypokalemia  Replaced, resolved  BMP in AM              Notes, labs, VS, diagnostic testing reviewed       Plan of Care Discussed with: Supervising MD Dr. Mason Canseco, care team, pt      Chiqui Abdi, NP

## 2020-11-24 NOTE — DIALYSIS
TRANSFER OUT- DIALYSIS    Hemodialysis treatment completed without complications. Patient alert and VS stable  /52  P 83       0 Kgs removed. Flushed both ports with 10 mL of NS.  CVC dressing clean, dry, and intact, tego caps intact, bilateral lumens wrapped with 4x4 gauze. Meds given-None. No units of RBCs given during dialysis. Patient to  after dialysis.

## 2020-11-24 NOTE — PROGRESS NOTES
Perfect serve message sent to Baldomero Alvarado NP, informing order received from [place], [place] in IR to hold Coumadin this evening, pt for tunneled cath placement tomorrow.

## 2020-11-25 ENCOUNTER — ANESTHESIA (OUTPATIENT)
Dept: SURGERY | Age: 70
DRG: 981 | End: 2020-11-25
Payer: MEDICARE

## 2020-11-25 LAB
ANION GAP SERPL CALC-SCNC: 6 MMOL/L (ref 7–16)
BACTERIA SPEC CULT: NORMAL
BASOPHILS # BLD: 0 K/UL (ref 0–0.2)
BASOPHILS NFR BLD: 1 % (ref 0–2)
BUN SERPL-MCNC: 12 MG/DL (ref 8–23)
CALCIUM SERPL-MCNC: 7 MG/DL (ref 8.3–10.4)
CHLORIDE SERPL-SCNC: 109 MMOL/L (ref 98–107)
CO2 SERPL-SCNC: 27 MMOL/L (ref 21–32)
CREAT SERPL-MCNC: 5.85 MG/DL (ref 0.6–1)
DIFFERENTIAL METHOD BLD: ABNORMAL
EOSINOPHIL # BLD: 0.2 K/UL (ref 0–0.8)
EOSINOPHIL NFR BLD: 5 % (ref 0.5–7.8)
ERYTHROCYTE [DISTWIDTH] IN BLOOD BY AUTOMATED COUNT: 14.1 % (ref 11.9–14.6)
GLUCOSE SERPL-MCNC: 75 MG/DL (ref 65–100)
HAV IGM SER QL: NONREACTIVE
HBV CORE IGM SER QL: NONREACTIVE
HBV SURFACE AG SER QL: NONREACTIVE
HCT VFR BLD AUTO: 22.4 % (ref 35.8–46.3)
HCT VFR BLD AUTO: 32 % (ref 35.8–46.3)
HCV AB SER QL: NONREACTIVE
HGB BLD-MCNC: 10.2 G/DL (ref 11.7–15.4)
HGB BLD-MCNC: 7.3 G/DL (ref 11.7–15.4)
HISTORY CHECKED?,CKHIST: NORMAL
IMM GRANULOCYTES # BLD AUTO: 0 K/UL (ref 0–0.5)
IMM GRANULOCYTES NFR BLD AUTO: 1 % (ref 0–5)
INR PPP: 1.7
LYMPHOCYTES # BLD: 1.5 K/UL (ref 0.5–4.6)
LYMPHOCYTES NFR BLD: 37 % (ref 13–44)
MCH RBC QN AUTO: 31.5 PG (ref 26.1–32.9)
MCHC RBC AUTO-ENTMCNC: 32.6 G/DL (ref 31.4–35)
MCV RBC AUTO: 96.6 FL (ref 79.6–97.8)
MONOCYTES # BLD: 0.4 K/UL (ref 0.1–1.3)
MONOCYTES NFR BLD: 9 % (ref 4–12)
NEUTS SEG # BLD: 1.9 K/UL (ref 1.7–8.2)
NEUTS SEG NFR BLD: 47 % (ref 43–78)
NRBC # BLD: 0 K/UL (ref 0–0.2)
PLATELET # BLD AUTO: 127 K/UL (ref 150–450)
PMV BLD AUTO: 12.2 FL (ref 9.4–12.3)
POTASSIUM SERPL-SCNC: 4.1 MMOL/L (ref 3.5–5.1)
PROTHROMBIN TIME: 20 SEC (ref 12.5–14.7)
RBC # BLD AUTO: 2.32 M/UL (ref 4.05–5.2)
SERVICE CMNT-IMP: NORMAL
SODIUM SERPL-SCNC: 142 MMOL/L (ref 138–145)
WBC # BLD AUTO: 3.9 K/UL (ref 4.3–11.1)

## 2020-11-25 PROCEDURE — 74011000250 HC RX REV CODE- 250: Performed by: NURSE ANESTHETIST, CERTIFIED REGISTERED

## 2020-11-25 PROCEDURE — 77030003028 HC SUT VCRL J&J -A: Performed by: SURGERY

## 2020-11-25 PROCEDURE — 74011250636 HC RX REV CODE- 250/636: Performed by: INTERNAL MEDICINE

## 2020-11-25 PROCEDURE — P9016 RBC LEUKOCYTES REDUCED: HCPCS

## 2020-11-25 PROCEDURE — 85025 COMPLETE CBC W/AUTO DIFF WBC: CPT

## 2020-11-25 PROCEDURE — 77030040922 HC BLNKT HYPOTHRM STRY -A: Performed by: STUDENT IN AN ORGANIZED HEALTH CARE EDUCATION/TRAINING PROGRAM

## 2020-11-25 PROCEDURE — 36415 COLL VENOUS BLD VENIPUNCTURE: CPT

## 2020-11-25 PROCEDURE — 0WPG03Z REMOVAL OF INFUSION DEVICE FROM PERITONEAL CAVITY, OPEN APPROACH: ICD-10-PCS | Performed by: SURGERY

## 2020-11-25 PROCEDURE — 85018 HEMOGLOBIN: CPT

## 2020-11-25 PROCEDURE — 86923 COMPATIBILITY TEST ELECTRIC: CPT

## 2020-11-25 PROCEDURE — 76060000032 HC ANESTHESIA 0.5 TO 1 HR: Performed by: SURGERY

## 2020-11-25 PROCEDURE — 86900 BLOOD TYPING SEROLOGIC ABO: CPT

## 2020-11-25 PROCEDURE — 76210000006 HC OR PH I REC 0.5 TO 1 HR: Performed by: SURGERY

## 2020-11-25 PROCEDURE — 80048 BASIC METABOLIC PNL TOTAL CA: CPT

## 2020-11-25 PROCEDURE — 30233N1 TRANSFUSION OF NONAUTOLOGOUS RED BLOOD CELLS INTO PERIPHERAL VEIN, PERCUTANEOUS APPROACH: ICD-10-PCS | Performed by: STUDENT IN AN ORGANIZED HEALTH CARE EDUCATION/TRAINING PROGRAM

## 2020-11-25 PROCEDURE — 65270000029 HC RM PRIVATE

## 2020-11-25 PROCEDURE — 74011250637 HC RX REV CODE- 250/637: Performed by: INTERNAL MEDICINE

## 2020-11-25 PROCEDURE — 85610 PROTHROMBIN TIME: CPT

## 2020-11-25 PROCEDURE — 77030010509 HC AIRWY LMA MSK TELE -A: Performed by: STUDENT IN AN ORGANIZED HEALTH CARE EDUCATION/TRAINING PROGRAM

## 2020-11-25 PROCEDURE — 2709999900 HC NON-CHARGEABLE SUPPLY: Performed by: SURGERY

## 2020-11-25 PROCEDURE — 74011250636 HC RX REV CODE- 250/636: Performed by: NURSE ANESTHETIST, CERTIFIED REGISTERED

## 2020-11-25 PROCEDURE — 80074 ACUTE HEPATITIS PANEL: CPT

## 2020-11-25 PROCEDURE — 76010000160 HC OR TIME 0.5 TO 1 HR INTENSV-TIER 1: Performed by: SURGERY

## 2020-11-25 PROCEDURE — 36430 TRANSFUSION BLD/BLD COMPNT: CPT

## 2020-11-25 PROCEDURE — 77030031139 HC SUT VCRL2 J&J -A: Performed by: SURGERY

## 2020-11-25 RX ORDER — HYDROMORPHONE HYDROCHLORIDE 2 MG/ML
0.5 INJECTION, SOLUTION INTRAMUSCULAR; INTRAVENOUS; SUBCUTANEOUS
Status: DISCONTINUED | OUTPATIENT
Start: 2020-11-25 | End: 2020-11-25 | Stop reason: HOSPADM

## 2020-11-25 RX ORDER — NALOXONE HYDROCHLORIDE 0.4 MG/ML
0.1 INJECTION, SOLUTION INTRAMUSCULAR; INTRAVENOUS; SUBCUTANEOUS AS NEEDED
Status: DISCONTINUED | OUTPATIENT
Start: 2020-11-25 | End: 2020-11-25 | Stop reason: HOSPADM

## 2020-11-25 RX ORDER — PROPOFOL 10 MG/ML
INJECTION, EMULSION INTRAVENOUS AS NEEDED
Status: DISCONTINUED | OUTPATIENT
Start: 2020-11-25 | End: 2020-11-25 | Stop reason: HOSPADM

## 2020-11-25 RX ORDER — SODIUM CHLORIDE, SODIUM LACTATE, POTASSIUM CHLORIDE, CALCIUM CHLORIDE 600; 310; 30; 20 MG/100ML; MG/100ML; MG/100ML; MG/100ML
100 INJECTION, SOLUTION INTRAVENOUS CONTINUOUS
Status: DISCONTINUED | OUTPATIENT
Start: 2020-11-25 | End: 2020-11-25 | Stop reason: HOSPADM

## 2020-11-25 RX ORDER — FLUMAZENIL 0.1 MG/ML
0.2 INJECTION INTRAVENOUS
Status: DISCONTINUED | OUTPATIENT
Start: 2020-11-25 | End: 2020-11-25 | Stop reason: HOSPADM

## 2020-11-25 RX ORDER — OXYCODONE HYDROCHLORIDE 5 MG/1
5 TABLET ORAL
Status: DISCONTINUED | OUTPATIENT
Start: 2020-11-25 | End: 2020-11-25 | Stop reason: HOSPADM

## 2020-11-25 RX ORDER — SODIUM CHLORIDE 0.9 % (FLUSH) 0.9 %
5-40 SYRINGE (ML) INJECTION AS NEEDED
Status: DISCONTINUED | OUTPATIENT
Start: 2020-11-25 | End: 2020-11-25 | Stop reason: HOSPADM

## 2020-11-25 RX ORDER — LIDOCAINE HYDROCHLORIDE 10 MG/ML
0.1 INJECTION INFILTRATION; PERINEURAL AS NEEDED
Status: DISCONTINUED | OUTPATIENT
Start: 2020-11-25 | End: 2020-11-25 | Stop reason: HOSPADM

## 2020-11-25 RX ORDER — SODIUM CHLORIDE 0.9 % (FLUSH) 0.9 %
5-40 SYRINGE (ML) INJECTION EVERY 8 HOURS
Status: DISCONTINUED | OUTPATIENT
Start: 2020-11-25 | End: 2020-11-25 | Stop reason: HOSPADM

## 2020-11-25 RX ORDER — FENTANYL CITRATE 50 UG/ML
INJECTION, SOLUTION INTRAMUSCULAR; INTRAVENOUS AS NEEDED
Status: DISCONTINUED | OUTPATIENT
Start: 2020-11-25 | End: 2020-11-25 | Stop reason: HOSPADM

## 2020-11-25 RX ORDER — SODIUM CHLORIDE 9 MG/ML
250 INJECTION, SOLUTION INTRAVENOUS AS NEEDED
Status: DISCONTINUED | OUTPATIENT
Start: 2020-11-25 | End: 2020-11-29 | Stop reason: HOSPADM

## 2020-11-25 RX ORDER — ONDANSETRON 2 MG/ML
INJECTION INTRAMUSCULAR; INTRAVENOUS AS NEEDED
Status: DISCONTINUED | OUTPATIENT
Start: 2020-11-25 | End: 2020-11-25 | Stop reason: HOSPADM

## 2020-11-25 RX ORDER — LIDOCAINE HYDROCHLORIDE 20 MG/ML
INJECTION, SOLUTION EPIDURAL; INFILTRATION; INTRACAUDAL; PERINEURAL AS NEEDED
Status: DISCONTINUED | OUTPATIENT
Start: 2020-11-25 | End: 2020-11-25 | Stop reason: HOSPADM

## 2020-11-25 RX ORDER — DIPHENHYDRAMINE HYDROCHLORIDE 50 MG/ML
12.5 INJECTION, SOLUTION INTRAMUSCULAR; INTRAVENOUS
Status: DISCONTINUED | OUTPATIENT
Start: 2020-11-25 | End: 2020-11-25 | Stop reason: HOSPADM

## 2020-11-25 RX ADMIN — WARFARIN SODIUM 2.5 MG: 2 TABLET ORAL at 18:01

## 2020-11-25 RX ADMIN — OXYCODONE HYDROCHLORIDE 5 MG: 5 TABLET ORAL at 11:41

## 2020-11-25 RX ADMIN — SODIUM CHLORIDE 75 ML/HR: 900 INJECTION, SOLUTION INTRAVENOUS at 07:35

## 2020-11-25 RX ADMIN — Medication 10 ML: at 05:46

## 2020-11-25 RX ADMIN — METOPROLOL SUCCINATE 25 MG: 25 TABLET, EXTENDED RELEASE ORAL at 09:06

## 2020-11-25 RX ADMIN — FENTANYL CITRATE 25 MCG: 50 INJECTION INTRAMUSCULAR; INTRAVENOUS at 09:37

## 2020-11-25 RX ADMIN — PHENYLEPHRINE HYDROCHLORIDE 60 MCG: 10 INJECTION INTRAVENOUS at 09:57

## 2020-11-25 RX ADMIN — PHENYLEPHRINE HYDROCHLORIDE 60 MCG: 10 INJECTION INTRAVENOUS at 09:39

## 2020-11-25 RX ADMIN — OXYCODONE HYDROCHLORIDE 5 MG: 5 TABLET ORAL at 17:57

## 2020-11-25 RX ADMIN — ONDANSETRON 4 MG: 2 INJECTION INTRAMUSCULAR; INTRAVENOUS at 09:40

## 2020-11-25 RX ADMIN — ASPIRIN 81 MG: 81 TABLET, COATED ORAL at 21:21

## 2020-11-25 RX ADMIN — FENTANYL CITRATE 25 MCG: 50 INJECTION INTRAMUSCULAR; INTRAVENOUS at 09:39

## 2020-11-25 RX ADMIN — PHENYLEPHRINE HYDROCHLORIDE 120 MCG: 10 INJECTION INTRAVENOUS at 09:59

## 2020-11-25 RX ADMIN — PHENYLEPHRINE HYDROCHLORIDE 60 MCG: 10 INJECTION INTRAVENOUS at 09:51

## 2020-11-25 RX ADMIN — PHENYLEPHRINE HYDROCHLORIDE 60 MCG: 10 INJECTION INTRAVENOUS at 09:31

## 2020-11-25 RX ADMIN — PROPOFOL 80 MG: 10 INJECTION, EMULSION INTRAVENOUS at 09:25

## 2020-11-25 RX ADMIN — PROPOFOL 20 MG: 10 INJECTION, EMULSION INTRAVENOUS at 09:39

## 2020-11-25 RX ADMIN — LIDOCAINE HYDROCHLORIDE 60 MG: 20 INJECTION, SOLUTION EPIDURAL; INFILTRATION; INTRACAUDAL; PERINEURAL at 09:25

## 2020-11-25 RX ADMIN — Medication 10 ML: at 13:58

## 2020-11-25 RX ADMIN — POTASSIUM CHLORIDE 20 MEQ: 20 TABLET, EXTENDED RELEASE ORAL at 12:13

## 2020-11-25 RX ADMIN — Medication 10 ML: at 21:21

## 2020-11-25 RX ADMIN — EPOETIN ALFA-EPBX 14000 UNITS: 10000 INJECTION, SOLUTION INTRAVENOUS; SUBCUTANEOUS at 18:30

## 2020-11-25 NOTE — BRIEF OP NOTE
Brief Postoperative Note    Patient: Roz Arreola  YOB: 1950  MRN: [id number]    Date of Procedure: 11/25/2020     Pre-Op Diagnosis: ESRD (end stage renal disease) ([facility] [N18.6]    Post-Op Diagnosis: Same as preoperative diagnosis.       Procedure(s):  PERITONEAL DIALYSIS CATHETER REMOVAL    Surgeon(s):  Rangel Tamayo MD    Surgical Assistant: None    Anesthesia: General     Estimated Blood Loss (mL): less than 50     Complications: None    Specimens: * No specimens in log *     Implants: * No implants in log *    Drains: * No LDAs found *    Findings:     Electronically Signed by Tamara Galvez MD on 11/25/2020 at 10:18 AM

## 2020-11-25 NOTE — PROGRESS NOTES
Transfusion complete at 16:15 - error entry made for 11:46. This note is to confirm the blood administration completed at 16:15.

## 2020-11-25 NOTE — PROGRESS NOTES
Problem: Falls - Risk of  Goal: *Absence of Falls  Description: Document Mora Fields Fall Risk and appropriate interventions in the flowsheet. Outcome: Progressing Towards Goal  Note: Fall Risk Interventions:  Mobility Interventions: Assess mobility with egress test         Medication Interventions: Evaluate medications/consider consulting pharmacy    Elimination Interventions: Call light in reach              Problem: Patient Education: Go to Patient Education Activity  Goal: Patient/Family Education  Outcome: Progressing Towards Goal     Problem: Pressure Injury - Risk of  Goal: *Prevention of pressure injury  Description: Document Izaiah Scale and appropriate interventions in the flowsheet.   Outcome: Progressing Towards Goal  Note: Pressure Injury Interventions:  Sensory Interventions: Assess changes in LOC    Moisture Interventions: Absorbent underpads    Activity Interventions: Assess need for specialty bed    Mobility Interventions: Assess need for specialty bed    Nutrition Interventions: Document food/fluid/supplement intake                     Problem: Patient Education: Go to Patient Education Activity  Goal: Patient/Family Education  Outcome: Progressing Towards Goal

## 2020-11-25 NOTE — PROGRESS NOTES
TRANSFER - OUT REPORT:    Verbal report given to RN on [place] Ford  being transferred to PreOp for ordered procedure       Report consisted of patients Situation, Background, Assessment and   Recommendations(SBAR). Information from the following report(s) SBAR was reviewed with the receiving nurse. Lines:   Peripheral IV 11/24/20 Right Wrist (Active)   Site Assessment Clean, dry, & intact 11/25/20 0315   Phlebitis Assessment 0 11/25/20 0315   Infiltration Assessment 0 11/25/20 0315   Dressing Status Clean, dry, & intact 11/25/20 0315   Dressing Type Transparent;Tape 11/25/20 0315   Hub Color/Line Status Infusing 11/25/20 0315        Opportunity for questions and clarification was provided.       Patient transported with:   [employer]

## 2020-11-25 NOTE — PROGRESS NOTES
Enid Liz  Admission Date: 11/22/2020             Renal Daily Progress Note: 11/25/2020      Subjective:       ROS:  General: no fever/chills, appetite ok  CV: no CP  Lung: no SOB, no cough  GI: abd pain   Ext: no edema    Current Facility-Administered Medications   Medication Dose Route Frequency    0.9% sodium chloride infusion 250 mL  250 mL IntraVENous PRN    warfarin (COUMADIN) tablet 2.5 mg  2.5 mg Oral QPM    aspirin delayed-release tablet 81 mg  81 mg Oral QHS    metoprolol succinate (TOPROL-XL) XL tablet 25 mg  25 mg Oral DAILY    potassium chloride (K-DUR, KLOR-CON) SR tablet 20 mEq  20 mEq Oral DAILY    0.9% sodium chloride infusion  75 mL/hr IntraVENous CONTINUOUS    sodium chloride (NS) flush 5-40 mL  5-40 mL IntraVENous Q8H    sodium chloride (NS) flush 5-40 mL  5-40 mL IntraVENous PRN    potassium chloride 10 mEq in 100 ml IVPB  10 mEq IntraVENous PRN    magnesium sulfate 2 g/50 ml IVPB (premix or compounded)  2 g IntraVENous PRN    acetaminophen (TYLENOL) tablet 650 mg  650 mg Oral Q6H PRN    Or    acetaminophen (TYLENOL) suppository 650 mg  650 mg Rectal Q6H PRN    polyethylene glycol (MIRALAX) packet 17 g  17 g Oral DAILY PRN    bisacodyL (DULCOLAX) suppository 10 mg  10 mg Rectal DAILY PRN    famotidine (PEPCID) tablet 20 mg  20 mg Oral DAILY PRN    promethazine (PHENERGAN) with saline injection 25 mg  25 mg IntraVENous Q6H PRN    ondansetron (ZOFRAN ODT) tablet 4 mg  4 mg Oral Q8H PRN    Or    ondansetron (ZOFRAN) injection 4 mg  4 mg IntraVENous Q6H PRN    guaiFENesin ER (MUCINEX) tablet 1,200 mg  1,200 mg Oral BID PRN    alum-mag hydroxide-simeth (MYLANTA) oral suspension 30 mL  30 mL Oral Q4H PRN    oxyCODONE IR (ROXICODONE) tablet 5 mg  5 mg Oral Q4H PRN    oxyCODONE IR (ROXICODONE) tablet 10 mg  10 mg Oral Q4H PRN    zolpidem (AMBIEN) tablet 5 mg  5 mg Oral QHS PRN    guaiFENesin-dextromethorphan (ROBITUSSIN DM) 100-10 mg/5 mL syrup 10 mL  10 mL Oral Q4H PRN    senna-docusate (PERICOLACE) 8.6-50 mg per tablet 2 Tab  2 Tab Oral DAILY PRN    LORazepam (ATIVAN) tablet 0.5 mg  0.5 mg Oral Q4H PRN    mirtazapine (REMERON) tablet 15 mg  15 mg Oral QHS PRN    hydrALAZINE (APRESOLINE) tablet 50 mg  50 mg Oral QID PRN    cefTRIAXone (ROCEPHIN) 1 g in 0.9% sodium chloride (MBP/ADV) 50 mL MBP  1 g IntraVENous Q24H         Objective:     Vitals:    11/25/20 1053 11/25/20 1059 11/25/20 1104 11/25/20 1146   BP: (!) 120/59 135/61 (!) 108/53 (!) 131/59   Pulse: 68 64 72 66   Resp: 16 15 16 16   Temp: 98 °F (36.7 °C)   97.5 °F (36.4 °C)   SpO2: 100% 100% 100% 100%   Weight:       Height:         Intake and Output:   11/23 1901 - 11/25 0700  In: 1155 [I.V.:1155]  Out: 10 [Urine:10]  11/25 0701 - 11/25 1900  In: 300 [I.V.:300]  Out: 3     Physical Exam:   Constitutional:  the patient is well developed and in no acute distress, alert and oriented   HEENT:  Sclera clear, pupils equal, oral mucosa moist  Lungs: clear bilaterally   Cardiovascular:  Regular rate, S1, S2, no Rub   Abd/GI: soft and +tender  Ext: warm without cyanosis. There is no lower leg edema. Skin:  no jaundice or rashes   Neuro: no gross neuro deficits   Psychiatric: Calm. Access: Right TCC- intact      LAB  Recent Labs     11/25/20  0459 11/24/20  0620 11/23/20  1538 11/23/20  0457 11/22/20  1252   WBC 3.9* 4.7  --  6.6 6.7   HGB 7.3* 7.7*  --  8.5* 10.5*   HCT 22.4* 23.9*  --  25.5* 32.0*   * 141*  --  155 207   INR 1.7 1.8 1.7  --  1.5     Recent Labs     11/25/20  0459 11/24/20  0620 11/23/20  0457 11/22/20  1252    144 141 141   K 4.1 3.9 3.0* 2.5*   * 114* 106 101   CO2 27 20* 24 29   GLU 75 70 89 133*   BUN 12 27* 27* 25*   CREA 5.85* 12.00* 12.10* 11.90*   MG  --   --  1.9 2.0   ALB  --   --   --  2.6*     No results for input(s): PH, PCO2, PO2, HCO3 in the last 72 hours.       Assessment:  (Medical Decision Making)     Hospital Problems  Date Reviewed: 10/28/2020 Codes Class Noted POA    Nausea vomiting and diarrhea ICD-10-CM: R11.2, R19.7  ICD-9-CM: 787.91, 787.01  11/4/2020 Yes        ESRD on peritoneal dialysis [facility]) (Chronic) ICD-10-CM: N18.6, Z99.2  ICD-9-CM: 585.6, V45.11  7/29/2019 Yes        PAD (peripheral artery disease) (HCC) (Chronic) ICD-10-CM: I73.9  ICD-9-CM: 443.9  6/19/2019 Yes        History of right-sided carotid endarterectomy (Chronic) ICD-10-CM: C49.160  ICD-9-CM: V45.89  11/8/2018 Yes        Pure hypercholesterolemia (Chronic) ICD-10-CM: E78.00  ICD-9-CM: 272.0  11/28/2017 Yes        * (Principal) UTI (urinary tract infection) ICD-10-CM: N39.0  ICD-9-CM: 599.0  11/10/2017 Yes        S/P coronary artery stent placement (Chronic) ICD-10-CM: Z95.5  ICD-9-CM: V45.82  2/6/2017 Yes        History of CVA (cerebrovascular accident) (Chronic) ICD-10-CM: Q38.03  ICD-9-CM: V12.54  2/11/2016 Yes        Anemia of renal disease (Chronic) ICD-10-CM: N18.9, D63.1  ICD-9-CM: 285.21  1/7/2016 Yes              Plan:  (Medical Decision Making)   1. ESRD on PD [address]  Transition from PD to HD  HD after tomorrow  Appreciate CW for HD slot      2. UTI Abx per primary      3. Splenic infracts INR 1.5, warfarin per hosp      4.  Anemia trend, transfuse hgb <7.0  Keven Gamez MD

## 2020-11-25 NOTE — PROGRESS NOTES
Warfarin dosing per pharmacist    Melina Velasquez is a 65 y.o. female. Height: 5' 1\" (154.9 cm)    Weight: 115.7 kg (255 lb 1.6 oz)    Indication:  Splenic infarcts    Goal INR:  2-3    Home dose:  2.5 mg qhs    Risk factors or significant drug interactions:  none    Other anticoagulants:  none    Daily Monitoring  Date  INR     Warfarin dose HGB              Notes  11/22  1.5  2.5 mg  10.5  11/23  1.7  Held  ---  11/24  1.8  2.5 mg  7.7   11/25  1.7  2.5 mg  7.3      Pharmacy is consulted to manage warfarin for Ms. Saturnino Smallwood. She was initiated on warfarin here earlier this month and INR brenda rapidly on 2.5 mg - the patient was also on cipro at that time. She has been holding warfarin prior to this admission and her INR is low. INR today 1.7, likely due to dose held on 11/23. PD cath removed today by vascular surgery. Will continue with warfarin 2.5 mg this evening. Daily INR for now. Pharmacy will continue to follow. Please call with any questions.     Thank you,  Efrem Holland, PharmD, [id number]  Clinical Pharmacist  [phone]

## 2020-11-25 NOTE — PROGRESS NOTES
Hourly rounds completed with bed in low/locked position. Patient has rested throughout shift, without acute changes this shift, assisted patient to restroom once this shift. Patient is currently resting in bed with all needs met at this, will give report to oncoming RN.

## 2020-11-25 NOTE — ANESTHESIA POSTPROCEDURE EVALUATION
Procedure(s):  PERITONEAL DIALYSIS CATHETER REMOVAL. general    Anesthesia Post Evaluation      Multimodal analgesia: multimodal analgesia used between 6 hours prior to anesthesia start to PACU discharge  Patient location during evaluation: bedside  Patient participation: complete - patient participated  Level of consciousness: awake and alert  Pain management: adequate  Airway patency: patent  Anesthetic complications: no  Cardiovascular status: acceptable  Respiratory status: acceptable  Hydration status: acceptable  Post anesthesia nausea and vomiting:  controlled  Final Post Anesthesia Temperature Assessment:  Normothermia (36.0-37.5 degrees C)      INITIAL Post-op Vital signs:   Vitals Value Taken Time   /61 11/25/2020 10:59 AM   Temp 36.5 °C (97.7 °F) 11/25/2020 10:38 AM   Pulse 64 11/25/2020 10:59 AM   Resp 16 11/25/2020 10:53 AM   SpO2 100 % 11/25/2020 10:59 AM   Vitals shown include unvalidated device data.

## 2020-11-25 NOTE — PROGRESS NOTES
Chart screened by  for discharge planning. Patient had PD catheter removed today. Patient had Tunneled Central Line placed 11/24/2020. According to physician notes patient will need HD at this time. PPD has been placed, all Hepatitis panels will need to be ordered to obtain an outpatient dialysis slot. CM will continue to follow patient during hospitalization for discharge planning and needs. Please consult or notify  if any new issues arise.

## 2020-11-25 NOTE — PROGRESS NOTES
Pt seen at bedside, had a moderate am't of sanguinous drainage from the prior PD cath exit site. Tunnel site with edges approximated and dermabond intact. No active bleeding noted when dressing removed. Pressure held for several minutes and new dressing placed with time marked on it. Pt receiving unit of blood now and in NAD.      Justice Judge, AMIRAH-C

## 2020-11-25 NOTE — PERIOP NOTES
Moderate drainage noted to dressing upon arrival to patients room. Dr Mela Hernandes contacted before leaving bedside and was told Darreld Payam NP would see patient. Nehal June RN updated on plan of care.

## 2020-11-25 NOTE — PROGRESS NOTES
Hospitalist Progress Note     Admit Date:  2020 12:37 PM   Name:  Janie Ga   Age:  65 y.o.  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 The patient was seen by nephrology and deemed unable to continue PD,  dialysis catheter removed. Per nephrology, patient has poor prognosis. :  Patient seen today after PD catheter removal by vascular. Abdominal dressing dry/intact.          Objective:     Patient Vitals for the past 24 hrs:   Temp Pulse Resp BP SpO2   20 1146 97.5 °F (36.4 °C) 66 16 (!) 131/59 100 %   20 1104  72 16 (!) 108/53 100 %   20 1059  64 15 135/61 100 %   20 1053 98 °F (36.7 °C) 68 16 (!) 120/59 100 %   20 1048  68 15 (!) 125/58    20 1043  69 16 (!) 135/58    20 1038 97.7 °F (36.5 °C) 71 15 (!) 134/59 95 %   20 1037 97.7 °F (36.5 °C) 71 16 129/60 97 %   20 1034  75 15 129/60 97 %   20 1029  77 14 (!) 124/53 97 %   20 1024  78 14 (!) 130/58 92 %   20 1018  77 15 (!) 122/55 95 %   20 1013  83 15 (!) 124/58 100 %   20 1011 98 °F (36.7 °C) 90 14 124/60 100 %   20 1009  90 14 124/60 100 %   20 0732 97.9 °F (36.6 °C) 82 16 (!) 122/56 100 %   20 0449 98.2 °F (36.8 °C) 88 16 110/68 98 %   20 0039 98.5 °F (36.9 °C) 90 15 (!) 110/54 95 %   20 1913 97.6 °F (36.4 °C) 88 12 (!) 105/55 98 %   20 1600  74      20 1532 97.3 °F (36.3 °C) 74 12 (!) 116/50 95 %     Oxygen Therapy  O2 Sat (%): 100 % (11/25/20 1146)  Pulse via Oximetry: 72 beats per minute (11/25/20 1104)  O2 Device: Nasal cannula (11/25/20 1038)  O2 Flow Rate (L/min): 2 l/min (11/25/20 1038)    Intake/Output Summary (Last 24 hours) at 11/25/2020 1431  Last data filed at 11/25/2020 1038  Gross per 24 hour   Intake 300 ml   Output 3 ml   Net 297 ml         REVIEW OF SYSTEMS: Comprehensive ROS performed and negative except as stated in HPI. Physical Examination:  General:    Well nourished. No gross distress. Head:  Normocephalic, atraumatic, nares patent  Eyes:  Extraocular movements intact, normal sclera  CV:   RRR. No  Murmurs, clicks, or gallops, distal pulses intact  Lungs:   Unlabored, no cyanosis, no wheeze  Abdomen:   Soft, nondistended, nontender. Abdominal dressing dry/intact. Extremities: Warm and dry. No cyanosis or edema. Skin:     No rashes or jaundice. Neuro:  No gross focal deficits, no tremor  Psych:  Mood and affect appropriate    Data Review:  I have reviewed all labs, meds, telemetry events, and studies from the last 24 hours.     Recent Results (from the past 24 hour(s))   PLEASE READ & DOCUMENT PPD TEST IN 48 HRS    Collection Time: 11/24/20  9:30 PM   Result Value Ref Range    PPD Negative Negative    mm Induration 0 0 - 5 mm   PROTHROMBIN TIME + INR    Collection Time: 11/25/20  4:59 AM   Result Value Ref Range    Prothrombin time 20.0 (H) 12.5 - 14.7 sec    INR 1.7     CBC WITH AUTOMATED DIFF    Collection Time: 11/25/20  4:59 AM   Result Value Ref Range    WBC 3.9 (L) 4.3 - 11.1 K/uL    RBC 2.32 (L) 4.05 - 5.2 M/uL    HGB 7.3 (L) 11.7 - 15.4 g/dL    HCT 22.4 (L) 35.8 - 46.3 %    MCV 96.6 79.6 - 97.8 FL    MCH 31.5 26.1 - 32.9 PG    MCHC 32.6 31.4 - 35.0 g/dL    RDW 14.1 11.9 - 14.6 %    PLATELET 055 (L) 462 - 450 K/uL    MPV 12.2 9.4 - 12.3 FL    ABSOLUTE NRBC 0.00 0.0 - 0.2 K/uL    DF AUTOMATED      NEUTROPHILS 47 43 - 78 %    LYMPHOCYTES 37 13 - 44 %    MONOCYTES 9 4.0 - 12.0 % EOSINOPHILS 5 0.5 - 7.8 %    BASOPHILS 1 0.0 - 2.0 %    IMMATURE GRANULOCYTES 1 0.0 - 5.0 %    ABS. NEUTROPHILS 1.9 1.7 - 8.2 K/UL    ABS. LYMPHOCYTES 1.5 0.5 - 4.6 K/UL    ABS. MONOCYTES 0.4 0.1 - 1.3 K/UL    ABS. EOSINOPHILS 0.2 0.0 - 0.8 K/UL    ABS. BASOPHILS 0.0 0.0 - 0.2 K/UL    ABS. IMM. GRANS. 0.0 0.0 - 0.5 K/UL   METABOLIC PANEL, BASIC    Collection Time: 11/25/20  4:59 AM   Result Value Ref Range    Sodium 142 138 - 145 mmol/L    Potassium 4.1 3.5 - 5.1 mmol/L    Chloride 109 (H) 98 - 107 mmol/L    CO2 27 21 - 32 mmol/L    Anion gap 6 (L) 7 - 16 mmol/L    Glucose 75 65 - 100 mg/dL    BUN 12 8 - 23 MG/DL    Creatinine 5.85 (H) 0.6 - 1.0 MG/DL    GFR est AA 9 (L) >60 ml/min/1.73m2    GFR est non-AA 8 (L) >60 ml/min/1.73m2    Calcium 7.0 (L) 8.3 - 10.4 MG/DL   TYPE & SCREEN    Collection Time: 11/25/20  9:09 AM   Result Value Ref Range    Crossmatch Expiration 11/28/2020,2359     ABO/Rh(D) [place]fe[place] Pete POSITIVE     Antibody screen NEG     Unit number [id number]     Blood component type [facility]     Unit division 00     Status of unit ISSUED     Crossmatch result Compatible    RBC, ALLOCATE    Collection Time: 11/25/20  9:15 AM   Result Value Ref Range    HISTORY CHECKED?  Historical check performed         All Micro Results     Procedure Component Value Units Date/Time    CULTURE, BLOOD [[id number]] Collected:  11/22/20 6433    Order Status:  Completed Specimen:  Blood Updated:  11/25/20 1122     Special Requests: --        RIGHT  Antecubital       Culture result: NO GROWTH 3 DAYS       CULTURE, BLOOD [[id number]] Collected:  11/22/20 5295    Order Status:  Completed Specimen:  Blood Updated:  11/25/20 1122     Special Requests: --        RIGHT  Antecubital       Culture result: NO GROWTH 3 DAYS       CULTURE, URINE [[id number]] Collected:  11/22/20 1400    Order Status:  Completed Specimen:  Urine Updated:  11/25/20 0742     Special Requests: NO SPECIAL REQUESTS        Culture result: NO GROWTH 2 DAYS       CULTURE, URINE [[id number]]     Order Status:  Canceled Specimen:  Urine     C. DIFFICILE/EPI PCR [[id number]] Collected:  11/22/20 1400    Order Status:  Completed Specimen:  Stool Updated:  11/22/20 1528     Special Requests: NO SPECIAL REQUESTS        Culture result:       Toxigenic C. diff NEGATIVE/ [id number]-[id number]-[id number] PRESUMPTIVE NEGATIVE                Current Meds:  Current Facility-Administered Medications   Medication Dose Route Frequency    0.9% sodium chloride infusion 250 mL  250 mL IntraVENous PRN    epoetin henry-epbx (RETACRIT) 14,000 Units combo injection  14,000 Units SubCUTAneous Q7D    warfarin (COUMADIN) tablet 2.5 mg  2.5 mg Oral QPM    aspirin delayed-release tablet 81 mg  81 mg Oral QHS    metoprolol succinate (TOPROL-XL) XL tablet 25 mg  25 mg Oral DAILY    potassium chloride (K-DUR, KLOR-CON) SR tablet 20 mEq  20 mEq Oral DAILY    0.9% sodium chloride infusion  75 mL/hr IntraVENous CONTINUOUS    sodium chloride (NS) flush 5-40 mL  5-40 mL IntraVENous Q8H    sodium chloride (NS) flush 5-40 mL  5-40 mL IntraVENous PRN    potassium chloride 10 mEq in 100 ml IVPB  10 mEq IntraVENous PRN    magnesium sulfate 2 g/50 ml IVPB (premix or compounded)  2 g IntraVENous PRN    acetaminophen (TYLENOL) tablet 650 mg  650 mg Oral Q6H PRN    Or    acetaminophen (TYLENOL) suppository 650 mg  650 mg Rectal Q6H PRN    polyethylene glycol (MIRALAX) packet 17 g  17 g Oral DAILY PRN    bisacodyL (DULCOLAX) suppository 10 mg  10 mg Rectal DAILY PRN    famotidine (PEPCID) tablet 20 mg  20 mg Oral DAILY PRN    promethazine (PHENERGAN) with saline injection 25 mg  25 mg IntraVENous Q6H PRN    ondansetron (ZOFRAN ODT) tablet 4 mg  4 mg Oral Q8H PRN    Or    ondansetron (ZOFRAN) injection 4 mg  4 mg IntraVENous Q6H PRN    guaiFENesin ER (MUCINEX) tablet 1,200 mg  1,200 mg Oral BID PRN    alum-mag hydroxide-simeth (MYLANTA) oral suspension 30 mL  30 mL Oral Q4H PRN    oxyCODONE IR (ROXICODONE) tablet 5 mg  5 mg Oral Q4H PRN  oxyCODONE IR (ROXICODONE) tablet 10 mg  10 mg Oral Q4H PRN    zolpidem (AMBIEN) tablet 5 mg  5 mg Oral QHS PRN    guaiFENesin-dextromethorphan (ROBITUSSIN DM) 100-10 mg/5 mL syrup 10 mL  10 mL Oral Q4H PRN    senna-docusate (PERICOLACE) 8.6-50 mg per tablet 2 Tab  2 Tab Oral DAILY PRN    LORazepam (ATIVAN) tablet 0.5 mg  0.5 mg Oral Q4H PRN    mirtazapine (REMERON) tablet 15 mg  15 mg Oral QHS PRN    hydrALAZINE (APRESOLINE) tablet 50 mg  50 mg Oral QID PRN    cefTRIAXone (ROCEPHIN) 1 g in 0.9% sodium chloride (MBP/ADV) 50 mL MBP  1 g IntraVENous Q24H       Diet:  DIET NPO  DIET RENAL    Other Studies (last 24 hours):  No results found. Assessment and Plan:     Hospital Problems as of 11/25/2020 Date Reviewed: 10/28/2020          Codes Class Noted - Resolved POA    Nausea vomiting and diarrhea ICD-10-CM: R11.2, R19.7  ICD-9-CM: 787.91, 787.01  11/4/2020 - Present Yes        ESRD on peritoneal dialysis [facility]) (Chronic) ICD-10-CM: N18.6, Z99.2  ICD-9-CM: 585.6, V45.11  7/29/2019 - Present Yes        PAD (peripheral artery disease) (HCC) (Chronic) ICD-10-CM: I73.9  ICD-9-CM: 443.9  6/19/2019 - Present Yes        History of right-sided carotid endarterectomy (Chronic) ICD-10-CM: Z16.484  ICD-9-CM: V45.89  11/8/2018 - Present Yes        Pure hypercholesterolemia (Chronic) ICD-10-CM: E78.00  ICD-9-CM: 272.0  11/28/2017 - Present Yes        * (Principal) UTI (urinary tract infection) ICD-10-CM: N39.0  ICD-9-CM: 599.0  11/10/2017 - Present Yes        S/P coronary artery stent placement (Chronic) ICD-10-CM: Z95.5  ICD-9-CM: V45.82  2/6/2017 - Present Yes        History of CVA (cerebrovascular accident) (Chronic) ICD-10-CM: X63.38  ICD-9-CM: V12.54  2/11/2016 - Present Yes        Anemia of renal disease (Chronic) ICD-10-CM: N18.9, D63.1  ICD-9-CM: 285.21  1/7/2016 - Present Yes              A/P:    1. UTI:  Stopping Rocephin today; culture negative for infection    2.  ESRD prior PD now HD:  PD catheter removed today, transitioning to HD   Nephrology following, deemed poor prognosis  CM assisting with arranging outpatient slot    3. 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  Hypokalemia:  resolved            Signed:  Alvaro Raines NP

## 2020-11-25 NOTE — PROGRESS NOTES
Pt daughter asked to speak to MD - Pt daughter was upset and stated she was here for 2 hours this am waiting on someone to come in the room so she could talk to them. Explained to pt if she notifies us she would like to speak to someone we can message them and have them come talk with her. Pt daughter stated she wanted to speak to someone concerning \"hospice\" I contacted Serjosé miguel Me and she spoke with pt daughter and was informative with pt and pt daughter on steps moving forward with dialysis and also give info on Hospice. Anner Scheuermann explained that she could have someone come talk to them further about Hospice so they have a better understanding if they would like. She also advised pt to speak to Nephrology with some of the dialysis questions she had because they could better answer her. Hourly rounding completed. No new complaints. All needs met. Pt is currently resting in bed. Will continue to monitor and give report to oncoming nurse.

## 2020-11-25 NOTE — PERIOP NOTES
TRANSFER - OUT REPORT:    Verbal report given to [facility] RN on Marigene Cea  being transferred to  for routine post - op       Report consisted of patients Situation, Background, Assessment and   Recommendations(SBAR). Information from the following report(s) OR Summary, Procedure Summary, Intake/Output and MAR was reviewed with the receiving nurse. Lines:   Peripheral IV 11/25/20 Anterior; Left Forearm (Active)   Site Assessment Clean, dry, & intact 11/25/20 1038   Phlebitis Assessment 0 11/25/20 1038   Infiltration Assessment 0 11/25/20 1038   Dressing Status Clean, dry, & intact 11/25/20 1038   Dressing Type Tape;Transparent 11/25/20 1038   Hub Color/Line Status Pink;Patent 11/25/20 1038   Alcohol Cap Used No 11/25/20 1038        Opportunity for questions and clarification was provided. Patient transported with:   O2 @ 2 liters  Registered Nurse    VTE prophylaxis orders have been written for Marigene Cea.

## 2020-11-26 LAB
ABO + RH BLD: NORMAL
ANION GAP SERPL CALC-SCNC: 8 MMOL/L (ref 7–16)
BASOPHILS # BLD: 0 K/UL (ref 0–0.2)
BASOPHILS NFR BLD: 0 % (ref 0–2)
BLD PROD TYP BPU: NORMAL
BLOOD GROUP ANTIBODIES SERPL: NORMAL
BPU ID: NORMAL
BUN SERPL-MCNC: 15 MG/DL (ref 8–23)
CALCIUM SERPL-MCNC: 7 MG/DL (ref 8.3–10.4)
CHLORIDE SERPL-SCNC: 113 MMOL/L (ref 98–107)
CO2 SERPL-SCNC: 22 MMOL/L (ref 21–32)
CREAT SERPL-MCNC: 7.24 MG/DL (ref 0.6–1)
CROSSMATCH RESULT,%XM: NORMAL
DIFFERENTIAL METHOD BLD: ABNORMAL
EOSINOPHIL # BLD: 0.2 K/UL (ref 0–0.8)
EOSINOPHIL NFR BLD: 5 % (ref 0.5–7.8)
ERYTHROCYTE [DISTWIDTH] IN BLOOD BY AUTOMATED COUNT: 15.4 % (ref 11.9–14.6)
GLUCOSE SERPL-MCNC: 71 MG/DL (ref 65–100)
HCT VFR BLD AUTO: 29.8 % (ref 35.8–46.3)
HGB BLD-MCNC: 9.6 G/DL (ref 11.7–15.4)
IMM GRANULOCYTES # BLD AUTO: 0 K/UL (ref 0–0.5)
IMM GRANULOCYTES NFR BLD AUTO: 0 % (ref 0–5)
INR PPP: 1.4
LYMPHOCYTES # BLD: 1.4 K/UL (ref 0.5–4.6)
LYMPHOCYTES NFR BLD: 31 % (ref 13–44)
MCH RBC QN AUTO: 30.2 PG (ref 26.1–32.9)
MCHC RBC AUTO-ENTMCNC: 32.2 G/DL (ref 31.4–35)
MCV RBC AUTO: 93.7 FL (ref 79.6–97.8)
MONOCYTES # BLD: 0.4 K/UL (ref 0.1–1.3)
MONOCYTES NFR BLD: 8 % (ref 4–12)
NEUTS SEG # BLD: 2.5 K/UL (ref 1.7–8.2)
NEUTS SEG NFR BLD: 56 % (ref 43–78)
NRBC # BLD: 0 K/UL (ref 0–0.2)
PLATELET # BLD AUTO: 96 K/UL (ref 150–450)
PMV BLD AUTO: 12.5 FL (ref 9.4–12.3)
POTASSIUM SERPL-SCNC: 4.2 MMOL/L (ref 3.5–5.1)
PROTHROMBIN TIME: 17.4 SEC (ref 12.5–14.7)
RBC # BLD AUTO: 3.18 M/UL (ref 4.05–5.2)
SODIUM SERPL-SCNC: 143 MMOL/L (ref 138–145)
SPECIMEN EXP DATE BLD: NORMAL
STATUS OF UNIT,%ST: NORMAL
UNIT DIVISION, %UDIV: 0
WBC # BLD AUTO: 4.5 K/UL (ref 4.3–11.1)

## 2020-11-26 PROCEDURE — 2709999900 HC NON-CHARGEABLE SUPPLY

## 2020-11-26 PROCEDURE — 74011250636 HC RX REV CODE- 250/636: Performed by: INTERNAL MEDICINE

## 2020-11-26 PROCEDURE — 65270000029 HC RM PRIVATE

## 2020-11-26 PROCEDURE — 80048 BASIC METABOLIC PNL TOTAL CA: CPT

## 2020-11-26 PROCEDURE — 74011250637 HC RX REV CODE- 250/637: Performed by: INTERNAL MEDICINE

## 2020-11-26 PROCEDURE — 85610 PROTHROMBIN TIME: CPT

## 2020-11-26 PROCEDURE — 74011250637 HC RX REV CODE- 250/637: Performed by: HOSPITALIST

## 2020-11-26 PROCEDURE — 85025 COMPLETE CBC W/AUTO DIFF WBC: CPT

## 2020-11-26 PROCEDURE — 36415 COLL VENOUS BLD VENIPUNCTURE: CPT

## 2020-11-26 RX ORDER — WARFARIN 2 MG/1
4 TABLET ORAL ONCE
Status: COMPLETED | OUTPATIENT
Start: 2020-11-26 | End: 2020-11-26

## 2020-11-26 RX ADMIN — WARFARIN SODIUM 4 MG: 2 TABLET ORAL at 17:38

## 2020-11-26 RX ADMIN — POTASSIUM CHLORIDE 20 MEQ: 20 TABLET, EXTENDED RELEASE ORAL at 08:25

## 2020-11-26 RX ADMIN — Medication 10 ML: at 21:56

## 2020-11-26 RX ADMIN — METOPROLOL SUCCINATE 25 MG: 25 TABLET, EXTENDED RELEASE ORAL at 08:25

## 2020-11-26 RX ADMIN — Medication 10 ML: at 05:14

## 2020-11-26 RX ADMIN — ASPIRIN 81 MG: 81 TABLET, COATED ORAL at 21:56

## 2020-11-26 RX ADMIN — SODIUM CHLORIDE 75 ML/HR: 900 INJECTION, SOLUTION INTRAVENOUS at 05:17

## 2020-11-26 RX ADMIN — Medication 10 ML: at 13:40

## 2020-11-26 NOTE — PROGRESS NOTES
Warfarin dosing per pharmacist    Mikki Yates is a 65 y.o. female. Height: 5' 1\" (154.9 cm)    Weight: 115.7 kg (255 lb 1.6 oz)    Indication:  Splenic infarcts    Goal INR:  2-3    Home dose:  2.5 mg qhs    Risk factors or significant drug interactions:  none    Other anticoagulants:  none    Daily Monitoring  Date  INR     Warfarin dose HGB              Notes  11/22  1.5  2.5 mg  10.5  11/23  1.7  Held  ---  11/24  1.8  2.5 mg  7.7   11/25  1.7  2.5 mg  7.3  11/26  1.4  4 mg  9.6      Pharmacy is consulted to manage warfarin for Ms. Phylliss Bloch. She was initiated on warfarin here earlier this month and INR brenda rapidly on 2.5 mg - the patient was also on cipro at that time. She has been holding warfarin prior to this admission and her INR is low. INR today 1.4, likely due to dose held on 11/23. PD cath removed today by vascular surgery. Will boost with 4 mg this evening. Daily INR for now. Pharmacy will continue to follow. Please call with any questions.     Thank you,  Elyssa Vines, PharmD, [address]  Clinical Pharmacy Specialist  [phone]

## 2020-11-26 NOTE — PROGRESS NOTES
Hospitalist Progress Note     Admit Date:  2020 12:37 PM   Name:  Fallon Plascencia   Age:  65 y.o.  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 The patient was seen by nephrology and deemed unable to continue PD,  dialysis catheter removed. Per nephrology, patient has poor prognosis. :  Patient alert and oriented x3. Afebrile, no leukocytosis. Abdominal dressing dry/intact. **Missael [id number] via phone. All questions answered.         Objective:     Patient Vitals for the past 24 hrs:   Temp Pulse Resp BP SpO2   20 0729 98.8 °F (37.1 °C) 77 17 119/60 98 %   20 0444 98.3 °F (36.8 °C) 73 17 (!) 120/56 97 %   20 2341 98.1 °F (36.7 °C) 77 17 (!) 122/59 97 %   20 1954 97.8 °F (36.6 °C) 71 16 111/61 100 %   20 1146 97.5 °F (36.4 °C) 66 16 (!) 131/59 100 %   20 1104  72 16 (!) 108/53 100 %   20 1059  64 15 135/61 100 %   20 1053 98 °F (36.7 °C) 68 16 (!) 120/59 100 %   20 1048  68 15 (!) 125/58    20 1043  69 16 (!) 135/58    20 1038 97.7 °F (36.5 °C) 71 15 (!) 134/59 95 %   20 1037 97.7 °F (36.5 °C) 71 16 129/60 97 %   20 1034  75 15 129/60 97 %   20 1029  77 14 (!) 124/53 97 %   20 1024  78 14 (!) 130/58 92 %   20 1018  77 15 (!) 122/55 95 %   20 1013  83 15 (!) 124/58 100 %   20 1011 98 °F (36.7 °C) 90 14 124/60 100 %   20 1009  90 14 124/60 100 %     Oxygen Therapy  O2 Sat (%): 98 % (11/26/20 0729)  Pulse via Oximetry: 72 beats per minute (11/25/20 1104)  O2 Device: Nasal cannula (11/25/20 1038)  O2 Flow Rate (L/min): 2 l/min (11/25/20 1038)    Intake/Output Summary (Last 24 hours) at 11/26/2020 0956  Last data filed at 11/25/2020 1038  Gross per 24 hour   Intake 100 ml   Output    Net 100 ml         REVIEW OF SYSTEMS: Comprehensive ROS performed and negative except as stated in HPI. Physical Examination:  General:    Well nourished. No gross distress. Head:  Normocephalic, atraumatic, nares patent  Eyes:  Extraocular movements intact, normal sclera  CV:   RRR. No  Murmurs, clicks, or gallops, distal pulses intact  Lungs:   Unlabored, no cyanosis, no wheeze  Abdomen:   Soft, nondistended, nontender. Abdominal dressing dry/intact. Extremities: Warm and dry. No cyanosis or edema. Skin:     No rashes or jaundice. Neuro:  No gross focal deficits, no tremor  Psych:  Mood and affect appropriate    Data Review:  I have reviewed all labs, meds, telemetry events, and studies from the last 24 hours.     Recent Results (from the past 24 hour(s))   HEPATITIS PANEL, ACUTE    Collection Time: 11/25/20  3:31 PM   Result Value Ref Range    Hepatitis A, IgM NONREACTIVE NR      Hepatitis B core, IgM NONREACTIVE NR      Hep B Surface Ag NONREACTIVE NR      Hepatitis C virus Ab NONREACTIVE NR     HGB & HCT    Collection Time: 11/25/20  3:31 PM   Result Value Ref Range    HGB 10.2 (L) 11.7 - 15.4 g/dL    HCT 32.0 (L) 35.8 - 46.3 %   PROTHROMBIN TIME + INR    Collection Time: 11/26/20  5:24 AM   Result Value Ref Range    Prothrombin time 17.4 (H) 12.5 - 14.7 sec    INR 1.4     METABOLIC PANEL, BASIC    Collection Time: 11/26/20  5:24 AM   Result Value Ref Range    Sodium 143 138 - 145 mmol/L    Potassium 4.2 3.5 - 5.1 mmol/L    Chloride 113 (H) 98 - 107 mmol/L    CO2 22 21 - 32 mmol/L    Anion gap 8 7 - 16 mmol/L    Glucose 71 65 - 100 mg/dL    BUN 15 8 - 23 MG/DL    Creatinine 7.24 (H) 0.6 - 1.0 MG/DL    GFR est AA 7 (L) >60 ml/min/1.73m2    GFR est non-AA 6 (L) >60 ml/min/1.73m2    Calcium 7.0 (L) 8.3 - 10.4 MG/DL   CBC WITH AUTOMATED DIFF    Collection Time: 11/26/20  5:24 AM   Result Value Ref Range    WBC 4.5 4.3 - 11.1 K/uL    RBC 3.18 (L) 4.05 - 5.2 M/uL    HGB 9.6 (L) 11.7 - 15.4 g/dL    HCT 29.8 (L) 35.8 - 46.3 %    MCV 93.7 79.6 - 97.8 FL    MCH 30.2 26.1 - 32.9 PG    MCHC 32.2 31.4 - 35.0 g/dL    RDW 15.4 (H) 11.9 - 14.6 %    PLATELET 96 (L) 836 - 450 K/uL    MPV 12.5 (H) 9.4 - 12.3 FL    ABSOLUTE NRBC 0.00 0.0 - 0.2 K/uL    DF AUTOMATED      NEUTROPHILS 56 43 - 78 %    LYMPHOCYTES 31 13 - 44 %    MONOCYTES 8 4.0 - 12.0 %    EOSINOPHILS 5 0.5 - 7.8 %    BASOPHILS 0 0.0 - 2.0 %    IMMATURE GRANULOCYTES 0 0.0 - 5.0 %    ABS. NEUTROPHILS 2.5 1.7 - 8.2 K/UL    ABS. LYMPHOCYTES 1.4 0.5 - 4.6 K/UL    ABS. MONOCYTES 0.4 0.1 - 1.3 K/UL    ABS. EOSINOPHILS 0.2 0.0 - 0.8 K/UL    ABS. BASOPHILS 0.0 0.0 - 0.2 K/UL    ABS. IMM.  GRANS. 0.0 0.0 - 0.5 K/UL        All Micro Results     Procedure Component Value Units Date/Time    CULTURE, BLOOD [[id number]] Collected:  11/22/20 1734    Order Status:  Completed Specimen:  Blood Updated:  11/26/20 0652     Special Requests: --        RIGHT  Antecubital       Culture result: NO GROWTH 4 DAYS       CULTURE, BLOOD [[id number]] Collected:  11/22/20 1755    Order Status:  Completed Specimen:  Blood Updated:  11/26/20 0652     Special Requests: --        RIGHT  Antecubital       Culture result: NO GROWTH 4 DAYS       CULTURE, URINE [[id number]] Collected:  11/22/20 1400    Order Status:  Completed Specimen:  Urine Updated:  11/25/20 0742     Special Requests: NO SPECIAL REQUESTS        Culture result: NO GROWTH 2 DAYS       CULTURE, URINE [[id number]]     Order Status:  Canceled Specimen:  Urine     C. DIFFICILE/EPI PCR [[id number]] Collected:  11/22/20 1400    Order Status:  Completed Specimen:  Stool Updated:  11/22/20 1528     Special Requests: NO SPECIAL REQUESTS        Culture result:       Toxigenic C. diff NEGATIVE/ [id number] PRESUMPTIVE NEGATIVE                Current Meds:  Current Facility-Administered Medications   Medication Dose Route Frequency    0.9% sodium chloride infusion 250 mL  250 mL IntraVENous PRN    epoetin henry-epbx (RETACRIT) 14,000 Units combo injection  14,000 Units SubCUTAneous Q7D    warfarin (COUMADIN) tablet 2.5 mg  2.5 mg Oral QPM    aspirin delayed-release tablet 81 mg  81 mg Oral QHS    metoprolol succinate (TOPROL-XL) XL tablet 25 mg  25 mg Oral DAILY    potassium chloride (K-DUR, KLOR-CON) SR tablet 20 mEq  20 mEq Oral DAILY    0.9% sodium chloride infusion  75 mL/hr IntraVENous CONTINUOUS    sodium chloride (NS) flush 5-40 mL  5-40 mL IntraVENous Q8H    sodium chloride (NS) flush 5-40 mL  5-40 mL IntraVENous PRN    potassium chloride 10 mEq in 100 ml IVPB  10 mEq IntraVENous PRN    magnesium sulfate 2 g/50 ml IVPB (premix or compounded)  2 g IntraVENous PRN    acetaminophen (TYLENOL) tablet 650 mg  650 mg Oral Q6H PRN    Or    acetaminophen (TYLENOL) suppository 650 mg  650 mg Rectal Q6H PRN    polyethylene glycol (MIRALAX) packet 17 g  17 g Oral DAILY PRN    bisacodyL (DULCOLAX) suppository 10 mg  10 mg Rectal DAILY PRN    famotidine (PEPCID) tablet 20 mg  20 mg Oral DAILY PRN    promethazine (PHENERGAN) with saline injection 25 mg  25 mg IntraVENous Q6H PRN    ondansetron (ZOFRAN ODT) tablet 4 mg  4 mg Oral Q8H PRN    Or    ondansetron (ZOFRAN) injection 4 mg  4 mg IntraVENous Q6H PRN    guaiFENesin ER (MUCINEX) tablet 1,200 mg  1,200 mg Oral BID PRN    alum-mag hydroxide-simeth (MYLANTA) oral suspension 30 mL  30 mL Oral Q4H PRN    oxyCODONE IR (ROXICODONE) tablet 5 mg  5 mg Oral Q4H PRN    oxyCODONE IR (ROXICODONE) tablet 10 mg  10 mg Oral Q4H PRN    zolpidem (AMBIEN) tablet 5 mg  5 mg Oral QHS PRN    guaiFENesin-dextromethorphan (ROBITUSSIN DM) 100-10 mg/5 mL syrup 10 mL  10 mL Oral Q4H PRN    senna-docusate (PERICOLACE) 8.6-50 mg per tablet 2 Tab  2 Tab Oral DAILY PRN    LORazepam (ATIVAN) tablet 0.5 mg  0.5 mg Oral Q4H PRN    mirtazapine (REMERON) tablet 15 mg  15 mg Oral QHS PRN    hydrALAZINE (APRESOLINE) tablet 50 mg  50 mg Oral QID PRN       Diet:  DIET RENAL  DIET RENAL    Other Studies (last 24 hours):  No results found. Assessment and Plan:     Hospital Problems as of 11/26/2020 Date Reviewed: 10/28/2020          Codes Class Noted - Resolved POA    Nausea vomiting and diarrhea ICD-10-CM: R11.2, R19.7  ICD-9-CM: 787.91, 787.01  11/4/2020 - Present Yes        ESRD on peritoneal dialysis [facility]) (Chronic) ICD-10-CM: N18.6, Z99.2  ICD-9-CM: 585.6, V45.11  7/29/2019 - Present Yes        PAD (peripheral artery disease) (HCC) (Chronic) ICD-10-CM: I73.9  ICD-9-CM: 443.9  6/19/2019 - Present Yes        History of right-sided carotid endarterectomy (Chronic) ICD-10-CM: W25.042  ICD-9-CM: V45.89  11/8/2018 - Present Yes        Pure hypercholesterolemia (Chronic) ICD-10-CM: E78.00  ICD-9-CM: 272.0  11/28/2017 - Present Yes        * (Principal) UTI (urinary tract infection) ICD-10-CM: N39.0  ICD-9-CM: 599.0  11/10/2017 - Present Yes        S/P coronary artery stent placement (Chronic) ICD-10-CM: Z95.5  ICD-9-CM: V45.82  2/6/2017 - Present Yes        History of CVA (cerebrovascular accident) (Chronic) ICD-10-CM: G52.63  ICD-9-CM: V12.54  2/11/2016 - Present Yes        Anemia of renal disease (Chronic) ICD-10-CM: N18.9, D63.1  ICD-9-CM: 285.21  1/7/2016 - Present Yes              A/P:    1. UTI:   Rocephin stopped 11/25; culture negative for infection    2. ESRD prior PD now HD:  PD catheter removed today, transitioning to HD   Nephrology following, deemed poor prognosis  CM assisting with arranging outpatient slot    3. 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 Hypokalemia:  Resolved    **Family is agreeable to hospice consult.  They would also like to have a conference with all providers to discuss plan. I explained this would likely occur Friday or Monday but would pass this request on. Information faxed to [address] [place] [address], they will reach out to family tomorrow.             Signed:  John Brown NP

## 2020-11-26 NOTE — PROGRESS NOTES
Patient seen and examined. No acute issues overnight. Patient status post removal of PD catheter. Incision stable. Vascular standpoint okay to be discharged home follow-up with Dr. Winsome Adams in 2 weeks. Patient is open previous incision can be covered with a Band-Aid.   Shavonne Arellano

## 2020-11-26 NOTE — OP NOTES
[facility]  OPERATIVE REPORT    Name:  Adrianna Jonas  MR#:  [id number]  :  1950  ACCOUNT #:  [de-identified]  DATE OF SERVICE:  2020    PREOPERATIVE DIAGNOSIS:  End-stage renal disease. POSTOPERATIVE DIAGNOSIS:  End-stage renal disease. PROCEDURE PERFORMED:  Removal of peritoneal dialysis catheter. SURGEON:  Stanford Bosch MD    ASSISTANT:  None. ANESTHESIA:  General endotracheal.    COMPLICATIONS:  None. SPECIMENS REMOVED:  None. IMPLANTS:  None. ESTIMATED BLOOD LOSS:  Minimal.    DRAINS:  None. PROCEDURE:  The patient was brought to the operating room and placed on the operating room table in supine position. Following adequate general endotracheal anesthesia and a time-out procedure, the anterior abdomen was draped and prepped in sterile fashion including the PD catheter and at its exit side on the left side of the abdomen. A small transverse incision was made through the existing scar just to the left and superior to the umbilicus. The wound was deepened using a Bovie to control bleeding. Dissection was carried down to where the peritoneal dialysis catheter was identified. It was clamped twice and then divided in between. The cuff in the subcutaneous tract was mobilized and the trailing end of the catheter was removed from its subcutaneous tract and removed from the operative field. Next, the bleeding cuff on the catheter which was implanted at the level of the rectus sheath was mobilized circumferentially and the catheter was removed easily. There was no evidence of infection. The resultant fascial defect in the rectus sheath was reapproximated with a running 2-0 Vicryl suture. The remainder of the wound was closed in layers with Vicryl suture. At this point, sterile dry dressings were applied. The patient was awakened from anesthesia and transported to the recovery room in stable condition. The patient tolerated the procedure well.   No complications. All needle and sponge counts were correct.       Germaine Hernandez MD      [id number]  D:  11/25/2020 12:04  T:  11/25/2020 20:49  JOB #:  [id number]

## 2020-11-27 LAB
ANION GAP SERPL CALC-SCNC: 7 MMOL/L (ref 7–16)
BACTERIA SPEC CULT: NORMAL
BACTERIA SPEC CULT: NORMAL
BASOPHILS # BLD: 0 K/UL (ref 0–0.2)
BASOPHILS NFR BLD: 1 % (ref 0–2)
BUN SERPL-MCNC: 18 MG/DL (ref 8–23)
CALCIUM SERPL-MCNC: 7 MG/DL (ref 8.3–10.4)
CHLORIDE SERPL-SCNC: 114 MMOL/L (ref 98–107)
CO2 SERPL-SCNC: 22 MMOL/L (ref 21–32)
CREAT SERPL-MCNC: 8.11 MG/DL (ref 0.6–1)
DIFFERENTIAL METHOD BLD: ABNORMAL
EOSINOPHIL # BLD: 0.3 K/UL (ref 0–0.8)
EOSINOPHIL NFR BLD: 6 % (ref 0.5–7.8)
ERYTHROCYTE [DISTWIDTH] IN BLOOD BY AUTOMATED COUNT: 14.6 % (ref 11.9–14.6)
GLUCOSE SERPL-MCNC: 73 MG/DL (ref 65–100)
HCT VFR BLD AUTO: 28.5 % (ref 35.8–46.3)
HGB BLD-MCNC: 9.3 G/DL (ref 11.7–15.4)
IMM GRANULOCYTES # BLD AUTO: 0 K/UL (ref 0–0.5)
IMM GRANULOCYTES NFR BLD AUTO: 0 % (ref 0–5)
INR PPP: 1.7
LYMPHOCYTES # BLD: 1.7 K/UL (ref 0.5–4.6)
LYMPHOCYTES NFR BLD: 38 % (ref 13–44)
MCH RBC QN AUTO: 30.3 PG (ref 26.1–32.9)
MCHC RBC AUTO-ENTMCNC: 32.6 G/DL (ref 31.4–35)
MCV RBC AUTO: 92.8 FL (ref 79.6–97.8)
MONOCYTES # BLD: 0.4 K/UL (ref 0.1–1.3)
MONOCYTES NFR BLD: 9 % (ref 4–12)
NEUTS SEG # BLD: 2.1 K/UL (ref 1.7–8.2)
NEUTS SEG NFR BLD: 47 % (ref 43–78)
NRBC # BLD: 0 K/UL (ref 0–0.2)
PLATELET # BLD AUTO: 105 K/UL (ref 150–450)
PMV BLD AUTO: 12.2 FL (ref 9.4–12.3)
POTASSIUM SERPL-SCNC: 4.4 MMOL/L (ref 3.5–5.1)
PROTHROMBIN TIME: 19.9 SEC (ref 12.5–14.7)
RBC # BLD AUTO: 3.07 M/UL (ref 4.05–5.2)
SERVICE CMNT-IMP: NORMAL
SERVICE CMNT-IMP: NORMAL
SODIUM SERPL-SCNC: 143 MMOL/L (ref 136–145)
WBC # BLD AUTO: 4.5 K/UL (ref 4.3–11.1)

## 2020-11-27 PROCEDURE — 74011250637 HC RX REV CODE- 250/637: Performed by: HOSPITALIST

## 2020-11-27 PROCEDURE — 74011250637 HC RX REV CODE- 250/637: Performed by: INTERNAL MEDICINE

## 2020-11-27 PROCEDURE — 97530 THERAPEUTIC ACTIVITIES: CPT

## 2020-11-27 PROCEDURE — 97110 THERAPEUTIC EXERCISES: CPT

## 2020-11-27 PROCEDURE — 85610 PROTHROMBIN TIME: CPT

## 2020-11-27 PROCEDURE — 85025 COMPLETE CBC W/AUTO DIFF WBC: CPT

## 2020-11-27 PROCEDURE — 36415 COLL VENOUS BLD VENIPUNCTURE: CPT

## 2020-11-27 PROCEDURE — 80048 BASIC METABOLIC PNL TOTAL CA: CPT

## 2020-11-27 PROCEDURE — 65270000029 HC RM PRIVATE

## 2020-11-27 PROCEDURE — 90935 HEMODIALYSIS ONE EVALUATION: CPT

## 2020-11-27 RX ADMIN — Medication 10 ML: at 05:28

## 2020-11-27 RX ADMIN — Medication 10 ML: at 23:01

## 2020-11-27 RX ADMIN — POTASSIUM CHLORIDE 20 MEQ: 20 TABLET, EXTENDED RELEASE ORAL at 10:51

## 2020-11-27 RX ADMIN — METOPROLOL SUCCINATE 25 MG: 25 TABLET, EXTENDED RELEASE ORAL at 10:51

## 2020-11-27 RX ADMIN — ASPIRIN 81 MG: 81 TABLET, COATED ORAL at 23:01

## 2020-11-27 RX ADMIN — Medication 10 ML: at 14:43

## 2020-11-27 RX ADMIN — WARFARIN SODIUM 2.5 MG: 2 TABLET ORAL at 17:35

## 2020-11-27 NOTE — PROGRESS NOTES
Hourly rounds completed with bed in low/locked position and call light within reach. Patient has rested throughout shift, with 1 BM and bathroom trip. Patient has no acute changes this shift. She is currently resting in bed with all needs met at this time. Will give report to oncoming RN.

## 2020-11-27 NOTE — PROGRESS NOTES
Rogelio Chow with [address] [facility] [address] has spoken with patient and family and they are in agreement with patient discharging home with hospice. Patient does not want HD and just wants to go home an be comfortable. [facility] [address] is having all DME delivered to patient home on Sunday.

## 2020-11-27 NOTE — PROGRESS NOTES
Hospitalist Progress Note    Subjective:   Daily Progress Note: 11/27/2020 1:21 PM    Patient presented to ER 11/22 with nausea, vomiting, fatigue and diarrhea x 4 hours PTA. admitted 11/4-11 with diarrhea/colitis and splenic infarcts. Was started on coumadin, discharged on vantin and flagyl. Diarrhea improved, then worsened again am of admission. No pain. Found with UTI, begun on rocephin. Chronic weight loss. 11/23:  Nephrology in, PD contraindicated, will need to switch to HD. Hospice recommended per Nephrology in the setting of ischemic bowel and splenic infarcts. Tolerating clear liquids. Multiple thrombi in BUE with vein mapping. 11/24: To IR for tunneled CVC placement. To dialysis with hypotension. Nausea and vomiting improved. 11/25:  PD cath removed in OR per Dr Rolly Campbell. Transfused. 11/27:  Patient and family now requesting hospice, does not want HD, but did have today. Arrangements being made, to discharge 11/29 with [address] Weak and wan. Continuing coumadin. INR: 1.5.     ADDITIONAL HISTORY:  RAD, CAD, ESRD on PD, hypertension, CVA, anemia, ASA, CAD with MI and one stent, GERD, colonic polyps, CVA, tobacco abuse, Chronic mesenteric ischemia    Current Facility-Administered Medications   Medication Dose Route Frequency    warfarin (COUMADIN) tablet 2.5 mg  2.5 mg Oral QPM    0.9% sodium chloride infusion 250 mL  250 mL IntraVENous PRN    epoetin henry-epbx (RETACRIT) 14,000 Units combo injection  14,000 Units SubCUTAneous Q7D    aspirin delayed-release tablet 81 mg  81 mg Oral QHS    metoprolol succinate (TOPROL-XL) XL tablet 25 mg  25 mg Oral DAILY    potassium chloride (K-DUR, KLOR-CON) SR tablet 20 mEq  20 mEq Oral DAILY    0.9% sodium chloride infusion  75 mL/hr IntraVENous CONTINUOUS    sodium chloride (NS) flush 5-40 mL  5-40 mL IntraVENous Q8H    sodium chloride (NS) flush 5-40 mL  5-40 mL IntraVENous PRN    potassium chloride 10 mEq in 100 ml IVPB  10 mEq IntraVENous PRN    magnesium sulfate 2 g/50 ml IVPB (premix or compounded)  2 g IntraVENous PRN    acetaminophen (TYLENOL) tablet 650 mg  650 mg Oral Q6H PRN    Or    acetaminophen (TYLENOL) suppository 650 mg  650 mg Rectal Q6H PRN    polyethylene glycol (MIRALAX) packet 17 g  17 g Oral DAILY PRN    bisacodyL (DULCOLAX) suppository 10 mg  10 mg Rectal DAILY PRN    famotidine (PEPCID) tablet 20 mg  20 mg Oral DAILY PRN    promethazine (PHENERGAN) with saline injection 25 mg  25 mg IntraVENous Q6H PRN    ondansetron (ZOFRAN ODT) tablet 4 mg  4 mg Oral Q8H PRN    Or    ondansetron (ZOFRAN) injection 4 mg  4 mg IntraVENous Q6H PRN    guaiFENesin ER (MUCINEX) tablet 1,200 mg  1,200 mg Oral BID PRN    alum-mag hydroxide-simeth (MYLANTA) oral suspension 30 mL  30 mL Oral Q4H PRN    oxyCODONE IR (ROXICODONE) tablet 5 mg  5 mg Oral Q4H PRN    oxyCODONE IR (ROXICODONE) tablet 10 mg  10 mg Oral Q4H PRN    zolpidem (AMBIEN) tablet 5 mg  5 mg Oral QHS PRN    guaiFENesin-dextromethorphan (ROBITUSSIN DM) 100-10 mg/5 mL syrup 10 mL  10 mL Oral Q4H PRN    senna-docusate (PERICOLACE) 8.6-50 mg per tablet 2 Tab  2 Tab Oral DAILY PRN    LORazepam (ATIVAN) tablet 0.5 mg  0.5 mg Oral Q4H PRN    mirtazapine (REMERON) tablet 15 mg  15 mg Oral QHS PRN    hydrALAZINE (APRESOLINE) tablet 50 mg  50 mg Oral QID PRN        Review of Systems  A comprehensive review of systems was negative except for that written in the HPI. Objective:     Visit Vitals  /66   Pulse 72   Temp 97.4 °F (36.3 °C)   Resp 18   Ht 5' 1\" (1.549 m)   Wt 61.2 kg (135 lb)   SpO2 95%   BMI 25.51 kg/m²    O2 Flow Rate (L/min): 2 l/min O2 Device: Nasal cannula    Temp (24hrs), Av.3 °F (36.8 °C), Min:97.4 °F (36.3 °C), Max:99 °F (37.2 °C)    701 -  1900  In: -   Out: 1000     General appearance: Chronically ill appearing. Wan. Oriented and alert, cooperative. Pain controlled.     Head: Normocephalic, without obvious abnormality, atraumatic  Eyes: conjunctivae/corneas clear. PERRL  Throat: Lips, mucosa, and tongue normal. Teeth and gums normal  Neck: supple, symmetrical, trachea midline, no JVD  Lungs: clear to auscultation bilaterally  Heart: regular rate and rhythm, S1, S2 normal, no murmur, click, rub or gallop  Abdomen: soft, non-tender. Bowel sounds normal. No masses,  no organomegaly. PD removal site dressing dry. Extremities: All extremities normal, atraumatic, no cyanosis. +  edema  Skin: Skin color pale, texture, turgor normal. No rashes or lesions  Neurologic: Grossly normal    Additional comments: Notes,orders, test results, vitals reviewed    Data Review  Recent Results (from the past 24 hour(s))   PROTHROMBIN TIME + INR    Collection Time: 11/27/20  5:34 AM   Result Value Ref Range    Prothrombin time 19.9 (H) 12.5 - 14.7 sec    INR 1.7     CBC WITH AUTOMATED DIFF    Collection Time: 11/27/20  5:34 AM   Result Value Ref Range    WBC 4.5 4.3 - 11.1 K/uL    RBC 3.07 (L) 4.05 - 5.2 M/uL    HGB 9.3 (L) 11.7 - 15.4 g/dL    HCT 28.5 (L) 35.8 - 46.3 %    MCV 92.8 79.6 - 97.8 FL    MCH 30.3 26.1 - 32.9 PG    MCHC 32.6 31.4 - 35.0 g/dL    RDW 14.6 11.9 - 14.6 %    PLATELET 000 (L) 278 - 450 K/uL    MPV 12.2 9.4 - 12.3 FL    ABSOLUTE NRBC 0.00 0.0 - 0.2 K/uL    DF AUTOMATED      NEUTROPHILS 47 43 - 78 %    LYMPHOCYTES 38 13 - 44 %    MONOCYTES 9 4.0 - 12.0 %    EOSINOPHILS 6 0.5 - 7.8 %    BASOPHILS 1 0.0 - 2.0 %    IMMATURE GRANULOCYTES 0 0.0 - 5.0 %    ABS. NEUTROPHILS 2.1 1.7 - 8.2 K/UL    ABS. LYMPHOCYTES 1.7 0.5 - 4.6 K/UL    ABS. MONOCYTES 0.4 0.1 - 1.3 K/UL    ABS. EOSINOPHILS 0.3 0.0 - 0.8 K/UL    ABS. BASOPHILS 0.0 0.0 - 0.2 K/UL    ABS. IMM.  GRANS. 0.0 0.0 - 0.5 K/UL   METABOLIC PANEL, BASIC    Collection Time: 11/27/20  5:34 AM   Result Value Ref Range    Sodium 143 136 - 145 mmol/L    Potassium 4.4 3.5 - 5.1 mmol/L    Chloride 114 (H) 98 - 107 mmol/L    CO2 22 21 - 32 mmol/L    Anion gap 7 7 - 16 mmol/L    Glucose 73 65 - 100 mg/dL    BUN 18 8 - 23 MG/DL    Creatinine 8.11 (H) 0.6 - 1.0 MG/DL    GFR est AA 6 (L) >60 ml/min/1.73m2    GFR est non-AA 5 (L) >60 ml/min/1.73m2    Calcium 7.0 (L) 8.3 - 10.4 MG/DL       Assessment/Plan:   Acute UTI:  Treated with Rocephin. Stopped 11/25 with negative culture     ESRD:  Changing from PD to HD. Poor prognosis   Nephro on board   11/27:  Patient decides she does not want HD,    Wants to go home on hospice. Nephrology aware     Anemia of ESRD    Follow, transfuse prn     History of CVA     Coagulopathy on coumadin:  INR 1.5 today    S/P coronary artery stent placement    Pure hypercholesterolemia:  Home meds     History of right-sided carotid endarterectomy     PAD:      Care Plan discussed with: Patient/Family and Nurse    Home with hospice 11/29.     Signed By: Mandi Gonsales NP     November 27, 2020

## 2020-11-27 NOTE — PROGRESS NOTES
PT Daily Note:  Two attempts were made to see patient for physical therapy this morning. First attempt, patient was off the floor in HD. Second attempt, patient declined PT. Will check back on patient at a later date/time if schedule permits.   Thank you,  Gordon Szymanski, PTA

## 2020-11-27 NOTE — PROGRESS NOTES
Problem: Self Care Deficits Care Plan (Adult)  Goal: *Acute Goals and Plan of Care (Insert Text)  Outcome: Progressing Towards Goal  Note: 1. Patient will complete lower body bathing and dressing with modified independence and adaptive equipment as needed. 2. Patient will complete toileting with modified independence. 3. Patient will tolerate 30 minutes of OT treatment with 2-3 rest breaks to increase activity tolerance for ADLs. 4. Patient will complete functional transfers with modified independence and adaptive equipment as needed. 5. Patient will complete functional mobility for household distances with modified independence and good safety awareness. Timeframe: 7 visits        OCCUPATIONAL THERAPY: Initial Assessment, Daily Note, and AM 11/27/2020  INPATIENT: OT Visit Days: 2  Payor: Zina Cano / Plan: [address] [place] [address] HMO / Product Type: Managed Care Medicare /      NAME/AGE/GENDER: Felix Hall is a 65 y.o. female   PRIMARY DIAGNOSIS:  UTI (urinary tract infection) [N39.0] UTI (urinary tract infection) UTI (urinary tract infection)  Procedure(s) (LRB):  PERITONEAL DIALYSIS CATHETER REMOVAL (N/A)  2 Days Post-Op  ICD-10: Treatment Diagnosis:    · Generalized Muscle Weakness (M62.81)  · Other lack of cordination (R27.8)   Precautions/Allergies:     Codeine and Hydralazine      ASSESSMENT:     Ms. Nalini Kay presents to the hospital with a UTI. Pt presents supine upon arrival. Pt alert and agreeable to therapy. Pt had dialysis today so she was slightly fatigued. Pt performed functional mobility in hallway and UE exercises (in grid below) to increase strength and activity tolerance. Pt tolerated both well. She demonstrates fair standing balance using rolling walker. Pt returned to room and left with all needs in reach. Will continue to benefit from skilled OT during stay.   This section established at most recent assessment   PROBLEM LIST (Impairments causing functional limitations):  1. Decreased Strength  2. Decreased ADL/Functional Activities  3. Decreased Transfer Abilities  4. Decreased Ambulation Ability/Technique  5. Decreased Balance  6. Decreased Activity Tolerance  7. Decreased [place] with Home Exercise Program   INTERVENTIONS PLANNED: (Benefits and precautions of occupational therapy have been discussed with the patient.)  1. Activities of daily living training  2. Adaptive equipment training  3. Balance training  4. Clothing management  5. Donning&doffing training  6. Neuromuscular re-eduation  7. Therapeutic activity  8. Therapeutic exercise     TREATMENT PLAN: Frequency/Duration: Follow patient 3 times per week to address above goals. Rehabilitation Potential For Stated Goals: Excellent     REHAB RECOMMENDATIONS (at time of discharge pending progress):    Placement: It is my opinion, based on this patient's performance to date, that Ms. Ulises Fam may benefit from [address] after discharge due to the functional deficits listed above that are likely to improve with skilled rehabilitation because he/she has multiple medical issues that affect his/her functional mobility in the community.   Equipment:    TBD              OCCUPATIONAL PROFILE AND HISTORY:   History of Present Injury/Illness (Reason for Referral):  See H&P  Past Medical History/Comorbidities:   Ms. Ulises Fam  has a past medical history of Anemia, Arrhythmia, Aspirin long-term use ([facility]), CAD (coronary artery disease) (2012), Chronic kidney disease, Chronic obstructive pulmonary disease ([facility] [address] CKD (chronic kidney disease) stage 3, GFR 30-59 ml/min ( ), ESRD on peritoneal dialysis ([facility] [address] Flat affect, GERD (gastroesophageal reflux disease), Heart murmur (not followed per cardiology), History of MI (myocardial infarction) (2012), colonic polyps (2016), Hydronephrosis, Hypertension, Left-sided weakness (2012), Loss of appetite, Peritoneal dialysis catheter in place [facility]), Poor historian, Renal atrophy, right, Renal insufficiency, Smoker, Smoker, Stented coronary artery ([facility]), and Stroke ([facility] [address] (2012). Ms. Katharina Agosto  has a past surgical history that includes colonoscopy (N/A, 9/26/2016); pr left heart cath,percutaneous (4/2012); hx urological; ir insert tunl cvc w/o port over 5 yr (8/7/2019); hx carotid endarterectomy (Right, 11/07/2018); vascular surgery procedure unlist (02/28/2019); vascular surgery procedure unlist (07/01/2019); vascular surgery procedure unlist (08/06/2019); and ir insert tunl cvc w/o port over 5 yr (11/24/2020). Social History/Living Environment:   Home Environment: Private residence  One/Two Story Residence: One story  Living Alone: No  Support Systems: Family member(s)  Patient Expects to be Discharged to[de-identified] Private residence  Current DME Used/Available at Home: Cane, straight  Tub or Shower Type: Tub/Shower combination  Prior Level of Function/Work/Activity:  Pt lives at home with her son. Pt uses a cane/walker for functional mobility. Pt denies any falls. Pt is typically modified independent with ADL. Pt reports son is at home with her all day. Personal Factors: Other factors that influence how disability is experienced by the patient:  multiple co-morbidities    Number of Personal Factors/Comorbidities that affect the Plan of Care: Expanded review of therapy/medical records (1-2):  MODERATE COMPLEXITY   ASSESSMENT OF OCCUPATIONAL PERFORMANCE[de-identified]   Activities of Daily Living:   Basic ADLs (From Assessment) Complex ADLs (From Assessment)   Feeding: Setup  Oral Facial Hygiene/Grooming: Minimum assistance  Bathing: Moderate assistance  Upper Body Dressing: Stand-by assistance  Lower Body Dressing:  Moderate assistance  Toileting: Minimum assistance Instrumental ADL  Meal Preparation: Moderate assistance  Homemaking: Maximum assistance   Grooming/Bathing/Dressing Activities of Daily Living     Cognitive Retraining  Safety/Judgement: Awareness of environment Bed/Mat Mobility  Supine to Sit: Minimum assistance  Sit to Stand: Contact guard assistance;Minimum assistance     Most Recent Physical Functioning:   Gross Assessment:                  Posture:     Balance:  Sitting - Static: Good (unsupported)  Sitting - Dynamic: Fair (occasional)  Standing - Static: Good  Standing - Dynamic : Fair Bed Mobility:  Supine to Sit: Minimum assistance  Wheelchair Mobility:     Transfers:  Sit to Stand: Contact guard assistance;Minimum assistance            Patient Vitals for the past 6 hrs:   BP SpO2 Pulse   20 0958 (!) 146/60  (!) 59   20 1030 114/66 95 % 72   20 1543 107/68 97 % 75       Mental Status  Neurologic State: Alert, Appropriate for age  Orientation Level: Oriented X4  Cognition: Follows commands  Perception: Appears intact  Perseveration: No perseveration noted  Safety/Judgement: Awareness of environment                          Physical Skills Involved:  1. Balance  2. Strength  3. Activity Tolerance Cognitive Skills Affected (resulting in the inability to perform in a timely and safe manner):  1. Executive Function Psychosocial Skills Affected:  1. Habits/Routines   Number of elements that affect the Plan of Care: 3-5:  MODERATE COMPLEXITY   CLINICAL DECISION MAKIN [place] [id number] AM-PAC 6 Clicks   Daily Activity Inpatient Short Form  How much help from another person does the patient currently need. .. Total A Lot A Little None   1. Putting on and taking off regular lower body clothing? [] 1   [x] 2   [] 3   [] 4   2. Bathing (including washing, rinsing, drying)? [] 1   [x] 2   [] 3   [] 4   3. Toileting, which includes using toilet, bedpan or urinal?   [] 1   [] 2   [x] 3   [] 4   4. Putting on and taking off regular upper body clothing? [] 1   [] 2   [x] 3   [] 4   5. Taking care of personal grooming such as brushing teeth? [] 1   [] 2   [x] 3   [] 4   6. Eating meals?    [] 1   [] 2   [x] 3   [] 4   © 2007, Trustees of [facility] [address] [address], under license to [employer]. All rights reserved      Score:  Initial: 16 Most Recent: X (Date: -- )    Interpretation of Tool:  Represents activities that are increasingly more difficult (i.e. Bed mobility, Transfers, Gait). Medical Necessity:     · Patient demonstrates   · good and excellent  ·  rehab potential due to higher previous functional level. Reason for Services/Other Comments:  · Patient continues to require skilled intervention due to   · Decreased independence with ADL/functional transfers. · .   Use of outcome tool(s) and clinical judgement create a POC that gives a: LOW COMPLEXITY         TREATMENT:   (In addition to Assessment/Re-Assessment sessions the following treatments were rendered)     Pre-treatment Symptoms/Complaints:    Pain: Initial:   Pain Intensity 1: 0  Post Session:  0     Therapeutic Activity: (10 minutes): Therapeutic activities including Bed transfers and functional mobility on level ground to improve mobility, strength and balance. Required minimal   to promote static and dynamic balance in standing. Therapeutic Exercise: (15 minutes):  Exercises per grid below to improve mobility, strength and activity tolerance. Required minimal visual, verbal and tactile cues to promote proper body alignment and promote proper body mechanics. Progressed resistance and repetitions as indicated.      Date:  11/27 Date:   Date:     Activity/Exercise Parameters Parameters Parameters   Shoulder Abd/Adduction 10 reps     Shoulder Flexion 10 reps     Elbow Flexion 10 reps     Punches 10 reps                         Braces/Orthotics/Lines/Etc:   · IV  · PEG   · O2 Device: Room air  Treatment/Session Assessment:    · Response to Treatment:  Pt tolerated it well  · Interdisciplinary Collaboration:   o Certified Occupational Therapy Assistant  o Registered Nurse  · After treatment position/precautions:   o Supine in bed  o Bed alarm/tab alert on  o Bed/Chair-wheels locked  o Bed in low position  o Call light within reach  o RN notified   · Compliance with Program/Exercises: Will assess as treatment progresses. · Recommendations/Intent for next treatment session: \"Next visit will focus on advancements to more challenging activities and reduction in assistance provided\".   Total Treatment Duration:  OT Patient Time In/Time Out  Time In: 1505  Time Out: [facility] Vicente Carter

## 2020-11-27 NOTE — PROGRESS NOTES
Warfarin dosing per pharmacist    Melina Velasquez is a 65 y.o. female. Height: 5' 1\" (154.9 cm)    Weight: 61.2 kg (135 lb)    Indication:  Splenic infarcts    Goal INR:  2-3    Home dose:  2.5 mg qhs    Risk factors or significant drug interactions:  none    Other anticoagulants:  none    Daily Monitoring  Date  INR     Warfarin dose HGB              Notes  11/22  1.5  2.5 mg  10.5  11/23  1.7  Held  ---  11/24  1.8  2.5 mg  7.7   11/25  1.7  2.5 mg  7.3  11/26  1.4  4 mg  9.6  11/27  1.7  2.5 mg  9.3      Pharmacy is consulted to manage warfarin for Ms. Saturnino Smallwood. She was initiated on warfarin here earlier this month and INR brenda rapidly on 2.5 mg - the patient was also on cipro at that time. She has been holding warfarin prior to this admission and her INR is low. INR increasing and at 1.7. Will adjust dose back to 2.5 mg qhs. Daily INR. Pharmacy will continue to follow. Please call with any questions.     Thank you,  Teddy Fonseca, PharmD, BCPS  Clinical Pharmacist  [phone]

## 2020-11-27 NOTE — PROGRESS NOTES
CM sent fax to [facility] as that is the wishes of patient and family. CM will follow up with Hospice.

## 2020-11-27 NOTE — DIALYSIS
TRANSFER IN - DIALYSIS    Received patient in dialysis unit  from [facility] for ordered procedure. Consent verified for renal replacement therapy. Patient drowsy and vital signs stable. /56 P 69    Hemodialysis initiated using CVC, right IJ. Aspirated and flushed both ports without difficulty. Dressing clean, dry and intact. Machine settings per MD order. No heparin this treatment. Will monitor during treatment.

## 2020-11-27 NOTE — PROGRESS NOTES
Problem: Falls - Risk of  Goal: *Absence of Falls  Description: Document Tamara Luciano Fall Risk and appropriate interventions in the flowsheet. Outcome: Progressing Towards Goal  Note: Fall Risk Interventions:  Mobility Interventions: Patient to call before getting OOB         Medication Interventions: Evaluate medications/consider consulting pharmacy    Elimination Interventions: Call light in reach              Problem: Patient Education: Go to Patient Education Activity  Goal: Patient/Family Education  Outcome: Progressing Towards Goal     Problem: Pressure Injury - Risk of  Goal: *Prevention of pressure injury  Description: Document Izaiah Scale and appropriate interventions in the flowsheet.   Outcome: Progressing Towards Goal  Note: Pressure Injury Interventions:  Sensory Interventions: Assess changes in LOC    Moisture Interventions: Absorbent underpads    Activity Interventions: Pressure redistribution bed/mattress(bed type)    Mobility Interventions: Assess need for specialty bed    Nutrition Interventions: Document food/fluid/supplement intake    Friction and Shear Interventions: Apply protective barrier, creams and emollients

## 2020-11-27 NOTE — INTERDISCIPLINARY ROUNDS
Interdisciplinary Rounds completed. Nursing, Case Management, and Physician  present. Plan of care reviewed and updated. Probable discharge Sunday or Monday with home hospice.

## 2020-11-27 NOTE — PROGRESS NOTES
Juana Ford  Admission Date: 11/22/2020             Renal Daily Progress Note: 11/27/2020      Subjective:       ROS:  General: no fever/chills  CV: no CP  Lung: no SOB, no cough  GI: abd pain   Ext: no edema    Current Facility-Administered Medications   Medication Dose Route Frequency    warfarin (COUMADIN) tablet 2.5 mg  2.5 mg Oral QPM    0.9% sodium chloride infusion 250 mL  250 mL IntraVENous PRN    epoetin henry-epbx (RETACRIT) 14,000 Units combo injection  14,000 Units SubCUTAneous Q7D    aspirin delayed-release tablet 81 mg  81 mg Oral QHS    metoprolol succinate (TOPROL-XL) XL tablet 25 mg  25 mg Oral DAILY    potassium chloride (K-DUR, KLOR-CON) SR tablet 20 mEq  20 mEq Oral DAILY    0.9% sodium chloride infusion  75 mL/hr IntraVENous CONTINUOUS    sodium chloride (NS) flush 5-40 mL  5-40 mL IntraVENous Q8H    sodium chloride (NS) flush 5-40 mL  5-40 mL IntraVENous PRN    potassium chloride 10 mEq in 100 ml IVPB  10 mEq IntraVENous PRN    magnesium sulfate 2 g/50 ml IVPB (premix or compounded)  2 g IntraVENous PRN    acetaminophen (TYLENOL) tablet 650 mg  650 mg Oral Q6H PRN    Or    acetaminophen (TYLENOL) suppository 650 mg  650 mg Rectal Q6H PRN    polyethylene glycol (MIRALAX) packet 17 g  17 g Oral DAILY PRN    bisacodyL (DULCOLAX) suppository 10 mg  10 mg Rectal DAILY PRN    famotidine (PEPCID) tablet 20 mg  20 mg Oral DAILY PRN    promethazine (PHENERGAN) with saline injection 25 mg  25 mg IntraVENous Q6H PRN    ondansetron (ZOFRAN ODT) tablet 4 mg  4 mg Oral Q8H PRN    Or    ondansetron (ZOFRAN) injection 4 mg  4 mg IntraVENous Q6H PRN    guaiFENesin ER (MUCINEX) tablet 1,200 mg  1,200 mg Oral BID PRN    alum-mag hydroxide-simeth (MYLANTA) oral suspension 30 mL  30 mL Oral Q4H PRN    oxyCODONE IR (ROXICODONE) tablet 5 mg  5 mg Oral Q4H PRN    oxyCODONE IR (ROXICODONE) tablet 10 mg  10 mg Oral Q4H PRN    zolpidem (AMBIEN) tablet 5 mg  5 mg Oral QHS PRN    guaiFENesin-dextromethorphan (ROBITUSSIN DM) 100-10 mg/5 mL syrup 10 mL  10 mL Oral Q4H PRN    senna-docusate (PERICOLACE) 8.6-50 mg per tablet 2 Tab  2 Tab Oral DAILY PRN    LORazepam (ATIVAN) tablet 0.5 mg  0.5 mg Oral Q4H PRN    mirtazapine (REMERON) tablet 15 mg  15 mg Oral QHS PRN    hydrALAZINE (APRESOLINE) tablet 50 mg  50 mg Oral QID PRN         Objective:     Vitals:    11/27/20 0831 11/27/20 0903 11/27/20 0930 11/27/20 0958   BP: (!) 143/64 111/60 (!) 156/65 (!) 146/60   Pulse: 70 (!) 52 65 (!) 59   Resp:       Temp:       SpO2:       Weight:       Height:         Intake and Output:   No intake/output data recorded. 11/27 0701 - 11/27 1900  In: -   Out: 1000     Physical Exam:   Constitutional:  the patient is well developed and in no acute distress, alert and oriented   HEENT:  Sclera clear, pupils equal, oral mucosa moist  Lungs: clear bilaterally   Cardiovascular:  Regular rate, S1, S2, no Rub   Abd/GI: soft and +tender  Ext: warm without cyanosis. There is no lower leg edema. Skin:  no jaundice or rashes   Neuro: no gross neuro deficits   Psychiatric: Calm. Access: Right TCC- intact      LAB  Recent Labs     11/27/20  0534 11/26/20  0524 11/25/20  1531 11/25/20  0459   WBC 4.5 4.5  --  3.9*   HGB 9.3* 9.6* 10.2* 7.3*   HCT 28.5* 29.8* 32.0* 22.4*   * 96*  --  127*   INR 1.7 1.4  --  1.7     Recent Labs     11/27/20  0534 11/26/20  0524 11/25/20  0459    143 142   K 4.4 4.2 4.1   * 113* 109*   CO2 22 22 27   GLU 73 71 75   BUN 18 15 12   CREA 8.11* 7.24* 5.85*     No results for input(s): PH, PCO2, PO2, HCO3 in the last 72 hours.       Assessment:  (Medical Decision Making)     Hospital Problems  Date Reviewed: 10/28/2020          Codes Class Noted POA    Nausea vomiting and diarrhea ICD-10-CM: R11.2, R19.7  ICD-9-CM: 787.91, 787.01  11/4/2020 Yes        ESRD on peritoneal dialysis [facility]) (Chronic) ICD-10-CM: N18.6, Z99.2  ICD-9-CM: 585.6, V45.11  7/29/2019 Yes PAD (peripheral artery disease) (HCC) (Chronic) ICD-10-CM: I73.9  ICD-9-CM: 443.9  6/19/2019 Yes        History of right-sided carotid endarterectomy (Chronic) ICD-10-CM: M23.315  ICD-9-CM: V45.89  11/8/2018 Yes        Pure hypercholesterolemia (Chronic) ICD-10-CM: E78.00  ICD-9-CM: 272.0  11/28/2017 Yes        * (Principal) UTI (urinary tract infection) ICD-10-CM: N39.0  ICD-9-CM: 599.0  11/10/2017 Yes        S/P coronary artery stent placement (Chronic) ICD-10-CM: Z95.5  ICD-9-CM: V45.82  2/6/2017 Yes        History of CVA (cerebrovascular accident) (Chronic) ICD-10-CM: H60.15  ICD-9-CM: V12.54  2/11/2016 Yes        Anemia of renal disease (Chronic) ICD-10-CM: N18.9, D63.1  ICD-9-CM: 285.21  1/7/2016 Yes              Plan:  (Medical Decision Making)   1. ESRD on PD [facility] [address] [place] [address]  Transition from PD to HD   Seen on HD   Appreciate CW for HD slot/hospice      2. UTI Abx per primary      3. Splenic infracts INR 1.5, warfarin per hosp      4.  Anemia    Colitis      Vandana Alvarado MD

## 2020-11-27 NOTE — DIALYSIS
TRANSFER OUT- DIALYSIS    Hemodialysis treatment completed without complications. Patient asleep for most of the treatment and VS stable. /60   P  59       1.0 Kgs removed. Flushed both ports with 10 mL of NS.  CVC dressing clean, dry, and intact, tego caps intact, bilateral lumens wrapped with 4x4 gauze. Patient to  after dialysis.

## 2020-11-27 NOTE — PROGRESS NOTES
Hourly rounds completed this shift, will give report to oncoming nurse. Pt resting in bed, call light within reach. Patient called back and she was informed of the message below. rx [facility]

## 2020-11-28 LAB
ANION GAP SERPL CALC-SCNC: 6 MMOL/L (ref 7–16)
BASOPHILS # BLD: 0 K/UL (ref 0–0.2)
BASOPHILS NFR BLD: 1 % (ref 0–2)
BUN SERPL-MCNC: 10 MG/DL (ref 8–23)
CALCIUM SERPL-MCNC: 7.2 MG/DL (ref 8.3–10.4)
CHLORIDE SERPL-SCNC: 107 MMOL/L (ref 98–107)
CO2 SERPL-SCNC: 28 MMOL/L (ref 21–32)
CREAT SERPL-MCNC: 5.51 MG/DL (ref 0.6–1)
DIFFERENTIAL METHOD BLD: ABNORMAL
EOSINOPHIL # BLD: 0.2 K/UL (ref 0–0.8)
EOSINOPHIL NFR BLD: 5 % (ref 0.5–7.8)
ERYTHROCYTE [DISTWIDTH] IN BLOOD BY AUTOMATED COUNT: 14.4 % (ref 11.9–14.6)
GLUCOSE SERPL-MCNC: 71 MG/DL (ref 65–100)
HCT VFR BLD AUTO: 28.2 % (ref 35.8–46.3)
HGB BLD-MCNC: 9.2 G/DL (ref 11.7–15.4)
IMM GRANULOCYTES # BLD AUTO: 0 K/UL (ref 0–0.5)
IMM GRANULOCYTES NFR BLD AUTO: 0 % (ref 0–5)
INR PPP: 1.6
LYMPHOCYTES # BLD: 1.8 K/UL (ref 0.5–4.6)
LYMPHOCYTES NFR BLD: 42 % (ref 13–44)
MCH RBC QN AUTO: 30.2 PG (ref 26.1–32.9)
MCHC RBC AUTO-ENTMCNC: 32.6 G/DL (ref 31.4–35)
MCV RBC AUTO: 92.5 FL (ref 79.6–97.8)
MONOCYTES # BLD: 0.5 K/UL (ref 0.1–1.3)
MONOCYTES NFR BLD: 11 % (ref 4–12)
NEUTS SEG # BLD: 1.8 K/UL (ref 1.7–8.2)
NEUTS SEG NFR BLD: 41 % (ref 43–78)
NRBC # BLD: 0 K/UL (ref 0–0.2)
PLATELET # BLD AUTO: 107 K/UL (ref 150–450)
PMV BLD AUTO: 12.3 FL (ref 9.4–12.3)
POTASSIUM SERPL-SCNC: 3.7 MMOL/L (ref 3.5–5.1)
PROTHROMBIN TIME: 19.5 SEC (ref 12.5–14.7)
RBC # BLD AUTO: 3.05 M/UL (ref 4.05–5.2)
SODIUM SERPL-SCNC: 141 MMOL/L (ref 138–145)
WBC # BLD AUTO: 4.3 K/UL (ref 4.3–11.1)

## 2020-11-28 PROCEDURE — 85025 COMPLETE CBC W/AUTO DIFF WBC: CPT

## 2020-11-28 PROCEDURE — 80048 BASIC METABOLIC PNL TOTAL CA: CPT

## 2020-11-28 PROCEDURE — 36415 COLL VENOUS BLD VENIPUNCTURE: CPT

## 2020-11-28 PROCEDURE — 85610 PROTHROMBIN TIME: CPT

## 2020-11-28 PROCEDURE — 74011250637 HC RX REV CODE- 250/637: Performed by: INTERNAL MEDICINE

## 2020-11-28 PROCEDURE — 65270000029 HC RM PRIVATE

## 2020-11-28 PROCEDURE — 74011250637 HC RX REV CODE- 250/637: Performed by: HOSPITALIST

## 2020-11-28 RX ADMIN — POTASSIUM CHLORIDE 20 MEQ: 20 TABLET, EXTENDED RELEASE ORAL at 08:47

## 2020-11-28 RX ADMIN — ASPIRIN 81 MG: 81 TABLET, COATED ORAL at 22:20

## 2020-11-28 RX ADMIN — Medication 10 ML: at 13:43

## 2020-11-28 RX ADMIN — Medication 10 ML: at 05:22

## 2020-11-28 RX ADMIN — WARFARIN SODIUM 2.5 MG: 2 TABLET ORAL at 16:57

## 2020-11-28 RX ADMIN — METOPROLOL SUCCINATE 25 MG: 25 TABLET, EXTENDED RELEASE ORAL at 08:47

## 2020-11-28 RX ADMIN — Medication 10 ML: at 22:20

## 2020-11-28 NOTE — PROGRESS NOTES
Warfarin dosing per pharmacist    Zina Lemus is a 65 y.o. female. Height: 5' 1\" (154.9 cm)    Weight: 59.2 kg (130 lb 9.6 oz)    Indication:  Splenic infarcts    Goal INR:  2-3    Home dose:  2.5 mg qhs    Risk factors or significant drug interactions:  none    Other anticoagulants:  none    Daily Monitoring  Date  INR     Warfarin dose HGB              Notes  11/22  1.5  2.5 mg  10.5  11/23  1.7  Held  ---  11/24  1.8  2.5 mg  7.7   11/25  1.7  2.5 mg  7.3  11/26  1.4  4 mg  9.6  11/27  1.7  2.5 mg  9.3  11/28  1.6  2.5 mg      Pharmacy is consulted to manage warfarin for Ms. Corey Hernandes. She was initiated on warfarin here earlier this month and INR brenda rapidly on 2.5 mg - the patient was also on cipro at that time. She has been holding warfarin prior to this admission and her INR is low. INR 1.6. Continue warfarin 2.5mg daily. Daily INR. Thank you,  Doug Cowden.  Isabell Wagner, PharmD, BCPS  Clinical Pharmacist

## 2020-11-28 NOTE — PROGRESS NOTES
Roz Arreola  Admission Date: 11/22/2020             Renal Daily Progress Note: 11/28/2020    The patient's chart is reviewed and the patient is discussed with the staff.   Follow up ESRD  Subjective:   patient seen and examined, resting comfortably  Labs and chart reviewed    ROS:  General: no fever/chills, appetite ok  CV: no CP  Lung: no SOB, no cough  GI: no N/V/D  Ext: no edema    Current Facility-Administered Medications   Medication Dose Route Frequency    warfarin (COUMADIN) tablet 2.5 mg  2.5 mg Oral QPM    0.9% sodium chloride infusion 250 mL  250 mL IntraVENous PRN    epoetin henry-epbx (RETACRIT) 14,000 Units combo injection  14,000 Units SubCUTAneous Q7D    aspirin delayed-release tablet 81 mg  81 mg Oral QHS    metoprolol succinate (TOPROL-XL) XL tablet 25 mg  25 mg Oral DAILY    potassium chloride (K-DUR, KLOR-CON) SR tablet 20 mEq  20 mEq Oral DAILY    0.9% sodium chloride infusion  75 mL/hr IntraVENous CONTINUOUS    sodium chloride (NS) flush 5-40 mL  5-40 mL IntraVENous Q8H    sodium chloride (NS) flush 5-40 mL  5-40 mL IntraVENous PRN    potassium chloride 10 mEq in 100 ml IVPB  10 mEq IntraVENous PRN    magnesium sulfate 2 g/50 ml IVPB (premix or compounded)  2 g IntraVENous PRN    acetaminophen (TYLENOL) tablet 650 mg  650 mg Oral Q6H PRN    Or    acetaminophen (TYLENOL) suppository 650 mg  650 mg Rectal Q6H PRN    polyethylene glycol (MIRALAX) packet 17 g  17 g Oral DAILY PRN    bisacodyL (DULCOLAX) suppository 10 mg  10 mg Rectal DAILY PRN    famotidine (PEPCID) tablet 20 mg  20 mg Oral DAILY PRN    promethazine (PHENERGAN) with saline injection 25 mg  25 mg IntraVENous Q6H PRN    ondansetron (ZOFRAN ODT) tablet 4 mg  4 mg Oral Q8H PRN    Or    ondansetron (ZOFRAN) injection 4 mg  4 mg IntraVENous Q6H PRN    guaiFENesin ER (MUCINEX) tablet 1,200 mg  1,200 mg Oral BID PRN    alum-mag hydroxide-simeth (MYLANTA) oral suspension 30 mL  30 mL Oral Q4H PRN    oxyCODONE IR (ROXICODONE) tablet 5 mg  5 mg Oral Q4H PRN    oxyCODONE IR (ROXICODONE) tablet 10 mg  10 mg Oral Q4H PRN    zolpidem (AMBIEN) tablet 5 mg  5 mg Oral QHS PRN    guaiFENesin-dextromethorphan (ROBITUSSIN DM) 100-10 mg/5 mL syrup 10 mL  10 mL Oral Q4H PRN    senna-docusate (PERICOLACE) 8.6-50 mg per tablet 2 Tab  2 Tab Oral DAILY PRN    LORazepam (ATIVAN) tablet 0.5 mg  0.5 mg Oral Q4H PRN    mirtazapine (REMERON) tablet 15 mg  15 mg Oral QHS PRN    hydrALAZINE (APRESOLINE) tablet 50 mg  50 mg Oral QID PRN         Objective:     Vitals:    11/27/20 2356 11/28/20 0451 11/28/20 0614 11/28/20 0754   BP: 112/67 (!) 131/55  (!) 107/53   Pulse: 79 77  74   Resp: 18 18  22   Temp: 98.3 °F (36.8 °C) 98.1 °F (36.7 °C)  98.4 °F (36.9 °C)   SpO2: 98% 98%  97%   Weight:   59.2 kg (130 lb 9.6 oz)    Height:         Intake and Output:   11/26 1901 - 11/28 0700  In: -   Out: 1000   11/28 0701 - 11/28 1900  In: 61 [P.O.:60]  Out: -     Physical Exam:   Constitutional:  the patient is well developed and in no acute distress, alert and oriented   HEENT:  Sclera clear, pupils equal, oral mucosa moist  Lungs: clear bilaterally   Cardiovascular:  Regular rate, S1, S2, no Rub   Abd/GI: soft and non-tender; with positive bowel sounds. Ext: warm without cyanosis. There is no lower leg edema. Skin:  no jaundice or rashes   Neuro: no gross neuro deficits   Psychiatric: Calm.    Access: Right TCC- intact, PD catheter intact, clean and dry      LAB  Recent Labs     11/28/20  0611 11/27/20  0534 11/26/20  0524 11/25/20  1531   WBC 4.3 4.5 4.5  --    HGB 9.2* 9.3* 9.6* 10.2*   HCT 28.2* 28.5* 29.8* 32.0*   * 105* 96*  --    INR 1.6 1.7 1.4  --      Recent Labs     11/28/20  0611 11/27/20  0534 11/26/20  0524    143 143   K 3.7 4.4 4.2    114* 113*   CO2 28 22 22   GLU 71 73 71   BUN 10 18 15   CREA 5.51* 8.11* 7.24*     No results for input(s): PH, PCO2, PO2, HCO3 in the last 72 hours.      Assessment:  (Medical Decision Making)     Hospital Problems  Date Reviewed: 10/28/2020          Codes Class Noted POA    Nausea vomiting and diarrhea ICD-10-CM: R11.2, R19.7  ICD-9-CM: 787.91, 787.01  11/4/2020 Yes        ESRD on peritoneal dialysis [facility]) (Chronic) ICD-10-CM: N18.6, Z99.2  ICD-9-CM: 585.6, V45.11  7/29/2019 Yes        PAD (peripheral artery disease) (HCC) (Chronic) ICD-10-CM: I73.9  ICD-9-CM: 443.9  6/19/2019 Yes        History of right-sided carotid endarterectomy (Chronic) ICD-10-CM: S58.744  ICD-9-CM: V45.89  11/8/2018 Yes        Pure hypercholesterolemia (Chronic) ICD-10-CM: E78.00  ICD-9-CM: 272.0  11/28/2017 Yes        * (Principal) UTI (urinary tract infection) ICD-10-CM: N39.0  ICD-9-CM: 599.0  11/10/2017 Yes        S/P coronary artery stent placement (Chronic) ICD-10-CM: Z95.5  ICD-9-CM: V45.82  2/6/2017 Yes        History of CVA (cerebrovascular accident) (Chronic) ICD-10-CM: D68.97  ICD-9-CM: V12.54  2/11/2016 Yes        Anemia of renal disease (Chronic) ICD-10-CM: N18.9, D63.1  ICD-9-CM: 285.21  1/7/2016 Yes              Plan:  (Medical Decision Making)   1. ESRD on PD [address] [place] [address]  Transition from PD to HD, pt and family not interested in HD. Have met with hospice and decided to go to comfort care. No need for further procedures.     2. UTI Abx per primary      3. Splenic infracts INR 1.5, warfarin per hosp      4. Anemia trend, transfuse hgb <7.0     5. Hypokalemia improved   I will sign off thanks.   Steve Brand MD

## 2020-11-28 NOTE — PROGRESS NOTES
Hospitalist Progress Note    Subjective:   Daily Progress Note: 11/28/2020 3:54 PM    Patient presented to ER 11/22 with nausea, vomiting, fatigue and diarrhea x 4 hours PTA. admitted 11/4-11 with diarrhea/colitis and splenic infarcts. Was started on coumadin, discharged on vantin and flagyl. Diarrhea improved, then worsened again am of admission. No pain. Found with UTI, begun on rocephin. Chronic weight loss. 11/23:  Nephrology in, PD contraindicated, will need to switch to HD. Hospice recommended per Nephrology in the setting of ischemic bowel and splenic infarcts. Tolerating clear liquids. Multiple thrombi in BUE with vein mapping. 11/24: To IR for tunneled CVC placement. To dialysis with hypotension. Nausea and vomiting improved. 11/25:  PD cath removed in OR per Dr Plaza Solid. Transfused. 11/27:  Patient and family now requesting hospice, does not want HD, but did have today. Arrangements being made, to discharge 11/29 with [address] Weak and wan. Continuing coumadin. INR: 1.5.    11/28:  Nephrology signing off as patient/family do not want HD any longer. Denies pain or need. Home tomorrow with Hospice. Pleasant and calm. No family in room.      ADDITIONAL HISTORY:  RAD, CAD, ESRD on PD, hypertension, CVA, anemia, ASA, CAD with MI and one stent, GERD, colonic polyps, CVA, tobacco abuse, Chronic mesenteric ischemia    Current Facility-Administered Medications   Medication Dose Route Frequency    warfarin (COUMADIN) tablet 2.5 mg  2.5 mg Oral QPM    0.9% sodium chloride infusion 250 mL  250 mL IntraVENous PRN    epoetin henry-epbx (RETACRIT) 14,000 Units combo injection  14,000 Units SubCUTAneous Q7D    aspirin delayed-release tablet 81 mg  81 mg Oral QHS    metoprolol succinate (TOPROL-XL) XL tablet 25 mg  25 mg Oral DAILY    potassium chloride (K-DUR, KLOR-CON) SR tablet 20 mEq  20 mEq Oral DAILY    0.9% sodium chloride infusion  75 mL/hr IntraVENous CONTINUOUS    sodium chloride (NS) flush 5-40 mL  5-40 mL IntraVENous Q8H    sodium chloride (NS) flush 5-40 mL  5-40 mL IntraVENous PRN    potassium chloride 10 mEq in 100 ml IVPB  10 mEq IntraVENous PRN    magnesium sulfate 2 g/50 ml IVPB (premix or compounded)  2 g IntraVENous PRN    acetaminophen (TYLENOL) tablet 650 mg  650 mg Oral Q6H PRN    Or    acetaminophen (TYLENOL) suppository 650 mg  650 mg Rectal Q6H PRN    polyethylene glycol (MIRALAX) packet 17 g  17 g Oral DAILY PRN    bisacodyL (DULCOLAX) suppository 10 mg  10 mg Rectal DAILY PRN    famotidine (PEPCID) tablet 20 mg  20 mg Oral DAILY PRN    promethazine (PHENERGAN) with saline injection 25 mg  25 mg IntraVENous Q6H PRN    ondansetron (ZOFRAN ODT) tablet 4 mg  4 mg Oral Q8H PRN    Or    ondansetron (ZOFRAN) injection 4 mg  4 mg IntraVENous Q6H PRN    guaiFENesin ER (MUCINEX) tablet 1,200 mg  1,200 mg Oral BID PRN    alum-mag hydroxide-simeth (MYLANTA) oral suspension 30 mL  30 mL Oral Q4H PRN    oxyCODONE IR (ROXICODONE) tablet 5 mg  5 mg Oral Q4H PRN    oxyCODONE IR (ROXICODONE) tablet 10 mg  10 mg Oral Q4H PRN    zolpidem (AMBIEN) tablet 5 mg  5 mg Oral QHS PRN    guaiFENesin-dextromethorphan (ROBITUSSIN DM) 100-10 mg/5 mL syrup 10 mL  10 mL Oral Q4H PRN    senna-docusate (PERICOLACE) 8.6-50 mg per tablet 2 Tab  2 Tab Oral DAILY PRN    LORazepam (ATIVAN) tablet 0.5 mg  0.5 mg Oral Q4H PRN    mirtazapine (REMERON) tablet 15 mg  15 mg Oral QHS PRN    hydrALAZINE (APRESOLINE) tablet 50 mg  50 mg Oral QID PRN      Review of Systems  A comprehensive review of systems was negative except for that written in the HPI.     Objective:     Visit Vitals  /63   Pulse 67   Temp 98.1 °F (36.7 °C)   Resp 16   Ht 5' 1\" (1.549 m)   Wt 59.2 kg (130 lb 9.6 oz)   SpO2 99%   BMI 24.68 kg/m²    O2 Flow Rate (L/min): 2 l/min O2 Device: Nasal cannula    Temp (24hrs), Av.4 °F (36.9 °C), Min:98.1 °F (36.7 °C), Max:99 °F (37.2 °C)    701 -  190  In: 60 [P.O.:60]  Out: -   11/26 1901 - 11/28 0700  In: -   Out: 1000     General appearance: Chronically ill appearing. Wan. Oriented and alert, cooperative. Pain controlled. Head: Normocephalic, without obvious abnormality, atraumatic  Eyes: conjunctivae/corneas clear. PERRL  Throat: Lips, mucosa, and tongue normal. Teeth and gums normal  Neck: supple, symmetrical, trachea midline, no JVD  Lungs: clear to auscultation bilaterally  Heart: regular rate and rhythm, S1, S2 normal, no murmur, click, rub or gallop  Abdomen: soft, non-tender. Bowel sounds normal. No masses,  no organomegaly. PD removal site dressing dry. Extremities: All extremities normal, atraumatic, no cyanosis. +  edema  Skin: Skin color pale, texture, turgor normal. No rashes or lesions  Neurologic: Grossly normal     Additional comments: Notes,orders, test results, vitals reviewed    Data Review  Recent Results (from the past 24 hour(s))   PROTHROMBIN TIME + INR    Collection Time: 11/28/20  6:11 AM   Result Value Ref Range    Prothrombin time 19.5 (H) 12.5 - 14.7 sec    INR 1.6     CBC WITH AUTOMATED DIFF    Collection Time: 11/28/20  6:11 AM   Result Value Ref Range    WBC 4.3 4.3 - 11.1 K/uL    RBC 3.05 (L) 4.05 - 5.2 M/uL    HGB 9.2 (L) 11.7 - 15.4 g/dL    HCT 28.2 (L) 35.8 - 46.3 %    MCV 92.5 79.6 - 97.8 FL    MCH 30.2 26.1 - 32.9 PG    MCHC 32.6 31.4 - 35.0 g/dL    RDW 14.4 11.9 - 14.6 %    PLATELET 675 (L) 616 - 450 K/uL    MPV 12.3 9.4 - 12.3 FL    ABSOLUTE NRBC 0.00 0.0 - 0.2 K/uL    DF AUTOMATED      NEUTROPHILS 41 (L) 43 - 78 %    LYMPHOCYTES 42 13 - 44 %    MONOCYTES 11 4.0 - 12.0 %    EOSINOPHILS 5 0.5 - 7.8 %    BASOPHILS 1 0.0 - 2.0 %    IMMATURE GRANULOCYTES 0 0.0 - 5.0 %    ABS. NEUTROPHILS 1.8 1.7 - 8.2 K/UL    ABS. LYMPHOCYTES 1.8 0.5 - 4.6 K/UL    ABS. MONOCYTES 0.5 0.1 - 1.3 K/UL    ABS. EOSINOPHILS 0.2 0.0 - 0.8 K/UL    ABS. BASOPHILS 0.0 0.0 - 0.2 K/UL    ABS. IMM.  GRANS. 0.0 0.0 - 0.5 K/UL   METABOLIC PANEL, BASIC Collection Time: 11/28/20  6:11 AM   Result Value Ref Range    Sodium 141 138 - 145 mmol/L    Potassium 3.7 3.5 - 5.1 mmol/L    Chloride 107 98 - 107 mmol/L    CO2 28 21 - 32 mmol/L    Anion gap 6 (L) 7 - 16 mmol/L    Glucose 71 65 - 100 mg/dL    BUN 10 8 - 23 MG/DL    Creatinine 5.51 (H) 0.6 - 1.0 MG/DL    GFR est AA 10 (L) >60 ml/min/1.73m2    GFR est non-AA 8 (L) >60 ml/min/1.73m2    Calcium 7.2 (L) 8.3 - 10.4 MG/DL   11/22:  CXR:  NO ACUTE CARDIOPULMONARY DISEASE IDENTIFIED    11/23:  DUPLEX UPPER EXTREMITY VEIN MAPPING:  Bilateral upper extremity veins as above. Thrombus is noted within the veins of both medial cubital fossas. .     Assessment/Plan:   Acute UTI:  Treated with Rocephin. Stopped 11/25 with negative culture      ESRD:  Changing from PD to HD. Poor prognosis              Nephro on board              11/27:  Patient decides she does not want HD,               Wants to go home on hospice.   Nephrology aware      Anemia of ESRD               Follow, transfuse prn      History of CVA      Coagulopathy on coumadin:  INR 1.5 today     S/P coronary artery stent placement     Pure hypercholesterolemia:  Home meds      History of right-sided carotid endarterectomy      PAD:  Noted     Home tomorrow with hospice    Care Plan discussed with: Patient, CM and Nurse    Signed By: Masoud Borjas NP     November 28, 2020

## 2020-11-29 VITALS
HEIGHT: 61 IN | SYSTOLIC BLOOD PRESSURE: 153 MMHG | DIASTOLIC BLOOD PRESSURE: 71 MMHG | TEMPERATURE: 98.5 F | WEIGHT: 133.3 LBS | RESPIRATION RATE: 18 BRPM | OXYGEN SATURATION: 99 % | BODY MASS INDEX: 25.17 KG/M2 | HEART RATE: 67 BPM

## 2020-11-29 PROCEDURE — 74011250637 HC RX REV CODE- 250/637: Performed by: HOSPITALIST

## 2020-11-29 PROCEDURE — 74011250637 HC RX REV CODE- 250/637: Performed by: INTERNAL MEDICINE

## 2020-11-29 RX ADMIN — METOPROLOL SUCCINATE 25 MG: 25 TABLET, EXTENDED RELEASE ORAL at 09:59

## 2020-11-29 RX ADMIN — Medication 10 ML: at 14:00

## 2020-11-29 RX ADMIN — POTASSIUM CHLORIDE 20 MEQ: 20 TABLET, EXTENDED RELEASE ORAL at 09:59

## 2020-11-29 RX ADMIN — Medication 10 ML: at 05:57

## 2020-11-29 RX ADMIN — WARFARIN SODIUM 2.5 MG: 2 TABLET ORAL at 17:08

## 2020-11-29 NOTE — DISCHARGE SUMMARY
Hospitalist Discharge Summary     Admit Date:  2020 12:37 PM   DC note date: 2020  Name:  Rhea Don   Age:  65 y.o.  :  1950   MRN:  [id number]   PCP:  Johny Naik MD  Treatment Team: Attending Provider: Charlene Li MD; Consulting Provider: Demetris Agosto MD; Care Manager: Damir Stevens, RN; Utilization Review: Joyce Walls RN; Consulting Provider: Deepali Chanel MD; Nurse Practitioner: Stephanie Guthrie NP; Hospitalist: Lupe Mcneal NP; Charge Nurse: Leandra De La Cruz RN     PROBLEMS:    Acute UTI:  Treated    ESRD: Previously on PD. Does not want HD  Extremely poor prognosis   Anemia of ESRD    History of CVA    Coagulopathy on coumadin   S/P coronary artery stent placement  Hyperlipidemia   History of right-sided carotid endarterectomy   PAD  CAD with MI and stent  GERD  Chronic mesenteric ischemia  Tobacco abuse   Splenic infarcts  Hypertension    Hospital Course:     Patient presented to ER  with nausea, vomiting, fatigue and diarrhea x 4 hours PTA.  admitted - with diarrhea/colitis and splenic infarcts. Was started on coumadin, discharged on vantin and flagyl.  Diarrhea improved, then worsened again am of admission. No pain. Found with UTI, begun on rocephin. Chronic weight loss. :  Nephrology in, PD contraindicated, will need to switch to HD.  Hospice recommended per Nephrology in the setting of ischemic bowel and splenic infarcts. Tolerating clear liquids. Multiple thrombi in BUE with vein mapping.     :  To IR for tunneled CVC placement. To dialysis with hypotension. Nausea and vomiting improved. :  PD cath removed in OR per Dr Pretty Hansen. Transfused. :  Patient and family now requesting hospice, does not want HD, but did have today. Arrangements being made, to discharge  with [facility] [address] and [facility]. Continuing coumadin.  INR: 1.5.   :  Nephrology signing off as patient/family do not want HD any longer. Denies pain or need. Home tomorrow with Hospice. Pleasant and calm. No family in room. 11/29:  Family making arrangements with Hospice. Discharging home with hospice and family. Disposition: Home Hospice  Activity: Activity as tolerated  Diet: DIET RENAL Regular  Code Status: Full Code    Discharge meds at bottom of this note. Plan was discussed with patient, ESTEFANI, RN. All questions answered. Patient was stable at time of discharge. Given instructions to call a physician or return if any concerns. Diagnostic Imaging/Tests:   11/22:  CXR:  NO ACUTE CARDIOPULMONARY DISEASE IDENTIFIED     11/23:  DUPLEX UPPER EXTREMITY VEIN MAPPING:  Bilateral upper extremity veins as above. Thrombus is noted within the veins of both medial cubital fossas. .    Results for orders placed or performed during the hospital encounter of 11/22/20   EKG, 12 LEAD, INITIAL   Result Value Ref Range    Ventricular Rate 91 BPM    Atrial Rate 91 BPM    P-R Interval 162 ms    QRS Duration 84 ms    Q-T Interval 326 ms    QTC Calculation (Bezet) 400 ms    Calculated P Axis 75 degrees    Calculated R Axis 55 degrees    Calculated T Axis -108 degrees    Diagnosis       Normal sinus rhythm  Possible Left atrial enlargement  ST & T wave abnormality, consider inferior ischemia  ST & T wave abnormality, consider anterolateral ischemia  Abnormal ECG  When compared with ECG of 22-NOV-2020 12:50,  No significant change was found  Confirmed by Jaida Hooper ([id number]) on 11/22/2020 5:09:04 PM       Procedures done this admission:  Procedure(s):  PERITONEAL DIALYSIS CATHETER REMOVAL      Labs: Results:       BMP, Mg, Phos Recent Labs     11/28/20  0611 11/27/20  0534    143   K 3.7 4.4    114*   CO2 28 22   AGAP 6* 7   BUN 10 18   CREA 5.51* 8.11*   CA 7.2* 7.0*   GLU 71 73      CBC Recent Labs     11/28/20  0611 11/27/20  0534   WBC 4.3 4.5   RBC 3.05* 3.07*   HGB 9.2* 9.3*   HCT 28.2* 28.5*   * 105*   GRANS 41* 47   LYMPH 42 38   EOS 5 6   MONOS 11 9   BASOS 1 1   IG 0 0   ANEU 1.8 2.1   ABL 1.8 1.7   NEEMA 0.2 0.3   ABM 0.5 0.4   ABB 0.0 0.0   AIG 0.0 0.0      Cardiac Testing Lab Results   Component Value Date/Time     02/04/2019 10:05 PM     (H) 04/26/2012 05:46 AM     06/04/2019 04:07 PM    Troponin-I <0.05 04/26/2012 05:46 AM    Troponin-I, Qt. 0.37 (HH) 06/05/2019 02:36 PM    Troponin-I, Qt. 0.36 () 06/05/2019 07:16 AM    Troponin-I, Qt. 0.43 () 06/05/2019 01:59 AM      Coagulation Tests Lab Results   Component Value Date/Time    Prothrombin time 19.5 (H) 11/28/2020 06:11 AM    Prothrombin time 19.9 (H) 11/27/2020 05:34 AM    Prothrombin time 17.4 (H) 11/26/2020 05:24 AM    INR 1.6 11/28/2020 06:11 AM    INR 1.7 11/27/2020 05:34 AM    INR 1.4 11/26/2020 05:24 AM    aPTT 32.0 11/23/2020 03:38 PM    aPTT 26.6 11/04/2020 01:35 PM    aPTT 64.0 (H) 08/01/2019 05:43 AM      A1c Lab Results   Component Value Date/Time    Hemoglobin A1c <3.5 (L) 11/10/2017 05:17 AM      Lipid Panel Lab Results   Component Value Date/Time    Cholesterol, total 152 11/10/2017 05:17 AM    HDL Cholesterol 52 11/10/2017 05:17 AM    LDL, calculated 85.6 11/10/2017 05:17 AM    VLDL, calculated 14.4 11/10/2017 05:17 AM    Triglyceride 72 11/10/2017 05:17 AM    CHOL/HDL Ratio 2.9 11/10/2017 05:17 AM      Most Recent UA Lab Results   Component Value Date/Time    Color RED 11/22/2020 02:00 PM    Appearance CLOUDY 11/22/2020 02:00 PM    Specific gravity 1.021 11/22/2020 02:00 PM    pH (UA) 6.0 11/22/2020 02:00 PM    Protein 100 (A) 11/22/2020 02:00 PM    Glucose Negative 11/22/2020 02:00 PM    Ketone TRACE (A) 11/22/2020 02:00 PM    Bilirubin SMALL (A) 11/22/2020 02:00 PM    Blood SMALL (A) 11/22/2020 02:00 PM    Urobilinogen 1.0 11/22/2020 02:00 PM    Nitrites Negative 11/22/2020 02:00 PM    Leukocyte Esterase LARGE (A) 11/22/2020 02:00 PM    WBC >100 11/22/2020 02:00 PM    RBC 20-50 11/22/2020 02:00 PM    Epithelial cells 10-20 11/22/2020 02:00 PM    Bacteria 3+ (H) 11/22/2020 02:00 PM    Casts GRANULAR 06/05/2019 10:18 AM    Crystals, urine 0 02/29/2016 03:15 PM    Mucus 0 02/29/2016 03:15 PM    Other observations RESULTS VERIFIED MANUALLY 11/22/2020 02:00 PM        Allergies   Allergen Reactions    Codeine Nausea and Vomiting    Hydralazine Nausea and Vomiting     Immunization History   Administered Date(s) Administered    TB Skin Test (PPD) Intradermal 11/10/2017, 08/06/2019, 11/04/2020, 11/22/2020     Discharge Exam:  Patient Vitals for the past 24 hrs:   Temp Pulse Resp BP SpO2   11/29/20 0729 98.8 °F (37.1 °C) 75 16 (!) 151/59 97 %   11/29/20 0312 98.5 °F (36.9 °C) 75 18 (!) 131/51 97 %   11/29/20 0003 98.4 °F (36.9 °C) 80 18 135/62 99 %   11/28/20 1959 99.3 °F (37.4 °C) 76 19 (!) 113/50 95 %   11/28/20 1708 98.4 °F (36.9 °C) 85 20 136/60 100 %   11/28/20 1208 98.1 °F (36.7 °C) 67 16 115/63 99 %     Oxygen Therapy  O2 Sat (%): 97 % (11/29/20 0729)  Pulse via Oximetry: 72 beats per minute (11/25/20 1104)  O2 Device: Nasal cannula (11/27/20 1838)  O2 Flow Rate (L/min): 2 l/min (11/27/20 1838)    Estimated body mass index is 25.19 kg/m² as calculated from the following:    Height as of this encounter: 5' 1\" (1.549 m). Weight as of this encounter: 60.5 kg (133 lb 4.8 oz). Intake/Output Summary (Last 24 hours) at 11/29/2020 1043  Last data filed at 11/29/2020 0908  Gross per 24 hour   Intake 480 ml   Output    Net 480 ml       General appearance: Chronically ill appearing. Enrique Clipper. Oriented and alert, cooperative.  Pain controlled.    Head: Normocephalic, without obvious abnormality, atraumatic  Eyes: conjunctivae/corneas clear. PERRL  Throat: Lips, mucosa, and tongue normal. Teeth and gums normal  Neck: supple, symmetrical, trachea midline, no JVD  Lungs: clear to auscultation bilaterally  Heart: regular rate and rhythm, S1, S2 normal, no murmur, click, rub or gallop  Abdomen: soft, non-tender.  Bowel sounds normal. No masses,  no organomegaly.  PD removal site dressing dry.    Extremities: All extremities normal, atraumatic, no cyanosis.  +  edema  Skin: Skin color pale, texture, turgor normal. No rashes or lesions  Neurologic: Grossly normal     Current Med List in Hospital:   Current Facility-Administered Medications   Medication Dose Route Frequency    warfarin (COUMADIN) tablet 2.5 mg  2.5 mg Oral QPM    0.9% sodium chloride infusion 250 mL  250 mL IntraVENous PRN    epoetin henry-epbx (RETACRIT) 14,000 Units combo injection  14,000 Units SubCUTAneous Q7D    aspirin delayed-release tablet 81 mg  81 mg Oral QHS    metoprolol succinate (TOPROL-XL) XL tablet 25 mg  25 mg Oral DAILY    potassium chloride (K-DUR, KLOR-CON) SR tablet 20 mEq  20 mEq Oral DAILY    0.9% sodium chloride infusion  75 mL/hr IntraVENous CONTINUOUS    sodium chloride (NS) flush 5-40 mL  5-40 mL IntraVENous Q8H    sodium chloride (NS) flush 5-40 mL  5-40 mL IntraVENous PRN    potassium chloride 10 mEq in 100 ml IVPB  10 mEq IntraVENous PRN    magnesium sulfate 2 g/50 ml IVPB (premix or compounded)  2 g IntraVENous PRN    acetaminophen (TYLENOL) tablet 650 mg  650 mg Oral Q6H PRN    Or    acetaminophen (TYLENOL) suppository 650 mg  650 mg Rectal Q6H PRN    polyethylene glycol (MIRALAX) packet 17 g  17 g Oral DAILY PRN    bisacodyL (DULCOLAX) suppository 10 mg  10 mg Rectal DAILY PRN    famotidine (PEPCID) tablet 20 mg  20 mg Oral DAILY PRN    promethazine (PHENERGAN) with saline injection 25 mg  25 mg IntraVENous Q6H PRN    ondansetron (ZOFRAN ODT) tablet 4 mg  4 mg Oral Q8H PRN    Or    ondansetron (ZOFRAN) injection 4 mg  4 mg IntraVENous Q6H PRN    guaiFENesin ER (MUCINEX) tablet 1,200 mg  1,200 mg Oral BID PRN    alum-mag hydroxide-simeth (MYLANTA) oral suspension 30 mL  30 mL Oral Q4H PRN    oxyCODONE IR (ROXICODONE) tablet 5 mg  5 mg Oral Q4H PRN    oxyCODONE IR (ROXICODONE) tablet 10 mg  10 mg Oral Q4H PRN    zolpidem (AMBIEN) tablet 5 mg  5 mg Oral QHS PRN    guaiFENesin-dextromethorphan (ROBITUSSIN DM) 100-10 mg/5 mL syrup 10 mL  10 mL Oral Q4H PRN    senna-docusate (PERICOLACE) 8.6-50 mg per tablet 2 Tab  2 Tab Oral DAILY PRN    LORazepam (ATIVAN) tablet 0.5 mg  0.5 mg Oral Q4H PRN    mirtazapine (REMERON) tablet 15 mg  15 mg Oral QHS PRN    hydrALAZINE (APRESOLINE) tablet 50 mg  50 mg Oral QID PRN       Discharge Info:   Current Discharge Medication List      CONTINUE these medications which have NOT CHANGED    Details   ondansetron (Zofran ODT) 8 mg disintegrating tablet Take 1 Tab by mouth every eight (8) hours as needed for Nausea. Qty: 30 Tab, Refills: 0      potassium chloride (K-DUR, KLOR-CON) 20 mEq tablet Take 1 Tab by mouth daily. Qty: 30 Tab, Refills: 1      metoprolol succinate (TOPROL-XL) 25 mg XL tablet Take 1 Tab by mouth daily. Qty: 30 Tab, Refills: 1      warfarin (COUMADIN) 2.5 mg tablet Warfarin 2.5 mg po every evening. TO BE STARTED UNDER DIRECTION OF PCP  Patient started on coumadin due to splenic infarcts. INR on 11/11/20 is 2.8. Holding coumadin. INR to be repeaated on 11/12/20, result to be faxed to PCP  Qty: 15 Tab, Refills: 0      aspirin delayed-release 81 mg tablet Take 81 mg by mouth nightly. acetaminophen (TylenoL) 325 mg tablet Take 2 Tabs by mouth every six (6) hours as needed for Pain. Qty: 30 Tab, Refills: 0      albuterol (PROVENTIL HFA, VENTOLIN HFA, PROAIR HFA) 90 mcg/actuation inhaler Take 2 Puffs by inhalation every six (6) hours as needed for Wheezing or Shortness of Breath. Qty: 1 Inhaler, Refills: 0      lanolin-mineral oil-nacl-w.pet (LUBRIDERM) lotion Apply  to affected area two (2) times a day. Qty: 1 Bottle, Refills: prn      polyethylene glycol (MIRALAX) 17 gram packet Take 1 Packet by mouth daily as needed (constipation).   Qty: 1 Each, Refills: prn             Follow Up Orders:    PER HOSPICE INSTRUCTION     Time spent in patient discharge planning and coordination 35 minutes.     Signed:  Gabrielle Weeks, NP

## 2020-11-29 NOTE — PROGRESS NOTES
Warfarin dosing per pharmacist    Yesika Rodriguez is a 65 y.o. female. Height: 5' 1\" (154.9 cm)    Weight: 60.5 kg (133 lb 4.8 oz)    Indication:  Splenic infarcts    Goal INR:  2-3    Home dose:  2.5 mg qhs    Risk factors or significant drug interactions:  none    Other anticoagulants:  none    Daily Monitoring  Date  INR     Warfarin dose HGB              Notes  11/22  1.5  2.5 mg  10.5  11/23  1.7  Held  ---  11/24  1.8  2.5 mg  7.7   11/25  1.7  2.5 mg  7.3  11/26  1.4  4 mg  9.6  11/27  1.7  2.5 mg  9.3  11/28  1.6  2.5 mg  11/29  ---  2.5 mg       Pharmacy is consulted to manage warfarin for Ms. Malena Lara. She was initiated on warfarin here earlier this month and INR brneda rapidly on 2.5 mg - the patient was also on cipro at that time. She has been holding warfarin prior to this admission and her INR is low. No new labs. Patient has decided to go home with Hospice. Continue warfarin 2.5mg daily. Thank you,  Angie Gaona.  Clark Lemus, PharmD, BCPS  Clinical Pharmacist

## 2020-11-29 NOTE — PROGRESS NOTES
Hourly rounds completed with bed in low/locked position and call light within reach. Patient has rested this shift with no acute changes. Patient REFUSED blood draw this AM, she did not want them to stick her per patient. Patient is currently resting in bed with all needs met at this time. Will give report to oncoming RN.

## 2020-11-29 NOTE — PROGRESS NOTES
CM contacted patient's daughter Quentin Horton [phone], to discuss discharge planning. Quentin Horton confirmed discharge plan, patient will return home with [address]. CM informed patient's daughter, patient will be discharged this day, if DME is delivered at the appropriate time. Daughter was receptive. ESTEFANI received call from Marietta Neumann with [address] [phone],[employer] confirmed DME will be delivered to patient's home this day. Marietta Neumann is unable to provide a set time, when DME will be delivered. However, patient's daughter Quentin Horton, has been notified to inform CM, once DME has been delivered.  will arranged [facility], at the appropriate time. CM remains available.

## 2020-11-29 NOTE — PROGRESS NOTES
Problem: Falls - Risk of  Goal: *Absence of Falls  Description: Document Marco A Going Fall Risk and appropriate interventions in the flowsheet. Outcome: Progressing Towards Goal  Note: Fall Risk Interventions:  Mobility Interventions: Bed/chair exit alarm, Patient to call before getting OOB         Medication Interventions: Bed/chair exit alarm    Elimination Interventions: Call light in reach              Problem: Patient Education: Go to Patient Education Activity  Goal: Patient/Family Education  Outcome: Progressing Towards Goal     Problem: Pressure Injury - Risk of  Goal: *Prevention of pressure injury  Description: Document Izaiah Scale and appropriate interventions in the flowsheet.   Outcome: Progressing Towards Goal  Note: Pressure Injury Interventions:  Sensory Interventions: Assess changes in LOC    Moisture Interventions: Apply protective barrier, creams and emollients, Assess need for specialty bed    Activity Interventions: Increase time out of bed, PT/OT evaluation    Mobility Interventions: Assess need for specialty bed, PT/OT evaluation    Nutrition Interventions: Document food/fluid/supplement intake    Friction and Shear Interventions: Feet elevated on foot rest, Apply protective barrier, creams and emollients, HOB 30 degrees or less, Lift sheet                Problem: Patient Education: Go to Patient Education Activity  Goal: Patient/Family Education  Outcome: Progressing Towards Goal     Problem: Patient Education: Go to Patient Education Activity  Goal: Patient/Family Education  Outcome: Progressing Towards Goal     Problem: Patient Education: Go to Patient Education Activity  Goal: Patient/Family Education  Outcome: Progressing Towards Goal

## 2020-11-29 NOTE — PROGRESS NOTES
CM was informed by Negritalesa Benoity- patient's son [phone], DME has been delivered. CM arranged [employer] [employer].  time has been scheduled 4:00pm. Patient's  Daughter has been notified. CM also informed [address] Patient to discharge home this day with [address]. No additional needs voiced at this time. Please consult or notify CM if any needs shall arise. CM remains available. Care Management Interventions  PCP Verified by CM: Yes([facility])  Mode of Transport at Discharge: S  Transition of Care Consult (CM Consult): [address]: No  [facility]: No  Reason Outside [place]: (Per Family request. )  Discharge Durable Medical Equipment: No  Physical Therapy Consult: Yes  Occupational Therapy Consult: Yes  Speech Therapy Consult: No  Current Support Network: Lives with Caregiver  Confirm Follow Up Transport: Family  The Plan for Transition of Care is Related to the Following Treatment Goals : Patient to discharge home with [address], for end of life care.    The Patient and/or Patient Representative was Provided with a Choice of Provider and Agrees with the Discharge Plan?: Yes  Freedom of Choice List was Provided with Basic Dialogue that Supports the Patient's Individualized Plan of Care/Goals, Treatment Preferences and Shares the Quality Data Associated with the Providers?: Yes  [place] Resource Information Provided?: No  Discharge Location  Discharge Placement: Home with hospice([facility]. )

## 2020-11-29 NOTE — DISCHARGE INSTRUCTIONS
Patient Education        General Discharge: Care Instructions  Your Care Instructions     One or more doctors have given you a physical exam, reviewed your symptoms, and asked about your past medical problems. You have had tests as needed. At this time, the doctors feel it is safe for you to go home. It is very important that you follow up with your doctor as directed. If you continue to have symptoms, you may need more tests or treatment. The doctor has checked you carefully, but problems can develop later. If you notice any problems or new symptoms,  get medical treatment right away. Follow-up care is a key part of your treatment and safety. Be sure to make and go to all appointments, and call your doctor if you are having problems. It's also a good idea to know your test results and keep a list of the medicines you take. How can you care for yourself at home? · Keep track of any new symptoms or changes in your symptoms. · Rest until you feel better. · Be safe with medicines. Take your medicines exactly as prescribed. Call your doctor if you think you are having a problem with your medicine. · Do not drive after taking a prescription pain medicine. When should you call for help? Call 911 anytime you think you may need emergency care. For example, call if:    · You passed out (lost consciousness). Call your doctor now or seek immediate medical care if:    · You have new symptoms like fever, difficulty breathing, vomiting, or rash.     · You have new or different pain.     · You are confused and are having trouble thinking clearly.     · Your symptoms are getting worse. Watch closely for changes in your health, and be sure to contact your doctor if:    · You do not get better as expected. Where can you learn more? Go to http://www.gray.com/  Enter G150 in the search box to learn more about \"General Discharge: Care Instructions. \"  Current as of: June 26, 6177               MTCEFVJ Version: 12.6  © 0178-8308 [employer]. Care instructions adapted under license by [employer] (which disclaims liability or warranty for this information). If you have questions about a medical condition or this instruction, always ask your healthcare professional. [address] any warranty or liability for your use of this information.

## 2020-11-29 NOTE — PROGRESS NOTES
Garret Served Lizet Del Castillo to ask if we needed to remove tunnel cath and she stated it was an IR thing and to not remove in case pt changes their mind.

## 2022-05-03 NOTE — DIALYSIS
Peritoneal dialysis initiated as ordered. Peritoneal catheter exit site cleaned with Chlorhexidine scrub and Gentamicin cream applied to PD cath exit site. Dressing changed, site has no s/s of redness, swelling, or exudate. Patient states no complaints at this time. Report given to primary RN. No

## 2022-12-05 NOTE — PROGRESS NOTES
Bedside shift report received from Kateryna Rosa RN (offgoing nurse). Report given to Konrad Mcmanus RN. Vital signs stable. No complaints present. Patient in stable condition. none
